# Patient Record
Sex: MALE | Race: WHITE | NOT HISPANIC OR LATINO | Employment: UNEMPLOYED | RURAL
[De-identification: names, ages, dates, MRNs, and addresses within clinical notes are randomized per-mention and may not be internally consistent; named-entity substitution may affect disease eponyms.]

---

## 2017-01-17 ENCOUNTER — OFFICE VISIT (OUTPATIENT)
Dept: FAMILY MEDICINE CLINIC | Facility: CLINIC | Age: 47
End: 2017-01-17

## 2017-01-17 VITALS
RESPIRATION RATE: 16 BRPM | TEMPERATURE: 98.6 F | SYSTOLIC BLOOD PRESSURE: 120 MMHG | WEIGHT: 285 LBS | HEIGHT: 71 IN | DIASTOLIC BLOOD PRESSURE: 80 MMHG | BODY MASS INDEX: 39.9 KG/M2 | HEART RATE: 84 BPM

## 2017-01-17 DIAGNOSIS — E78.5 HYPERLIPIDEMIA, UNSPECIFIED HYPERLIPIDEMIA TYPE: ICD-10-CM

## 2017-01-17 DIAGNOSIS — E66.9 NON MORBID OBESITY, UNSPECIFIED OBESITY TYPE: ICD-10-CM

## 2017-01-17 DIAGNOSIS — F90.2 ATTENTION DEFICIT HYPERACTIVITY DISORDER (ADHD), COMBINED TYPE: Primary | ICD-10-CM

## 2017-01-17 DIAGNOSIS — R73.03 PREDIABETES: ICD-10-CM

## 2017-01-17 DIAGNOSIS — R41.840 INATTENTION: ICD-10-CM

## 2017-01-17 LAB
ALBUMIN SERPL-MCNC: 3.7 GM/DL (ref 3.4–4.8)
ALP SERPL-CCNC: 91 U/L (ref 38–126)
ALT SERPL-CCNC: 45 U/L (ref 21–72)
AMPHETAMINES UR QL: NEGATIVE
ANION GAP SERPL CALCULATED.3IONS-SCNC: 10 MMOL/L (ref 5–15)
AST SERPL-CCNC: 27 U/L (ref 17–59)
BARBITURATES UR QL: NEGATIVE
BENZODIAZ UR QL: NEGATIVE
BILIRUB SERPL-MCNC: 0.8 MG/DL (ref 0.2–1.3)
BUN SERPL-MCNC: 16 MG/DL (ref 7–21)
CALCIUM SERPL-MCNC: 9.4 MG/DL (ref 8.4–10.2)
CANNABINOIDS UR QL SCN: NEGATIVE
CHLORIDE SERPL-SCNC: 101 MMOL/L (ref 95–110)
CHOLEST SERPL-MCNC: 252 MG/DL (ref 0–199)
CO2 SERPL-SCNC: 28 MMOL/L (ref 22–31)
COCAINE UR QL: NEGATIVE
CREAT SERPL-MCNC: 0.9 MG/DL (ref 0.7–1.3)
GLUCOSE SERPL-MCNC: 115 MG/DL (ref 60–100)
HBA1C MFR BLD CALC: 6 %TOTHGB (ref 4–5.6)
HDLC SERPL-MCNC: 41 MG/DL (ref 60–200)
LDLC SERPL CALC-MCNC: 156 MG/DL (ref 0–129)
METHADONE UR QL: NEGATIVE
OPIATES UR QL: NEGATIVE
OXYCODONE UR QL: NEGATIVE
POTASSIUM SERPL-SCNC: 4.8 MMOL/L (ref 3.5–5.1)
PROT SERPL-MCNC: 7 GM/DL (ref 6.3–8.6)
SODIUM SERPL-SCNC: 139 MMOL/L (ref 137–145)
TRIGL SERPL-MCNC: 273 MG/DL (ref 20–199)

## 2017-01-17 PROCEDURE — 99213 OFFICE O/P EST LOW 20 MIN: CPT | Performed by: FAMILY MEDICINE

## 2017-01-17 RX ORDER — DEXTROAMPHETAMINE SACCHARATE, AMPHETAMINE ASPARTATE MONOHYDRATE, DEXTROAMPHETAMINE SULFATE AND AMPHETAMINE SULFATE 2.5; 2.5; 2.5; 2.5 MG/1; MG/1; MG/1; MG/1
10 CAPSULE, EXTENDED RELEASE ORAL EVERY MORNING
Qty: 30 CAPSULE | Refills: 0 | Status: SHIPPED | OUTPATIENT
Start: 2017-01-17 | End: 2017-02-17 | Stop reason: SDUPTHER

## 2017-01-17 NOTE — MR AVS SNAPSHOT
Randall Hernandez   1/17/2017 8:45 AM   Office Visit    Dept Phone:  [phone]   Encounter #:  [id number]    Provider:  Scottie Borges MD   Department:  [facility]                Your Full Care Plan              Today's Medication Changes          These changes are accurate as of: 1/17/17  9:23 AM.  If you have any questions, ask your nurse or doctor.               New Medication(s)Ordered:     amphetamine-dextroamphetamine XR 10 MG 24 hr capsule   Commonly known as:  ADDERALL XR   Take 1 capsule by mouth Every Morning.   Started by:  Scottie Borges MD       aspirin 81 MG tablet   Take 1 tablet by mouth Daily.   Started by:  Scottie Borges MD            Where to Get Your Medications      These medications were sent to [email or web address] [address] [place] [address] AT [facility] [place] - [phone]  - [phone]   [address] [facility] [address]     Phone:  [phone]     aspirin 81 MG tablet         You can get these medications from any pharmacy     Bring a paper prescription for each of these medications     amphetamine-dextroamphetamine XR 10 MG 24 hr capsule                  Your Updated Medication List          This list is accurate as of: 1/17/17  9:23 AM.  Always use your most recent med list.                amphetamine-dextroamphetamine XR 10 MG 24 hr capsule   Commonly known as:  ADDERALL XR   Take 1 capsule by mouth Every Morning.       aspirin 81 MG tablet   Take 1 tablet by mouth Daily.       atorvastatin 20 MG tablet   Commonly known as:  LIPITOR   Take 1 tablet by mouth Every Night.       fluticasone 50 MCG/ACT nasal spray   Commonly known as:  FLONASE       metFORMIN 500 MG tablet   Commonly known as:  GLUCOPHAGE   Take 1 tablet by mouth 2 (Two) Times a Day With Meals.       montelukast 10 MG tablet   Commonly known as:  SINGULAIR       pantoprazole 40  MG EC tablet   Commonly known as:  PROTONIX               We Performed the Following     CBC Auto Differential     Comprehensive Metabolic Panel     Hemoglobin A1c     Lipid Panel     Urine Drug Screen       You Were Diagnosed With        Codes Comments    Attention deficit hyperactivity disorder (ADHD), combined type    -  Primary ICD-10-CM: F90.2  ICD-9-CM: 314.01     Hyperlipidemia, unspecified hyperlipidemia type     ICD-10-CM: E78.5  ICD-9-CM: 272.4     Inattention     ICD-10-CM: R41.840  ICD-9-CM: 799.51     Non morbid obesity, unspecified obesity type     ICD-10-CM: E66.9  ICD-9-CM: 278.00     Prediabetes     ICD-10-CM: R73.03  ICD-9-CM: 790.29       Instructions    10 DAY DETOX DIET AND EAT FAT GET THIN BY DR. CORDELL CARTER  Probiotic over the counter  Amphetamine; Dextroamphetamine extended-release capsules  What is this medicine?  AMPHETAMINE; DEXTROAMPHETAMINE (am FET a meen; dex troe am FET a meen) is used to treat attention-deficit hyperactivity disorder (ADHD). Federal law prohibits giving this medicine to any person other than the person for whom it was prescribed. Do not share this medicine with anyone else.  This medicine may be used for other purposes; ask your health care provider or pharmacist if you have questions.  What should I tell my health care provider before I take this medicine?  They need to know if you have any of these conditions:  -anxiety or panic attacks  -circulation problems in fingers and toes  -glaucoma  -hardening or blockages of the arteries or heart blood vessels  -heart disease or a heart defect  -high blood pressure  -history of a drug or alcohol abuse problem  -history of stroke  -kidney disease  -liver disease  -mental illness  -seizures  -suicidal thoughts, plans, or attempt; a previous suicide attempt by you or a family member  -thyroid disease  -Tourette's syndrome  -an unusual or allergic reaction to dextroamphetamine, other amphetamines, other medicines, foods, dyes,  or preservatives  -pregnant or trying to get pregnant  -breast-feeding  How should I use this medicine?  Take this medicine by mouth with a glass of water. Follow the directions on the prescription label. This medicine is taken just one time per day, usually in the morning after waking up. Take with or without food. Do not chew or crush this medicine. You may open the capsules and sprinkle the medicine on a spoonful of applesauce. If sprinkled on applesauce, take the dose immediately and do not crush or chew. Always drink a glass of water or other liquid after taking this medicine. Do not take your medicine more often than directed.  A special MedGuide will be given to you by the pharmacist with each prescription and refill. Be sure to read this information carefully each time.  Talk to your pediatrician regarding the use of this medicine in children. While this drug may be prescribed for children as young as 6 years for selected conditions, precautions do apply.  Overdosage: If you think you have taken too much of this medicine contact a poison control center or emergency room at once.  NOTE: This medicine is only for you. Do not share this medicine with others.  What if I miss a dose?  If you miss a dose, take it as soon as you can. If it is almost time for your next dose, take only that dose. Do not take double or extra doses.  What may interact with this medicine?  Do not take this medicine with any of the following medications:  -MAOIs like Carbex, Eldepryl, Marplan, Nardil, and Parnate  -other stimulant medicines for attention disorders, weight loss, or to stay awake  This medicine may also interact with the following medications:  -acetazolamide  -ammonium chloride  -antacids  -ascorbic acid  -atomoxetine  -caffeine  -certain medicines for blood pressure  -certain medicines for depression, anxiety, or psychotic disturbances  -certain medicines for diabetes  -certain medicines for seizures like carbamazepine,  phenobarbital, phenytoin  -certain medicines for stomach problems like cimetidine, famotidine, omeprazole, lansoprazole  -cold or allergy medicines  -glutamic acid  -lithium  -meperidine  -methenamine; sodium acid phosphate  -narcotic medicines for pain  -norepinephrine  -phenothiazines like chlorpromazine, mesoridazine, prochlorperazine, thioridazine  -sodium bicarbonate  This list may not describe all possible interactions. Give your health care provider a list of all the medicines, herbs, non-prescription drugs, or dietary supplements you use. Also tell them if you smoke, drink alcohol, or use illegal drugs. Some items may interact with your medicine.  What should I watch for while using this medicine?  Visit your doctor or health care professional for regular checks on your progress. This prescription requires that you follow special procedures with your doctor and pharmacy. You will need to have a new written prescription from your doctor every time you need a refill.  This medicine may affect your concentration, or hide signs of tiredness. Until you know how this medicine affects you, do not drive, ride a bicycle, use machinery, or do anything that needs mental alertness.  Tell your doctor or health care professional if this medicine loses its effects, or if you feel you need to take more than the prescribed amount. Do not change the dosage without talking to your doctor or health care professional.  Decreased appetite is a common side effect when starting this medicine. Eating small, frequent meals or snacks can help. Talk to your doctor if you continue to have poor eating habits. Height and weight growth of a child taking this medicine will be monitored closely.  Do not take this medicine close to bedtime. It may prevent you from sleeping.  If you are going to need surgery, an MRI, a CT scan, or other procedure, tell your doctor that you are taking this medicine. You may need to stop taking this medicine  before the procedure.  Tell your doctor or healthcare professional right away if you notice unexplained wounds on your fingers and toes while taking this medicine. You should also tell your healthcare provider if you experience numbness or pain, changes in the skin color, or sensitivity to temperature in your fingers or toes.  What side effects may I notice from receiving this medicine?  Side effects that you should report to your doctor or health care professional as soon as possible:  -allergic reactions like skin rash, itching or hives, swelling of the face, lips, or tongue  -changes in vision  -chest pain or chest tightness  -confusion, trouble speaking or understanding  -fast, irregular heartbeat  -fingers or toes feel numb, cool, painful  -hallucination, loss of contact with reality  -high blood pressure  -males: prolonged or painful erection  -seizures  -severe headaches  -shortness of breath  -suicidal thoughts or other mood changes  -trouble walking, dizziness, loss of balance or coordination  -uncontrollable head, mouth, neck, arm, or leg movements  Side effects that usually do not require medical attention (report to your doctor or health care professional if they continue or are bothersome):  -anxious  -headache  -loss of appetite  -nausea, vomiting  -trouble sleeping  -weight loss  This list may not describe all possible side effects. Call your doctor for medical advice about side effects. You may report side effects to FDA at 8-036-FDA-3084.  Where should I keep my medicine?  Keep out of the reach of children. This medicine can be abused. Keep your medicine in a safe place to protect it from theft. Do not share this medicine with anyone. Selling or giving away this medicine is dangerous and against the law.  Store at room temperature between 15 and 30 degrees C (59 and 86 degrees F). Keep container tightly closed. Protect from light. Throw away any unused medicine after the expiration date.  NOTE: This  "sheet is a summary. It may not cover all possible information. If you have questions about this medicine, talk to your doctor, pharmacist, or health care provider.     © 2016, [employer]. (2015-10-21 18:22:45)       Patient Instructions History      Upcoming Appointments     Visit Type Date Time Department    OFFICE VISIT 1/17/2017  8:45 AM [facility]    OFFICE VISIT 2/17/2017  3:45 PM [facility]      MyChart Signup     Our records indicate that you have declined [facility] sign[facility]. If you would like to sign up for [employer], please email [email or web address] or call [phone] to obtain an activation code.             Other Info from Your Visit           Your Appointments     Feb 17, 2017  3:45 PM CST   Office Visit with Scottie Borges MD   [facility] (--)    [address] [place] [address]  [facility] [address] [phone]           Arrive 15 minutes prior to appointment.              Allergies     No Known Allergies      Reason for Visit     Med Refill           Vital Signs     Blood Pressure Pulse Temperature Respirations Height Weight    120/80 84 98.6 °F (37 °C) 16 71\" (180.3 cm) 285 lb (129 kg)    Body Mass Index Smoking Status                39.75 kg/m2 Former Smoker          Problems and Diagnoses Noted     ADHD (attention deficit hyperactivity disorder)    High cholesterol or triglycerides    Inattention    Non morbid obesity    Prediabetes        "

## 2017-01-17 NOTE — PROGRESS NOTES
"Subjective   Randall Hernandez is a 46 y.o. male.     History of Present Illness     Problem List  Problem list  1. Obesity  2. Prediabetes  3. Hyperlipidemia  4. Allergic Rhinitis  5. ADHD,combined type    Patient is 47 yo WM with the above medical issues. Is here for recheck. Recently got insurance and was seen by Dr. Correia (Clinical Psychologist) for ADHD evaluation.  Per evaluation pt has combined ADHD in attentive,hyperactive type.  Is not on medication currently.  Was on Adderal before but could not remember dosage or if it was shortacting or long acting. States he still has issues with focus at work. Forgets to take some of medications such as medications for prediabetes,  Hyperlipidemia and allergy medication    Pt also has issues with weight and wants to lose weight.  States he eats a lot of processed food. Once a week would have parties with other motorcycle bikers and people would bring lots of food.  Has not had focus to try to change diet and lose weight. ASCVD risk high. Currently on statin therapy for hyperlipidemia.  Pt is also prediabetic        The following portions of the patient's history were reviewed and updated as appropriate: allergies, current medications, past family history, past medical history, past social history, past surgical history and problem list.    Review of Systems   Constitutional: Negative.    HENT: Negative.    Eyes: Negative.    Respiratory: Negative.    Cardiovascular: Negative.    Gastrointestinal: Negative.    Endocrine: Negative.    Genitourinary: Negative.    Musculoskeletal: Negative.    Skin: Negative.    Allergic/Immunologic: Negative.    Neurological: Negative.    Hematological: Negative.    Psychiatric/Behavioral: Positive for decreased concentration.       Objective    Visit Vitals   • /80   • Pulse 84   • Temp 98.6 °F (37 °C)   • Resp 16   • Ht 71\" (180.3 cm)   • Wt 285 lb (129 kg)   • BMI 39.75 kg/m2         Chemistry        Component Value Date/Time    "  10/07/2016 1328    K 5.0 10/07/2016 1328     10/07/2016 1328    CO2 29 10/07/2016 1328    BUN 11 10/07/2016 1328    CREATININE 0.9 10/07/2016 1328        Component Value Date/Time    CALCIUM 9.3 10/07/2016 1328    ALKPHOS 88 10/07/2016 1328    AST 30 10/07/2016 1328    ALT 45 10/07/2016 1328    BILITOT 0.6 10/07/2016 1328        Lab Results   Component Value Date    WBC 7.4 10/07/2016    HGB 15.0 10/07/2016    HCT 45.4 10/07/2016    MCV 91.9 10/07/2016     10/07/2016     No results found for: CHOL  Lab Results   Component Value Date    TRIG 194 10/07/2016    TRIG 246 (H) 02/24/2016     Lab Results   Component Value Date    HDL 39 (L) 10/07/2016    HDL 37 (L) 02/24/2016     Lab Results   Component Value Date    LDLCALC 196 (H) 10/07/2016    LDLCALC 166 (H) 02/24/2016     No results found for: LDL  No results found for: HDLLDLRATIO  No components found for: CHOLHDL  Lab Results   Component Value Date    HGBA1C 5.7 (H) 10/07/2016     Lab Results   Component Value Date    TSH 3.05 02/24/2016     Physical Exam   Constitutional: He is oriented to person, place, and time. He appears well-developed and well-nourished. No distress.   HENT:   Head: Normocephalic and atraumatic.   Right Ear: External ear normal.   Left Ear: External ear normal.   Eyes: Conjunctivae and EOM are normal. Pupils are equal, round, and reactive to light. Right eye exhibits no discharge. Left eye exhibits no discharge. No scleral icterus.   Neck: Normal range of motion. Neck supple. No JVD present. No tracheal deviation present. No thyromegaly present.   Cardiovascular: Normal rate, regular rhythm and normal heart sounds.  Exam reveals no friction rub.    No murmur heard.  Pulmonary/Chest: Effort normal and breath sounds normal. No stridor. No respiratory distress. He has no wheezes.   Abdominal: Soft. Bowel sounds are normal. He exhibits no distension and no mass. There is no tenderness. There is no rebound and no guarding. No  hernia.   Musculoskeletal: Normal range of motion. He exhibits no edema, tenderness or deformity.   Lymphadenopathy:     He has no cervical adenopathy.   Neurological: He is alert and oriented to person, place, and time. He has normal reflexes. No cranial nerve deficit. Coordination normal.   Skin: Skin is warm and dry. No rash noted. He is not diaphoretic. No erythema. No pallor.   Psychiatric: He has a normal mood and affect. His behavior is normal. Judgment and thought content normal.   Nursing note and vitals reviewed.      Assessment/Plan   Problems Addressed this Visit        Cardiovascular and Mediastinum    Hyperlipidemia    Relevant Orders    CBC Auto Differential    Comprehensive Metabolic Panel    Hemoglobin A1c    Lipid Panel       Digestive    Non morbid obesity       Other    Inattention    Prediabetes    Attention deficit hyperactivity disorder (ADHD), combined type - Primary    Relevant Medications    amphetamine-dextroamphetamine XR (ADDERALL XR) 10 MG 24 hr capsule    Other Relevant Orders    Urine Drug Screen        -  For hyperlipidemia will repeat lipid panel ASCVD risk >5% continue with statin  - aspirin 81 mg PO q daily for MI prevention  - for prediabetes - recheck hga1c - continue with metformin 500 mg PO BId  -  For ADHD inattentive type will start on Adderall XR 10 mg PO q daily.  Pt to take at 6 am before going to work. Drug information given. BP at goal today.  ERWIN printed and on file.  Will get UDS today  - recheck in 1 month

## 2017-01-17 NOTE — PATIENT INSTRUCTIONS
10 DAY DETOX DIET AND EAT FAT GET THIN BY DR. CORDELL Hermosillo over the counter  Amphetamine; Dextroamphetamine extended-release capsules  What is this medicine?  AMPHETAMINE; DEXTROAMPHETAMINE (am FET a meen; dex troe am FET a meen) is used to treat attention-deficit hyperactivity disorder (ADHD). Federal law prohibits giving this medicine to any person other than the person for whom it was prescribed. Do not share this medicine with anyone else.  This medicine may be used for other purposes; ask your health care provider or pharmacist if you have questions.  What should I tell my health care provider before I take this medicine?  They need to know if you have any of these conditions:  -anxiety or panic attacks  -circulation problems in fingers and toes  -glaucoma  -hardening or blockages of the arteries or heart blood vessels  -heart disease or a heart defect  -high blood pressure  -history of a drug or alcohol abuse problem  -history of stroke  -kidney disease  -liver disease  -mental illness  -seizures  -suicidal thoughts, plans, or attempt; a previous suicide attempt by you or a family member  -thyroid disease  -Tourette's syndrome  -an unusual or allergic reaction to dextroamphetamine, other amphetamines, other medicines, foods, dyes, or preservatives  -pregnant or trying to get pregnant  -breast-feeding  How should I use this medicine?  Take this medicine by mouth with a glass of water. Follow the directions on the prescription label. This medicine is taken just one time per day, usually in the morning after waking up. Take with or without food. Do not chew or crush this medicine. You may open the capsules and sprinkle the medicine on a spoonful of applesauce. If sprinkled on applesauce, take the dose immediately and do not crush or chew. Always drink a glass of water or other liquid after taking this medicine. Do not take your medicine more often than directed.  A special MedGuide will be given to you  by the pharmacist with each prescription and refill. Be sure to read this information carefully each time.  Talk to your pediatrician regarding the use of this medicine in children. While this drug may be prescribed for children as young as 6 years for selected conditions, precautions do apply.  Overdosage: If you think you have taken too much of this medicine contact a poison control center or emergency room at once.  NOTE: This medicine is only for you. Do not share this medicine with others.  What if I miss a dose?  If you miss a dose, take it as soon as you can. If it is almost time for your next dose, take only that dose. Do not take double or extra doses.  What may interact with this medicine?  Do not take this medicine with any of the following medications:  -MAOIs like Carbex, Eldepryl, Marplan, Nardil, and Parnate  -other stimulant medicines for attention disorders, weight loss, or to stay awake  This medicine may also interact with the following medications:  -acetazolamide  -ammonium chloride  -antacids  -ascorbic acid  -atomoxetine  -caffeine  -certain medicines for blood pressure  -certain medicines for depression, anxiety, or psychotic disturbances  -certain medicines for diabetes  -certain medicines for seizures like carbamazepine, phenobarbital, phenytoin  -certain medicines for stomach problems like cimetidine, famotidine, omeprazole, lansoprazole  -cold or allergy medicines  -glutamic acid  -lithium  -meperidine  -methenamine; sodium acid phosphate  -narcotic medicines for pain  -norepinephrine  -phenothiazines like chlorpromazine, mesoridazine, prochlorperazine, thioridazine  -sodium bicarbonate  This list may not describe all possible interactions. Give your health care provider a list of all the medicines, herbs, non-prescription drugs, or dietary supplements you use. Also tell them if you smoke, drink alcohol, or use illegal drugs. Some items may interact with your medicine.  What should I  watch for while using this medicine?  Visit your doctor or health care professional for regular checks on your progress. This prescription requires that you follow special procedures with your doctor and pharmacy. You will need to have a new written prescription from your doctor every time you need a refill.  This medicine may affect your concentration, or hide signs of tiredness. Until you know how this medicine affects you, do not drive, ride a bicycle, use machinery, or do anything that needs mental alertness.  Tell your doctor or health care professional if this medicine loses its effects, or if you feel you need to take more than the prescribed amount. Do not change the dosage without talking to your doctor or health care professional.  Decreased appetite is a common side effect when starting this medicine. Eating small, frequent meals or snacks can help. Talk to your doctor if you continue to have poor eating habits. Height and weight growth of a child taking this medicine will be monitored closely.  Do not take this medicine close to bedtime. It may prevent you from sleeping.  If you are going to need surgery, an MRI, a CT scan, or other procedure, tell your doctor that you are taking this medicine. You may need to stop taking this medicine before the procedure.  Tell your doctor or healthcare professional right away if you notice unexplained wounds on your fingers and toes while taking this medicine. You should also tell your healthcare provider if you experience numbness or pain, changes in the skin color, or sensitivity to temperature in your fingers or toes.  What side effects may I notice from receiving this medicine?  Side effects that you should report to your doctor or health care professional as soon as possible:  -allergic reactions like skin rash, itching or hives, swelling of the face, lips, or tongue  -changes in vision  -chest pain or chest tightness  -confusion, trouble speaking or  understanding  -fast, irregular heartbeat  -fingers or toes feel numb, cool, painful  -hallucination, loss of contact with reality  -high blood pressure  -males: prolonged or painful erection  -seizures  -severe headaches  -shortness of breath  -suicidal thoughts or other mood changes  -trouble walking, dizziness, loss of balance or coordination  -uncontrollable head, mouth, neck, arm, or leg movements  Side effects that usually do not require medical attention (report to your doctor or health care professional if they continue or are bothersome):  -anxious  -headache  -loss of appetite  -nausea, vomiting  -trouble sleeping  -weight loss  This list may not describe all possible side effects. Call your doctor for medical advice about side effects. You may report side effects to FDA at 5-484-FDA-5068.  Where should I keep my medicine?  Keep out of the reach of children. This medicine can be abused. Keep your medicine in a safe place to protect it from theft. Do not share this medicine with anyone. Selling or giving away this medicine is dangerous and against the law.  Store at room temperature between 15 and 30 degrees C (59 and 86 degrees F). Keep container tightly closed. Protect from light. Throw away any unused medicine after the expiration date.  NOTE: This sheet is a summary. It may not cover all possible information. If you have questions about this medicine, talk to your doctor, pharmacist, or health care provider.     © 2016, [employer]. (2015-10-21 18:22:45)

## 2017-01-20 ENCOUNTER — TELEPHONE (OUTPATIENT)
Dept: PEDIATRICS | Facility: CLINIC | Age: 47
End: 2017-01-20

## 2017-01-20 RX ORDER — ATORVASTATIN CALCIUM 20 MG/1
40 TABLET, FILM COATED ORAL DAILY
Qty: 30 TABLET | Refills: 11 | Status: SHIPPED | OUTPATIENT
Start: 2017-01-20 | End: 2017-01-23 | Stop reason: DRUGHIGH

## 2017-01-20 NOTE — TELEPHONE ENCOUNTER
Spoke with patient and relayed message and medication change. Patient expressed understanding.      From: Scottie Borges MD     Call pt please.  [id number] got worse and is now at 6.0.  He is prediabetic.  Increase metformin from 500 mg PO BID to 1000 mg PO BID. Give 60 pills and 11 refills.  Please send to his pharmacy.  Have pt take 2 pills of 500 =1000 mg of metformin twice a day so he doesn't waste medication.  Also cholesterol levels still high.  Send new prescription of lipitor 40 mg PO qhs to [facility]. Give 30 pills and 11 refills.  Have pt take 2 pills of 20 mg of lipitor at bedtime = 40 mg so he doesn't waste medication.  Then  new medications.  Have pt call me back with any questions. Thanks

## 2017-01-23 ENCOUNTER — TELEPHONE (OUTPATIENT)
Dept: FAMILY MEDICINE CLINIC | Facility: CLINIC | Age: 47
End: 2017-01-23

## 2017-01-23 RX ORDER — ATORVASTATIN CALCIUM 40 MG/1
40 TABLET, FILM COATED ORAL DAILY
Qty: 30 TABLET | Refills: 5 | Status: SHIPPED | OUTPATIENT
Start: 2017-01-23 | End: 2017-01-23 | Stop reason: SDUPTHER

## 2017-01-23 RX ORDER — ATORVASTATIN CALCIUM 40 MG/1
40 TABLET, FILM COATED ORAL DAILY
Qty: 30 TABLET | Refills: 11 | Status: SHIPPED | OUTPATIENT
Start: 2017-01-23 | End: 2017-11-13 | Stop reason: SDUPTHER

## 2017-01-23 NOTE — TELEPHONE ENCOUNTER
----- Message from Araceli Howell sent at 1/23/2017  8:20 AM CST -----  Regarding: pa  Patient called wanting to know the status of his PA.

## 2017-02-13 RX ORDER — MONTELUKAST SODIUM 10 MG/1
10 TABLET ORAL NIGHTLY
Qty: 30 TABLET | Refills: 11 | Status: SHIPPED | OUTPATIENT
Start: 2017-02-13 | End: 2018-02-27 | Stop reason: SDUPTHER

## 2017-02-17 ENCOUNTER — OFFICE VISIT (OUTPATIENT)
Dept: FAMILY MEDICINE CLINIC | Facility: CLINIC | Age: 47
End: 2017-02-17

## 2017-02-17 VITALS
HEART RATE: 87 BPM | TEMPERATURE: 98.2 F | WEIGHT: 274 LBS | OXYGEN SATURATION: 98 % | SYSTOLIC BLOOD PRESSURE: 120 MMHG | DIASTOLIC BLOOD PRESSURE: 82 MMHG | BODY MASS INDEX: 38.36 KG/M2 | HEIGHT: 71 IN

## 2017-02-17 DIAGNOSIS — F90.2 ATTENTION DEFICIT HYPERACTIVITY DISORDER (ADHD), COMBINED TYPE: Primary | ICD-10-CM

## 2017-02-17 DIAGNOSIS — E66.9 NON MORBID OBESITY, UNSPECIFIED OBESITY TYPE: ICD-10-CM

## 2017-02-17 DIAGNOSIS — E78.5 HYPERLIPIDEMIA, UNSPECIFIED HYPERLIPIDEMIA TYPE: ICD-10-CM

## 2017-02-17 DIAGNOSIS — Z23 NEED FOR VACCINATION: ICD-10-CM

## 2017-02-17 DIAGNOSIS — R73.03 PREDIABETES: ICD-10-CM

## 2017-02-17 PROCEDURE — 99213 OFFICE O/P EST LOW 20 MIN: CPT | Performed by: FAMILY MEDICINE

## 2017-02-17 PROCEDURE — 90715 TDAP VACCINE 7 YRS/> IM: CPT | Performed by: FAMILY MEDICINE

## 2017-02-17 PROCEDURE — 90471 IMMUNIZATION ADMIN: CPT | Performed by: FAMILY MEDICINE

## 2017-02-17 RX ORDER — DEXTROAMPHETAMINE SACCHARATE, AMPHETAMINE ASPARTATE MONOHYDRATE, DEXTROAMPHETAMINE SULFATE AND AMPHETAMINE SULFATE 2.5; 2.5; 2.5; 2.5 MG/1; MG/1; MG/1; MG/1
10 CAPSULE, EXTENDED RELEASE ORAL EVERY MORNING
Qty: 30 CAPSULE | Refills: 0 | Status: SHIPPED | OUTPATIENT
Start: 2017-02-17 | End: 2017-03-28 | Stop reason: SDUPTHER

## 2017-02-17 RX ORDER — ASPIRIN 81 MG
TABLET, DELAYED RELEASE (ENTERIC COATED) ORAL
Refills: 11 | COMMUNITY
Start: 2017-01-17 | End: 2017-06-02

## 2017-02-17 RX ORDER — ACETAMINOPHEN AND CODEINE PHOSPHATE 300; 30 MG/1; MG/1
1 TABLET ORAL EVERY 6 HOURS PRN
Qty: 60 TABLET | Refills: 0 | Status: SHIPPED | OUTPATIENT
Start: 2017-02-17 | End: 2017-02-17

## 2017-02-17 NOTE — PROGRESS NOTES
"Subjective   Randall Hernandez is a 46 y.o. male.     History of Present Illness     Problem list  1. Obesity  2. Prediabetes  3. Hyperlipidemia  4. Allergic Rhinitis  5. ADHD,combined type  6. Hyperinsular Obesity     Pt is 45 yo WM with the above medical issues. Is here for recheck and rfills on ADHD.  Currently on Addeall XR 10 mg every day. States medication is helping with focus and attention. No chest pain, no headaches    Also is prediabetic based on labwork. Currently on aspirin 81 mg PO q daily along with lipitor 40 mg PO qhs for hyperlipidemia.  Tolerating medication well    Also has hyperinsular obesity and is on metformin and tolerating medication well. Lost 10 lbs since last visit         The following portions of the patient's history were reviewed and updated as appropriate: allergies, current medications, past family history, past medical history, past social history, past surgical history and problem list.    Review of Systems   Constitutional: Negative.    HENT: Negative.    Eyes: Negative.    Respiratory: Negative.    Cardiovascular: Negative.    Gastrointestinal: Negative.    Endocrine: Negative.    Genitourinary: Negative.    Musculoskeletal: Negative.    Skin: Negative.    Allergic/Immunologic: Negative.    Neurological: Negative.    Hematological: Negative.    Psychiatric/Behavioral: Negative.        Objective    Visit Vitals   • /82 (BP Location: Right arm, Patient Position: Sitting, Cuff Size: Adult)   • Pulse 87   • Temp 98.2 °F (36.8 °C) (Oral)   • Ht 71\" (180.3 cm)   • Wt 274 lb (124 kg)   • SpO2 98%   • BMI 38.22 kg/m2     Hospital Outpatient Visit on 01/17/2017   Component Date Value Ref Range Status   • WBC 01/17/2017 6.5  3.2 - 9.8 x1000/uL Final   • RBC 01/17/2017 4.72  4.37 - 5.74 case/mm3 Final   • Hemoglobin 01/17/2017 14.2  13.7 - 17.3 gm/dl Final   • Hematocrit 01/17/2017 42.9  39.0 - 49.0 % Final   • MCV 01/17/2017 90.9  80.0 - 98.0 fl Final   • MCH 01/17/2017 30.1  26.0 - " 34.0 pg Final   • MCHC 01/17/2017 33.1  31.5 - 36.3 gm/dl Final   • RDW 01/17/2017 13.4  11.5 - 14.5 % Final   • Platelets 01/17/2017 288  150 - 450 x1000/mm3 Final   • MPV 01/17/2017 9.8  8.0 - 12.0 fl Final   • Neutrophil Rel % 01/17/2017 53.4  37.0 - 80.0 % Final   • Lymphocyte Rel % 01/17/2017 33.5  10.0 - 50.0 % Final   • Monocyte Rel % 01/17/2017 8.9  0.0 - 12.0 % Final   • Eosinophil Rel % 01/17/2017 2.8  0.0 - 7.0 % Final   • Basophil Rel % 01/17/2017 0.8  0.0 - 2.0 % Final   • Immature Granulocyte Rel % 01/17/2017 0.60* 0.00 - 0.50 % Final   • Neutrophils Absolute 01/17/2017 3.49  2.00 - 8.60 x1000/uL Final   • Lymphocytes Absolute 01/17/2017 2.19  0.60 - 4.20 x1000/uL Final   • Monocytes Absolute 01/17/2017 0.58  0.00 - 0.90 x1000/uL Final   • Eosinophils Absolute 01/17/2017 0.18  0.00 - 0.70 x1000/uL Final   • Basophils Absolute 01/17/2017 0.05  0.00 - 0.20 x1000/uL Final   • Immature Granulocytes Absolute 01/17/2017 0.040* 0.005 - 0.022 x1000/uL Final   • nRBC 01/17/2017 0.0  0.0 - 0.2 % Final   • nRBC 01/17/2017 0.000  x1000/uL Final       Physical Exam   Constitutional: He is oriented to person, place, and time. He appears well-developed and well-nourished. No distress.   HENT:   Head: Normocephalic and atraumatic.   Right Ear: External ear normal.   Left Ear: External ear normal.   Eyes: Conjunctivae and EOM are normal. Pupils are equal, round, and reactive to light. Right eye exhibits no discharge. Left eye exhibits no discharge. No scleral icterus.   Neck: Normal range of motion. Neck supple. No JVD present. No tracheal deviation present. No thyromegaly present.   Cardiovascular: Normal rate, regular rhythm and normal heart sounds.    Pulmonary/Chest: Effort normal and breath sounds normal. No stridor. No respiratory distress. He has no wheezes.   Abdominal: Soft. Bowel sounds are normal. He exhibits no distension and no mass. There is no tenderness. There is no rebound and no guarding. No hernia.    Musculoskeletal: Normal range of motion. He exhibits no edema, tenderness or deformity.   Lymphadenopathy:     He has no cervical adenopathy.   Neurological: He is alert and oriented to person, place, and time. He has normal reflexes. No cranial nerve deficit. Coordination normal.   Skin: Skin is warm and dry. No rash noted. He is not diaphoretic. No erythema. No pallor.   Psychiatric: He has a normal mood and affect. His behavior is normal. Judgment and thought content normal.   Nursing note and vitals reviewed.      Assessment/Plan   Problems Addressed this Visit        Cardiovascular and Mediastinum    Hyperlipidemia       Digestive    Non morbid obesity       Other    Prediabetes    Attention deficit hyperactivity disorder (ADHD), combined type - Primary    Relevant Medications    amphetamine-dextroamphetamine XR (ADDERALL XR) 10 MG 24 hr capsule    Other Relevant Orders    Urine Drug Screen    Need for vaccination    Relevant Orders    Tdap Vaccine Greater Than or Equal To 6yo IM (Completed)        - For ADHD - refilled medication today. Gave 30 pills.  Pt to get urine drug screen today  - tetanus vaccination today  - for prediabetes - prediabetes meal plan  - hyperlipidemia - ASCVD risk high - aspirin and lipitor  - recheck in 1 month  - advised high healthy fat diet low sugar and low carb diet

## 2017-02-17 NOTE — PATIENT INSTRUCTIONS
CORDELL CARTER MD -  EAT FAT GET THIN, 10 day detox diet and Blood sugar solution  Probiotic - good bacteria.  A billion cultures.  Daily  [employer]

## 2017-03-28 ENCOUNTER — OFFICE VISIT (OUTPATIENT)
Dept: FAMILY MEDICINE CLINIC | Facility: CLINIC | Age: 47
End: 2017-03-28

## 2017-03-28 VITALS
HEART RATE: 96 BPM | HEIGHT: 71 IN | BODY MASS INDEX: 37.8 KG/M2 | SYSTOLIC BLOOD PRESSURE: 136 MMHG | TEMPERATURE: 99.2 F | WEIGHT: 270 LBS | DIASTOLIC BLOOD PRESSURE: 86 MMHG | RESPIRATION RATE: 16 BRPM

## 2017-03-28 DIAGNOSIS — R09.81 NASAL CONGESTION: ICD-10-CM

## 2017-03-28 DIAGNOSIS — Z23 NEED FOR IMMUNIZATION AGAINST INFLUENZA: ICD-10-CM

## 2017-03-28 DIAGNOSIS — F90.9 ATTENTION DEFICIT HYPERACTIVITY DISORDER (ADHD), UNSPECIFIED ADHD TYPE: Primary | ICD-10-CM

## 2017-03-28 LAB
EXPIRATION DATE: NORMAL
FLUAV AG NPH QL: NORMAL
FLUBV AG NPH QL: NORMAL
INTERNAL CONTROL: NORMAL
Lab: NORMAL

## 2017-03-28 PROCEDURE — 80307 DRUG TEST PRSMV CHEM ANLYZR: CPT | Performed by: FAMILY MEDICINE

## 2017-03-28 PROCEDURE — 87804 INFLUENZA ASSAY W/OPTIC: CPT | Performed by: FAMILY MEDICINE

## 2017-03-28 PROCEDURE — 85025 COMPLETE CBC W/AUTO DIFF WBC: CPT | Performed by: FAMILY MEDICINE

## 2017-03-28 PROCEDURE — 90471 IMMUNIZATION ADMIN: CPT | Performed by: FAMILY MEDICINE

## 2017-03-28 PROCEDURE — 99213 OFFICE O/P EST LOW 20 MIN: CPT | Performed by: FAMILY MEDICINE

## 2017-03-28 PROCEDURE — 90658 IIV3 VACCINE SPLT 0.5 ML IM: CPT | Performed by: FAMILY MEDICINE

## 2017-03-28 PROCEDURE — 83525 ASSAY OF INSULIN: CPT | Performed by: FAMILY MEDICINE

## 2017-03-28 RX ORDER — OSELTAMIVIR PHOSPHATE 75 MG/1
75 CAPSULE ORAL 2 TIMES DAILY
Qty: 10 CAPSULE | Refills: 0 | Status: SHIPPED | OUTPATIENT
Start: 2017-03-28 | End: 2017-12-08

## 2017-03-28 RX ORDER — DEXTROAMPHETAMINE SACCHARATE, AMPHETAMINE ASPARTATE MONOHYDRATE, DEXTROAMPHETAMINE SULFATE AND AMPHETAMINE SULFATE 2.5; 2.5; 2.5; 2.5 MG/1; MG/1; MG/1; MG/1
10 CAPSULE, EXTENDED RELEASE ORAL EVERY MORNING
Qty: 30 CAPSULE | Refills: 0 | Status: SHIPPED | OUTPATIENT
Start: 2017-03-28 | End: 2017-05-05 | Stop reason: SDUPTHER

## 2017-03-28 NOTE — PROGRESS NOTES
"Subjective   Randall Hernandez is a 46 y.o. male.     History of Present Illness     Problem list  1. Obesity  2. Prediabetes  3. Hyperlipidemia  4. Allergic Rhinitis  5. ADHD,combined type  6. Hyperinsular Obesity     Pt is 47 yo WM with the above medical issues. Is here for recheck and refill on ADHD medication. Pt is currently on Adderall XR 10 mg daily.  Last urine drug screen was negative and will need another one today.  States medication is helping with focus and attention. No issues with appetite or sleep.    Pt also has had recent exposure to flu. Wife has been tested positive for flu.  Has started developing cough and congestion but no muscles aches, or fever. Would like to be tested for flu today.  VS are stable today.        The following portions of the patient's history were reviewed and updated as appropriate: allergies, current medications, past family history, past medical history, past social history, past surgical history and problem list.    Review of Systems   Constitutional: Negative.    HENT: Positive for congestion.    Eyes: Negative.    Respiratory: Positive for cough.    Cardiovascular: Negative.    Gastrointestinal: Negative.    Endocrine: Negative.    Genitourinary: Negative.    Musculoskeletal: Negative.    Skin: Negative.    Allergic/Immunologic: Negative.    Neurological: Negative.    Hematological: Negative.    Psychiatric/Behavioral: Negative.        Objective    /86  Pulse 96  Temp 99.2 °F (37.3 °C)  Resp 16  Ht 71\" (180.3 cm)  Wt 270 lb (122 kg)  BMI 37.66 kg/m2     POCT Influenza A/B   Order: [id number]   Status:  Final result   Visible to patient:  No (Not Released) Dx:  Nasal congestion      Ref Range & Units 11:11 AM     Rapid Influenza A Ag  neg   Rapid Influenza B Ag  neg   Internal Control Passed Passed   Lot Number  [id number]   Expiration Date  07/02/2018   [facility]      Specimen Collected: 03/28/17 11:11 AM Last Resulted: " 03/28/17 11:11 AM                Hospital Outpatient Visit on 01/17/2017   Component Date Value Ref Range Status   • WBC 01/17/2017 6.5  3.2 - 9.8 x1000/uL Final   • RBC 01/17/2017 4.72  4.37 - 5.74 case/mm3 Final   • Hemoglobin 01/17/2017 14.2  13.7 - 17.3 gm/dl Final   • Hematocrit 01/17/2017 42.9  39.0 - 49.0 % Final   • MCV 01/17/2017 90.9  80.0 - 98.0 fl Final   • MCH 01/17/2017 30.1  26.0 - 34.0 pg Final   • MCHC 01/17/2017 33.1  31.5 - 36.3 gm/dl Final   • RDW 01/17/2017 13.4  11.5 - 14.5 % Final   • Platelets 01/17/2017 288  150 - 450 x1000/mm3 Final   • MPV 01/17/2017 9.8  8.0 - 12.0 fl Final   • Neutrophil Rel % 01/17/2017 53.4  37.0 - 80.0 % Final   • Lymphocyte Rel % 01/17/2017 33.5  10.0 - 50.0 % Final   • Monocyte Rel % 01/17/2017 8.9  0.0 - 12.0 % Final   • Eosinophil Rel % 01/17/2017 2.8  0.0 - 7.0 % Final   • Basophil Rel % 01/17/2017 0.8  0.0 - 2.0 % Final   • Immature Granulocyte Rel % 01/17/2017 0.60* 0.00 - 0.50 % Final   • Neutrophils Absolute 01/17/2017 3.49  2.00 - 8.60 x1000/uL Final   • Lymphocytes Absolute 01/17/2017 2.19  0.60 - 4.20 x1000/uL Final   • Monocytes Absolute 01/17/2017 0.58  0.00 - 0.90 x1000/uL Final   • Eosinophils Absolute 01/17/2017 0.18  0.00 - 0.70 x1000/uL Final   • Basophils Absolute 01/17/2017 0.05  0.00 - 0.20 x1000/uL Final   • Immature Granulocytes Absolute 01/17/2017 0.040* 0.005 - 0.022 x1000/uL Final   • nRBC 01/17/2017 0.0  0.0 - 0.2 % Final   • nRBC 01/17/2017 0.000  x1000/uL Final         Hospital Outpatient Visit on 01/17/2017   Component Date Value Ref Range Status   • WBC 01/17/2017 6.5  3.2 - 9.8 x1000/uL Final   • RBC 01/17/2017 4.72  4.37 - 5.74 case/mm3 Final   • Hemoglobin 01/17/2017 14.2  13.7 - 17.3 gm/dl Final   • Hematocrit 01/17/2017 42.9  39.0 - 49.0 % Final   • MCV 01/17/2017 90.9  80.0 - 98.0 fl Final   • MCH 01/17/2017 30.1  26.0 - 34.0 pg Final   • MCHC 01/17/2017 33.1  31.5 - 36.3 gm/dl Final   • RDW 01/17/2017 13.4  11.5 - 14.5 % Final    • Platelets 01/17/2017 288  150 - 450 x1000/mm3 Final   • MPV 01/17/2017 9.8  8.0 - 12.0 fl Final   • Neutrophil Rel % 01/17/2017 53.4  37.0 - 80.0 % Final   • Lymphocyte Rel % 01/17/2017 33.5  10.0 - 50.0 % Final   • Monocyte Rel % 01/17/2017 8.9  0.0 - 12.0 % Final   • Eosinophil Rel % 01/17/2017 2.8  0.0 - 7.0 % Final   • Basophil Rel % 01/17/2017 0.8  0.0 - 2.0 % Final   • Immature Granulocyte Rel % 01/17/2017 0.60* 0.00 - 0.50 % Final   • Neutrophils Absolute 01/17/2017 3.49  2.00 - 8.60 x1000/uL Final   • Lymphocytes Absolute 01/17/2017 2.19  0.60 - 4.20 x1000/uL Final   • Monocytes Absolute 01/17/2017 0.58  0.00 - 0.90 x1000/uL Final   • Eosinophils Absolute 01/17/2017 0.18  0.00 - 0.70 x1000/uL Final   • Basophils Absolute 01/17/2017 0.05  0.00 - 0.20 x1000/uL Final   • Immature Granulocytes Absolute 01/17/2017 0.040* 0.005 - 0.022 x1000/uL Final   • nRBC 01/17/2017 0.0  0.0 - 0.2 % Final   • nRBC 01/17/2017 0.000  x1000/uL Final       Physical Exam   Constitutional: He is oriented to person, place, and time. He appears well-developed and well-nourished. No distress.   HENT:   Head: Normocephalic and atraumatic.   Right Ear: External ear normal.   Left Ear: External ear normal.   No cervical lymphadenopathy.    - mild erythema throat. No exudates    Eyes: Conjunctivae and EOM are normal. Pupils are equal, round, and reactive to light. Right eye exhibits no discharge. Left eye exhibits no discharge. No scleral icterus.   Neck: Normal range of motion. Neck supple. No JVD present. No tracheal deviation present. No thyromegaly present.   Cardiovascular: Normal rate, regular rhythm and normal heart sounds.    Pulmonary/Chest: Effort normal and breath sounds normal. No stridor. No respiratory distress. He has no wheezes.   Abdominal: Soft. Bowel sounds are normal. He exhibits no distension and no mass. There is no tenderness. There is no rebound and no guarding. No hernia.   Musculoskeletal: Normal range of  motion. He exhibits no edema, tenderness or deformity.   Lymphadenopathy:     He has no cervical adenopathy.   Neurological: He is alert and oriented to person, place, and time. He has normal reflexes. No cranial nerve deficit. Coordination normal.   Skin: Skin is warm and dry. No rash noted. He is not diaphoretic. No erythema. No pallor.   Psychiatric: He has a normal mood and affect. His behavior is normal. Judgment and thought content normal.   Nursing note and vitals reviewed.      Assessment/Plan   Problems Addressed this Visit     None      Visit Diagnoses     Attention deficit hyperactivity disorder (ADHD), unspecified ADHD type    -  Primary    Relevant Medications    amphetamine-dextroamphetamine XR (ADDERALL XR) 10 MG 24 hr capsule    Other Relevant Orders    Urine Drug Screen    Nasal congestion        Relevant Orders    POCT Influenza A/B (Completed)    Need for immunization against influenza        Relevant Orders    Flu Vaccine Greater Than or equal to 4yo w/Preservative        -Will refill ADHD medication Adderall XR 10 mg PO q daily. ERWIN LOCKETT. Will also get urine drug screen today  - for nasal congestion - pt tested negative for flu.  Will give influenza vaccination today.  Will also prescribe Tamiflu 75 mg PO BID for 5 days in case pt develops symptoms of myalgias, fatigue, fever.  [facility] for flu if symptoms develop  - recheck in 1 month or earlier if any problems arise

## 2017-03-29 LAB
AMPHET+METHAMPHET UR QL: POSITIVE
BARBITURATES UR QL SCN: NEGATIVE
BENZODIAZ UR QL SCN: NEGATIVE
CANNABINOIDS SERPL QL: NEGATIVE
COCAINE UR QL: NEGATIVE
METHADONE UR QL SCN: NEGATIVE
OPIATES UR QL: NEGATIVE
OXYCODONE UR QL SCN: NEGATIVE

## 2017-04-10 RX ORDER — OSELTAMIVIR PHOSPHATE 75 MG/1
CAPSULE ORAL
Qty: 10 CAPSULE | Refills: 0 | OUTPATIENT
Start: 2017-04-10

## 2017-05-05 ENCOUNTER — OFFICE VISIT (OUTPATIENT)
Dept: FAMILY MEDICINE CLINIC | Facility: CLINIC | Age: 47
End: 2017-05-05

## 2017-05-05 VITALS
HEIGHT: 71 IN | SYSTOLIC BLOOD PRESSURE: 120 MMHG | HEART RATE: 73 BPM | BODY MASS INDEX: 37.1 KG/M2 | DIASTOLIC BLOOD PRESSURE: 80 MMHG | WEIGHT: 265 LBS | RESPIRATION RATE: 16 BRPM | TEMPERATURE: 98 F

## 2017-05-05 DIAGNOSIS — F90.2 ATTENTION DEFICIT HYPERACTIVITY DISORDER (ADHD), COMBINED TYPE: Primary | ICD-10-CM

## 2017-05-05 DIAGNOSIS — K21.9 GASTROESOPHAGEAL REFLUX DISEASE, ESOPHAGITIS PRESENCE NOT SPECIFIED: ICD-10-CM

## 2017-05-05 PROCEDURE — 99213 OFFICE O/P EST LOW 20 MIN: CPT | Performed by: FAMILY MEDICINE

## 2017-05-05 RX ORDER — PANTOPRAZOLE SODIUM 40 MG/1
40 TABLET, DELAYED RELEASE ORAL DAILY
Qty: 30 TABLET | Refills: 11 | Status: SHIPPED | OUTPATIENT
Start: 2017-05-05 | End: 2018-01-31

## 2017-05-05 RX ORDER — DEXTROAMPHETAMINE SACCHARATE, AMPHETAMINE ASPARTATE MONOHYDRATE, DEXTROAMPHETAMINE SULFATE AND AMPHETAMINE SULFATE 5; 5; 5; 5 MG/1; MG/1; MG/1; MG/1
20 CAPSULE, EXTENDED RELEASE ORAL EVERY MORNING
Qty: 30 CAPSULE | Refills: 0 | Status: SHIPPED | OUTPATIENT
Start: 2017-05-05 | End: 2017-06-02 | Stop reason: SDUPTHER

## 2017-05-05 RX ORDER — DEXTROAMPHETAMINE SACCHARATE, AMPHETAMINE ASPARTATE MONOHYDRATE, DEXTROAMPHETAMINE SULFATE AND AMPHETAMINE SULFATE 2.5; 2.5; 2.5; 2.5 MG/1; MG/1; MG/1; MG/1
20 CAPSULE, EXTENDED RELEASE ORAL EVERY MORNING
Qty: 30 CAPSULE | Refills: 0 | Status: SHIPPED | OUTPATIENT
Start: 2017-05-05 | End: 2017-06-02

## 2017-06-02 ENCOUNTER — OFFICE VISIT (OUTPATIENT)
Dept: FAMILY MEDICINE CLINIC | Facility: CLINIC | Age: 47
End: 2017-06-02

## 2017-06-02 VITALS
RESPIRATION RATE: 16 BRPM | BODY MASS INDEX: 37.18 KG/M2 | DIASTOLIC BLOOD PRESSURE: 80 MMHG | WEIGHT: 265.6 LBS | HEART RATE: 83 BPM | HEIGHT: 71 IN | TEMPERATURE: 97.9 F | SYSTOLIC BLOOD PRESSURE: 130 MMHG

## 2017-06-02 DIAGNOSIS — E78.5 HYPERLIPIDEMIA, UNSPECIFIED HYPERLIPIDEMIA TYPE: Primary | ICD-10-CM

## 2017-06-02 DIAGNOSIS — F90.2 ATTENTION DEFICIT HYPERACTIVITY DISORDER (ADHD), COMBINED TYPE: ICD-10-CM

## 2017-06-02 DIAGNOSIS — R73.03 PREDIABETES: ICD-10-CM

## 2017-06-02 DIAGNOSIS — K21.9 GASTROESOPHAGEAL REFLUX DISEASE, ESOPHAGITIS PRESENCE NOT SPECIFIED: ICD-10-CM

## 2017-06-02 DIAGNOSIS — E66.9 NON MORBID OBESITY, UNSPECIFIED OBESITY TYPE: ICD-10-CM

## 2017-06-02 LAB
ALBUMIN SERPL-MCNC: 3.9 G/DL (ref 3.4–4.8)
ALBUMIN/GLOB SERPL: 1.3 G/DL (ref 1.1–1.8)
ALP SERPL-CCNC: 101 U/L (ref 38–126)
ALT SERPL W P-5'-P-CCNC: 46 U/L (ref 21–72)
AMPHET+METHAMPHET UR QL: POSITIVE
ANION GAP SERPL CALCULATED.3IONS-SCNC: 12 MMOL/L (ref 5–15)
ARTICHOKE IGE QN: 93 MG/DL (ref 1–129)
AST SERPL-CCNC: 23 U/L (ref 17–59)
BARBITURATES UR QL SCN: NEGATIVE
BASOPHILS # BLD AUTO: 0.04 10*3/MM3 (ref 0–0.2)
BASOPHILS NFR BLD AUTO: 0.6 % (ref 0–2)
BENZODIAZ UR QL SCN: NEGATIVE
BILIRUB SERPL-MCNC: 0.9 MG/DL (ref 0.2–1.3)
BUN BLD-MCNC: 14 MG/DL (ref 7–21)
BUN/CREAT SERPL: 16.1 (ref 7–25)
CALCIUM SPEC-SCNC: 9.1 MG/DL (ref 8.4–10.2)
CANNABINOIDS SERPL QL: NEGATIVE
CHLORIDE SERPL-SCNC: 100 MMOL/L (ref 95–110)
CHOLEST SERPL-MCNC: 144 MG/DL (ref 0–199)
CO2 SERPL-SCNC: 27 MMOL/L (ref 22–31)
COCAINE UR QL: NEGATIVE
CREAT BLD-MCNC: 0.87 MG/DL (ref 0.7–1.3)
DEPRECATED RDW RBC AUTO: 43.5 FL (ref 35.1–43.9)
EOSINOPHIL # BLD AUTO: 0.31 10*3/MM3 (ref 0–0.7)
EOSINOPHIL NFR BLD AUTO: 4.4 % (ref 0–7)
ERYTHROCYTE [DISTWIDTH] IN BLOOD BY AUTOMATED COUNT: 13.5 % (ref 11.5–14.5)
GFR SERPL CREATININE-BSD FRML MDRD: 94 ML/MIN/1.73 (ref 63–147)
GLOBULIN UR ELPH-MCNC: 3 GM/DL (ref 2.3–3.5)
GLUCOSE BLD-MCNC: 95 MG/DL (ref 60–100)
HBA1C MFR BLD: 5.84 % (ref 4–5.6)
HCT VFR BLD AUTO: 41.4 % (ref 39–49)
HDLC SERPL-MCNC: 36 MG/DL (ref 60–200)
HGB BLD-MCNC: 13.9 G/DL (ref 13.7–17.3)
IMM GRANULOCYTES # BLD: 0.02 10*3/MM3 (ref 0–0.02)
IMM GRANULOCYTES NFR BLD: 0.3 % (ref 0–0.5)
LDLC/HDLC SERPL: 2.3 {RATIO} (ref 0–3.55)
LYMPHOCYTES # BLD AUTO: 2.38 10*3/MM3 (ref 0.6–4.2)
LYMPHOCYTES NFR BLD AUTO: 33.9 % (ref 10–50)
MCH RBC QN AUTO: 29.8 PG (ref 26.5–34)
MCHC RBC AUTO-ENTMCNC: 33.6 G/DL (ref 31.5–36.3)
MCV RBC AUTO: 88.7 FL (ref 80–98)
METHADONE UR QL SCN: NEGATIVE
MONOCYTES # BLD AUTO: 0.42 10*3/MM3 (ref 0–0.9)
MONOCYTES NFR BLD AUTO: 6 % (ref 0–12)
NEUTROPHILS # BLD AUTO: 3.85 10*3/MM3 (ref 2–8.6)
NEUTROPHILS NFR BLD AUTO: 54.8 % (ref 37–80)
OPIATES UR QL: NEGATIVE
OXYCODONE UR QL SCN: NEGATIVE
PLATELET # BLD AUTO: 262 10*3/MM3 (ref 150–450)
PMV BLD AUTO: 10.1 FL (ref 8–12)
POTASSIUM BLD-SCNC: 4.3 MMOL/L (ref 3.5–5.1)
PROT SERPL-MCNC: 6.9 G/DL (ref 6.3–8.6)
RBC # BLD AUTO: 4.67 10*6/MM3 (ref 4.37–5.74)
SODIUM BLD-SCNC: 139 MMOL/L (ref 137–145)
TRIGL SERPL-MCNC: 126 MG/DL (ref 20–199)
WBC NRBC COR # BLD: 7.02 10*3/MM3 (ref 3.2–9.8)

## 2017-06-02 PROCEDURE — 80307 DRUG TEST PRSMV CHEM ANLYZR: CPT | Performed by: FAMILY MEDICINE

## 2017-06-02 PROCEDURE — 80061 LIPID PANEL: CPT | Performed by: FAMILY MEDICINE

## 2017-06-02 PROCEDURE — 83036 HEMOGLOBIN GLYCOSYLATED A1C: CPT | Performed by: FAMILY MEDICINE

## 2017-06-02 PROCEDURE — 99214 OFFICE O/P EST MOD 30 MIN: CPT | Performed by: FAMILY MEDICINE

## 2017-06-02 PROCEDURE — 85025 COMPLETE CBC W/AUTO DIFF WBC: CPT | Performed by: FAMILY MEDICINE

## 2017-06-02 PROCEDURE — 80053 COMPREHEN METABOLIC PANEL: CPT | Performed by: FAMILY MEDICINE

## 2017-06-02 RX ORDER — DEXTROAMPHETAMINE SACCHARATE, AMPHETAMINE ASPARTATE MONOHYDRATE, DEXTROAMPHETAMINE SULFATE AND AMPHETAMINE SULFATE 5; 5; 5; 5 MG/1; MG/1; MG/1; MG/1
20 CAPSULE, EXTENDED RELEASE ORAL EVERY MORNING
Qty: 30 CAPSULE | Refills: 0 | Status: SHIPPED | OUTPATIENT
Start: 2017-06-02 | End: 2017-06-23 | Stop reason: SDUPTHER

## 2017-06-02 RX ORDER — FLUTICASONE PROPIONATE 50 MCG
2 SPRAY, SUSPENSION (ML) NASAL DAILY
Qty: 1 EACH | Refills: 11 | Status: SHIPPED | OUTPATIENT
Start: 2017-06-02 | End: 2018-08-25 | Stop reason: SDUPTHER

## 2017-06-02 NOTE — PROGRESS NOTES
"Subjective   Randall Hernandez is a 46 y.o. male.     History of Present Illness        Problem list  1. Obesity  2. Prediabetes  3. Hyperlipidemia  4. Allergic Rhinitis  5. ADHD,combined type  6. Hyperinsular Obesity   7. GERD    Pt is 45 yo WM with the above medical issues. Is here for ADHD medication refill. States he is doing well with Adderall XR 20 mg PO q daily.  No chest pain. No headaches no dizziness. Also protonix has been helping with reflux. Pt has cut down on eating carbs, sugar and beer. Has lost 5 lbs since last visit and is feeling better.  Denies any fever or abdominal pain currently    Also needs new labwork to check for prediabetes and hyperlipidemia. Currently on metformin and lipitor       The following portions of the patient's history were reviewed and updated as appropriate: allergies, current medications, past family history, past medical history, past social history, past surgical history and problem list.    Review of Systems   Constitutional: Negative.    HENT: Negative.    Eyes: Negative.    Respiratory: Negative.    Cardiovascular: Negative.    Gastrointestinal: Positive for abdominal pain.   Endocrine: Negative.    Genitourinary: Negative.    Musculoskeletal: Negative.    Skin: Negative.    Allergic/Immunologic: Negative.    Neurological: Negative.    Hematological: Negative.    Psychiatric/Behavioral: Positive for decreased concentration.       Objective    /80  Pulse 83  Temp 97.9 °F (36.6 °C)  Resp 16  Ht 71\" (180.3 cm)  Wt 265 lb 9.6 oz (120 kg)  BMI 37.04 kg/m2   Office Visit on 03/28/2017   Component Date Value Ref Range Status   • Rapid Influenza A Ag 03/28/2017 neg   Final   • Rapid Influenza B Ag 03/28/2017 neg   Final   • Internal Control 03/28/2017 Passed  Passed Final   • Lot Number 03/28/2017 [id number]   Final   • Expiration Date 03/28/2017 07/02/2018   Final       Physical Exam   Constitutional: He is oriented to person, place, and time. He appears " well-developed and well-nourished. No distress.   HENT:   Head: Normocephalic.   Right Ear: External ear normal.   Left Ear: External ear normal.   Eyes: Conjunctivae and EOM are normal. Pupils are equal, round, and reactive to light. Right eye exhibits no discharge. Left eye exhibits no discharge. No scleral icterus.   Neck: Normal range of motion. Neck supple. No JVD present. No tracheal deviation present. No thyromegaly present.   Cardiovascular: Normal rate, regular rhythm and normal heart sounds.    Pulmonary/Chest: Effort normal and breath sounds normal. No stridor. No respiratory distress. He has no wheezes.   Abdominal: Soft. Bowel sounds are normal. He exhibits no distension and no mass. There is no tenderness. There is no rebound and no guarding. No hernia.   Obese abdomen    Musculoskeletal: Normal range of motion. He exhibits no edema, tenderness or deformity.   Lymphadenopathy:     He has no cervical adenopathy.   Neurological: He is alert and oriented to person, place, and time. He has normal reflexes. No cranial nerve deficit. Coordination normal.   Skin: Skin is warm and dry. No rash noted. He is not diaphoretic. No erythema. No pallor.   Psychiatric: He has a normal mood and affect. His behavior is normal. Judgment and thought content normal.   Nursing note and vitals reviewed.      Assessment/Plan   Problems Addressed this Visit        Cardiovascular and Mediastinum    Hyperlipidemia - Primary    Relevant Orders    CBC Auto Differential    Comprehensive Metabolic Panel    Hemoglobin A1c    Lipid Panel    Urine Drug Screen       Digestive    Non morbid obesity       Other    Prediabetes    Attention deficit hyperactivity disorder (ADHD), combined type    Relevant Medications    amphetamine-dextroamphetamine XR (ADDERALL XR) 20 MG 24 hr capsule          - obesity - continue with weight loss, diet and exercise  - hyperlipidemia-  Lipid panel.  Continue statin  - prediabetes - continue metformin,  recheck hga1c  - ADHD - refilled Adderall XR 20 mg PO q daily. Will get urine drug screen. [facility] printed and on file  - for GERD - continue protonix  - recheck in 1 month

## 2017-06-23 ENCOUNTER — OFFICE VISIT (OUTPATIENT)
Dept: FAMILY MEDICINE CLINIC | Facility: CLINIC | Age: 47
End: 2017-06-23

## 2017-06-23 VITALS
DIASTOLIC BLOOD PRESSURE: 88 MMHG | BODY MASS INDEX: 36.2 KG/M2 | SYSTOLIC BLOOD PRESSURE: 128 MMHG | HEART RATE: 84 BPM | HEIGHT: 71 IN | TEMPERATURE: 98.1 F | WEIGHT: 258.6 LBS | OXYGEN SATURATION: 96 %

## 2017-06-23 DIAGNOSIS — F90.2 ATTENTION DEFICIT HYPERACTIVITY DISORDER (ADHD), COMBINED TYPE: ICD-10-CM

## 2017-06-23 DIAGNOSIS — E66.9 NON MORBID OBESITY, UNSPECIFIED OBESITY TYPE: ICD-10-CM

## 2017-06-23 DIAGNOSIS — E78.5 HYPERLIPIDEMIA, UNSPECIFIED HYPERLIPIDEMIA TYPE: Primary | ICD-10-CM

## 2017-06-23 PROCEDURE — 99213 OFFICE O/P EST LOW 20 MIN: CPT | Performed by: FAMILY MEDICINE

## 2017-06-23 RX ORDER — DEXTROAMPHETAMINE SACCHARATE, AMPHETAMINE ASPARTATE MONOHYDRATE, DEXTROAMPHETAMINE SULFATE AND AMPHETAMINE SULFATE 5; 5; 5; 5 MG/1; MG/1; MG/1; MG/1
20 CAPSULE, EXTENDED RELEASE ORAL EVERY MORNING
Qty: 30 CAPSULE | Refills: 0 | Status: SHIPPED | OUTPATIENT
Start: 2017-06-23 | End: 2017-07-21 | Stop reason: SDUPTHER

## 2017-06-23 RX ORDER — ASPIRIN 81 MG
81 TABLET, DELAYED RELEASE (ENTERIC COATED) ORAL DAILY
COMMUNITY
Start: 2017-06-12 | End: 2018-10-30

## 2017-06-23 NOTE — PROGRESS NOTES
"Subjective   Randall Hernandez is a 46 y.o. male.     History of Present Illness     Problem list  1. Obesity  2. Prediabetes  3. Hyperlipidemia  4. Allergic Rhinitis  5. ADHD,combined type  6. Hyperinsular Obesity   7. GERD    Pt is 45 yo WM with the above medical issues. Is here for recheck. States he is doing well.  Has lost 7 lbs since last visit by eating healthier.  Is due for ADHD medication refill.  Currently on Adderall XR 20 mg PO q daily.  Also had recent labwork and cholesterol levels improved on lipitor 40 mg PO qhs.         The following portions of the patient's history were reviewed and updated as appropriate: allergies, current medications, past family history, past medical history, past social history, past surgical history and problem list.    Review of Systems   Constitutional: Negative.    HENT: Negative.    Eyes: Negative.    Respiratory: Negative.    Cardiovascular: Negative.    Gastrointestinal: Negative.    Endocrine: Negative.    Genitourinary: Negative.    Musculoskeletal: Negative.    Skin: Negative.    Allergic/Immunologic: Negative.    Neurological: Negative.    Hematological: Negative.    Psychiatric/Behavioral: Positive for decreased concentration.       Objective    /88 (BP Location: Right arm, Patient Position: Sitting, Cuff Size: Adult)  Pulse 84  Temp 98.1 °F (36.7 °C) (Oral)   Ht 71\" (180.3 cm)  Wt 258 lb 9.6 oz (117 kg)  SpO2 96%  BMI 36.07 kg/m2    /88 (BP Location: Right arm, Patient Position: Sitting, Cuff Size: Adult)  Pulse 84  Temp 98.1 °F (36.7 °C) (Oral)   Ht 71\" (180.3 cm)  Wt 258 lb 9.6 oz (117 kg)  SpO2 96%  BMI 36.07 kg/m2    Chemistry        Component Value Date/Time     06/02/2017 0916    K 4.3 06/02/2017 0916     06/02/2017 0916    CO2 27.0 06/02/2017 0916    BUN 14 06/02/2017 0916    CREATININE 0.87 06/02/2017 0916        Component Value Date/Time    CALCIUM 9.1 06/02/2017 0916    ALKPHOS 101 06/02/2017 0916    AST 23 06/02/2017 " 0916    ALT 46 06/02/2017 0916    BILITOT 0.9 06/02/2017 0916        Lab Results   Component Value Date    WBC 7.02 06/02/2017    HGB 13.9 06/02/2017    HCT 41.4 06/02/2017    MCV 88.7 06/02/2017     06/02/2017     Lab Results   Component Value Date    CHOL 144 06/02/2017     Lab Results   Component Value Date    TRIG 126 06/02/2017    TRIG 273 (H) 01/17/2017    TRIG 194 10/07/2016     Lab Results   Component Value Date    HDL 36 (L) 06/02/2017    HDL 41 (L) 01/17/2017    HDL 39 (L) 10/07/2016     Lab Results   Component Value Date    LDLCALC 156 (H) 01/17/2017    LDLCALC 196 (H) 10/07/2016    LDLCALC 166 (H) 02/24/2016     No results found for: LDL  No results found for: HDLLDLRATIO  No components found for: CHOLHDL    Office Visit on 06/02/2017   Component Date Value Ref Range Status   • WBC 06/02/2017 7.02  3.20 - 9.80 10*3/mm3 Final   • RBC 06/02/2017 4.67  4.37 - 5.74 10*6/mm3 Final   • Hemoglobin 06/02/2017 13.9  13.7 - 17.3 g/dL Final   • Hematocrit 06/02/2017 41.4  39.0 - 49.0 % Final   • MCV 06/02/2017 88.7  80.0 - 98.0 fL Final   • MCH 06/02/2017 29.8  26.5 - 34.0 pg Final   • MCHC 06/02/2017 33.6  31.5 - 36.3 g/dL Final   • RDW 06/02/2017 13.5  11.5 - 14.5 % Final   • RDW-SD 06/02/2017 43.5  35.1 - 43.9 fl Final   • MPV 06/02/2017 10.1  8.0 - 12.0 fL Final   • Platelets 06/02/2017 262  150 - 450 10*3/mm3 Final   • Neutrophil % 06/02/2017 54.8  37.0 - 80.0 % Final   • Lymphocyte % 06/02/2017 33.9  10.0 - 50.0 % Final   • Monocyte % 06/02/2017 6.0  0.0 - 12.0 % Final   • Eosinophil % 06/02/2017 4.4  0.0 - 7.0 % Final   • Basophil % 06/02/2017 0.6  0.0 - 2.0 % Final   • Immature Grans % 06/02/2017 0.3  0.0 - 0.5 % Final   • Neutrophils, Absolute 06/02/2017 3.85  2.00 - 8.60 10*3/mm3 Final   • Lymphocytes, Absolute 06/02/2017 2.38  0.60 - 4.20 10*3/mm3 Final   • Monocytes, Absolute 06/02/2017 0.42  0.00 - 0.90 10*3/mm3 Final   • Eosinophils, Absolute 06/02/2017 0.31  0.00 - 0.70 10*3/mm3 Final   •  Basophils, Absolute 06/02/2017 0.04  0.00 - 0.20 10*3/mm3 Final   • Immature Grans, Absolute 06/02/2017 0.02  0.00 - 0.02 10*3/mm3 Final   • Glucose 06/02/2017 95  60 - 100 mg/dL Final   • BUN 06/02/2017 14  7 - 21 mg/dL Final   • Creatinine 06/02/2017 0.87  0.70 - 1.30 mg/dL Final   • Sodium 06/02/2017 139  137 - 145 mmol/L Final   • Potassium 06/02/2017 4.3  3.5 - 5.1 mmol/L Final   • Chloride 06/02/2017 100  95 - 110 mmol/L Final   • CO2 06/02/2017 27.0  22.0 - 31.0 mmol/L Final   • Calcium 06/02/2017 9.1  8.4 - 10.2 mg/dL Final   • Total Protein 06/02/2017 6.9  6.3 - 8.6 g/dL Final   • Albumin 06/02/2017 3.90  3.40 - 4.80 g/dL Final   • ALT (SGPT) 06/02/2017 46  21 - 72 U/L Final   • AST (SGOT) 06/02/2017 23  17 - 59 U/L Final   • Alkaline Phosphatase 06/02/2017 101  38 - 126 U/L Final   • Total Bilirubin 06/02/2017 0.9  0.2 - 1.3 mg/dL Final   • eGFR Non African Amer 06/02/2017 94  63 - 147 mL/min/1.73 Final   • Globulin 06/02/2017 3.0  2.3 - 3.5 gm/dL Final   • A/G Ratio 06/02/2017 1.3  1.1 - 1.8 g/dL Final   • BUN/Creatinine Ratio 06/02/2017 16.1  7.0 - 25.0 Final   • Anion Gap 06/02/2017 12.0  5.0 - 15.0 mmol/L Final   • Hemoglobin A1C 06/02/2017 5.84* 4 - 5.6 % Final   • Total Cholesterol 06/02/2017 144  0 - 199 mg/dL Final   • Triglycerides 06/02/2017 126  20 - 199 mg/dL Final   • HDL Cholesterol 06/02/2017 36* 60 - 200 mg/dL Final   • LDL Cholesterol  06/02/2017 93  1 - 129 mg/dL Final   • LDL/HDL Ratio 06/02/2017 2.30  0.00 - 3.55 Final   • Amphetamine Screen, Urine 06/02/2017 Positive* Negative Final   • Barbiturates Screen, Urine 06/02/2017 Negative  Negative Final   • Benzodiazepine Screen, Urine 06/02/2017 Negative  Negative Final   • Cocaine Screen, Urine 06/02/2017 Negative  Negative Final   • Methadone Screen, Urine 06/02/2017 Negative  Negative Final   • Opiate Screen 06/02/2017 Negative  Negative Final   • Oxycodone Screen, Urine 06/02/2017 Negative  Negative Final   • THC, Screen, Urine  06/02/2017 Negative  Negative Final       Physical Exam   Constitutional: He is oriented to person, place, and time. He appears well-developed and well-nourished. No distress.   HENT:   Head: Normocephalic and atraumatic.   Right Ear: External ear normal.   Left Ear: External ear normal.   Eyes: Conjunctivae and EOM are normal. Pupils are equal, round, and reactive to light. Right eye exhibits no discharge. Left eye exhibits no discharge. No scleral icterus.   Neck: Normal range of motion. Neck supple. No JVD present. No tracheal deviation present. No thyromegaly present.   Cardiovascular: Normal rate, regular rhythm and normal heart sounds.    Pulmonary/Chest: Effort normal and breath sounds normal. No stridor. No respiratory distress. He has no wheezes.   Abdominal: Soft. Bowel sounds are normal. He exhibits no distension and no mass. There is no tenderness. There is no rebound and no guarding. No hernia.   Obese abdomen    Musculoskeletal: Normal range of motion. He exhibits no edema, tenderness or deformity.   Lymphadenopathy:     He has no cervical adenopathy.   Neurological: He is alert and oriented to person, place, and time. He has normal reflexes. No cranial nerve deficit. Coordination normal.   Skin: Skin is warm and dry. No rash noted. He is not diaphoretic. No erythema. No pallor.   Psychiatric: He has a normal mood and affect. His behavior is normal.   Nursing note and vitals reviewed.      Assessment/Plan   Problems Addressed this Visit        Cardiovascular and Mediastinum    Hyperlipidemia - Primary       Digestive    Non morbid obesity       Other    Attention deficit hyperactivity disorder (ADHD), combined type    Relevant Medications    amphetamine-dextroamphetamine XR (ADDERALL XR) 20 MG 24 hr capsule      - hyperlipidemia - improved.  Continue with lipitor and aspirin  - obesity - BMI >30. Continue with weight loss, diet and exercise  - ADHD - refilled Adderall XR 20 mg PO q daily.  [facility]  printed and on file  - recheck in 1 month

## 2017-07-21 ENCOUNTER — OFFICE VISIT (OUTPATIENT)
Dept: FAMILY MEDICINE CLINIC | Facility: CLINIC | Age: 47
End: 2017-07-21

## 2017-07-21 VITALS
SYSTOLIC BLOOD PRESSURE: 118 MMHG | WEIGHT: 262 LBS | RESPIRATION RATE: 16 BRPM | HEART RATE: 77 BPM | DIASTOLIC BLOOD PRESSURE: 84 MMHG | HEIGHT: 71 IN | TEMPERATURE: 97.9 F | BODY MASS INDEX: 36.68 KG/M2

## 2017-07-21 DIAGNOSIS — E66.9 NON MORBID OBESITY, UNSPECIFIED OBESITY TYPE: ICD-10-CM

## 2017-07-21 DIAGNOSIS — R73.03 PREDIABETES: ICD-10-CM

## 2017-07-21 DIAGNOSIS — E78.5 HYPERLIPIDEMIA, UNSPECIFIED HYPERLIPIDEMIA TYPE: Primary | ICD-10-CM

## 2017-07-21 DIAGNOSIS — F90.2 ATTENTION DEFICIT HYPERACTIVITY DISORDER (ADHD), COMBINED TYPE: ICD-10-CM

## 2017-07-21 PROCEDURE — 99213 OFFICE O/P EST LOW 20 MIN: CPT | Performed by: FAMILY MEDICINE

## 2017-07-21 RX ORDER — DEXTROAMPHETAMINE SACCHARATE, AMPHETAMINE ASPARTATE MONOHYDRATE, DEXTROAMPHETAMINE SULFATE AND AMPHETAMINE SULFATE 5; 5; 5; 5 MG/1; MG/1; MG/1; MG/1
20 CAPSULE, EXTENDED RELEASE ORAL EVERY MORNING
Qty: 30 CAPSULE | Refills: 0 | Status: SHIPPED | OUTPATIENT
Start: 2017-07-21 | End: 2017-08-25 | Stop reason: SDUPTHER

## 2017-07-21 RX ORDER — DEXTROAMPHETAMINE SACCHARATE, AMPHETAMINE ASPARTATE MONOHYDRATE, DEXTROAMPHETAMINE SULFATE AND AMPHETAMINE SULFATE 5; 5; 5; 5 MG/1; MG/1; MG/1; MG/1
20 CAPSULE, EXTENDED RELEASE ORAL EVERY MORNING
Qty: 30 CAPSULE | Refills: 0 | Status: SHIPPED | OUTPATIENT
Start: 2017-07-21 | End: 2017-07-21 | Stop reason: SDUPTHER

## 2017-07-21 NOTE — PROGRESS NOTES
"Subjective   Randall Hernandez is a 46 y.o. male.     History of Present Illness     Problem list  1. Obesity  2. Prediabetes  3. Hyperlipidemia  4. Allergic Rhinitis  5. ADHD,combined type  6. Hyperinsular Obesity   7. GERD    Pt is 45 yo WM with the above medical issues. Is here for recheck. States he is doing well Adderall XR 20 mg PO q daily is helping.  Has gained 4 lbs since last visit. Mainly eating processed foods now. Does not exercise regularly.   Also had recent labwork and lipid panel improved. Also hga1c improved back in June 2016.       The following portions of the patient's history were reviewed and updated as appropriate: allergies, current medications, past family history, past medical history, past social history, past surgical history and problem list.    Review of Systems   Constitutional: Negative.    HENT: Negative.    Eyes: Negative.    Respiratory: Negative.    Cardiovascular: Negative.    Gastrointestinal: Negative.    Endocrine: Negative.    Genitourinary: Negative.    Musculoskeletal: Negative.    Skin: Negative.    Allergic/Immunologic: Negative.    Neurological: Negative.    Hematological: Negative.    Psychiatric/Behavioral: Positive for decreased concentration.       Objective    /84  Pulse 77  Temp 97.9 °F (36.6 °C)  Resp 16  Ht 71\" (180.3 cm)  Wt 262 lb (119 kg)  BMI 36.54 kg/m2  Office Visit on 06/02/2017   Component Date Value Ref Range Status   • WBC 06/02/2017 7.02  3.20 - 9.80 10*3/mm3 Final   • RBC 06/02/2017 4.67  4.37 - 5.74 10*6/mm3 Final   • Hemoglobin 06/02/2017 13.9  13.7 - 17.3 g/dL Final   • Hematocrit 06/02/2017 41.4  39.0 - 49.0 % Final   • MCV 06/02/2017 88.7  80.0 - 98.0 fL Final   • MCH 06/02/2017 29.8  26.5 - 34.0 pg Final   • MCHC 06/02/2017 33.6  31.5 - 36.3 g/dL Final   • RDW 06/02/2017 13.5  11.5 - 14.5 % Final   • RDW-SD 06/02/2017 43.5  35.1 - 43.9 fl Final   • MPV 06/02/2017 10.1  8.0 - 12.0 fL Final   • Platelets 06/02/2017 262  150 - 450 " 10*3/mm3 Final   • Neutrophil % 06/02/2017 54.8  37.0 - 80.0 % Final   • Lymphocyte % 06/02/2017 33.9  10.0 - 50.0 % Final   • Monocyte % 06/02/2017 6.0  0.0 - 12.0 % Final   • Eosinophil % 06/02/2017 4.4  0.0 - 7.0 % Final   • Basophil % 06/02/2017 0.6  0.0 - 2.0 % Final   • Immature Grans % 06/02/2017 0.3  0.0 - 0.5 % Final   • Neutrophils, Absolute 06/02/2017 3.85  2.00 - 8.60 10*3/mm3 Final   • Lymphocytes, Absolute 06/02/2017 2.38  0.60 - 4.20 10*3/mm3 Final   • Monocytes, Absolute 06/02/2017 0.42  0.00 - 0.90 10*3/mm3 Final   • Eosinophils, Absolute 06/02/2017 0.31  0.00 - 0.70 10*3/mm3 Final   • Basophils, Absolute 06/02/2017 0.04  0.00 - 0.20 10*3/mm3 Final   • Immature Grans, Absolute 06/02/2017 0.02  0.00 - 0.02 10*3/mm3 Final   • Glucose 06/02/2017 95  60 - 100 mg/dL Final   • BUN 06/02/2017 14  7 - 21 mg/dL Final   • Creatinine 06/02/2017 0.87  0.70 - 1.30 mg/dL Final   • Sodium 06/02/2017 139  137 - 145 mmol/L Final   • Potassium 06/02/2017 4.3  3.5 - 5.1 mmol/L Final   • Chloride 06/02/2017 100  95 - 110 mmol/L Final   • CO2 06/02/2017 27.0  22.0 - 31.0 mmol/L Final   • Calcium 06/02/2017 9.1  8.4 - 10.2 mg/dL Final   • Total Protein 06/02/2017 6.9  6.3 - 8.6 g/dL Final   • Albumin 06/02/2017 3.90  3.40 - 4.80 g/dL Final   • ALT (SGPT) 06/02/2017 46  21 - 72 U/L Final   • AST (SGOT) 06/02/2017 23  17 - 59 U/L Final   • Alkaline Phosphatase 06/02/2017 101  38 - 126 U/L Final   • Total Bilirubin 06/02/2017 0.9  0.2 - 1.3 mg/dL Final   • eGFR Non African Amer 06/02/2017 94  63 - 147 mL/min/1.73 Final   • Globulin 06/02/2017 3.0  2.3 - 3.5 gm/dL Final   • A/G Ratio 06/02/2017 1.3  1.1 - 1.8 g/dL Final   • BUN/Creatinine Ratio 06/02/2017 16.1  7.0 - 25.0 Final   • Anion Gap 06/02/2017 12.0  5.0 - 15.0 mmol/L Final   • Hemoglobin A1C 06/02/2017 5.84* 4 - 5.6 % Final   • Total Cholesterol 06/02/2017 144  0 - 199 mg/dL Final   • Triglycerides 06/02/2017 126  20 - 199 mg/dL Final   • HDL Cholesterol 06/02/2017  36* 60 - 200 mg/dL Final   • LDL Cholesterol  06/02/2017 93  1 - 129 mg/dL Final   • LDL/HDL Ratio 06/02/2017 2.30  0.00 - 3.55 Final   • Amphetamine Screen, Urine 06/02/2017 Positive* Negative Final   • Barbiturates Screen, Urine 06/02/2017 Negative  Negative Final   • Benzodiazepine Screen, Urine 06/02/2017 Negative  Negative Final   • Cocaine Screen, Urine 06/02/2017 Negative  Negative Final   • Methadone Screen, Urine 06/02/2017 Negative  Negative Final   • Opiate Screen 06/02/2017 Negative  Negative Final   • Oxycodone Screen, Urine 06/02/2017 Negative  Negative Final   • THC, Screen, Urine 06/02/2017 Negative  Negative Final       Physical Exam   Constitutional: He is oriented to person, place, and time. He appears well-developed and well-nourished. No distress.   HENT:   Head: Normocephalic and atraumatic.   Right Ear: External ear normal.   Eyes: Conjunctivae and EOM are normal. Pupils are equal, round, and reactive to light. Right eye exhibits no discharge. Left eye exhibits no discharge. No scleral icterus.   Neck: Normal range of motion. Neck supple. No JVD present. No tracheal deviation present. No thyromegaly present.   Cardiovascular: Normal rate, regular rhythm and normal heart sounds.    Pulmonary/Chest: Effort normal and breath sounds normal. No stridor. No respiratory distress. He has no wheezes.   Abdominal: Soft. Bowel sounds are normal. He exhibits no distension and no mass. There is no tenderness. There is no rebound and no guarding. No hernia.   Obese abdomen    Musculoskeletal: Normal range of motion. He exhibits no edema, tenderness or deformity.   Lymphadenopathy:     He has no cervical adenopathy.   Neurological: He is alert and oriented to person, place, and time. He has normal reflexes. No cranial nerve deficit. Coordination normal.   Skin: Skin is warm and dry. No rash noted. He is not diaphoretic. No erythema. No pallor.   Psychiatric: He has a normal mood and affect. His behavior is  normal.   Nursing note and vitals reviewed.      Assessment/Plan   Problems Addressed this Visit        Cardiovascular and Mediastinum    Hyperlipidemia - Primary       Digestive    Non morbid obesity       Other    Prediabetes    Attention deficit hyperactivity disorder (ADHD), combined type    Relevant Medications    amphetamine-dextroamphetamine XR (ADDERALL XR) 20 MG 24 hr capsule      - hyperlipidemia - continue statin medication.  - prediabetes - continue prediabetes meal plan  - Obesity -counseled on weight loss, diet and exercise  - ADHD - refilled Adderall XR 20 mg PO q daily.  UDS up to date and consistent.  [facility] printed and on file.   - recheck in 1 month

## 2017-08-25 ENCOUNTER — OFFICE VISIT (OUTPATIENT)
Dept: FAMILY MEDICINE CLINIC | Facility: CLINIC | Age: 47
End: 2017-08-25

## 2017-08-25 VITALS
BODY MASS INDEX: 37.3 KG/M2 | TEMPERATURE: 98.6 F | RESPIRATION RATE: 16 BRPM | SYSTOLIC BLOOD PRESSURE: 116 MMHG | HEART RATE: 88 BPM | HEIGHT: 71 IN | DIASTOLIC BLOOD PRESSURE: 78 MMHG | WEIGHT: 266.4 LBS

## 2017-08-25 DIAGNOSIS — Z02.83 ENCOUNTER FOR DRUG SCREENING: ICD-10-CM

## 2017-08-25 DIAGNOSIS — R20.2 TINGLING IN EXTREMITIES: ICD-10-CM

## 2017-08-25 DIAGNOSIS — F90.2 ATTENTION DEFICIT HYPERACTIVITY DISORDER (ADHD), COMBINED TYPE: Primary | ICD-10-CM

## 2017-08-25 PROCEDURE — 80307 DRUG TEST PRSMV CHEM ANLYZR: CPT | Performed by: FAMILY MEDICINE

## 2017-08-25 PROCEDURE — 99214 OFFICE O/P EST MOD 30 MIN: CPT | Performed by: FAMILY MEDICINE

## 2017-08-25 RX ORDER — DEXTROAMPHETAMINE SACCHARATE, AMPHETAMINE ASPARTATE MONOHYDRATE, DEXTROAMPHETAMINE SULFATE AND AMPHETAMINE SULFATE 5; 5; 5; 5 MG/1; MG/1; MG/1; MG/1
20 CAPSULE, EXTENDED RELEASE ORAL EVERY MORNING
Qty: 30 CAPSULE | Refills: 0 | Status: SHIPPED | OUTPATIENT
Start: 2017-08-25 | End: 2017-10-05 | Stop reason: SDUPTHER

## 2017-08-25 NOTE — PROGRESS NOTES
"Subjective   Randall Hernandez is a 46 y.o. male.     History of Present Illness     Problem list  1. Obesity  2. Prediabetes  3. Hyperlipidemia  4. Allergic Rhinitis  5. ADHD,combined type  6. Hyperinsular Obesity   7. GERD    Pt is 47 yo WM with the above medical issues. Is here for recheck and refill on ADHD medication. :Pt currently takes Adderall XR 20 mg PO q daily.  States prescription is helping with focus and attention. Denies any chest pain. Pt gained 4 lbs since last visit. States he has been binge eating. Pt has stop dieting and does not exercise.      Pt states he has noticed increased numbness and tingling in both his feet. He has been dealing this for months. States pain would come and go and it feels like he is walking on pins and needles. States it is like a sharp electrical shock to both feet.  Pt is prediabetic and last hga1c was 5.8.  Pt states pain would last for a few minutes then stop. No modifying factors.  Pt does not smoke currently.  Pain is about 5/10 on severity.        The following portions of the patient's history were reviewed and updated as appropriate: allergies, current medications, past family history, past medical history, past social history, past surgical history and problem list.    Review of Systems   Constitutional: Negative.    HENT: Negative.    Eyes: Negative.    Respiratory: Negative.    Cardiovascular: Negative.    Gastrointestinal: Negative.    Endocrine: Negative.    Genitourinary: Negative.    Musculoskeletal: Negative.    Skin: Negative.    Allergic/Immunologic: Negative.    Neurological: Negative.    Hematological: Negative.    Psychiatric/Behavioral: Negative.        Objective    /78  Pulse 88  Temp 98.6 °F (37 °C)  Resp 16  Ht 71\" (180.3 cm)  Wt 266 lb 6.4 oz (121 kg)  BMI 37.16 kg/m2    Chemistry        Component Value Date/Time     06/02/2017 0916    K 4.3 06/02/2017 0916     06/02/2017 0916    CO2 27.0 06/02/2017 0916    BUN 14 " 06/02/2017 0916    CREATININE 0.87 06/02/2017 0916        Component Value Date/Time    CALCIUM 9.1 06/02/2017 0916    ALKPHOS 101 06/02/2017 0916    AST 23 06/02/2017 0916    ALT 46 06/02/2017 0916    BILITOT 0.9 06/02/2017 0916        Lab Results   Component Value Date    WBC 7.02 06/02/2017    HGB 13.9 06/02/2017    HCT 41.4 06/02/2017    MCV 88.7 06/02/2017     06/02/2017     Lab Results   Component Value Date    CHOL 144 06/02/2017     Lab Results   Component Value Date    TRIG 126 06/02/2017    TRIG 273 (H) 01/17/2017    TRIG 194 10/07/2016     Lab Results   Component Value Date    HDL 36 (L) 06/02/2017    HDL 41 (L) 01/17/2017    HDL 39 (L) 10/07/2016     Lab Results   Component Value Date    LDLCALC 156 (H) 01/17/2017    LDLCALC 196 (H) 10/07/2016    LDLCALC 166 (H) 02/24/2016     No results found for: LDL  No results found for: HDLLDLRATIO  No components found for: CHOLHDL  Lab Results   Component Value Date    HGBA1C 5.84 (H) 06/02/2017     Office Visit on 06/02/2017   Component Date Value Ref Range Status   • WBC 06/02/2017 7.02  3.20 - 9.80 10*3/mm3 Final   • RBC 06/02/2017 4.67  4.37 - 5.74 10*6/mm3 Final   • Hemoglobin 06/02/2017 13.9  13.7 - 17.3 g/dL Final   • Hematocrit 06/02/2017 41.4  39.0 - 49.0 % Final   • MCV 06/02/2017 88.7  80.0 - 98.0 fL Final   • MCH 06/02/2017 29.8  26.5 - 34.0 pg Final   • MCHC 06/02/2017 33.6  31.5 - 36.3 g/dL Final   • RDW 06/02/2017 13.5  11.5 - 14.5 % Final   • RDW-SD 06/02/2017 43.5  35.1 - 43.9 fl Final   • MPV 06/02/2017 10.1  8.0 - 12.0 fL Final   • Platelets 06/02/2017 262  150 - 450 10*3/mm3 Final   • Neutrophil % 06/02/2017 54.8  37.0 - 80.0 % Final   • Lymphocyte % 06/02/2017 33.9  10.0 - 50.0 % Final   • Monocyte % 06/02/2017 6.0  0.0 - 12.0 % Final   • Eosinophil % 06/02/2017 4.4  0.0 - 7.0 % Final   • Basophil % 06/02/2017 0.6  0.0 - 2.0 % Final   • Immature Grans % 06/02/2017 0.3  0.0 - 0.5 % Final   • Neutrophils, Absolute 06/02/2017 3.85  2.00 -  8.60 10*3/mm3 Final   • Lymphocytes, Absolute 06/02/2017 2.38  0.60 - 4.20 10*3/mm3 Final   • Monocytes, Absolute 06/02/2017 0.42  0.00 - 0.90 10*3/mm3 Final   • Eosinophils, Absolute 06/02/2017 0.31  0.00 - 0.70 10*3/mm3 Final   • Basophils, Absolute 06/02/2017 0.04  0.00 - 0.20 10*3/mm3 Final   • Immature Grans, Absolute 06/02/2017 0.02  0.00 - 0.02 10*3/mm3 Final   • Glucose 06/02/2017 95  60 - 100 mg/dL Final   • BUN 06/02/2017 14  7 - 21 mg/dL Final   • Creatinine 06/02/2017 0.87  0.70 - 1.30 mg/dL Final   • Sodium 06/02/2017 139  137 - 145 mmol/L Final   • Potassium 06/02/2017 4.3  3.5 - 5.1 mmol/L Final   • Chloride 06/02/2017 100  95 - 110 mmol/L Final   • CO2 06/02/2017 27.0  22.0 - 31.0 mmol/L Final   • Calcium 06/02/2017 9.1  8.4 - 10.2 mg/dL Final   • Total Protein 06/02/2017 6.9  6.3 - 8.6 g/dL Final   • Albumin 06/02/2017 3.90  3.40 - 4.80 g/dL Final   • ALT (SGPT) 06/02/2017 46  21 - 72 U/L Final   • AST (SGOT) 06/02/2017 23  17 - 59 U/L Final   • Alkaline Phosphatase 06/02/2017 101  38 - 126 U/L Final   • Total Bilirubin 06/02/2017 0.9  0.2 - 1.3 mg/dL Final   • eGFR Non African Amer 06/02/2017 94  63 - 147 mL/min/1.73 Final   • Globulin 06/02/2017 3.0  2.3 - 3.5 gm/dL Final   • A/G Ratio 06/02/2017 1.3  1.1 - 1.8 g/dL Final   • BUN/Creatinine Ratio 06/02/2017 16.1  7.0 - 25.0 Final   • Anion Gap 06/02/2017 12.0  5.0 - 15.0 mmol/L Final   • Hemoglobin A1C 06/02/2017 5.84* 4 - 5.6 % Final   • Total Cholesterol 06/02/2017 144  0 - 199 mg/dL Final   • Triglycerides 06/02/2017 126  20 - 199 mg/dL Final   • HDL Cholesterol 06/02/2017 36* 60 - 200 mg/dL Final   • LDL Cholesterol  06/02/2017 93  1 - 129 mg/dL Final   • LDL/HDL Ratio 06/02/2017 2.30  0.00 - 3.55 Final   • Amphetamine Screen, Urine 06/02/2017 Positive* Negative Final   • Barbiturates Screen, Urine 06/02/2017 Negative  Negative Final   • Benzodiazepine Screen, Urine 06/02/2017 Negative  Negative Final   • Cocaine Screen, Urine 06/02/2017  Negative  Negative Final   • Methadone Screen, Urine 06/02/2017 Negative  Negative Final   • Opiate Screen 06/02/2017 Negative  Negative Final   • Oxycodone Screen, Urine 06/02/2017 Negative  Negative Final   • THC, Screen, Urine 06/02/2017 Negative  Negative Final       Physical Exam   Constitutional: He is oriented to person, place, and time. He appears well-developed and well-nourished. No distress.   HENT:   Head: Normocephalic and atraumatic.   Right Ear: External ear normal.   Left Ear: External ear normal.   Eyes: Conjunctivae and EOM are normal. Pupils are equal, round, and reactive to light. Right eye exhibits no discharge. Left eye exhibits no discharge. No scleral icterus.   Neck: Normal range of motion. Neck supple. No JVD present. No tracheal deviation present. No thyromegaly present.   Cardiovascular: Normal rate, regular rhythm and normal heart sounds.    Pulmonary/Chest: Effort normal and breath sounds normal. No stridor. No respiratory distress. He has no wheezes.   Abdominal: Soft. Bowel sounds are normal. He exhibits no distension and no mass. There is no tenderness. There is no rebound and no guarding. No hernia.   Obese abdomen    Musculoskeletal: Normal range of motion. He exhibits no edema, tenderness or deformity.   Lymphadenopathy:     He has no cervical adenopathy.   Neurological: He is alert and oriented to person, place, and time. He has normal reflexes. He displays normal reflexes. No cranial nerve deficit. He exhibits normal muscle tone. Coordination normal.   Tuning fork test normal on both feet has good proprioception and vibration sensation in both feet    Skin: Skin is warm and dry. No rash noted. He is not diaphoretic. No erythema. No pallor.   Psychiatric: He has a normal mood and affect. His behavior is normal.   Nursing note and vitals reviewed.      Assessment/Plan   Problems Addressed this Visit        Nervous and Auditory    Tingling in extremities    Relevant Orders     Ambulatory Referral to Neurology (Completed)       Other    Attention deficit hyperactivity disorder (ADHD), combined type - Primary    Relevant Medications    amphetamine-dextroamphetamine XR (ADDERALL XR) 20 MG 24 hr capsule    Encounter for drug screening    Relevant Orders    Urine Drug Screen        - ADHD - refilled Adderall XR 20 mg PO q caban.  UDS today. ERWIN printed and on file  - for numbness and tingling in legs - likely neuropathy. Will refer to Dr. Medina for EMG studies. Consultation appreciated.  Consider Lyrica, neurontin in the near future.  - recheck in 1 month

## 2017-10-05 ENCOUNTER — OFFICE VISIT (OUTPATIENT)
Dept: FAMILY MEDICINE CLINIC | Facility: CLINIC | Age: 47
End: 2017-10-05

## 2017-10-05 VITALS
HEIGHT: 71 IN | SYSTOLIC BLOOD PRESSURE: 116 MMHG | BODY MASS INDEX: 37.35 KG/M2 | DIASTOLIC BLOOD PRESSURE: 78 MMHG | TEMPERATURE: 98.6 F | RESPIRATION RATE: 16 BRPM | WEIGHT: 266.8 LBS | HEART RATE: 87 BPM

## 2017-10-05 DIAGNOSIS — F90.2 ATTENTION DEFICIT HYPERACTIVITY DISORDER (ADHD), COMBINED TYPE: ICD-10-CM

## 2017-10-05 DIAGNOSIS — E78.5 HYPERLIPIDEMIA, UNSPECIFIED HYPERLIPIDEMIA TYPE: Primary | ICD-10-CM

## 2017-10-05 DIAGNOSIS — E66.9 NON MORBID OBESITY: ICD-10-CM

## 2017-10-05 DIAGNOSIS — R73.03 PREDIABETES: ICD-10-CM

## 2017-10-05 PROCEDURE — 99213 OFFICE O/P EST LOW 20 MIN: CPT | Performed by: FAMILY MEDICINE

## 2017-10-05 RX ORDER — DEXTROAMPHETAMINE SACCHARATE, AMPHETAMINE ASPARTATE MONOHYDRATE, DEXTROAMPHETAMINE SULFATE AND AMPHETAMINE SULFATE 5; 5; 5; 5 MG/1; MG/1; MG/1; MG/1
20 CAPSULE, EXTENDED RELEASE ORAL EVERY MORNING
Qty: 30 CAPSULE | Refills: 0 | Status: SHIPPED | OUTPATIENT
Start: 2017-10-05 | End: 2017-11-06 | Stop reason: SDUPTHER

## 2017-10-05 NOTE — PROGRESS NOTES
"Subjective   Randall Hernandez is a 46 y.o. male.     History of Present Illness        Problem list  1. Obesity  2. Prediabetes  3. Hyperlipidemia  4. Allergic Rhinitis  5. ADHD,combined type  6. Hyperinsular Obesity   7. GERD    Pt is 45 yo WM with the above medical  Issues. Is here for followup on ADHD medication . Pt states Adderall XR 20 mg PO q daily is helping with focus and concentration.  No issues with sleep or appetite.  Also takes lipitor for hyperlipidemia along with metformin for hyperinsular obesity and prediabetes.  For GERD pt takes protonix which is helping.  PT has obesity and BMI >30.  Pt has tried to lose weight with no relief. Has yet to try ketogenic diet.     The following portions of the patient's history were reviewed and updated as appropriate: allergies, current medications, past family history, past medical history, past social history, past surgical history and problem list.    Review of Systems   Constitutional: Negative.    HENT: Negative.    Eyes: Negative.    Respiratory: Negative.    Cardiovascular: Negative.    Gastrointestinal: Negative.    Endocrine: Negative.    Genitourinary: Negative.    Musculoskeletal: Negative.    Skin: Negative.    Allergic/Immunologic: Negative.    Neurological: Negative.    Hematological: Negative.    Psychiatric/Behavioral: The patient is nervous/anxious.        Objective    /78  Pulse 87  Temp 98.6 °F (37 °C)  Resp 16  Ht 71\" (180.3 cm)  Wt 266 lb 12.8 oz (121 kg)  BMI 37.21 kg/m2    Chemistry        Component Value Date/Time     06/02/2017 0916    K 4.3 06/02/2017 0916     06/02/2017 0916    CO2 27.0 06/02/2017 0916    BUN 14 06/02/2017 0916    CREATININE 0.87 06/02/2017 0916        Component Value Date/Time    CALCIUM 9.1 06/02/2017 0916    ALKPHOS 101 06/02/2017 0916    AST 23 06/02/2017 0916    ALT 46 06/02/2017 0916    BILITOT 0.9 06/02/2017 0916        Lab Results   Component Value Date    WBC 7.02 06/02/2017    HGB 13.9 " 06/02/2017    HCT 41.4 06/02/2017    MCV 88.7 06/02/2017     06/02/2017     Lab Results   Component Value Date    CHOL 144 06/02/2017     Lab Results   Component Value Date    TRIG 126 06/02/2017    TRIG 273 (H) 01/17/2017    TRIG 194 10/07/2016     Lab Results   Component Value Date    HDL 36 (L) 06/02/2017    HDL 41 (L) 01/17/2017    HDL 39 (L) 10/07/2016     Lab Results   Component Value Date    LDLCALC 156 (H) 01/17/2017    LDLCALC 196 (H) 10/07/2016    LDLCALC 166 (H) 02/24/2016     No results found for: LDL  No results found for: HDLLDLRATIO  No components found for: CHOLHDL  Lab Results   Component Value Date    HGBA1C 5.84 (H) 06/02/2017     Lab Results   Component Value Date    TSH 3.05 02/24/2016     Physical Exam   Constitutional: He is oriented to person, place, and time. He appears well-developed and well-nourished. No distress.   HENT:   Head: Normocephalic and atraumatic.   Right Ear: External ear normal.   Left Ear: External ear normal.   Eyes: Conjunctivae and EOM are normal. Pupils are equal, round, and reactive to light. Right eye exhibits no discharge. Left eye exhibits no discharge. No scleral icterus.   Neck: Normal range of motion. Neck supple. No JVD present. No tracheal deviation present. No thyromegaly present.   Cardiovascular: Normal rate, regular rhythm and normal heart sounds.    Pulmonary/Chest: Effort normal. No stridor. No respiratory distress. He has no wheezes.   Abdominal: Soft. He exhibits no distension and no mass. There is no tenderness. There is no rebound and no guarding. No hernia.   Obese abdomen    Musculoskeletal: Normal range of motion. He exhibits no edema, tenderness or deformity.   Lymphadenopathy:     He has no cervical adenopathy.   Neurological: He is alert and oriented to person, place, and time. No cranial nerve deficit. Coordination normal.   Skin: Skin is warm and dry. No rash noted. He is not diaphoretic. No erythema. No pallor.   Psychiatric: He has a  normal mood and affect.   Nursing note and vitals reviewed.      Assessment/Plan   Problems Addressed this Visit        Cardiovascular and Mediastinum    Hyperlipidemia - Primary    Relevant Orders    CBC Auto Differential    Comprehensive Metabolic Panel    Hemoglobin A1c    Lipid Panel       Digestive    Non morbid obesity       Other    Prediabetes    Attention deficit hyperactivity disorder (ADHD), combined type    Relevant Medications    amphetamine-dextroamphetamine XR (ADDERALL XR) 20 MG 24 hr capsule      -prediabetes - recheck hga1c  - hyperlipidemia - recheck lipid panel continue statin  - recheck cbc and cmp  - obesity - recommend ketogenic diet along with intermittent fasting  - ADHD medication - ERWIN printed and on file.   Refilled Adderall XR 30 mg PO q daily. Currently dosage if working for pt  - recheck in 1 month

## 2017-11-06 ENCOUNTER — OFFICE VISIT (OUTPATIENT)
Dept: FAMILY MEDICINE CLINIC | Facility: CLINIC | Age: 47
End: 2017-11-06

## 2017-11-06 VITALS
BODY MASS INDEX: 37.21 KG/M2 | WEIGHT: 265.8 LBS | HEIGHT: 71 IN | RESPIRATION RATE: 16 BRPM | HEART RATE: 87 BPM | TEMPERATURE: 98.6 F | DIASTOLIC BLOOD PRESSURE: 96 MMHG | SYSTOLIC BLOOD PRESSURE: 140 MMHG

## 2017-11-06 DIAGNOSIS — F90.2 ATTENTION DEFICIT HYPERACTIVITY DISORDER (ADHD), COMBINED TYPE: Primary | ICD-10-CM

## 2017-11-06 DIAGNOSIS — E66.9 NON MORBID OBESITY: ICD-10-CM

## 2017-11-06 DIAGNOSIS — R03.0 ELEVATED BP WITHOUT DIAGNOSIS OF HYPERTENSION: ICD-10-CM

## 2017-11-06 DIAGNOSIS — E78.5 HYPERLIPIDEMIA, UNSPECIFIED HYPERLIPIDEMIA TYPE: ICD-10-CM

## 2017-11-06 DIAGNOSIS — R73.03 PREDIABETES: ICD-10-CM

## 2017-11-06 LAB
ALBUMIN SERPL-MCNC: 4.5 G/DL (ref 3.4–4.8)
ALBUMIN/GLOB SERPL: 1.4 G/DL (ref 1.1–1.8)
ALP SERPL-CCNC: 103 U/L (ref 38–126)
ALT SERPL W P-5'-P-CCNC: 51 U/L (ref 21–72)
ANION GAP SERPL CALCULATED.3IONS-SCNC: 15 MMOL/L (ref 5–15)
ARTICHOKE IGE QN: 107 MG/DL (ref 1–129)
AST SERPL-CCNC: 27 U/L (ref 17–59)
BASOPHILS # BLD AUTO: 0.04 10*3/MM3 (ref 0–0.2)
BASOPHILS NFR BLD AUTO: 0.6 % (ref 0–2)
BILIRUB SERPL-MCNC: 0.7 MG/DL (ref 0.2–1.3)
BUN BLD-MCNC: 11 MG/DL (ref 7–21)
BUN/CREAT SERPL: 11.6 (ref 7–25)
CALCIUM SPEC-SCNC: 9.4 MG/DL (ref 8.4–10.2)
CHLORIDE SERPL-SCNC: 104 MMOL/L (ref 95–110)
CHOLEST SERPL-MCNC: 178 MG/DL (ref 0–199)
CO2 SERPL-SCNC: 24 MMOL/L (ref 22–31)
CREAT BLD-MCNC: 0.95 MG/DL (ref 0.7–1.3)
DEPRECATED RDW RBC AUTO: 43 FL (ref 35.1–43.9)
EOSINOPHIL # BLD AUTO: 0.22 10*3/MM3 (ref 0–0.7)
EOSINOPHIL NFR BLD AUTO: 3.1 % (ref 0–7)
ERYTHROCYTE [DISTWIDTH] IN BLOOD BY AUTOMATED COUNT: 13 % (ref 11.5–14.5)
GFR SERPL CREATININE-BSD FRML MDRD: 84 ML/MIN/1.73 (ref 63–147)
GLOBULIN UR ELPH-MCNC: 3.2 GM/DL (ref 2.3–3.5)
GLUCOSE BLD-MCNC: 100 MG/DL (ref 60–100)
HBA1C MFR BLD: 5.7 % (ref 4–5.6)
HCT VFR BLD AUTO: 44.4 % (ref 39–49)
HDLC SERPL-MCNC: 47 MG/DL (ref 60–200)
HGB BLD-MCNC: 14.9 G/DL (ref 13.7–17.3)
IMM GRANULOCYTES # BLD: 0.03 10*3/MM3 (ref 0–0.02)
IMM GRANULOCYTES NFR BLD: 0.4 % (ref 0–0.5)
LDLC/HDLC SERPL: 2.21 {RATIO} (ref 0–3.55)
LYMPHOCYTES # BLD AUTO: 2.42 10*3/MM3 (ref 0.6–4.2)
LYMPHOCYTES NFR BLD AUTO: 34.5 % (ref 10–50)
MCH RBC QN AUTO: 30.5 PG (ref 26.5–34)
MCHC RBC AUTO-ENTMCNC: 33.6 G/DL (ref 31.5–36.3)
MCV RBC AUTO: 91 FL (ref 80–98)
MONOCYTES # BLD AUTO: 0.41 10*3/MM3 (ref 0–0.9)
MONOCYTES NFR BLD AUTO: 5.8 % (ref 0–12)
NEUTROPHILS # BLD AUTO: 3.89 10*3/MM3 (ref 2–8.6)
NEUTROPHILS NFR BLD AUTO: 55.6 % (ref 37–80)
PLATELET # BLD AUTO: 294 10*3/MM3 (ref 150–450)
PMV BLD AUTO: 9.8 FL (ref 8–12)
POTASSIUM BLD-SCNC: 4.5 MMOL/L (ref 3.5–5.1)
PROT SERPL-MCNC: 7.7 G/DL (ref 6.3–8.6)
RBC # BLD AUTO: 4.88 10*6/MM3 (ref 4.37–5.74)
SODIUM BLD-SCNC: 143 MMOL/L (ref 137–145)
T4 FREE SERPL-MCNC: 0.98 NG/DL (ref 0.78–2.19)
TRIGL SERPL-MCNC: 135 MG/DL (ref 20–199)
TSH SERPL DL<=0.05 MIU/L-ACNC: 2.78 MIU/ML (ref 0.46–4.68)
WBC NRBC COR # BLD: 7.01 10*3/MM3 (ref 3.2–9.8)

## 2017-11-06 PROCEDURE — 84481 FREE ASSAY (FT-3): CPT | Performed by: FAMILY MEDICINE

## 2017-11-06 PROCEDURE — 80061 LIPID PANEL: CPT | Performed by: FAMILY MEDICINE

## 2017-11-06 PROCEDURE — 80053 COMPREHEN METABOLIC PANEL: CPT | Performed by: FAMILY MEDICINE

## 2017-11-06 PROCEDURE — 99213 OFFICE O/P EST LOW 20 MIN: CPT | Performed by: FAMILY MEDICINE

## 2017-11-06 PROCEDURE — 84443 ASSAY THYROID STIM HORMONE: CPT | Performed by: FAMILY MEDICINE

## 2017-11-06 PROCEDURE — 84439 ASSAY OF FREE THYROXINE: CPT | Performed by: FAMILY MEDICINE

## 2017-11-06 PROCEDURE — 83036 HEMOGLOBIN GLYCOSYLATED A1C: CPT | Performed by: FAMILY MEDICINE

## 2017-11-06 PROCEDURE — 85025 COMPLETE CBC W/AUTO DIFF WBC: CPT | Performed by: FAMILY MEDICINE

## 2017-11-06 RX ORDER — DEXTROAMPHETAMINE SACCHARATE, AMPHETAMINE ASPARTATE MONOHYDRATE, DEXTROAMPHETAMINE SULFATE AND AMPHETAMINE SULFATE 5; 5; 5; 5 MG/1; MG/1; MG/1; MG/1
20 CAPSULE, EXTENDED RELEASE ORAL EVERY MORNING
Qty: 30 CAPSULE | Refills: 0 | Status: SHIPPED | OUTPATIENT
Start: 2017-11-06 | End: 2017-12-08 | Stop reason: SDUPTHER

## 2017-11-06 NOTE — PATIENT INSTRUCTIONS
"DASH Eating Plan  DASH stands for \"Dietary Approaches to Stop Hypertension.\" The DASH eating plan is a healthy eating plan that has been shown to reduce high blood pressure (hypertension). Additional health benefits may include reducing the risk of type 2 diabetes mellitus, heart disease, and stroke. The DASH eating plan may also help with weight loss.  WHAT DO I NEED TO KNOW ABOUT THE DASH EATING PLAN?  For the DASH eating plan, you will follow these general guidelines:  · Choose foods with less than 150 milligrams of sodium per serving (as listed on the food label).  · Use salt-free seasonings or herbs instead of table salt or sea salt.  · Check with your health care provider or pharmacist before using salt substitutes.  · Eat lower-sodium products. These are often labeled as \"low-sodium\" or \"no salt added.\"  · Eat fresh foods. Avoid eating a lot of canned foods.  · Eat more vegetables, fruits, and low-fat dairy products.  · Choose whole grains. Look for the word \"whole\" as the first word in the ingredient list.  · Choose fish and skinless chicken or turkey more often than red meat. Limit fish, poultry, and meat to 6 oz (170 g) each day.  · Limit sweets, desserts, sugars, and sugary drinks.  · Choose heart-healthy fats.  · Eat more home-cooked food and less restaurant, buffet, and fast food.  · Limit fried foods.  · Do not pedroza foods. Cook foods using methods such as baking, boiling, grilling, and broiling instead.  · When eating at a restaurant, ask that your food be prepared with less salt, or no salt if possible.  WHAT FOODS CAN I EAT?  Seek help from a dietitian for individual calorie needs.  Grains  Whole grain or whole wheat bread. Brown rice. Whole grain or whole wheat pasta. Quinoa, bulgur, and whole grain cereals. Low-sodium cereals. Corn or whole wheat flour tortillas. Whole grain cornbread. Whole grain crackers. Low-sodium crackers.  Vegetables  Fresh or frozen vegetables (raw, steamed, roasted, or " grilled). Low-sodium or reduced-sodium tomato and vegetable juices. Low-sodium or reduced-sodium tomato sauce and paste. Low-sodium or reduced-sodium canned vegetables.   Fruits  All fresh, canned (in natural juice), or frozen fruits.  Meat and Other Protein Products  Ground beef (85% or leaner), grass-fed beef, or beef trimmed of fat. Skinless chicken or turkey. Ground chicken or turkey. Pork trimmed of fat. All fish and seafood. Eggs. Dried beans, peas, or lentils. Unsalted nuts and seeds. Unsalted canned beans.  Dairy  Low-fat dairy products, such as skim or 1% milk, 2% or reduced-fat cheeses, low-fat ricotta or cottage cheese, or plain low-fat yogurt. Low-sodium or reduced-sodium cheeses.  Fats and Oils  Tub margarines without trans fats. Light or reduced-fat mayonnaise and salad dressings (reduced sodium). Avocado. Safflower, olive, or canola oils. Natural peanut or almond butter.  Other  Unsalted popcorn and pretzels.  The items listed above may not be a complete list of recommended foods or beverages. Contact your dietitian for more options.  WHAT FOODS ARE NOT RECOMMENDED?  Grains  White bread. White pasta. White rice. Refined cornbread. Bagels and croissants. Crackers that contain trans fat.  Vegetables  Creamed or fried vegetables. Vegetables in a cheese sauce. Regular canned vegetables. Regular canned tomato sauce and paste. Regular tomato and vegetable juices.  Fruits  Canned fruit in light or heavy syrup. Fruit juice.  Meat and Other Protein Products  Fatty cuts of meat. Ribs, chicken wings, [place], sausage, bologna, salami, chitterlings, fatback, hot dogs, bratwurst, and packaged luncheon meats. Salted nuts and seeds. Canned beans with salt.  Dairy  Whole or 2% milk, cream, half-and-half, and cream cheese. Whole-fat or sweetened yogurt. Full-fat cheeses or blue cheese. Nondairy creamers and whipped toppings. Processed cheese, cheese spreads, or cheese curds.  Condiments  Onion and garlic salt, seasoned  salt, table salt, and sea salt. Canned and packaged gravies. Worcestershire sauce. Tartar sauce. Barbecue sauce. Teriyaki sauce. Soy sauce, including reduced sodium. Steak sauce. Fish sauce. Oyster sauce. Cocktail sauce. Horseradish. Ketchup and mustard. Meat flavorings and tenderizers. Bouillon cubes. Hot sauce. Tabasco sauce. Marinades. Taco seasonings. Relishes.  Fats and Oils  Butter, stick margarine, lard, shortening, ghee, and [place]ston fat. Coconut, palm kernel, or palm oils. Regular salad dressings.  Other  Pickles and olives. Salted popcorn and pretzels.  The items listed above may not be a complete list of foods and beverages to avoid. Contact your dietitian for more information.  WHERE CAN I FIND MORE INFORMATION?  [employer] [place]: www.nhlbi.nih.gov/health/health-topics/topics/dash/     This information is not intended to replace advice given to you by your health care provider. Make sure you discuss any questions you have with your health care provider.     Document Released: 12/06/2012 Document Revised: 04/10/2017 Document Reviewed: 10/22/2014  Elsevier Interactive Patient Education ©2017 [employer]

## 2017-11-06 NOTE — PROGRESS NOTES
"Subjective   Randall Hernandez is a 47 y.o. male.     History of Present Illness       Problem list  1. Obesity >30  2. Prediabetes  3. Hyperlipidemia ASCVD risk >5%  4. Allergic Rhinitis  5. ADHD,combined type  6. Hyperinsular Obesity   7. GERD    Pt is 47 yo WM with the above medical  Issues. Is here for followup on ADHD medication . Pt states Adderall XR 20 mg PO q daily is helping with focus and concentration.  No issues with sleep or appetite.  Also takes lipitor for hyperlipidemia along with metformin for hyperinsular obesity and prediabetes.  For GERD pt takes protonix which is helping.  PT has obesity and BMI >30.  Pt has tried to lose weight with no relief. Has yet to try ketogenic diet. Pt did not get labwork from last visit yet .  Pt has lost 1 lbs since lat visit    BP is elevated today. States he has been stressed lately. Denies any chest pain/headaches or dizziness    The following portions of the patient's history were reviewed and updated as appropriate: allergies, current medications, past family history, past medical history, past social history, past surgical history and problem list.    Review of Systems   Constitutional: Negative.    HENT: Negative.    Eyes: Negative.    Respiratory: Negative.    Cardiovascular: Negative.    Gastrointestinal: Negative.    Endocrine: Negative.    Genitourinary: Negative.    Musculoskeletal: Negative.    Skin: Negative.    Allergic/Immunologic: Negative.    Neurological: Negative.    Hematological: Negative.    Psychiatric/Behavioral: The patient is nervous/anxious.        Objective    /96  Pulse 87  Temp 98.6 °F (37 °C)  Resp 16  Ht 71\" (180.3 cm)  Wt 265 lb 12.8 oz (121 kg)  BMI 37.07 kg/m2    /96  Pulse 87  Temp 98.6 °F (37 °C)  Resp 16  Ht 71\" (180.3 cm)  Wt 265 lb 12.8 oz (121 kg)  BMI 37.07 kg/m2    Chemistry        Component Value Date/Time     06/02/2017 0916    K 4.3 06/02/2017 0916     06/02/2017 0916    CO2 27.0 " 06/02/2017 0916    BUN 14 06/02/2017 0916    CREATININE 0.87 06/02/2017 0916        Component Value Date/Time    CALCIUM 9.1 06/02/2017 0916    ALKPHOS 101 06/02/2017 0916    AST 23 06/02/2017 0916    ALT 46 06/02/2017 0916    BILITOT 0.9 06/02/2017 0916        Lab Results   Component Value Date    WBC 7.02 06/02/2017    HGB 13.9 06/02/2017    HCT 41.4 06/02/2017    MCV 88.7 06/02/2017     06/02/2017     Lab Results   Component Value Date    CHOL 144 06/02/2017     Lab Results   Component Value Date    TRIG 126 06/02/2017    TRIG 273 (H) 01/17/2017    TRIG 194 10/07/2016     Lab Results   Component Value Date    HDL 36 (L) 06/02/2017    HDL 41 (L) 01/17/2017    HDL 39 (L) 10/07/2016     Lab Results   Component Value Date    LDLCALC 156 (H) 01/17/2017    LDLCALC 196 (H) 10/07/2016    LDLCALC 166 (H) 02/24/2016     No results found for: LDL  No results found for: HDLLDLRATIO  No components found for: CHOLHDL  Lab Results   Component Value Date    HGBA1C 5.84 (H) 06/02/2017     Lab Results   Component Value Date    TSH 3.05 02/24/2016     Physical Exam   Constitutional: He is oriented to person, place, and time. He appears well-developed and well-nourished. No distress.   HENT:   Head: Normocephalic and atraumatic.   Right Ear: External ear normal.   Left Ear: External ear normal.   Eyes: Conjunctivae and EOM are normal. Pupils are equal, round, and reactive to light. Right eye exhibits no discharge. Left eye exhibits no discharge. No scleral icterus.   Neck: Normal range of motion. Neck supple. No JVD present. No tracheal deviation present. No thyromegaly present.   Cardiovascular: Normal rate, regular rhythm and normal heart sounds.    Pulmonary/Chest: Effort normal. No stridor. No respiratory distress. He has no wheezes.   Abdominal: Soft. He exhibits no distension and no mass. There is no tenderness. There is no rebound and no guarding. No hernia.   Obese abdomen    Musculoskeletal: Normal range of motion.  He exhibits no edema, tenderness or deformity.   Lymphadenopathy:     He has no cervical adenopathy.   Neurological: He is alert and oriented to person, place, and time. No cranial nerve deficit. Coordination normal.   Skin: Skin is warm and dry. No rash noted. He is not diaphoretic. No erythema. No pallor.   Psychiatric: He has a normal mood and affect.   Nursing note and vitals reviewed.      Assessment/Plan   Problems Addressed this Visit        Cardiovascular and Mediastinum    Hyperlipidemia    Relevant Orders    TSH    T4, Free    T3, Free    CBC Auto Differential    Comprehensive Metabolic Panel    Hemoglobin A1c    Lipid Panel       Digestive    Non morbid obesity       Other    Prediabetes    Attention deficit hyperactivity disorder (ADHD), combined type - Primary    Relevant Medications    amphetamine-dextroamphetamine XR (ADDERALL XR) 20 MG 24 hr capsule    Elevated BP without diagnosis of hypertension      -prediabetes - recheck hga1c  - hyperlipidemia - recheck lipid panel continue statin  - recheck cbc and cmp  - obesity - recommend ketogenic diet along with intermittent fasting  - ADHD medication - ERWIN printed and on file.   Refilled Adderall XR 30 mg PO q daily. Currently dosage if working for pt  - pt has elevated BP today. Will recheck on next visit.if consisently elevated will initiate BP medicaiton   - recheck in 1 month

## 2017-11-07 LAB — T3FREE SERPL-MCNC: 3.2 PG/ML (ref 2–4.4)

## 2017-11-10 ENCOUNTER — TELEPHONE (OUTPATIENT)
Dept: FAMILY MEDICINE CLINIC | Facility: CLINIC | Age: 47
End: 2017-11-10

## 2017-11-13 RX ORDER — ATORVASTATIN CALCIUM 80 MG/1
80 TABLET, FILM COATED ORAL DAILY
Qty: 30 TABLET | Refills: 11 | Status: SHIPPED | OUTPATIENT
Start: 2017-11-13 | End: 2018-10-30

## 2017-12-08 ENCOUNTER — OFFICE VISIT (OUTPATIENT)
Dept: FAMILY MEDICINE CLINIC | Facility: CLINIC | Age: 47
End: 2017-12-08

## 2017-12-08 VITALS
DIASTOLIC BLOOD PRESSURE: 81 MMHG | WEIGHT: 264.4 LBS | HEART RATE: 94 BPM | BODY MASS INDEX: 37.02 KG/M2 | TEMPERATURE: 98.3 F | SYSTOLIC BLOOD PRESSURE: 124 MMHG | HEIGHT: 71 IN | OXYGEN SATURATION: 94 %

## 2017-12-08 DIAGNOSIS — S60.10XA SUBUNGUAL HEMATOMA OF FINGER, INITIAL ENCOUNTER: ICD-10-CM

## 2017-12-08 DIAGNOSIS — F90.2 ATTENTION DEFICIT HYPERACTIVITY DISORDER (ADHD), COMBINED TYPE: Primary | ICD-10-CM

## 2017-12-08 PROCEDURE — 99213 OFFICE O/P EST LOW 20 MIN: CPT | Performed by: FAMILY MEDICINE

## 2017-12-08 RX ORDER — DEXTROAMPHETAMINE SACCHARATE, AMPHETAMINE ASPARTATE MONOHYDRATE, DEXTROAMPHETAMINE SULFATE AND AMPHETAMINE SULFATE 5; 5; 5; 5 MG/1; MG/1; MG/1; MG/1
20 CAPSULE, EXTENDED RELEASE ORAL EVERY MORNING
Qty: 30 CAPSULE | Refills: 0 | Status: SHIPPED | OUTPATIENT
Start: 2017-12-08 | End: 2018-01-31 | Stop reason: SDUPTHER

## 2017-12-08 NOTE — PROGRESS NOTES
Subjective   Randall Hernandez is a 47 y.o. male.     History of Present Illness       Problem list  1. Obesity >30  2. Prediabetes  3. Hyperlipidemia ASCVD risk >5%  4. Allergic Rhinitis  5. ADHD,combined type  6. Hyperinsular Obesity   7. GERD    Pt is 47 yo WM with the above medical  Issues. Is here for followup on ADHD medication . Pt states Adderall XR 20 mg PO q daily is helping with focus and concentration.  No issues with sleep or appetite.  Also takes lipitor for hyperlipidemia along with metformin for hyperinsular obesity and prediabetes.  For GERD pt takes protonix which is helping.  PT has obesity and BMI >30.  Pt has tried to lose weight with no relief. Has yet to try ketogenic diet. Pt did not get labwork from last visit yet .  Pt has lost 1 lbs since lat visit    BP is elevated today. States he has been stressed lately. Denies any chest pain/headaches or dizziness    Pt states ADHD medication Adderall 20 mg PO q daily is helping. He also has bilateral subungual hematoma of both thumbs that have been bothering him for weeks.  Pt denies any trauma to both thumbs     The following portions of the patient's history were reviewed and updated as appropriate: allergies, current medications, past family history, past medical history, past social history, past surgical history and problem list.    Review of Systems   Constitutional: Negative.    HENT: Negative.    Eyes: Negative.    Respiratory: Negative.    Cardiovascular: Negative.    Gastrointestinal: Negative.    Endocrine: Negative.    Genitourinary: Negative.    Musculoskeletal: Negative.    Skin: Negative.         Bilateral thumb pain.     Allergic/Immunologic: Negative.    Neurological: Negative.    Hematological: Negative.    Psychiatric/Behavioral: The patient is nervous/anxious.        Objective    /81 (BP Location: Other (Comment), Patient Position: Sitting, Cuff Size: Other (Comment)) Comment (BP Location): Left Wrist Comment (Cuff Size):  "Wrist  Pulse 94  Ht 180.3 cm (71\")  Wt 120 kg (264 lb 6.4 oz)  SpO2 94% Comment: Room Air  BMI 36.88 kg/m2    /81 (BP Location: Other (Comment), Patient Position: Sitting, Cuff Size: Other (Comment)) Comment (BP Location): Left Wrist Comment (Cuff Size): Wrist  Pulse 94  Ht 180.3 cm (71\")  Wt 120 kg (264 lb 6.4 oz)  SpO2 94% Comment: Room Air  BMI 36.88 kg/m2    Chemistry        Component Value Date/Time     11/06/2017 0945    K 4.5 11/06/2017 0945     11/06/2017 0945    CO2 24.0 11/06/2017 0945    BUN 11 11/06/2017 0945    CREATININE 0.95 11/06/2017 0945        Component Value Date/Time    CALCIUM 9.4 11/06/2017 0945    ALKPHOS 103 11/06/2017 0945    AST 27 11/06/2017 0945    ALT 51 11/06/2017 0945    BILITOT 0.7 11/06/2017 0945        Lab Results   Component Value Date    WBC 7.01 11/06/2017    HGB 14.9 11/06/2017    HCT 44.4 11/06/2017    MCV 91.0 11/06/2017     11/06/2017     Lab Results   Component Value Date    CHOL 178 11/06/2017    CHOL 144 06/02/2017     Lab Results   Component Value Date    TRIG 135 11/06/2017    TRIG 126 06/02/2017    TRIG 273 (H) 01/17/2017     Lab Results   Component Value Date    HDL 47 (L) 11/06/2017    HDL 36 (L) 06/02/2017    HDL 41 (L) 01/17/2017     Lab Results   Component Value Date    LDLCALC 156 (H) 01/17/2017    LDLCALC 196 (H) 10/07/2016    LDLCALC 166 (H) 02/24/2016     No results found for: LDL  No results found for: HDLLDLRATIO  No components found for: CHOLHDL  Lab Results   Component Value Date    HGBA1C 5.7 (H) 11/06/2017     Lab Results   Component Value Date    TSH 2.780 11/06/2017     Physical Exam   Constitutional: He is oriented to person, place, and time. He appears well-developed and well-nourished. No distress.   HENT:   Head: Normocephalic and atraumatic.   Right Ear: External ear normal.   Left Ear: External ear normal.   Eyes: Conjunctivae and EOM are normal. Pupils are equal, round, and reactive to light. Right eye exhibits " no discharge. Left eye exhibits no discharge. No scleral icterus.   Neck: Normal range of motion. Neck supple. No JVD present. No tracheal deviation present. No thyromegaly present.   Cardiovascular: Normal rate, regular rhythm and normal heart sounds.    Pulmonary/Chest: Effort normal. No stridor. No respiratory distress. He has no wheezes.   Abdominal: Soft. He exhibits no distension and no mass. There is no tenderness. There is no rebound and no guarding. No hernia.   Obese abdomen    Musculoskeletal: Normal range of motion. He exhibits no edema, tenderness or deformity.   Lymphadenopathy:     He has no cervical adenopathy.   Neurological: He is alert and oriented to person, place, and time. No cranial nerve deficit. Coordination normal.   Skin: Skin is warm and dry. No rash noted. He is not diaphoretic. No erythema. No pallor.   Swollen nailbed of both thumbs. Subungual hematoma of both thumbs    Psychiatric: He has a normal mood and affect.   Nursing note and vitals reviewed.      Assessment/Plan   Problems Addressed this Visit        Other    Attention deficit hyperactivity disorder (ADHD), combined type - Primary    Relevant Medications    amphetamine-dextroamphetamine XR (ADDERALL XR) 20 MG 24 hr capsule      Other Visit Diagnoses     Subungual hematoma of finger, initial encounter          -prediabetes - recheck hga1c  - hyperlipidemia - recheck lipid panel continue statin  - recheck cbc and cmp  - obesity - recommend ketogenic diet along with intermittent fasting  - ADHD medication - ERWIN printed and on file.   Refilled Adderall XR 30 mg PO q daily. Currently dosage if working for pt.  Refer number [id number]  - for subungual hematoma of both thumbs. Offered to pt to help relieve pressure with needle but declined. Recommend pt look on youtube videos to try and do it at home.  An 18 gauge needed was provided and pt was instructed how to make hole in nailbed if the event he does decide to do it.    - recheck  in 1 month

## 2018-01-25 RX ORDER — ASPIRIN 81 MG
TABLET, DELAYED RELEASE (ENTERIC COATED) ORAL
Qty: 30 TABLET | Refills: 11 | Status: SHIPPED | OUTPATIENT
Start: 2018-01-25 | End: 2018-06-25

## 2018-01-31 ENCOUNTER — OFFICE VISIT (OUTPATIENT)
Dept: FAMILY MEDICINE CLINIC | Facility: CLINIC | Age: 48
End: 2018-01-31

## 2018-01-31 VITALS
HEART RATE: 100 BPM | BODY MASS INDEX: 37.46 KG/M2 | WEIGHT: 267.6 LBS | HEIGHT: 71 IN | TEMPERATURE: 98.6 F | DIASTOLIC BLOOD PRESSURE: 80 MMHG | SYSTOLIC BLOOD PRESSURE: 120 MMHG | RESPIRATION RATE: 16 BRPM

## 2018-01-31 DIAGNOSIS — R73.03 PREDIABETES: ICD-10-CM

## 2018-01-31 DIAGNOSIS — E66.9 NON MORBID OBESITY: ICD-10-CM

## 2018-01-31 DIAGNOSIS — E78.5 HYPERLIPIDEMIA, UNSPECIFIED HYPERLIPIDEMIA TYPE: ICD-10-CM

## 2018-01-31 DIAGNOSIS — F90.2 ATTENTION DEFICIT HYPERACTIVITY DISORDER (ADHD), COMBINED TYPE: Primary | ICD-10-CM

## 2018-01-31 DIAGNOSIS — Z77.29 CARBON MONOXIDE EXPOSURE: ICD-10-CM

## 2018-01-31 DIAGNOSIS — K21.9 GASTROESOPHAGEAL REFLUX DISEASE, ESOPHAGITIS PRESENCE NOT SPECIFIED: ICD-10-CM

## 2018-01-31 PROCEDURE — 99214 OFFICE O/P EST MOD 30 MIN: CPT | Performed by: FAMILY MEDICINE

## 2018-01-31 RX ORDER — ESOMEPRAZOLE MAGNESIUM 40 MG/1
40 CAPSULE, DELAYED RELEASE ORAL
Qty: 30 CAPSULE | Refills: 11 | Status: SHIPPED | OUTPATIENT
Start: 2018-01-31 | End: 2018-02-09 | Stop reason: CLARIF

## 2018-01-31 RX ORDER — DEXTROAMPHETAMINE SACCHARATE, AMPHETAMINE ASPARTATE MONOHYDRATE, DEXTROAMPHETAMINE SULFATE AND AMPHETAMINE SULFATE 5; 5; 5; 5 MG/1; MG/1; MG/1; MG/1
20 CAPSULE, EXTENDED RELEASE ORAL EVERY MORNING
Qty: 30 CAPSULE | Refills: 0 | Status: SHIPPED | OUTPATIENT
Start: 2018-01-31 | End: 2018-03-01 | Stop reason: SDUPTHER

## 2018-01-31 NOTE — PROGRESS NOTES
Subjective   Randall Hernandez is a 47 y.o. male.     History of Present Illness       Problem list  1. Obesity >30  2. Prediabetes  3. Hyperlipidemia ASCVD risk >5%  4. Allergic Rhinitis  5. ADHD,combined type  6. Hyperinsular Obesity   7. GERD    Pt is 47 yo WM with the above medical  Issues. Is here for followup on ADHD medication . Pt states Adderall XR 20 mg PO q daily is helping with focus and concentration.  No issues with sleep or appetite.  Also takes lipitor for hyperlipidemia along with metformin for hyperinsular obesity and prediabetes.  For GERD pt takes protonix which is helping.  PT has obesity and BMI >30.  Pt has tried to lose weight with no relief. Has yet to try ketogenic diet. Pt did not get labwork from last visit yet .  Pt has lost 1 lbs since lat visit    BP is elevated today. States he has been stressed lately. Denies any chest pain/headaches or dizziness    Pt states ADHD medication Adderall 20 mg PO q daily is helping. He also has bilateral subungual hematoma of both thumbs that have been bothering him for weeks.  Pt denies any trauma to both thumbs     Pt on last labwork had LDL at 107 .HDL was low. Hga1c was at 5.7. Takes lipitors 80 mg PO qhs..  Take metformin 1000 mg PO BID    Pt states he still has reflux issues. Takes protonix. States symptoms occur every once in a while. 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    Review of Systems   Constitutional: Negative.    HENT: Negative.    Eyes: Negative.    Respiratory: Negative.    Cardiovascular: Negative.    Gastrointestinal: Negative.   "  Endocrine: Negative.    Genitourinary: Negative.    Musculoskeletal: Negative.    Skin: Negative.         Bilateral thumb pain.     Allergic/Immunologic: Negative.    Neurological: Negative.    Hematological: Negative.    Psychiatric/Behavioral: The patient is nervous/anxious.        Objective    /80  Pulse 100  Temp 98.6 °F (37 °C)  Resp 16  Ht 180.3 cm (71\")  Wt 121 kg (267 lb 9.6 oz)  BMI 37.32 kg/m2    /80  Pulse 100  Temp 98.6 °F (37 °C)  Resp 16  Ht 180.3 cm (71\")  Wt 121 kg (267 lb 9.6 oz)  BMI 37.32 kg/m2    Chemistry        Component Value Date/Time     11/06/2017 0945    K 4.5 11/06/2017 0945     11/06/2017 0945    CO2 24.0 11/06/2017 0945    BUN 11 11/06/2017 0945    CREATININE 0.95 11/06/2017 0945        Component Value Date/Time    CALCIUM 9.4 11/06/2017 0945    ALKPHOS 103 11/06/2017 0945    AST 27 11/06/2017 0945    ALT 51 11/06/2017 0945    BILITOT 0.7 11/06/2017 0945        Lab Results   Component Value Date    WBC 7.01 11/06/2017    HGB 14.9 11/06/2017    HCT 44.4 11/06/2017    MCV 91.0 11/06/2017     11/06/2017     Lab Results   Component Value Date    CHOL 178 11/06/2017    CHOL 144 06/02/2017     Lab Results   Component Value Date    TRIG 135 11/06/2017    TRIG 126 06/02/2017    TRIG 273 (H) 01/17/2017     Lab Results   Component Value Date    HDL 47 (L) 11/06/2017    HDL 36 (L) 06/02/2017    HDL 41 (L) 01/17/2017     Lab Results   Component Value Date    LDLCALC 156 (H) 01/17/2017    LDLCALC 196 (H) 10/07/2016    LDLCALC 166 (H) 02/24/2016     No results found for: LDL  No results found for: HDLLDLRATIO  No components found for: CHOLHDL  Lab Results   Component Value Date    HGBA1C 5.7 (H) 11/06/2017     Lab Results   Component Value Date    TSH 2.780 11/06/2017     Physical Exam   Constitutional: He is oriented to person, place, and time. He appears well-developed and well-nourished. No distress.   HENT:   Head: Normocephalic and atraumatic.   Right " Ear: External ear normal.   Left Ear: External ear normal.   Eyes: Conjunctivae and EOM are normal. Pupils are equal, round, and reactive to light. Right eye exhibits no discharge. Left eye exhibits no discharge. No scleral icterus.   Neck: Normal range of motion. Neck supple. No JVD present. No tracheal deviation present. No thyromegaly present.   Cardiovascular: Normal rate, regular rhythm and normal heart sounds.    Pulmonary/Chest: Effort normal. No stridor. No respiratory distress. He has no wheezes.   Abdominal: Soft. He exhibits no distension and no mass. There is no tenderness. There is no rebound and no guarding. No hernia.   Obese abdomen    Musculoskeletal: Normal range of motion. He exhibits no edema, tenderness or deformity.   Lymphadenopathy:     He has no cervical adenopathy.   Neurological: He is alert and oriented to person, place, and time. No cranial nerve deficit. Coordination normal.   Skin: Skin is warm and dry. No rash noted. He is not diaphoretic. No erythema. No pallor.   Swollen nailbed of both thumbs. Subungual hematoma of both thumbs    Psychiatric: He has a normal mood and affect.   Nursing note and vitals reviewed.      Assessment/Plan   Problems Addressed this Visit        Cardiovascular and Mediastinum    Hyperlipidemia       Digestive    Non morbid obesity    Gastroesophageal reflux disease    Relevant Medications    esomeprazole (NEXIUM) 40 MG capsule       Other    Prediabetes    Attention deficit hyperactivity disorder (ADHD), combined type - Primary    Relevant Medications    amphetamine-dextroamphetamine XR (ADDERALL XR) 20 MG 24 hr capsule      Other Visit Diagnoses     Carbon monoxide exposure            - counseled pt to limit carbon monoxide and gave information on posioning to go home with.  Recommend ventilation.  He has had multiple carbon monoxide exposure before.  Advised pt to go to ER or call 911 if symptoms severe. Gave carbon monoxide information today    -prediabetes - continue metformin   - hyperlipidemia - recheck lipid panel continue statin  - recheck cbc and cmp  - obesity - recommend ketogenic diet along with intermittent fasting  - ADHD medication - ERWIN printed and on file.   Refilled Adderall XR 30 mg PO q daily. Currently dosage if working for pt.    - for subungual hematoma of both thumbs. Offered to pt to help relieve pressure with needle but declined. Recommend pt look on youtube videos to try and do it at home.  An 18 gauge needed was provided and pt was instructed how to make hole in nailbed if the event he does decide to do it.    - recheck in 1 month

## 2018-01-31 NOTE — PATIENT INSTRUCTIONS
Carbon Monoxide Poisoning  Carbon monoxide poisoning is illness caused by inhaling carbon monoxide gas. Carbon monoxide is a colorless, odorless gas. When the gas is inhaled, it quickly enters the bloodstream and reduces the amount of oxygen that goes to your cells. This decrease in oxygen received by the body tissues quickly becomes a life-threatening problem. Carbon monoxide poisoning is a medical emergency that requires immediate medical care. People who are elderly or who have heart disease or lung disease are more likely to have ill effects from carbon monoxide poisoning.  What are the causes?  Carbon monoxide poisoning is often caused by inhaling exhaust fumes from fuel burning sources. Burning any carbon-containing fuel (gasoline, coal, charcoal, wood) releases carbon monoxide into the air. Sources of carbon monoxide fumes include:  · Motor exhaust from cars, motorcycles, and boats.  · Cigarette smoke.  · Propane-powered tools and vehicles.  · Gas-powered tools and other industrial equipment.  Without proper ventilation, carbon monoxide can build up in an enclosed or partially enclosed area. For example, if a chimney is not clear, a camping stove is used indoors, or a car is left running in a closed garage, this can lead to carbon monoxide poisoning.  What are the signs or symptoms?  · Headache.  · Dizziness.  · Sleepiness.  · Weakness.  · Confusion.  · Fainting.  · Seizures.  · Coma.  · Nausea.  · Vomiting.  · Chest pain.  · Shortness of breath.  How is this diagnosed?  Your caregiver will probably suspect that you have carbon monoxide poisoning based on your symptoms and history of possible exposure. A blood test may be done to confirm the diagnosis.  How is this treated?  Treatment involves getting to fresh air immediately and removing yourself from the dangerous environment. In the emergency room, you may be given oxygen therapy. Oxygen can be delivered through a face mask or breathing tubes that fit  under your nostrils. In some severe cases, hyperbaric oxygen therapy may be used. For this treatment, the person enters a chamber where oxygen is delivered under forced pressure. This speeds up the process in which oxygen is absorbed and replaces the carbon monoxide in the blood.  Follow these instructions at home:  Do not return to the area where you were exposed to carbon monoxide. The area must be thoroughly ventilated before it is safe to return.  How is this prevented?  · Install a carbon monoxide detector in your home.  · Have all gas stoves and furnaces inspected once a year.  · Clean all fireplace chimneys and flues at least once a year.  · Have the exhaust system in your car checked once a year.  · Never keep a car's motor running in a closed garage.  · Never sleep in a car with the motor running.  · Make sure all rooms that are heated with gasoline, coal, charcoal, or wood are properly ventilated.  Get help right away if:  You suspect that a person has inhaled carbon monoxide gas. Remove the person from the area immediately. Call your local emergency services (911 in [place] Begin rescue breathing if the person is unconscious.  This information is not intended to replace advice given to you by your health care provider. Make sure you discuss any questions you have with your health care provider.  Document Released: 12/15/2001 Document Revised: 07/07/2017 Document Reviewed: 03/07/2013  Elsevier Interactive Patient Education © 2017 [employer]

## 2018-02-05 RX ORDER — ATORVASTATIN CALCIUM 40 MG/1
TABLET, FILM COATED ORAL
Qty: 30 TABLET | Refills: 6 | Status: SHIPPED | OUTPATIENT
Start: 2018-02-05 | End: 2018-03-01

## 2018-02-09 RX ORDER — OMEPRAZOLE 40 MG/1
40 CAPSULE, DELAYED RELEASE ORAL DAILY
Qty: 30 CAPSULE | Refills: 11 | Status: SHIPPED | OUTPATIENT
Start: 2018-02-09 | End: 2019-02-07 | Stop reason: SDUPTHER

## 2018-02-12 ENCOUNTER — PRIOR AUTHORIZATION (OUTPATIENT)
Dept: FAMILY MEDICINE CLINIC | Facility: CLINIC | Age: 48
End: 2018-02-12

## 2018-02-12 NOTE — TELEPHONE ENCOUNTER
P/A approved for amphetamine-dextroamphetamine ER 20 mg.  Valid 2/9/18-12/31/2039.  Pharmacy notified.

## 2018-02-27 RX ORDER — MONTELUKAST SODIUM 10 MG/1
TABLET ORAL
Qty: 30 TABLET | Refills: 0 | Status: SHIPPED | OUTPATIENT
Start: 2018-02-27 | End: 2018-04-05 | Stop reason: SDUPTHER

## 2018-03-01 ENCOUNTER — OFFICE VISIT (OUTPATIENT)
Dept: FAMILY MEDICINE CLINIC | Facility: CLINIC | Age: 48
End: 2018-03-01

## 2018-03-01 VITALS
WEIGHT: 262.6 LBS | HEART RATE: 78 BPM | BODY MASS INDEX: 36.76 KG/M2 | TEMPERATURE: 98.6 F | DIASTOLIC BLOOD PRESSURE: 92 MMHG | SYSTOLIC BLOOD PRESSURE: 130 MMHG | HEIGHT: 71 IN | RESPIRATION RATE: 16 BRPM

## 2018-03-01 DIAGNOSIS — J30.9 ALLERGIC RHINITIS, UNSPECIFIED CHRONICITY, UNSPECIFIED SEASONALITY, UNSPECIFIED TRIGGER: ICD-10-CM

## 2018-03-01 DIAGNOSIS — E78.5 HYPERLIPIDEMIA, UNSPECIFIED HYPERLIPIDEMIA TYPE: ICD-10-CM

## 2018-03-01 DIAGNOSIS — F90.2 ATTENTION DEFICIT HYPERACTIVITY DISORDER (ADHD), COMBINED TYPE: Primary | ICD-10-CM

## 2018-03-01 DIAGNOSIS — R73.03 PREDIABETES: ICD-10-CM

## 2018-03-01 DIAGNOSIS — E66.9 NON MORBID OBESITY: ICD-10-CM

## 2018-03-01 PROCEDURE — 99214 OFFICE O/P EST MOD 30 MIN: CPT | Performed by: FAMILY MEDICINE

## 2018-03-01 RX ORDER — CETIRIZINE HYDROCHLORIDE 5 MG/1
5 TABLET ORAL DAILY
Qty: 30 TABLET | Refills: 3 | Status: SHIPPED | OUTPATIENT
Start: 2018-03-01 | End: 2018-10-30

## 2018-03-01 RX ORDER — DEXTROAMPHETAMINE SACCHARATE, AMPHETAMINE ASPARTATE MONOHYDRATE, DEXTROAMPHETAMINE SULFATE AND AMPHETAMINE SULFATE 5; 5; 5; 5 MG/1; MG/1; MG/1; MG/1
20 CAPSULE, EXTENDED RELEASE ORAL EVERY MORNING
Qty: 30 CAPSULE | Refills: 0 | Status: SHIPPED | OUTPATIENT
Start: 2018-03-01 | End: 2018-04-02 | Stop reason: SDUPTHER

## 2018-03-01 NOTE — PROGRESS NOTES
"Subjective   Randall Hernandez is a 47 y.o. male.     History of Present Illness       Problem list  1. Obesity >BMI 30  2. Prediabetes  3. Hyperlipidemia ASCVD risk >5%  4. Allergic Rhinitis  5. ADHD,combined type  6. Hyperinsular Obesity   7. GERD    1/31/18 Pt is 45 yo WM with the above medical  Issues. Is here for followup on ADHD medication . Pt states Adderall XR 20 mg PO q daily is helping with focus and concentration.  No issues with sleep or appetite.  Also takes lipitor for hyperlipidemia along with metformin for hyperinsular obesity and prediabetes.  For GERD pt takes protonix which is helping.  PT has obesity and BMI >30.  Pt has tried to lose weight with no relief. Has yet to try ketogenic diet. Pt did not get labwork from last visit yet .  Pt has lost 1 lbs since lat visitBP is elevated today. States he has been stressed lately. Denies any chest pain/headaches or dizzinessPt states ADHD medication Adderall 20 mg PO q daily is helping. He also has bilateral subungual hematoma of both thumbs that have been bothering him for weeks.  Pt denies any trauma to both thumbs Pt on last labwork had LDL at 107 .HDL was low. Hga1c was at 5.7. Takes lipitors 80 mg PO qhs..  Take metformin 1000 mg PO BIDPt states he still has reflux issues. Takes protonix. States symptoms occur every once in a while. Sometimes he wakes up in middle of night and has burning sensation in throat. 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 Pt is here for refill on  Adderral XR 20 mg PO q daily.  Pt is still taking lipitor and metformin for his hyperlipidemia and prediabetes. Prilosec is helping with reflux medication.  Also takes aspirin 81 mg PO q daily.  Weight today is 262 lbs. On last visit he was 267.  His BMI >30. He declined any weight loss surgery . He states \"I am addicted to sugar\".   Pt also has " "postnasal drip and allergies. He is taking flonase and singulair. He also has been constantly clearing his throat. Denies any fever or body aches    The following portions of the patient's history were reviewed and updated as appropriate: allergies, current medications, past family history, past medical history, past social history, past surgical history and problem list.    Review of Systems   Constitutional: Negative.    HENT: Positive for postnasal drip.    Eyes: Negative.    Respiratory: Negative.    Cardiovascular: Negative.    Gastrointestinal: Negative.    Endocrine: Negative.    Genitourinary: Negative.    Musculoskeletal: Negative.    Skin: Negative.         Bilateral thumb pain.     Allergic/Immunologic: Positive for environmental allergies.   Neurological: Negative.    Hematological: Negative.    Psychiatric/Behavioral: The patient is nervous/anxious.        Objective    /92  Pulse 78  Temp 98.6 °F (37 °C)  Resp 16  Ht 180.3 cm (71\")  Wt 119 kg (262 lb 9.6 oz)  BMI 36.63 kg/m2      Chemistry        Component Value Date/Time     11/06/2017 0945    K 4.5 11/06/2017 0945     11/06/2017 0945    CO2 24.0 11/06/2017 0945    BUN 11 11/06/2017 0945    CREATININE 0.95 11/06/2017 0945        Component Value Date/Time    CALCIUM 9.4 11/06/2017 0945    ALKPHOS 103 11/06/2017 0945    AST 27 11/06/2017 0945    ALT 51 11/06/2017 0945    BILITOT 0.7 11/06/2017 0945        Lab Results   Component Value Date    WBC 7.01 11/06/2017    HGB 14.9 11/06/2017    HCT 44.4 11/06/2017    MCV 91.0 11/06/2017     11/06/2017     Lab Results   Component Value Date    CHOL 178 11/06/2017    CHOL 144 06/02/2017     Lab Results   Component Value Date    TRIG 135 11/06/2017    TRIG 126 06/02/2017    TRIG 273 (H) 01/17/2017     Lab Results   Component Value Date    HDL 47 (L) 11/06/2017    HDL 36 (L) 06/02/2017    HDL 41 (L) 01/17/2017     No components found for: LDLCALC  Lab Results   Component Value Date    "  11/06/2017    LDL 93 06/02/2017     (H) 01/17/2017     No results found for: HDLLDLRATIO  No components found for: CHOLHDL  Lab Results   Component Value Date    HGBA1C 5.7 (H) 11/06/2017     Lab Results   Component Value Date    TSH 2.780 11/06/2017     Physical Exam   Constitutional: He is oriented to person, place, and time. He appears well-developed and well-nourished. No distress.   HENT:   Head: Normocephalic and atraumatic.   Right Ear: External ear normal.   Left Ear: External ear normal.   Eyes: Conjunctivae and EOM are normal. Pupils are equal, round, and reactive to light. Right eye exhibits no discharge. Left eye exhibits no discharge. No scleral icterus.   Neck: Normal range of motion. Neck supple. No JVD present. No tracheal deviation present. No thyromegaly present.   Cardiovascular: Normal rate, regular rhythm and normal heart sounds.    Pulmonary/Chest: Effort normal. No stridor. No respiratory distress. He has no wheezes.   Abdominal: Soft. He exhibits no distension and no mass. There is no tenderness. There is no rebound and no guarding. No hernia.   Obese abdomen    Musculoskeletal: Normal range of motion. He exhibits no edema, tenderness or deformity.   Lymphadenopathy:     He has no cervical adenopathy.   Neurological: He is alert and oriented to person, place, and time. No cranial nerve deficit. Coordination normal.   Skin: Skin is warm and dry. No rash noted. He is not diaphoretic. No erythema. No pallor.   Swollen nailbed of both thumbs. Subungual hematoma of both thumbs    Psychiatric: He has a normal mood and affect.   Nursing note and vitals reviewed.      Assessment/Plan   Problems Addressed this Visit        Cardiovascular and Mediastinum    Hyperlipidemia       Respiratory    Allergic rhinitis       Digestive    Non morbid obesity       Other    Prediabetes    Attention deficit hyperactivity disorder (ADHD), combined type - Primary    Relevant Medications     amphetamine-dextroamphetamine XR (ADDERALL XR) 20 MG 24 hr capsule        - counseled pt to limit carbon monoxide and gave information on posioning to go home with.  Recommend ventilation.  He has had multiple carbon monoxide exposure before.  Advised pt to go to ER or call 911 if symptoms severe. Gave carbon monoxide information today   - for allergic rhinitis - continue singulair and flonase and will add zyrtec.  -prediabetes - continue metformin   - hyperlipidemia -continue statin  -advised pt to let me know if his sore throat gets worse or he develops any body aches   - obesity - recommend ketogenic diet along with intermittent fasting  - ADHD medication - ERWIN printed and on file. Reference number [id number]   Refilled Adderall XR 30 mg PO q daily. Currently dosage if working for pt.    - recheck in 1 month  - labwork in 1 month

## 2018-03-01 NOTE — PATIENT INSTRUCTIONS
"DASH Eating Plan  DASH stands for \"Dietary Approaches to Stop Hypertension.\" The DASH eating plan is a healthy eating plan that has been shown to reduce high blood pressure (hypertension). It may also reduce your risk for type 2 diabetes, heart disease, and stroke. The DASH eating plan may also help with weight loss.  What are tips for following this plan?  General guidelines   · Avoid eating more than 2,300 mg (milligrams) of salt (sodium) a day. If you have hypertension, you may need to reduce your sodium intake to 1,500 mg a day.  · Limit alcohol intake to no more than 1 drink a day for nonpregnant women and 2 drinks a day for men. One drink equals 12 oz of beer, 5 oz of wine, or 1½ oz of hard liquor.  · Work with your health care provider to maintain a healthy body weight or to lose weight. Ask what an ideal weight is for you.  · Get at least 30 minutes of exercise that causes your heart to beat faster (aerobic exercise) most days of the week. Activities may include walking, swimming, or biking.  · Work with your health care provider or diet and nutrition specialist (dietitian) to adjust your eating plan to your individual calorie needs.  Reading food labels   · Check food labels for the amount of sodium per serving. Choose foods with less than 5 percent of the Daily Value of sodium. Generally, foods with less than 300 mg of sodium per serving fit into this eating plan.  · To find whole grains, look for the word \"whole\" as the first word in the ingredient list.  Shopping   · Buy products labeled as \"low-sodium\" or \"no salt added.\"  · Buy fresh foods. Avoid canned foods and premade or frozen meals.  Cooking   · Avoid adding salt when cooking. Use salt-free seasonings or herbs instead of table salt or sea salt. Check with your health care provider or pharmacist before using salt substitutes.  · Do not pedroza foods. Cook foods using healthy methods such as baking, boiling, grilling, and broiling instead.  · Cook with " heart-healthy oils, such as olive, canola, soybean, or sunflower oil.  Meal planning     · Eat a balanced diet that includes:  ¨ 5 or more servings of fruits and vegetables each day. At each meal, try to fill half of your plate with fruits and vegetables.  ¨ Up to 6-8 servings of whole grains each day.  ¨ Less than 6 oz of lean meat, poultry, or fish each day. A 3-oz serving of meat is about the same size as a deck of cards. One egg equals 1 oz.  ¨ 2 servings of low-fat dairy each day.  ¨ A serving of nuts, seeds, or beans 5 times each week.  ¨ Heart-healthy fats. Healthy fats called Omega-3 fatty acids are found in foods such as flaxseeds and coldwater fish, like sardines, salmon, and mackerel.  · Limit how much you eat of the following:  ¨ Canned or prepackaged foods.  ¨ Food that is high in trans fat, such as fried foods.  ¨ Food that is high in saturated fat, such as fatty meat.  ¨ Sweets, desserts, sugary drinks, and other foods with added sugar.  ¨ Full-fat dairy products.  · Do not salt foods before eating.  · Try to eat at least 2 vegetarian meals each week.  · Eat more home-cooked food and less restaurant, buffet, and fast food.  · When eating at a restaurant, ask that your food be prepared with less salt or no salt, if possible.  What foods are recommended?  The items listed may not be a complete list. Talk with your dietitian about what dietary choices are best for you.  Grains   Whole-grain or whole-wheat bread. Whole-grain or whole-wheat pasta. Brown rice. Oatmeal. Quinoa. Bulgur. Whole-grain and low-sodium cereals. Erica bread. Low-fat, low-sodium crackers. Whole-wheat flour tortillas.  Vegetables   Fresh or frozen vegetables (raw, steamed, roasted, or grilled). Low-sodium or reduced-sodium tomato and vegetable juice. Low-sodium or reduced-sodium tomato sauce and tomato paste. Low-sodium or reduced-sodium canned vegetables.  Fruits   All fresh, dried, or frozen fruit. Canned fruit in natural juice  (without added sugar).  Meat and other protein foods   Skinless chicken or turkey. Ground chicken or turkey. Pork with fat trimmed off. Fish and seafood. Egg whites. Dried beans, peas, or lentils. Unsalted nuts, nut butters, and seeds. Unsalted canned beans. Lean cuts of beef with fat trimmed off. Low-sodium, lean deli meat.  Dairy   Low-fat (1%) or fat-free (skim) milk. Fat-free, low-fat, or reduced-fat cheeses. Nonfat, low-sodium ricotta or cottage cheese. Low-fat or nonfat yogurt. Low-fat, low-sodium cheese.  Fats and oils   Soft margarine without trans fats. Vegetable oil. Low-fat, reduced-fat, or light mayonnaise and salad dressings (reduced-sodium). Canola, safflower, olive, soybean, and sunflower oils. Avocado.  Seasoning and other foods   Herbs. Spices. Seasoning mixes without salt. Unsalted popcorn and pretzels. Fat-free sweets.  What foods are not recommended?  The items listed may not be a complete list. Talk with your dietitian about what dietary choices are best for you.  Grains   Baked goods made with fat, such as croissants, muffins, or some breads. Dry pasta or rice meal packs.  Vegetables   Creamed or fried vegetables. Vegetables in a cheese sauce. Regular canned vegetables (not low-sodium or reduced-sodium). Regular canned tomato sauce and paste (not low-sodium or reduced-sodium). Regular tomato and vegetable juice (not low-sodium or reduced-sodium). Pickles. Olives.  Fruits   Canned fruit in a light or heavy syrup. Fried fruit. Fruit in cream or butter sauce.  Meat and other protein foods   Fatty cuts of meat. Ribs. Fried meat. Ling. Sausage. Bologna and other processed lunch meats. Salami. Fatback. Hotdogs. Bratwurst. Salted nuts and seeds. Canned beans with added salt. Canned or smoked fish. Whole eggs or egg yolks. Chicken or turkey with skin.  Dairy   Whole or 2% milk, cream, and half-and-half. Whole or full-fat cream cheese. Whole-fat or sweetened yogurt. Full-fat cheese. Nondairy creamers.  Whipped toppings. Processed cheese and cheese spreads.  Fats and oils   Butter. Stick margarine. Lard. Shortening. Ghee. Ling fat. Tropical oils, such as coconut, palm kernel, or palm oil.  Seasoning and other foods   Salted popcorn and pretzels. Onion salt, garlic salt, seasoned salt, table salt, and sea salt. Worcestershire sauce. Tartar sauce. Barbecue sauce. Teriyaki sauce. Soy sauce, including reduced-sodium. Steak sauce. Canned and packaged gravies. Fish sauce. Oyster sauce. Cocktail sauce. Horseradish that you find on the shelf. Ketchup. Mustard. Meat flavorings and tenderizers. Bouillon cubes. Hot sauce and Tabasco sauce. Premade or packaged marinades. Premade or packaged taco seasonings. Relishes. Regular salad dressings.  Where to find more information:  · National Heart, Lung, and [facility] [place]: www.nhlbi.nih.gov  · American Heart Association: www.heart.org  Summary  · The DASH eating plan is a healthy eating plan that has been shown to reduce high blood pressure (hypertension). It may also reduce your risk for type 2 diabetes, heart disease, and stroke.  · With the DASH eating plan, you should limit salt (sodium) intake to 2,300 mg a day. If you have hypertension, you may need to reduce your sodium intake to 1,500 mg a day.  · When on the DASH eating plan, aim to eat more fresh fruits and vegetables, whole grains, lean proteins, low-fat dairy, and heart-healthy fats.  · Work with your health care provider or diet and nutrition specialist (dietitian) to adjust your eating plan to your individual calorie needs.  This information is not intended to replace advice given to you by your health care provider. Make sure you discuss any questions you have with your health care provider.  Document Released: 12/06/2012 Document Revised: 12/11/2017 Document Reviewed: 12/11/2017  [employer] Interactive Patient Education © 2017 [employer]    Calorie Counting for Weight Loss  Calories are units of energy. Your body  needs a certain amount of calories from food to keep you going throughout the day. When you eat more calories than your body needs, your body stores the extra calories as fat. When you eat fewer calories than your body needs, your body burns fat to get the energy it needs.  Calorie counting means keeping track of how many calories you eat and drink each day. Calorie counting can be helpful if you need to lose weight. If you make sure to eat fewer calories than your body needs, you should lose weight. Ask your health care provider what a healthy weight is for you.  For calorie counting to work, you will need to eat the right number of calories in a day in order to lose a healthy amount of weight per week. A dietitian can help you determine how many calories you need in a day and will give you suggestions on how to reach your calorie goal.  · A healthy amount of weight to lose per week is usually 1-2 lb (0.5-0.9 kg). This usually means that your daily calorie intake should be reduced by 500-750 calories.  · Eating 1,200 - 1,500 calories per day can help most women lose weight.  · Eating 1,500 - 1,800 calories per day can help most men lose weight.  What is my plan?  My goal is to have __________ calories per day.  If I have this many calories per day, I should lose around __________ pounds per week.  What do I need to know about calorie counting?  In order to meet your daily calorie goal, you will need to:  · Find out how many calories are in each food you would like to eat. Try to do this before you eat.  · Decide how much of the food you plan to eat.  · Write down what you ate and how many calories it had. Doing this is called keeping a food log.  To successfully lose weight, it is important to balance calorie counting with a healthy lifestyle that includes regular activity. Aim for 150 minutes of moderate exercise (such as walking) or 75 minutes of vigorous exercise (such as running) each week.  Where do I find  calorie information?     The number of calories in a food can be found on a Nutrition Facts label. If a food does not have a Nutrition Facts label, try to look up the calories online or ask your dietitian for help.  Remember that calories are listed per serving. If you choose to have more than one serving of a food, you will have to multiply the calories per serving by the amount of servings you plan to eat. For example, the label on a package of bread might say that a serving size is 1 slice and that there are 90 calories in a serving. If you eat 1 slice, you will have eaten 90 calories. If you eat 2 slices, you will have eaten 180 calories.  How do I keep a food log?  Immediately after each meal, record the following information in your food log:  · What you ate. Don't forget to include toppings, sauces, and other extras on the food.  · How much you ate. This can be measured in cups, ounces, or number of items.  · How many calories each food and drink had.  · The total number of calories in the meal.  Keep your food log near you, such as in a small notebook in your pocket, or use a mobile gabo or website. Some programs will calculate calories for you and show you how many calories you have left for the day to meet your goal.  What are some calorie counting tips?  · Use your calories on foods and drinks that will fill you up and not leave you hungry:  ¨ Some examples of foods that fill you up are nuts and nut butters, vegetables, lean proteins, and high-fiber foods like whole grains. High-fiber foods are foods with more than 5 g fiber per serving.  ¨ Drinks such as sodas, specialty coffee drinks, alcohol, and juices have a lot of calories, yet do not fill you up.  · Eat nutritious foods and avoid empty calories. Empty calories are calories you get from foods or beverages that do not have many vitamins or protein, such as candy, sweets, and soda. It is better to have a nutritious high-calorie food (such as an  "avocado) than a food with few nutrients (such as a bag of chips).  · Know how many calories are in the foods you eat most often. This will help you calculate calorie counts faster.  · Pay attention to calories in drinks. Low-calorie drinks include water and unsweetened drinks.  · Pay attention to nutrition labels for \"low fat\" or \"fat free\" foods. These foods sometimes have the same amount of calories or more calories than the full fat versions. They also often have added sugar, starch, or salt, to make up for flavor that was removed with the fat.  · Find a way of tracking calories that works for you. Get creative. Try different apps or programs if writing down calories does not work for you.  What are some portion control tips?  · Know how many calories are in a serving. This will help you know how many servings of a certain food you can have.  · Use a measuring cup to measure serving sizes. You could also try weighing out portions on a kitchen scale. With time, you will be able to estimate serving sizes for some foods.  · Take some time to put servings of different foods on your favorite plates, bowls, and cups so you know what a serving looks like.  · Try not to eat straight from a bag or box. Doing this can lead to overeating. Put the amount you would like to eat in a cup or on a plate to make sure you are eating the right portion.  · Use smaller plates, glasses, and bowls to prevent overeating.  · Try not to multitask (for example, watch TV or use your computer) while eating. If it is time to eat, sit down at a table and enjoy your food. This will help you to know when you are full. It will also help you to be aware of what you are eating and how much you are eating.  What are tips for following this plan?  Reading food labels   · Check the calorie count compared to the serving size. The serving size may be smaller than what you are used to eating.  · Check the source of the calories. Make sure the food you are " eating is high in vitamins and protein and low in saturated and trans fats.  Shopping   · Read nutrition labels while you shop. This will help you make healthy decisions before you decide to purchase your food.  · Make a grocery list and stick to it.  Cooking   · Try to cook your favorite foods in a healthier way. For example, try baking instead of frying.  · Use low-fat dairy products.  Meal planning   · Use more fruits and vegetables. Half of your plate should be fruits and vegetables.  · Include lean proteins like poultry and fish.  How do I count calories when eating out?  · Ask for smaller portion sizes.  · Consider sharing an entree and sides instead of getting your own entree.  · If you get your own entree, eat only half. Ask for a box at the beginning of your meal and put the rest of your entree in it so you are not tempted to eat it.  · If calories are listed on the menu, choose the lower calorie options.  · Choose dishes that include vegetables, fruits, whole grains, low-fat dairy products, and lean protein.  · Choose items that are boiled, broiled, grilled, or steamed. Stay away from items that are buttered, battered, fried, or served with cream sauce. Items labeled “crispy” are usually fried, unless stated otherwise.  · Choose water, low-fat milk, unsweetened iced tea, or other drinks without added sugar. If you want an alcoholic beverage, choose a lower calorie option such as a glass of wine or light beer.  · Ask for dressings, sauces, and syrups on the side. These are usually high in calories, so you should limit the amount you eat.  · If you want a salad, choose a garden salad and ask for grilled meats. Avoid extra toppings like [place], cheese, or fried items. Ask for the dressing on the side, or ask for olive oil and vinegar or lemon to use as dressing.  · Estimate how many servings of a food you are given. For example, a serving of cooked rice is ½ cup or about the size of half a baseball. Knowing  serving sizes will help you be aware of how much food you are eating at restaurants. The list below tells you how big or small some common portion sizes are based on everyday objects:  ¨ 1 oz--4 stacked dice.  ¨ 3 oz--1 deck of cards.  ¨ 1 tsp--1 die.  ¨ 1 Tbsp--½ a ping-pong ball.  ¨ 2 Tbsp--1 ping-pong ball.  ¨ ½ cup--½ baseball.  ¨ 1 cup--1 baseball.  Summary  · Calorie counting means keeping track of how many calories you eat and drink each day. If you eat fewer calories than your body needs, you should lose weight.  · A healthy amount of weight to lose per week is usually 1-2 lb (0.5-0.9 kg). This usually means reducing your daily calorie intake by 500-750 calories.  · The number of calories in a food can be found on a Nutrition Facts label. If a food does not have a Nutrition Facts label, try to look up the calories online or ask your dietitian for help.  · Use your calories on foods and drinks that will fill you up, and not on foods and drinks that will leave you hungry.  · Use smaller plates, glasses, and bowls to prevent overeating.  This information is not intended to replace advice given to you by your health care provider. Make sure you discuss any questions you have with your health care provider.  Document Released: 12/18/2006 Document Revised: 11/17/2017 Document Reviewed: 11/17/2017  [employer] Interactive Patient Education © 2017 [employer]    Exercising to Lose Weight  Exercising can help you to lose weight. In order to lose weight through exercise, you need to do vigorous-intensity exercise. You can tell that you are exercising with vigorous intensity if you are breathing very hard and fast and cannot hold a conversation while exercising.  Moderate-intensity exercise helps to maintain your current weight. You can tell that you are exercising at a moderate level if you have a higher heart rate and faster breathing, but you are still able to hold a conversation.  How often should I  exercise?  Choose an activity that you enjoy and set realistic goals. Your health care provider can help you to make an activity plan that works for you. Exercise regularly as directed by your health care provider. This may include:  · Doing resistance training twice each week, such as:  ¨ Push-ups.  ¨ Sit-ups.  ¨ Lifting weights.  ¨ Using resistance bands.  · Doing a given intensity of exercise for a given amount of time. Choose from these options:  ¨ 150 minutes of moderate-intensity exercise every week.  ¨ 75 minutes of vigorous-intensity exercise every week.  ¨ A mix of moderate-intensity and vigorous-intensity exercise every week.  Children, pregnant women, people who are out of shape, people who are overweight, and older adults may need to consult a health care provider for individual recommendations. If you have any sort of medical condition, be sure to consult your health care provider before starting a new exercise program.  What are some activities that can help me to lose weight?  · Walking at a rate of at least 4.5 miles an hour.  · Jogging or running at a rate of 5 miles per hour.  · Biking at a rate of at least 10 miles per hour.  · Lap swimming.  · Roller-skating or in-line skating.  · Cross-country skiing.  · Vigorous competitive sports, such as football, basketball, and soccer.  · Jumping rope.  · Aerobic dancing.  How can I be more active in my day-to-day activities?  · Use the stairs instead of the elevator.  · Take a walk during your lunch break.  · If you drive, park your car farther away from work or school.  · If you take public transportation, get off one stop early and walk the rest of the way.  · Make all of your phone calls while standing up and walking around.  · Get up, stretch, and walk around every 30 minutes throughout the day.  What guidelines should I follow while exercising?  · Do not exercise so much that you hurt yourself, feel dizzy, or get very short of breath.  · Consult your  health care provider prior to starting a new exercise program.  · Wear comfortable clothes and shoes with good support.  · Drink plenty of water while you exercise to prevent dehydration or heat stroke. Body water is lost during exercise and must be replaced.  · Work out until you breathe faster and your heart beats faster.  This information is not intended to replace advice given to you by your health care provider. Make sure you discuss any questions you have with your health care provider.  Document Released: 01/20/2012 Document Revised: 05/25/2017 Document Reviewed: 05/21/2015  [employer] Interactive Patient Education © 2017 [employer]    Influenza, Adult  Influenza, more commonly known as “the flu,” is a viral infection that primarily affects the respiratory tract. The respiratory tract includes organs that help you breathe, such as the lungs, nose, and throat. The flu causes many common cold symptoms, as well as a high fever and body aches.  The flu spreads easily from person to person (is contagious). Getting a flu shot (influenza vaccination) every year is the best way to prevent influenza.  What are the causes?  Influenza is caused by a virus. You can catch the virus by:  · Breathing in droplets from an infected person's cough or sneeze.  · Touching something that was recently contaminated with the virus and then touching your mouth, nose, or eyes.  What increases the risk?  The following factors may make you more likely to get the flu:  · Not cleaning your hands frequently with soap and water or alcohol-based hand .  · Having close contact with many people during cold and flu season.  · Touching your mouth, eyes, or nose without washing or sanitizing your hands first.  · Not drinking enough fluids or not eating a healthy diet.  · Not getting enough sleep or exercise.  · Being under a high amount of stress.  · Not getting a yearly (annual) flu shot.  You may be at a higher risk of complications  from the flu, such as a severe lung infection (pneumonia), if you:  · Are over the age of 65.  · Are pregnant.  · Have a weakened disease-fighting system (immune system). You may have a weakened immune system if you:  ¨ Have HIV or AIDS.  ¨ Are undergoing chemotherapy.  ¨ Are taking medicines that reduce the activity of (suppress) the immune system.  · Have a long-term (chronic) illness, such as heart disease, kidney disease, diabetes, or lung disease.  · Have a liver disorder.  · Are obese.  · Have anemia.  What are the signs or symptoms?  Symptoms of this condition typically last 4-10 days and may include:  · Fever.  · Chills.  · Headache, body aches, or muscle aches.  · Sore throat.  · Cough.  · Runny or congested nose.  · Chest discomfort and cough.  · Poor appetite.  · Weakness or tiredness (fatigue).  · Dizziness.  · Nausea or vomiting.  How is this diagnosed?  This condition may be diagnosed based on your medical history and a physical exam. Your health care provider may do a nose or throat swab test to confirm the diagnosis.  How is this treated?  If influenza is detected early, you can be treated with antiviral medicine that can reduce the length of your illness and the severity of your symptoms. This medicine may be given by mouth (orally) or through an IV tube that is inserted in one of your veins.  The goal of treatment is to relieve symptoms by taking care of yourself at home. This may include taking over-the-counter medicines, drinking plenty of fluids, and adding humidity to the air in your home.  In some cases, influenza goes away on its own. Severe influenza or complications from influenza may be treated in a hospital.  Follow these instructions at home:  · Take over-the-counter and prescription medicines only as told by your health care provider.  · Use a cool mist humidifier to add humidity to the air in your home. This can make breathing easier.  · Rest as needed.  · Drink enough fluid to keep  your urine clear or pale yellow.  · Cover your mouth and nose when you cough or sneeze.  · Wash your hands with soap and water often, especially after you cough or sneeze. If soap and water are not available, use hand .  · Stay home from work or school as told by your health care provider. Unless you are visiting your health care provider, try to avoid leaving home until your fever has been gone for 24 hours without the use of medicine.  · Keep all follow-up visits as told by your health care provider. This is important.  How is this prevented?  · Getting an annual flu shot is the best way to avoid getting the flu. You may get the flu shot in late summer, fall, or winter. Ask your health care provider when you should get your flu shot.  · Wash your hands often or use hand  often.  · Avoid contact with people who are sick during cold and flu season.  · Eat a healthy diet, drink plenty of fluids, get enough sleep, and exercise regularly.  Contact a health care provider if:  · You develop new symptoms.  · You have:  ¨ Chest pain.  ¨ Diarrhea.  ¨ A fever.  · Your cough gets worse.  · You produce more mucus.  · You feel nauseous or you vomit.  Get help right away if:  · You develop shortness of breath or difficulty breathing.  · Your skin or nails turn a bluish color.  · You have severe pain or stiffness in your neck.  · You develop a sudden headache or sudden pain in your face or ear.  · You cannot stop vomiting.  This information is not intended to replace advice given to you by your health care provider. Make sure you discuss any questions you have with your health care provider.  Document Released: 12/15/2001 Document Revised: 05/25/2017 Document Reviewed: 10/11/2016  [employer] Interactive Patient Education © 2017 [employer]  Cetirizine tablets  What is this medicine?  CETIRIZINE (se TI ra zevu) is an antihistamine. This medicine is used to treat or prevent symptoms of allergies. It is also used  to help reduce itchy skin rash and hives.  This medicine may be used for other purposes; ask your health care provider or pharmacist if you have questions.  COMMON BRAND NAME(S): All Day Allergy, Zyrtec, Zyrtec Hives Relief  What should I tell my health care provider before I take this medicine?  They need to know if you have any of these conditions:  -kidney disease  -liver disease  -an unusual or allergic reaction to cetirizine, hydroxyzine, other medicines, foods, dyes, or preservatives  -pregnant or trying to get pregnant  -breast-feeding  How should I use this medicine?  Take this medicine by mouth with a glass of water. Follow the directions on the prescription label. You can take this medicine with food or on an empty stomach. Take your medicine at regular times. Do not take more often than directed. You may need to take this medicine for several days before your symptoms improve.  Talk to your pediatrician regarding the use of this medicine in children. Special care may be needed. While this drug may be prescribed for children as young as 6 years of age for selected conditions, precautions do apply.  Overdosage: If you think you have taken too much of this medicine contact a poison control center or emergency room at once.  NOTE: This medicine is only for you. Do not share this medicine with others.  What if I miss a dose?  If you miss a dose, take it as soon as you can. If it is almost time for your next dose, take only that dose. Do not take double or extra doses.  What may interact with this medicine?  -alcohol  -certain medicines for anxiety or sleep  -narcotic medicines for pain  -other medicines for colds or allergies  This list may not describe all possible interactions. Give your health care provider a list of all the medicines, herbs, non-prescription drugs, or dietary supplements you use. Also tell them if you smoke, drink alcohol, or use illegal drugs. Some items may interact with your  medicine.  What should I watch for while using this medicine?  Visit your doctor or health care professional for regular checks on your health. Tell your doctor if your symptoms do not improve.  You may get drowsy or dizzy. Do not drive, use machinery, or do anything that needs mental alertness until you know how this medicine affects you. Do not stand or sit up quickly, especially if you are an older patient. This reduces the risk of dizzy or fainting spells.  Your mouth may get dry. Chewing sugarless gum or sucking hard candy, and drinking plenty of water may help. Contact your doctor if the problem does not go away or is severe.  What side effects may I notice from receiving this medicine?  Side effects that you should report to your doctor or health care professional as soon as possible:  -allergic reactions like skin rash, itching or hives, swelling of the face, lips, or tongue  -changes in vision or hearing  -fast or irregular heartbeat  -trouble passing urine or change in the amount of urine  Side effects that usually do not require medical attention (report to your doctor or health care professional if they continue or are bothersome):  -dizziness  -dry mouth  -irritability  -sore throat  -stomach pain  -tiredness  This list may not describe all possible side effects. Call your doctor for medical advice about side effects. You may report side effects to FDA at 3-291-FDA-9766.  Where should I keep my medicine?  Keep out of the reach of children.  Store at room temperature between 15 and 30 degrees C (59 and 86 degrees F). Throw away any unused medicine after the expiration date.  NOTE: This sheet is a summary. It may not cover all possible information. If you have questions about this medicine, talk to your doctor, pharmacist, or health care provider.  © 2018 [employer] (2016-01-12 13:44:42)

## 2018-04-02 ENCOUNTER — OFFICE VISIT (OUTPATIENT)
Dept: FAMILY MEDICINE CLINIC | Facility: CLINIC | Age: 48
End: 2018-04-02

## 2018-04-02 VITALS
DIASTOLIC BLOOD PRESSURE: 70 MMHG | WEIGHT: 261 LBS | RESPIRATION RATE: 16 BRPM | BODY MASS INDEX: 36.54 KG/M2 | HEART RATE: 79 BPM | HEIGHT: 71 IN | TEMPERATURE: 98.6 F | SYSTOLIC BLOOD PRESSURE: 120 MMHG

## 2018-04-02 DIAGNOSIS — E78.5 HYPERLIPIDEMIA, UNSPECIFIED HYPERLIPIDEMIA TYPE: ICD-10-CM

## 2018-04-02 DIAGNOSIS — R73.03 PREDIABETES: ICD-10-CM

## 2018-04-02 DIAGNOSIS — K21.9 GASTROESOPHAGEAL REFLUX DISEASE, ESOPHAGITIS PRESENCE NOT SPECIFIED: ICD-10-CM

## 2018-04-02 DIAGNOSIS — F90.2 ATTENTION DEFICIT HYPERACTIVITY DISORDER (ADHD), COMBINED TYPE: ICD-10-CM

## 2018-04-02 DIAGNOSIS — R10.32 LEFT LOWER QUADRANT PAIN: Primary | ICD-10-CM

## 2018-04-02 DIAGNOSIS — E66.9 NON MORBID OBESITY: ICD-10-CM

## 2018-04-02 DIAGNOSIS — Z02.83 ENCOUNTER FOR DRUG SCREENING: ICD-10-CM

## 2018-04-02 LAB
25(OH)D3 SERPL-MCNC: 45 NG/ML (ref 30–100)
ALBUMIN SERPL-MCNC: 4.1 G/DL (ref 3.4–4.8)
ALBUMIN/GLOB SERPL: 1.2 G/DL (ref 1.1–1.8)
ALP SERPL-CCNC: 109 U/L (ref 38–126)
ALT SERPL W P-5'-P-CCNC: 31 U/L (ref 21–72)
AMPHET+METHAMPHET UR QL: NEGATIVE
ANION GAP SERPL CALCULATED.3IONS-SCNC: 13 MMOL/L (ref 5–15)
ARTICHOKE IGE QN: 114 MG/DL (ref 1–129)
AST SERPL-CCNC: 21 U/L (ref 17–59)
BACTERIA UR QL AUTO: ABNORMAL /HPF
BARBITURATES UR QL SCN: NEGATIVE
BASOPHILS # BLD AUTO: 0.06 10*3/MM3 (ref 0–0.2)
BASOPHILS NFR BLD AUTO: 0.8 % (ref 0–2)
BENZODIAZ UR QL SCN: NEGATIVE
BILIRUB SERPL-MCNC: 0.5 MG/DL (ref 0.2–1.3)
BILIRUB UR QL STRIP: ABNORMAL
BUN BLD-MCNC: 15 MG/DL (ref 7–21)
BUN/CREAT SERPL: 17.2 (ref 7–25)
CALCIUM SPEC-SCNC: 9.9 MG/DL (ref 8.4–10.2)
CANNABINOIDS SERPL QL: NEGATIVE
CHLORIDE SERPL-SCNC: 101 MMOL/L (ref 95–110)
CHOLEST SERPL-MCNC: 184 MG/DL (ref 0–199)
CLARITY UR: CLEAR
CO2 SERPL-SCNC: 27 MMOL/L (ref 22–31)
COCAINE UR QL: NEGATIVE
COD CRY URNS QL: ABNORMAL /HPF
COLOR UR: YELLOW
CREAT BLD-MCNC: 0.87 MG/DL (ref 0.7–1.3)
DEPRECATED RDW RBC AUTO: 40.4 FL (ref 35.1–43.9)
EOSINOPHIL # BLD AUTO: 0.2 10*3/MM3 (ref 0–0.7)
EOSINOPHIL NFR BLD AUTO: 2.6 % (ref 0–7)
ERYTHROCYTE [DISTWIDTH] IN BLOOD BY AUTOMATED COUNT: 12.6 % (ref 11.5–14.5)
GFR SERPL CREATININE-BSD FRML MDRD: 93 ML/MIN/1.73 (ref 60–147)
GLOBULIN UR ELPH-MCNC: 3.3 GM/DL (ref 2.3–3.5)
GLUCOSE BLD-MCNC: 132 MG/DL (ref 60–100)
GLUCOSE UR STRIP-MCNC: NEGATIVE MG/DL
HBA1C MFR BLD: 5.4 % (ref 4–5.6)
HCT VFR BLD AUTO: 46.8 % (ref 39–49)
HDLC SERPL-MCNC: 44 MG/DL (ref 60–200)
HGB BLD-MCNC: 15.8 G/DL (ref 13.7–17.3)
HGB UR QL STRIP.AUTO: NEGATIVE
HYALINE CASTS UR QL AUTO: ABNORMAL /LPF
IMM GRANULOCYTES # BLD: 0.05 10*3/MM3 (ref 0–0.02)
IMM GRANULOCYTES NFR BLD: 0.7 % (ref 0–0.5)
KETONES UR QL STRIP: NEGATIVE
LDLC/HDLC SERPL: 2.69 {RATIO} (ref 0–3.55)
LEUKOCYTE ESTERASE UR QL STRIP.AUTO: NEGATIVE
LYMPHOCYTES # BLD AUTO: 2.2 10*3/MM3 (ref 0.6–4.2)
LYMPHOCYTES NFR BLD AUTO: 28.9 % (ref 10–50)
MCH RBC QN AUTO: 29.9 PG (ref 26.5–34)
MCHC RBC AUTO-ENTMCNC: 33.8 G/DL (ref 31.5–36.3)
MCV RBC AUTO: 88.6 FL (ref 80–98)
METHADONE UR QL SCN: NEGATIVE
MONOCYTES # BLD AUTO: 0.5 10*3/MM3 (ref 0–0.9)
MONOCYTES NFR BLD AUTO: 6.6 % (ref 0–12)
MUCOUS THREADS URNS QL MICRO: ABNORMAL /HPF
NEUTROPHILS # BLD AUTO: 4.59 10*3/MM3 (ref 2–8.6)
NEUTROPHILS NFR BLD AUTO: 60.4 % (ref 37–80)
NITRITE UR QL STRIP: NEGATIVE
OPIATES UR QL: NEGATIVE
OXYCODONE UR QL SCN: NEGATIVE
PH UR STRIP.AUTO: 6 [PH] (ref 5–8)
PLATELET # BLD AUTO: 257 10*3/MM3 (ref 150–450)
PMV BLD AUTO: 10 FL (ref 8–12)
POTASSIUM BLD-SCNC: 4.9 MMOL/L (ref 3.5–5.1)
PROT SERPL-MCNC: 7.4 G/DL (ref 6.3–8.6)
PROT UR QL STRIP: ABNORMAL
RBC # BLD AUTO: 5.28 10*6/MM3 (ref 4.37–5.74)
RBC # UR: ABNORMAL /HPF
REF LAB TEST METHOD: ABNORMAL
SODIUM BLD-SCNC: 141 MMOL/L (ref 137–145)
SP GR UR STRIP: 1.03 (ref 1–1.03)
SQUAMOUS #/AREA URNS HPF: ABNORMAL /HPF
TRIGL SERPL-MCNC: 108 MG/DL (ref 20–199)
UROBILINOGEN UR QL STRIP: ABNORMAL
WBC NRBC COR # BLD: 7.6 10*3/MM3 (ref 3.2–9.8)
WBC UR QL AUTO: ABNORMAL /HPF

## 2018-04-02 PROCEDURE — 80307 DRUG TEST PRSMV CHEM ANLYZR: CPT | Performed by: FAMILY MEDICINE

## 2018-04-02 PROCEDURE — 85025 COMPLETE CBC W/AUTO DIFF WBC: CPT | Performed by: FAMILY MEDICINE

## 2018-04-02 PROCEDURE — 80061 LIPID PANEL: CPT | Performed by: FAMILY MEDICINE

## 2018-04-02 PROCEDURE — 83036 HEMOGLOBIN GLYCOSYLATED A1C: CPT | Performed by: FAMILY MEDICINE

## 2018-04-02 PROCEDURE — 80053 COMPREHEN METABOLIC PANEL: CPT | Performed by: FAMILY MEDICINE

## 2018-04-02 PROCEDURE — 81015 MICROSCOPIC EXAM OF URINE: CPT | Performed by: FAMILY MEDICINE

## 2018-04-02 PROCEDURE — 99214 OFFICE O/P EST MOD 30 MIN: CPT | Performed by: FAMILY MEDICINE

## 2018-04-02 PROCEDURE — 82306 VITAMIN D 25 HYDROXY: CPT | Performed by: FAMILY MEDICINE

## 2018-04-02 RX ORDER — DEXTROAMPHETAMINE SACCHARATE, AMPHETAMINE ASPARTATE MONOHYDRATE, DEXTROAMPHETAMINE SULFATE AND AMPHETAMINE SULFATE 5; 5; 5; 5 MG/1; MG/1; MG/1; MG/1
20 CAPSULE, EXTENDED RELEASE ORAL EVERY MORNING
Qty: 30 CAPSULE | Refills: 0 | Status: SHIPPED | OUTPATIENT
Start: 2018-04-02 | End: 2018-05-02

## 2018-04-02 RX ORDER — ATORVASTATIN CALCIUM 40 MG/1
40 TABLET, FILM COATED ORAL DAILY
Refills: 6 | COMMUNITY
Start: 2018-03-09 | End: 2018-04-02

## 2018-04-02 NOTE — PATIENT INSTRUCTIONS
Try linzess 72 mcg. Call or go to ER if pain is getting worse. Will need CT of abdomen.  Get labwork and stool studies today    Call me before April 11 I will be back April 23.  If getting worse     Linaclotide oral capsules  What is this medicine?  LINACLOTIDE (deborah a KLOE tide) is used to treat irritable bowel syndrome (IBS) with constipation as the main problem. It may also be used for relief of chronic constipation.  This medicine may be used for other purposes; ask your health care provider or pharmacist if you have questions.  COMMON BRAND NAME(S): Linzess  What should I tell my health care provider before I take this medicine?  They need to know if you have any of these conditions:  -history of stool (fecal) impaction  -now have diarrhea or have diarrhea often  -other medical condition  -stomach or intestinal disease, including bowel obstruction or abdominal adhesions  -an unusual or allergic reaction to linaclotide, other medicines, foods, dyes, or preservatives  -pregnant or trying to get pregnant  -breast-feeding  How should I use this medicine?  Take this medicine by mouth with a glass of water. Follow the directions on the prescription label. Do not cut, crush or chew this medicine. Take on an empty stomach, at least 30 minutes before your first meal of the day. Take your medicine at regular intervals. Do not take your medicine more often than directed. Do not stop taking except on your doctor's advice.  A special MedGuide will be given to you by the pharmacist with each prescription and refill. Be sure to read this information carefully each time.  Talk to your pediatrician regarding the use of this medicine in children. This medicine is not approved for use in children.  Overdosage: If you think you have taken too much of this medicine contact a poison control center or emergency room at once.  NOTE: This medicine is only for you. Do not share this medicine with others.  What if I miss a dose?  If you  miss a dose, just skip that dose. Wait until your next dose, and take only that dose. Do not take double or extra doses.  What may interact with this medicine?  -certain medicines for bowel problems or bladder incontinence (these can cause constipation)  This list may not describe all possible interactions. Give your health care provider a list of all the medicines, herbs, non-prescription drugs, or dietary supplements you use. Also tell them if you smoke, drink alcohol, or use illegal drugs. Some items may interact with your medicine.  What should I watch for while using this medicine?  Visit your doctor for regular check ups. Tell your doctor if your symptoms do not get better or if they get worse.  Diarrhea is a common side effect of this medicine. It often begins within 2 weeks of starting this medicine. Stop taking this medicine and call your doctor if you get severe diarrhea.  Stop taking this medicine and call your doctor or go to the nearest hospital emergency room right away if you develop unusual or severe stomach-area (abdominal) pain, especially if you also have bright red, bloody stools or black stools that look like tar.  What side effects may I notice from receiving this medicine?  Side effects that you should report to your doctor or health care professional as soon as possible:  -allergic reactions like skin rash, itching or hives, swelling of the face, lips, or tongue  -black, tarry stools  -bloody or watery diarrhea  -new or worsening stomach pain  -severe or prolonged diarrhea  Side effects that usually do not require medical attention (report to your doctor or health care professional if they continue or are bothersome):  -bloating  -gas  -loose stools  This list may not describe all possible side effects. Call your doctor for medical advice about side effects. You may report side effects to FDA at 7-850-FDA-1797.  Where should I keep my medicine?  Keep out of the reach of children.  Store at  room temperature between 20 and 25 degrees C (68 and 77 degrees F). Keep this medicine in the original container. Keep tightly closed in a dry place. Do not remove the desiccant packet from the bottle, it helps to protect your medicine from moisture. Throw away any unused medicine after the expiration date.  NOTE: This sheet is a summary. It may not cover all possible information. If you have questions about this medicine, talk to your doctor, pharmacist, or health care provider.  © 2018 [employer] (2017-01-19 12:17:04)

## 2018-04-02 NOTE — PROGRESS NOTES
"Subjective   Randall Hernandez is a 47 y.o. male.       Problem list  1. Obesity >BMI 30  2. Prediabetes  3. Hyperlipidemia ASCVD risk >5%  4. Allergic Rhinitis  5. ADHD,combined type  6. Hyperinsular Obesity   7. GERD    1/31/18 Pt is 45 yo WM with the above medical  Issues. Is here for followup on ADHD medication . Pt states Adderall XR 20 mg PO q daily is helping with focus and concentration.  No issues with sleep or appetite.  Also takes lipitor for hyperlipidemia along with metformin for hyperinsular obesity and prediabetes.  For GERD pt takes protonix which is helping.  PT has obesity and BMI >30.  Pt has tried to lose weight with no relief. Has yet to try ketogenic diet. Pt did not get labwork from last visit yet .  Pt has lost 1 lbs since lat visitBP is elevated today. States he has been stressed lately. Denies any chest pain/headaches or dizzinessPt states ADHD medication Adderall 20 mg PO q daily is helping. He also has bilateral subungual hematoma of both thumbs that have been bothering him for weeks.  Pt denies any trauma to both thumbs Pt on last labwork had LDL at 107 .HDL was low. Hga1c was at 5.7. Takes lipitors 80 mg PO qhs..  Take metformin 1000 mg PO BIDPt states he still has reflux issues. Takes protonix. States symptoms occur every once in a while. Sometimes he wakes up in middle of night and has burning sensation in throat. 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 Pt is here for refill on  Adderral XR 20 mg PO q daily.  Pt is still taking lipitor and metformin for his hyperlipidemia and prediabetes. Prilosec is helping with reflux medication.  Also takes aspirin 81 mg PO q daily.  Weight today is 262 lbs. On last visit he was 267.  His BMI >30. He declined any weight loss surgery . He states \"I am addicted to sugar\".   Pt also has postnasal drip and allergies. He is " taking flonase and singulair. He also has been constantly clearing his throat. Denies any fever or body aches    4/2/18 pt is here for followup and refill on ADHD medication. Pt is due for new recheck on cholesterol pane.  Pt is on statin medication. Also is prediabetic with hga1c at 5.7.  He is taking metformin 500 mg PO BID with meals. His weight is 261 lbs from from 262 lbs.  He has noted abdominal pain on left lower quadrant.  He does have a bowel movement every day.  He is taking a probiotic supplemetn. He has pain for last 4 days . He denies blood in stool he does have history of constipation and has bowel movement every other day. Denies any fever. Does not recall eating anything that made it worse.  Pt does have family history of colon cancer.  His mother was diagnosed  In her age 80s. Pt denies any vomiting, fever or diarrhea       Abdominal Pain   This is a new problem. The current episode started in the past 7 days. The problem occurs intermittently. The problem has been unchanged. The pain is located in the LLQ. The pain is at a severity of 5/10. The pain is moderate. The quality of the pain is aching. Associated symptoms include constipation. Pertinent negatives include no anorexia, arthralgias, belching, diarrhea, dysuria, fever, flatus, frequency, headaches, hematochezia, hematuria, melena, myalgias, nausea, vomiting or weight loss. Nothing aggravates the pain. The pain is relieved by nothing. He has tried nothing for the symptoms. The treatment provided no relief. There is no history of abdominal surgery, colon cancer, Crohn's disease, gallstones, GERD, irritable bowel syndrome, pancreatitis, PUD or ulcerative colitis.            The following portions of the patient's history were reviewed and updated as appropriate: allergies, current medications, past family history, past medical history, past social history, past surgical history and problem list.    Review of Systems   Constitutional: Negative.   "Negative for fever and weight loss.   HENT: Positive for postnasal drip.    Eyes: Negative.    Respiratory: Negative.    Cardiovascular: Negative.    Gastrointestinal: Positive for abdominal pain and constipation. Negative for anorexia, diarrhea, flatus, hematochezia, melena, nausea and vomiting.   Endocrine: Negative.    Genitourinary: Negative.  Negative for dysuria, frequency and hematuria.   Musculoskeletal: Negative.  Negative for arthralgias and myalgias.   Skin: Negative.         Bilateral thumb pain.     Allergic/Immunologic: Positive for environmental allergies.   Neurological: Negative.  Negative for headaches.   Hematological: Negative.    Psychiatric/Behavioral: The patient is nervous/anxious.        Objective    /70   Pulse 79   Temp 98.6 °F (37 °C)   Resp 16   Ht 180.3 cm (71\")   Wt 118 kg (261 lb)   BMI 36.40 kg/m²     /70   Pulse 79   Temp 98.6 °F (37 °C)   Resp 16   Ht 180.3 cm (71\")   Wt 118 kg (261 lb)   BMI 36.40 kg/m²       Chemistry        Component Value Date/Time     11/06/2017 0945    K 4.5 11/06/2017 0945     11/06/2017 0945    CO2 24.0 11/06/2017 0945    BUN 11 11/06/2017 0945    CREATININE 0.95 11/06/2017 0945        Component Value Date/Time    CALCIUM 9.4 11/06/2017 0945    ALKPHOS 103 11/06/2017 0945    AST 27 11/06/2017 0945    ALT 51 11/06/2017 0945    BILITOT 0.7 11/06/2017 0945        Lab Results   Component Value Date    WBC 7.01 11/06/2017    HGB 14.9 11/06/2017    HCT 44.4 11/06/2017    MCV 91.0 11/06/2017     11/06/2017     Lab Results   Component Value Date    CHOL 178 11/06/2017    CHOL 144 06/02/2017     Lab Results   Component Value Date    TRIG 135 11/06/2017    TRIG 126 06/02/2017    TRIG 273 (H) 01/17/2017     Lab Results   Component Value Date    HDL 47 (L) 11/06/2017    HDL 36 (L) 06/02/2017    HDL 41 (L) 01/17/2017     No components found for: LDLCALC  Lab Results   Component Value Date     11/06/2017    LDL 93 " 06/02/2017     (H) 01/17/2017     No results found for: HDLLDLRATIO  No components found for: CHOLHDL  Lab Results   Component Value Date    HGBA1C 5.7 (H) 11/06/2017     Lab Results   Component Value Date    TSH 2.780 11/06/2017     Physical Exam   Constitutional: He is oriented to person, place, and time. He appears well-developed and well-nourished. No distress.   HENT:   Head: Normocephalic and atraumatic.   Right Ear: External ear normal.   Left Ear: External ear normal.   Eyes: Conjunctivae and EOM are normal. Pupils are equal, round, and reactive to light. Right eye exhibits no discharge. Left eye exhibits no discharge. No scleral icterus.   Neck: Normal range of motion. Neck supple. No JVD present. No tracheal deviation present. No thyromegaly present.   Cardiovascular: Normal rate, regular rhythm and normal heart sounds.    Pulmonary/Chest: Effort normal. No stridor. No respiratory distress. He has no wheezes.   Abdominal: Soft. He exhibits no distension and no mass. There is tenderness. There is no rebound and no guarding. No hernia.   Obese abdomen     Pain in left lower quadrant on palpation   Musculoskeletal: Normal range of motion. He exhibits no edema, tenderness or deformity.   Lymphadenopathy:     He has no cervical adenopathy.   Neurological: He is alert and oriented to person, place, and time. No cranial nerve deficit. Coordination normal.   Skin: Skin is warm and dry. No rash noted. He is not diaphoretic. No erythema. No pallor.   Swollen nailbed of both thumbs. Subungual hematoma of both thumbs    Psychiatric: He has a normal mood and affect.   Nursing note and vitals reviewed.      Assessment/Plan   Problems Addressed this Visit        Cardiovascular and Mediastinum    Hyperlipidemia    Relevant Medications    atorvastatin (LIPITOR) 40 MG tablet    Other Relevant Orders    CBC Auto Differential    Comprehensive Metabolic Panel    Hemoglobin A1c    Lipid Panel    Vitamin D 25 Hydroxy     Urine Drug Screen - Urine, Clean Catch    Urinalysis With Microscopic - Urine, Clean Catch       Digestive    Non morbid obesity    Gastroesophageal reflux disease       Other    Prediabetes    Attention deficit hyperactivity disorder (ADHD), combined type    Relevant Medications    amphetamine-dextroamphetamine XR (ADDERALL XR) 20 MG 24 hr capsule    Encounter for drug screening    Relevant Orders    Urine Drug Screen - Urine, Clean Catch    Urinalysis With Microscopic - Urine, Clean Catch      Other Visit Diagnoses     Left lower quadrant pain    -  Primary    Relevant Orders    Occult Blood X 1, Stool - Stool, Per Rectum      - for abdominal pain  LLQ - suspect IBS with constipation. Will start on linzess low does 72 mcg daily.  Samples provided today. Drug information provided.    - counseled pt to limit carbon monoxide and gave information on posioning to go home with.  Recommend ventilation.  He has had multiple carbon monoxide exposure before.  Advised pt to go to ER or call 911 if symptoms severe. Gave carbon monoxide information today   - for allergic rhinitis - continue singulair and flonase and will add zyrtec.  -prediabetes - continue metformin, recheck hga1c  - hyperlipidemia -continue statin. Recheck lipid panel. Pt on aspirin   -advised pt to let me know if his sore throat gets worse or he develops any body aches   - obesity - recommend ketogenic diet along with intermittent fasting  - ADHD medication - [facility]WIN printed and on file. Reference number [id number]  Refilled Adderall XR 30 mg PO q daily. Currently dosage if working for pt.  Will get UDS as well   - recheck in 1 month  - advised pt to call if abdominal pain is worse to go to ER or call 911 may need CT of abdomen to rule out possible inflammatory or infectious disease   - labwork in 1 month               Review of Systems   Constitutional: Negative.  Negative for fever and unexpected weight loss.   HENT: Positive for postnasal drip.    Eyes:  Negative.    Respiratory: Negative.    Cardiovascular: Negative.    Gastrointestinal: Positive for abdominal pain and constipation. Negative for anorexia, diarrhea, flatus, hematochezia, melena, nausea and vomiting.   Endocrine: Negative.    Genitourinary: Negative.  Negative for dysuria, frequency and hematuria.   Musculoskeletal: Negative.  Negative for arthralgias and myalgias.   Skin: Negative.         Bilateral thumb pain.     Allergic/Immunologic: Positive for environmental allergies.   Neurological: Negative.  Negative for headaches.   Hematological: Negative.    Psychiatric/Behavioral: The patient is nervous/anxious.        Physical Exam   Constitutional: He is oriented to person, place, and time. He appears well-developed and well-nourished. No distress.   HENT:   Head: Normocephalic and atraumatic.   Right Ear: External ear normal.   Left Ear: External ear normal.   Eyes: Conjunctivae and EOM are normal. Pupils are equal, round, and reactive to light. Right eye exhibits no discharge. Left eye exhibits no discharge. No scleral icterus.   Neck: Normal range of motion. Neck supple. No JVD present. No tracheal deviation present. No thyromegaly present.   Cardiovascular: Normal rate, regular rhythm and normal heart sounds.    Pulmonary/Chest: Effort normal. No stridor. No respiratory distress. He has no wheezes.   Abdominal: Soft. He exhibits no distension and no mass. There is tenderness. There is no rebound and no guarding. No hernia.   Obese abdomen     Pain in left lower quadrant on palpation   Musculoskeletal: Normal range of motion. He exhibits no edema, tenderness or deformity.   Lymphadenopathy:     He has no cervical adenopathy.   Neurological: He is alert and oriented to person, place, and time. No cranial nerve deficit. Coordination normal.   Skin: Skin is warm and dry. No rash noted. He is not diaphoretic. No erythema. No pallor.   Swollen nailbed of both thumbs. Subungual hematoma of both thumbs     Psychiatric: He has a normal mood and affect.   Nursing note and vitals reviewed.

## 2018-04-05 RX ORDER — MONTELUKAST SODIUM 10 MG/1
TABLET ORAL
Qty: 30 TABLET | Refills: 0 | Status: SHIPPED | OUTPATIENT
Start: 2018-04-05 | End: 2018-05-07 | Stop reason: SDUPTHER

## 2018-05-02 ENCOUNTER — OFFICE VISIT (OUTPATIENT)
Dept: FAMILY MEDICINE CLINIC | Facility: CLINIC | Age: 48
End: 2018-05-02

## 2018-05-02 VITALS
HEART RATE: 83 BPM | SYSTOLIC BLOOD PRESSURE: 138 MMHG | RESPIRATION RATE: 16 BRPM | HEIGHT: 71 IN | WEIGHT: 265 LBS | DIASTOLIC BLOOD PRESSURE: 98 MMHG | TEMPERATURE: 98.6 F | BODY MASS INDEX: 37.1 KG/M2

## 2018-05-02 DIAGNOSIS — L28.2 PRURITIC RASH: ICD-10-CM

## 2018-05-02 DIAGNOSIS — F90.2 ATTENTION DEFICIT HYPERACTIVITY DISORDER (ADHD), COMBINED TYPE: ICD-10-CM

## 2018-05-02 DIAGNOSIS — E78.5 HYPERLIPIDEMIA, UNSPECIFIED HYPERLIPIDEMIA TYPE: Primary | ICD-10-CM

## 2018-05-02 DIAGNOSIS — R03.0 ELEVATED BLOOD PRESSURE READING: ICD-10-CM

## 2018-05-02 DIAGNOSIS — R73.03 PREDIABETES: ICD-10-CM

## 2018-05-02 DIAGNOSIS — E66.9 NON MORBID OBESITY: ICD-10-CM

## 2018-05-02 PROCEDURE — 99214 OFFICE O/P EST MOD 30 MIN: CPT | Performed by: FAMILY MEDICINE

## 2018-05-02 RX ORDER — DEXTROAMPHETAMINE SACCHARATE, AMPHETAMINE ASPARTATE MONOHYDRATE, DEXTROAMPHETAMINE SULFATE AND AMPHETAMINE SULFATE 6.25; 6.25; 6.25; 6.25 MG/1; MG/1; MG/1; MG/1
25 CAPSULE, EXTENDED RELEASE ORAL EVERY MORNING
Qty: 30 CAPSULE | Refills: 0 | Status: SHIPPED | OUTPATIENT
Start: 2018-05-02 | End: 2018-05-30 | Stop reason: SDUPTHER

## 2018-05-02 RX ORDER — CLOTRIMAZOLE AND BETAMETHASONE DIPROPIONATE 10; .64 MG/G; MG/G
CREAM TOPICAL EVERY 12 HOURS SCHEDULED
Qty: 15 G | Refills: 2 | Status: SHIPPED | OUTPATIENT
Start: 2018-05-02 | End: 2018-10-30
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
## 2018-05-07 RX ORDER — MONTELUKAST SODIUM 10 MG/1
TABLET ORAL
Qty: 30 TABLET | Refills: 11 | Status: SHIPPED | OUTPATIENT
Start: 2018-05-07 | End: 2018-10-30

## 2018-05-16 ENCOUNTER — OFFICE VISIT (OUTPATIENT)
Dept: FAMILY MEDICINE CLINIC | Facility: CLINIC | Age: 48
End: 2018-05-16

## 2018-05-16 VITALS
RESPIRATION RATE: 16 BRPM | HEART RATE: 81 BPM | BODY MASS INDEX: 36.88 KG/M2 | TEMPERATURE: 98.6 F | DIASTOLIC BLOOD PRESSURE: 84 MMHG | SYSTOLIC BLOOD PRESSURE: 120 MMHG | WEIGHT: 263.4 LBS | HEIGHT: 71 IN

## 2018-05-16 DIAGNOSIS — M25.521 BILATERAL ELBOW JOINT PAIN: ICD-10-CM

## 2018-05-16 DIAGNOSIS — E66.9 NON MORBID OBESITY: ICD-10-CM

## 2018-05-16 DIAGNOSIS — M25.531 PAIN IN BOTH WRISTS: ICD-10-CM

## 2018-05-16 DIAGNOSIS — R73.03 PREDIABETES: ICD-10-CM

## 2018-05-16 DIAGNOSIS — E78.5 HYPERLIPIDEMIA, UNSPECIFIED HYPERLIPIDEMIA TYPE: ICD-10-CM

## 2018-05-16 DIAGNOSIS — R20.0 NUMBNESS IN BOTH HANDS: Primary | ICD-10-CM

## 2018-05-16 DIAGNOSIS — M25.522 BILATERAL ELBOW JOINT PAIN: ICD-10-CM

## 2018-05-16 DIAGNOSIS — K21.9 GASTROESOPHAGEAL REFLUX DISEASE, ESOPHAGITIS PRESENCE NOT SPECIFIED: ICD-10-CM

## 2018-05-16 DIAGNOSIS — F90.2 ATTENTION DEFICIT HYPERACTIVITY DISORDER (ADHD), COMBINED TYPE: ICD-10-CM

## 2018-05-16 DIAGNOSIS — M25.532 PAIN IN BOTH WRISTS: ICD-10-CM

## 2018-05-16 PROCEDURE — 99213 OFFICE O/P EST LOW 20 MIN: CPT | Performed by: FAMILY MEDICINE

## 2018-05-16 RX ORDER — NAPROXEN 500 MG/1
500 TABLET ORAL 2 TIMES DAILY WITH MEALS
Qty: 60 TABLET | Refills: 3 | Status: SHIPPED | OUTPATIENT
Start: 2018-05-16 | End: 2018-07-30
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
## 2018-05-16 NOTE — PATIENT INSTRUCTIONS
Tennis Elbow  Tennis elbow (lateral epicondylitis) is inflammation of the outer tendons of your forearm close to your elbow. Your tendons attach your muscles to your bones. The outer tendons of your forearm are used to extend your wrist, and they attach on the outside part of your elbow. Tennis elbow is often found in people who play tennis, but anyone may get the condition from repeatedly extending the wrist or turning the forearm.  What are the causes?  This condition is caused by repeatedly extending your wrist and using your hands. It can result from sports or work that requires repetitive forearm movements. Tennis elbow may also be caused by an injury.  What increases the risk?  You have a higher risk of developing tennis elbow if you play tennis or another racquet sport. You also have a higher risk if you frequently use your hands for work. This condition is also more likely to develop in:  · Musicians.  · , painters, and plumbers.  · Cooks.  · [place].  · People who work in factories.  · Construction workers.  · Butchers.  · People who use computers.  What are the signs or symptoms?  Symptoms of this condition include:  · Pain and tenderness in your forearm and the outer part of your elbow. You may only feel the pain when you use your arm, or you may feel it even when you are not using your arm.  · A burning feeling that runs from your elbow through your arm.  · Weak  in your hands.  How is this diagnosed?  This condition may be diagnosed by medical history and physical exam. You may also have other tests, including:  · X-rays.  · MRI.  How is this treated?  Your health care provider will recommend lifestyle adjustments, such as resting and icing your arm. Treatment may also include:  · Medicines for inflammation. This may include shots of cortisone if your pain continues.  · Physical therapy. This may include massage or exercises.  · An elbow brace.  Surgery may eventually be recommended if  your pain does not go away with treatment.  Follow these instructions at home:  Activity   · Rest your elbow and wrist as directed by your health care provider. Try to avoid any activities that caused the problem until your health care provider says that you can do them again.  · If a physical therapist teaches you exercises, do all of them as directed.  · If you lift an object, lift it with your palm facing upward. This lowers the stress on your elbow.  Lifestyle   · If your tennis elbow is caused by sports, check your equipment and make sure that:  ¨ You are using it correctly.  ¨ It is the best fit for you.  · If your tennis elbow is caused by work, take breaks frequently, if you are able. Talk with your manager about how to best perform tasks in a way that is safe.  ¨ If your tennis elbow is caused by computer use, talk with your manager about any changes that can be made to your work environment.  General instructions   · If directed, apply ice to the painful area:  ¨ Put ice in a plastic bag.  ¨ Place a towel between your skin and the bag.  ¨ Leave the ice on for 20 minutes, 2-3 times per day.  · Take medicines only as directed by your health care provider.  · If you were given a brace, wear it as directed by your health care provider.  · Keep all follow-up visits as directed by your health care provider. This is important.  Contact a health care provider if:  · Your pain does not get better with treatment.  · Your pain gets worse.  · You have numbness or weakness in your forearm, hand, or fingers.  This information is not intended to replace advice given to you by your health care provider. Make sure you discuss any questions you have with your health care provider.  Document Released: 12/18/2006 Document Revised: 08/17/2017 Document Reviewed: 12/14/2015  [employer] Interactive Patient Education © 2017 [employer]  Carpal Tunnel Release, Care After  Refer to this sheet in the next few weeks. These  instructions provide you with information about caring for yourself after your procedure. Your health care provider may also give you more specific instructions. Your treatment has been planned according to current medical practices, but problems sometimes occur. Call your health care provider if you have any problems or questions after your procedure.  What can I expect after the procedure?  After your procedure, it is typical to have the following:  · Pain.  · Numbness.  · Tingling.  · Swelling.  · Stiffness.  · Bruising.  Follow these instructions at home:  · Take medicines only as directed by your health care provider.  · There are many different ways to close and cover an incision, including stitches (sutures), skin glue, and adhesive strips. Follow your health care provider's instructions about:  ¨ Incision care.  ¨ Bandage (dressing) changes and removal.  ¨ Incision closure removal.  · Wear a splint or a brace as directed by your surgeon. You may need to do this for 2-3 weeks.  · Keep your hand raised (elevated) above the level of your heart while you are resting. Move your fingers often.  · Avoid activities that cause hand pain.  · Ask your surgeon when you can start to do all of your usual activities again, such as:  ¨ Driving.  ¨ Returning to work.  ¨ Bathing and swimming.  · Keep all follow-up visits as directed by your health care provider. This is important. You may need physical therapy for several months to speed healing and regain movement.  Contact a health care provider if:  · You have drainage, redness, swelling, or pain at your incision site.  · You have a fever.  · You have chills.  · Your pain medicine is not working.  · Your symptoms do not go away after 2 months.  · Your symptoms go away and then return.  Get help right away if:  · You have pain or numbness that is getting worse.  · Your fingers change color.  · You are not able to move your fingers.  This information is not intended to  replace advice given to you by your health care provider. Make sure you discuss any questions you have with your health care provider.  Document Released: 07/07/2006 Document Revised: 05/25/2017 Document Reviewed: 08/05/2015  [employer] Interactive Patient Education © 2017 [employer]    Carpal Tunnel Release  Carpal tunnel release is a surgical procedure to relieve numbness and pain in your hand that are caused by carpal tunnel syndrome. Your carpal tunnel is a narrow, hollow space in your wrist. It passes between your wrist bones and a band of connective tissue (transverse carpal ligament). The nerve that supplies most of your hand (median nerve) passes through this space, and so do the connections between your fingers and the muscles of your arm (tendons). Carpal tunnel syndrome makes this space swell and become narrow, and this causes pain and numbness.  In carpal tunnel release surgery, a surgeon cuts through the transverse carpal ligament to make more room in the carpal tunnel space. You may have this surgery if other types of treatment have not worked.  Tell a health care provider about:  · Any allergies you have.  · All medicines you are taking, including vitamins, herbs, eye drops, creams, and over-the-counter medicines.  · Any problems you or family members have had with anesthetic medicines.  · Any blood disorders you have.  · Any surgeries you have had.  · Any medical conditions you have.  What are the risks?  Generally, this is a safe procedure. However, problems may occur, including:  · Bleeding.  · Infection.  · Injury to the median nerve.  · Need for additional surgery.  What happens before the procedure?  · Ask your health care provider about:  ¨ Changing or stopping your regular medicines. This is especially important if you are taking diabetes medicines or blood thinners.  ¨ Taking medicines such as aspirin and ibuprofen. These medicines can thin your blood. Do not take these medicines before your  procedure if your health care provider instructs you not to.  · Do not eat or drink anything after midnight on the night before the procedure or as directed by your health care provider.  · Plan to have someone take you home after the procedure.  What happens during the procedure?  · An IV tube may be inserted into a vein.  · You will be given one of the following:  ¨ A medicine that numbs the wrist area (local anesthetic). You may also be given a medicine to make you relax (sedative).  ¨ A medicine that makes you go to sleep (general anesthetic).  · Your arm, hand, and wrist will be cleaned with a germ-killing solution (antiseptic).  · Your surgeon will make a surgical cut (incision) over the palm side of your wrist. The surgeon will pull aside the skin of your wrist to expose the carpal tunnel space.  · The surgeon will cut the transverse carpal ligament.  · The edges of the incision will be closed with stitches (sutures) or staples.  · A bandage (dressing) will be placed over your wrist and wrapped around your hand and wrist.  What happens after the procedure?  · You may spend some time in a recovery area.  · Your blood pressure, heart rate, breathing rate, and blood oxygen level will be monitored often until the medicines you were given have worn off.  · You will likely have some pain. You will be given pain medicine.  · You may need to wear a splint or a wrist brace over your dressing.  This information is not intended to replace advice given to you by your health care provider. Make sure you discuss any questions you have with your health care provider.  Document Released: 03/09/2005 Document Revised: 05/25/2017 Document Reviewed: 08/05/2015  [employer] Interactive Patient Education © 2017 [employer]    Carpal Tunnel Syndrome  Carpal tunnel syndrome is a condition that causes pain in your hand and arm. The carpal tunnel is a narrow area located on the palm side of your wrist. Repeated wrist motion or certain  diseases may cause swelling within the tunnel. This swelling pinches the main nerve in the wrist (median nerve).  What are the causes?  This condition may be caused by:  · Repeated wrist motions.  · Wrist injuries.  · Arthritis.  · A cyst or tumor in the carpal tunnel.  · Fluid buildup during pregnancy.  Sometimes the cause of this condition is not known.  What increases the risk?  This condition is more likely to develop in:  · People who have jobs that cause them to repeatedly move their wrists in the same motion, such as butchers and cashiers.  · Women.  · People with certain conditions, such as:  ¨ Diabetes.  ¨ Obesity.  ¨ An underactive thyroid (hypothyroidism).  ¨ Kidney failure.  What are the signs or symptoms?  Symptoms of this condition include:  · A tingling feeling in your fingers, especially in your thumb, index, and middle fingers.  · Tingling or numbness in your hand.  · An aching feeling in your entire arm, especially when your wrist and elbow are bent for long periods of time.  · Wrist pain that goes up your arm to your shoulder.  · Pain that goes down into your palm or fingers.  · A weak feeling in your hands. You may have trouble grabbing and holding items.  Your symptoms may feel worse during the night.  How is this diagnosed?  This condition is diagnosed with a medical history and physical exam. You may also have tests, including:  · An electromyogram (EMG). This test measures electrical signals sent by your nerves into the muscles.  · X-rays.  How is this treated?  Treatment for this condition includes:  · Lifestyle changes. It is important to stop doing or modify the activity that caused your condition.  · Physical or occupational therapy.  · Medicines for pain and inflammation. This may include medicine that is injected into your wrist.  · A wrist splint.  · Surgery.  Follow these instructions at home:  If you have a splint:   · Wear it as told by your health care provider. Remove it only as  told by your health care provider.  · Loosen the splint if your fingers become numb and tingle, or if they turn cold and blue.  · Keep the splint clean and dry.  General instructions   · Take over-the-counter and prescription medicines only as told by your health care provider.  · Rest your wrist from any activity that may be causing your pain. If your condition is work related, talk to your employer about changes that can be made, such as getting a wrist pad to use while typing.  · If directed, apply ice to the painful area:  ¨ Put ice in a plastic bag.  ¨ Place a towel between your skin and the bag.  ¨ Leave the ice on for 20 minutes, 2-3 times per day.  · Keep all follow-up visits as told by your health care provider. This is important.  · Do any exercises as told by your health care provider, physical therapist, or occupational therapist.  Contact a health care provider if:  · You have new symptoms.  · Your pain is not controlled with medicines.  · Your symptoms get worse.  This information is not intended to replace advice given to you by your health care provider. Make sure you discuss any questions you have with your health care provider.  Document Released: 12/15/2001 Document Revised: 04/27/2017 Document Reviewed: 05/04/2016  [employer] Interactive Patient Education © 2017 [employer]    Tennis Elbow  Tennis elbow (lateral epicondylitis) is inflammation of the outer tendons of your forearm close to your elbow. Your tendons attach your muscles to your bones. The outer tendons of your forearm are used to extend your wrist, and they attach on the outside part of your elbow. Tennis elbow is often found in people who play tennis, but anyone may get the condition from repeatedly extending the wrist or turning the forearm.  What are the causes?  This condition is caused by repeatedly extending your wrist and using your hands. It can result from sports or work that requires repetitive forearm movements. Tennis  elbow may also be caused by an injury.  What increases the risk?  You have a higher risk of developing tennis elbow if you play tennis or another racquet sport. You also have a higher risk if you frequently use your hands for work. This condition is also more likely to develop in:  · Musicians.  · , painters, and plumbers.  · Cooks.  · [place].  · People who work in factories.  · Construction workers.  · Butchers.  · People who use computers.  What are the signs or symptoms?  Symptoms of this condition include:  · Pain and tenderness in your forearm and the outer part of your elbow. You may only feel the pain when you use your arm, or you may feel it even when you are not using your arm.  · A burning feeling that runs from your elbow through your arm.  · Weak  in your hands.  How is this diagnosed?  This condition may be diagnosed by medical history and physical exam. You may also have other tests, including:  · X-rays.  · MRI.  How is this treated?  Your health care provider will recommend lifestyle adjustments, such as resting and icing your arm. Treatment may also include:  · Medicines for inflammation. This may include shots of cortisone if your pain continues.  · Physical therapy. This may include massage or exercises.  · An elbow brace.  Surgery may eventually be recommended if your pain does not go away with treatment.  Follow these instructions at home:  Activity   · Rest your elbow and wrist as directed by your health care provider. Try to avoid any activities that caused the problem until your health care provider says that you can do them again.  · If a physical therapist teaches you exercises, do all of them as directed.  · If you lift an object, lift it with your palm facing upward. This lowers the stress on your elbow.  Lifestyle   · If your tennis elbow is caused by sports, check your equipment and make sure that:  ¨ You are using it correctly.  ¨ It is the best fit for you.  · If your  tennis elbow is caused by work, take breaks frequently, if you are able. Talk with your manager about how to best perform tasks in a way that is safe.  ¨ If your tennis elbow is caused by computer use, talk with your manager about any changes that can be made to your work environment.  General instructions   · If directed, apply ice to the painful area:  ¨ Put ice in a plastic bag.  ¨ Place a towel between your skin and the bag.  ¨ Leave the ice on for 20 minutes, 2-3 times per day.  · Take medicines only as directed by your health care provider.  · If you were given a brace, wear it as directed by your health care provider.  · Keep all follow-up visits as directed by your health care provider. This is important.  Contact a health care provider if:  · Your pain does not get better with treatment.  · Your pain gets worse.  · You have numbness or weakness in your forearm, hand, or fingers.  This information is not intended to replace advice given to you by your health care provider. Make sure you discuss any questions you have with your health care provider.  Document Released: 12/18/2006 Document Revised: 08/17/2017 Document Reviewed: 12/14/2015  [email or web address] Interactive Patient Education © 2017 [employer]

## 2018-05-30 ENCOUNTER — OFFICE VISIT (OUTPATIENT)
Dept: FAMILY MEDICINE CLINIC | Facility: CLINIC | Age: 48
End: 2018-05-30

## 2018-05-30 VITALS
HEIGHT: 71 IN | OXYGEN SATURATION: 98 % | TEMPERATURE: 98.6 F | WEIGHT: 266.4 LBS | DIASTOLIC BLOOD PRESSURE: 92 MMHG | BODY MASS INDEX: 37.3 KG/M2 | SYSTOLIC BLOOD PRESSURE: 142 MMHG | HEART RATE: 84 BPM

## 2018-05-30 DIAGNOSIS — E66.9 NON MORBID OBESITY: ICD-10-CM

## 2018-05-30 DIAGNOSIS — Z02.83 ENCOUNTER FOR DRUG SCREENING: Primary | ICD-10-CM

## 2018-05-30 DIAGNOSIS — F90.2 ATTENTION DEFICIT HYPERACTIVITY DISORDER (ADHD), COMBINED TYPE: ICD-10-CM

## 2018-05-30 DIAGNOSIS — K21.9 GASTROESOPHAGEAL REFLUX DISEASE, ESOPHAGITIS PRESENCE NOT SPECIFIED: ICD-10-CM

## 2018-05-30 DIAGNOSIS — R20.2 TINGLING IN EXTREMITIES: ICD-10-CM

## 2018-05-30 DIAGNOSIS — R20.0 NUMBNESS IN BOTH HANDS: ICD-10-CM

## 2018-05-30 DIAGNOSIS — M25.531 PAIN IN BOTH WRISTS: ICD-10-CM

## 2018-05-30 DIAGNOSIS — E78.5 HYPERLIPIDEMIA, UNSPECIFIED HYPERLIPIDEMIA TYPE: ICD-10-CM

## 2018-05-30 DIAGNOSIS — M25.521 BILATERAL ELBOW JOINT PAIN: ICD-10-CM

## 2018-05-30 DIAGNOSIS — M25.532 PAIN IN BOTH WRISTS: ICD-10-CM

## 2018-05-30 DIAGNOSIS — M25.522 BILATERAL ELBOW JOINT PAIN: ICD-10-CM

## 2018-05-30 PROCEDURE — 99214 OFFICE O/P EST MOD 30 MIN: CPT | Performed by: FAMILY MEDICINE

## 2018-05-30 PROCEDURE — 80307 DRUG TEST PRSMV CHEM ANLYZR: CPT | Performed by: FAMILY MEDICINE

## 2018-05-30 RX ORDER — DEXTROAMPHETAMINE SACCHARATE, AMPHETAMINE ASPARTATE MONOHYDRATE, DEXTROAMPHETAMINE SULFATE AND AMPHETAMINE SULFATE 6.25; 6.25; 6.25; 6.25 MG/1; MG/1; MG/1; MG/1
25 CAPSULE, EXTENDED RELEASE ORAL EVERY MORNING
Qty: 30 CAPSULE | Refills: 0 | Status: SHIPPED | OUTPATIENT
Start: 2018-05-30 | End: 2018-06-25 | Stop reason: SDUPTHER
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
## 2018-05-30 NOTE — PATIENT INSTRUCTIONS
Dr. Medina appt with Neurologist on 6/8/18 at 1:45 pm   [facility] Elbow Rehab  Ask your health care provider which exercises are safe for you. Do exercises exactly as told by your health care provider and adjust them as directed. It is normal to feel mild stretching, pulling, tightness, or discomfort as you do these exercises, but you should stop right away if you feel sudden pain or your pain gets worse. Do not begin these exercises until told by your health care provider.  Stretching and range of motion exercises  These exercises warm up your muscles and joints and improve the movement and flexibility of your elbow.  Exercise A: Wrist flexors     1. Straighten your left / right elbow in front of you with your palm up. If told by your health care provider, do this stretch with your elbow bent rather than straight.  2. With your other hand, gently pull your left / right hand and fingers toward you until you feel a gentle stretch on the top of your forearm.  3. Hold this position for __________ seconds.  Repeat __________ times. Complete this exercise __________ times a day.  Strengthening exercises  These exercises build strength and endurance in your elbow. Endurance is the ability to use your muscles for a long time, even after several repetitions.  Exercise B: Wrist flexion     1. Sit with your left / right forearm palm-up and supported on a table or other surface.  2. Let your left / right wrist extend over the edge of the surface.  3. Hold a __________ weight or a piece of rubber exercise band or tubing. Slowly curl your hand up toward your forearm. Try to only move your hand and keep the rest of your arm still.  4. Hold this position for __________ seconds.  5. Slowly return to the starting position.  Repeat __________ times. Complete this exercise __________ times a day.  Exercise C: Eccentric wrist flexion   1. Sit with your left / right forearm palm-up and supported on a table or other surface.  2. Let your  left / right wrist extend over the edge of the surface.  3. Hold a __________ weight or a piece of rubber exercise band or tubing.  4. Use your other hand to move your left / right hand up toward your forearm.  5. Slowly return to the starting position using only your left / right hand.  Repeat __________ times. Complete this exercise __________ times a day.  Exercise D: Hand turns, pronation i   1. Sit with your left / right forearm supported on a table or other surface.  2. Move your wrist so your pinkie travels toward your forearm and your thumb moves away from your forearm.  3. Hold this position for __________ seconds.  4. Slowly return to the starting position.  Exercise E: Hand turns, pronation ii     1. Sit with your left / right forearm supported on a table or other surface.  2. Hold a hammer in your left / right hand.  ¨ The exercise will be easier if you hold on near the head of the hammer.  ¨ If you hold on toward the end of the handle, the exercise will be harder.  3. Rest your left / right hand over the edge of the table with your palm facing up.  4. Without moving your elbow, slowly turn your palm and your hand down toward the table.  5. Hold this position for __________ seconds.  6. Slowly return to the starting position.  Repeat __________ times. Complete this exercise __________ times a day.  Exercise F: Shoulder blade squeeze   1. Sit in a stable chair with good posture. Do not let your back touch the back of the chair.  2. Your arms should be at your sides with your elbows bent. You can rest your forearms on a pillow.  3. Squeeze your shoulder blades together. Keep your shoulders level. Do not lift your shoulders up toward your ears.  4. Hold this position for __________ seconds.  5. Slowly release.  Repeat __________ times. Complete this exercise __________ times a day.  This information is not intended to replace advice given to you by your health care provider. Make sure you discuss any  questions you have with your health care provider.  Document Released: 12/18/2006 Document Revised: 08/24/2017 Document Reviewed: 08/29/2016  [employer] Interactive Patient Education © 2017 [employer]    Golfer's Elbow  Golfer’s elbow, also called medial epicondylitis, is a condition that results from inflammation of the strong bands of tissue (tendons) that attach your forearm muscles to the inside of your bone at the elbow. These tendons affect the muscles that bend the palm toward the wrist (flexion).  This condition is called golfer's elbow because it is more common among people who constantly bend and twist their wrists, such as golfers. This injury usually results from overuse. Tendons also become less flexible with age.  This condition causes elbow pain that may spread to your forearm and upper arm. The pain may get worse when you bend your wrist downward.  What are the causes?  This condition is an overuse injury that is caused by:  · Repeatedly flexing, turning, or twisting your wrist.  · Constantly gripping objects with your hands.  What increases the risk?  This condition is more likely to develop in people who play golf or tennis or have jobs that require the constant use of their hands. This injury is more common among:  · .  · Gardeners.  · Musicians.  · Bricklayers.  · Typists.  What are the signs or symptoms?  Symptoms of this condition include:  · Pain near the inner elbow or forearm.  · Reduced  strength.  How is this diagnosed?  This condition is diagnosed based on your symptoms, medical history, and physical exam. During the exam, your health care provider may test your  strength and move your wrist to check for pain. You may also have an MRI to confirm the diagnosis, look for other issues, and check for tears in the ligaments, muscles, or tendons.  How is this treated?  Treatment for this condition includes:  · Stopping all activities that make you bend or twist your wrist  until your pain and other symptoms go away.  · Icing your wrist to relieve pain.  · Taking NSAIDs or getting corticosteroid injections to reduce pain and swelling.  · Doing stretches, range-of-motion, and strengthening exercises (physical therapy) as told by your health care provider.  In rare cases, surgery may be needed if your condition does not improve.  Follow these instructions at home:  · If directed, apply ice to the injured area.  ¨ Put ice in a plastic bag.  ¨ Place a towel between your skin and the bag.  ¨ Leave the ice on for 20 minutes, 2-3 times a day.  · Move your fingers often to avoid stiffness.  · Raise (elevate) the injured area above the level of your heart while you are sitting or lying down.  · Return to your normal activities as told by your health care provider. Ask your health care provider what activities are safe for you.  · Do exercises as told by your health care provider.  · Do not use tobacco products, including cigarettes, chewing tobacco, or e-cigarettes. If you need help quitting, ask your health care provider.  · Take over-the-counter and prescription medicines only as told by your health care provider.  · Keep all follow-up visits as told by your health care provider. This is important.  How is this prevented?  · Warm up and stretch before being active.  · Cool down and stretch after being active.  · Give your body time to rest between periods of activity.  · Make sure to use equipment that fits you.  · Be safe and responsible while being active to avoid falls.  · Do at least 150 minutes of moderate-intensity exercise each week, such as brisk walking or water aerobics.  · Maintain physical fitness, including:  ¨ Strength.  ¨ Flexibility.  ¨ Cardiovascular fitness.  ¨ Endurance.  · Perform exercises to strengthen the forearm muscles.  · Slow your golf swing to reduce shock in the arm when making contact with the ball, if you play golf.  Contact a health care provider if:  · Your  pain does not improve or it gets worse.  · You notice numbness in your hand.  Get help right away if:  · Your pain is severe.  · You cannot move your wrist.  This information is not intended to replace advice given to you by your health care provider. Make sure you discuss any questions you have with your health care provider.  Document Released: 12/18/2006 Document Revised: 08/22/2017 Document Reviewed: 08/29/2016  [employer] Interactive Patient Education © 2017 [employer]    Tennis Elbow Rehab  Ask your health care provider which exercises are safe for you. Do exercises exactly as told by your health care provider and adjust them as directed. It is normal to feel mild stretching, pulling, tightness, or discomfort as you do these exercises, but you should stop right away if you feel sudden pain or your pain gets worse. Do not begin these exercises until told by your health care provider.  Stretching and range of motion exercises  These exercises warm up your muscles and joints and improve the movement and flexibility of your elbow. These exercises also help to relieve pain, numbness, and tingling.  Exercise A: Wrist extensor stretch   4. Extend your left / right elbow with your fingers pointing down.  5. Gently pull the palm of your left / right hand toward you until you feel a gentle stretch on the top of your forearm.  6. To increase the stretch, push your left / right hand toward the outer edge or pinkie side of your forearm.  7. Hold this position for __________ seconds.  Repeat __________ times. Complete this exercise __________ times a day.  If directed by your health care provider, repeat this stretch except do it with a bent elbow this time.  Exercise B: Wrist flexor stretch     6. Extend your left / right elbow and turn your palm upward.  7. Gently pull your left / right palm and fingertips back so your wrist extends and your fingers point more toward the ground.  8. You should feel a gentle stretch on  the inside of your forearm.  9. Hold this position for __________ seconds.  Repeat __________ times. Complete this exercise __________ times a day.  If directed by your health care provider, repeat this stretch except do it with a bent elbow this time.  Strengthening exercises  These exercises build strength and endurance in your elbow. Endurance is the ability to use your muscles for a long time, even after they get tired.  Exercise C: Wrist extensors     6. Sit with your left / right forearm palm-down and fully supported on a table or countertop. Your elbow should be resting below the height of your shoulder.  7. Let your left / right wrist extend over the edge of the surface.  8. Loosely hold a __________ weight or a piece of rubber exercise band or tubing in your left / right hand. Slowly curl your left / right hand up toward your forearm. If you are using band or tubing, hold the band or tubing in place with your other hand to provide resistance.  9. Hold this position for __________ seconds.  10. Slowly return to the starting position.  Repeat __________ times. Complete this exercise __________ times a day.  Exercise D: Radial deviators     5. Stand with a __________ weight in your left / right hand. Or, sit while holding a rubber exercise band or tubing with your other arm supported on a table or countertop. Position your hand so your thumb is on top.  6. Raise your hand upward in front of you so your thumb travels toward your forearm, or pull up on the rubber tubing.  7. Hold this position for __________ seconds.  8. Slowly return to the starting position.  Repeat __________ times. Complete this exercise __________ times a day.  Exercise E: Eccentric wrist extensors   1. Sit with your left / right forearm palm-down and fully supported on a table or countertop. Your elbow should be resting below the height of your shoulder.  2. If told by your health care provider, hold a __________ weight in your  hand.  3. Let your left / right wrist extend over the edge of the surface.  4. Use your other hand to lift up your left / right hand toward your forearm. Keep your forearm on the table.  5. Using only the muscles in your left / right hand, slowly lower your hand back down to the starting position.  Repeat __________ times. Complete this exercise __________ times a day.  This information is not intended to replace advice given to you by your health care provider. Make sure you discuss any questions you have with your health care provider.  Document Released: 12/18/2006 Document Revised: 08/23/2017 Document Reviewed: 09/15/2016  [employer] Interactive Patient Education © 2017 [employer]    Lateral Epicondylitis With Rehab  Lateral epicondylitis involves inflammation and pain around the outer portion of the elbow. The pain is caused by inflammation of the tendons in the forearm that bring back (extend) the wrist. Lateral epicondylitis is also called tennis elbow, because it is very common in tennis players. However, it may occur in any individual who extends the wrist repetitively. If lateral epicondylitis is left untreated, it may become a chronic problem.  SYMPTOMS   · Pain, tenderness, and inflammation on the outer (lateral) side of the elbow.  · Pain or weakness with gripping activities.  · Pain that increases with wrist-twisting motions (playing tennis, using a screwdriver, opening a door or a jar).  · Pain with lifting objects, including a coffee cup.  CAUSES   Lateral epicondylitis is caused by inflammation of the tendons that extend the wrist. Causes of injury may include:  · Repetitive stress and strain on the muscles and tendons that extend the wrist.  · Sudden change in activity level or intensity.  · Incorrect  in racquet sports.  · Incorrect  size of racquet (often too large).  · Incorrect hitting position or technique (usually backhand, leading with the elbow).  · Using a racket that is too  heavy.  RISK INCREASES WITH:  · Sports or occupations that require repetitive and/or strenuous forearm and wrist movements (tennis, squash, racquetball, carpentry).  · Poor wrist and forearm strength and flexibility.  · Failure to warm up properly before activity.  · Resuming activity before healing, rehabilitation, and conditioning are complete.  PREVENTION   · Warm up and stretch properly before activity.  · Maintain physical fitness:    Strength, flexibility, and endurance.    Cardiovascular fitness.  · Wear and use properly fitted equipment.  · Learn and use proper technique and have a  correct improper technique.  · Wear a tennis elbow (counterforce) brace.  PROGNOSIS   The course of this condition depends on the degree of the injury. If treated properly, acute cases (symptoms lasting less than 4 weeks) are often resolved in 2 to 6 weeks. Chronic (longer lasting cases) often resolve in 3 to 6 months but may require physical therapy.  RELATED COMPLICATIONS   · Frequently recurring symptoms, resulting in a chronic problem. Properly treating the problem the first time decreases frequency of recurrence.  · Chronic inflammation, scarring tendon degeneration, and partial tendon tear, requiring surgery.  · Delayed healing or resolution of symptoms.  TREATMENT   Treatment first involves the use of ice and medicine to reduce pain and inflammation. Strengthening and stretching exercises may help reduce discomfort if performed regularly. These exercises may be performed at home if the condition is an acute injury. Chronic cases may require a referral to a physical therapist for evaluation and treatment. Your caregiver may advise a corticosteroid injection to help reduce inflammation. Rarely, surgery is needed.  MEDICATION  · If pain medicine is needed, nonsteroidal anti-inflammatory medicines (aspirin and ibuprofen), or other minor pain relievers (acetaminophen), are often advised.  · Do not take pain medicine for 7  days before surgery.  · Prescription pain relievers may be given, if your caregiver thinks they are needed. Use only as directed and only as much as you need.  · Corticosteroid injections may be recommended. These injections should be reserved only for the most severe cases, because they can only be given a certain number of times.  HEAT AND COLD  · Cold treatment (icing) should be applied for 10 to 15 minutes every 2 to 3 hours for inflammation and pain, and immediately after activity that aggravates your symptoms. Use ice packs or an ice massage.  · Heat treatment may be used before performing stretching and strengthening activities prescribed by your caregiver, physical therapist, or . Use a heat pack or a warm water soak.  SEEK MEDICAL CARE IF:  Symptoms get worse or do not improve in 2 weeks, despite treatment.  EXERCISES   RANGE OF MOTION (ROM) AND STRETCHING EXERCISES - Epicondylitis, Lateral (Tennis Elbow)  These exercises may help you when beginning to rehabilitate your injury. Your symptoms may go away with or without further involvement from your physician, physical therapist, or . While completing these exercises, remember:   · Restoring tissue flexibility helps normal motion to return to the joints. This allows healthier, less painful movement and activity.  · An effective stretch should be held for at least 30 seconds.  · A stretch should never be painful. You should only feel a gentle lengthening or release in the stretched tissue.  RANGE OF MOTION - Wrist Flexion, Active-Assisted  · Extend your right / left elbow with your fingers pointing down.*  · Gently pull the back of your hand towards you, until you feel a gentle stretch on the top of your forearm.  · Hold this position for __________ seconds.  Repeat __________ times. Complete this exercise __________ times per day.   *If directed by your physician, physical therapist or , complete this stretch  with your elbow bent, rather than extended.  RANGE OF MOTION - Wrist Extension, Active-Assisted  · Extend your right / left elbow and turn your palm upwards.*  · Gently pull your palm and fingertips back, so your wrist extends and your fingers point more toward the ground.  · You should feel a gentle stretch on the inside of your forearm.  · Hold this position for __________ seconds.  Repeat __________ times. Complete this exercise __________ times per day.  *If directed by your physician, physical therapist or , complete this stretch with your elbow bent, rather than extended.  STRETCH - Wrist Flexion  · Place the back of your right / left hand on a tabletop, leaving your elbow slightly bent. Your fingers should point away from your body.  · Gently press the back of your hand down onto the table by straightening your elbow. You should feel a stretch on the top of your forearm.  · Hold this position for __________ seconds.  Repeat __________ times. Complete this stretch __________ times per day.   STRETCH - Wrist Extension   · Place your right / left fingertips on a tabletop, leaving your elbow slightly bent. Your fingers should point backwards.  · Gently press your fingers and palm down onto the table by straightening your elbow. You should feel a stretch on the inside of your forearm.  · Hold this position for __________ seconds.  Repeat __________ times. Complete this stretch __________ times per day.   STRENGTHENING EXERCISES - Epicondylitis, Lateral (Tennis Elbow)  These exercises may help you when beginning to rehabilitate your injury. They may resolve your symptoms with or without further involvement from your physician, physical therapist, or . While completing these exercises, remember:   · Muscles can gain both the endurance and the strength needed for everyday activities through controlled exercises.  · Complete these exercises as instructed by your physician, physical  therapist or . Increase the resistance and repetitions only as guided.  · You may experience muscle soreness or fatigue, but the pain or discomfort you are trying to eliminate should never worsen during these exercises. If this pain does get worse, stop and make sure you are following the directions exactly. If the pain is still present after adjustments, discontinue the exercise until you can discuss the trouble with your caregiver.  STRENGTH - Wrist Flexors  · Sit with your right / left forearm palm-up and fully supported on a table or countertop. Your elbow should be resting below the height of your shoulder. Allow your wrist to extend over the edge of the surface.  · Loosely holding a __________ weight, or a piece of rubber exercise band or tubing, slowly curl your hand up toward your forearm.  · Hold this position for __________ seconds. Slowly lower the wrist back to the starting position in a controlled manner.  Repeat __________ times. Complete this exercise __________ times per day.   STRENGTH - Wrist Extensors  · Sit with your right / left forearm palm-down and fully supported on a table or countertop. Your elbow should be resting below the height of your shoulder. Allow your wrist to extend over the edge of the surface.  · Loosely holding a __________ weight, or a piece of rubber exercise band or tubing, slowly curl your hand up toward your forearm.  · Hold this position for __________ seconds. Slowly lower the wrist back to the starting position in a controlled manner.  Repeat __________ times. Complete this exercise __________ times per day.   STRENGTH - Ulnar Deviators  · Stand with a ____________________ weight in your right / left hand, or sit while holding a rubber exercise band or tubing, with your healthy arm supported on a table or countertop.  · Move your wrist, so that your pinkie travels toward your forearm and your thumb moves away from your forearm.  · Hold this position for  "__________ seconds and then slowly lower the wrist back to the starting position.  Repeat __________ times. Complete this exercise __________ times per day  STRENGTH - Radial Deviators  · Stand with a ____________________ weight in your right / left hand, or sit while holding a rubber exercise band or tubing, with your injured arm supported on a table or countertop.  · Raise your hand upward in front of you or pull up on the rubber tubing.  · Hold this position for __________ seconds and then slowly lower the wrist back to the starting position.  Repeat __________ times. Complete this exercise __________ times per day.  STRENGTH - Forearm Supinators   · Sit with your right / left forearm supported on a table, keeping your elbow below shoulder height. Rest your hand over the edge, palm down.  · Gently  a hammer or a soup ladle.  · Without moving your elbow, slowly turn your palm and hand upward to a \"thumbs-up\" position.  · Hold this position for __________ seconds. Slowly return to the starting position.  Repeat __________ times. Complete this exercise __________ times per day.   STRENGTH - Forearm Pronators   · Sit with your right / left forearm supported on a table, keeping your elbow below shoulder height. Rest your hand over the edge, palm up.  · Gently  a hammer or a soup ladle.  · Without moving your elbow, slowly turn your palm and hand upward to a \"thumbs-up\" position.  · Hold this position for __________ seconds. Slowly return to the starting position.  Repeat __________ times. Complete this exercise __________ times per day.   STRENGTH -   · Grasp a tennis ball, a dense sponge, or a large, rolled sock in your hand.  · Squeeze as hard as you can, without increasing any pain.  · Hold this position for __________ seconds. Release your  slowly.  Repeat __________ times. Complete this exercise __________ times per day.   STRENGTH - Elbow Extensors, Isometric  · Stand or sit upright, on a firm " surface. Place your right / left arm so that your palm faces your stomach, and it is at the height of your waist.  · Place your opposite hand on the underside of your forearm. Gently push up as your right / left arm resists. Push as hard as you can with both arms, without causing any pain or movement at your right / left elbow. Hold this stationary position for __________ seconds.  Gradually release the tension in both arms. Allow your muscles to relax completely before repeating.     This information is not intended to replace advice given to you by your health care provider. Make sure you discuss any questions you have with your health care provider.     Document Released: 12/18/2006 Document Revised: 01/08/2016 Document Reviewed: 09/15/2016  [employer] Interactive Patient Education ©2017 [employer]    Tennis Elbow  Tennis elbow is puffiness (inflammation) of the outer tendons of your forearm close to your elbow. Your tendons attach your muscles to your bones. Tennis elbow can happen in any sport or job in which you use your elbow too much. It is caused by doing the same motion over and over. Tennis elbow can cause:  · Pain and tenderness in your forearm and the outer part of your elbow.  · A burning feeling. This runs from your elbow through your arm.  · Weak  in your hands.  Follow these instructions at home:  Activity   · Rest your elbow and wrist as told by your doctor. Try to avoid any activities that caused the problem until your doctor says that you can do them again.  · If a physical therapist teaches you exercises, do all of them as told.  · If you lift an object, lift it with your palm facing up. This is easier on your elbow.  Lifestyle   · If your tennis elbow is caused by sports, check your equipment and make sure that:  ¨ You are using it correctly.  ¨ It fits you well.  · If your tennis elbow is caused by work, take breaks often, if you are able. Talk with your manager about doing your work in a  way that is safe for you.  ¨ If your tennis elbow is caused by computer use, talk with your manager about any changes that can be made to your work setup.  General instructions   · If told, apply ice to the painful area:  ¨ Put ice in a plastic bag.  ¨ Place a towel between your skin and the bag.  ¨ Leave the ice on for 20 minutes, 2-3 times per day.  · Take medicines only as told by your doctor.  · If you were given a brace, wear it as told by your doctor.  · Keep all follow-up visits as told by your doctor. This is important.  Contact a doctor if:  · Your pain does not get better with treatment.  · Your pain gets worse.  · You have weakness in your forearm, hand, or fingers.  · You cannot feel your forearm, hand, or fingers.  This information is not intended to replace advice given to you by your health care provider. Make sure you discuss any questions you have with your health care provider.  Document Released: 06/07/2011 Document Revised: 08/17/2017 Document Reviewed: 12/14/2015  [employer] Interactive Patient Education © 2017 [employer]
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
## 2018-06-25 ENCOUNTER — OFFICE VISIT (OUTPATIENT)
Dept: FAMILY MEDICINE CLINIC | Facility: CLINIC | Age: 48
End: 2018-06-25

## 2018-06-25 VITALS
TEMPERATURE: 98.6 F | HEART RATE: 76 BPM | DIASTOLIC BLOOD PRESSURE: 94 MMHG | HEIGHT: 71 IN | OXYGEN SATURATION: 97 % | SYSTOLIC BLOOD PRESSURE: 128 MMHG | BODY MASS INDEX: 38.05 KG/M2 | WEIGHT: 271.8 LBS

## 2018-06-25 DIAGNOSIS — K21.9 GASTROESOPHAGEAL REFLUX DISEASE, ESOPHAGITIS PRESENCE NOT SPECIFIED: ICD-10-CM

## 2018-06-25 DIAGNOSIS — M25.531 PAIN IN BOTH WRISTS: ICD-10-CM

## 2018-06-25 DIAGNOSIS — G56.03 BILATERAL CARPAL TUNNEL SYNDROME: ICD-10-CM

## 2018-06-25 DIAGNOSIS — G56.22 ULNAR NEUROPATHY OF LEFT UPPER EXTREMITY: ICD-10-CM

## 2018-06-25 DIAGNOSIS — E78.5 HYPERLIPIDEMIA, UNSPECIFIED HYPERLIPIDEMIA TYPE: Primary | ICD-10-CM

## 2018-06-25 DIAGNOSIS — M25.532 PAIN IN BOTH WRISTS: ICD-10-CM

## 2018-06-25 DIAGNOSIS — Z00.00 GENERAL MEDICAL EXAMINATION: ICD-10-CM

## 2018-06-25 DIAGNOSIS — Z87.898 HISTORY OF PREDIABETES: ICD-10-CM

## 2018-06-25 DIAGNOSIS — E66.9 NON MORBID OBESITY: ICD-10-CM

## 2018-06-25 DIAGNOSIS — E55.9 VITAMIN D DEFICIENCY: ICD-10-CM

## 2018-06-25 DIAGNOSIS — F90.2 ATTENTION DEFICIT HYPERACTIVITY DISORDER (ADHD), COMBINED TYPE: ICD-10-CM

## 2018-06-25 PROCEDURE — 99214 OFFICE O/P EST MOD 30 MIN: CPT | Performed by: FAMILY MEDICINE

## 2018-06-25 RX ORDER — DEXTROAMPHETAMINE SACCHARATE, AMPHETAMINE ASPARTATE MONOHYDRATE, DEXTROAMPHETAMINE SULFATE AND AMPHETAMINE SULFATE 6.25; 6.25; 6.25; 6.25 MG/1; MG/1; MG/1; MG/1
25 CAPSULE, EXTENDED RELEASE ORAL EVERY MORNING
Qty: 30 CAPSULE | Refills: 0 | Status: SHIPPED | OUTPATIENT
Start: 2018-06-25 | End: 2018-07-30 | Stop reason: SDUPTHER

## 2018-06-25 RX ORDER — GABAPENTIN 300 MG/1
300 CAPSULE ORAL NIGHTLY PRN
Qty: 30 CAPSULE | Refills: 2 | Status: SHIPPED | OUTPATIENT
Start: 2018-06-25 | End: 2018-10-30

## 2018-06-25 NOTE — PATIENT INSTRUCTIONS
1. Get labwork next week after July 2nd   2. Start on neurontin 300 mg at bedtime  3. Recheck in 1 month     Gabapentin capsules or tablets  What is this medicine?  GABAPENTIN (GA ba pen tin) is used to control partial seizures in adults with epilepsy. It is also used to treat certain types of nerve pain.  This medicine may be used for other purposes; ask your health care provider or pharmacist if you have questions.  COMMON BRAND NAME(S): Active-PAC with Gabapentin, Gabarone, Neurontin  What should I tell my health care provider before I take this medicine?  They need to know if you have any of these conditions:  -kidney disease  -suicidal thoughts, plans, or attempt; a previous suicide attempt by you or a family member  -an unusual or allergic reaction to gabapentin, other medicines, foods, dyes, or preservatives  -pregnant or trying to get pregnant  -breast-feeding  How should I use this medicine?  Take this medicine by mouth with a glass of water. Follow the directions on the prescription label. You can take it with or without food. If it upsets your stomach, take it with food.Take your medicine at regular intervals. Do not take it more often than directed. Do not stop taking except on your doctor's advice.  If you are directed to break the 600 or 800 mg tablets in half as part of your dose, the extra half tablet should be used for the next dose. If you have not used the extra half tablet within 28 days, it should be thrown away.  A special MedGuide will be given to you by the pharmacist with each prescription and refill. Be sure to read this information carefully each time.  Talk to your pediatrician regarding the use of this medicine in children. Special care may be needed.  Overdosage: If you think you have taken too much of this medicine contact a poison control center or emergency room at once.  NOTE: This medicine is only for you. Do not share this medicine with others.  What if I miss a dose?  If you  miss a dose, take it as soon as you can. If it is almost time for your next dose, take only that dose. Do not take double or extra doses.  What may interact with this medicine?  Do not take this medicine with any of the following medications:  -other gabapentin products  This medicine may also interact with the following medications:  -alcohol  -antacids  -antihistamines for allergy, cough and cold  -certain medicines for anxiety or sleep  -certain medicines for depression or psychotic disturbances  -homatropine; hydrocodone  -naproxen  -narcotic medicines (opiates) for pain  -phenothiazines like chlorpromazine, mesoridazine, prochlorperazine, thioridazine  This list may not describe all possible interactions. Give your health care provider a list of all the medicines, herbs, non-prescription drugs, or dietary supplements you use. Also tell them if you smoke, drink alcohol, or use illegal drugs. Some items may interact with your medicine.  What should I watch for while using this medicine?  Visit your doctor or health care professional for regular checks on your progress. You may want to keep a record at home of how you feel your condition is responding to treatment. You may want to share this information with your doctor or health care professional at each visit. You should contact your doctor or health care professional if your seizures get worse or if you have any new types of seizures. Do not stop taking this medicine or any of your seizure medicines unless instructed by your doctor or health care professional. Stopping your medicine suddenly can increase your seizures or their severity.  Wear a medical identification bracelet or chain if you are taking this medicine for seizures, and carry a card that lists all your medications.  You may get drowsy, dizzy, or have blurred vision. Do not drive, use machinery, or do anything that needs mental alertness until you know how this medicine affects you. To reduce dizzy  or fainting spells, do not sit or stand up quickly, especially if you are an older patient. Alcohol can increase drowsiness and dizziness. Avoid alcoholic drinks.  Your mouth may get dry. Chewing sugarless gum or sucking hard candy, and drinking plenty of water will help.  The use of this medicine may increase the chance of suicidal thoughts or actions. Pay special attention to how you are responding while on this medicine. Any worsening of mood, or thoughts of suicide or dying should be reported to your health care professional right away.  Women who become pregnant while using this medicine may enroll in the [place] Antiepileptic Drug Pregnancy Registry by calling 1-614.710.7169. This registry collects information about the safety of antiepileptic drug use during pregnancy.  What side effects may I notice from receiving this medicine?  Side effects that you should report to your doctor or health care professional as soon as possible:  -allergic reactions like skin rash, itching or hives, swelling of the face, lips, or tongue  -worsening of mood, thoughts or actions of suicide or dying  Side effects that usually do not require medical attention (report to your doctor or health care professional if they continue or are bothersome):  -constipation  -difficulty walking or controlling muscle movements  -dizziness  -nausea  -slurred speech  -tiredness  -tremors  -weight gain  This list may not describe all possible side effects. Call your doctor for medical advice about side effects. You may report side effects to FDA at 5-491-FDA-0331.  Where should I keep my medicine?  Keep out of reach of children.  This medicine may cause accidental overdose and death if it taken by other adults, children, or pets. Mix any unused medicine with a substance like cat litter or coffee grounds. Then throw the medicine away in a sealed container like a sealed bag or a coffee can with a lid. Do not use the medicine after the  expiration date.  Store at room temperature between 15 and 30 degrees C (59 and 86 degrees F).  NOTE: This sheet is a summary. It may not cover all possible information. If you have questions about this medicine, talk to your doctor, pharmacist, or health care provider.  © 2018 [employer] (2015-02-13 15:26:50)
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
## 2018-07-30 ENCOUNTER — OFFICE VISIT (OUTPATIENT)
Dept: FAMILY MEDICINE CLINIC | Facility: CLINIC | Age: 48
End: 2018-07-30

## 2018-07-30 VITALS
WEIGHT: 271 LBS | SYSTOLIC BLOOD PRESSURE: 134 MMHG | OXYGEN SATURATION: 96 % | HEART RATE: 86 BPM | TEMPERATURE: 98.6 F | DIASTOLIC BLOOD PRESSURE: 84 MMHG | BODY MASS INDEX: 37.94 KG/M2 | HEIGHT: 71 IN

## 2018-07-30 DIAGNOSIS — F90.2 ATTENTION DEFICIT HYPERACTIVITY DISORDER (ADHD), COMBINED TYPE: ICD-10-CM

## 2018-07-30 DIAGNOSIS — G56.03 BILATERAL CARPAL TUNNEL SYNDROME: Primary | ICD-10-CM

## 2018-07-30 DIAGNOSIS — Z87.898 HISTORY OF PREDIABETES: ICD-10-CM

## 2018-07-30 DIAGNOSIS — E78.5 HYPERLIPIDEMIA, UNSPECIFIED HYPERLIPIDEMIA TYPE: ICD-10-CM

## 2018-07-30 DIAGNOSIS — M25.522 BILATERAL ELBOW JOINT PAIN: ICD-10-CM

## 2018-07-30 DIAGNOSIS — Z00.00 GENERAL MEDICAL EXAMINATION: ICD-10-CM

## 2018-07-30 DIAGNOSIS — M25.521 BILATERAL ELBOW JOINT PAIN: ICD-10-CM

## 2018-07-30 DIAGNOSIS — K21.9 GASTROESOPHAGEAL REFLUX DISEASE, ESOPHAGITIS PRESENCE NOT SPECIFIED: ICD-10-CM

## 2018-07-30 DIAGNOSIS — G56.22 ULNAR NEUROPATHY OF LEFT UPPER EXTREMITY: ICD-10-CM

## 2018-07-30 DIAGNOSIS — R20.0 NUMBNESS IN BOTH HANDS: ICD-10-CM

## 2018-07-30 PROCEDURE — 99214 OFFICE O/P EST MOD 30 MIN: CPT | Performed by: FAMILY MEDICINE

## 2018-07-30 RX ORDER — DEXTROAMPHETAMINE SACCHARATE, AMPHETAMINE ASPARTATE MONOHYDRATE, DEXTROAMPHETAMINE SULFATE AND AMPHETAMINE SULFATE 6.25; 6.25; 6.25; 6.25 MG/1; MG/1; MG/1; MG/1
25 CAPSULE, EXTENDED RELEASE ORAL EVERY MORNING
Qty: 30 CAPSULE | Refills: 0 | Status: SHIPPED | OUTPATIENT
Start: 2018-07-30 | End: 2018-08-30 | Stop reason: SDUPTHER

## 2018-07-30 RX ORDER — MELOXICAM 15 MG/1
15 TABLET ORAL DAILY
Qty: 30 TABLET | Refills: 3 | Status: SHIPPED | OUTPATIENT
Start: 2018-07-30 | End: 2018-12-14

## 2018-07-30 NOTE — PATIENT INSTRUCTIONS
Get labwork  1 stop naproxen start mobic  2. Will refer to Orthopedic.  They will call for an appt  3. Recheck in 1 month

## 2018-08-17 LAB
ALBUMIN SERPL-MCNC: 4.1 G/DL (ref 3.4–4.8)
ALBUMIN/GLOB SERPL: 1.3 G/DL (ref 1.1–1.8)
ALP SERPL-CCNC: 102 U/L (ref 38–126)
ALT SERPL W P-5'-P-CCNC: 45 U/L (ref 21–72)
ANION GAP SERPL CALCULATED.3IONS-SCNC: 9 MMOL/L (ref 5–15)
ARTICHOKE IGE QN: 106 MG/DL (ref 1–129)
AST SERPL-CCNC: 28 U/L (ref 17–59)
BASOPHILS # BLD AUTO: 0.03 10*3/MM3 (ref 0–0.2)
BASOPHILS NFR BLD AUTO: 0.5 % (ref 0–2)
BILIRUB SERPL-MCNC: 0.7 MG/DL (ref 0.2–1.3)
BUN BLD-MCNC: 18 MG/DL (ref 7–21)
BUN/CREAT SERPL: 23.1 (ref 7–25)
CALCIUM SPEC-SCNC: 8.8 MG/DL (ref 8.4–10.2)
CHLORIDE SERPL-SCNC: 105 MMOL/L (ref 95–110)
CHOLEST SERPL-MCNC: 174 MG/DL (ref 0–199)
CO2 SERPL-SCNC: 25 MMOL/L (ref 22–31)
CREAT BLD-MCNC: 0.78 MG/DL (ref 0.7–1.3)
DEPRECATED RDW RBC AUTO: 43.6 FL (ref 35.1–43.9)
EOSINOPHIL # BLD AUTO: 0.13 10*3/MM3 (ref 0–0.7)
EOSINOPHIL NFR BLD AUTO: 2.2 % (ref 0–7)
ERYTHROCYTE [DISTWIDTH] IN BLOOD BY AUTOMATED COUNT: 13.3 % (ref 11.5–14.5)
GFR SERPL CREATININE-BSD FRML MDRD: 106 ML/MIN/1.73 (ref 63–147)
GLOBULIN UR ELPH-MCNC: 3.1 GM/DL (ref 2.3–3.5)
GLUCOSE BLD-MCNC: 110 MG/DL (ref 60–100)
HBA1C MFR BLD: 5.5 % (ref 4–5.6)
HCT VFR BLD AUTO: 42.4 % (ref 39–49)
HDLC SERPL-MCNC: 42 MG/DL (ref 60–200)
HGB BLD-MCNC: 14.1 G/DL (ref 13.7–17.3)
IMM GRANULOCYTES # BLD: 0.03 10*3/MM3 (ref 0–0.02)
IMM GRANULOCYTES NFR BLD: 0.5 % (ref 0–0.5)
LDLC/HDLC SERPL: 2.5 {RATIO} (ref 0–3.55)
LYMPHOCYTES # BLD AUTO: 2.12 10*3/MM3 (ref 0.6–4.2)
LYMPHOCYTES NFR BLD AUTO: 35.5 % (ref 10–50)
MCH RBC QN AUTO: 30.1 PG (ref 26.5–34)
MCHC RBC AUTO-ENTMCNC: 33.3 G/DL (ref 31.5–36.3)
MCV RBC AUTO: 90.6 FL (ref 80–98)
MONOCYTES # BLD AUTO: 0.45 10*3/MM3 (ref 0–0.9)
MONOCYTES NFR BLD AUTO: 7.5 % (ref 0–12)
NEUTROPHILS # BLD AUTO: 3.21 10*3/MM3 (ref 2–8.6)
NEUTROPHILS NFR BLD AUTO: 53.8 % (ref 37–80)
PLATELET # BLD AUTO: 249 10*3/MM3 (ref 150–450)
PMV BLD AUTO: 9.6 FL (ref 8–12)
POTASSIUM BLD-SCNC: 4.7 MMOL/L (ref 3.5–5.1)
PROT SERPL-MCNC: 7.2 G/DL (ref 6.3–8.6)
RBC # BLD AUTO: 4.68 10*6/MM3 (ref 4.37–5.74)
SODIUM BLD-SCNC: 139 MMOL/L (ref 137–145)
TRIGL SERPL-MCNC: 134 MG/DL (ref 20–199)
WBC NRBC COR # BLD: 5.97 10*3/MM3 (ref 3.2–9.8)

## 2018-08-17 PROCEDURE — 83036 HEMOGLOBIN GLYCOSYLATED A1C: CPT | Performed by: FAMILY MEDICINE

## 2018-08-17 PROCEDURE — 80053 COMPREHEN METABOLIC PANEL: CPT | Performed by: FAMILY MEDICINE

## 2018-08-17 PROCEDURE — 80061 LIPID PANEL: CPT | Performed by: FAMILY MEDICINE

## 2018-08-17 PROCEDURE — 85025 COMPLETE CBC W/AUTO DIFF WBC: CPT | Performed by: FAMILY MEDICINE

## 2018-08-17 PROCEDURE — 36415 COLL VENOUS BLD VENIPUNCTURE: CPT | Performed by: FAMILY MEDICINE

## 2018-08-27 RX ORDER — FLUTICASONE PROPIONATE 50 MCG
SPRAY, SUSPENSION (ML) NASAL
Qty: 16 ML | Refills: 5 | Status: SHIPPED | OUTPATIENT
Start: 2018-08-27 | End: 2018-10-30

## 2018-08-28 ENCOUNTER — OFFICE VISIT (OUTPATIENT)
Dept: ORTHOPEDIC SURGERY | Facility: CLINIC | Age: 48
End: 2018-08-28

## 2018-08-28 VITALS — WEIGHT: 274 LBS | HEIGHT: 71 IN | BODY MASS INDEX: 38.36 KG/M2

## 2018-08-28 DIAGNOSIS — R20.2 NUMBNESS AND TINGLING IN BOTH HANDS: ICD-10-CM

## 2018-08-28 DIAGNOSIS — G56.03 BILATERAL CARPAL TUNNEL SYNDROME: Primary | ICD-10-CM

## 2018-08-28 DIAGNOSIS — R20.0 NUMBNESS AND TINGLING IN BOTH HANDS: ICD-10-CM

## 2018-08-28 PROCEDURE — 99213 OFFICE O/P EST LOW 20 MIN: CPT | Performed by: NURSE PRACTITIONER

## 2018-08-28 NOTE — PROGRESS NOTES
Randall Hernandez is a 47 y.o. male   Primary provider:  Scottie Borges MD       Chief Complaint   Patient presents with   • Carpal Tunnel       HISTORY OF PRESENT ILLNESS:     47-year-old  is in the office today for an evaluation of bilateral carpal tunnel.  His previously evaluated by his primary care provider has placed him in splints.  He reports that the symptoms have been present for several years with diffuse numbness tingling and burning sensation in its worse when he does his motorcycle in the first second third digit.  He takes gabapentin which improves his night pain and anti-inflammatories which as well does improve his symptoms somewhat.           Upper Extremity Issue   This is a chronic problem. The current episode started more than 1 year ago. The problem occurs intermittently. The problem has been unchanged. Associated symptoms include numbness. Associated symptoms comments: Aching, swelling, numbness, tingling. He has tried rest and NSAIDs for the symptoms.        CONCURRENT MEDICAL HISTORY:    Past Medical History:   Diagnosis Date   • Allergic rhinitis    • Backache    • Cellulitis of leg, except foot    • Dyslipidemia    • GERD (gastroesophageal reflux disease)    • Heartburn    • History of Holter monitoring 02/26/2016    Sinus mechanism with a normal average heart rate.No evidence of chronotropic incompetence.Asymptomatic Holter   • Hyperlipidemia    • Low back pain    • Numbness of lower limb     left leg numbness and tingling      • Obesity, unspecified    • Obstructive sleep apnea of adult    • Pain in left hip    • Pain in right hip    • Palpitations    • Skin lesion     insect bite.      • Supraventricular tachycardia (CMS/HCC)    • Weight loss     advised       No Known Allergies      Current Outpatient Prescriptions:   •  amphetamine-dextroamphetamine XR (ADDERALL XR) 25 MG 24 hr capsule, Take 1 capsule by mouth Every Morning Take 1 tablet by mouth daily, Disp: 30 capsule, Rfl:  0  •  ASPIRIN LOW DOSE 81 MG EC tablet, Take 81 mg by mouth Daily., Disp: , Rfl:   •  atorvastatin (LIPITOR) 80 MG tablet, Take 1 tablet by mouth Daily., Disp: 30 tablet, Rfl: 11  •  cetirizine (zyrTEC) 5 MG tablet, Take 1 tablet by mouth Daily., Disp: 30 tablet, Rfl: 3  •  clotrimazole-betamethasone (LOTRISONE) 1-0.05 % cream, Apply  topically Every 12 (Twelve) Hours., Disp: 15 g, Rfl: 2  •  fluticasone (FLONASE) 50 MCG/ACT nasal spray, INSTILL 2 SPRAYS IN EACH NOSTRIL ONCE DAILY, Disp: 16 mL, Rfl: 5  •  gabapentin (NEURONTIN) 300 MG capsule, Take 1 capsule by mouth At Night As Needed (for pain)., Disp: 30 capsule, Rfl: 2  •  meloxicam (MOBIC) 15 MG tablet, Take 1 tablet by mouth Daily., Disp: 30 tablet, Rfl: 3  •  metFORMIN (GLUCOPHAGE) 500 MG tablet, TAKE 2 TABLETS BY MOUTH TWICE DAILY WITH MEALS, Disp: 60 tablet, Rfl: 0  •  montelukast (SINGULAIR) 10 MG tablet, TAKE 1 TABLET BY MOUTH EVERY NIGHT, Disp: 30 tablet, Rfl: 11  •  omeprazole (priLOSEC) 40 MG capsule, Take 1 capsule by mouth Daily., Disp: 30 capsule, Rfl: 11    No past surgical history on file.    Family History   Problem Relation Age of Onset   • Colon cancer Mother         onset age greater than 75y   • Diabetes Mother    • Breast cancer Sister    • Cancer Brother         bladder   • Diabetes Brother    • Hyperlipidemia Brother         Social History     Social History   • Marital status:      Spouse name: N/A   • Number of children: N/A   • Years of education: N/A     Occupational History   • Not on file.     Social History Main Topics   • Smoking status: Former Smoker   • Smokeless tobacco: Never Used      Comment: 12/08/201 - Patient states he ceased utilization of tobacco products 20 plus years prior.   • Alcohol use Yes      Comment: 12/08/2017 - Patinet states he consumes alcoholic beverages on a social basis.   • Drug use: No   • Sexual activity: Defer     Other Topics Concern   • Not on file     Social History Narrative   • No  "narrative on file        Review of Systems   Musculoskeletal:        Joint pain and swelling   Neurological: Positive for numbness.        Tingling   All other systems reviewed and are negative.      PHYSICAL EXAMINATION:       Ht 180.3 cm (71\")   Wt 124 kg (274 lb)   BMI 38.22 kg/m²     Physical Exam   Constitutional: He is oriented to person, place, and time. Vital signs are normal. He appears well-developed and well-nourished. He is cooperative.   HENT:   Head: Normocephalic and atraumatic.   Neck: Trachea normal and phonation normal.   Pulmonary/Chest: Effort normal. No respiratory distress.   Abdominal: Soft. Normal appearance. He exhibits no distension.   Neurological: He is alert and oriented to person, place, and time. GCS eye subscore is 4. GCS verbal subscore is 5. GCS motor subscore is 6.   Skin: Skin is warm, dry and intact.   Psychiatric: He has a normal mood and affect. His speech is normal and behavior is normal. Judgment and thought content normal. Cognition and memory are normal.   Vitals reviewed.      GAIT:     [x]  Normal  []  Antalgic    Assistive device: [x]  None  []  Walker     []  Crutches  []  Cane     []  Wheelchair  []  Stretcher    Right Hand Exam     Tenderness   The patient is experiencing no tenderness.         Range of Motion     Wrist   Extension: normal   Flexion: normal   Pronation: normal   Supination: normal     Muscle Strength   Wrist Extension: 5/5   Wrist Flexion: 5/5   : 4/5     Tests   Tinel’s Sign (Medial Nerve): positive    Other   Erythema: absent  Scars: absent  Sensation: normal  Pulse: present      Left Hand Exam     Tenderness   The patient is experiencing no tenderness.         Range of Motion     Wrist   Extension: normal   Flexion: normal   Pronation: normal   Supination: normal     Muscle Strength   Wrist Extension: 5/5   Wrist Flexion: 5/5   :  4/5     Tests   Tinel’s Sign (Medial Nerve): positive    Other   Erythema: absent  Scars: absent  Sensation: " normal  Pulse: present      Right Elbow Exam     Tenderness   The patient is experiencing no tenderness.         Range of Motion   Extension: normal   Flexion: normal   Pronation: normal   Supination: normal     Muscle Strength   Pronation:  5/5   Supination:  5/5     Tests Tinel's Sign (cubital tunnel): negative    Other   Erythema: absent  Scars: absent  Sensation: normal  Pulse: present      Left Elbow Exam     Tenderness   The patient is experiencing no tenderness.         Range of Motion   Extension: normal   Flexion: normal   Pronation: normal   Supination: normal     Muscle Strength   Pronation:  5/5   Supination:  5/5     Tests Tinel's Sign (cubital tunnel): negative    Other   Erythema: absent  Scars: absent  Sensation: normal  Pulse: present              emg 6- Dr. Upton  Abnormal test. As suspected, [id number] is most consistent with both upper ext. Carpal tunnel syndrome, left side more than right side, left side moderate state and right side mild stage.       PROCEDURE: Bilateral wrist, three views each.     INDICATION: Bilateral wrist pain. No injury.     COMPARISON: None.     PA, oblique and lateral views of both wrists.     Old well-healed fracture of the left fifth metacarpal shaft. No  acute fractures in either wrist. No osseous arthritic changes.     No soft tissue abnormality.     IMPRESSION:  Old well-healed fracture left fifth metacarpal.  Otherwise negative bilateral wrist.     [id number]        Electronically signed by:  Loi Summers MD  5/16/2018 4:45  PM CDT Workstation: [id number]    ASSESSMENT:    Diagnoses and all orders for this visit:    Bilateral carpal tunnel syndrome  -     Ambulatory Referral to Physical Therapy Evaluate and treat  -      Wrist Hand Orthosis, Wrist Extension Control Cock-up    Numbness and tingling in both hands  -     Ambulatory Referral to Physical Therapy Evaluate and treat  -      Wrist Hand Orthosis, Wrist Extension Control  Cock-up          PLAN  Will recommend a course of guided physical therapy over the next couple weeks and the continued use of the wrist control splint bilaterally.  The patient will then follow up with Dr. Mathias in October to reevaluate and possibly proceed with carpal tunnel release bilaterally.  No Follow-up on file.    ROLAN Johnson

## 2018-08-29 NOTE — PROGRESS NOTES
Subjective   Randall Hernandez is a 47 y.o. male.     Abdominal Pain   This is a new problem. The current episode started in the past 7 days. The problem occurs intermittently. The problem has been unchanged. The pain is located in the LLQ. The pain is at a severity of 5/10. The pain is moderate. The quality of the pain is aching. Associated symptoms include arthralgias and constipation. Pertinent negatives include no anorexia, belching, diarrhea, dysuria, fever, flatus, frequency, headaches, hematochezia, hematuria, melena, myalgias, nausea, vomiting or weight loss. Nothing aggravates the pain. The pain is relieved by nothing. He has tried nothing for the symptoms. The treatment provided no relief. There is no history of abdominal surgery, colon cancer, Crohn's disease, gallstones, GERD, irritable bowel syndrome, pancreatitis, PUD or ulcerative colitis.   Wrist Pain    The pain is present in the left elbow, left wrist, right wrist and right elbow. This is a chronic problem. The current episode started more than 1 month ago. The problem occurs constantly. The problem has been gradually worsening. The quality of the pain is described as aching. The pain is at a severity of 9/10. The pain is moderate. Associated symptoms include joint locking, a limited range of motion, numbness, stiffness and tingling. Pertinent negatives include no fever, inability to bear weight, itching or joint swelling. The symptoms are aggravated by activity. He has tried NSAIDS for the symptoms. The treatment provided no relief. Family history does not include gout or rheumatoid arthritis. There is no history of diabetes, gout, osteoarthritis or rheumatoid arthritis.   Elbow Injury   This is a chronic problem. The current episode started more than 1 year ago. The problem occurs constantly. The problem has been gradually worsening. Associated symptoms include abdominal pain, arthralgias, joint swelling, numbness, a rash and weakness. Pertinent  negatives include no anorexia, change in bowel habit, chest pain, chills, congestion, coughing, diaphoresis, fatigue, fever, headaches, myalgias, nausea, neck pain, sore throat, swollen glands, urinary symptoms, vertigo, visual change or vomiting. The symptoms are aggravated by bending. He has tried NSAIDs for the symptoms. The treatment provided mild relief.        {Common H&P Review Areas:72205}    Review of Systems   Constitutional: Negative for chills, diaphoresis, fatigue, fever and unexpected weight loss.   HENT: Negative for congestion and sore throat.    Respiratory: Negative for cough.    Cardiovascular: Negative for chest pain.   Gastrointestinal: Positive for abdominal pain and constipation. Negative for anorexia, change in bowel habit, diarrhea, flatus, hematochezia, melena, nausea and vomiting.   Genitourinary: Negative for dysuria, frequency and hematuria.   Musculoskeletal: Positive for arthralgias, joint swelling and stiffness. Negative for gout, myalgias and neck pain.   Skin: Positive for rash. Negative for itching.   Neurological: Positive for tingling, weakness and numbness. Negative for vertigo.       Objective   Physical Exam      Assessment/Plan   {Assess/PlanSmartLinks:71086}

## 2018-08-30 ENCOUNTER — OFFICE VISIT (OUTPATIENT)
Dept: FAMILY MEDICINE CLINIC | Facility: CLINIC | Age: 48
End: 2018-08-30

## 2018-08-30 VITALS
OXYGEN SATURATION: 96 % | HEART RATE: 100 BPM | HEIGHT: 71 IN | SYSTOLIC BLOOD PRESSURE: 146 MMHG | DIASTOLIC BLOOD PRESSURE: 80 MMHG | BODY MASS INDEX: 37.74 KG/M2 | WEIGHT: 269.6 LBS

## 2018-08-30 DIAGNOSIS — G56.03 BILATERAL CARPAL TUNNEL SYNDROME: Primary | ICD-10-CM

## 2018-08-30 DIAGNOSIS — R20.0 NUMBNESS IN BOTH HANDS: ICD-10-CM

## 2018-08-30 DIAGNOSIS — I10 ESSENTIAL HYPERTENSION: ICD-10-CM

## 2018-08-30 DIAGNOSIS — E78.5 HYPERLIPIDEMIA, UNSPECIFIED HYPERLIPIDEMIA TYPE: ICD-10-CM

## 2018-08-30 DIAGNOSIS — E66.9 NON MORBID OBESITY: ICD-10-CM

## 2018-08-30 PROCEDURE — 99214 OFFICE O/P EST MOD 30 MIN: CPT | Performed by: FAMILY MEDICINE

## 2018-08-30 RX ORDER — LISINOPRIL 10 MG/1
10 TABLET ORAL DAILY
Qty: 30 TABLET | Refills: 3 | Status: SHIPPED | OUTPATIENT
Start: 2018-08-30 | End: 2018-12-17 | Stop reason: SDUPTHER

## 2018-08-30 RX ORDER — DEXTROAMPHETAMINE SACCHARATE, AMPHETAMINE ASPARTATE MONOHYDRATE, DEXTROAMPHETAMINE SULFATE AND AMPHETAMINE SULFATE 6.25; 6.25; 6.25; 6.25 MG/1; MG/1; MG/1; MG/1
25 CAPSULE, EXTENDED RELEASE ORAL EVERY MORNING
Qty: 30 CAPSULE | Refills: 0 | Status: SHIPPED | OUTPATIENT
Start: 2018-08-30 | End: 2018-09-28 | Stop reason: SDUPTHER
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
## 2018-08-30 NOTE — PATIENT INSTRUCTIONS
"  1.  Start on lisinopril 10 mg daily.  Read drug information. If you develop cough or swelling of lips or trouble breathing go to ER or call 911   2. Recheck in 1 month        Low-Sodium Eating Plan  Sodium, which is an element that makes up salt, helps you maintain a healthy balance of fluids in your body. Too much sodium can increase your blood pressure and cause fluid and waste to be held in your body.  Your health care provider or dietitian may recommend following this plan if you have high blood pressure (hypertension), kidney disease, liver disease, or heart failure. Eating less sodium can help lower your blood pressure, reduce swelling, and protect your heart, liver, and kidneys.  What are tips for following this plan?  General guidelines  · Most people on this plan should limit their sodium intake to 1,500-2,000 mg (milligrams) of sodium each day.  Reading food labels  · The Nutrition Facts label lists the amount of sodium in one serving of the food. If you eat more than one serving, you must multiply the listed amount of sodium by the number of servings.  · Choose foods with less than 140 mg of sodium per serving.  · Avoid foods with 300 mg of sodium or more per serving.  Shopping  · Look for lower-sodium products, often labeled as \"low-sodium\" or \"no salt added.\"  · Always check the sodium content even if foods are labeled as \"unsalted\" or \"no salt added\".  · Buy fresh foods.  ? Avoid canned foods and premade or frozen meals.  ? Avoid canned, cured, or processed meats  · Buy breads that have less than 80 mg of sodium per slice.  Cooking  · Eat more home-cooked food and less restaurant, buffet, and fast food.  · Avoid adding salt when cooking. Use salt-free seasonings or herbs instead of table salt or sea salt. Check with your health care provider or pharmacist before using salt substitutes.  · Cook with plant-based oils, such as canola, sunflower, or olive oil.  Meal planning  · When eating at a " "restaurant, ask that your food be prepared with less salt or no salt, if possible.  · Avoid foods that contain MSG (monosodium glutamate). MSG is sometimes added to Chinese food, bouillon, and some canned foods.  What foods are recommended?  The items listed may not be a complete list. Talk with your dietitian about what dietary choices are best for you.  Grains  Low-sodium cereals, including oats, puffed wheat and rice, and shredded wheat. Low-sodium crackers. Unsalted rice. Unsalted pasta. Low-sodium bread. Whole-grain breads and whole-grain pasta.  Vegetables  Fresh or frozen vegetables. \"No salt added\" canned vegetables. \"No salt added\" tomato sauce and paste. Low-sodium or reduced-sodium tomato and vegetable juice.  Fruits  Fresh, frozen, or canned fruit. Fruit juice.  Meats and other protein foods  Fresh or frozen (no salt added) meat, poultry, seafood, and fish. Low-sodium canned tuna and salmon. Unsalted nuts. Dried peas, beans, and lentils without added salt. Unsalted canned beans. Eggs. Unsalted nut butters.  Dairy  Milk. Soy milk. Cheese that is naturally low in sodium, such as ricotta cheese, fresh mozzarella, or Swiss cheese Low-sodium or reduced-sodium cheese. Cream cheese. Yogurt.  Fats and oils  Unsalted butter. Unsalted margarine with no trans fat. Vegetable oils such as canola or olive oils.  Seasonings and other foods  Fresh and dried herbs and spices. Salt-free seasonings. Low-sodium mustard and ketchup. Sodium-free salad dressing. Sodium-free light mayonnaise. Fresh or refrigerated horseradish. Lemon juice. Vinegar. Homemade, reduced-sodium, or low-sodium soups. Unsalted popcorn and pretzels. Low-salt or salt-free chips.  What foods are not recommended?  The items listed may not be a complete list. Talk with your dietitian about what dietary choices are best for you.  Grains  Instant hot cereals. Bread stuffing, pancake, and biscuit mixes. Croutons. Seasoned rice or pasta mixes. Noodle soup " cups. Boxed or frozen macaroni and cheese. Regular salted crackers. Self-rising flour.  Vegetables  Sauerkraut, pickled vegetables, and relishes. Olives. French fries. Onion rings. Regular canned vegetables (not low-sodium or reduced-sodium). Regular canned tomato sauce and paste (not low-sodium or reduced-sodium). Regular tomato and vegetable juice (not low-sodium or reduced-sodium). Frozen vegetables in sauces.  Meats and other protein foods  Meat or fish that is salted, canned, smoked, spiced, or pickled. Ling, ham, sausage, hotdogs, corned beef, chipped beef, packaged lunch meats, salt pork, jerky, pickled herring, anchovies, regular canned tuna, sardines, salted nuts.  Dairy  Processed cheese and cheese spreads. Cheese curds. Blue cheese. Feta cheese. String cheese. Regular cottage cheese. Buttermilk. Canned milk.  Fats and oils  Salted butter. Regular margarine. Ghee. Ling fat.  Seasonings and other foods  Onion salt, garlic salt, seasoned salt, table salt, and sea salt. Canned and packaged gravies. Worcestershire sauce. Tartar sauce. Barbecue sauce. Teriyaki sauce. Soy sauce, including reduced-sodium. Steak sauce. Fish sauce. Oyster sauce. Cocktail sauce. Horseradish that you find on the shelf. Regular ketchup and mustard. Meat flavorings and tenderizers. Bouillon cubes. Hot sauce and Tabasco sauce. Premade or packaged marinades. Premade or packaged taco seasonings. Relishes. Regular salad dressings. Salsa. Potato and tortilla chips. Corn chips and puffs. Salted popcorn and pretzels. Canned or dried soups. Pizza. Frozen entrees and pot pies.  Summary  · Eating less sodium can help lower your blood pressure, reduce swelling, and protect your heart, liver, and kidneys.  · Most people on this plan should limit their sodium intake to 1,500-2,000 mg (milligrams) of sodium each day.  · Canned, boxed, and frozen foods are high in sodium. Restaurant foods, fast foods, and pizza are also very high in sodium. You  also get sodium by adding salt to food.  · Try to cook at home, eat more fresh fruits and vegetables, and eat less fast food, canned, processed, or prepared foods.  This information is not intended to replace advice given to you by your health care provider. Make sure you discuss any questions you have with your health care provider.  Document Released: 06/09/2003 Document Revised: 12/11/2017 Document Reviewed: 12/11/2017  [employer] Interactive Patient Education © 2018 [employer]    Hypertension  Hypertension is another name for high blood pressure. High blood pressure forces your heart to work harder to pump blood. This can cause problems over time.  There are two numbers in a blood pressure reading. There is a top number (systolic) over a bottom number (diastolic). It is best to have a blood pressure below 120/80. Healthy choices can help lower your blood pressure. You may need medicine to help lower your blood pressure if:  · Your blood pressure cannot be lowered with healthy choices.  · Your blood pressure is higher than 130/80.    Follow these instructions at home:  Eating and drinking  · If directed, follow the DASH eating plan. This diet includes:  ? Filling half of your plate at each meal with fruits and vegetables.  ? Filling one quarter of your plate at each meal with whole grains. Whole grains include whole wheat pasta, brown rice, and whole grain bread.  ? Eating or drinking low-fat dairy products, such as skim milk or low-fat yogurt.  ? Filling one quarter of your plate at each meal with low-fat (lean) proteins. Low-fat proteins include fish, skinless chicken, eggs, beans, and tofu.  ? Avoiding fatty meat, cured and processed meat, or chicken with skin.  ? Avoiding premade or processed food.  · Eat less than 1,500 mg of salt (sodium) a day.  · Limit alcohol use to no more than 1 drink a day for nonpregnant women and 2 drinks a day for men. One drink equals 12 oz of beer, 5 oz of wine, or 1½ oz of hard  liquor.  Lifestyle  · Work with your doctor to stay at a healthy weight or to lose weight. Ask your doctor what the best weight is for you.  · Get at least 30 minutes of exercise that causes your heart to beat faster (aerobic exercise) most days of the week. This may include walking, swimming, or biking.  · Get at least 30 minutes of exercise that strengthens your muscles (resistance exercise) at least 3 days a week. This may include lifting weights or pilates.  · Do not use any products that contain nicotine or tobacco. This includes cigarettes and e-cigarettes. If you need help quitting, ask your doctor.  · Check your blood pressure at home as told by your doctor.  · Keep all follow-up visits as told by your doctor. This is important.  Medicines  · Take over-the-counter and prescription medicines only as told by your doctor. Follow directions carefully.  · Do not skip doses of blood pressure medicine. The medicine does not work as well if you skip doses. Skipping doses also puts you at risk for problems.  · Ask your doctor about side effects or reactions to medicines that you should watch for.  Contact a doctor if:  · You think you are having a reaction to the medicine you are taking.  · You have headaches that keep coming back (recurring).  · You feel dizzy.  · You have swelling in your ankles.  · You have trouble with your vision.  Get help right away if:  · You get a very bad headache.  · You start to feel confused.  · You feel weak or numb.  · You feel faint.  · You get very bad pain in your:  ? Chest.  ? Belly (abdomen).  · You throw up (vomit) more than once.  · You have trouble breathing.  Summary  · Hypertension is another name for high blood pressure.  · Making healthy choices can help lower blood pressure. If your blood pressure cannot be controlled with healthy choices, you may need to take medicine.  This information is not intended to replace advice given to you by your health care provider. Make sure  you discuss any questions you have with your health care provider.  Document Released: 06/05/2009 Document Revised: 11/15/2017 Document Reviewed: 11/15/2017  [employer] Interactive Patient Education © 2018 [employer]  Lisinopril tablets  What is this medicine?  LISINOPRIL (lyse IN oh pril) is an ACE inhibitor. This medicine is used to treat high blood pressure and heart failure. It is also used to protect the heart immediately after a heart attack.  This medicine may be used for other purposes; ask your health care provider or pharmacist if you have questions.  COMMON BRAND NAME(S): Prinivil, Zestril  What should I tell my health care provider before I take this medicine?  They need to know if you have any of these conditions:  -diabetes  -heart or blood vessel disease  -kidney disease  -low blood pressure  -previous swelling of the tongue, face, or lips with difficulty breathing, difficulty swallowing, hoarseness, or tightening of the throat  -an unusual or allergic reaction to lisinopril, other ACE inhibitors, insect venom, foods, dyes, or preservatives  -pregnant or trying to get pregnant  -breast-feeding  How should I use this medicine?  Take this medicine by mouth with a glass of water. Follow the directions on your prescription label. You may take this medicine with or without food. If it upsets your stomach, take it with food. Take your medicine at regular intervals. Do not take it more often than directed. Do not stop taking except on your doctor's advice.  Talk to your pediatrician regarding the use of this medicine in children. Special care may be needed. While this drug may be prescribed for children as young as 6 years of age for selected conditions, precautions do apply.  Overdosage: If you think you have taken too much of this medicine contact a poison control center or emergency room at once.  NOTE: This medicine is only for you. Do not share this medicine with others.  What if I miss a dose?  If you  miss a dose, take it as soon as you can. If it is almost time for your next dose, take only that dose. Do not take double or extra doses.  What may interact with this medicine?  Do not take this medicine with any of the following medications:  -hymenoptera venom  -sacubitril; valsartan  This medicines may also interact with the following medications:  -aliskiren  -angiotensin receptor blockers, like losartan or valsartan  -certain medicines for diabetes  -diuretics  -everolimus  -gold compounds  -lithium  -NSAIDs, medicines for pain and inflammation, like ibuprofen or naproxen  -potassium salts or supplements  -salt substitutes  -sirolimus  -temsirolimus  This list may not describe all possible interactions. Give your health care provider a list of all the medicines, herbs, non-prescription drugs, or dietary supplements you use. Also tell them if you smoke, drink alcohol, or use illegal drugs. Some items may interact with your medicine.  What should I watch for while using this medicine?  Visit your doctor or health care professional for regular check ups. Check your blood pressure as directed. Ask your doctor what your blood pressure should be, and when you should contact him or her.  Do not treat yourself for coughs, colds, or pain while you are using this medicine without asking your doctor or health care professional for advice. Some ingredients may increase your blood pressure.  Women should inform their doctor if they wish to become pregnant or think they might be pregnant. There is a potential for serious side effects to an unborn child. Talk to your health care professional or pharmacist for more information.  Check with your doctor or health care professional if you get an attack of severe diarrhea, nausea and vomiting, or if you sweat a lot. The loss of too much body fluid can make it dangerous for you to take this medicine.  You may get drowsy or dizzy. Do not drive, use machinery, or do anything that  needs mental alertness until you know how this drug affects you. Do not stand or sit up quickly, especially if you are an older patient. This reduces the risk of dizzy or fainting spells. Alcohol can make you more drowsy and dizzy. Avoid alcoholic drinks.  Avoid salt substitutes unless you are told otherwise by your doctor or health care professional.  What side effects may I notice from receiving this medicine?  Side effects that you should report to your doctor or health care professional as soon as possible:  -allergic reactions like skin rash, itching or hives, swelling of the hands, feet, face, lips, throat, or tongue  -breathing problems  -signs and symptoms of kidney injury like trouble passing urine or change in the amount of urine  -signs and symptoms of increased potassium like muscle weakness; chest pain; or fast, irregular heartbeat  -signs and symptoms of liver injury like dark yellow or brown urine; general ill feeling or flu-like symptoms; light-colored stools; loss of appetite; nausea; right upper belly pain; unusually weak or tired; yellowing of the eyes or skin  -signs and symptoms of low blood pressure like dizziness; feeling faint or lightheaded, falls; unusually weak or tired  -stomach pain with or without nausea and vomiting  Side effects that usually do not require medical attention (report to your doctor or health care professional if they continue or are bothersome):  -changes in taste  -cough  -dizziness  -fever  -headache  -sensitivity to light  This list may not describe all possible side effects. Call your doctor for medical advice about side effects. You may report side effects to FDA at 0-191-FDA-8049.  Where should I keep my medicine?  Keep out of the reach of children.  Store at room temperature between 15 and 30 degrees C (59 and 86 degrees F). Protect from moisture. Keep container tightly closed. Throw away any unused medicine after the expiration date.  NOTE: This sheet is a  summary. It may not cover all possible information. If you have questions about this medicine, talk to your doctor, pharmacist, or health care provider.  © 2018 [employer] (2017-02-06 12:52:35)    Hypertension  Hypertension is another name for high blood pressure. High blood pressure forces your heart to work harder to pump blood. This can cause problems over time.  There are two numbers in a blood pressure reading. There is a top number (systolic) over a bottom number (diastolic). It is best to have a blood pressure below 120/80. Healthy choices can help lower your blood pressure. You may need medicine to help lower your blood pressure if:  · Your blood pressure cannot be lowered with healthy choices.  · Your blood pressure is higher than 130/80.    Follow these instructions at home:  Eating and drinking  · If directed, follow the DASH eating plan. This diet includes:  ? Filling half of your plate at each meal with fruits and vegetables.  ? Filling one quarter of your plate at each meal with whole grains. Whole grains include whole wheat pasta, brown rice, and whole grain bread.  ? Eating or drinking low-fat dairy products, such as skim milk or low-fat yogurt.  ? Filling one quarter of your plate at each meal with low-fat (lean) proteins. Low-fat proteins include fish, skinless chicken, eggs, beans, and tofu.  ? Avoiding fatty meat, cured and processed meat, or chicken with skin.  ? Avoiding premade or processed food.  · Eat less than 1,500 mg of salt (sodium) a day.  · Limit alcohol use to no more than 1 drink a day for nonpregnant women and 2 drinks a day for men. One drink equals 12 oz of beer, 5 oz of wine, or 1½ oz of hard liquor.  Lifestyle  · Work with your doctor to stay at a healthy weight or to lose weight. Ask your doctor what the best weight is for you.  · Get at least 30 minutes of exercise that causes your heart to beat faster (aerobic exercise) most days of the week. This may include  walking, swimming, or biking.  · Get at least 30 minutes of exercise that strengthens your muscles (resistance exercise) at least 3 days a week. This may include lifting weights or pilates.  · Do not use any products that contain nicotine or tobacco. This includes cigarettes and e-cigarettes. If you need help quitting, ask your doctor.  · Check your blood pressure at home as told by your doctor.  · Keep all follow-up visits as told by your doctor. This is important.  Medicines  · Take over-the-counter and prescription medicines only as told by your doctor. Follow directions carefully.  · Do not skip doses of blood pressure medicine. The medicine does not work as well if you skip doses. Skipping doses also puts you at risk for problems.  · Ask your doctor about side effects or reactions to medicines that you should watch for.  Contact a doctor if:  · You think you are having a reaction to the medicine you are taking.  · You have headaches that keep coming back (recurring).  · You feel dizzy.  · You have swelling in your ankles.  · You have trouble with your vision.  Get help right away if:  · You get a very bad headache.  · You start to feel confused.  · You feel weak or numb.  · You feel faint.  · You get very bad pain in your:  ? Chest.  ? Belly (abdomen).  · You throw up (vomit) more than once.  · You have trouble breathing.  Summary  · Hypertension is another name for high blood pressure.  · Making healthy choices can help lower blood pressure. If your blood pressure cannot be controlled with healthy choices, you may need to take medicine.  This information is not intended to replace advice given to you by your health care provider. Make sure you discuss any questions you have with your health care provider.  Document Released: 06/05/2009 Document Revised: 11/15/2017 Document Reviewed: 11/15/2017  [employer] Interactive Patient Education © 2018 [employer]    Low-Sodium Eating Plan  Sodium, which is an element  "that makes up salt, helps you maintain a healthy balance of fluids in your body. Too much sodium can increase your blood pressure and cause fluid and waste to be held in your body.  Your health care provider or dietitian may recommend following this plan if you have high blood pressure (hypertension), kidney disease, liver disease, or heart failure. Eating less sodium can help lower your blood pressure, reduce swelling, and protect your heart, liver, and kidneys.  What are tips for following this plan?  General guidelines  · Most people on this plan should limit their sodium intake to 1,500-2,000 mg (milligrams) of sodium each day.  Reading food labels  · The Nutrition Facts label lists the amount of sodium in one serving of the food. If you eat more than one serving, you must multiply the listed amount of sodium by the number of servings.  · Choose foods with less than 140 mg of sodium per serving.  · Avoid foods with 300 mg of sodium or more per serving.  Shopping  · Look for lower-sodium products, often labeled as \"low-sodium\" or \"no salt added.\"  · Always check the sodium content even if foods are labeled as \"unsalted\" or \"no salt added\".  · Buy fresh foods.  ? Avoid canned foods and premade or frozen meals.  ? Avoid canned, cured, or processed meats  · Buy breads that have less than 80 mg of sodium per slice.  Cooking  · Eat more home-cooked food and less restaurant, buffet, and fast food.  · Avoid adding salt when cooking. Use salt-free seasonings or herbs instead of table salt or sea salt. Check with your health care provider or pharmacist before using salt substitutes.  · Cook with plant-based oils, such as canola, sunflower, or olive oil.  Meal planning  · When eating at a restaurant, ask that your food be prepared with less salt or no salt, if possible.  · Avoid foods that contain MSG (monosodium glutamate). MSG is sometimes added to Chinese food, bouillon, and some canned foods.  What foods are " "recommended?  The items listed may not be a complete list. Talk with your dietitian about what dietary choices are best for you.  Grains  Low-sodium cereals, including oats, puffed wheat and rice, and shredded wheat. Low-sodium crackers. Unsalted rice. Unsalted pasta. Low-sodium bread. Whole-grain breads and whole-grain pasta.  Vegetables  Fresh or frozen vegetables. \"No salt added\" canned vegetables. \"No salt added\" tomato sauce and paste. Low-sodium or reduced-sodium tomato and vegetable juice.  Fruits  Fresh, frozen, or canned fruit. Fruit juice.  Meats and other protein foods  Fresh or frozen (no salt added) meat, poultry, seafood, and fish. Low-sodium canned tuna and salmon. Unsalted nuts. Dried peas, beans, and lentils without added salt. Unsalted canned beans. Eggs. Unsalted nut butters.  Dairy  Milk. Soy milk. Cheese that is naturally low in sodium, such as ricotta cheese, fresh mozzarella, or Swiss cheese Low-sodium or reduced-sodium cheese. Cream cheese. Yogurt.  Fats and oils  Unsalted butter. Unsalted margarine with no trans fat. Vegetable oils such as canola or olive oils.  Seasonings and other foods  Fresh and dried herbs and spices. Salt-free seasonings. Low-sodium mustard and ketchup. Sodium-free salad dressing. Sodium-free light mayonnaise. Fresh or refrigerated horseradish. Lemon juice. Vinegar. Homemade, reduced-sodium, or low-sodium soups. Unsalted popcorn and pretzels. Low-salt or salt-free chips.  What foods are not recommended?  The items listed may not be a complete list. Talk with your dietitian about what dietary choices are best for you.  Grains  Instant hot cereals. Bread stuffing, pancake, and biscuit mixes. Croutons. Seasoned rice or pasta mixes. Noodle soup cups. Boxed or frozen macaroni and cheese. Regular salted crackers. Self-rising flour.  Vegetables  Sauerkraut, pickled vegetables, and relishes. Olives. French fries. Onion rings. Regular canned vegetables (not low-sodium or " reduced-sodium). Regular canned tomato sauce and paste (not low-sodium or reduced-sodium). Regular tomato and vegetable juice (not low-sodium or reduced-sodium). Frozen vegetables in sauces.  Meats and other protein foods  Meat or fish that is salted, canned, smoked, spiced, or pickled. Ling, ham, sausage, hotdogs, corned beef, chipped beef, packaged lunch meats, salt pork, jerky, pickled herring, anchovies, regular canned tuna, sardines, salted nuts.  Dairy  Processed cheese and cheese spreads. Cheese curds. Blue cheese. Feta cheese. String cheese. Regular cottage cheese. Buttermilk. Canned milk.  Fats and oils  Salted butter. Regular margarine. Ghee. Ling fat.  Seasonings and other foods  Onion salt, garlic salt, seasoned salt, table salt, and sea salt. Canned and packaged gravies. Worcestershire sauce. Tartar sauce. Barbecue sauce. Teriyaki sauce. Soy sauce, including reduced-sodium. Steak sauce. Fish sauce. Oyster sauce. Cocktail sauce. Horseradish that you find on the shelf. Regular ketchup and mustard. Meat flavorings and tenderizers. Bouillon cubes. Hot sauce and Tabasco sauce. Premade or packaged marinades. Premade or packaged taco seasonings. Relishes. Regular salad dressings. Salsa. Potato and tortilla chips. Corn chips and puffs. Salted popcorn and pretzels. Canned or dried soups. Pizza. Frozen entrees and pot pies.  Summary  · Eating less sodium can help lower your blood pressure, reduce swelling, and protect your heart, liver, and kidneys.  · Most people on this plan should limit their sodium intake to 1,500-2,000 mg (milligrams) of sodium each day.  · Canned, boxed, and frozen foods are high in sodium. Restaurant foods, fast foods, and pizza are also very high in sodium. You also get sodium by adding salt to food.  · Try to cook at home, eat more fresh fruits and vegetables, and eat less fast food, canned, processed, or prepared foods.  This information is not intended to replace advice given to you  by your health care provider. Make sure you discuss any questions you have with your health care provider.  Document Released: 06/09/2003 Document Revised: 12/11/2017 Document Reviewed: 12/11/2017  Elsevier Interactive Patient Education © 2018 [employer]

## 2018-09-06 ENCOUNTER — HOSPITAL ENCOUNTER (OUTPATIENT)
Dept: PHYSICAL THERAPY | Facility: HOSPITAL | Age: 48
Setting detail: THERAPIES SERIES
Discharge: HOME | End: 2018-09-06

## 2018-09-06 DIAGNOSIS — R20.2 NUMBNESS AND TINGLING IN BOTH HANDS: ICD-10-CM

## 2018-09-06 DIAGNOSIS — R20.0 NUMBNESS AND TINGLING IN BOTH HANDS: ICD-10-CM

## 2018-09-06 DIAGNOSIS — R20.0 NUMBNESS IN BOTH HANDS: ICD-10-CM

## 2018-09-06 DIAGNOSIS — G56.03 BILATERAL CARPAL TUNNEL SYNDROME: Primary | ICD-10-CM

## 2018-09-06 PROCEDURE — 97162 PT EVAL MOD COMPLEX 30 MIN: CPT | Performed by: PHYSICAL THERAPIST

## 2018-09-06 NOTE — THERAPY EVALUATION
Outpatient Physical Therapy Ortho Initial Evaluation  [facility]  Rhoda Wallace, PT, DPT, CSCS       Patient Name: Randall Hernandez  : 1970  MRN: [id number]  Today's Date: 2018      Visit Date: 2018     Pt reports 2/10 pain pre treatment, 2/10 pain post treatment  Reports N/A% of improvement.  Attended  visits.  Insurance available: Pending approval  Next MD appt: TBD .  Recertification: 2018.    Patient Active Problem List   Diagnosis   • Hyperlipidemia   • Inattention   • Non morbid obesity   • Prediabetes   • Attention deficit hyperactivity disorder (ADHD), combined type   • Need for vaccination   • Gastroesophageal reflux disease   • Encounter for drug screening   • Tingling in extremities   • Elevated BP without diagnosis of hypertension   • Allergic rhinitis   • Elevated blood pressure reading   • Pruritic rash   • Numbness in both hands   • Bilateral elbow joint pain   • Pain in both wrists   • Ulnar neuropathy of left upper extremity   • General medical examination   • Bilateral carpal tunnel syndrome   • History of prediabetes   • Numbness and tingling in both hands   • Essential hypertension        Past Medical History:   Diagnosis Date   • Allergic rhinitis    • Backache    • Cellulitis of leg, except foot    • Dyslipidemia    • GERD (gastroesophageal reflux disease)    • Heartburn    • History of Holter monitoring 2016    Sinus mechanism with a normal average heart rate.No evidence of chronotropic incompetence.Asymptomatic Holter   • Hyperlipidemia    • Low back pain    • Numbness of lower limb     left leg numbness and tingling      • Obesity, unspecified    • Obstructive sleep apnea of adult    • Pain in left hip    • Pain in right hip    • Palpitations    • Skin lesion     insect bite.      • Supraventricular tachycardia (CMS/HCC)    • Weight loss     advised        History reviewed. No pertinent surgical history.    Visit Dx:      ICD-10-CM ICD-9-CM   1. Bilateral carpal tunnel syndrome G56.03 354.0   2. Numbness and tingling in both hands R20.0 782.0    R20.2      Number of days off work: None    Patient is .    Medications (Admitted on 9/6/2018)     amphetamine-dextroamphetamine XR (ADDERALL XR) 25 MG 24 hr capsule     ASPIRIN LOW DOSE 81 MG EC tablet     atorvastatin (LIPITOR) 80 MG tablet     cetirizine (zyrTEC) 5 MG tablet     clotrimazole-betamethasone (LOTRISONE) 1-0.05 % cream     fluticasone (FLONASE) 50 MCG/ACT nasal spray     gabapentin (NEURONTIN) 300 MG capsule     lisinopril (PRINIVIL,ZESTRIL) 10 MG tablet     meloxicam (MOBIC) 15 MG tablet     montelukast (SINGULAIR) 10 MG tablet     omeprazole (priLOSEC) 40 MG capsule      Allergies: None          Patient History     Row Name 09/06/18 0800             History    Chief Complaint Numbness;Tinglings;Pain  -AJ      Type of Pain Hand pain  -AJ      Date Current Problem(s) Began --   Chronic, years  -AJ      Brief Description of Current Complaint Patient reports this has been going on for years, but he reports that when he rides his motor cycle the vibration he can't even use the turn signal or know how hard he's doing the brake,  -AJ      Previous treatment for THIS PROBLEM Medication  -AJ      Current Tobacco Use None  -AJ      Smoking Status Former smoker  -AJ      Patient's Rating of General Health Good  -AJ      Hand Dominance right-handed  -AJ      Occupation/sports/leisure activities Occupation: ; Hobbies: riding motorcycles, building motor cycles  -AJ      Patient seeing anyone else for problem(s)? Yes, ortho  -AJ      What clinical tests have you had for this problem? Nerve Conduction Test  -AJ      Results of Clinical Tests B CTS  -AJ      History of Previous Related Injuries None recent  -AJ         Pain     Pain Location Hand  -AJ      Pain at Present 2  -AJ      Pain at Best 1  -AJ      Pain at Worst 9  -AJ      Pain Frequency Intermittent  -AJ      Pain  Description Aching;Pressure;Numbness;Throbbing  -AJ      What Performance Factors Make the Current Problem(s) WORSE? vibration from riding motorcycle, power tools  -AJ      What Performance Factors Make the Current Problem(s) BETTER? stretches  -AJ      Is your sleep disturbed? No  -AJ      Is medication used to assist with sleep? Yes  -AJ      Difficulties at work? power tools  -AJ      Difficulties with ADL's? None  -AJ      Difficulties with recreational activities? riding motorcycle  -AJ        User Key  (r) = Recorded By, (t) = Taken By, (c) = Cosigned By    Initials Name Provider Type    AJ Rhoda Wallace, PT Physical Therapist                PT Ortho     Row Name 09/06/18 1000        Strength Right    # Reps 2  -AJ    Right Rung 4  -AJ    Right  Test 1 65  -AJ    Right  Test 2 67  -AJ     Strength Average Right 66  -AJ        Strength Left    # Reps 2  -AJ    Left Rung 4  -AJ    Left  Test 1 74  -AJ    Left  Test 2 62  -AJ     Strength Average Left 68  -AJ    Row Name 09/06/18 0800       Subjective Comments    Subjective Comments Patient wishes ot be able to ride without it hurting. and to get to where he can work without s/s.  -AJ       Precautions and Contraindications    Precautions/Limitations no known precautions/limitations  -AJ       Subjective Pain    Able to rate subjective pain? yes  -AJ    Pre-Treatment Pain Level 2  -AJ    Post-Treatment Pain Level 2  -AJ       Posture/Observations    Posture- WNL Posture is WNL   for B UEs  -AJ    Observations --  -AJ    Posture/Observations Comments No acute distress.  -AJ       Special Tests/Palpation    Special Tests/Palpation --   Mild R ventral wrist TTP at carpal tunnel  -AJ       Wrist/Hand Special Tests    Finklestein test (DeQuervain’s syndrome) Bilateral:;Negative  -AJ    Phalen’s test (carpal tunnel syndrome) Right:;Positive;Left:;Negative  -AJ    Reverse Phalen’s (carpal tunnel syndrome) Bilateral:;Positive  -AJ     Tinel’s sign (median nerve irritation) Right:;Positive;Left:;Negative  -AJ       General ROM    GENERAL ROM COMMENTS B hands all WNL, able to make full fist and open and able to fully move thumb in al ldirections.  -AJ       Right Upper Ext    Rt Elbow Supination AROM WNL but painful  -AJ    Rt Elbow Pronation AROM WNL  -AJ    Rt Wrist Flexion AROM 60°  -AJ    Rt Wrist Extension AROM 58°  -AJ    Rt  Ulnar Deviation AROM 28°  -AJ    Rt  Radial Deviation AROM 12°  -AJ       Left Upper Ext    Lt Elbow Supination AROM WNL  -AJ    Lt Elbow Pronation AROM WNL  -AJ    Lt Wrist Flexion AROM 72°  -AJ    Lt Wrist Extension AROM 62°  -AJ    Lt  Ulnar Deviation AROM 32°  -AJ    Lt  Radial Deviation AROM 20°  -AJ       MMT (Manual Muscle Testing)    General MMT Comments B wrists 5/5, no change in pain.  -AJ        Strength Right    # Reps 2  -AJ    Right Rung 2  -AJ    Right  Test 1 88  -AJ    Right  Test 2 102  -AJ     Strength Average Right 95  -AJ        Strength Left    # Reps 2  -AJ    Left Rung 2  -AJ    Left  Test 1 22  -AJ    Left  Test 2 102  -AJ     Strength Average Left 62  -AJ       Sensation    Sensation WNL? WFL  -AJ    Light Touch Partial deficits in the RUE;Partial deficits in the LUE  -AJ    Additional Comments Reports numbnes in hands primarily with using vibration tools/driving  -AJ       Upper Extremity Flexibility    Wrist Extensors Bilateral:;Mildly limited  -AJ    Wrist Flexors Bilateral:;Mildly limited;Moderately limited  -       Lower Extremity Flexibility    Hamstrings --  -    Quadriceps --  -       RLE Quick Girth (cm)    Other 1 --  -    RLE Quick Girth Total --  -       LLE Quick Girth (cm)    Other 1 --  -    LLE Quick Girth Total --  -       Transfers    Comment (Transfers) I with all transfers.  -AJ       Gait/Stairs Assessment/Training    Comment (Gait/Stairs) FWB, non-antalgic gait, normal arm swing with gait.  -       Hand  Strength      Strength Affected Side Bilateral  -      User Key  (r) = Recorded By, (t) = Taken By, (c) = Cosigned By    Initials Name Provider Type    Rhoda Contreras, VELMA Physical Therapist            Therapy Education  Given: HEP, Symptoms/condition management, Pain management (POC)  Program: New  How Provided: Verbal, Demonstration, Written  Provided to: Patient  Level of Understanding: Verbalized, Demonstrated           PT OP Goals     Row Name 09/06/18 0900          PT Short Term Goals    STG Date to Achieve 09/28/18  -     STG 1 I with HEP and have additions/changes  -     STG 2 AROm B wrist extension >= 70°.  -     STG 3 AROM B wrist UD >= 38°.  -     STG 4 AROm B wrist RD >= 20°.  -     STG 5 AROM B wrist pro/sup WNL and pain-free.  -        Long Term Goals    LTG Date to Achieve 10/05/18  -     LTG 1 AROm B wrists all WNL, no increase in pain flex/ext >= 70° B.  -     LTG 2 B  @ #2 setting >= 100# in 2 rep average  -     LTG 3 B  @ #4 setting >= 85# in 2 rep average.  -     LTG 4 Patient able to perform 10 minutes of fine motor activity with no increase in pain.  -     LTG 5 Patient to report rad pain is occuring less often.  -     LTG 6 I with final HEP.  -     LTG 7 D/C with a final HEP and free 30 day fitness formula memebership.  -        Time Calculation    PT Goal Re-Cert Due Date 09/27/18  -       User Key  (r) = Recorded By, (t) = Taken By, (c) = Cosigned By    Initials Name Provider Type    Rhoda Contreras, VELMA Physical Therapist         Barriers to Rehab: Include significant or possible arthritic/degenerative changes that have occurred within the joint, The patient's obesity.    Safety Issues: None noted.          PT Assessment/Plan     Row Name 09/06/18 0900          PT Assessment    Functional Limitations Limitations in community activities;Performance in leisure activities;Performance in sport activities;Performance in work activities  -NELLY      Impairments Pain;Joint integrity;Impaired muscle endurance;Endurance;Impaired flexibility;Impaired muscle length;Impaired muscle power;Joint mobility;Peripheral nerve integrity;Range of motion;Sensation  -AJ     Assessment Comments Patient's insurance requires authorization prior to treatment beginning. patient was issued small HEP program.  -AJ     Rehab Potential Fair  -AJ     Patient/caregiver participated in establishment of treatment plan and goals Yes  -AJ     Patient would benefit from skilled therapy intervention Yes  -AJ        PT Plan    PT Frequency 2x/week  -AJ     Predicted Duration of Therapy Intervention (Therapy Eval) 3-4 weeks, 6-8 visits  -AJ     Planned CPT's? PT EVAL MOD COMPLELITY: 69662;PT THER PROC EA 15 MIN: 95007;PT THER ACT EA 15 MIN: 31441;PT THER SUPP EA 15 MIN;PT ULTRASOUND EA 15 MIN: 08726;PT PARAFFIN BATH: 23960  -AJ     Physical Therapy Interventions (Optional Details) ROM (Range of Motion);strengthening;stretching;home exercise program;patient/family education;fine motor skills;gross motor skills;modalities  -AJ     PT Plan Comments Progress overall flexibility and function while managing s/s.  -AJ       User Key  (r) = Recorded By, (t) = Taken By, (c) = Cosigned By    Initials Name Provider Type    Rhoda Contreras, PT Physical Therapist       Other therapeutic activities and/or exercises will be prescribed depending on the patients progress or lack there of.            Exercises     Row Name 09/06/18 0800             Subjective Comments    Subjective Comments Patient wishes ot be able to ride without it hurting. and to get to where he can work without s/s.  -AJ         Subjective Pain    Able to rate subjective pain? yes  -AJ      Pre-Treatment Pain Level 2  -AJ      Post-Treatment Pain Level 2  -AJ         Exercise 1    Exercise Name 1 B CT 1 S  -AJ      Reps 1 1  -AJ      Time 1 1 minute  -AJ      Additional Comments --  -AJ         Exercise 2    Exercise Name 2 B CT 3 S   -      Reps 2 1  -      Time 2 1 minute  -         Exercise 3    Exercise Name 3 --  -      Reps 3 --  -      Time 3 --  -         Exercise 4    Exercise Name 4 --  -         Exercise 5    Exercise Name 5 --  -      Reps 5 --  -         Exercise 6    Exercise Name 6 --  -      Reps 6 --  -        User Key  (r) = Recorded By, (t) = Taken By, (c) = Cosigned By    Initials Name Provider Type    Rhoda Contreras PT Physical Therapist                        Outcome Measure Options: Quick DASH  Quick DASH  Open a tight or new jar.: Mild Difficulty  Do heavy household chores (e.g., wash walls, wash floors): Moderate Difficulty  Carry a shopping bag or briefcase: Moderate Difficulty  Wash your back: Moderate Difficulty  Use a knife to cut food: Mild Difficulty  Recreational activities in which you take some force or impact through your arm, should or hand (e.g. golf, hammering, tennis, etc.): Severe Difficulty  During the past week, to what extent has your arm, shoulder, or hand problem interfered with your normal social activites with family, friends, neighbors or groups?: Slightly  During the past week, were you limited in your work or other regular daily activities as a result of your arm, shoulder or hand problem?: Moderately Limited  Arm, Shoulder, or hand pain: Moderate  Tingling (pins and needles) in your arm, shoulder, or hand: Moderate  During the past week, how much difficulty have you had sleeping because of the pain in your arm, shoulder or hand?: Mild Difficulty  Number of Questions Answered: 11  Quick DASH Score: 43.18         Time Calculation:   Start Time: 0848  Stop Time: 0920  Time Calculation (min): 32 min     Therapy Charges for Today     Code Description Service Date Service Provider Modifiers Qty    84874625874 HC PT EVAL MOD COMPLEXITY 2 9/6/2018 Rhoda Wallace, PT GP 1    03297581774 HC PT THER SUPP EA 15 MIN 9/6/2018 Rhoda Wallace, PT GP 2          PT  G-Codes  Outcome Measure Options: Quick DASH  Quick DASH Score: 43.18         Rhoda Wallace, PT, DPT, CSCS  9/6/2018

## 2018-09-13 ENCOUNTER — HOSPITAL ENCOUNTER (OUTPATIENT)
Dept: PHYSICAL THERAPY | Facility: HOSPITAL | Age: 48
Setting detail: THERAPIES SERIES
Discharge: HOME | End: 2018-09-13

## 2018-09-13 DIAGNOSIS — R20.0 NUMBNESS AND TINGLING IN BOTH HANDS: ICD-10-CM

## 2018-09-13 DIAGNOSIS — R20.0 NUMBNESS IN BOTH HANDS: ICD-10-CM

## 2018-09-13 DIAGNOSIS — G56.03 BILATERAL CARPAL TUNNEL SYNDROME: Primary | ICD-10-CM

## 2018-09-13 DIAGNOSIS — R20.2 NUMBNESS AND TINGLING IN BOTH HANDS: ICD-10-CM

## 2018-09-13 PROCEDURE — 97110 THERAPEUTIC EXERCISES: CPT

## 2018-09-13 NOTE — THERAPY TREATMENT NOTE
Outpatient Physical Therapy Ortho Treatment Note  [facility]     Patient Name: Randall Hernandez  : 1970  MRN: [id number]  Today's Date: 2018      Visit Date: 2018  Pt reports 2/10 pain pre treatment, 0/10 pain post treatment  Reports 0% of improvement.  Attended 2/2 visits.  Insurance available: 9 visits approved  Next MD appt: [id number] .  Recertification: 2018.  Visit Dx:    ICD-10-CM ICD-9-CM   1. Bilateral carpal tunnel syndrome G56.03 354.0   2. Numbness and tingling in both hands R20.0 782.0    R20.2    3. Numbness in both hands R20.0 782.0       Patient Active Problem List   Diagnosis   • Hyperlipidemia   • Inattention   • Non morbid obesity   • Prediabetes   • Attention deficit hyperactivity disorder (ADHD), combined type   • Need for vaccination   • Gastroesophageal reflux disease   • Encounter for drug screening   • Tingling in extremities   • Elevated BP without diagnosis of hypertension   • Allergic rhinitis   • Elevated blood pressure reading   • Pruritic rash   • Numbness in both hands   • Bilateral elbow joint pain   • Pain in both wrists   • Ulnar neuropathy of left upper extremity   • General medical examination   • Bilateral carpal tunnel syndrome   • History of prediabetes   • Numbness and tingling in both hands   • Essential hypertension        Past Medical History:   Diagnosis Date   • Allergic rhinitis    • Backache    • Cellulitis of leg, except foot    • Dyslipidemia    • GERD (gastroesophageal reflux disease)    • Heartburn    • History of Holter monitoring 2016    Sinus mechanism with a normal average heart rate.No evidence of chronotropic incompetence.Asymptomatic Holter   • Hyperlipidemia    • Low back pain    • Numbness of lower limb     left leg numbness and tingling      • Obesity, unspecified    • Obstructive sleep apnea of adult    • Pain in left hip    • Pain in right hip    • Palpitations    • Skin lesion     insect bite.      •  Supraventricular tachycardia (CMS/HCC)    • Weight loss     advised        No past surgical history on file.          PT Ortho     Row Name 09/13/18 1600       Subjective Comments    Subjective Comments Pain is mainly riding motocycle or working  -TL       Precautions and Contraindications    Precautions/Limitations no known precautions/limitations  -TL       Subjective Pain    Able to rate subjective pain? yes  -TL    Pre-Treatment Pain Level 2  -TL    Post-Treatment Pain Level 0   numb  -TL      User Key  (r) = Recorded By, (t) = Taken By, (c) = Cosigned By    Initials Name Provider Type    Hemalatha Barajas PTA Physical Therapy Assistant                            PT Assessment/Plan     Row Name 09/13/18 1600          PT Assessment    Assessment Comments No new goals met. pt progressing toward goals. pt tolerated new Stretches this date and ex.    Pt reports that he has been doing his stretches.  -TL        PT Plan    PT Frequency 2x/week  -TL     PT Plan Comments Review HEP and add some strengthening web flex  -TL       User Key  (r) = Recorded By, (t) = Taken By, (c) = Cosigned By    Initials Name Provider Type    Hemalatha Barajas PTA Physical Therapy Assistant                Modalities     Row Name 09/13/18 1600             Ice    Ice Applied Yes  -TL      Location R and L wrist  -TL      Rx Minutes 10 mins  -TL      Ice S/P Rx Yes  -TL        User Key  (r) = Recorded By, (t) = Taken By, (c) = Cosigned By    Initials Name Provider Type    Hemalatha Barajas PTA Physical Therapy Assistant                Exercises     Row Name 09/13/18 1600             Subjective Comments    Subjective Comments Pain is mainly riding motocycle or working  -TL         Subjective Pain    Able to rate subjective pain? yes  -TL      Pre-Treatment Pain Level 2  -TL      Post-Treatment Pain Level 0   numb  -TL         Exercise 1    Exercise Name 1 Pro ll UE fwd/rev  -TL      Time 1 10 mins  -TL      Additional Comments level 4   -TL         Exercise 2    Exercise Name 2 B ct 1 S  -TL      Reps 2 1  -TL      Time 2 1 min  -TL      Additional Comments both  -TL         Exercise 3    Exercise Name 3 B ct 3 S  -TL      Reps 3 1  -TL      Time 3 1 mins  -TL      Additional Comments both  -TL         Exercise 4    Exercise Name 4 Finkelstin thumb s  -TL      Reps 4 2  -TL      Time 4 1 mins  -TL      Additional Comments both  -TL         Exercise 5    Exercise Name 5 6 pack ex  -TL      Reps 5 20 each directions  -TL      Additional Comments both  -TL         Exercise 6    Exercise Name 6 Red Putty fisting  -TL      Time 6 5 mins  -TL      Additional Comments each  -TL         Exercise 7    Exercise Name 7 wrist pronation/supination  -TL      Reps 7 20  -TL        User Key  (r) = Recorded By, (t) = Taken By, (c) = Cosigned By    Initials Name Provider Type    Hemalatha Barajas, PTA Physical Therapy Assistant                               PT OP Goals     Row Name 09/13/18 1600          PT Short Term Goals    STG Date to Achieve 09/28/18  -TL     STG 1 I with HEP and have additions/changes  -TL     STG 1 Progress Ongoing;Progressing  -TL     STG 2 AROm B wrist extension >= 70°.  -TL     STG 2 Progress Ongoing;Progressing  -TL     STG 3 AROM B wrist UD >= 38°.  -TL     STG 3 Progress Ongoing;Progressing  -TL     STG 4 AROm B wrist RD >= 20°.  -TL     STG 4 Progress Ongoing;Progressing  -TL     STG 5 AROM B wrist pro/sup WNL and pain-free.  -TL     STG 5 Progress Ongoing  -TL        Long Term Goals    LTG Date to Achieve 10/05/18  -TL     LTG 1 AROm B wrists all WNL, no increase in pain flex/ext >= 70° B.  -TL     LTG 2 B  @ #2 setting >= 100# in 2 rep average  -TL     LTG 3 B  @ #4 setting >= 85# in 2 rep average.  -TL     LTG 4 Patient able to perform 10 minutes of fine motor activity with no increase in pain.  -TL     LTG 5 Patient to report rad pain is occuring less often.  -TL     LTG 6 I with final HEP.  -TL     LTG 7 D/C with a  final HEP and free 30 day fitness formula memebership.  -TL        Time Calculation    PT Goal Re-Cert Due Date 09/27/18  -TL       User Key  (r) = Recorded By, (t) = Taken By, (c) = Cosigned By    Initials Name Provider Type    Hemalatha Barajas PTA Physical Therapy Assistant          Therapy Education  Education Details: Red putty fisting, six pack, Frinkelstin thumb s  Given: HEP, Symptoms/condition management, Pain management  Program: New  How Provided: Verbal, Demonstration, Written  Provided to: Patient  Level of Understanding: Verbalized, Demonstrated              Time Calculation:   Start Time: 1603  Stop Time: 1705  Time Calculation (min): 62 min  PT Non-Billable Time (min): 10 min  Total Timed Code Minutes- PT: 52 minute(s)  Therapy Suggested Charges     Code   Minutes Charges    None           Therapy Charges for Today     Code Description Service Date Service Provider Modifiers Qty    68566839258 HC PT THER PROC EA 15 MIN 9/13/2018 Heamlatha Baeza PTA GP 3    71258618193 HC PT THER SUPP EA 15 MIN 9/13/2018 Hemalatha Baeza PTA GP 1                    Hemalatha Baeza PTA  9/13/2018

## 2018-09-18 ENCOUNTER — HOSPITAL ENCOUNTER (OUTPATIENT)
Dept: PHYSICAL THERAPY | Facility: HOSPITAL | Age: 48
Setting detail: THERAPIES SERIES
Discharge: HOME | End: 2018-09-18

## 2018-09-18 DIAGNOSIS — R20.0 NUMBNESS IN BOTH HANDS: ICD-10-CM

## 2018-09-18 DIAGNOSIS — R20.2 NUMBNESS AND TINGLING IN BOTH HANDS: ICD-10-CM

## 2018-09-18 DIAGNOSIS — R20.0 NUMBNESS AND TINGLING IN BOTH HANDS: ICD-10-CM

## 2018-09-18 DIAGNOSIS — G56.03 BILATERAL CARPAL TUNNEL SYNDROME: Primary | ICD-10-CM

## 2018-09-18 PROCEDURE — 97110 THERAPEUTIC EXERCISES: CPT

## 2018-09-18 NOTE — THERAPY TREATMENT NOTE
Outpatient Physical Therapy Ortho Treatment Note  [facility]  Camille Rodriguez ATC       Patient Name: Randall Hernandez  : 1970  MRN: [id number]  Today's Date: 2018      Visit Date: 2018  Pt reports 4/10 pain pre treatment, 0/10 pain post treatment  Reports -% of improvement.  Attended 3/3 visits.  Insurance available: 9 visits  Next MD appt: [id number].  Recertification: 2018.  Visit Dx:    ICD-10-CM ICD-9-CM   1. Bilateral carpal tunnel syndrome G56.03 354.0   2. Numbness and tingling in both hands R20.0 782.0    R20.2    3. Numbness in both hands R20.0 782.0       Patient Active Problem List   Diagnosis   • Hyperlipidemia   • Inattention   • Non morbid obesity   • Prediabetes   • Attention deficit hyperactivity disorder (ADHD), combined type   • Need for vaccination   • Gastroesophageal reflux disease   • Encounter for drug screening   • Tingling in extremities   • Elevated BP without diagnosis of hypertension   • Allergic rhinitis   • Elevated blood pressure reading   • Pruritic rash   • Numbness in both hands   • Bilateral elbow joint pain   • Pain in both wrists   • Ulnar neuropathy of left upper extremity   • General medical examination   • Bilateral carpal tunnel syndrome   • History of prediabetes   • Numbness and tingling in both hands   • Essential hypertension        Past Medical History:   Diagnosis Date   • Allergic rhinitis    • Backache    • Cellulitis of leg, except foot    • Dyslipidemia    • GERD (gastroesophageal reflux disease)    • Heartburn    • History of Holter monitoring 2016    Sinus mechanism with a normal average heart rate.No evidence of chronotropic incompetence.Asymptomatic Holter   • Hyperlipidemia    • Low back pain    • Numbness of lower limb     left leg numbness and tingling      • Obesity, unspecified    • Obstructive sleep apnea of adult    • Pain in left hip    • Pain in right hip    • Palpitations    • Skin lesion     insect  bite.      • Supraventricular tachycardia (CMS/HCC)    • Weight loss     advised        No past surgical history on file.          PT Ortho     Row Name 09/18/18 0900       Subjective Comments    Subjective Comments (P)  states that he feel smore stiff this visit thatn striaght up pain   -HB       Precautions and Contraindications    Precautions/Limitations (P)  no known precautions/limitations  -HB       Subjective Pain    Able to rate subjective pain? (P)  yes  -HB    Pre-Treatment Pain Level (P)  4  -HB      User Key  (r) = Recorded By, (t) = Taken By, (c) = Cosigned By    Initials Name Provider Type     Camille Rodriguez, ATC                             PT Assessment/Plan     Row Name 09/18/18 1000          PT Assessment    Assessment Comments (P)  tlerated new additions well this date  -HB        PT Plan    PT Frequency (P)  2x/week  -HB     PT Plan Comments (P)  cont and add 4 way hammer next visit  -HB       User Key  (r) = Recorded By, (t) = Taken By, (c) = Cosigned By    Initials Name Provider Type     Camille Rodriguez, ATC                 Modalities     Row Name 09/18/18 0900             Ice    Ice Applied (P)  Yes  -HB      Location (P)  R and L wrist  -HB      Rx Minutes (P)  10 mins  -HB      Ice S/P Rx (P)  Yes  -HB        User Key  (r) = Recorded By, (t) = Taken By, (c) = Cosigned By    Initials Name Provider Type     Camille Rodriguez, ATC                 Exercises     Row Name 09/18/18 0900             Subjective Comments    Subjective Comments (P)  states that he feel smore stiff this visit thatn striaght up pain   -HB         Subjective Pain    Able to rate subjective pain? (P)  yes  -HB      Pre-Treatment Pain Level (P)  4  -HB      Post-Treatment Pain Level (P)  0  -HB         Exercise 1    Exercise Name 1 (P)  Pro ll UE fwd/rev  -HB      Time 1 (P)  10 mins  -HB      Additional Comments (P)  L 4.0  -HB         Exercise 2    Exercise Name 2 (P)  B  CT1/CT3  -HB      Reps 2 (P)  2  -HB      Time 2 (P)  1 min  -HB         Exercise 3    Exercise Name 3 (P)  B finklinstin S  -HB      Reps 3 (P)  2  -HB      Time 3 (P)  1 min  -HB         Exercise 4    Exercise Name 4 (P)  Powerweb    -HB      Reps 4 (P)  20  -HB      Additional Comments (P)  Bilateral  -HB         Exercise 5    Exercise Name 5 (P)  Powerweb spreads  -HB      Reps 5 (P)  20  -HB      Additional Comments (P)  Bilateral  -HB         Exercise 6    Exercise Name 6 (P)  Red Putty fist  -HB      Time 6 (P)  5 mins  -HB      Additional Comments (P)  each  -HB         Exercise 7    Exercise Name 7 (P)  6 pack   -HB      Reps 7 (P)  20  -HB         Exercise 8    Exercise Name 8 (P)  hammer pro/sup  -HB      Reps 8 (P)  15 B  -HB        User Key  (r) = Recorded By, (t) = Taken By, (c) = Cosigned By    Initials Name Provider Type    Camille Lamb, ATC                                PT OP Goals     Row Name 09/18/18 0900          PT Short Term Goals    STG Date to Achieve (P)  09/28/18  -HB     STG 1 (P)  I with HEP and have additions/changes  -HB     STG 1 Progress (P)  Ongoing;Progressing  -HB     STG 2 (P)  AROm B wrist extension >= 70°.  -HB     STG 2 Progress (P)  Ongoing;Progressing  -HB     STG 3 (P)  AROM B wrist UD >= 38°.  -HB     STG 3 Progress (P)  Ongoing;Progressing  -HB     STG 4 (P)  AROm B wrist RD >= 20°.  -HB     STG 4 Progress (P)  Ongoing;Progressing  -HB     STG 5 (P)  AROM B wrist pro/sup WNL and pain-free.  -HB     STG 5 Progress (P)  Ongoing  -HB        Long Term Goals    LTG Date to Achieve (P)  10/05/18  -HB     LTG 1 (P)  AROm B wrists all WNL, no increase in pain flex/ext >= 70° B.  -HB     LTG 1 Progress (P)  Ongoing  -HB     LTG 2 (P)  B  @ #2 setting >= 100# in 2 rep average  -HB     LTG 2 Progress (P)  Ongoing  -HB     LTG 3 (P)  B  @ #4 setting >= 85# in 2 rep average.  -HB     LTG 3 Progress (P)  Ongoing  -HB     LTG 4 (P)  Patient able to  perform 10 minutes of fine motor activity with no increase in pain.  -HB     LTG 4 Progress (P)  Ongoing  -HB     LTG 5 (P)  Patient to report rad pain is occuring less often.  -HB     LTG 5 Progress (P)  Ongoing  -HB     LTG 6 (P)  I with final HEP.  -HB     LTG 6 Progress (P)  Ongoing  -HB     LTG 7 (P)  D/C with a final HEP and free 30 day fitness formula memebership.  -HB     LTG 7 Progress (P)  Ongoing  -HB        Time Calculation    PT Goal Re-Cert Due Date (P)  09/27/18  -HB       User Key  (r) = Recorded By, (t) = Taken By, (c) = Cosigned By    Initials Name Provider Type     Camille Rodriguez ATC                          Time Calculation:   Start Time: (P) 0930  Stop Time: (P) 1020  Time Calculation (min): (P) 50 min  PT Non-Billable Time (min): (P) 10 min  Total Timed Code Minutes- PT: (P) 40 minute(s)      Therapy Charges for Today     Code Description Service Date Service Provider Modifiers Qty    44251021002  PT THER PROC EA 15 MIN 9/18/2018 Camille Rodriguez ATC  3    19371155832 HC PT THER SUPP EA 15 MIN 9/18/2018 Camille Rodriguez, ATC  2                    Camille Rodriguez ATC  9/18/2018

## 2018-09-21 ENCOUNTER — HOSPITAL ENCOUNTER (OUTPATIENT)
Dept: PHYSICAL THERAPY | Facility: HOSPITAL | Age: 48
Setting detail: THERAPIES SERIES
Discharge: HOME | End: 2018-09-21

## 2018-09-21 DIAGNOSIS — G56.03 BILATERAL CARPAL TUNNEL SYNDROME: Primary | ICD-10-CM

## 2018-09-21 DIAGNOSIS — R20.2 NUMBNESS AND TINGLING IN BOTH HANDS: ICD-10-CM

## 2018-09-21 DIAGNOSIS — R20.0 NUMBNESS IN BOTH HANDS: ICD-10-CM

## 2018-09-21 DIAGNOSIS — R20.0 NUMBNESS AND TINGLING IN BOTH HANDS: ICD-10-CM

## 2018-09-21 PROCEDURE — 97110 THERAPEUTIC EXERCISES: CPT

## 2018-09-21 NOTE — THERAPY TREATMENT NOTE
Outpatient Physical Therapy Ortho Treatment Note  [facility]  Pt reports 2/10 pain pre treatment, 0/10 pain post treatment  Reports 10% of improvement.  Attended 4/4 visits.  Insurance available:9 visits   Next MD appt: [id number] .  Recertification: 2018.     Patient Name: Randall Hernandez  : 1970  MRN: [id number]  Today's Date: 2018      Visit Date: 2018    Visit Dx:    ICD-10-CM ICD-9-CM   1. Bilateral carpal tunnel syndrome G56.03 354.0   2. Numbness and tingling in both hands R20.0 782.0    R20.2    3. Numbness in both hands R20.0 782.0       Patient Active Problem List   Diagnosis   • Hyperlipidemia   • Inattention   • Non morbid obesity   • Prediabetes   • Attention deficit hyperactivity disorder (ADHD), combined type   • Need for vaccination   • Gastroesophageal reflux disease   • Encounter for drug screening   • Tingling in extremities   • Elevated BP without diagnosis of hypertension   • Allergic rhinitis   • Elevated blood pressure reading   • Pruritic rash   • Numbness in both hands   • Bilateral elbow joint pain   • Pain in both wrists   • Ulnar neuropathy of left upper extremity   • General medical examination   • Bilateral carpal tunnel syndrome   • History of prediabetes   • Numbness and tingling in both hands   • Essential hypertension        Past Medical History:   Diagnosis Date   • Allergic rhinitis    • Backache    • Cellulitis of leg, except foot    • Dyslipidemia    • GERD (gastroesophageal reflux disease)    • Heartburn    • History of Holter monitoring 2016    Sinus mechanism with a normal average heart rate.No evidence of chronotropic incompetence.Asymptomatic Holter   • Hyperlipidemia    • Low back pain    • Numbness of lower limb     left leg numbness and tingling      • Obesity, unspecified    • Obstructive sleep apnea of adult    • Pain in left hip    • Pain in right hip    • Palpitations    • Skin lesion     insect bite.      •  Supraventricular tachycardia (CMS/HCC)    • Weight loss     advised        No past surgical history on file.          PT Ortho     Row Name 09/21/18 1000       Right Upper Ext    Rt Wrist Extension AROM 65  -TL    Rt  Ulnar Deviation AROM 30  -TL    Rt  Radial Deviation AROM 18  -TL       Left Upper Ext    Lt Wrist Extension AROM 64  -TL    Lt  Ulnar Deviation AROM 30  -TL    Lt  Radial Deviation AROM 18  -TL        Strength Right    # Reps 2  -TL    Right Rung 2  -TL    Right  Test 1 75  -TL    Right  Test 2 85  -TL     Strength Average Right 80  -TL        Strength Left    # Reps 2  -TL    Left Rung 2  -TL    Left  Test 1 94  -TL    Left  Test 2 94  -TL     Strength Average Left 94  -TL    Row Name 09/21/18 0900       Precautions and Contraindications    Precautions/Limitations no known precautions/limitations  -TL       Subjective Pain    Able to rate subjective pain? yes  -TL    Post-Treatment Pain Level 0  -TL        Strength Right    # Reps 2  -TL    Right Rung 4  -TL    Right  Test 1 77  -TL    Right  Test 2 75  -TL     Strength Average Right 76  -TL        Strength Left    # Reps 2  -TL    Left Rung 4  -TL    Left  Test 1 78  -TL    Left  Test 2 75  -TL     Strength Average Left 76.5  -TL      User Key  (r) = Recorded By, (t) = Taken By, (c) = Cosigned By    Initials Name Provider Type    Hemalatha Barajas PTA Physical Therapy Assistant                            PT Assessment/Plan     Row Name 09/21/18 1000          PT Assessment    Assessment Comments Pt has made some improvements with strengthen and ROM of both wrist. pt reports only 10% improved. Pt has been doing his HEP per patient. No new goals met . Pt does an order for night splints but pt states that he had them before it made the pain worse. PTA instructed pt to bring in the old splints to see if they are proper size.  -TL        PT Plan    PT Frequency 2x/week  -TL     PT Plan  Comments bring in old night splints.  -TL       User Key  (r) = Recorded By, (t) = Taken By, (c) = Cosigned By    Initials Name Provider Type    Hemalatha Barajas PTA Physical Therapy Assistant                Modalities     Row Name 09/21/18 0900             Ice    Ice Applied Yes  -TL      Location R and L wrist  -TL      Rx Minutes 10 mins  -TL      Ice S/P Rx Yes  -TL        User Key  (r) = Recorded By, (t) = Taken By, (c) = Cosigned By    Initials Name Provider Type    TL Hemalatha Baeza PTA Physical Therapy Assistant                Exercises     Row Name 09/21/18 0900             Subjective Comments    Subjective Comments pt reported that he is negra to ride longer before hand going numb. Pt stated that he is only 10% improved. Instructed pt to bring in his night splint next visit.  -TL         Subjective Pain    Able to rate subjective pain? yes  -TL      Pre-Treatment Pain Level 2  -TL      Post-Treatment Pain Level 0  -TL         Exercise 1    Exercise Name 1 Pro ll UE fwd/rev  -TL      Time 1 10 mins  -TL      Additional Comments level 5  -TL         Exercise 2    Exercise Name 2 B CT1/CT3  -TL      Reps 2 2  -TL      Time 2 1 min  -TL         Exercise 3    Exercise Name 3 B finklinstin S  -TL      Reps 3 2  -TL      Time 3 1 min  -TL         Exercise 4    Exercise Name 4 Powerweb    -TL      Time 4 2min  -TL         Exercise 5    Exercise Name 5 Powerweb spread  -TL      Time 5 2 min  -TL         Exercise 6    Exercise Name 6 measured  S  -TL         Exercise 7    Exercise Name 7 measured ROM wrist ex/RD/UD  -TL         Exercise 8    Exercise Name 8 Hammer 4-way  -TL      Reps 8 20  -TL      Additional Comments both  -TL         Exercise 9    Exercise Name 9 thera roll   -TL      Time 9 3mins  -TL        User Key  (r) = Recorded By, (t) = Taken By, (c) = Cosigned By    Initials Name Provider Type    Hemalatha Barajas PTA Physical Therapy Assistant                               PT OP Goals      Row Name 09/21/18 0900          PT Short Term Goals    STG Date to Achieve 09/28/18  -TL     STG 1 I with HEP and have additions/changes  -TL     STG 1 Progress Ongoing;Progressing  -TL     STG 2 AROm B wrist extension >= 70°.  -TL     STG 2 Progress Ongoing;Progressing  -TL     STG 3 AROM B wrist UD >= 38°.  -TL     STG 3 Progress Ongoing;Progressing  -TL     STG 4 AROm B wrist RD >= 20°.  -TL     STG 4 Progress Ongoing;Progressing  -TL     STG 5 AROM B wrist pro/sup WNL and pain-free.  -TL     STG 5 Progress Ongoing  -TL        Long Term Goals    LTG Date to Achieve 10/05/18  -TL     LTG 1 AROm B wrists all WNL, no increase in pain flex/ext >= 70° B.  -TL     LTG 1 Progress Ongoing  -TL     LTG 2 B  @ #2 setting >= 100# in 2 rep average  -TL     LTG 2 Progress Ongoing  -TL     LTG 3 B  @ #4 setting >= 85# in 2 rep average.  -TL     LTG 3 Progress Ongoing  -TL     LTG 4 Patient able to perform 10 minutes of fine motor activity with no increase in pain.  -TL     LTG 4 Progress Ongoing  -TL     LTG 5 Patient to report rad pain is occuring less often.  -TL     LTG 5 Progress Ongoing  -TL     LTG 6 I with final HEP.  -TL     LTG 6 Progress Ongoing  -TL     LTG 7 D/C with a final HEP and free 30 day fitness formula memebership.  -TL     LTG 7 Progress Ongoing  -TL        Time Calculation    PT Goal Re-Cert Due Date 09/27/18  -TL       User Key  (r) = Recorded By, (t) = Taken By, (c) = Cosigned By    Initials Name Provider Type    Hemalatha Barajas, PTA Physical Therapy Assistant          Therapy Education  Given: HEP, Symptoms/condition management, Pain management  Program: Reinforced  How Provided: Verbal, Demonstration, Written  Provided to: Patient  Level of Understanding: Verbalized, Demonstrated              Time Calculation:   Start Time: 0931  Stop Time: 1038  Time Calculation (min): 67 min  PT Non-Billable Time (min): 10 min  Total Timed Code Minutes- PT: 57 minute(s)  Therapy Suggested Charges      Code   Minutes Charges    None           Therapy Charges for Today     Code Description Service Date Service Provider Modifiers Qty    37586255191 HC PT THER PROC EA 15 MIN 9/21/2018 Hemalatha Baeza, PTA GP 4    45541512893 HC PT THER SUPP EA 15 MIN 9/21/2018 Hemalatha Baeza, PTA GP 1                    Hemalatha Baeza, CHANTAL  9/21/2018

## 2018-09-25 ENCOUNTER — HOSPITAL ENCOUNTER (OUTPATIENT)
Dept: PHYSICAL THERAPY | Facility: HOSPITAL | Age: 48
Setting detail: THERAPIES SERIES
Discharge: HOME | End: 2018-09-25

## 2018-09-25 DIAGNOSIS — R20.0 NUMBNESS IN BOTH HANDS: ICD-10-CM

## 2018-09-25 DIAGNOSIS — G56.03 BILATERAL CARPAL TUNNEL SYNDROME: Primary | ICD-10-CM

## 2018-09-25 DIAGNOSIS — R20.2 NUMBNESS AND TINGLING IN BOTH HANDS: ICD-10-CM

## 2018-09-25 DIAGNOSIS — R20.0 NUMBNESS AND TINGLING IN BOTH HANDS: ICD-10-CM

## 2018-09-25 PROCEDURE — 97110 THERAPEUTIC EXERCISES: CPT

## 2018-09-25 NOTE — THERAPY TREATMENT NOTE
Outpatient Physical Therapy Ortho Treatment Note  [facility]  Camille Rodriguez ATC       Patient Name: Randall Hernandez  : 1970  MRN: [id number]  Today's Date: 2018      Visit Date: 2018  Pt reports 2/10 pain pre treatment, 0/10 pain post treatment  Reports 10% of improvement.  Attended 5/5 visits.  Insurance available: 9 visits  Next MD appt: 10/10//2018.  Recertification: 2018.  Visit Dx:    ICD-10-CM ICD-9-CM   1. Bilateral carpal tunnel syndrome G56.03 354.0   2. Numbness and tingling in both hands R20.0 782.0    R20.2    3. Numbness in both hands R20.0 782.0       Patient Active Problem List   Diagnosis   • Hyperlipidemia   • Inattention   • Non morbid obesity   • Prediabetes   • Attention deficit hyperactivity disorder (ADHD), combined type   • Need for vaccination   • Gastroesophageal reflux disease   • Encounter for drug screening   • Tingling in extremities   • Elevated BP without diagnosis of hypertension   • Allergic rhinitis   • Elevated blood pressure reading   • Pruritic rash   • Numbness in both hands   • Bilateral elbow joint pain   • Pain in both wrists   • Ulnar neuropathy of left upper extremity   • General medical examination   • Bilateral carpal tunnel syndrome   • History of prediabetes   • Numbness and tingling in both hands   • Essential hypertension        Past Medical History:   Diagnosis Date   • Allergic rhinitis    • Backache    • Cellulitis of leg, except foot    • Dyslipidemia    • GERD (gastroesophageal reflux disease)    • Heartburn    • History of Holter monitoring 2016    Sinus mechanism with a normal average heart rate.No evidence of chronotropic incompetence.Asymptomatic Holter   • Hyperlipidemia    • Low back pain    • Numbness of lower limb     left leg numbness and tingling      • Obesity, unspecified    • Obstructive sleep apnea of adult    • Pain in left hip    • Pain in right hip    • Palpitations    • Skin lesion     insect  bite.      • Supraventricular tachycardia (CMS/HCC)    • Weight loss     advised        No past surgical history on file.          PT Ortho     Row Name 09/25/18 0800       Subjective Comments    Subjective Comments (P)  states that after using a heard  yesterday at work that his hands/wrist are very sore. the vibration from the tool caused some pain. states that this morning he is more sore than painful in B wrist   -HB       Subjective Pain    Able to rate subjective pain? (P)  yes  -HB    Pre-Treatment Pain Level (P)  2  -HB    Post-Treatment Pain Level (P)  0  -HB      User Key  (r) = Recorded By, (t) = Taken By, (c) = Cosigned By    Initials Name Provider Type     Camille Rodriguez, ATC                             PT Assessment/Plan     Row Name 09/25/18 0900          PT Assessment    Assessment Comments (P)  tolerated therex welll and reports feeling better post therex soreness wise  -HB        PT Plan    PT Frequency (P)  2x/week  -HB     PT Plan Comments (P)  progress as tolerated. Begin TB wrist   -HB       User Key  (r) = Recorded By, (t) = Taken By, (c) = Cosigned By    Initials Name Provider Type     Camille Rodriguez A, ATC                     Exercises     Row Name 09/25/18 0800             Subjective Comments    Subjective Comments (P)  states that after using a heard  yesterday at work that his hands/wrist are very sore. the vibration from the tool caused some pain. states that this morning he is more sore than painful in B wrist   -HB         Subjective Pain    Able to rate subjective pain? (P)  yes  -HB      Pre-Treatment Pain Level (P)  2  -HB      Post-Treatment Pain Level (P)  0  -HB         Exercise 1    Exercise Name 1 (P)  Pro ll UE fwd/rev  -HB      Time 1 (P)  10 mins  -HB      Additional Comments (P)  L 5.0  -HB         Exercise 2    Exercise Name 2 (P)  B CT1/CT3  -HB      Reps 2 (P)  2  -HB      Time 2 (P)  1 min  -HB         Exercise 3    Exercise  Name 3 (P)  B finklinstin S  -HB      Reps 3 (P)  2  -HB      Time 3 (P)  1 min  -HB         Exercise 4    Exercise Name 4 (P)  Powerweb    -HB      Time 4 (P)  2min  -HB         Exercise 5    Exercise Name 5 (P)  Powerweb spread  -HB      Time 5 (P)  2 min  -HB         Exercise 6    Exercise Name 6 (P)  clothpin pinches  -HB      Time 6 (P)  1 min  -HB      Additional Comments (P)  blue  -HB         Exercise 7    Exercise Name 7 (P)  thera roll  -HB      Time 7 (P)  3 min  -HB      Additional Comments (P)  yellow  -HB         Exercise 8    Exercise Name 8 (P)  Hammer 4-way  -HB      Reps 8 (P)  20  -HB      Additional Comments (P)  both  -HB         Exercise 9    Exercise Name 9 (P)  putty    -HB      Time 9 (P)  2 minutes B  -HB         Exercise 10    Exercise Name 10 (P)  velcro board large roll  -HB      Time 10 (P)  1 minute each hand  -HB        User Key  (r) = Recorded By, (t) = Taken By, (c) = Cosigned By    Initials Name Provider Type    Camille Lamb, ATC                                PT OP Goals     Row Name 09/25/18 0800          PT Short Term Goals    STG Date to Achieve (P)  09/28/18  -HB     STG 1 (P)  I with HEP and have additions/changes  -HB     STG 1 Progress (P)  Ongoing;Progressing  -HB     STG 2 (P)  AROm B wrist extension >= 70°.  -HB     STG 2 Progress (P)  Ongoing;Progressing  -HB     STG 3 (P)  AROM B wrist UD >= 38°.  -HB     STG 3 Progress (P)  Ongoing;Progressing  -HB     STG 4 (P)  AROm B wrist RD >= 20°.  -HB     STG 4 Progress (P)  Ongoing;Progressing  -HB     STG 5 (P)  AROM B wrist pro/sup WNL and pain-free.  -HB     STG 5 Progress (P)  Ongoing  -HB        Long Term Goals    LTG Date to Achieve (P)  10/05/18  -HB     LTG 1 (P)  AROm B wrists all WNL, no increase in pain flex/ext >= 70° B.  -HB     LTG 1 Progress (P)  Ongoing  -HB     LTG 2 (P)  B  @ #2 setting >= 100# in 2 rep average  -HB     LTG 2 Progress (P)  Ongoing  -HB     LTG 3 (P)  B  @  #4 setting >= 85# in 2 rep average.  -HB     LTG 3 Progress (P)  Ongoing  -HB     LTG 4 (P)  Patient able to perform 10 minutes of fine motor activity with no increase in pain.  -HB     LTG 4 Progress (P)  Ongoing  -HB     LTG 5 (P)  Patient to report rad pain is occuring less often.  -HB     LTG 5 Progress (P)  Ongoing  -HB     LTG 6 (P)  I with final HEP.  -HB     LTG 6 Progress (P)  Ongoing  -HB     LTG 7 (P)  D/C with a final HEP and free 30 day fitness formula memebership.  -HB     LTG 7 Progress (P)  Ongoing  -HB        Time Calculation    PT Goal Re-Cert Due Date (P)  09/27/18  -HB       User Key  (r) = Recorded By, (t) = Taken By, (c) = Cosigned By    Initials Name Provider Type    Camille Lamb, ATC           Therapy Education  Given: (P) HEP, Symptoms/condition management, Pain management  Program: (P) Reinforced  How Provided: (P) Verbal, Demonstration  Provided to: (P) Patient  Level of Understanding: (P) Verbalized, Demonstrated              Time Calculation:   Start Time: (P) 0841  Stop Time: (P) 0933  Time Calculation (min): (P) 52 min  PT Non-Billable Time (min): (P) 10 min  Total Timed Code Minutes- PT: (P) 42 minute(s)  Therapy Suggested Charges     Code   Minutes Charges    None           Therapy Charges for Today     Code Description Service Date Service Provider Modifiers Qty    65137900702 HC PT THER PROC EA 15 MIN 9/25/2018 Camille Rodriguez ATC  3    [id number] HC PT THER SUPP EA 15 MIN 9/25/2018 Camille Rodriguez ATC  1                    Camille Rodriguez ATC  9/25/2018

## 2018-09-26 NOTE — PROGRESS NOTES
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 tenderness.        Left wrist: He exhibits decreased range of motion, tenderness and bony tenderness.        Arms:  Lymphadenopathy:     He has no cervical adenopathy.   Neurological: He is alert and oriented to person, place, and time. No cranial nerve deficit. Coordination normal.   Skin: Skin is warm and dry. He is not diaphoretic. No erythema. No pallor.   Pruritic rash on right lower leg. Slight tenderness no swelling no drainage. Itchy    Psychiatric: He has a normal mood and affect.   Nursing note and vitals reviewed.

## 2018-09-27 ENCOUNTER — HOSPITAL ENCOUNTER (OUTPATIENT)
Dept: PHYSICAL THERAPY | Facility: HOSPITAL | Age: 48
Setting detail: THERAPIES SERIES
Discharge: HOME | End: 2018-09-27

## 2018-09-27 DIAGNOSIS — G56.03 BILATERAL CARPAL TUNNEL SYNDROME: Primary | ICD-10-CM

## 2018-09-27 DIAGNOSIS — R20.2 NUMBNESS AND TINGLING IN BOTH HANDS: ICD-10-CM

## 2018-09-27 DIAGNOSIS — R20.0 NUMBNESS IN BOTH HANDS: ICD-10-CM

## 2018-09-27 DIAGNOSIS — R20.0 NUMBNESS AND TINGLING IN BOTH HANDS: ICD-10-CM

## 2018-09-27 PROCEDURE — 97110 THERAPEUTIC EXERCISES: CPT | Performed by: PHYSICAL THERAPIST

## 2018-09-27 NOTE — THERAPY DISCHARGE NOTE
Outpatient Physical Therapy Ortho Progress Note/Discharge Summary  [facility]  Rhoda Wallace, PT, DPT, CSCS       Patient Name: Randall Hernandez  : 1970  MRN: [id number]  Today's Date: 2018      Visit Date: 2018     Pt reports 3/10 pain R pre treatment, 0/10 pain post treatment  Reports 10-15% of improvement.  Attended 6/6 visits.  Insurance available: 9 visits  Next MD appt: 10/10/2018.  Recertification: N/A    Visit Dx:    ICD-10-CM ICD-9-CM   1. Bilateral carpal tunnel syndrome G56.03 354.0   2. Numbness and tingling in both hands R20.0 782.0    R20.2    3. Numbness in both hands R20.0 782.0       Patient Active Problem List   Diagnosis   • Hyperlipidemia   • Inattention   • Non morbid obesity   • Prediabetes   • Attention deficit hyperactivity disorder (ADHD), combined type   • Need for vaccination   • Gastroesophageal reflux disease   • Encounter for drug screening   • Tingling in extremities   • Elevated BP without diagnosis of hypertension   • Allergic rhinitis   • Elevated blood pressure reading   • Pruritic rash   • Numbness in both hands   • Bilateral elbow joint pain   • Pain in both wrists   • Ulnar neuropathy of left upper extremity   • General medical examination   • Bilateral carpal tunnel syndrome   • History of prediabetes   • Numbness and tingling in both hands   • Essential hypertension        Past Medical History:   Diagnosis Date   • Allergic rhinitis    • Backache    • Cellulitis of leg, except foot    • Dyslipidemia    • GERD (gastroesophageal reflux disease)    • Heartburn    • History of Holter monitoring 2016    Sinus mechanism with a normal average heart rate.No evidence of chronotropic incompetence.Asymptomatic Holter   • Hyperlipidemia    • Low back pain    • Numbness of lower limb     left leg numbness and tingling      • Obesity, unspecified    • Obstructive sleep apnea of adult    • Pain in left hip    • Pain in right hip    •  Palpitations    • Skin lesion     insect bite.      • Supraventricular tachycardia (CMS/HCC)    • Weight loss     advised        History reviewed. No pertinent surgical history.    Changes to medications: None noted.    Changes to MD orders: None noted.          PT Ortho     Row Name 09/27/18 1301        Strength Right    # Reps 2  -AJ    Right Rung 4  -AJ    Right  Test 1 78  -AJ    Right  Test 2 78  -AJ     Strength Average Right 78  -AJ        Strength Left    # Reps 2  -AJ    Left Rung 4  -AJ    Left  Test 1 84  -AJ    Left  Test 2 88  -AJ     Strength Average Left 86  -AJ    Row Name 09/27/18 1300       Subjective Comments    Subjective Comments Patient rpeorts he still feels it more in his R hand than L. He reports it has gotten some better, but not resolved.  -AJ       Precautions and Contraindications    Precautions/Limitations no known precautions/limitations  -       Subjective Pain    Able to rate subjective pain? yes  -AJ    Pre-Treatment Pain Level 3  -AJ    Post-Treatment Pain Level 0  -       Posture/Observations    Posture- WNL Posture is WNL   for b UEs  -    Posture/Observations Comments No acute distress.  -AJ       Wrist/Hand Special Tests    Finklestein test (DeQuervain’s syndrome) Bilateral:;Negative  -AJ    Phalen’s test (carpal tunnel syndrome) Right:;Positive;Left:;Negative  -AJ    Reverse Phalen’s (carpal tunnel syndrome) Bilateral:;Positive  -AJ    Tinel’s sign (median nerve irritation) Right:;Positive;Left:;Negative  -       General ROM    GENERAL ROM COMMENTS B hands all WNL, able to make full fist and open and able to fully move thumb in al ldirections.  -AJ       Right Upper Ext    Rt Elbow Supination AROM WNL, pain-free  -AJ    Rt Elbow Pronation AROM WNL, pain-free  -AJ    Rt Wrist Flexion AROM 72°  -AJ    Rt Wrist Extension AROM 78°  -AJ    Rt  Ulnar Deviation AROM 34°  -AJ    Rt  Radial Deviation AROM 24°  -AJ       Left Upper Ext    Lt Elbow  Supination AROM WNL, pain-free  -AJ    Lt Elbow Pronation AROM WNL, pain-free  -AJ    Lt Wrist Flexion AROM 78°  -AJ    Lt Wrist Extension AROM 70°  -AJ    Lt  Ulnar Deviation AROM 38°  -AJ    Lt  Radial Deviation AROM 24°  -AJ       MMT (Manual Muscle Testing)    General MMT Comments B wrists 5/5, no change in pain.  -AJ        Strength Right    # Reps 2  -AJ    Right Rung 2  -AJ    Right  Test 1 92  -AJ    Right  Test 2 104  -AJ     Strength Average Right 98  -AJ        Strength Left    # Reps 2  -AJ    Left Rung 2  -AJ    Left  Test 1 122  -AJ    Left  Test 2 114  -AJ     Strength Average Left 118  -AJ       Sensation    Sensation WNL? WFL  -AJ    Light Touch Partial deficits in the RUE;Partial deficits in the LUE  -AJ    Additional Comments Reports numbnes in hands primarily with using vibration tools/driving  -AJ       Upper Extremity Flexibility    Wrist Extensors Bilateral:;WNL  -AJ    Wrist Flexors Bilateral:;WNL  -AJ       Transfers    Comment (Transfers) I with all transfers.  -AJ       Gait/Stairs Assessment/Training    Comment (Gait/Stairs) FWB, non-antalgic gait, normal arm swing with gait.  -AJ    Row Name 09/25/18 0800       Subjective Comments    Subjective Comments (P)  states that after using a heard  yesterday at work that his hands/wrist are very sore. the vibration from the tool caused some pain. states that this morning he is more sore than painful in B wrist   -HB       Subjective Pain    Able to rate subjective pain? (P)  yes  -HB    Pre-Treatment Pain Level (P)  2  -HB    Post-Treatment Pain Level (P)  0  -HB      User Key  (r) = Recorded By, (t) = Taken By, (c) = Cosigned By    Initials Name Provider Type    Rhoda Contreras, PT Physical Therapist    Camille Lamb, ATC            Barriers to Rehab: Include significant or possible arthritic/degenerative changes that have occurred within the joint.    Safety Issues: None  noted.            PT Assessment/Plan     Row Name 09/27/18 1300          PT Assessment    Functional Limitations Limitations in community activities;Performance in leisure activities;Performance in sport activities;Performance in work activities  -     Impairments Pain;Joint integrity;Impaired flexibility;Peripheral nerve integrity;Sensation;Impaired muscle length  -     Assessment Comments Patient now has normal ROM and improved strength in B hands. Patient is also I with all ther ex.  -AJ     Rehab Potential Fair  -AJ     Patient/caregiver participated in establishment of treatment plan and goals Yes  -AJ     Patient would benefit from skilled therapy intervention No  -AJ        PT Plan    PT Frequency --   N/A  -AJ     Predicted Duration of Therapy Intervention (Therapy Eval) N/A  -AJ     PT Plan Comments D/C today with a final HEP and free 30 day fitness formula memebership.  -AJ       User Key  (r) = Recorded By, (t) = Taken By, (c) = Cosigned By    Initials Name Provider Type    Rhoda Contreras, PT Physical Therapist                 Modalities     Row Name 09/27/18 1300             Ice    Patient denies application of Ice Yes  -      Patient reports will apply ice at home to involved area Yes  -        User Key  (r) = Recorded By, (t) = Taken By, (c) = Cosigned By    Initials Name Provider Type    Rhoda Contreras, PT Physical Therapist                Exercises     Row Name 09/27/18 1300             Subjective Comments    Subjective Comments Patient rpeorts he still feels it more in his R hand than L. He reports it has gotten some better, but not resolved.  -         Subjective Pain    Able to rate subjective pain? yes  -AJ      Pre-Treatment Pain Level 3  -      Post-Treatment Pain Level 0  -         Exercise 1    Exercise Name 1 Pro ll UE fwd/rev  -AJ      Time 1 10 mins  -AJ      Additional Comments L 5.0  -         Exercise 2    Exercise Name 2 B CT1/CT3  -      Reps 2 1   -      Time 2 1 min each  -         Exercise 3    Exercise Name 3 B gunjan S  -      Reps 3 1  -      Time 3 1 min  -         Exercise 4    Exercise Name 4 fine motor chain  -      Time 4 10 minutes  -         Exercise 5    Exercise Name 5 Dscussion of POC and final HEP.  -        User Key  (r) = Recorded By, (t) = Taken By, (c) = Cosigned By    Initials Name Provider Type    Rhoda Contreras, PT Physical Therapist                               PT OP Goals     Row Name 09/27/18 1300          PT Short Term Goals    STG Date to Achieve 09/28/18  -     STG 1 I with HEP and have additions/changes  -     STG 1 Progress Met  -     STG 2 AROm B wrist extension >= 70°.  -     STG 2 Progress Met  -     STG 3 AROM B wrist UD >= 38°.  -     STG 3 Progress Met  -     STG 4 AROm B wrist RD >= 20°.  -     STG 4 Progress Met  -     STG 5 AROM B wrist pro/sup WNL and pain-free.  -     STG 5 Progress Met  -        Long Term Goals    LTG Date to Achieve 10/05/18  -     LTG 1 AROm B wrists all WNL, no increase in pain flex/ext >= 70° B.  -     LTG 1 Progress Met  -     LTG 2 B  @ #2 setting >= 100# in 2 rep average  -     LTG 2 Progress Ongoing;Partially Met  -     LTG 3 B  @ #4 setting >= 85# in 2 rep average.  -     LTG 3 Progress Ongoing;Partially Met  -     LTG 4 Patient able to perform 10 minutes of fine motor activity with no increase in pain.  -     LTG 4 Progress Met  -     LTG 5 Patient to report rad pain is occuring less often.  -     LTG 5 Progress Partially Met;Ongoing  -     LTG 6 I with final HEP.  -     LTG 6 Progress Met  -     LTG 7 D/C with a final HEP and free 30 day fitness formula memebership.  -     LTG 7 Progress Met  -       User Key  (r) = Recorded By, (t) = Taken By, (c) = Cosigned By    Initials Name Provider Type    Rhoda Contreras, PT Physical Therapist          Therapy Education  Given: HEP,  Symptoms/condition management, Pain management (POC)  Program: Reinforced  How Provided: Verbal, Demonstration  Provided to: Patient  Level of Understanding: Teach back education performed, Verbalized, Demonstrated    Outcome Measure Options: Quick DASH  Quick DASH  Open a tight or new jar.: Moderate Difficulty  Do heavy household chores (e.g., wash walls, wash floors): Moderate Difficulty  Carry a shopping bag or briefcase: Mild Difficulty  Wash your back: Moderate Difficulty  Use a knife to cut food: Mild Difficulty  Recreational activities in which you take some force or impact through your arm, should or hand (e.g. golf, hammering, tennis, etc.): Moderate Difficulty  During the past week, to what extent has your arm, shoulder, or hand problem interfered with your normal social activites with family, friends, neighbors or groups?: Slightly  During the past week, were you limited in your work or other regular daily activities as a result of your arm, shoulder or hand problem?: Moderately Limited  Arm, Shoulder, or hand pain: Moderate  Tingling (pins and needles) in your arm, shoulder, or hand: Severe  During the past week, how much difficulty have you had sleeping because of the pain in your arm, shoulder or hand?: Mild Difficulty  Number of Questions Answered: 11  Quick DASH Score: 43.18         Time Calculation:   Start Time: 1257  Stop Time: 1344  Time Calculation (min): 47 min    Therapy Charges for Today     Code Description Service Date Service Provider Modifiers Qty    71522532874  PT THER PROC EA 15 MIN 9/27/2018 Rhoda Wallace, PT GP 3    93834682194 HC PT THER SUPP EA 15 MIN 9/27/2018 Rhoda Wallace, PT GP 1          PT G-Codes  Outcome Measure Options: Quick DASH  Quick DASH Score: 43.18     OP PT Discharge Summary  Date of Discharge: 09/27/18  Reason for Discharge: Independent  Outcomes Achieved: Patient able to partially acheive established goals, Refer to plan of care for updates on  goals achieved  Discharge Destination: Home with home program  Discharge Instructions/Additional Comments: D/C with a final HEp and free 30 day fitness formula memebership.      Rhoda Wallace, PT, DPT, CSCS  9/27/2018

## 2018-09-28 ENCOUNTER — APPOINTMENT (OUTPATIENT)
Dept: PHYSICAL THERAPY | Facility: HOSPITAL | Age: 48
End: 2018-09-28

## 2018-09-28 ENCOUNTER — OFFICE VISIT (OUTPATIENT)
Dept: FAMILY MEDICINE CLINIC | Facility: CLINIC | Age: 48
End: 2018-09-28

## 2018-09-28 VITALS
BODY MASS INDEX: 38.89 KG/M2 | HEIGHT: 71 IN | TEMPERATURE: 97.9 F | DIASTOLIC BLOOD PRESSURE: 88 MMHG | WEIGHT: 277.8 LBS | SYSTOLIC BLOOD PRESSURE: 128 MMHG | HEART RATE: 92 BPM | OXYGEN SATURATION: 96 %

## 2018-09-28 DIAGNOSIS — F90.2 ATTENTION DEFICIT HYPERACTIVITY DISORDER (ADHD), COMBINED TYPE: ICD-10-CM

## 2018-09-28 DIAGNOSIS — M25.532 PAIN IN BOTH WRISTS: Primary | ICD-10-CM

## 2018-09-28 DIAGNOSIS — E78.5 HYPERLIPIDEMIA, UNSPECIFIED HYPERLIPIDEMIA TYPE: ICD-10-CM

## 2018-09-28 DIAGNOSIS — I10 ESSENTIAL HYPERTENSION: ICD-10-CM

## 2018-09-28 DIAGNOSIS — G56.00 CARPAL TUNNEL SYNDROME, UNSPECIFIED LATERALITY: ICD-10-CM

## 2018-09-28 DIAGNOSIS — Z02.83 ENCOUNTER FOR DRUG SCREENING: ICD-10-CM

## 2018-09-28 DIAGNOSIS — E66.9 NON MORBID OBESITY: ICD-10-CM

## 2018-09-28 DIAGNOSIS — M25.531 PAIN IN BOTH WRISTS: Primary | ICD-10-CM

## 2018-09-28 DIAGNOSIS — K21.9 GASTROESOPHAGEAL REFLUX DISEASE, ESOPHAGITIS PRESENCE NOT SPECIFIED: ICD-10-CM

## 2018-09-28 DIAGNOSIS — H61.23 CERUMEN DEBRIS ON TYMPANIC MEMBRANE OF BOTH EARS: ICD-10-CM

## 2018-09-28 DIAGNOSIS — R20.0 NUMBNESS IN BOTH HANDS: ICD-10-CM

## 2018-09-28 PROCEDURE — 99214 OFFICE O/P EST MOD 30 MIN: CPT | Performed by: FAMILY MEDICINE

## 2018-09-28 RX ORDER — DEXTROAMPHETAMINE SACCHARATE, AMPHETAMINE ASPARTATE MONOHYDRATE, DEXTROAMPHETAMINE SULFATE AND AMPHETAMINE SULFATE 6.25; 6.25; 6.25; 6.25 MG/1; MG/1; MG/1; MG/1
25 CAPSULE, EXTENDED RELEASE ORAL EVERY MORNING
Qty: 30 CAPSULE | Refills: 0 | Status: SHIPPED | OUTPATIENT
Start: 2018-09-28 | End: 2018-10-26 | Stop reason: SDUPTHER

## 2018-09-28 RX ORDER — IBUPROFEN 800 MG/1
800 TABLET ORAL 3 TIMES DAILY
Refills: 0 | COMMUNITY
Start: 2018-09-18 | End: 2018-10-30

## 2018-10-02 ENCOUNTER — APPOINTMENT (OUTPATIENT)
Dept: PHYSICAL THERAPY | Facility: HOSPITAL | Age: 48
End: 2018-10-02

## 2018-10-04 ENCOUNTER — APPOINTMENT (OUTPATIENT)
Dept: PHYSICAL THERAPY | Facility: HOSPITAL | Age: 48
End: 2018-10-04

## 2018-10-05 ENCOUNTER — APPOINTMENT (OUTPATIENT)
Dept: PHYSICAL THERAPY | Facility: HOSPITAL | Age: 48
End: 2018-10-05

## 2018-10-10 ENCOUNTER — OFFICE VISIT (OUTPATIENT)
Dept: ORTHOPEDIC SURGERY | Facility: CLINIC | Age: 48
End: 2018-10-10

## 2018-10-10 VITALS — BODY MASS INDEX: 38.22 KG/M2 | HEIGHT: 71 IN | WEIGHT: 273 LBS

## 2018-10-10 DIAGNOSIS — R20.2 NUMBNESS AND TINGLING IN BOTH HANDS: ICD-10-CM

## 2018-10-10 DIAGNOSIS — R20.0 NUMBNESS AND TINGLING IN BOTH HANDS: ICD-10-CM

## 2018-10-10 DIAGNOSIS — G56.03 BILATERAL CARPAL TUNNEL SYNDROME: Primary | ICD-10-CM

## 2018-10-10 PROCEDURE — 99214 OFFICE O/P EST MOD 30 MIN: CPT | Performed by: ORTHOPAEDIC SURGERY

## 2018-10-10 RX ORDER — BUPIVACAINE HCL/0.9 % NACL/PF 0.1 %
2 PLASTIC BAG, INJECTION (ML) EPIDURAL ONCE
Status: CANCELLED | OUTPATIENT
Start: 2018-11-02 | End: 2018-11-02

## 2018-10-10 NOTE — PROGRESS NOTES
Randall Hernandez is a 48 y.o. male   Primary provider:  Scottie Borges MD       Chief Complaint   Patient presents with   • Left Wrist - Follow-up   • Right Wrist - Follow-up       HISTORY OF PRESENT ILLNESS: Patient being seen for bilateral wrist follow up. Patient states pain is 3/10 at this time, however is aggravated by driving due to driving stick shift.  Is been seen by Angelo and had EMGs show bilateral carpal tunnel worse on the right than the left the EMG portion is intact.  He's been treated conservatively at this point he works as a  and rides his bike these things that bother him the most.    History of Present Illness     CONCURRENT MEDICAL HISTORY:    Past Medical History:   Diagnosis Date   • Allergic rhinitis    • Backache    • Cellulitis of leg, except foot    • Dyslipidemia    • GERD (gastroesophageal reflux disease)    • Heartburn    • History of Holter monitoring 02/26/2016    Sinus mechanism with a normal average heart rate.No evidence of chronotropic incompetence.Asymptomatic Holter   • Hyperlipidemia    • Low back pain    • Numbness of lower limb     left leg numbness and tingling      • Obesity, unspecified    • Obstructive sleep apnea of adult    • Pain in left hip    • Pain in right hip    • Palpitations    • Skin lesion     insect bite.      • Supraventricular tachycardia (CMS/HCC)    • Weight loss     advised       No Known Allergies      Current Outpatient Prescriptions:   •  amphetamine-dextroamphetamine XR (ADDERALL XR) 25 MG 24 hr capsule, Take 1 capsule by mouth Every Morning Take 1 tablet by mouth daily, Disp: 30 capsule, Rfl: 0  •  ASPIRIN LOW DOSE 81 MG EC tablet, Take 81 mg by mouth Daily., Disp: , Rfl:   •  atorvastatin (LIPITOR) 80 MG tablet, Take 1 tablet by mouth Daily., Disp: 30 tablet, Rfl: 11  •  carbamide peroxide (DEBROX) 6.5 % otic solution, Administer 5 drops into both ears As Needed for Ear Pain., Disp: 14.8 mL, Rfl: 2  •  cetirizine (zyrTEC) 5 MG tablet,  Take 1 tablet by mouth Daily., Disp: 30 tablet, Rfl: 3  •  clotrimazole-betamethasone (LOTRISONE) 1-0.05 % cream, Apply  topically Every 12 (Twelve) Hours., Disp: 15 g, Rfl: 2  •  fluticasone (FLONASE) 50 MCG/ACT nasal spray, INSTILL 2 SPRAYS IN EACH NOSTRIL ONCE DAILY, Disp: 16 mL, Rfl: 5  •  gabapentin (NEURONTIN) 300 MG capsule, Take 1 capsule by mouth At Night As Needed (for pain)., Disp: 30 capsule, Rfl: 2  •  ibuprofen (ADVIL,MOTRIN) 800 MG tablet, Take 800 mg by mouth 3 (Three) Times a Day., Disp: , Rfl: 0  •  lisinopril (PRINIVIL,ZESTRIL) 10 MG tablet, Take 1 tablet by mouth Daily., Disp: 30 tablet, Rfl: 3  •  meloxicam (MOBIC) 15 MG tablet, Take 1 tablet by mouth Daily., Disp: 30 tablet, Rfl: 3  •  montelukast (SINGULAIR) 10 MG tablet, TAKE 1 TABLET BY MOUTH EVERY NIGHT, Disp: 30 tablet, Rfl: 11  •  omeprazole (priLOSEC) 40 MG capsule, Take 1 capsule by mouth Daily., Disp: 30 capsule, Rfl: 11    History reviewed. No pertinent surgical history.    Family History   Problem Relation Age of Onset   • Colon cancer Mother         onset age greater than 75y   • Diabetes Mother    • Breast cancer Sister    • Cancer Brother         bladder   • Diabetes Brother    • Hyperlipidemia Brother         Social History     Social History   • Marital status:      Spouse name: N/A   • Number of children: N/A   • Years of education: N/A     Occupational History   • Not on file.     Social History Main Topics   • Smoking status: Former Smoker   • Smokeless tobacco: Never Used      Comment: 12/08/201 - Patient states he ceased utilization of tobacco products 20 plus years prior.   • Alcohol use Yes      Comment: 09/06/2018 - Patinet states he consumes alcoholic beverages on a social basis.   • Drug use: No   • Sexual activity: Defer     Other Topics Concern   • Not on file     Social History Narrative   • No narrative on file        Review of Systems   Constitutional: Positive for activity change. Negative for chills and  "fever.   HENT: Negative for facial swelling.    Eyes: Negative for photophobia.   Respiratory: Negative for apnea and shortness of breath.    Cardiovascular: Negative for chest pain and leg swelling.   Gastrointestinal: Negative for abdominal pain, nausea and vomiting.   Genitourinary: Negative for dysuria.   Skin: Negative for color change and rash.   Neurological: Positive for weakness and numbness. Negative for seizures and syncope.   Psychiatric/Behavioral: Negative for behavioral problems and dysphoric mood.       PHYSICAL EXAMINATION:       Ht 180.3 cm (71\")   Wt 124 kg (273 lb)   BMI 38.08 kg/m²     Physical Exam   Constitutional: He is oriented to person, place, and time. He appears well-developed and well-nourished.   HENT:   Head: Normocephalic and atraumatic.   Eyes: Pupils are equal, round, and reactive to light. EOM are normal.   Neck: Neck supple. No tracheal deviation present.   Pulmonary/Chest: Effort normal.   Musculoskeletal: Normal range of motion. He exhibits no edema or deformity.   Neurological: He is alert and oriented to person, place, and time. A sensory deficit is present.   Skin: Skin is warm and dry. No erythema.   Psychiatric: He has a normal mood and affect.   Vitals reviewed.      GAIT:     [x]  Normal  []  Antalgic    Assistive device: [x]  None  []  Walker     []  Crutches  []  Cane     []  Wheelchair  []  Stretcher    Ortho Exam  Positive Tinel's.  Phalen's is negative bilaterally.  No atrophy is noted weakness in median nerve distribution.  Good capillary refill.  Good motion of his wrist.    PROCEDURE: Bilateral wrist, three views each.     INDICATION: Bilateral wrist pain. No injury.     COMPARISON: None.     PA, oblique and lateral views of both wrists.     Old well-healed fracture of the left fifth metacarpal shaft. No  acute fractures in either wrist. No osseous arthritic changes.     No soft tissue abnormality.     IMPRESSION:  Old well-healed fracture left fifth " metacarpal.  Otherwise negative bilateral wrist.     [id number]        Electronically signed by:  Loi Summers MD  5/16/2018 4:45  PM CDT Workstation: [email or web address]    Three view bilateral elbows     HISTORY: Bilateral elbow pain     AP, lateral and oblique views of each elbow obtained.     COMPARISON: None     No fracture or dislocation.  Very small spur coronoid process proximal right ulna.  No joint effusion.  No other osseous or articular abnormality.     IMPRESSION:  CONCLUSION:  Very small spur coronoid process proximal right ulna.  Normal left elbow.     [id number]     Electronically signed by:  Anthony Cm MD  5/16/2018 4:49 PM CDT  Workstation: [email or web address] [id number]        ASSESSMENT:    Diagnoses and all orders for this visit:    Bilateral carpal tunnel syndrome    Numbness and tingling in both hands          PLAN right side is worse than the left.  I'd get this released.  Reasonable at this point.  I discussed risks and benefits including but not limited to bleeding, blood clot, infection, painful wound, prolonged recovery, neurovascular injury, need for further surgery, recurrence, medical anesthetic complications, etc. he understands detailed informed consent is given we will proceed as planned.    No Follow-up on file.    Judd Mathias MD

## 2018-10-24 NOTE — PROGRESS NOTES
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 and swelling. Tenderness found.        Left elbow: He exhibits decreased range of motion and swelling. Tenderness found.        Right wrist: He exhibits decreased range of motion, tenderness and bony tenderness.        Left wrist: He exhibits decreased range of motion, tenderness and bony tenderness.        Arms:  Lymphadenopathy:     He has no cervical adenopathy.   Neurological: He is alert and oriented to person, place, and time. No cranial nerve deficit. Coordination normal.   Skin: Skin is warm and dry. He is not diaphoretic. No erythema. No pallor.   Pruritic rash on right lower leg. Slight tenderness no swelling no drainage. Itchy    Psychiatric: He has a normal mood and affect.   Nursing note and vitals reviewed.

## 2018-10-26 ENCOUNTER — OFFICE VISIT (OUTPATIENT)
Dept: FAMILY MEDICINE CLINIC | Facility: CLINIC | Age: 48
End: 2018-10-26

## 2018-10-26 VITALS
DIASTOLIC BLOOD PRESSURE: 80 MMHG | WEIGHT: 279.4 LBS | SYSTOLIC BLOOD PRESSURE: 124 MMHG | HEIGHT: 71 IN | TEMPERATURE: 97.9 F | HEART RATE: 80 BPM | OXYGEN SATURATION: 96 % | BODY MASS INDEX: 39.11 KG/M2

## 2018-10-26 DIAGNOSIS — F90.2 ATTENTION DEFICIT HYPERACTIVITY DISORDER (ADHD), COMBINED TYPE: ICD-10-CM

## 2018-10-26 DIAGNOSIS — E78.5 HYPERLIPIDEMIA, UNSPECIFIED HYPERLIPIDEMIA TYPE: ICD-10-CM

## 2018-10-26 DIAGNOSIS — G56.00 CARPAL TUNNEL SYNDROME, UNSPECIFIED LATERALITY: Primary | ICD-10-CM

## 2018-10-26 DIAGNOSIS — I10 ESSENTIAL HYPERTENSION: ICD-10-CM

## 2018-10-26 DIAGNOSIS — K21.9 GASTROESOPHAGEAL REFLUX DISEASE, ESOPHAGITIS PRESENCE NOT SPECIFIED: ICD-10-CM

## 2018-10-26 DIAGNOSIS — E66.9 NON MORBID OBESITY: ICD-10-CM

## 2018-10-26 PROCEDURE — 99214 OFFICE O/P EST MOD 30 MIN: CPT | Performed by: FAMILY MEDICINE

## 2018-10-26 RX ORDER — DEXTROAMPHETAMINE SACCHARATE, AMPHETAMINE ASPARTATE MONOHYDRATE, DEXTROAMPHETAMINE SULFATE AND AMPHETAMINE SULFATE 6.25; 6.25; 6.25; 6.25 MG/1; MG/1; MG/1; MG/1
25 CAPSULE, EXTENDED RELEASE ORAL EVERY MORNING
Qty: 30 CAPSULE | Refills: 0 | Status: SHIPPED | OUTPATIENT
Start: 2018-10-26 | End: 2018-11-28 | Stop reason: SDUPTHER

## 2018-10-30 ENCOUNTER — APPOINTMENT (OUTPATIENT)
Dept: PREADMISSION TESTING | Facility: HOSPITAL | Age: 48
End: 2018-10-30

## 2018-10-30 VITALS
WEIGHT: 276 LBS | HEART RATE: 100 BPM | OXYGEN SATURATION: 98 % | DIASTOLIC BLOOD PRESSURE: 81 MMHG | BODY MASS INDEX: 38.64 KG/M2 | SYSTOLIC BLOOD PRESSURE: 146 MMHG | RESPIRATION RATE: 16 BRPM | HEIGHT: 71 IN

## 2018-10-30 PROCEDURE — 93010 ELECTROCARDIOGRAM REPORT: CPT | Performed by: INTERNAL MEDICINE

## 2018-10-30 PROCEDURE — 93005 ELECTROCARDIOGRAM TRACING: CPT

## 2018-10-30 RX ORDER — MONTELUKAST SODIUM 10 MG/1
10 TABLET ORAL NIGHTLY
COMMUNITY
End: 2019-02-07 | Stop reason: SDUPTHER

## 2018-10-30 RX ORDER — FLUTICASONE PROPIONATE 50 MCG
2 SPRAY, SUSPENSION (ML) NASAL DAILY
COMMUNITY
End: 2019-02-07 | Stop reason: SDUPTHER

## 2018-10-30 RX ORDER — SODIUM CHLORIDE, SODIUM GLUCONATE, SODIUM ACETATE, POTASSIUM CHLORIDE, AND MAGNESIUM CHLORIDE 526; 502; 368; 37; 30 MG/100ML; MG/100ML; MG/100ML; MG/100ML; MG/100ML
1000 INJECTION, SOLUTION INTRAVENOUS CONTINUOUS
Status: CANCELLED | OUTPATIENT
Start: 2018-11-02

## 2018-11-02 ENCOUNTER — ANESTHESIA (OUTPATIENT)
Dept: PERIOP | Facility: HOSPITAL | Age: 48
End: 2018-11-02

## 2018-11-02 ENCOUNTER — HOSPITAL ENCOUNTER (OUTPATIENT)
Facility: HOSPITAL | Age: 48
Setting detail: HOSPITAL OUTPATIENT SURGERY
Discharge: HOME | End: 2018-11-02
Attending: ORTHOPAEDIC SURGERY | Admitting: ORTHOPAEDIC SURGERY

## 2018-11-02 ENCOUNTER — ANESTHESIA EVENT (OUTPATIENT)
Dept: PERIOP | Facility: HOSPITAL | Age: 48
End: 2018-11-02

## 2018-11-02 VITALS
DIASTOLIC BLOOD PRESSURE: 63 MMHG | WEIGHT: 276.02 LBS | HEART RATE: 85 BPM | HEIGHT: 71 IN | RESPIRATION RATE: 18 BRPM | OXYGEN SATURATION: 96 % | SYSTOLIC BLOOD PRESSURE: 135 MMHG | TEMPERATURE: 97.5 F | BODY MASS INDEX: 38.64 KG/M2

## 2018-11-02 DIAGNOSIS — G56.03 BILATERAL CARPAL TUNNEL SYNDROME: ICD-10-CM

## 2018-11-02 PROCEDURE — 64721 CARPAL TUNNEL SURGERY: CPT | Performed by: ORTHOPAEDIC SURGERY

## 2018-11-02 PROCEDURE — 25010000002 MIDAZOLAM PER 1 MG: Performed by: NURSE ANESTHETIST, CERTIFIED REGISTERED

## 2018-11-02 PROCEDURE — 25010000002 FENTANYL CITRATE (PF) 100 MCG/2ML SOLUTION: Performed by: NURSE ANESTHETIST, CERTIFIED REGISTERED

## 2018-11-02 RX ORDER — HYDROCODONE BITARTRATE AND ACETAMINOPHEN 7.5; 325 MG/1; MG/1
1-2 TABLET ORAL EVERY 4 HOURS PRN
Qty: 30 TABLET | Refills: 0 | Status: SHIPPED | OUTPATIENT
Start: 2018-11-02 | End: 2018-11-12

## 2018-11-02 RX ORDER — 0.9 % SODIUM CHLORIDE 0.9 %
VIAL (ML) INJECTION AS NEEDED
Status: DISCONTINUED | OUTPATIENT
Start: 2018-11-02 | End: 2018-11-02 | Stop reason: HOSPADM

## 2018-11-02 RX ORDER — ONDANSETRON 2 MG/ML
4 INJECTION INTRAMUSCULAR; INTRAVENOUS ONCE AS NEEDED
Status: DISCONTINUED | OUTPATIENT
Start: 2018-11-02 | End: 2018-11-02 | Stop reason: HOSPADM

## 2018-11-02 RX ORDER — BACITRACIN 50000 [IU]/1
INJECTION, POWDER, FOR SOLUTION INTRAMUSCULAR AS NEEDED
Status: DISCONTINUED | OUTPATIENT
Start: 2018-11-02 | End: 2018-11-02 | Stop reason: HOSPADM

## 2018-11-02 RX ORDER — LIDOCAINE HYDROCHLORIDE 10 MG/ML
INJECTION, SOLUTION INFILTRATION; PERINEURAL AS NEEDED
Status: DISCONTINUED | OUTPATIENT
Start: 2018-11-02 | End: 2018-11-02 | Stop reason: HOSPADM

## 2018-11-02 RX ORDER — BUPIVACAINE HCL/0.9 % NACL/PF 0.1 %
2 PLASTIC BAG, INJECTION (ML) EPIDURAL ONCE
Status: COMPLETED | OUTPATIENT
Start: 2018-11-02 | End: 2018-11-02

## 2018-11-02 RX ORDER — SODIUM CHLORIDE, SODIUM GLUCONATE, SODIUM ACETATE, POTASSIUM CHLORIDE, AND MAGNESIUM CHLORIDE 526; 502; 368; 37; 30 MG/100ML; MG/100ML; MG/100ML; MG/100ML; MG/100ML
1000 INJECTION, SOLUTION INTRAVENOUS CONTINUOUS
Status: DISCONTINUED | OUTPATIENT
Start: 2018-11-02 | End: 2018-11-02 | Stop reason: HOSPADM

## 2018-11-02 RX ORDER — MIDAZOLAM HYDROCHLORIDE 1 MG/ML
INJECTION INTRAMUSCULAR; INTRAVENOUS AS NEEDED
Status: DISCONTINUED | OUTPATIENT
Start: 2018-11-02 | End: 2018-11-02 | Stop reason: SURG

## 2018-11-02 RX ORDER — FENTANYL CITRATE 50 UG/ML
INJECTION, SOLUTION INTRAMUSCULAR; INTRAVENOUS AS NEEDED
Status: DISCONTINUED | OUTPATIENT
Start: 2018-11-02 | End: 2018-11-02 | Stop reason: SURG

## 2018-11-02 RX ADMIN — SODIUM CHLORIDE, SODIUM GLUCONATE, SODIUM ACETATE, POTASSIUM CHLORIDE, AND MAGNESIUM CHLORIDE 1000 ML: 526; 502; 368; 37; 30 INJECTION, SOLUTION INTRAVENOUS at 06:18

## 2018-11-02 RX ADMIN — MIDAZOLAM HYDROCHLORIDE 2 MG: 2 INJECTION, SOLUTION INTRAMUSCULAR; INTRAVENOUS at 07:44

## 2018-11-02 RX ADMIN — FENTANYL CITRATE 25 MCG: 50 INJECTION, SOLUTION INTRAMUSCULAR; INTRAVENOUS at 08:03

## 2018-11-02 RX ADMIN — FENTANYL CITRATE 25 MCG: 50 INJECTION, SOLUTION INTRAMUSCULAR; INTRAVENOUS at 08:06

## 2018-11-02 RX ADMIN — Medication 2 G: at 07:48

## 2018-11-02 RX ADMIN — FENTANYL CITRATE 50 MCG: 50 INJECTION, SOLUTION INTRAMUSCULAR; INTRAVENOUS at 07:47

## 2018-11-02 NOTE — OP NOTE
Procedure(s):  CARPAL TUNNEL RELEASE  Procedure Note    Randall Hernandez  11/2/2018    Pre-op Diagnosis:   Bilateral carpal tunnel syndrome [G56.03]    Post-op Diagnosis:     Post-Op Diagnosis Codes:     * Bilateral carpal tunnel syndrome [G56.03]    Procedure/CPT® Codes:      Procedure(s):  CARPAL TUNNEL RELEASE right    Surgeon(s):  Judd Mathias MD    Anesthesia: MAC with local    Staff:   Circulator: Silvia Clarke RN  Scrub Person: Luz Peguero; Nicki Spangler  Assistant: Jocelyn Olson MA    Estimated Blood Loss: minimal    Specimens:                None      Drains:      Findings: Carpal tunnel    Complications: None    Dictation: Indications: 48-year-old with radial electrodiagnostic and clinical evidence carpal tunnel was failed conservative treatment he is for at least this point.  Risk and benefits have been discussed with him in detail including but not limited to bleeding, blood clot, infection, neurovascular injury, failure to resolve his pain, need for further surgery, medical anesthetic complications, etc. he understands was to go ahead and proceed as detailed informed consent is given.    Procedure: After adequate anesthesia obtained patient's arm prepped and draped usual sterile fashion timeout was performed prior to procedure.  Longitudinal incision made over the right carpal canal.  Sharp dissection is carried to skin and down to the palmar fascia with care to avoid neurovascular structures.  Self-retaining retractors were inserted the transverse carpal ligament was identified and entered in line with the fourth metacarpal ray.  A freer elevator was inserted under the transverse carpal ligament protecting the contents of the canal and a knife was used to transect the carpal canal proximally to distally.  This was completed with careful scissor dissection both proximally and distally.  The nerve was noted to be red and irritated the canal was palpated to make sure there was no  further constriction.  There was let down and bleeding points controlled with electrocautery and the wound was irrigated) saline solution.  Skin closed with 4-0 nylon a sterile dressing and a splint were applied he tolerated procedure well without complication    Judd Mathias MD  11/02/18  11:38 AM

## 2018-11-02 NOTE — INTERVAL H&P NOTE
H&P reviewed. The patient was examined and there are no changes to the H&P.      Temp:  [97.4 °F (36.3 °C)] 97.4 °F (36.3 °C)  Heart Rate:  [86] 86  Resp:  [20] 20  BP: (131)/(72) 131/72    No Known Allergies    Prior to Admission medications    Medication Sig Start Date End Date Taking? Authorizing Provider   amphetamine-dextroamphetamine XR (ADDERALL XR) 25 MG 24 hr capsule Take 1 capsule by mouth Every Morning Take 1 tablet by mouth daily 10/26/18  Yes Scottie Borges MD   fluticasone (FLONASE) 50 MCG/ACT nasal spray 2 sprays into the nostril(s) as directed by provider Daily.   Yes ProviderAlin MD   lisinopril (PRINIVIL,ZESTRIL) 10 MG tablet Take 1 tablet by mouth Daily. 8/30/18   Scottie Borges MD   meloxicam (MOBIC) 15 MG tablet Take 1 tablet by mouth Daily. 7/30/18   Scottie Borges MD   montelukast (SINGULAIR) 10 MG tablet Take 10 mg by mouth Every Night.    Provider, MD Alin   omeprazole (priLOSEC) 40 MG capsule Take 1 capsule by mouth Daily. 2/9/18   Scottie Borges MD       Past Medical History:   Diagnosis Date   • Allergic rhinitis    • Arthritis    • Backache    • Cellulitis of leg, except foot    • Dyslipidemia    • GERD (gastroesophageal reflux disease)    • Heartburn    • History of Holter monitoring 02/26/2016    Sinus mechanism with a normal average heart rate.No evidence of chronotropic incompetence.Asymptomatic Holter   • Hyperlipidemia    • Hypertension    • Low back pain    • Numbness of lower limb     left leg numbness and tingling      • Obesity, unspecified    • Obstructive sleep apnea of adult     USING C-PAP   • Pain in left hip    • Pain in right hip    • Palpitations    • Skin lesion     insect bite.      • Supraventricular tachycardia (CMS/HCC)    • Weight loss     advised       Past Surgical History:   Procedure Laterality Date   • ENDOSCOPY     • PYLOROMYOTOMY         Social History     Social History   • Marital status:      Spouse name: N/A   • Number of  children: N/A   • Years of education: N/A     Occupational History   • Not on file.     Social History Main Topics   • Smoking status: Former Smoker     Years: 6.00     Quit date: 1993   • Smokeless tobacco: Never Used   • Alcohol use Yes      Comment: SOCIAL   • Drug use: No   • Sexual activity: Defer     Other Topics Concern   • Not on file     Social History Narrative   • No narrative on file       Family History   Problem Relation Age of Onset   • Colon cancer Mother         onset age greater than 75y   • Diabetes Mother    • Breast cancer Sister    • Cancer Brother         bladder   • Diabetes Brother    • Hyperlipidemia Brother          Bilateral carpal tunnel syndrome

## 2018-11-02 NOTE — ANESTHESIA POSTPROCEDURE EVALUATION
Patient: Randall Hernandez    Procedure Summary     Date:  11/02/18 Room / Location:  [facility] OR 11 / [facility] OR    Anesthesia Start:  0744 Anesthesia Stop:  0824    Procedure:  CARPAL TUNNEL RELEASE (Right Wrist) Diagnosis:       Bilateral carpal tunnel syndrome      (Bilateral carpal tunnel syndrome [G56.03])    Surgeon:  Judd Mathias MD Provider:  Kennedy Green MD    Anesthesia Type:  MAC ASA Status:  3          Anesthesia Type: MAC  Last vitals  BP   131/72 (11/02/18 0609)   Temp   97.4 °F (36.3 °C) (11/02/18 0609)   Pulse   86 (11/02/18 0609)   Resp   20 (11/02/18 0609)     SpO2   98 % (11/02/18 0609)     Post Anesthesia Care and Evaluation    Patient location during evaluation: PHASE II  Level of consciousness: awake and awake and alert  Pain score: 0  Pain management: adequate  Airway patency: patent  Anesthetic complications: No anesthetic complications  PONV Status: none  Cardiovascular status: acceptable and hemodynamically stable  Respiratory status: acceptable and spontaneous ventilation  Hydration status: acceptable

## 2018-11-02 NOTE — H&P (VIEW-ONLY)
Randall Hernandez is a 48 y.o. male   Primary provider:  Scottie Borges MD       Chief Complaint   Patient presents with   • Left Wrist - Follow-up   • Right Wrist - Follow-up       HISTORY OF PRESENT ILLNESS: Patient being seen for bilateral wrist follow up. Patient states pain is 3/10 at this time, however is aggravated by driving due to driving stick shift.  Is been seen by Angelo and had EMGs show bilateral carpal tunnel worse on the right than the left the EMG portion is intact.  He's been treated conservatively at this point he works as a  and rides his bike these things that bother him the most.    History of Present Illness     CONCURRENT MEDICAL HISTORY:    Past Medical History:   Diagnosis Date   • Allergic rhinitis    • Backache    • Cellulitis of leg, except foot    • Dyslipidemia    • GERD (gastroesophageal reflux disease)    • Heartburn    • History of Holter monitoring 02/26/2016    Sinus mechanism with a normal average heart rate.No evidence of chronotropic incompetence.Asymptomatic Holter   • Hyperlipidemia    • Low back pain    • Numbness of lower limb     left leg numbness and tingling      • Obesity, unspecified    • Obstructive sleep apnea of adult    • Pain in left hip    • Pain in right hip    • Palpitations    • Skin lesion     insect bite.      • Supraventricular tachycardia (CMS/HCC)    • Weight loss     advised       No Known Allergies      Current Outpatient Prescriptions:   •  amphetamine-dextroamphetamine XR (ADDERALL XR) 25 MG 24 hr capsule, Take 1 capsule by mouth Every Morning Take 1 tablet by mouth daily, Disp: 30 capsule, Rfl: 0  •  ASPIRIN LOW DOSE 81 MG EC tablet, Take 81 mg by mouth Daily., Disp: , Rfl:   •  atorvastatin (LIPITOR) 80 MG tablet, Take 1 tablet by mouth Daily., Disp: 30 tablet, Rfl: 11  •  carbamide peroxide (DEBROX) 6.5 % otic solution, Administer 5 drops into both ears As Needed for Ear Pain., Disp: 14.8 mL, Rfl: 2  •  cetirizine (zyrTEC) 5 MG tablet,  Take 1 tablet by mouth Daily., Disp: 30 tablet, Rfl: 3  •  clotrimazole-betamethasone (LOTRISONE) 1-0.05 % cream, Apply  topically Every 12 (Twelve) Hours., Disp: 15 g, Rfl: 2  •  fluticasone (FLONASE) 50 MCG/ACT nasal spray, INSTILL 2 SPRAYS IN EACH NOSTRIL ONCE DAILY, Disp: 16 mL, Rfl: 5  •  gabapentin (NEURONTIN) 300 MG capsule, Take 1 capsule by mouth At Night As Needed (for pain)., Disp: 30 capsule, Rfl: 2  •  ibuprofen (ADVIL,MOTRIN) 800 MG tablet, Take 800 mg by mouth 3 (Three) Times a Day., Disp: , Rfl: 0  •  lisinopril (PRINIVIL,ZESTRIL) 10 MG tablet, Take 1 tablet by mouth Daily., Disp: 30 tablet, Rfl: 3  •  meloxicam (MOBIC) 15 MG tablet, Take 1 tablet by mouth Daily., Disp: 30 tablet, Rfl: 3  •  montelukast (SINGULAIR) 10 MG tablet, TAKE 1 TABLET BY MOUTH EVERY NIGHT, Disp: 30 tablet, Rfl: 11  •  omeprazole (priLOSEC) 40 MG capsule, Take 1 capsule by mouth Daily., Disp: 30 capsule, Rfl: 11    History reviewed. No pertinent surgical history.    Family History   Problem Relation Age of Onset   • Colon cancer Mother         onset age greater than 75y   • Diabetes Mother    • Breast cancer Sister    • Cancer Brother         bladder   • Diabetes Brother    • Hyperlipidemia Brother         Social History     Social History   • Marital status:      Spouse name: N/A   • Number of children: N/A   • Years of education: N/A     Occupational History   • Not on file.     Social History Main Topics   • Smoking status: Former Smoker   • Smokeless tobacco: Never Used      Comment: 12/08/201 - Patient states he ceased utilization of tobacco products 20 plus years prior.   • Alcohol use Yes      Comment: 09/06/2018 - Patinet states he consumes alcoholic beverages on a social basis.   • Drug use: No   • Sexual activity: Defer     Other Topics Concern   • Not on file     Social History Narrative   • No narrative on file        Review of Systems   Constitutional: Positive for activity change. Negative for chills and  "fever.   HENT: Negative for facial swelling.    Eyes: Negative for photophobia.   Respiratory: Negative for apnea and shortness of breath.    Cardiovascular: Negative for chest pain and leg swelling.   Gastrointestinal: Negative for abdominal pain, nausea and vomiting.   Genitourinary: Negative for dysuria.   Skin: Negative for color change and rash.   Neurological: Positive for weakness and numbness. Negative for seizures and syncope.   Psychiatric/Behavioral: Negative for behavioral problems and dysphoric mood.       PHYSICAL EXAMINATION:       Ht 180.3 cm (71\")   Wt 124 kg (273 lb)   BMI 38.08 kg/m²     Physical Exam   Constitutional: He is oriented to person, place, and time. He appears well-developed and well-nourished.   HENT:   Head: Normocephalic and atraumatic.   Eyes: Pupils are equal, round, and reactive to light. EOM are normal.   Neck: Neck supple. No tracheal deviation present.   Pulmonary/Chest: Effort normal.   Musculoskeletal: Normal range of motion. He exhibits no edema or deformity.   Neurological: He is alert and oriented to person, place, and time. A sensory deficit is present.   Skin: Skin is warm and dry. No erythema.   Psychiatric: He has a normal mood and affect.   Vitals reviewed.      GAIT:     [x]  Normal  []  Antalgic    Assistive device: [x]  None  []  Walker     []  Crutches  []  Cane     []  Wheelchair  []  Stretcher    Ortho Exam  Positive Tinel's.  Phalen's is negative bilaterally.  No atrophy is noted weakness in median nerve distribution.  Good capillary refill.  Good motion of his wrist.    PROCEDURE: Bilateral wrist, three views each.     INDICATION: Bilateral wrist pain. No injury.     COMPARISON: None.     PA, oblique and lateral views of both wrists.     Old well-healed fracture of the left fifth metacarpal shaft. No  acute fractures in either wrist. No osseous arthritic changes.     No soft tissue abnormality.     IMPRESSION:  Old well-healed fracture left fifth " metacarpal.  Otherwise negative bilateral wrist.     [id number]        Electronically signed by:  Loi Summers MD  5/16/2018 4:45  PM CDT Workstation: [email or web address]    Three view bilateral elbows     HISTORY: Bilateral elbow pain     AP, lateral and oblique views of each elbow obtained.     COMPARISON: None     No fracture or dislocation.  Very small spur coronoid process proximal right ulna.  No joint effusion.  No other osseous or articular abnormality.     IMPRESSION:  CONCLUSION:  Very small spur coronoid process proximal right ulna.  Normal left elbow.     [id number]     Electronically signed by:  Anthony Cm MD  5/16/2018 4:49 PM CDT  Workstation: [employer]        ASSESSMENT:    Diagnoses and all orders for this visit:    Bilateral carpal tunnel syndrome    Numbness and tingling in both hands          PLAN right side is worse than the left.  I'd get this released.  Reasonable at this point.  I discussed risks and benefits including but not limited to bleeding, blood clot, infection, painful wound, prolonged recovery, neurovascular injury, need for further surgery, recurrence, medical anesthetic complications, etc. he understands detailed informed consent is given we will proceed as planned.    No Follow-up on file.    Judd Mathias MD

## 2018-11-02 NOTE — ANESTHESIA PREPROCEDURE EVALUATION
Anesthesia Evaluation     Patient summary reviewed and Nursing notes reviewed   no history of anesthetic complications:  NPO Solid Status: > 8 hours  NPO Liquid Status: > 8 hours           Airway   Mallampati: III  TM distance: >3 FB  Neck ROM: full  possible difficult intubation, Narrow palate and Small opening  Dental    (+) poor dentation    Pulmonary - normal exam    breath sounds clear to auscultation  (+) a smoker Former, sleep apnea,   Cardiovascular - normal exam    ECG reviewed  Rhythm: regular  Rate: normal    (+) hypertension 2 medications or greater, hyperlipidemia,   (-) murmur    ROS comment: Normal sinus rhythm  Non specific t wave changes inferior leads  When compared with ECG of 09-MAR-2016 13:43,  T wave inversion now evident in Inferior leads  Confirmed by DANIELA CASTRO, [id number] on 10/31/2018 12:31:25 PM    Neuro/Psych  (+) numbness (Bilateral carpal tunnel.), psychiatric history ADHD,     GI/Hepatic/Renal/Endo    (+) obesity,  GERD well controlled,      Musculoskeletal     Abdominal   (+) obese,    Substance History - negative use     OB/GYN negative ob/gyn ROS         Other   (+) arthritis                     Anesthesia Plan    ASA 3     other     intravenous induction   Anesthetic plan, all risks, benefits, and alternatives have been provided, discussed and informed consent has been obtained with: patient and spouse/significant other.

## 2018-11-12 ENCOUNTER — OFFICE VISIT (OUTPATIENT)
Dept: ORTHOPEDIC SURGERY | Facility: CLINIC | Age: 48
End: 2018-11-12

## 2018-11-12 VITALS — BODY MASS INDEX: 38.64 KG/M2 | WEIGHT: 276 LBS | HEIGHT: 71 IN

## 2018-11-12 DIAGNOSIS — R20.2 NUMBNESS AND TINGLING IN BOTH HANDS: ICD-10-CM

## 2018-11-12 DIAGNOSIS — G56.03 BILATERAL CARPAL TUNNEL SYNDROME: Primary | ICD-10-CM

## 2018-11-12 DIAGNOSIS — R20.0 NUMBNESS AND TINGLING IN BOTH HANDS: ICD-10-CM

## 2018-11-12 PROCEDURE — 99024 POSTOP FOLLOW-UP VISIT: CPT | Performed by: ORTHOPAEDIC SURGERY

## 2018-11-12 NOTE — PROGRESS NOTES
Randall Hernandez is a 48 y.o. male is s/p       Chief Complaint   Patient presents with   • Right Wrist - Post-op       HISTORY OF PRESENT ILLNESS: Patient being seen for right wrist post-op. Right CTR performed 11/2/18.        No Known Allergies      Current Outpatient Medications:   •  amphetamine-dextroamphetamine XR (ADDERALL XR) 25 MG 24 hr capsule, Take 1 capsule by mouth Every Morning Take 1 tablet by mouth daily, Disp: 30 capsule, Rfl: 0  •  fluticasone (FLONASE) 50 MCG/ACT nasal spray, 2 sprays into the nostril(s) as directed by provider Daily., Disp: , Rfl:   •  lisinopril (PRINIVIL,ZESTRIL) 10 MG tablet, Take 1 tablet by mouth Daily., Disp: 30 tablet, Rfl: 3  •  meloxicam (MOBIC) 15 MG tablet, Take 1 tablet by mouth Daily., Disp: 30 tablet, Rfl: 3  •  montelukast (SINGULAIR) 10 MG tablet, Take 10 mg by mouth Every Night., Disp: , Rfl:   •  omeprazole (priLOSEC) 40 MG capsule, Take 1 capsule by mouth Daily., Disp: 30 capsule, Rfl: 11    No fevers or chills.  No nausea or vomiting.      PHYSICAL EXAMINATION:       Randall Hernandez is a 48 y.o. male    Patient is awake and alert, answers questions appropriately and is in no apparent distress.    GAIT:     [x]  Normal  []  Antalgic    Assistive device: [x]  None  []  Walker     []  Crutches  []  Cane     []  Wheelchair  []  Stretcher    Ortho Exam  No sign of infection.  Able to make a fist.  Minimal ecchymosis      PROCEDURE: Bilateral wrist, three views each.     INDICATION: Bilateral wrist pain. No injury.     COMPARISON: None.     PA, oblique and lateral views of both wrists.     Old well-healed fracture of the left fifth metacarpal shaft. No  acute fractures in either wrist. No osseous arthritic changes.     No soft tissue abnormality.     IMPRESSION:  Old well-healed fracture left fifth metacarpal.  Otherwise negative bilateral wrist.     [id number]        Electronically signed by:  Loi Summers MD  5/16/2018 4:45  PM CDT Workstation:  [email or web address]      ASSESSMENT:    Diagnoses and all orders for this visit:    Bilateral carpal tunnel syndrome    Numbness and tingling in both hands          PLAN DC splint.  Range of motion hand.  Suture removal 1 week    No Follow-up on file.    Judd Mathias MD

## 2018-11-19 ENCOUNTER — OFFICE VISIT (OUTPATIENT)
Dept: ORTHOPEDIC SURGERY | Facility: CLINIC | Age: 48
End: 2018-11-19

## 2018-11-19 VITALS — BODY MASS INDEX: 38.64 KG/M2 | HEIGHT: 71 IN | WEIGHT: 276 LBS

## 2018-11-19 DIAGNOSIS — Z98.890 S/P CARPAL TUNNEL RELEASE: Primary | ICD-10-CM

## 2018-11-19 PROCEDURE — 99024 POSTOP FOLLOW-UP VISIT: CPT | Performed by: ORTHOPAEDIC SURGERY

## 2018-11-19 RX ORDER — BUPIVACAINE HCL/0.9 % NACL/PF 0.1 %
2 PLASTIC BAG, INJECTION (ML) EPIDURAL ONCE
Status: CANCELLED | OUTPATIENT
Start: 2018-12-07 | End: 2018-11-19

## 2018-11-19 NOTE — PROGRESS NOTES
Randall Hernandez is a 48 y.o. male is s/p  Right carpal tunnel release.     Chief Complaint   Patient presents with   • Right Hand - Follow-up       HISTORY OF PRESENT ILLNESS: Patient is here today for suture removal following carpal tunnel release on 11/2/2018.        No Known Allergies      Current Outpatient Medications:   •  amphetamine-dextroamphetamine XR (ADDERALL XR) 25 MG 24 hr capsule, Take 1 capsule by mouth Every Morning Take 1 tablet by mouth daily, Disp: 30 capsule, Rfl: 0  •  fluticasone (FLONASE) 50 MCG/ACT nasal spray, 2 sprays into the nostril(s) as directed by provider Daily., Disp: , Rfl:   •  lisinopril (PRINIVIL,ZESTRIL) 10 MG tablet, Take 1 tablet by mouth Daily., Disp: 30 tablet, Rfl: 3  •  meloxicam (MOBIC) 15 MG tablet, Take 1 tablet by mouth Daily., Disp: 30 tablet, Rfl: 3  •  montelukast (SINGULAIR) 10 MG tablet, Take 10 mg by mouth Every Night., Disp: , Rfl:   •  omeprazole (priLOSEC) 40 MG capsule, Take 1 capsule by mouth Daily., Disp: 30 capsule, Rfl: 11    No fevers or chills.  No nausea or vomiting.      PHYSICAL EXAMINATION:       Randall Hernandez is a 48 y.o. male    Patient is awake and alert, answers questions appropriately and is in no apparent distress.    GAIT:     []  Normal  []  Antalgic    Assistive device: []  None  []  Walker     []  Crutches  []  Cane     []  Wheelchair  []  Stretcher    Ortho Exam   Wound looks good.  Able to make a fist.  Neurologic symptoms improved    No results found.        ASSESSMENT:    Diagnoses and all orders for this visit:    S/P carpal tunnel release          PLAN suture removal, scar massage.  We'll plan on doing his opposite side in 3 weeks.  We have again gone over risks and benefits as we have before including not limited to bleeding, blood clot, infection, neurovascular injury, need for further surgery, recurrence, failure to resolve his pain, medical anesthetic complications, etc. he understands we'll proceed as planned    No  Follow-up on file.    Judd Mathias MD

## 2018-11-27 NOTE — PROGRESS NOTES
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 bony tenderness.        Arms:  Lymphadenopathy:     He has no cervical adenopathy.   Neurological: He is alert and oriented to person, place, and time. No cranial nerve deficit. Coordination normal.   Skin: Skin is warm and dry. He is not diaphoretic. No erythema. No pallor.   Pruritic rash on right lower leg. Slight tenderness no swelling no drainage. Itchy    Psychiatric: He has a normal mood and affect.   Nursing note and vitals reviewed.

## 2018-11-28 ENCOUNTER — OFFICE VISIT (OUTPATIENT)
Dept: FAMILY MEDICINE CLINIC | Facility: CLINIC | Age: 48
End: 2018-11-28

## 2018-11-28 VITALS
HEART RATE: 88 BPM | TEMPERATURE: 98.6 F | BODY MASS INDEX: 38.75 KG/M2 | HEIGHT: 71 IN | OXYGEN SATURATION: 97 % | SYSTOLIC BLOOD PRESSURE: 120 MMHG | WEIGHT: 276.8 LBS | DIASTOLIC BLOOD PRESSURE: 84 MMHG

## 2018-11-28 DIAGNOSIS — F90.2 ATTENTION DEFICIT HYPERACTIVITY DISORDER (ADHD), COMBINED TYPE: ICD-10-CM

## 2018-11-28 DIAGNOSIS — R20.0 NUMBNESS AND TINGLING IN BOTH HANDS: ICD-10-CM

## 2018-11-28 DIAGNOSIS — G56.00 CARPAL TUNNEL SYNDROME, UNSPECIFIED LATERALITY: ICD-10-CM

## 2018-11-28 DIAGNOSIS — Z12.11 ENCOUNTER FOR SCREENING FOR MALIGNANT NEOPLASM OF COLON: Primary | ICD-10-CM

## 2018-11-28 DIAGNOSIS — R20.2 NUMBNESS AND TINGLING IN BOTH HANDS: ICD-10-CM

## 2018-11-28 DIAGNOSIS — Z98.890 S/P CARPAL TUNNEL RELEASE: ICD-10-CM

## 2018-11-28 DIAGNOSIS — E66.9 NON MORBID OBESITY: ICD-10-CM

## 2018-11-28 DIAGNOSIS — K21.9 GASTROESOPHAGEAL REFLUX DISEASE, ESOPHAGITIS PRESENCE NOT SPECIFIED: ICD-10-CM

## 2018-11-28 PROCEDURE — 99214 OFFICE O/P EST MOD 30 MIN: CPT | Performed by: FAMILY MEDICINE

## 2018-11-28 RX ORDER — ASPIRIN 81 MG/1
81 TABLET ORAL DAILY
Qty: 30 TABLET | Refills: 3 | Status: SHIPPED | OUTPATIENT
Start: 2018-11-28 | End: 2019-08-19 | Stop reason: SDUPTHER

## 2018-11-28 RX ORDER — ATORVASTATIN CALCIUM 20 MG/1
20 TABLET, FILM COATED ORAL DAILY
Qty: 30 TABLET | Refills: 3 | Status: SHIPPED | OUTPATIENT
Start: 2018-11-28 | End: 2019-02-07 | Stop reason: SDUPTHER

## 2018-11-28 RX ORDER — DEXTROAMPHETAMINE SACCHARATE, AMPHETAMINE ASPARTATE MONOHYDRATE, DEXTROAMPHETAMINE SULFATE AND AMPHETAMINE SULFATE 6.25; 6.25; 6.25; 6.25 MG/1; MG/1; MG/1; MG/1
25 CAPSULE, EXTENDED RELEASE ORAL EVERY MORNING
Qty: 30 CAPSULE | Refills: 0 | Status: SHIPPED | OUTPATIENT
Start: 2018-11-28 | End: 2018-12-27 | Stop reason: SDUPTHER

## 2018-11-28 NOTE — PATIENT INSTRUCTIONS
Get labwork before I come back in March 2019    Take aspirin and lipitor. Make sure you do not take this with mobic or meloxicam    Recheck with other providers for medication refill

## 2018-12-04 ENCOUNTER — APPOINTMENT (OUTPATIENT)
Dept: PREADMISSION TESTING | Facility: HOSPITAL | Age: 48
End: 2018-12-04

## 2018-12-04 VITALS
BODY MASS INDEX: 38.64 KG/M2 | HEART RATE: 92 BPM | DIASTOLIC BLOOD PRESSURE: 72 MMHG | OXYGEN SATURATION: 94 % | WEIGHT: 276 LBS | HEIGHT: 71 IN | RESPIRATION RATE: 12 BRPM | SYSTOLIC BLOOD PRESSURE: 125 MMHG

## 2018-12-04 RX ORDER — SODIUM CHLORIDE, SODIUM GLUCONATE, SODIUM ACETATE, POTASSIUM CHLORIDE, AND MAGNESIUM CHLORIDE 526; 502; 368; 37; 30 MG/100ML; MG/100ML; MG/100ML; MG/100ML; MG/100ML
1000 INJECTION, SOLUTION INTRAVENOUS CONTINUOUS
Status: CANCELLED | OUTPATIENT
Start: 2018-12-07

## 2018-12-06 ENCOUNTER — ANESTHESIA EVENT (OUTPATIENT)
Dept: PERIOP | Facility: HOSPITAL | Age: 48
End: 2018-12-06

## 2018-12-07 ENCOUNTER — ANESTHESIA (OUTPATIENT)
Dept: PERIOP | Facility: HOSPITAL | Age: 48
End: 2018-12-07

## 2018-12-07 ENCOUNTER — HOSPITAL ENCOUNTER (OUTPATIENT)
Facility: HOSPITAL | Age: 48
Setting detail: HOSPITAL OUTPATIENT SURGERY
Discharge: HOME | End: 2018-12-07
Attending: ORTHOPAEDIC SURGERY | Admitting: ORTHOPAEDIC SURGERY

## 2018-12-07 VITALS
OXYGEN SATURATION: 95 % | RESPIRATION RATE: 18 BRPM | SYSTOLIC BLOOD PRESSURE: 118 MMHG | BODY MASS INDEX: 39.07 KG/M2 | HEART RATE: 93 BPM | WEIGHT: 279.1 LBS | DIASTOLIC BLOOD PRESSURE: 64 MMHG | HEIGHT: 71 IN | TEMPERATURE: 98.9 F

## 2018-12-07 DIAGNOSIS — Z98.890 S/P CARPAL TUNNEL RELEASE: ICD-10-CM

## 2018-12-07 PROCEDURE — 64721 CARPAL TUNNEL SURGERY: CPT | Performed by: ORTHOPAEDIC SURGERY

## 2018-12-07 PROCEDURE — 25010000002 PROPOFOL 10 MG/ML EMULSION: Performed by: NURSE ANESTHETIST, CERTIFIED REGISTERED

## 2018-12-07 PROCEDURE — 25010000002 FENTANYL CITRATE (PF) 100 MCG/2ML SOLUTION: Performed by: NURSE ANESTHETIST, CERTIFIED REGISTERED

## 2018-12-07 PROCEDURE — 25010000002 MIDAZOLAM PER 1 MG: Performed by: NURSE ANESTHETIST, CERTIFIED REGISTERED

## 2018-12-07 RX ORDER — 0.9 % SODIUM CHLORIDE 0.9 %
VIAL (ML) INJECTION AS NEEDED
Status: DISCONTINUED | OUTPATIENT
Start: 2018-12-07 | End: 2018-12-07 | Stop reason: HOSPADM

## 2018-12-07 RX ORDER — MAGNESIUM HYDROXIDE 1200 MG/15ML
LIQUID ORAL AS NEEDED
Status: DISCONTINUED | OUTPATIENT
Start: 2018-12-07 | End: 2018-12-07 | Stop reason: HOSPADM

## 2018-12-07 RX ORDER — MIDAZOLAM HYDROCHLORIDE 1 MG/ML
INJECTION INTRAMUSCULAR; INTRAVENOUS AS NEEDED
Status: DISCONTINUED | OUTPATIENT
Start: 2018-12-07 | End: 2018-12-07 | Stop reason: SURG

## 2018-12-07 RX ORDER — PROPOFOL 10 MG/ML
VIAL (ML) INTRAVENOUS AS NEEDED
Status: DISCONTINUED | OUTPATIENT
Start: 2018-12-07 | End: 2018-12-07 | Stop reason: SURG

## 2018-12-07 RX ORDER — HYDROCODONE BITARTRATE AND ACETAMINOPHEN 7.5; 325 MG/1; MG/1
1-2 TABLET ORAL EVERY 4 HOURS PRN
Qty: 30 TABLET | Refills: 0 | Status: SHIPPED | OUTPATIENT
Start: 2018-12-07 | End: 2018-12-27

## 2018-12-07 RX ORDER — BUPIVACAINE HCL/0.9 % NACL/PF 0.1 %
2 PLASTIC BAG, INJECTION (ML) EPIDURAL ONCE
Status: COMPLETED | OUTPATIENT
Start: 2018-12-07 | End: 2018-12-07

## 2018-12-07 RX ORDER — LIDOCAINE HYDROCHLORIDE 10 MG/ML
INJECTION, SOLUTION INFILTRATION; PERINEURAL AS NEEDED
Status: DISCONTINUED | OUTPATIENT
Start: 2018-12-07 | End: 2018-12-07 | Stop reason: HOSPADM

## 2018-12-07 RX ORDER — FENTANYL CITRATE 50 UG/ML
INJECTION, SOLUTION INTRAMUSCULAR; INTRAVENOUS AS NEEDED
Status: DISCONTINUED | OUTPATIENT
Start: 2018-12-07 | End: 2018-12-07 | Stop reason: SURG

## 2018-12-07 RX ORDER — SODIUM CHLORIDE, SODIUM GLUCONATE, SODIUM ACETATE, POTASSIUM CHLORIDE, AND MAGNESIUM CHLORIDE 526; 502; 368; 37; 30 MG/100ML; MG/100ML; MG/100ML; MG/100ML; MG/100ML
1000 INJECTION, SOLUTION INTRAVENOUS CONTINUOUS
Status: DISCONTINUED | OUTPATIENT
Start: 2018-12-07 | End: 2018-12-07 | Stop reason: HOSPADM

## 2018-12-07 RX ADMIN — FENTANYL CITRATE 25 MCG: 50 INJECTION, SOLUTION INTRAMUSCULAR; INTRAVENOUS at 07:43

## 2018-12-07 RX ADMIN — PROPOFOL 20 MG: 10 INJECTION, EMULSION INTRAVENOUS at 07:51

## 2018-12-07 RX ADMIN — MIDAZOLAM HYDROCHLORIDE 1 MG: 2 INJECTION, SOLUTION INTRAMUSCULAR; INTRAVENOUS at 07:38

## 2018-12-07 RX ADMIN — SODIUM CHLORIDE, SODIUM GLUCONATE, SODIUM ACETATE, POTASSIUM CHLORIDE, AND MAGNESIUM CHLORIDE: 526; 502; 368; 37; 30 INJECTION, SOLUTION INTRAVENOUS at 07:32

## 2018-12-07 RX ADMIN — PROPOFOL 50 MG: 10 INJECTION, EMULSION INTRAVENOUS at 07:48

## 2018-12-07 RX ADMIN — PROPOFOL 30 MG: 10 INJECTION, EMULSION INTRAVENOUS at 07:46

## 2018-12-07 RX ADMIN — SODIUM CHLORIDE, SODIUM GLUCONATE, SODIUM ACETATE, POTASSIUM CHLORIDE, AND MAGNESIUM CHLORIDE 1000 ML: 526; 502; 368; 37; 30 INJECTION, SOLUTION INTRAVENOUS at 05:59

## 2018-12-07 RX ADMIN — PROPOFOL 20 MG: 10 INJECTION, EMULSION INTRAVENOUS at 07:50

## 2018-12-07 RX ADMIN — Medication 2 G: at 07:39

## 2018-12-07 RX ADMIN — FENTANYL CITRATE 25 MCG: 50 INJECTION, SOLUTION INTRAMUSCULAR; INTRAVENOUS at 07:49

## 2018-12-07 RX ADMIN — MIDAZOLAM HYDROCHLORIDE 1 MG: 2 INJECTION, SOLUTION INTRAMUSCULAR; INTRAVENOUS at 07:33

## 2018-12-07 NOTE — ANESTHESIA POSTPROCEDURE EVALUATION
Patient: Randall Hernandez    Procedure Summary     Date:  12/07/18 Room / Location:  [facility] OR  / [facility] OR    Anesthesia Start:  0736 Anesthesia Stop:  0808    Procedure:  CARPAL TUNNEL RELEASE (Left Wrist) Diagnosis:       S/P carpal tunnel release      (S/P carpal tunnel release [Z98.890])    Surgeon:  Judd Mathias MD Provider:  Kennedy Green MD    Anesthesia Type:  other ASA Status:  3          Anesthesia Type: other  Last vitals  BP   118/64 (12/07/18 0810)   Temp   98.9 °F (37.2 °C) (12/07/18 0810)   Pulse   93 (12/07/18 0810)   Resp   18 (12/07/18 0810)     SpO2   95 % (12/07/18 0810)     Anesthesia Post Evaluation

## 2018-12-07 NOTE — INTERVAL H&P NOTE
H&P reviewed. The patient was examined and there are no changes to the H&P.      Temp:  [98.3 °F (36.8 °C)] 98.3 °F (36.8 °C)  Heart Rate:  [88] 88  Resp:  [18] 18  BP: (135)/(78) 135/78    No Known Allergies    Prior to Admission medications    Medication Sig Start Date End Date Taking? Authorizing Provider   amphetamine-dextroamphetamine XR (ADDERALL XR) 25 MG 24 hr capsule Take 1 capsule by mouth Every Morning Take 1 tablet by mouth daily 11/28/18  Yes Scottie Borges MD   atorvastatin (LIPITOR) 20 MG tablet Take 1 tablet by mouth Daily. 11/28/18  Yes Scottie Borges MD   fluticasone (FLONASE) 50 MCG/ACT nasal spray 2 sprays into the nostril(s) as directed by provider Daily.   Yes Alin Cano MD   lisinopril (PRINIVIL,ZESTRIL) 10 MG tablet Take 1 tablet by mouth Daily. 8/30/18  Yes Scottie Borges MD   meloxicam (MOBIC) 15 MG tablet Take 1 tablet by mouth Daily. 7/30/18  Yes Scottie Borges MD   montelukast (SINGULAIR) 10 MG tablet Take 10 mg by mouth Every Night.   Yes Alin Cano MD   omeprazole (priLOSEC) 40 MG capsule Take 1 capsule by mouth Daily. 2/9/18  Yes Scottie Borges MD   aspirin 81 MG EC tablet Take 1 tablet by mouth Daily. 11/28/18   Scottie Borges MD       Past Medical History:   Diagnosis Date   • Allergic rhinitis    • Arthritis    • Backache    • Cellulitis of leg, except foot    • Dyslipidemia    • GERD (gastroesophageal reflux disease)    • Heartburn    • History of Holter monitoring 02/26/2016    Sinus mechanism with a normal average heart rate.No evidence of chronotropic incompetence.Asymptomatic Holter   • Hyperlipidemia    • Hypertension    • Low back pain    • Numbness of lower limb     left leg numbness and tingling      • Obesity, unspecified    • Obstructive sleep apnea of adult     USING C-PAP   • Pain in left hip    • Pain in right hip    • Palpitations    • Skin lesion     insect bite.      • Supraventricular tachycardia (CMS/HCC)    • Weight loss      advised       Past Surgical History:   Procedure Laterality Date   • ENDOSCOPY     • PYLOROMYOTOMY         Social History     Socioeconomic History   • Marital status:      Spouse name: Not on file   • Number of children: Not on file   • Years of education: Not on file   • Highest education level: Not on file   Social Needs   • Financial resource strain: Not on file   • Food insecurity - worry: Not on file   • Food insecurity - inability: Not on file   • Transportation needs - medical: Not on file   • Transportation needs - non-medical: Not on file   Occupational History   • Not on file   Tobacco Use   • Smoking status: Former Smoker     Years: 6.00     Last attempt to quit:      Years since quittin.9   • Smokeless tobacco: Never Used   Substance and Sexual Activity   • Alcohol use: Yes     Comment: SOCIAL   • Drug use: No   • Sexual activity: Defer   Other Topics Concern   • Not on file   Social History Narrative   • Not on file       Family History   Problem Relation Age of Onset   • Colon cancer Mother         onset age greater than 75y   • Diabetes Mother    • Breast cancer Sister    • Cancer Brother         bladder   • Diabetes Brother    • Hyperlipidemia Brother          S/P carpal tunnel release

## 2018-12-07 NOTE — ANESTHESIA POSTPROCEDURE EVALUATION
Patient: Randall Hernandez    Procedure Summary     Date:  12/07/18 Room / Location:  [facility] OR  / [facility] OR    Anesthesia Start:  0736 Anesthesia Stop:  0808    Procedure:  CARPAL TUNNEL RELEASE (Left Wrist) Diagnosis:       S/P carpal tunnel release      (S/P carpal tunnel release [Z98.890])    Surgeon:  Judd Mathias MD Provider:  Kennedy Green MD    Anesthesia Type:  other ASA Status:  3          Anesthesia Type: other  Last vitals  BP   135/78 (12/07/18 0603)   Temp   98.3 °F (36.8 °C) (12/07/18 0603)   Pulse   88 (12/07/18 0603)   Resp   18 (12/07/18 0603)     SpO2   96 % (12/07/18 0603)     Post Anesthesia Care and Evaluation    Patient location during evaluation: bedside  Patient participation: complete - patient participated  Level of consciousness: awake and alert  Pain score: 0  Pain management: adequate  Airway patency: patent  Anesthetic complications: No anesthetic complications  PONV Status: none  Cardiovascular status: acceptable  Respiratory status: acceptable  Hydration status: acceptable

## 2018-12-07 NOTE — ANESTHESIA PREPROCEDURE EVALUATION
Anesthesia Evaluation     Patient summary reviewed and Nursing notes reviewed   no history of anesthetic complications:  NPO Solid Status: > 8 hours  NPO Liquid Status: > 8 hours           Airway   Mallampati: III  TM distance: >3 FB  Neck ROM: full  possible difficult intubation, Narrow palate and Small opening  Comment: Full beard.  Dental    (+) poor dentation    Pulmonary - normal exam    breath sounds clear to auscultation  (+) a smoker Former, sleep apnea,   Cardiovascular - normal exam    ECG reviewed  Rhythm: regular  Rate: normal    (+) hypertension 2 medications or greater, hyperlipidemia,   (-) murmur    ROS comment: Normal sinus rhythm  Non specific t wave changes inferior leads  When compared with ECG of 09-MAR-2016 13:43,  T wave inversion now evident in Inferior leads  Confirmed by DANIELA CASTRO, [facility] ([id number]) on 10/31/2018 12:31:25 PM    Neuro/Psych  (+) numbness (Bilateral carpal tunnel.), psychiatric history ADHD,     GI/Hepatic/Renal/Endo    (+) obesity,  GERD well controlled,      Musculoskeletal     Abdominal   (+) obese,    Substance History - negative use     OB/GYN negative ob/gyn ROS         Other   (+) arthritis                       Anesthesia Plan    ASA 3     other     intravenous induction   Anesthetic plan, all risks, benefits, and alternatives have been provided, discussed and informed consent has been obtained with: patient and spouse/significant other.

## 2018-12-07 NOTE — OP NOTE
Procedure(s):  CARPAL TUNNEL RELEASE  Procedure Note    Randall Hernandez  12/7/2018    Pre-op Diagnosis:   S/P carpal tunnel release [Z98.890]    Post-op Diagnosis:     Post-Op Diagnosis Codes:     * S/P carpal tunnel release [Z98.890]    Procedure/CPT® Codes:      Procedure(s):  CARPAL TUNNEL RELEASE left    Surgeon(s):  Judd Mathias MD    Anesthesia: Local with MAC    Staff:   Circulator: Irena Merlos RN  Scrub Person: Anna Ledesma  Assistant: Jocelyn Olson MA    Estimated Blood Loss: minimal    Specimens:                None      Drains:      Findings: Carpal tunnel    Complications: None    Dictation: Indications: 48-year-old status post carpal tunnel release on the opposite side.  He is for release on the left at this point.  He is failed conservative treatment.  Risks and benefits have been explained to him once again including but not limited to bleeding, blood clot, neurovascular injury, need for further surgery, failure to resolve his pain, medical anesthetic complications, etc. he understands we will proceed as planned as detailed informed consent is given.    Procedure:  Procedure: Patient taken operating room the arm was prepped and draped usual sterile fashion timeout performed prior to procedure.    Area of the volar surface of the carpal canal was infiltrated 1% plain Xylocaine.  It was tested for numbness.  Tourniquet was inflated.  Sharp dissection carried to the skin down to the transverse carpal ligament with care to avoid neurovascular structures.  Transverse carpal ligament was identified and entered sharply in line with the fourth metacarpal ray.  A elevator was inserted to protect the canal contents.  This transverse carpal ligament was divided sharply in line with the fourth metacarpal ray.  Careful scissor dissection was carried proximally and distally to make sure this had been released adequately.  Nerve was somewhat erythematous the canal was palpated and noted to  be free.  Tourniquet let down and bleeding points controlled with electrocautery.  Areas irrigated with copious amounts of saline solution.  Skin was enclosed 4-0 nylon interrupted fashion sterile dressings and a splint were applied she tolerated procedure well WERE correct    Judd Mathias MD  12/07/18  8:52 AM

## 2018-12-07 NOTE — ANESTHESIA POSTPROCEDURE EVALUATION
Patient: Randall Hernandez    Procedure Summary     Date:  12/07/18 Room / Location:  [facility] OR 12 / [facility] OR    Anesthesia Start:  0736 Anesthesia Stop:  0808    Procedure:  CARPAL TUNNEL RELEASE (Left Wrist) Diagnosis:       S/P carpal tunnel release      (S/P carpal tunnel release [Z98.890])    Surgeon:  Judd Mathias MD Provider:  Kennedy Green MD    Anesthesia Type:  other ASA Status:  3          Anesthesia Type: other  Last vitals  BP   135/78 (12/07/18 0603)   Temp   98.3 °F (36.8 °C) (12/07/18 0603)   Pulse   88 (12/07/18 0603)   Resp   18 (12/07/18 0603)     SpO2   96 % (12/07/18 0603)     Anesthesia Post Evaluation

## 2018-12-07 NOTE — ANESTHESIA POSTPROCEDURE EVALUATION
Patient: Randall Hernandez    Procedure Summary     Date:  12/07/18 Room / Location:  [facility] OR  / [facility] OR    Anesthesia Start:  0736 Anesthesia Stop:  0808    Procedure:  CARPAL TUNNEL RELEASE (Left Wrist) Diagnosis:       S/P carpal tunnel release      (S/P carpal tunnel release [Z98.890])    Surgeon:  Judd Mathias MD Provider:  Kennedy Green MD    Anesthesia Type:  other ASA Status:  3          Anesthesia Type: other  Last vitals  BP   135/78 (12/07/18 0603)   Temp   98.3 °F (36.8 °C) (12/07/18 0603)   Pulse   88 (12/07/18 0603)   Resp   18 (12/07/18 0603)     SpO2   96 % (12/07/18 0603)     Post Anesthesia Care and Evaluation    Patient location during evaluation: PHASE II  Patient participation: complete - patient participated  Level of consciousness: awake and alert  Pain management: adequate  Airway patency: patent  Anesthetic complications: No anesthetic complications  PONV Status: none  Cardiovascular status: acceptable  Respiratory status: acceptable  Hydration status: acceptable

## 2018-12-14 ENCOUNTER — OFFICE VISIT (OUTPATIENT)
Dept: GASTROENTEROLOGY | Facility: CLINIC | Age: 48
End: 2018-12-14

## 2018-12-14 VITALS
DIASTOLIC BLOOD PRESSURE: 70 MMHG | HEART RATE: 97 BPM | SYSTOLIC BLOOD PRESSURE: 116 MMHG | WEIGHT: 276 LBS | OXYGEN SATURATION: 95 % | BODY MASS INDEX: 38.64 KG/M2 | HEIGHT: 71 IN

## 2018-12-14 DIAGNOSIS — Z80.0 FH: COLON CANCER: ICD-10-CM

## 2018-12-14 DIAGNOSIS — Z12.11 ENCOUNTER FOR SCREENING FOR MALIGNANT NEOPLASM OF COLON: Primary | ICD-10-CM

## 2018-12-14 PROCEDURE — S0260 H&P FOR SURGERY: HCPCS | Performed by: NURSE PRACTITIONER

## 2018-12-14 RX ORDER — DEXTROSE AND SODIUM CHLORIDE 5; .45 G/100ML; G/100ML
30 INJECTION, SOLUTION INTRAVENOUS CONTINUOUS PRN
Status: CANCELLED | OUTPATIENT
Start: 2019-01-18

## 2018-12-14 NOTE — PROGRESS NOTES
Chief Complaint   Patient presents with   • Screening Colonoscopy Consultation       Subjective    Randall Hernandez is a 48 y.o. male. he is being seen for consultation today at the request of Dr. Borges     History of Present Illness  48-year-old male presents to discuss getting colonoscopy.  He denies any abdominal pain, nausea, vomiting or changes in his bowel habits.  He has reflux that is well controlled PPI daily.  Reports only history of colon cancer in his mother and states she was diagnosed in her 70s.  Plan; schedule patient for initial screening colonoscopy.       The following portions of the patient's history were reviewed and updated as appropriate:   Past Medical History:   Diagnosis Date   • Allergic rhinitis    • Arthritis    • Backache    • Cellulitis of leg, except foot    • Dyslipidemia    • GERD (gastroesophageal reflux disease)    • Heartburn    • History of Holter monitoring 02/26/2016    Sinus mechanism with a normal average heart rate.No evidence of chronotropic incompetence.Asymptomatic Holter   • Hyperlipidemia    • Hypertension    • Low back pain    • Numbness of lower limb     left leg numbness and tingling      • Obesity, unspecified    • Obstructive sleep apnea of adult     USING C-PAP   • Pain in left hip    • Pain in right hip    • Palpitations    • Skin lesion     insect bite.      • Supraventricular tachycardia (CMS/HCC)    • Weight loss     advised     Past Surgical History:   Procedure Laterality Date   • CARPAL TUNNEL RELEASE  12/07/2018    Left   • ENDOSCOPY     • PYLOROMYOTOMY       Family History   Problem Relation Age of Onset   • Colon cancer Mother         onset age greater than 75y   • Diabetes Mother    • Breast cancer Sister    • Cancer Brother         bladder   • Diabetes Brother    • Hyperlipidemia Brother        Current Outpatient Medications   Medication Sig Dispense Refill   • amphetamine-dextroamphetamine XR (ADDERALL XR) 25 MG 24 hr capsule Take 1 capsule by  mouth Every Morning Take 1 tablet by mouth daily 30 capsule 0   • aspirin 81 MG EC tablet Take 1 tablet by mouth Daily. 30 tablet 3   • atorvastatin (LIPITOR) 20 MG tablet Take 1 tablet by mouth Daily. 30 tablet 3   • fluticasone (FLONASE) 50 MCG/ACT nasal spray 2 sprays into the nostril(s) as directed by provider Daily.     • HYDROcodone-acetaminophen (NORCO) 7.5-325 MG per tablet Take 1-2 tablets by mouth Every 4 (Four) Hours As Needed for Moderate Pain. 30 tablet 0   • lisinopril (PRINIVIL,ZESTRIL) 10 MG tablet Take 1 tablet by mouth Daily. 30 tablet 3   • montelukast (SINGULAIR) 10 MG tablet Take 10 mg by mouth Every Night.     • omeprazole (priLOSEC) 40 MG capsule Take 1 capsule by mouth Daily. 30 capsule 11   • polyethylene glycol (GoLYTELY) 236 g solution Starting at noon on day prior to procedure, drink 8 ounces every 30 minutes until all gone or stools are clear. May add flavor packet. 4000 mL 0     No current facility-administered medications for this visit.      No Known Allergies  Social History     Socioeconomic History   • Marital status:      Spouse name: Not on file   • Number of children: Not on file   • Years of education: Not on file   • Highest education level: Not on file   Occupational History   • Occupation:      Employer: SELF-EMPLOYED     Comment: [place]   Tobacco Use   • Smoking status: Former Smoker     Packs/day: 1.00     Years: 6.00     Pack years: 6.00     Types: Cigarettes     Last attempt to quit:      Years since quittin.9   • Smokeless tobacco: Never Used   Substance and Sexual Activity   • Alcohol use: Yes     Comment: 2018 - Kevin reports consumption of alcoholic beverages under a social basis (approximately 3 - 4 per month).     • Drug use: No   • Sexual activity: Defer       Review of Systems  Review of Systems   Constitutional: Negative for activity change, appetite change, chills, diaphoresis,  "fatigue, fever and unexpected weight change.   HENT: Negative for sore throat and trouble swallowing.    Respiratory: Negative for shortness of breath.    Gastrointestinal: Negative for abdominal distention, abdominal pain, anal bleeding, blood in stool, constipation, diarrhea, nausea, rectal pain and vomiting.   Musculoskeletal: Negative for arthralgias.   Skin: Negative for pallor.   Neurological: Negative for light-headedness.        /70   Pulse 97   Ht 180.3 cm (71\")   Wt 125 kg (276 lb)   SpO2 95%   BMI 38.49 kg/m²     Objective    Physical Exam   Constitutional: He is oriented to person, place, and time. He appears well-developed and well-nourished. He is cooperative. No distress.   HENT:   Head: Normocephalic and atraumatic.   Neck: Normal range of motion. Neck supple. No thyromegaly present.   Cardiovascular: Normal rate, regular rhythm and normal heart sounds.   Pulmonary/Chest: Effort normal and breath sounds normal. He has no wheezes. He has no rhonchi. He has no rales.   Abdominal: Soft. Normal appearance and bowel sounds are normal. He exhibits no distension. There is no hepatosplenomegaly. There is tenderness in the epigastric area. There is no rigidity and no guarding. No hernia.   Lymphadenopathy:     He has no cervical adenopathy.   Neurological: He is alert and oriented to person, place, and time.   Skin: Skin is warm, dry and intact. No rash noted. No pallor.   Psychiatric: He has a normal mood and affect. His speech is normal.     Office Visit on 06/25/2018   Component Date Value Ref Range Status   • WBC 08/17/2018 5.97  3.20 - 9.80 10*3/mm3 Final   • RBC 08/17/2018 4.68  4.37 - 5.74 10*6/mm3 Final   • Hemoglobin 08/17/2018 14.1  13.7 - 17.3 g/dL Final   • Hematocrit 08/17/2018 42.4  39.0 - 49.0 % Final   • MCV 08/17/2018 90.6  80.0 - 98.0 fL Final   • MCH 08/17/2018 30.1  26.5 - 34.0 pg Final   • MCHC 08/17/2018 33.3  31.5 - 36.3 g/dL Final   • RDW 08/17/2018 13.3  11.5 - 14.5 % Final "   • RDW-SD 08/17/2018 43.6  35.1 - 43.9 fl Final   • MPV 08/17/2018 9.6  8.0 - 12.0 fL Final   • Platelets 08/17/2018 249  150 - 450 10*3/mm3 Final   • Neutrophil % 08/17/2018 53.8  37.0 - 80.0 % Final   • Lymphocyte % 08/17/2018 35.5  10.0 - 50.0 % Final   • Monocyte % 08/17/2018 7.5  0.0 - 12.0 % Final   • Eosinophil % 08/17/2018 2.2  0.0 - 7.0 % Final   • Basophil % 08/17/2018 0.5  0.0 - 2.0 % Final   • Immature Grans % 08/17/2018 0.5  0.0 - 0.5 % Final   • Neutrophils, Absolute 08/17/2018 3.21  2.00 - 8.60 10*3/mm3 Final   • Lymphocytes, Absolute 08/17/2018 2.12  0.60 - 4.20 10*3/mm3 Final   • Monocytes, Absolute 08/17/2018 0.45  0.00 - 0.90 10*3/mm3 Final   • Eosinophils, Absolute 08/17/2018 0.13  0.00 - 0.70 10*3/mm3 Final   • Basophils, Absolute 08/17/2018 0.03  0.00 - 0.20 10*3/mm3 Final   • Immature Grans, Absolute 08/17/2018 0.03* 0.00 - 0.02 10*3/mm3 Final   • Hemoglobin A1C 08/17/2018 5.5  4 - 5.6 % Final   • Total Cholesterol 08/17/2018 174  0 - 199 mg/dL Final   • Triglycerides 08/17/2018 134  20 - 199 mg/dL Final   • HDL Cholesterol 08/17/2018 42* 60 - 200 mg/dL Final   • LDL Cholesterol  08/17/2018 106  1 - 129 mg/dL Final   • LDL/HDL Ratio 08/17/2018 2.50  0.00 - 3.55 Final   • Glucose 08/17/2018 110* 60 - 100 mg/dL Final   • BUN 08/17/2018 18  7 - 21 mg/dL Final   • Creatinine 08/17/2018 0.78  0.70 - 1.30 mg/dL Final   • Sodium 08/17/2018 139  137 - 145 mmol/L Final   • Potassium 08/17/2018 4.7  3.5 - 5.1 mmol/L Final   • Chloride 08/17/2018 105  95 - 110 mmol/L Final   • CO2 08/17/2018 25.0  22.0 - 31.0 mmol/L Final   • Calcium 08/17/2018 8.8  8.4 - 10.2 mg/dL Final   • Total Protein 08/17/2018 7.2  6.3 - 8.6 g/dL Final   • Albumin 08/17/2018 4.10  3.40 - 4.80 g/dL Final   • ALT (SGPT) 08/17/2018 45  21 - 72 U/L Final   • AST (SGOT) 08/17/2018 28  17 - 59 U/L Final   • Alkaline Phosphatase 08/17/2018 102  38 - 126 U/L Final   • Total Bilirubin 08/17/2018 0.7  0.2 - 1.3 mg/dL Final   • eGFR Non   Amer 08/17/2018 106  63 - 147 mL/min/1.73 Final   • Globulin 08/17/2018 3.1  2.3 - 3.5 gm/dL Final   • A/G Ratio 08/17/2018 1.3  1.1 - 1.8 g/dL Final   • BUN/Creatinine Ratio 08/17/2018 23.1  7.0 - 25.0 Final   • Anion Gap 08/17/2018 9.0  5.0 - 15.0 mmol/L Final     Assessment/Plan      1. Encounter for screening for malignant neoplasm of colon    2. FH: colon cancer    .       Orders placed during this encounter include:  Orders Placed This Encounter   Procedures   • Follow Anesthesia Guidelines / Standing Orders     Standing Status:   Future       COLONOSCOPY a friday please  (N/A)    Review and/or summary of lab tests, radiology, procedures, medications. Review and summary of old records and obtaining of history. The risks and benefits of my recommendations, as well as other treatment options were discussed with the patient today. Questions were answered.    New Medications Ordered This Visit   Medications   • polyethylene glycol (GoLYTELY) 236 g solution     Sig: Starting at noon on day prior to procedure, drink 8 ounces every 30 minutes until all gone or stools are clear. May add flavor packet.     Dispense:  4000 mL     Refill:  0       Follow-up: Return for Recheck after procedure.          This document has been electronically signed by ROLAN Way on December 14, 2018 9:51 AM             Results for orders placed or performed in visit on 06/25/18   CBC Auto Differential   Result Value Ref Range    WBC 5.97 3.20 - 9.80 10*3/mm3    RBC 4.68 4.37 - 5.74 10*6/mm3    Hemoglobin 14.1 13.7 - 17.3 g/dL    Hematocrit 42.4 39.0 - 49.0 %    MCV 90.6 80.0 - 98.0 fL    MCH 30.1 26.5 - 34.0 pg    MCHC 33.3 31.5 - 36.3 g/dL    RDW 13.3 11.5 - 14.5 %    RDW-SD 43.6 35.1 - 43.9 fl    MPV 9.6 8.0 - 12.0 fL    Platelets 249 150 - 450 10*3/mm3    Neutrophil % 53.8 37.0 - 80.0 %    Lymphocyte % 35.5 10.0 - 50.0 %    Monocyte % 7.5 0.0 - 12.0 %    Eosinophil % 2.2 0.0 - 7.0 %    Basophil % 0.5 0.0 - 2.0 %     Immature Grans % 0.5 0.0 - 0.5 %    Neutrophils, Absolute 3.21 2.00 - 8.60 10*3/mm3    Lymphocytes, Absolute 2.12 0.60 - 4.20 10*3/mm3    Monocytes, Absolute 0.45 0.00 - 0.90 10*3/mm3    Eosinophils, Absolute 0.13 0.00 - 0.70 10*3/mm3    Basophils, Absolute 0.03 0.00 - 0.20 10*3/mm3    Immature Grans, Absolute 0.03 (H) 0.00 - 0.02 10*3/mm3   Hemoglobin A1c   Result Value Ref Range    Hemoglobin A1C 5.5 4 - 5.6 %   Lipid Panel   Result Value Ref Range    Total Cholesterol 174 0 - 199 mg/dL    Triglycerides 134 20 - 199 mg/dL    HDL Cholesterol 42 (L) 60 - 200 mg/dL    LDL Cholesterol  106 1 - 129 mg/dL    LDL/HDL Ratio 2.50 0.00 - 3.55   Comprehensive Metabolic Panel   Result Value Ref Range    Glucose 110 (H) 60 - 100 mg/dL    BUN 18 7 - 21 mg/dL    Creatinine 0.78 0.70 - 1.30 mg/dL    Sodium 139 137 - 145 mmol/L    Potassium 4.7 3.5 - 5.1 mmol/L    Chloride 105 95 - 110 mmol/L    CO2 25.0 22.0 - 31.0 mmol/L    Calcium 8.8 8.4 - 10.2 mg/dL    Total Protein 7.2 6.3 - 8.6 g/dL    Albumin 4.10 3.40 - 4.80 g/dL    ALT (SGPT) 45 21 - 72 U/L    AST (SGOT) 28 17 - 59 U/L    Alkaline Phosphatase 102 38 - 126 U/L    Total Bilirubin 0.7 0.2 - 1.3 mg/dL    eGFR Non  Amer 106 63 - 147 mL/min/1.73    Globulin 3.1 2.3 - 3.5 gm/dL    A/G Ratio 1.3 1.1 - 1.8 g/dL    BUN/Creatinine Ratio 23.1 7.0 - 25.0    Anion Gap 9.0 5.0 - 15.0 mmol/L   Results for orders placed or performed in visit on 05/30/18   Urine Drug Screen - Urine, Clean Catch   Result Value Ref Range    Amphetamine Screen, Urine Positive (A) Negative    Barbiturates Screen, Urine Negative Negative    Benzodiazepine Screen, Urine Negative Negative    Cocaine Screen, Urine Negative Negative    Methadone Screen, Urine Negative Negative    Opiate Screen Negative Negative    Oxycodone Screen, Urine Negative Negative    THC, Screen, Urine Negative Negative   Results for orders placed or performed in visit on 04/02/18   Urinalysis, Microscopic Only - Urine, Clean Catch    Result Value Ref Range    RBC, UA None Seen None Seen /HPF    WBC, UA 6-12 (A) None Seen, 0-2, 3-5 /HPF    Bacteria, UA Trace (A) None Seen /HPF    Squamous Epithelial Cells, UA 0-2 None Seen, 0-2 /HPF    Hyaline Casts, UA None Seen None Seen /LPF    Calcium Oxalate Crystals, UA Moderate/2+ None Seen /HPF    Mucus, UA Moderate/2+ (A) None Seen, Trace /HPF    Methodology Manual Light Microscopy    CBC Auto Differential   Result Value Ref Range    WBC 7.60 3.20 - 9.80 10*3/mm3    RBC 5.28 4.37 - 5.74 10*6/mm3    Hemoglobin 15.8 13.7 - 17.3 g/dL    Hematocrit 46.8 39.0 - 49.0 %    MCV 88.6 80.0 - 98.0 fL    MCH 29.9 26.5 - 34.0 pg    MCHC 33.8 31.5 - 36.3 g/dL    RDW 12.6 11.5 - 14.5 %    RDW-SD 40.4 35.1 - 43.9 fl    MPV 10.0 8.0 - 12.0 fL    Platelets 257 150 - 450 10*3/mm3    Neutrophil % 60.4 37.0 - 80.0 %    Lymphocyte % 28.9 10.0 - 50.0 %    Monocyte % 6.6 0.0 - 12.0 %    Eosinophil % 2.6 0.0 - 7.0 %    Basophil % 0.8 0.0 - 2.0 %    Immature Grans % 0.7 (H) 0.0 - 0.5 %    Neutrophils, Absolute 4.59 2.00 - 8.60 10*3/mm3    Lymphocytes, Absolute 2.20 0.60 - 4.20 10*3/mm3    Monocytes, Absolute 0.50 0.00 - 0.90 10*3/mm3    Eosinophils, Absolute 0.20 0.00 - 0.70 10*3/mm3    Basophils, Absolute 0.06 0.00 - 0.20 10*3/mm3    Immature Grans, Absolute 0.05 (H) 0.00 - 0.02 10*3/mm3   Urine Drug Screen - Urine, Clean Catch   Result Value Ref Range    Amphetamine Screen, Urine Negative Negative    Barbiturates Screen, Urine Negative Negative    Benzodiazepine Screen, Urine Negative Negative    Cocaine Screen, Urine Negative Negative    Methadone Screen, Urine Negative Negative    Opiate Screen Negative Negative    Oxycodone Screen, Urine Negative Negative    THC, Screen, Urine Negative Negative   Vitamin D 25 Hydroxy   Result Value Ref Range    25 Hydroxy, Vitamin D 45.0 30.0 - 100.0 ng/ml   Urinalysis - Urine, Clean Catch   Result Value Ref Range    Color, UA Yellow Yellow, Straw    Appearance, UA Clear Clear    pH, UA  6.0 5.0 - 8.0    Specific Gravity, UA 1.030 1.005 - 1.030    Glucose, UA Negative Negative    Ketones, UA Negative Negative    Bilirubin, UA Small (1+) (A) Negative    Blood, UA Negative Negative    Protein, UA 30 mg/dL (1+) (A) Negative    Leuk Esterase, UA Negative Negative    Nitrite, UA Negative Negative    Urobilinogen, UA 0.2 E.U./dL 0.2 - 1.0 E.U./dL     *Note: Due to a large number of results and/or encounters for the requested time period, some results have not been displayed. A complete set of results can be found in Results Review.

## 2018-12-14 NOTE — PATIENT INSTRUCTIONS
Colonoscopy, Adult  A colonoscopy is an exam to look at the entire large intestine. During the exam, a lubricated, bendable tube is inserted into the anus and then passed into the rectum, colon, and other parts of the large intestine.  A colonoscopy is often done as a part of normal colorectal screening or in response to certain symptoms, such as anemia, persistent diarrhea, abdominal pain, and blood in the stool. The exam can help screen for and diagnose medical problems, including:  · Tumors.  · Polyps.  · Inflammation.  · Areas of bleeding.    Tell a health care provider about:  · Any allergies you have.  · All medicines you are taking, including vitamins, herbs, eye drops, creams, and over-the-counter medicines.  · Any problems you or family members have had with anesthetic medicines.  · Any blood disorders you have.  · Any surgeries you have had.  · Any medical conditions you have.  · Any problems you have had passing stool.  What are the risks?  Generally, this is a safe procedure. However, problems may occur, including:  · Bleeding.  · A tear in the intestine.  · A reaction to medicines given during the exam.  · Infection (rare).    What happens before the procedure?  Eating and drinking restrictions  Follow instructions from your health care provider about eating and drinking, which may include:  · A few days before the procedure - follow a low-fiber diet. Avoid nuts, seeds, dried fruit, raw fruits, and vegetables.  · 1-3 days before the procedure - follow a clear liquid diet. Drink only clear liquids, such as clear broth or bouillon, black coffee or tea, clear juice, clear soft drinks or sports drinks, gelatin dessert, and popsicles. Avoid any liquids that contain red or purple dye.  · On the day of the procedure - do not eat or drink anything during the 2 hours before the procedure, or within the time period that your health care provider recommends.    Bowel prep  If you were prescribed an oral bowel prep  to clean out your colon:  · Take it as told by your health care provider. Starting the day before your procedure, you will need to drink a large amount of medicated liquid. The liquid will cause you to have multiple loose stools until your stool is almost clear or light green.  · If your skin or anus gets irritated from diarrhea, you may use these to relieve the irritation:  ? Medicated wipes, such as adult wet wipes with aloe and vitamin E.  ? A skin soothing-product like petroleum jelly.  · If you vomit while drinking the bowel prep, take a break for up to 60 minutes and then begin the bowel prep again. If vomiting continues and you cannot take the bowel prep without vomiting, call your health care provider.    General instructions  · Ask your health care provider about changing or stopping your regular medicines. This is especially important if you are taking diabetes medicines or blood thinners.  · Plan to have someone take you home from the hospital or clinic.  What happens during the procedure?  · An IV tube may be inserted into one of your veins.  · You will be given medicine to help you relax (sedative).  · To reduce your risk of infection:  ? Your health care team will wash or sanitize their hands.  ? Your anal area will be washed with soap.  · You will be asked to lie on your side with your knees bent.  · Your health care provider will lubricate a long, thin, flexible tube. The tube will have a camera and a light on the end.  · The tube will be inserted into your anus.  · The tube will be gently eased through your rectum and colon.  · Air will be delivered into your colon to keep it open. You may feel some pressure or cramping.  · The camera will be used to take images during the procedure.  · A small tissue sample may be removed from your body to be examined under a microscope (biopsy). If any potential problems are found, the tissue will be sent to a lab for testing.  · If small polyps are found, your  health care provider may remove them and have them checked for cancer cells.  · The tube that was inserted into your anus will be slowly removed.  The procedure may vary among health care providers and hospitals.  What happens after the procedure?  · Your blood pressure, heart rate, breathing rate, and blood oxygen level will be monitored until the medicines you were given have worn off.  · Do not drive for 24 hours after the exam.  · You may have a small amount of blood in your stool.  · You may pass gas and have mild abdominal cramping or bloating due to the air that was used to inflate your colon during the exam.  · It is up to you to get the results of your procedure. Ask your health care provider, or the department performing the procedure, when your results will be ready.  This information is not intended to replace advice given to you by your health care provider. Make sure you discuss any questions you have with your health care provider.  Document Released: 12/15/2001 Document Revised: 10/18/2017 Document Reviewed: 02/28/2017  [employer] Patient Education © 2018 [employer]

## 2018-12-17 RX ORDER — LISINOPRIL 10 MG/1
10 TABLET ORAL DAILY
Qty: 30 TABLET | Refills: 3 | Status: SHIPPED | OUTPATIENT
Start: 2018-12-17 | End: 2019-02-07 | Stop reason: SDUPTHER

## 2018-12-20 ENCOUNTER — OFFICE VISIT (OUTPATIENT)
Dept: ORTHOPEDIC SURGERY | Facility: CLINIC | Age: 48
End: 2018-12-20

## 2018-12-20 VITALS — BODY MASS INDEX: 38.64 KG/M2 | WEIGHT: 276 LBS | HEIGHT: 71 IN

## 2018-12-20 DIAGNOSIS — G56.00 CARPAL TUNNEL SYNDROME, UNSPECIFIED LATERALITY: Primary | ICD-10-CM

## 2018-12-20 PROCEDURE — 99024 POSTOP FOLLOW-UP VISIT: CPT | Performed by: ORTHOPAEDIC SURGERY

## 2018-12-20 RX ORDER — SULFAMETHOXAZOLE AND TRIMETHOPRIM 800; 160 MG/1; MG/1
1 TABLET ORAL 2 TIMES DAILY
Qty: 14 TABLET | Refills: 1 | Status: SHIPPED | OUTPATIENT
Start: 2018-12-20 | End: 2018-12-27

## 2018-12-20 NOTE — PROGRESS NOTES
Randall Hernandez is a 48 y.o. male is s/p  Left carpal tunnel release     Chief Complaint   Patient presents with   • Post-op     left CTR       HISTORY OF PRESENT ILLNESS: Patient is here today for follow up of left carpal tunnel release on 12/7/2018. Patient comes in and his wound is red and he states that it has had yellow discharge coming from the wound for the last few days. Patient states that his pain is 5/10.       No Known Allergies      Current Outpatient Medications:   •  amphetamine-dextroamphetamine XR (ADDERALL XR) 25 MG 24 hr capsule, Take 1 capsule by mouth Every Morning Take 1 tablet by mouth daily, Disp: 30 capsule, Rfl: 0  •  aspirin 81 MG EC tablet, Take 1 tablet by mouth Daily., Disp: 30 tablet, Rfl: 3  •  atorvastatin (LIPITOR) 20 MG tablet, Take 1 tablet by mouth Daily., Disp: 30 tablet, Rfl: 3  •  fluticasone (FLONASE) 50 MCG/ACT nasal spray, 2 sprays into the nostril(s) as directed by provider Daily., Disp: , Rfl:   •  HYDROcodone-acetaminophen (NORCO) 7.5-325 MG per tablet, Take 1-2 tablets by mouth Every 4 (Four) Hours As Needed for Moderate Pain., Disp: 30 tablet, Rfl: 0  •  lisinopril (PRINIVIL,ZESTRIL) 10 MG tablet, TAKE 1 TABLET BY MOUTH DAILY, Disp: 30 tablet, Rfl: 3  •  montelukast (SINGULAIR) 10 MG tablet, Take 10 mg by mouth Every Night., Disp: , Rfl:   •  omeprazole (priLOSEC) 40 MG capsule, Take 1 capsule by mouth Daily., Disp: 30 capsule, Rfl: 11  •  polyethylene glycol (GoLYTELY) 236 g solution, Starting at noon on day prior to procedure, drink 8 ounces every 30 minutes until all gone or stools are clear. May add flavor packet., Disp: 4000 mL, Rfl: 0    No fevers or chills.  No nausea or vomiting.      PHYSICAL EXAMINATION:       Randall Hernandez is a 48 y.o. male    Patient is awake and alert, answers questions appropriately and is in no apparent distress.    GAIT:     []  Normal  []  Antalgic    Assistive device: []  None  []  Walker     []  Crutches  []  Cane     []   Wheelchair  []  Stretcher    Ortho Exam  Wound was good is a little irritation proximally.  Neurologically is okay there is no drainage.    No results found.        ASSESSMENT:    Diagnoses and all orders for this visit:    Carpal tunnel syndrome, unspecified laterality  -     sulfamethoxazole-trimethoprim (BACTRIM DS) 800-160 MG per tablet; Take 1 tablet by mouth 2 (Two) Times a Day.          PLAN suture removal.  An appointment to [facility].  Follow-up in a week, sooner with any problems or any change.  Tells me he's opercular can take his splint off has been    Doing a little too much with it    No Follow-up on file.    Judd Mathias MD

## 2018-12-27 ENCOUNTER — OFFICE VISIT (OUTPATIENT)
Dept: FAMILY MEDICINE CLINIC | Facility: CLINIC | Age: 48
End: 2018-12-27

## 2018-12-27 VITALS
HEART RATE: 98 BPM | BODY MASS INDEX: 38.75 KG/M2 | DIASTOLIC BLOOD PRESSURE: 86 MMHG | SYSTOLIC BLOOD PRESSURE: 120 MMHG | HEIGHT: 71 IN | TEMPERATURE: 98.6 F | OXYGEN SATURATION: 95 % | WEIGHT: 276.8 LBS

## 2018-12-27 DIAGNOSIS — J30.9 ALLERGIC RHINITIS, UNSPECIFIED SEASONALITY, UNSPECIFIED TRIGGER: ICD-10-CM

## 2018-12-27 DIAGNOSIS — I10 ESSENTIAL HYPERTENSION: Primary | ICD-10-CM

## 2018-12-27 PROCEDURE — 99213 OFFICE O/P EST LOW 20 MIN: CPT | Performed by: FAMILY MEDICINE

## 2018-12-27 RX ORDER — DEXTROAMPHETAMINE SACCHARATE, AMPHETAMINE ASPARTATE MONOHYDRATE, DEXTROAMPHETAMINE SULFATE AND AMPHETAMINE SULFATE 6.25; 6.25; 6.25; 6.25 MG/1; MG/1; MG/1; MG/1
25 CAPSULE, EXTENDED RELEASE ORAL EVERY MORNING
Qty: 30 CAPSULE | Refills: 0 | Status: SHIPPED | OUTPATIENT
Start: 2018-12-27 | End: 2019-02-07 | Stop reason: SDUPTHER

## 2018-12-27 NOTE — PATIENT INSTRUCTIONS
Preventive Care 40-64 Years, Male  Preventive care refers to lifestyle choices and visits with your health care provider that can promote health and wellness.  What does preventive care include?  · A yearly physical exam. This is also called an annual well check.  · Dental exams once or twice a year.  · Routine eye exams. Ask your health care provider how often you should have your eyes checked.  · Personal lifestyle choices, including:  ? Daily care of your teeth and gums.  ? Regular physical activity.  ? Eating a healthy diet.  ? Avoiding tobacco and drug use.  ? Limiting alcohol use.  ? Practicing safe sex.  ? Taking low-dose aspirin every day starting at age 50.  What happens during an annual well check?  The services and screenings done by your health care provider during your annual well check will depend on your age, overall health, lifestyle risk factors, and family history of disease.  Counseling  Your health care provider may ask you questions about your:  · Alcohol use.  · Tobacco use.  · Drug use.  · Emotional well-being.  · Home and relationship well-being.  · Sexual activity.  · Eating habits.  · Work and work environment.    Screening  You may have the following tests or measurements:  · Height, weight, and BMI.  · Blood pressure.  · Lipid and cholesterol levels. These may be checked every 5 years, or more frequently if you are over 50 years old.  · Skin check.  · Lung cancer screening. You may have this screening every year starting at age 55 if you have a 30-pack-year history of smoking and currently smoke or have quit within the past 15 years.  · Fecal occult blood test (FOBT) of the stool. You may have this test every year starting at age 50.  · Flexible sigmoidoscopy or colonoscopy. You may have a sigmoidoscopy every 5 years or a colonoscopy every 10 years starting at age 50.  · Prostate cancer screening. Recommendations will vary depending on your family history and other risks.  · Hepatitis C  blood test.  · Hepatitis B blood test.  · Sexually transmitted disease (STD) testing.  · Diabetes screening. This is done by checking your blood sugar (glucose) after you have not eaten for a while (fasting). You may have this done every 1-3 years.    Discuss your test results, treatment options, and if necessary, the need for more tests with your health care provider.  Vaccines  Your health care provider may recommend certain vaccines, such as:  · Influenza vaccine. This is recommended every year.  · Tetanus, diphtheria, and acellular pertussis (Tdap, Td) vaccine. You may need a Td booster every 10 years.  · Varicella vaccine. You may need this if you have not been vaccinated.  · Zoster vaccine. You may need this after age 60.  · Measles, mumps, and rubella (MMR) vaccine. You may need at least one dose of MMR if you were born in 1957 or later. You may also need a second dose.  · Pneumococcal 13-valent conjugate (PCV13) vaccine. You may need this if you have certain conditions and have not been vaccinated.  · Pneumococcal polysaccharide (PPSV23) vaccine. You may need one or two doses if you smoke cigarettes or if you have certain conditions.  · Meningococcal vaccine. You may need this if you have certain conditions.  · Hepatitis A vaccine. You may need this if you have certain conditions or if you travel or work in places where you may be exposed to hepatitis A.  · Hepatitis B vaccine. You may need this if you have certain conditions or if you travel or work in places where you may be exposed to hepatitis B.  · Haemophilus influenzae type b (Hib) vaccine. You may need this if you have certain risk factors.    Talk to your health care provider about which screenings and vaccines you need and how often you need them.  This information is not intended to replace advice given to you by your health care provider. Make sure you discuss any questions you have with your health care provider.  Document Released: 01/13/2017  Document Revised: 09/06/2017 Document Reviewed: 10/18/2016  [employer] Interactive Patient Education © 2018 [employer]

## 2018-12-27 NOTE — PROGRESS NOTES
Subjective Pt of Dr. CHLOE Borges, covering vacation.    Randall Hernandez is a 48 y.o. obese white male former smoker with HTN, High cholesterol, Allergies , ADHD controlled on meds. Pt presents for exam , discuss Tx and F/Un plans    ' ADHD controlled on med, need refill.  S/P Carpal tunnel surgery with Dr. Mathias. Heart burn has been controlled on med,Have Coloscopy scheduled with Dr. Mata.  B/P has been controlled.     Mental Health Problem   This is a chronic problem.   The degree of incapacity that he is experiencing as a consequence of his illness is moderate. Sequelae of the illness include an inability to work. Additional symptoms of the illness include attention impairment. Additional symptoms of the illness do not include fatigue, headaches or abdominal pain.   Hypertension   This is a chronic problem. The current episode started more than 1 year ago. The problem is controlled. Pertinent negatives include no chest pain, headaches, neck pain, palpitations or shortness of breath. Risk factors for coronary artery disease include dyslipidemia, male gender, obesity and smoking/tobacco exposure. Past treatments include ACE inhibitors. Current antihypertension treatment includes ACE inhibitors. The current treatment provides significant improvement.   Heartburn   He complains of heartburn. He reports no abdominal pain, no chest pain, no coughing, no nausea or no sore throat. This is a recurrent problem. The problem occurs occasionally. The problem has been gradually improving. The heartburn is of moderate intensity. Pertinent negatives include no fatigue. He has tried a PPI for the symptoms. The treatment provided significant relief.        The following portions of the patient's history were reviewed and updated as appropriate: allergies, current medications, past family history, past medical history, past social history, past surgical history and problem list.    Review of Systems   Constitutional: Negative  for chills, diaphoresis, fatigue and fever.   HENT: Positive for postnasal drip. Negative for congestion and sore throat.    Eyes: Negative.    Respiratory: Negative.  Negative for cough and shortness of breath.    Cardiovascular: Negative.  Negative for chest pain and palpitations.   Gastrointestinal: Positive for constipation and heartburn. Negative for abdominal pain, diarrhea, nausea and vomiting.   Endocrine: Negative.    Genitourinary: Negative.  Negative for dysuria, frequency and hematuria.   Musculoskeletal: Positive for arthralgias and joint swelling. Negative for myalgias and neck pain.   Skin: Negative for rash.        S/P Carpal tunnel surgery   Allergic/Immunologic: Positive for environmental allergies.   Neurological: Negative for weakness, numbness and headaches.   Hematological: Negative.    Psychiatric/Behavioral: The patient is nervous/anxious.        Objective    Pleasant WM appears stated age in no distress.  AAOx 3  BBS clear  HRR, S1 S2  Abd large nrounded, BS active, nontender.   ADAMS x 4 S/P carpal tunnel syndrome incison healing well.  PP with edema. Skin W/D/I  Mood congruent,       Assessment/Plan   Diagnoses and all orders for this visit:    Essential hypertension    Allergic rhinitis, unspecified seasonality, unspecified trigger    Other orders  -     amphetamine-dextroamphetamine XR (ADDERALL XR) 25 MG 24 hr capsule; Take 1 capsule by mouth Every Morning Take 1 tablet by mouth daily      Discussed exam, meds, indications, USPSTF recommendations. Discussed safety with controlled meds. Discussed f/U here.          This document has been electronically signed by Cameron Woodard MD

## 2019-01-18 ENCOUNTER — ANESTHESIA EVENT (OUTPATIENT)
Dept: GASTROENTEROLOGY | Facility: HOSPITAL | Age: 49
End: 2019-01-18

## 2019-01-18 ENCOUNTER — ANESTHESIA (OUTPATIENT)
Dept: GASTROENTEROLOGY | Facility: HOSPITAL | Age: 49
End: 2019-01-18

## 2019-01-18 ENCOUNTER — HOSPITAL ENCOUNTER (OUTPATIENT)
Facility: HOSPITAL | Age: 49
Setting detail: HOSPITAL OUTPATIENT SURGERY
Discharge: HOME | End: 2019-01-18
Attending: INTERNAL MEDICINE | Admitting: INTERNAL MEDICINE

## 2019-01-18 VITALS
BODY MASS INDEX: 38.06 KG/M2 | TEMPERATURE: 97.9 F | WEIGHT: 271.83 LBS | SYSTOLIC BLOOD PRESSURE: 145 MMHG | HEART RATE: 87 BPM | DIASTOLIC BLOOD PRESSURE: 67 MMHG | OXYGEN SATURATION: 97 % | HEIGHT: 71 IN | RESPIRATION RATE: 19 BRPM

## 2019-01-18 DIAGNOSIS — Z12.11 ENCOUNTER FOR SCREENING FOR MALIGNANT NEOPLASM OF COLON: ICD-10-CM

## 2019-01-18 DIAGNOSIS — Z80.0 FH: COLON CANCER: ICD-10-CM

## 2019-01-18 PROCEDURE — 25010000002 PROPOFOL 10 MG/ML EMULSION: Performed by: NURSE ANESTHETIST, CERTIFIED REGISTERED

## 2019-01-18 PROCEDURE — 45378 DIAGNOSTIC COLONOSCOPY: CPT | Performed by: INTERNAL MEDICINE

## 2019-01-18 RX ORDER — PROPOFOL 10 MG/ML
VIAL (ML) INTRAVENOUS AS NEEDED
Status: DISCONTINUED | OUTPATIENT
Start: 2019-01-18 | End: 2019-01-18 | Stop reason: SURG

## 2019-01-18 RX ORDER — DEXTROSE AND SODIUM CHLORIDE 5; .45 G/100ML; G/100ML
30 INJECTION, SOLUTION INTRAVENOUS CONTINUOUS PRN
Status: DISCONTINUED | OUTPATIENT
Start: 2019-01-18 | End: 2019-01-18 | Stop reason: HOSPADM

## 2019-01-18 RX ADMIN — PROPOFOL 40 MG: 10 INJECTION, EMULSION INTRAVENOUS at 17:42

## 2019-01-18 RX ADMIN — DEXTROSE AND SODIUM CHLORIDE 30 ML/HR: 5; 450 INJECTION, SOLUTION INTRAVENOUS at 16:24

## 2019-01-18 RX ADMIN — PROPOFOL 40 MG: 10 INJECTION, EMULSION INTRAVENOUS at 17:44

## 2019-01-18 RX ADMIN — PROPOFOL 120 MG: 10 INJECTION, EMULSION INTRAVENOUS at 17:40

## 2019-01-18 NOTE — ANESTHESIA POSTPROCEDURE EVALUATION
Patient: Randall Hernandez    Procedure Summary     Date:  01/18/19 Room / Location:  [facility] ENDOSCOPY 1 / [facility] ENDOSCOPY    Anesthesia Start:  1727 Anesthesia Stop:  1750    Procedure:  COLONOSCOPY a friday please  (N/A ) Diagnosis:       Encounter for screening for malignant neoplasm of colon      FH: colon cancer      (Encounter for screening for malignant neoplasm of colon [Z12.11])      (FH: colon cancer [Z80.0])    Surgeon:  Ruslan Bowers MD Provider:  Patric Cohen CRNA    Anesthesia Type:  MAC ASA Status:  2          Anesthesia Type: MAC  Last vitals  BP   151/86 (01/18/19 1614)   Temp   97.4 °F (36.3 °C) (01/18/19 1614)   Pulse   81 (01/18/19 1614)   Resp   18 (01/18/19 1614)     SpO2   96 % (01/18/19 1614)     Post Anesthesia Care and Evaluation    Patient location during evaluation: bedside  Patient participation: complete - patient participated  Level of consciousness: awake and alert  Pain management: adequate  Airway patency: patent  Anesthetic complications: No anesthetic complications  PONV Status: none  Cardiovascular status: acceptable  Respiratory status: acceptable  Hydration status: acceptable

## 2019-01-18 NOTE — ANESTHESIA PREPROCEDURE EVALUATION
Anesthesia Evaluation     NPO Solid Status: > 8 hours  NPO Liquid Status: > 8 hours           Airway   Mallampati: III  TM distance: >3 FB  Neck ROM: full  no difficulty expected and Possible difficult intubation  Dental - normal exam     Pulmonary - normal exam   (+) sleep apnea,   Cardiovascular - normal exam    (+) hypertension, hyperlipidemia,       Neuro/Psych  (+) numbness, psychiatric history,     GI/Hepatic/Renal/Endo    (+) obesity,       Musculoskeletal     Abdominal    Substance History      OB/GYN          Other   (+) arthritis                   Anesthesia Plan    ASA 2     MAC     intravenous induction   Anesthetic plan, all risks, benefits, and alternatives have been provided, discussed and informed consent has been obtained with: patient.

## 2019-01-18 NOTE — H&P
No chief complaint on file.      Subjective    Randall Hernandez is a 48 y.o. male. he is being seen for consultation today at the request of Dr. Borges     History of Present Illness  48-year-old male presents to discuss getting colonoscopy.  He denies any abdominal pain, nausea, vomiting or changes in his bowel habits.  He has reflux that is well controlled PPI daily.  Reports only history of colon cancer in his mother and states she was diagnosed in her 70s.  Plan; schedule patient for initial screening colonoscopy.       Prior to Admission medications    Medication Sig Start Date End Date Taking? Authorizing Provider   amphetamine-dextroamphetamine XR (ADDERALL XR) 25 MG 24 hr capsule Take 1 capsule by mouth Every Morning Take 1 tablet by mouth daily 12/27/18  Yes Cameron Woodard MD   atorvastatin (LIPITOR) 20 MG tablet Take 1 tablet by mouth Daily. 11/28/18  Yes Scottie Borges MD   fluticasone (FLONASE) 50 MCG/ACT nasal spray 2 sprays into the nostril(s) as directed by provider Daily.   Yes Alin Cano MD   lisinopril (PRINIVIL,ZESTRIL) 10 MG tablet TAKE 1 TABLET BY MOUTH DAILY 12/17/18  Yes Scottie Borges MD   montelukast (SINGULAIR) 10 MG tablet Take 10 mg by mouth Every Night.   Yes Alin Cano MD   omeprazole (priLOSEC) 40 MG capsule Take 1 capsule by mouth Daily. 2/9/18  Yes Scottie Borges MD   polyethylene glycol (GoLYTELY) 236 g solution Starting at noon on day prior to procedure, drink 8 ounces every 30 minutes until all gone or stools are clear. May add flavor packet. 12/14/18  Yes Reba Jones APRN   aspirin 81 MG EC tablet Take 1 tablet by mouth Daily. 11/28/18   Scottie Borges MD       The following portions of the patient's history were reviewed and updated as appropriate:   Past Medical History:   Diagnosis Date   • Allergic rhinitis    • Arthritis    • Backache    • Cellulitis of leg, except foot    • Dyslipidemia    • GERD (gastroesophageal reflux disease)    •  Heartburn    • History of Holter monitoring 2016    Sinus mechanism with a normal average heart rate.No evidence of chronotropic incompetence.Asymptomatic Holter   • Hyperlipidemia    • Hypertension    • Low back pain    • Numbness of lower limb     left leg numbness and tingling      • Obesity, unspecified    • Obstructive sleep apnea of adult     USING C-PAP   • Pain in left hip    • Pain in right hip    • Palpitations    • Skin lesion     insect bite.      • Supraventricular tachycardia (CMS/HCC)    • Weight loss     advised     Past Surgical History:   Procedure Laterality Date   • CARPAL TUNNEL RELEASE Right 2018    Procedure: CARPAL TUNNEL RELEASE;  Surgeon: Judd Mathias MD;  Location: [facility];  Service: Orthopedics   • CARPAL TUNNEL RELEASE  2018    Left   • CARPAL TUNNEL RELEASE Left 2018    Procedure: CARPAL TUNNEL RELEASE;  Surgeon: Judd Mathias MD;  Location: [facility];  Service: Orthopedics   • ENDOSCOPY     • PYLOROMYOTOMY       Family History   Problem Relation Age of Onset   • Colon cancer Mother         onset age greater than 75y   • Diabetes Mother    • Breast cancer Sister    • Cancer Brother         bladder   • Diabetes Brother    • Hyperlipidemia Brother        No current facility-administered medications for this encounter.      No Known Allergies  Social History     Socioeconomic History   • Marital status: Single     Spouse name: Not on file   • Number of children: Not on file   • Years of education: Not on file   • Highest education level: Not on file   Occupational History   • Occupation:      Employer: SELF-EMPLOYED     Comment: Patient resides with [place] Addiselaine Tavarez.   Tobacco Use   • Smoking status: Former Smoker     Packs/day: 1.00     Years: 6.00     Pack years: 6.00     Types: Cigarettes     Last attempt to quit:      Years since quittin.0   • Smokeless tobacco: Never Used   Substance and Sexual Activity   • Alcohol  "use: Yes     Comment: 12/14/2018 - Kevin reports consumption of alcoholic beverages under a social basis (approximately 3 - 4 per month).     • Drug use: No   • Sexual activity: Defer       Review of Systems  Review of Systems   Constitutional: Negative for activity change, appetite change, chills, diaphoresis, fatigue, fever and unexpected weight change.   HENT: Negative for sore throat and trouble swallowing.    Respiratory: Negative for shortness of breath.    Gastrointestinal: Negative for abdominal distention, abdominal pain, anal bleeding, blood in stool, constipation, diarrhea, nausea, rectal pain and vomiting.   Musculoskeletal: Negative for arthralgias.   Skin: Negative for pallor.   Neurological: Negative for light-headedness.        Ht 180.3 cm (71\")   Wt 122 kg (270 lb)   BMI 37.66 kg/m²     Objective    Physical Exam   Constitutional: He is oriented to person, place, and time. He appears well-developed and well-nourished. He is cooperative. No distress.   HENT:   Head: Normocephalic and atraumatic.   Neck: Normal range of motion. Neck supple. No thyromegaly present.   Cardiovascular: Normal rate, regular rhythm and normal heart sounds.   Pulmonary/Chest: Effort normal and breath sounds normal. He has no wheezes. He has no rhonchi. He has no rales.   Abdominal: Soft. Normal appearance and bowel sounds are normal. He exhibits no distension. There is no hepatosplenomegaly. There is tenderness in the epigastric area. There is no rigidity and no guarding. No hernia.   Lymphadenopathy:     He has no cervical adenopathy.   Neurological: He is alert and oriented to person, place, and time.   Skin: Skin is warm, dry and intact. No rash noted. No pallor.   Psychiatric: He has a normal mood and affect. His speech is normal.     Office Visit on 06/25/2018   Component Date Value Ref Range Status   • WBC 08/17/2018 5.97  3.20 - 9.80 10*3/mm3 Final   • RBC 08/17/2018 4.68  4.37 - 5.74 10*6/mm3 Final   • Hemoglobin " 08/17/2018 14.1  13.7 - 17.3 g/dL Final   • Hematocrit 08/17/2018 42.4  39.0 - 49.0 % Final   • MCV 08/17/2018 90.6  80.0 - 98.0 fL Final   • MCH 08/17/2018 30.1  26.5 - 34.0 pg Final   • MCHC 08/17/2018 33.3  31.5 - 36.3 g/dL Final   • RDW 08/17/2018 13.3  11.5 - 14.5 % Final   • RDW-SD 08/17/2018 43.6  35.1 - 43.9 fl Final   • MPV 08/17/2018 9.6  8.0 - 12.0 fL Final   • Platelets 08/17/2018 249  150 - 450 10*3/mm3 Final   • Neutrophil % 08/17/2018 53.8  37.0 - 80.0 % Final   • Lymphocyte % 08/17/2018 35.5  10.0 - 50.0 % Final   • Monocyte % 08/17/2018 7.5  0.0 - 12.0 % Final   • Eosinophil % 08/17/2018 2.2  0.0 - 7.0 % Final   • Basophil % 08/17/2018 0.5  0.0 - 2.0 % Final   • Immature Grans % 08/17/2018 0.5  0.0 - 0.5 % Final   • Neutrophils, Absolute 08/17/2018 3.21  2.00 - 8.60 10*3/mm3 Final   • Lymphocytes, Absolute 08/17/2018 2.12  0.60 - 4.20 10*3/mm3 Final   • Monocytes, Absolute 08/17/2018 0.45  0.00 - 0.90 10*3/mm3 Final   • Eosinophils, Absolute 08/17/2018 0.13  0.00 - 0.70 10*3/mm3 Final   • Basophils, Absolute 08/17/2018 0.03  0.00 - 0.20 10*3/mm3 Final   • Immature Grans, Absolute 08/17/2018 0.03* 0.00 - 0.02 10*3/mm3 Final   • Hemoglobin A1C 08/17/2018 5.5  4 - 5.6 % Final   • Total Cholesterol 08/17/2018 174  0 - 199 mg/dL Final   • Triglycerides 08/17/2018 134  20 - 199 mg/dL Final   • HDL Cholesterol 08/17/2018 42* 60 - 200 mg/dL Final   • LDL Cholesterol  08/17/2018 106  1 - 129 mg/dL Final   • LDL/HDL Ratio 08/17/2018 2.50  0.00 - 3.55 Final   • Glucose 08/17/2018 110* 60 - 100 mg/dL Final   • BUN 08/17/2018 18  7 - 21 mg/dL Final   • Creatinine 08/17/2018 0.78  0.70 - 1.30 mg/dL Final   • Sodium 08/17/2018 139  137 - 145 mmol/L Final   • Potassium 08/17/2018 4.7  3.5 - 5.1 mmol/L Final   • Chloride 08/17/2018 105  95 - 110 mmol/L Final   • CO2 08/17/2018 25.0  22.0 - 31.0 mmol/L Final   • Calcium 08/17/2018 8.8  8.4 - 10.2 mg/dL Final   • Total Protein 08/17/2018 7.2  6.3 - 8.6 g/dL Final   •  Albumin 08/17/2018 4.10  3.40 - 4.80 g/dL Final   • ALT (SGPT) 08/17/2018 45  21 - 72 U/L Final   • AST (SGOT) 08/17/2018 28  17 - 59 U/L Final   • Alkaline Phosphatase 08/17/2018 102  38 - 126 U/L Final   • Total Bilirubin 08/17/2018 0.7  0.2 - 1.3 mg/dL Final   • eGFR Non African Amer 08/17/2018 106  63 - 147 mL/min/1.73 Final   • Globulin 08/17/2018 3.1  2.3 - 3.5 gm/dL Final   • A/G Ratio 08/17/2018 1.3  1.1 - 1.8 g/dL Final   • BUN/Creatinine Ratio 08/17/2018 23.1  7.0 - 25.0 Final   • Anion Gap 08/17/2018 9.0  5.0 - 15.0 mmol/L Final     Assessment/Plan      No diagnosis found..       Orders placed during this encounter include:  No orders of the defined types were placed in this encounter.      COLONOSCOPY a friday please  (N/A)    Review and/or summary of lab tests, radiology, procedures, medications. Review and summary of old records and obtaining of history. The risks and benefits of my recommendations, as well as other treatment options were discussed with the patient today. Questions were answered.    No orders of the defined types were placed in this encounter.      Follow-up: No Follow-up on file.          This document has been electronically signed by Ruslan Bowers MD on January 18, 2019 3:17 PM             Results for orders placed or performed in visit on 06/25/18   CBC Auto Differential   Result Value Ref Range    WBC 5.97 3.20 - 9.80 10*3/mm3    RBC 4.68 4.37 - 5.74 10*6/mm3    Hemoglobin 14.1 13.7 - 17.3 g/dL    Hematocrit 42.4 39.0 - 49.0 %    MCV 90.6 80.0 - 98.0 fL    MCH 30.1 26.5 - 34.0 pg    MCHC 33.3 31.5 - 36.3 g/dL    RDW 13.3 11.5 - 14.5 %    RDW-SD 43.6 35.1 - 43.9 fl    MPV 9.6 8.0 - 12.0 fL    Platelets 249 150 - 450 10*3/mm3    Neutrophil % 53.8 37.0 - 80.0 %    Lymphocyte % 35.5 10.0 - 50.0 %    Monocyte % 7.5 0.0 - 12.0 %    Eosinophil % 2.2 0.0 - 7.0 %    Basophil % 0.5 0.0 - 2.0 %    Immature Grans % 0.5 0.0 - 0.5 %    Neutrophils, Absolute 3.21 2.00 - 8.60 10*3/mm3     Lymphocytes, Absolute 2.12 0.60 - 4.20 10*3/mm3    Monocytes, Absolute 0.45 0.00 - 0.90 10*3/mm3    Eosinophils, Absolute 0.13 0.00 - 0.70 10*3/mm3    Basophils, Absolute 0.03 0.00 - 0.20 10*3/mm3    Immature Grans, Absolute 0.03 (H) 0.00 - 0.02 10*3/mm3   Hemoglobin A1c   Result Value Ref Range    Hemoglobin A1C 5.5 4 - 5.6 %   Lipid Panel   Result Value Ref Range    Total Cholesterol 174 0 - 199 mg/dL    Triglycerides 134 20 - 199 mg/dL    HDL Cholesterol 42 (L) 60 - 200 mg/dL    LDL Cholesterol  106 1 - 129 mg/dL    LDL/HDL Ratio 2.50 0.00 - 3.55   Comprehensive Metabolic Panel   Result Value Ref Range    Glucose 110 (H) 60 - 100 mg/dL    BUN 18 7 - 21 mg/dL    Creatinine 0.78 0.70 - 1.30 mg/dL    Sodium 139 137 - 145 mmol/L    Potassium 4.7 3.5 - 5.1 mmol/L    Chloride 105 95 - 110 mmol/L    CO2 25.0 22.0 - 31.0 mmol/L    Calcium 8.8 8.4 - 10.2 mg/dL    Total Protein 7.2 6.3 - 8.6 g/dL    Albumin 4.10 3.40 - 4.80 g/dL    ALT (SGPT) 45 21 - 72 U/L    AST (SGOT) 28 17 - 59 U/L    Alkaline Phosphatase 102 38 - 126 U/L    Total Bilirubin 0.7 0.2 - 1.3 mg/dL    eGFR Non  Amer 106 63 - 147 mL/min/1.73    Globulin 3.1 2.3 - 3.5 gm/dL    A/G Ratio 1.3 1.1 - 1.8 g/dL    BUN/Creatinine Ratio 23.1 7.0 - 25.0    Anion Gap 9.0 5.0 - 15.0 mmol/L   Results for orders placed or performed in visit on 05/30/18   Urine Drug Screen - Urine, Clean Catch   Result Value Ref Range    Amphetamine Screen, Urine Positive (A) Negative    Barbiturates Screen, Urine Negative Negative    Benzodiazepine Screen, Urine Negative Negative    Cocaine Screen, Urine Negative Negative    Methadone Screen, Urine Negative Negative    Opiate Screen Negative Negative    Oxycodone Screen, Urine Negative Negative    THC, Screen, Urine Negative Negative   Results for orders placed or performed in visit on 04/02/18   Urinalysis, Microscopic Only - Urine, Clean Catch   Result Value Ref Range    RBC, UA None Seen None Seen /HPF    WBC, UA 6-12 (A) None  Seen, 0-2, 3-5 /HPF    Bacteria, UA Trace (A) None Seen /HPF    Squamous Epithelial Cells, UA 0-2 None Seen, 0-2 /HPF    Hyaline Casts, UA None Seen None Seen /LPF    Calcium Oxalate Crystals, UA Moderate/2+ None Seen /HPF    Mucus, UA Moderate/2+ (A) None Seen, Trace /HPF    Methodology Manual Light Microscopy    CBC Auto Differential   Result Value Ref Range    WBC 7.60 3.20 - 9.80 10*3/mm3    RBC 5.28 4.37 - 5.74 10*6/mm3    Hemoglobin 15.8 13.7 - 17.3 g/dL    Hematocrit 46.8 39.0 - 49.0 %    MCV 88.6 80.0 - 98.0 fL    MCH 29.9 26.5 - 34.0 pg    MCHC 33.8 31.5 - 36.3 g/dL    RDW 12.6 11.5 - 14.5 %    RDW-SD 40.4 35.1 - 43.9 fl    MPV 10.0 8.0 - 12.0 fL    Platelets 257 150 - 450 10*3/mm3    Neutrophil % 60.4 37.0 - 80.0 %    Lymphocyte % 28.9 10.0 - 50.0 %    Monocyte % 6.6 0.0 - 12.0 %    Eosinophil % 2.6 0.0 - 7.0 %    Basophil % 0.8 0.0 - 2.0 %    Immature Grans % 0.7 (H) 0.0 - 0.5 %    Neutrophils, Absolute 4.59 2.00 - 8.60 10*3/mm3    Lymphocytes, Absolute 2.20 0.60 - 4.20 10*3/mm3    Monocytes, Absolute 0.50 0.00 - 0.90 10*3/mm3    Eosinophils, Absolute 0.20 0.00 - 0.70 10*3/mm3    Basophils, Absolute 0.06 0.00 - 0.20 10*3/mm3    Immature Grans, Absolute 0.05 (H) 0.00 - 0.02 10*3/mm3   Urine Drug Screen - Urine, Clean Catch   Result Value Ref Range    Amphetamine Screen, Urine Negative Negative    Barbiturates Screen, Urine Negative Negative    Benzodiazepine Screen, Urine Negative Negative    Cocaine Screen, Urine Negative Negative    Methadone Screen, Urine Negative Negative    Opiate Screen Negative Negative    Oxycodone Screen, Urine Negative Negative    THC, Screen, Urine Negative Negative   Vitamin D 25 Hydroxy   Result Value Ref Range    25 Hydroxy, Vitamin D 45.0 30.0 - 100.0 ng/ml   Urinalysis - Urine, Clean Catch   Result Value Ref Range    Color, UA Yellow Yellow, Straw    Appearance, UA Clear Clear    pH, UA 6.0 5.0 - 8.0    Specific Gravity, UA 1.030 1.005 - 1.030    Glucose, UA Negative  Negative    Ketones, UA Negative Negative    Bilirubin, UA Small (1+) (A) Negative    Blood, UA Negative Negative    Protein, UA 30 mg/dL (1+) (A) Negative    Leuk Esterase, UA Negative Negative    Nitrite, UA Negative Negative    Urobilinogen, UA 0.2 E.U./dL 0.2 - 1.0 E.U./dL     *Note: Due to a large number of results and/or encounters for the requested time period, some results have not been displayed. A complete set of results can be found in Results Review.

## 2019-01-28 ENCOUNTER — LAB (OUTPATIENT)
Dept: LAB | Facility: HOSPITAL | Age: 49
End: 2019-01-28

## 2019-01-28 DIAGNOSIS — E78.5 HYPERLIPIDEMIA, UNSPECIFIED HYPERLIPIDEMIA TYPE: ICD-10-CM

## 2019-01-28 DIAGNOSIS — I10 ESSENTIAL HYPERTENSION: ICD-10-CM

## 2019-01-28 DIAGNOSIS — Z02.83 ENCOUNTER FOR DRUG SCREENING: ICD-10-CM

## 2019-01-28 LAB
ALBUMIN SERPL-MCNC: 4.1 G/DL (ref 3.4–4.8)
ALBUMIN/GLOB SERPL: 1.5 G/DL (ref 1.1–1.8)
ALP SERPL-CCNC: 107 U/L (ref 38–126)
ALT SERPL W P-5'-P-CCNC: 42 U/L (ref 21–72)
AMPHET+METHAMPHET UR QL: POSITIVE
ANION GAP SERPL CALCULATED.3IONS-SCNC: 8 MMOL/L (ref 5–15)
ARTICHOKE IGE QN: 109 MG/DL (ref 1–129)
AST SERPL-CCNC: 29 U/L (ref 17–59)
BARBITURATES UR QL SCN: NEGATIVE
BASOPHILS # BLD AUTO: 0.04 10*3/MM3 (ref 0–0.2)
BASOPHILS NFR BLD AUTO: 0.7 % (ref 0–2)
BENZODIAZ UR QL SCN: NEGATIVE
BILIRUB SERPL-MCNC: 0.4 MG/DL (ref 0.2–1.3)
BUN BLD-MCNC: 12 MG/DL (ref 7–21)
BUN/CREAT SERPL: 12.5 (ref 7–25)
CALCIUM SPEC-SCNC: 9.7 MG/DL (ref 8.4–10.2)
CANNABINOIDS SERPL QL: NEGATIVE
CHLORIDE SERPL-SCNC: 103 MMOL/L (ref 95–110)
CHOLEST SERPL-MCNC: 168 MG/DL (ref 0–199)
CO2 SERPL-SCNC: 28 MMOL/L (ref 22–31)
COCAINE UR QL: NEGATIVE
CREAT BLD-MCNC: 0.96 MG/DL (ref 0.7–1.3)
DEPRECATED RDW RBC AUTO: 43.1 FL (ref 35.1–43.9)
EOSINOPHIL # BLD AUTO: 0.25 10*3/MM3 (ref 0–0.7)
EOSINOPHIL NFR BLD AUTO: 4.1 % (ref 0–7)
ERYTHROCYTE [DISTWIDTH] IN BLOOD BY AUTOMATED COUNT: 12.9 % (ref 11.5–14.5)
GFR SERPL CREATININE-BSD FRML MDRD: 83 ML/MIN/1.73 (ref 56–130)
GLOBULIN UR ELPH-MCNC: 2.7 GM/DL (ref 2.3–3.5)
GLUCOSE BLD-MCNC: 118 MG/DL (ref 60–100)
HCT VFR BLD AUTO: 43.4 % (ref 39–49)
HDLC SERPL-MCNC: 38 MG/DL (ref 60–200)
HGB BLD-MCNC: 14.4 G/DL (ref 13.7–17.3)
IMM GRANULOCYTES # BLD AUTO: 0.02 10*3/MM3 (ref 0–0.02)
IMM GRANULOCYTES NFR BLD AUTO: 0.3 % (ref 0–0.5)
LDLC/HDLC SERPL: 2.77 {RATIO} (ref 0–3.55)
LYMPHOCYTES # BLD AUTO: 2.34 10*3/MM3 (ref 0.6–4.2)
LYMPHOCYTES NFR BLD AUTO: 38.3 % (ref 10–50)
MCH RBC QN AUTO: 30.3 PG (ref 26.5–34)
MCHC RBC AUTO-ENTMCNC: 33.2 G/DL (ref 31.5–36.3)
MCV RBC AUTO: 91.4 FL (ref 80–98)
METHADONE UR QL SCN: NEGATIVE
MONOCYTES # BLD AUTO: 0.39 10*3/MM3 (ref 0–0.9)
MONOCYTES NFR BLD AUTO: 6.4 % (ref 0–12)
NEUTROPHILS # BLD AUTO: 3.07 10*3/MM3 (ref 2–8.6)
NEUTROPHILS NFR BLD AUTO: 50.2 % (ref 37–80)
OPIATES UR QL: NEGATIVE
OXYCODONE UR QL SCN: NEGATIVE
PLATELET # BLD AUTO: 288 10*3/MM3 (ref 150–450)
PMV BLD AUTO: 9.4 FL (ref 8–12)
POTASSIUM BLD-SCNC: 4.7 MMOL/L (ref 3.5–5.1)
PROT SERPL-MCNC: 6.8 G/DL (ref 6.3–8.6)
RBC # BLD AUTO: 4.75 10*6/MM3 (ref 4.37–5.74)
SODIUM BLD-SCNC: 139 MMOL/L (ref 137–145)
TRIGL SERPL-MCNC: 124 MG/DL (ref 20–199)
WBC NRBC COR # BLD: 6.11 10*3/MM3 (ref 3.2–9.8)

## 2019-01-28 PROCEDURE — 85025 COMPLETE CBC W/AUTO DIFF WBC: CPT

## 2019-01-28 PROCEDURE — 80307 DRUG TEST PRSMV CHEM ANLYZR: CPT

## 2019-01-28 PROCEDURE — 80053 COMPREHEN METABOLIC PANEL: CPT

## 2019-01-28 PROCEDURE — 80061 LIPID PANEL: CPT

## 2019-02-05 RX ORDER — ASPIRIN 81 MG/1
TABLET, COATED ORAL
Qty: 30 TABLET | Refills: 0 | OUTPATIENT
Start: 2019-02-05

## 2019-02-07 ENCOUNTER — OFFICE VISIT (OUTPATIENT)
Dept: FAMILY MEDICINE CLINIC | Facility: CLINIC | Age: 49
End: 2019-02-07

## 2019-02-07 VITALS
WEIGHT: 274.1 LBS | OXYGEN SATURATION: 96 % | SYSTOLIC BLOOD PRESSURE: 134 MMHG | HEART RATE: 89 BPM | TEMPERATURE: 98.3 F | BODY MASS INDEX: 38.37 KG/M2 | HEIGHT: 71 IN | DIASTOLIC BLOOD PRESSURE: 86 MMHG

## 2019-02-07 DIAGNOSIS — I10 ESSENTIAL HYPERTENSION: Primary | ICD-10-CM

## 2019-02-07 DIAGNOSIS — K21.9 GASTROESOPHAGEAL REFLUX DISEASE, ESOPHAGITIS PRESENCE NOT SPECIFIED: ICD-10-CM

## 2019-02-07 DIAGNOSIS — Z87.898 HISTORY OF PREDIABETES: ICD-10-CM

## 2019-02-07 PROCEDURE — 99214 OFFICE O/P EST MOD 30 MIN: CPT | Performed by: FAMILY MEDICINE

## 2019-02-07 RX ORDER — OMEPRAZOLE 40 MG/1
40 CAPSULE, DELAYED RELEASE ORAL DAILY
Qty: 30 CAPSULE | Refills: 11 | Status: SHIPPED | OUTPATIENT
Start: 2019-02-07 | End: 2019-06-13 | Stop reason: SDUPTHER

## 2019-02-07 RX ORDER — ATORVASTATIN CALCIUM 20 MG/1
20 TABLET, FILM COATED ORAL DAILY
Qty: 30 TABLET | Refills: 3 | Status: SHIPPED | OUTPATIENT
Start: 2019-02-07 | End: 2019-04-22 | Stop reason: SDUPTHER

## 2019-02-07 RX ORDER — FLUTICASONE PROPIONATE 50 MCG
2 SPRAY, SUSPENSION (ML) NASAL DAILY
Qty: 1 BOTTLE | Refills: 5 | Status: SHIPPED | OUTPATIENT
Start: 2019-02-07 | End: 2019-06-13 | Stop reason: SDUPTHER

## 2019-02-07 RX ORDER — DEXTROAMPHETAMINE SACCHARATE, AMPHETAMINE ASPARTATE MONOHYDRATE, DEXTROAMPHETAMINE SULFATE AND AMPHETAMINE SULFATE 6.25; 6.25; 6.25; 6.25 MG/1; MG/1; MG/1; MG/1
25 CAPSULE, EXTENDED RELEASE ORAL EVERY MORNING
Qty: 30 CAPSULE | Refills: 0 | Status: SHIPPED | OUTPATIENT
Start: 2019-02-07 | End: 2019-03-06 | Stop reason: SDUPTHER

## 2019-02-07 RX ORDER — LISINOPRIL 10 MG/1
10 TABLET ORAL DAILY
Qty: 30 TABLET | Refills: 3 | Status: SHIPPED | OUTPATIENT
Start: 2019-02-07 | End: 2019-08-19 | Stop reason: SDUPTHER

## 2019-02-07 RX ORDER — MONTELUKAST SODIUM 10 MG/1
10 TABLET ORAL NIGHTLY
Qty: 30 TABLET | Refills: 5 | Status: SHIPPED | OUTPATIENT
Start: 2019-02-07 | End: 2019-06-13 | Stop reason: SDUPTHER

## 2019-02-07 NOTE — PROGRESS NOTES
Subjective   Pt of Dr. CHLOE Borges, covering vacation.     Randall Hernandez is a 48 y.o. obese white male former smoker with HTN, High cholesterol, Allergies , ADHD controlled on meds. Pt presents for exam , discuss Tx and F/Un plans   Last visit  ' ADHD controlled on med, need refill.  S/P Carpal tunnel surgery with Dr. Mathias. Heart burn has been controlled on med,Have Coloscopy scheduled with Dr. Mata.  B/P has been controlled'.     Today   ' Had Colonoscopy. B/P has been good. Able to stay on focus with current meds, needs refill'.    Mental Health Problem   This is a chronic problem.   The degree of incapacity that he is experiencing as a consequence of his illness is moderate. Sequelae of the illness include an inability to work. Additional symptoms of the illness include attention impairment. Additional symptoms of the illness do not include fatigue, headaches or abdominal pain.   Hypertension   This is a chronic problem. The current episode started more than 1 year ago. The problem is controlled. Pertinent negatives include no chest pain, headaches, neck pain, palpitations or shortness of breath. Risk factors for coronary artery disease include dyslipidemia, male gender, obesity and smoking/tobacco exposure. Past treatments include ACE inhibitors. Current antihypertension treatment includes ACE inhibitors. The current treatment provides significant improvement.   Heartburn   He complains of heartburn. He reports no abdominal pain, no chest pain, no coughing, no nausea or no sore throat. This is a recurrent problem. The problem occurs occasionally. The problem has been gradually improving. The heartburn is of moderate intensity. Pertinent negatives include no fatigue. He has tried a PPI for the symptoms. The treatment provided significant relief.        The following portions of the patient's history were reviewed and updated as appropriate: allergies, current medications, past family history, past  medical history, past social history, past surgical history and problem list.    Review of Systems   Constitutional: Negative for chills, diaphoresis, fatigue and fever.   HENT: Positive for postnasal drip. Negative for congestion and sore throat.    Eyes: Negative.    Respiratory: Negative.  Negative for cough and shortness of breath.    Cardiovascular: Negative.  Negative for chest pain and palpitations.   Gastrointestinal: Positive for constipation and heartburn. Negative for abdominal pain, diarrhea, nausea and vomiting.   Endocrine: Negative.    Genitourinary: Negative.  Negative for dysuria, frequency and hematuria.   Musculoskeletal: Positive for arthralgias and joint swelling. Negative for myalgias and neck pain.   Skin: Negative for rash.        S/P Carpal tunnel surgery   Allergic/Immunologic: Positive for environmental allergies.   Neurological: Negative for weakness, numbness and headaches.   Hematological: Negative.    Psychiatric/Behavioral: The patient is nervous/anxious.        Objective   Physical Exam   Constitutional: He is oriented to person, place, and time. He appears well-developed and well-nourished. No distress.   HENT:   Head: Normocephalic.   Right Ear: External ear normal.   Left Ear: External ear normal.   Nose: Nose normal.   Mouth/Throat: Oropharynx is clear and moist.    Wax in ears.   Eyes: Conjunctivae and EOM are normal. Pupils are equal, round, and reactive to light. Right eye exhibits no discharge. Left eye exhibits no discharge. No scleral icterus.   Neck: Normal range of motion. Neck supple. No JVD present. No tracheal deviation present. No thyromegaly present.   Cardiovascular: Normal rate, regular rhythm, normal heart sounds and intact distal pulses. Exam reveals no gallop and no friction rub.   No murmur heard.  Pulmonary/Chest: Effort normal and breath sounds normal. No stridor. No respiratory distress. He has no wheezes. He has no rales. He exhibits no tenderness.    Abdominal: Soft. Bowel sounds are normal. He exhibits no distension and no mass. There is no tenderness. There is no rebound and no guarding. No hernia.   Musculoskeletal: Normal range of motion. He exhibits no edema, tenderness or deformity.   Lymphadenopathy:     He has no cervical adenopathy.   Neurological: He is alert and oriented to person, place, and time. He has normal reflexes. He displays normal reflexes. No cranial nerve deficit or sensory deficit. He exhibits normal muscle tone. Coordination normal.   Skin: Skin is warm and dry. No rash noted. He is not diaphoretic. No erythema. No pallor.   Psychiatric: He has a normal mood and affect. His behavior is normal. Judgment and thought content normal.   Nursing note and vitals reviewed.      Assessment/Plan   Randall was seen today for adhd.    Diagnoses and all orders for this visit:    Essential hypertension    Gastroesophageal reflux disease, esophagitis presence not specified    History of prediabetes    Other orders  -     amphetamine-dextroamphetamine XR (ADDERALL XR) 25 MG 24 hr capsule; Take 1 capsule by mouth Every Morning Take 1 tablet by mouth daily  -     lisinopril (PRINIVIL,ZESTRIL) 10 MG tablet; Take 1 tablet by mouth Daily.  -     omeprazole (priLOSEC) 40 MG capsule; Take 1 capsule by mouth Daily.  -     atorvastatin (LIPITOR) 20 MG tablet; Take 1 tablet by mouth Daily.  -     montelukast (SINGULAIR) 10 MG tablet; Take 1 tablet by mouth Every Night.  -     fluticasone (FLONASE) 50 MCG/ACT nasal spray; 2 sprays into the nostril(s) as directed by provider Daily.      Discussed exam, health problems, meds, indications, Refills,  safety with controlled meds. Discussed USPSTF recommendations. Discussed F/U plan.           This document has been electronically signed by Cameron Woodard MD

## 2019-03-02 NOTE — PROGRESS NOTES
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    Genitourinary: Negative.  Negative for dysuria, frequency and hematuria.   Musculoskeletal: Positive for arthralgias, joint swelling and stiffness. Negative for gout, myalgias and neck pain.   Skin: Positive for rash. Negative for itching.        Bilateral thumb pain.     Allergic/Immunologic: Positive for environmental allergies.   Neurological: Positive for tingling, weakness and numbness. Negative for vertigo.   Hematological: Negative.    Psychiatric/Behavioral: The patient is nervous/anxious.        Physical Exam   Constitutional: He is oriented to person, place, and time. He appears well-developed and well-nourished. No distress.   HENT:   Head: Normocephalic and atraumatic.       Right Ear: External ear normal.   Left Ear: External ear normal.   Eyes: Conjunctivae and EOM are normal. Pupils are equal, round, and reactive to light. Right eye exhibits no discharge. Left eye exhibits no discharge. No scleral icterus.   Cerumen in both ears    Neck: Normal range of motion. Neck supple. No JVD present. No tracheal deviation present. No thyromegaly present.   Cardiovascular: Normal rate, regular rhythm and normal heart sounds.   Pulmonary/Chest: Effort normal. No stridor. No respiratory distress. He has no wheezes.   Abdominal: Soft. He exhibits no distension and no mass. There is tenderness. There is  no rebound and no guarding. No hernia.   Obese abdomen     Pain in left lower quadrant on palpation   Musculoskeletal: He exhibits tenderness. He exhibits no edema or deformity.        Right elbow: He exhibits decreased range of motion and swelling. Tenderness found.        Left elbow: He exhibits decreased range of motion and swelling. Tenderness found.        Right wrist: He exhibits decreased range of motion, tenderness and bony tenderness.        Left wrist: He exhibits decreased range of motion, tenderness and bony tenderness.        Arms:  Lymphadenopathy:     He has no cervical adenopathy.   Neurological: He is alert and oriented to person, place, and time. No cranial nerve deficit. Coordination normal.   Skin: Skin is warm and dry. He is not diaphoretic. No erythema. No pallor.   Pruritic rash on right lower leg. Slight tenderness no swelling no drainage. Itchy    Psychiatric: He has a normal mood and affect.   Nursing note and vitals reviewed.

## 2019-03-06 ENCOUNTER — OFFICE VISIT (OUTPATIENT)
Dept: FAMILY MEDICINE CLINIC | Facility: CLINIC | Age: 49
End: 2019-03-06

## 2019-03-06 VITALS
TEMPERATURE: 99 F | WEIGHT: 254.6 LBS | SYSTOLIC BLOOD PRESSURE: 120 MMHG | BODY MASS INDEX: 35.64 KG/M2 | OXYGEN SATURATION: 96 % | HEART RATE: 85 BPM | DIASTOLIC BLOOD PRESSURE: 80 MMHG | HEIGHT: 71 IN

## 2019-03-06 DIAGNOSIS — Z98.890 S/P CARPAL TUNNEL RELEASE: ICD-10-CM

## 2019-03-06 DIAGNOSIS — E78.5 HYPERLIPIDEMIA, UNSPECIFIED HYPERLIPIDEMIA TYPE: ICD-10-CM

## 2019-03-06 DIAGNOSIS — F90.2 ATTENTION DEFICIT HYPERACTIVITY DISORDER (ADHD), COMBINED TYPE: Primary | ICD-10-CM

## 2019-03-06 DIAGNOSIS — J01.91 ACUTE RECURRENT SINUSITIS, UNSPECIFIED LOCATION: ICD-10-CM

## 2019-03-06 DIAGNOSIS — R41.840 INATTENTION: ICD-10-CM

## 2019-03-06 DIAGNOSIS — E66.9 NON MORBID OBESITY: ICD-10-CM

## 2019-03-06 DIAGNOSIS — R73.03 PREDIABETES: ICD-10-CM

## 2019-03-06 DIAGNOSIS — I10 ESSENTIAL HYPERTENSION: ICD-10-CM

## 2019-03-06 PROCEDURE — 99214 OFFICE O/P EST MOD 30 MIN: CPT | Performed by: FAMILY MEDICINE

## 2019-03-06 RX ORDER — DOXYCYCLINE 50 MG/1
100 CAPSULE ORAL 2 TIMES DAILY
Qty: 20 CAPSULE | Refills: 0 | Status: SHIPPED | OUTPATIENT
Start: 2019-03-06 | End: 2019-05-08

## 2019-03-06 RX ORDER — DEXTROAMPHETAMINE SACCHARATE, AMPHETAMINE ASPARTATE MONOHYDRATE, DEXTROAMPHETAMINE SULFATE AND AMPHETAMINE SULFATE 6.25; 6.25; 6.25; 6.25 MG/1; MG/1; MG/1; MG/1
25 CAPSULE, EXTENDED RELEASE ORAL EVERY MORNING
Qty: 30 CAPSULE | Refills: 0 | Status: SHIPPED | OUTPATIENT
Start: 2019-03-06 | End: 2019-04-08 | Stop reason: SDUPTHER

## 2019-03-06 NOTE — PATIENT INSTRUCTIONS
[facility]I POT    Steam therapy     Sinusitis, Adult  Sinusitis is soreness and inflammation of your sinuses. Sinuses are hollow spaces in the bones around your face. Your sinuses are located:  · Around your eyes.  · In the middle of your forehead.  · Behind your nose.  · In your cheekbones.    Your sinuses and nasal passages are lined with a stringy fluid (mucus). Mucus normally drains out of your sinuses. When your nasal tissues become inflamed or swollen, the mucus can become trapped or blocked so air cannot flow through your sinuses. This allows bacteria, viruses, and funguses to grow, which leads to infection.  Sinusitis can develop quickly and last for 7?10 days (acute) or for more than 12 weeks (chronic). Sinusitis often develops after a cold.  What are the causes?  This condition is caused by anything that creates swelling in the sinuses or stops mucus from draining, including:  · Allergies.  · Asthma.  · Bacterial or viral infection.  · Abnormally shaped bones between the nasal passages.  · Nasal growths that contain mucus (nasal polyps).  · Narrow sinus openings.  · Pollutants, such as chemicals or irritants in the air.  · A foreign object stuck in the nose.  · A fungal infection. This is rare.    What increases the risk?  The following factors may make you more likely to develop this condition:  · Having allergies or asthma.  · Having had a recent cold or respiratory tract infection.  · Having structural deformities or blockages in your nose or sinuses.  · Having a weak immune system.  · Doing a lot of swimming or diving.  · Overusing nasal sprays.  · Smoking.    What are the signs or symptoms?  The main symptoms of this condition are pain and a feeling of pressure around the affected sinuses. Other symptoms include:  · Upper toothache.  · Earache.  · Headache.  · Bad breath.  · Decreased sense of smell and taste.  · A cough that may get worse at night.  · Fatigue.  · Fever.  · Thick drainage from your nose.  The drainage is often green and it may contain pus (purulent).  · Stuffy nose or congestion.  · Postnasal drip. This is when extra mucus collects in the throat or back of the nose.  · Swelling and warmth over the affected sinuses.  · Sore throat.  · Sensitivity to light.    How is this diagnosed?  This condition is diagnosed based on symptoms, a medical history, and a physical exam. To find out if your condition is acute or chronic, your health care provider may:  · Look in your nose for signs of nasal polyps.  · Tap over the affected sinus to check for signs of infection.  · View the inside of your sinuses using an imaging device that has a light attached (endoscope).    If your health care provider suspects that you have chronic sinusitis, you may also:  · Be tested for allergies.  · Have a sample of mucus taken from your nose (nasal culture) and checked for bacteria.  · Have a mucus sample examined to see if your sinusitis is related to an allergy.    If your sinusitis does not respond to treatment and it lasts longer than 8 weeks, you may have an MRI or CT scan to check your sinuses. These scans also help to determine how severe your infection is.  In rare cases, a bone biopsy may be done to rule out more serious types of fungal sinus disease.  How is this treated?  Treatment for sinusitis depends on the cause and whether your condition is chronic or acute. If a virus is causing your sinusitis, your symptoms will go away on their own within 10 days. You may be given medicines to relieve your symptoms, including:  · Topical nasal decongestants. They shrink swollen nasal passages and let mucus drain from your sinuses.  · Antihistamines. These drugs block inflammation that is triggered by allergies. This can help to ease swelling in your nose and sinuses.  · Topical nasal corticosteroids. These are nasal sprays that ease inflammation and swelling in your nose and sinuses.  · Nasal saline washes. These rinses can  help to get rid of thick mucus in your nose.    If your condition is caused by bacteria, you will be given an antibiotic medicine. If your condition is caused by a fungus, you will be given an antifungal medicine.  Surgery may be needed to correct underlying conditions, such as narrow nasal passages. Surgery may also be needed to remove polyps.  Follow these instructions at home:  Medicines  · Take, use, or apply over-the-counter and prescription medicines only as told by your health care provider. These may include nasal sprays.  · If you were prescribed an antibiotic medicine, take it as told by your health care provider. Do not stop taking the antibiotic even if you start to feel better.  Hydrate and Humidify  · Drink enough water to keep your urine clear or pale yellow. Staying hydrated will help to thin your mucus.  · Use a cool mist humidifier to keep the humidity level in your home above 50%.  · Inhale steam for 10-15 minutes, 3-4 times a day or as told by your health care provider. You can do this in the bathroom while a hot shower is running.  · Limit your exposure to cool or dry air.  Rest  · Rest as much as possible.  · Sleep with your head raised (elevated).  · Make sure to get enough sleep each night.  General instructions  · Apply a warm, moist washcloth to your face 3-4 times a day or as told by your health care provider. This will help with discomfort.  · Wash your hands often with soap and water to reduce your exposure to viruses and other germs. If soap and water are not available, use hand .  · Do not smoke. Avoid being around people who are smoking (secondhand smoke).  · Keep all follow-up visits as told by your health care provider. This is important.  Contact a health care provider if:  · You have a fever.  · Your symptoms get worse.  · Your symptoms do not improve within 10 days.  Get help right away if:  · You have a severe headache.  · You have persistent vomiting.  · You have pain  or swelling around your face or eyes.  · You have vision problems.  · You develop confusion.  · Your neck is stiff.  · You have trouble breathing.  This information is not intended to replace advice given to you by your health care provider. Make sure you discuss any questions you have with your health care provider.  Document Released: 12/18/2006 Document Revised: 08/13/2017 Document Reviewed: 10/12/2016  [employer] Interactive Patient Education © 2018 [employer]

## 2019-04-03 RX ORDER — FLUTICASONE PROPIONATE 50 MCG
SPRAY, SUSPENSION (ML) NASAL
Qty: 16 ML | Refills: 0 | Status: CANCELLED | OUTPATIENT
Start: 2019-04-03

## 2019-04-03 RX ORDER — OMEPRAZOLE 40 MG/1
CAPSULE, DELAYED RELEASE ORAL
Qty: 30 CAPSULE | Refills: 0 | Status: CANCELLED | OUTPATIENT
Start: 2019-04-03

## 2019-04-03 RX ORDER — ATORVASTATIN CALCIUM 20 MG/1
20 TABLET, FILM COATED ORAL DAILY
Qty: 30 TABLET | Refills: 0 | Status: CANCELLED | OUTPATIENT
Start: 2019-04-03

## 2019-04-04 RX ORDER — ATORVASTATIN CALCIUM 20 MG/1
20 TABLET, FILM COATED ORAL DAILY
Qty: 30 TABLET | Refills: 0 | Status: SHIPPED | OUTPATIENT
Start: 2019-04-04 | End: 2019-05-06

## 2019-04-04 RX ORDER — OMEPRAZOLE 40 MG/1
CAPSULE, DELAYED RELEASE ORAL
Qty: 30 CAPSULE | Refills: 0 | Status: SHIPPED | OUTPATIENT
Start: 2019-04-04 | End: 2019-04-05

## 2019-04-04 RX ORDER — FLUTICASONE PROPIONATE 50 MCG
SPRAY, SUSPENSION (ML) NASAL
Qty: 16 ML | Refills: 0 | Status: SHIPPED | OUTPATIENT
Start: 2019-04-04 | End: 2019-05-08 | Stop reason: SDUPTHER

## 2019-04-06 NOTE — PROGRESS NOTES
Subjective:  Randall Hernandez is a 48 y.o. male who presents for       Patient Active Problem List   Diagnosis   • Hyperlipidemia   • Inattention   • Non morbid obesity   • Prediabetes   • Attention deficit hyperactivity disorder (ADHD), combined type   • Need for vaccination   • Gastroesophageal reflux disease   • Encounter for drug screening   • Tingling in extremities   • Elevated BP without diagnosis of hypertension   • Allergic rhinitis   • Elevated blood pressure reading   • Pruritic rash   • Numbness in both hands   • Bilateral elbow joint pain   • Pain in both wrists   • Ulnar neuropathy of left upper extremity   • General medical examination   • Bilateral carpal tunnel syndrome   • History of prediabetes   • Numbness and tingling in both hands   • Essential hypertension   • Cerumen debris on tympanic membrane of both ears   • Carpal tunnel syndrome   • S/P carpal tunnel release   • Encounter for screening for malignant neoplasm of colon   • FH: colon cancer   • Acute recurrent sinusitis           Current Outpatient Medications:   •  amphetamine-dextroamphetamine XR (ADDERALL XR) 25 MG 24 hr capsule, Take 1 capsule by mouth Every Morning Take 1 tablet by mouth daily, Disp: 30 capsule, Rfl: 0  •  aspirin 81 MG EC tablet, Take 1 tablet by mouth Daily., Disp: 30 tablet, Rfl: 3  •  atorvastatin (LIPITOR) 20 MG tablet, Take 1 tablet by mouth Daily., Disp: 30 tablet, Rfl: 3  •  atorvastatin (LIPITOR) 20 MG tablet, TAKE 1 TABLET BY MOUTH DAILY, Disp: 30 tablet, Rfl: 0  •  cyclobenzaprine (FLEXERIL) 5 MG tablet, Take 1 tablet by mouth 3 (Three) Times a Day As Needed for Muscle Spasms., Disp: 30 tablet, Rfl: 3  •  doxycycline (MONODOX) 50 MG capsule, Take 2 capsules by mouth 2 (Two) Times a Day., Disp: 20 capsule, Rfl: 0  •  fluticasone (FLONASE) 50 MCG/ACT nasal spray, 2 sprays into the nostril(s) as directed by provider Daily., Disp: 1 bottle, Rfl: 5  •  fluticasone (FLONASE) 50 MCG/ACT nasal spray, INSTILL 2  "SPRAYS IN EACH NOSTRIL ONCE DAILY, Disp: 16 mL, Rfl: 0  •  lisinopril (PRINIVIL,ZESTRIL) 10 MG tablet, Take 1 tablet by mouth Daily., Disp: 30 tablet, Rfl: 3  •  meloxicam (MOBIC) 7.5 MG tablet, Take 1 tablet by mouth Daily., Disp: 30 tablet, Rfl: 3  •  montelukast (SINGULAIR) 10 MG tablet, Take 1 tablet by mouth Every Night., Disp: 30 tablet, Rfl: 5  •  omeprazole (priLOSEC) 40 MG capsule, Take 1 capsule by mouth Daily., Disp: 30 capsule, Rfl: 11    Pt is 47 yo male with history of obesity, gERD, allergic rhinitis ADHD, HLP who is here for recheck. And refills on ADHD medication. He is doing well no chest pain . Pt is doing well postop after having bilateral carpal tunnel release although his left hand gives him issues intermittently.  No chest pain no dizziness. His other issue is back pian in lower back  With bilateral sciatica  that has been going for years. It is worse when he bends and stands for prolong periods. Pain is about 8/10 on severity at times. He does hear a \"popping\" sound in his back periodically     Obesity   This is a chronic problem. The current episode started more than 1 year ago. The problem occurs constantly. The problem has been unchanged. Associated symptoms include arthralgias and numbness. Pertinent negatives include no abdominal pain, anorexia, change in bowel habit, chest pain, chills, congestion, coughing, diaphoresis, fatigue, fever, headaches, joint swelling, myalgias, nausea, rash, sore throat, swollen glands, urinary symptoms, vertigo, visual change, vomiting or weakness. Nothing aggravates the symptoms. He has tried nothing for the symptoms. The treatment provided no relief.   Mental Health Problem   The primary symptoms do not include dysphoric mood, delusions, hallucinations, bizarre behavior, disorganized speech, negative symptoms or somatic symptoms. This is a chronic problem.   The degree of incapacity that he is experiencing as a consequence of his illness is mild. " Additional symptoms of the illness include attention impairment. Additional symptoms of the illness do not include anhedonia, insomnia, hypersomnia, appetite change, unexpected weight change, fatigue, agitation, psychomotor retardation, feelings of worthlessness, euphoric mood, increased goal-directed activity, flight of ideas, inflated self-esteem, decreased need for sleep, distractible, poor judgment, visual change, headaches or abdominal pain. He does not admit to suicidal ideas. He does not have a plan to commit suicide. He does not contemplate injuring another person.   Back Pain   This is a chronic problem. The current episode started more than 1 month ago. The problem occurs constantly. The problem is unchanged. The pain is present in the lumbar spine. The quality of the pain is described as aching. The pain is at a severity of 5/10. The symptoms are aggravated by lying down, position, sitting and standing. Associated symptoms include numbness. Pertinent negatives include no abdominal pain, bladder incontinence, bowel incontinence, chest pain, dysuria, fever, headaches, leg pain, paresis, paresthesias, pelvic pain, perianal numbness, tingling or weakness. He has tried nothing for the symptoms. The treatment provided no relief.    Wrist Pain    The pain is present in the left elbow, left wrist, right wrist and right elbow. This is a chronic problem. The current episode started more than 1 month ago. The problem occurs constantly. The problem has been gradually worsening. The quality of the pain is described as aching. The pain is at a severity of 9/10. The pain is moderate. Associated symptoms include joint locking, a limited range of motion, numbness, stiffness and tingling. Pertinent negatives include no fever, inability to bear weight, itching or joint swelling. The symptoms are aggravated by activity. He has tried NSAIDS for the symptoms. The treatment provided no relief. Family history does not include  gout or rheumatoid arthritis. There is no history of diabetes, gout, osteoarthritis or rheumatoid arthritis.   Elbow Injury   This is a chronic problem. The current episode started more than 1 year ago. The problem occurs constantly. The problem has been gradually worsening. Associated symptoms include arthralgias, joint swelling, numbness, a rash and weakness. Pertinent negatives include no abdominal pain, anorexia, change in bowel habit, chest pain, chills, congestion, coughing, diaphoresis, fatigue, fever, headaches, myalgias, nausea, neck pain, sore throat, swollen glands, urinary symptoms, vertigo, visual change or vomiting. The symptoms are aggravated by bending. He has tried NSAIDs for the symptoms. The treatment provided mild relief.   Hypertension   This is a new problem. The current episode started more than 1 month ago. The problem has been gradually worsening since onset. The problem is uncontrolled. Pertinent negatives include no anxiety, blurred vision, chest pain, headaches, malaise/fatigue, neck pain, orthopnea, palpitations, peripheral edema, PND, shortness of breath or sweats. Risk factors for coronary artery disease include male gender, obesity and sedentary lifestyle. Past treatments include nothing. There are no compliance problems.         Review of Systems  Review of Systems   Constitutional: Negative for activity change, appetite change, chills, diaphoresis, fatigue, fever and unexpected weight change.   HENT: Negative for congestion, postnasal drip, rhinorrhea, sinus pressure, sinus pain, sneezing, sore throat, trouble swallowing and voice change.    Respiratory: Negative for cough, choking, chest tightness, shortness of breath, wheezing and stridor.    Cardiovascular: Negative for chest pain.   Gastrointestinal: Negative for abdominal pain, anorexia, bowel incontinence, change in bowel habit, diarrhea, nausea and vomiting.   Genitourinary: Negative for bladder incontinence, dysuria and  pelvic pain.   Musculoskeletal: Positive for arthralgias and back pain. Negative for joint swelling and myalgias.   Skin: Negative for rash.   Neurological: Positive for numbness. Negative for vertigo, tingling, weakness, headaches and paresthesias.   Psychiatric/Behavioral: Positive for decreased concentration. Negative for agitation, dysphoric mood and hallucinations. The patient does not have insomnia.        Patient Active Problem List   Diagnosis   • Hyperlipidemia   • Inattention   • Non morbid obesity   • Prediabetes   • Attention deficit hyperactivity disorder (ADHD), combined type   • Need for vaccination   • Gastroesophageal reflux disease   • Encounter for drug screening   • Tingling in extremities   • Elevated BP without diagnosis of hypertension   • Allergic rhinitis   • Elevated blood pressure reading   • Pruritic rash   • Numbness in both hands   • Bilateral elbow joint pain   • Pain in both wrists   • Ulnar neuropathy of left upper extremity   • General medical examination   • Bilateral carpal tunnel syndrome   • History of prediabetes   • Numbness and tingling in both hands   • Essential hypertension   • Cerumen debris on tympanic membrane of both ears   • Carpal tunnel syndrome   • S/P carpal tunnel release   • Encounter for screening for malignant neoplasm of colon   • FH: colon cancer   • Acute recurrent sinusitis     Past Surgical History:   Procedure Laterality Date   • CARPAL TUNNEL RELEASE Right 11/2/2018    Procedure: CARPAL TUNNEL RELEASE;  Surgeon: Judd Mathias MD;  Location: [facility] OR;  Service: Orthopedics   • CARPAL TUNNEL RELEASE  12/07/2018    Left   • CARPAL TUNNEL RELEASE Left 12/7/2018    Procedure: CARPAL TUNNEL RELEASE;  Surgeon: Judd Mathias MD;  Location: [facility] OR;  Service: Orthopedics   • COLONOSCOPY N/A 1/18/2019    Procedure: COLONOSCOPY a friday please ;  Surgeon: Ruslan Bowers MD;  Location: [facility] ENDOSCOPY;  Service: Gastroenterology   • ENDOSCOPY      • PYLOROMYOTOMY       Social History     Socioeconomic History   • Marital status: Single     Spouse name: Not on file   • Number of children: Not on file   • Years of education: Not on file   • Highest education level: Not on file   Occupational History   • Occupation:      Employer: SELF-EMPLOYED     Comment: Patient resides with Addis Rodriguez.   Tobacco Use   • Smoking status: Former Smoker     Packs/day: 1.00     Years: 6.00     Pack years: 6.00     Types: Cigarettes     Last attempt to quit:      Years since quittin.2   • Smokeless tobacco: Never Used   Substance and Sexual Activity   • Alcohol use: Yes     Comment: 2018 - Kevin reports consumption of alcoholic beverages under a social basis (approximately 3 - 4 per month).     • Drug use: No   • Sexual activity: Defer     Family History   Problem Relation Age of Onset   • Colon cancer Mother         onset age greater than 75y   • Diabetes Mother    • Breast cancer Sister    • Cancer Brother         bladder   • Diabetes Brother    • Hyperlipidemia Brother      Lab on 2019   Component Date Value Ref Range Status   • WBC 2019 6.11  3.20 - 9.80 10*3/mm3 Final   • RBC 2019 4.75  4.37 - 5.74 10*6/mm3 Final   • Hemoglobin 2019 14.4  13.7 - 17.3 g/dL Final   • Hematocrit 2019 43.4  39.0 - 49.0 % Final   • MCV 2019 91.4  80.0 - 98.0 fL Final   • MCH 2019 30.3  26.5 - 34.0 pg Final   • MCHC 2019 33.2  31.5 - 36.3 g/dL Final   • RDW 2019 12.9  11.5 - 14.5 % Final   • RDW-SD 2019 43.1  35.1 - 43.9 fl Final   • MPV 2019 9.4  8.0 - 12.0 fL Final   • Platelets 2019 288  150 - 450 10*3/mm3 Final   • Neutrophil % 2019 50.2  37.0 - 80.0 % Final   • Lymphocyte % 2019 38.3  10.0 - 50.0 % Final   • Monocyte % 2019 6.4  0.0 - 12.0 % Final   • Eosinophil % 2019 4.1  0.0 - 7.0 % Final   • Basophil % 2019 0.7  0.0 - 2.0 % Final   • Immature  Grans % 01/28/2019 0.3  0.0 - 0.5 % Final   • Neutrophils, Absolute 01/28/2019 3.07  2.00 - 8.60 10*3/mm3 Final   • Lymphocytes, Absolute 01/28/2019 2.34  0.60 - 4.20 10*3/mm3 Final   • Monocytes, Absolute 01/28/2019 0.39  0.00 - 0.90 10*3/mm3 Final   • Eosinophils, Absolute 01/28/2019 0.25  0.00 - 0.70 10*3/mm3 Final   • Basophils, Absolute 01/28/2019 0.04  0.00 - 0.20 10*3/mm3 Final   • Immature Grans, Absolute 01/28/2019 0.02  0.00 - 0.02 10*3/mm3 Final   • Glucose 01/28/2019 118* 60 - 100 mg/dL Final   • BUN 01/28/2019 12  7 - 21 mg/dL Final   • Creatinine 01/28/2019 0.96  0.70 - 1.30 mg/dL Final   • Sodium 01/28/2019 139  137 - 145 mmol/L Final   • Potassium 01/28/2019 4.7  3.5 - 5.1 mmol/L Final   • Chloride 01/28/2019 103  95 - 110 mmol/L Final   • CO2 01/28/2019 28.0  22.0 - 31.0 mmol/L Final   • Calcium 01/28/2019 9.7  8.4 - 10.2 mg/dL Final   • Total Protein 01/28/2019 6.8  6.3 - 8.6 g/dL Final   • Albumin 01/28/2019 4.10  3.40 - 4.80 g/dL Final   • ALT (SGPT) 01/28/2019 42  21 - 72 U/L Final   • AST (SGOT) 01/28/2019 29  17 - 59 U/L Final   • Alkaline Phosphatase 01/28/2019 107  38 - 126 U/L Final   • Total Bilirubin 01/28/2019 0.4  0.2 - 1.3 mg/dL Final   • eGFR Non African Amer 01/28/2019 83  56 - 130 mL/min/1.73 Final   • Globulin 01/28/2019 2.7  2.3 - 3.5 gm/dL Final   • A/G Ratio 01/28/2019 1.5  1.1 - 1.8 g/dL Final   • BUN/Creatinine Ratio 01/28/2019 12.5  7.0 - 25.0 Final   • Anion Gap 01/28/2019 8.0  5.0 - 15.0 mmol/L Final   • Total Cholesterol 01/28/2019 168  0 - 199 mg/dL Final   • Triglycerides 01/28/2019 124  20 - 199 mg/dL Final   • HDL Cholesterol 01/28/2019 38* 60 - 200 mg/dL Final   • LDL Cholesterol  01/28/2019 109  1 - 129 mg/dL Final   • LDL/HDL Ratio 01/28/2019 2.77  0.00 - 3.55 Final   • Amphetamine Screen, Urine 01/28/2019 Positive* Negative Final   • Barbiturates Screen, Urine 01/28/2019 Negative  Negative Final   • Benzodiazepine Screen, Urine 01/28/2019 Negative  Negative Final  "  • Cocaine Screen, Urine 01/28/2019 Negative  Negative Final   • Methadone Screen, Urine 01/28/2019 Negative  Negative Final   • Opiate Screen 01/28/2019 Negative  Negative Final   • Oxycodone Screen, Urine 01/28/2019 Negative  Negative Final   • THC, Screen, Urine 01/28/2019 Negative  Negative Final      XR wrist 3+ vw bilateral  Narrative: PROCEDURE: Bilateral wrist, three views each.    INDICATION: Bilateral wrist pain. No injury.    COMPARISON: None.    PA, oblique and lateral views of both wrists.    Old well-healed fracture of the left fifth metacarpal shaft. No  acute fractures in either wrist. No osseous arthritic changes.    No soft tissue abnormality.  Impression: Old well-healed fracture left fifth metacarpal.  Otherwise negative bilateral wrist.    [id number]    Electronically signed by:  Loi Summers MD  5/16/2018 4:45  PM CDT Workstation: [email or web address]    [email or web address]  Immunization History   Administered Date(s) Administered   • Influenza TIV (IM) 03/28/2017   • Tdap 02/17/2017       The following portions of the patient's history were reviewed and updated as appropriate: allergies, current medications, past family history, past medical history, past social history, past surgical history and problem list.        Physical Exam  /80   Pulse 75   Temp 98.6 °F (37 °C)   Ht 180.3 cm (71\")   Wt 115 kg (253 lb 3.2 oz)   SpO2 96%   BMI 35.31 kg/m²     Physical Exam   Constitutional: He is oriented to person, place, and time. He appears well-developed and well-nourished.   HENT:   Head: Normocephalic and atraumatic.   Right Ear: External ear normal.   Eyes: Conjunctivae and EOM are normal. Pupils are equal, round, and reactive to light.   Neck: Normal range of motion. Neck supple.   Cardiovascular: Normal rate, regular rhythm and normal heart sounds.   No murmur heard.  Pulmonary/Chest: Effort normal and breath sounds normal. No respiratory distress.   Abdominal: Soft. Bowel sounds are normal. He " exhibits no distension. There is no tenderness.   Obese abdomen    Musculoskeletal: He exhibits tenderness. He exhibits no edema or deformity.        Cervical back: He exhibits decreased range of motion, tenderness, bony tenderness, pain and spasm.        Thoracic back: He exhibits decreased range of motion, tenderness, bony tenderness, pain and spasm.        Lumbar back: He exhibits decreased range of motion, tenderness, bony tenderness and pain.   Neurological: He is alert and oriented to person, place, and time. No cranial nerve deficit.   Skin: Skin is warm. No rash noted. He is not diaphoretic. No erythema. No pallor.   Psychiatric: He has a normal mood and affect. His behavior is normal.   Nursing note and vitals reviewed.      Assessment/Plan   Problems Addressed this Visit        Cardiovascular and Mediastinum    Hyperlipidemia    Essential hypertension       Respiratory    Allergic rhinitis       Digestive    Non morbid obesity       Endocrine    History of prediabetes       Other    Inattention    Prediabetes    Attention deficit hyperactivity disorder (ADHD), combined type    Relevant Medications    amphetamine-dextroamphetamine XR (ADDERALL XR) 25 MG 24 hr capsule    Encounter for drug screening    Relevant Orders    Urine Drug Screen - Urine, Clean Catch    General medical examination    Relevant Orders    CBC Auto Differential    Comprehensive Metabolic Panel    Lipid Panel    Vitamin D 25 Hydroxy    S/P carpal tunnel release      Other Visit Diagnoses     Bilateral low back pain with bilateral sciatica, unspecified chronicity    -  Primary    Relevant Orders    XR Spine Lumbar 4+ View    Ambulatory Referral to Physical Therapy Evaluate and treat    Upper back pain        Relevant Orders    XR Spine Thoracic 3 View    Ambulatory Referral to Physical Therapy Evaluate and treat          -recommend pt go back on aspirin and lipitor  -for back pain with sciatica  -x rays of thoracic and lumbar spine -PT/OT  referral start back on mobic 7.5 mg daily. And flexeril 5 mg PO TID PRN.  Recommend rest heat massage   -referral to Gastroenterology for colon cancer screening   - pt doing well postop on carpal tunnel release on right hand   -for cerumen of both ears - debrox ointment PRN. Drug information provided  -for carpal tunnel he is doing better post op  he has failed conservative treatment for >3 months. . He has yet to get surgery on left  Hand   -  Continue  on lisionpril 10 mg PO q daily drug information provided low salt diet informaiton provided. Side effects discussed BP better controlled today  -for sinusitis - doxycyline 100 mg BID for 10 days. Side effects discussed  - refilled ADHD medication Adderall XR 25 mg PO q daily for ADHD.  [facility] [place] ref [id number]  - for allergic rhinitis - continue singulair and flonase and will add zyrtec.  -H/O prediabetes - D/C  metformin, recent hga1c normal at 5.4   - hyperlipidemia -continue statin. Recheck lipid panel. Pt on aspirin   - obesity - recommend ketogenic diet along with intermittent fasting. Pt has lost 3 lbs since last visit   -recheck in 1 month for followup         This document has been electronically signed by Scottie Borges MD on April 8, 2019 8:34 AM                      This document has been electronically signed by Scottie Borges MD on April 8, 2019 8:34 AM

## 2019-04-08 ENCOUNTER — OFFICE VISIT (OUTPATIENT)
Dept: FAMILY MEDICINE CLINIC | Facility: CLINIC | Age: 49
End: 2019-04-08

## 2019-04-08 ENCOUNTER — TELEPHONE (OUTPATIENT)
Dept: FAMILY MEDICINE CLINIC | Facility: CLINIC | Age: 49
End: 2019-04-08

## 2019-04-08 VITALS
HEART RATE: 75 BPM | OXYGEN SATURATION: 96 % | WEIGHT: 253.2 LBS | BODY MASS INDEX: 35.45 KG/M2 | SYSTOLIC BLOOD PRESSURE: 130 MMHG | HEIGHT: 71 IN | DIASTOLIC BLOOD PRESSURE: 80 MMHG | TEMPERATURE: 98.6 F

## 2019-04-08 DIAGNOSIS — Z02.83 ENCOUNTER FOR DRUG SCREENING: ICD-10-CM

## 2019-04-08 DIAGNOSIS — Z00.00 GENERAL MEDICAL EXAMINATION: ICD-10-CM

## 2019-04-08 DIAGNOSIS — R73.03 PREDIABETES: ICD-10-CM

## 2019-04-08 DIAGNOSIS — M54.41 BILATERAL LOW BACK PAIN WITH BILATERAL SCIATICA, UNSPECIFIED CHRONICITY: Primary | ICD-10-CM

## 2019-04-08 DIAGNOSIS — J30.9 ALLERGIC RHINITIS, UNSPECIFIED SEASONALITY, UNSPECIFIED TRIGGER: ICD-10-CM

## 2019-04-08 DIAGNOSIS — Z98.890 S/P CARPAL TUNNEL RELEASE: ICD-10-CM

## 2019-04-08 DIAGNOSIS — M54.9 UPPER BACK PAIN: ICD-10-CM

## 2019-04-08 DIAGNOSIS — I10 ESSENTIAL HYPERTENSION: ICD-10-CM

## 2019-04-08 DIAGNOSIS — F90.2 ATTENTION DEFICIT HYPERACTIVITY DISORDER (ADHD), COMBINED TYPE: ICD-10-CM

## 2019-04-08 DIAGNOSIS — M54.42 BILATERAL LOW BACK PAIN WITH BILATERAL SCIATICA, UNSPECIFIED CHRONICITY: Primary | ICD-10-CM

## 2019-04-08 DIAGNOSIS — E66.9 NON MORBID OBESITY: ICD-10-CM

## 2019-04-08 DIAGNOSIS — E78.5 HYPERLIPIDEMIA, UNSPECIFIED HYPERLIPIDEMIA TYPE: ICD-10-CM

## 2019-04-08 DIAGNOSIS — Z87.898 HISTORY OF PREDIABETES: ICD-10-CM

## 2019-04-08 DIAGNOSIS — R41.840 INATTENTION: ICD-10-CM

## 2019-04-08 PROCEDURE — 99214 OFFICE O/P EST MOD 30 MIN: CPT | Performed by: FAMILY MEDICINE

## 2019-04-08 RX ORDER — CYCLOBENZAPRINE HCL 5 MG
5 TABLET ORAL 3 TIMES DAILY PRN
Qty: 30 TABLET | Refills: 3 | Status: SHIPPED | OUTPATIENT
Start: 2019-04-08 | End: 2020-05-13

## 2019-04-08 RX ORDER — MELOXICAM 7.5 MG/1
7.5 TABLET ORAL DAILY
Qty: 30 TABLET | Refills: 3 | Status: SHIPPED | OUTPATIENT
Start: 2019-04-08 | End: 2019-09-03 | Stop reason: SDUPTHER

## 2019-04-08 RX ORDER — DEXTROAMPHETAMINE SACCHARATE, AMPHETAMINE ASPARTATE MONOHYDRATE, DEXTROAMPHETAMINE SULFATE AND AMPHETAMINE SULFATE 6.25; 6.25; 6.25; 6.25 MG/1; MG/1; MG/1; MG/1
25 CAPSULE, EXTENDED RELEASE ORAL EVERY MORNING
Qty: 30 CAPSULE | Refills: 0 | Status: SHIPPED | OUTPATIENT
Start: 2019-04-08 | End: 2019-05-08 | Stop reason: SDUPTHER

## 2019-04-08 NOTE — TELEPHONE ENCOUNTER
----- Message from Scottie Borges MD sent at 4/8/2019 10:55 AM CDT -----  Call pt    Has degenerative changes of lumbar and throacic spine    He also has mild scoliosis or curvature of spine  that contributes to back pain. Continue PT/OT mobic and flexeril for pain    Recheck on next visit. Thanks  Called pt

## 2019-04-08 NOTE — TELEPHONE ENCOUNTER
----- Message from Scottie Borges MD sent at 4/8/2019 10:56 AM CDT -----  Call pt    Has degenerative changes of lumbar and throacic spine    He also has mild scoliosis or curvature of spine  that contributes to back pain. Continue PT/OT mobic and flexeril for pain    Recheck on next visit. Thanks  Called pt

## 2019-04-11 ENCOUNTER — HOSPITAL ENCOUNTER (OUTPATIENT)
Dept: PHYSICAL THERAPY | Facility: HOSPITAL | Age: 49
Setting detail: THERAPIES SERIES
Discharge: HOME | End: 2019-04-11

## 2019-04-11 DIAGNOSIS — M54.41 BILATERAL LOW BACK PAIN WITH BILATERAL SCIATICA, UNSPECIFIED CHRONICITY: Primary | ICD-10-CM

## 2019-04-11 DIAGNOSIS — M54.9 UPPER BACK PAIN: ICD-10-CM

## 2019-04-11 DIAGNOSIS — M54.42 BILATERAL LOW BACK PAIN WITH BILATERAL SCIATICA, UNSPECIFIED CHRONICITY: Primary | ICD-10-CM

## 2019-04-11 PROCEDURE — 97162 PT EVAL MOD COMPLEX 30 MIN: CPT | Performed by: PHYSICAL THERAPIST

## 2019-04-11 NOTE — THERAPY EVALUATION
"    Outpatient Physical Therapy Ortho Initial Evaluation  [facility]  Rhoda Wallace, PT       Patient Name: Randall Hernandez  : 1970  MRN: [id number]  Today's Date: 2019      Visit Date: 2019     Pt reports 2/10 pain pre treatment, \"not bad\"/10 pain post treatment  Reports N/A% of improvement.  Attended  visits.  Insurance available: Eval only  Next MD appt: TBD .  Recertification: 2019.    Patient Active Problem List   Diagnosis   • Hyperlipidemia   • Inattention   • Non morbid obesity   • Prediabetes   • Attention deficit hyperactivity disorder (ADHD), combined type   • Need for vaccination   • Gastroesophageal reflux disease   • Encounter for drug screening   • Tingling in extremities   • Elevated BP without diagnosis of hypertension   • Allergic rhinitis   • Elevated blood pressure reading   • Pruritic rash   • Numbness in both hands   • Bilateral elbow joint pain   • Pain in both wrists   • Ulnar neuropathy of left upper extremity   • General medical examination   • Bilateral carpal tunnel syndrome   • History of prediabetes   • Numbness and tingling in both hands   • Essential hypertension   • Cerumen debris on tympanic membrane of both ears   • Carpal tunnel syndrome   • S/P carpal tunnel release   • Encounter for screening for malignant neoplasm of colon   • FH: colon cancer   • Acute recurrent sinusitis        Past Medical History:   Diagnosis Date   • Allergic rhinitis    • Arthritis    • Backache    • Cellulitis of leg, except foot    • Dyslipidemia    • GERD (gastroesophageal reflux disease)    • Heartburn    • History of Holter monitoring 2016    Sinus mechanism with a normal average heart rate.No evidence of chronotropic incompetence.Asymptomatic Holter   • Hyperlipidemia    • Hypertension    • Low back pain    • Numbness of lower limb     left leg numbness and tingling      • Obesity, unspecified    • Obstructive sleep apnea of adult     " USING C-PAP   • Pain in left hip    • Pain in right hip    • Palpitations    • Skin lesion     insect bite.      • Supraventricular tachycardia (CMS/HCC)    • Weight loss     advised        Past Surgical History:   Procedure Laterality Date   • CARPAL TUNNEL RELEASE Right 11/2/2018    Procedure: CARPAL TUNNEL RELEASE;  Surgeon: Judd Mathias MD;  Location: [facility] OR;  Service: Orthopedics   • CARPAL TUNNEL RELEASE  12/07/2018    Left   • CARPAL TUNNEL RELEASE Left 12/7/2018    Procedure: CARPAL TUNNEL RELEASE;  Surgeon: Judd Mathias MD;  Location: [facility] OR;  Service: Orthopedics   • COLONOSCOPY N/A 1/18/2019    Procedure: COLONOSCOPY a friday please ;  Surgeon: Ruslan Bowers MD;  Location: [facility] ENDOSCOPY;  Service: Gastroenterology   • ENDOSCOPY     • PYLOROMYOTOMY       Medications      amphetamine-dextroamphetamine XR (ADDERALL XR) 25 MG 24 hr capsule     aspirin 81 MG EC tablet     atorvastatin (LIPITOR) 20 MG tablet     atorvastatin (LIPITOR) 20 MG tablet     cyclobenzaprine (FLEXERIL) 5 MG tablet     doxycycline (MONODOX) 50 MG capsule     fluticasone (FLONASE) 50 MCG/ACT nasal spray     fluticasone (FLONASE) 50 MCG/ACT nasal spray     lisinopril (PRINIVIL,ZESTRIL) 10 MG tablet     meloxicam (MOBIC) 7.5 MG tablet     montelukast (SINGULAIR) 10 MG tablet     omeprazole (priLOSEC) 40 MG capsule          Allergies: None      Visit Dx:     ICD-10-CM ICD-9-CM   1. Bilateral low back pain with bilateral sciatica, unspecified chronicity M54.42 724.3    M54.41 724.2   2. Upper back pain M54.9 724.5     Number of days off work: N/A    Patient History     Row Name 04/11/19 0800             History    Chief Complaint  Tinglings;Numbness;Pain  (Pended)   -ER      Type of Pain  Back pain  (Pended)   -ER      Date Current Problem(s) Began  --  (Pended)  couple years, gotten worse recently  -ER      Brief Description of Current Complaint  Standing around for very long, the back hurts and it goes all the  way down to his feet, also sitting for long periods of time causes increased pain. Doing the dishes, vacuuming, anything that involved bending forward causes increased pain  (Pended)   -ER      Current Tobacco Use  none  (Pended)   -ER      Smoking Status  Former smoker  (Pended)   -ER      Occupation/sports/leisure activities  Occupation: ; Hobbies: riding motorcycles, building motor cycles  (Pended)   -ER      Patient seeing anyone else for problem(s)?  Yes, ortho  (Pended)   -ER      How has patient tried to help current problem?  ibuprofen, trying to stretch, resting, muscle relaxers  (Pended)   -ER      What clinical tests have you had for this problem?  X-ray  (Pended)   -ER      Results of Clinical Tests  Age-related degeneration, short pedicles  (Pended)   -ER      History of Previous Related Injuries  been going on periodically for the past few years  (Pended)   -ER         Pain     Pain Location  Back  (Pended)  Lumbar and Thoracic  -ER      Pain at Present  2  (Pended)   -ER      Pain at Best  1  (Pended)   -ER      Pain at Worst  8  (Pended)   -ER      Pain Frequency  Constant/continuous  (Pended)   -ER      Pain Description  Aching;Sharp  (Pended)   -ER      What Performance Factors Make the Current Problem(s) WORSE?  standing for long periods, bending over, doing the dishes  (Pended)   -ER      What Performance Factors Make the Current Problem(s) BETTER?  standing up straight and walking around for a little while eases the pain, resting  (Pended)   -ER      Is your sleep disturbed?  Yes  (Pended)   -ER      Is medication used to assist with sleep?  Yes  (Pended)  ibuprofen, muscle relaxers  -ER      What position do you sleep in?  Prone;Right sidelying  (Pended)   -ER      Difficulties at work?  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  doing dishes, sweeping, vacuuming  (Pended)   -ER      Difficulties with recreational activities?  sometimes gets in the  "way of riding motorcycles, has a gel seat that helps  (Pended)   -ER        User Key  (r) = Recorded By, (t) = Taken By, (c) = Cosigned By    Initials Name Provider Type    ER Donna Lemus, PT Student PT Student          PT Ortho     Row Name 04/11/19 0800       Subjective Comments    Subjective Comments  Patient wishes to ease the pain in order to return to home management, work, and riding his motorcycle  (Pended)   -ER       Precautions and Contraindications    Precautions/Limitations  no known precautions/limitations  (Pended)   -ER       Subjective Pain    Able to rate subjective pain?  yes  (Pended)   -ER    Pre-Treatment Pain Level  2  (Pended)   -ER    Post-Treatment Pain Level  --  (Pended)  \"not bad\"  -ER    Subjective Pain Comment  Patient states that there was a dull ache in his mid back throughout eval  (Pended)   -ER       Posture/Observations    Alignment Options  Thoracic kyphosis;Rounded shoulders;Cervical lordosis;Lumbar lordosis;Scoliosis  (Pended)   -ER    Cervical Lordosis  Mild;Increased  (Pended)   -ER    Thoracic Kyphosis  Moderate;Increased  (Pended)   -ER    Rounded Shoulders  Moderate;Increased  (Pended)   -ER    Scoliosis  Mild;Increased  (Pended)  Lumbar spine  -ER    Lumbar lordosis  Decreased;Mild  (Pended)   -ER    Posture/Observations Comments  Patient has forward flexed posture in sitting and standing.   (Pended)   -ER       Lumbar/SI Special Tests    Standing Flexion Test (SI Dysfunction)  Bilateral:;Negative  (Pended)   -ER    Trendelenburg Test (Gluteus Medius Weakness)  Bilateral:;Negative  (Pended)   -ER    SLR (Neural Tension)  Bilateral:;Negative  (Pended)   -ER    SI Compression Test (SI Dysfunction)  Bilateral:;Negative  (Pended)   -ER    FRED (hip vs. SI Dysfunction)  Bilateral:;Negative  (Pended)   -ER    FAIR Test (Piriformis Syndrome)  Bilateral:;Negative  (Pended)   -ER       Head/Neck/Trunk    Trunk Extension AROM  14 degrees  (Pended)   -ER    Trunk Flexion AROM  74 " degrees  (Pended)   -ER    Trunk Lt Lateral Flexion AROM  75% of range  (Pended)   -ER    Trunk Rt Lateral Flexion AROM  75% of range  (Pended)   -ER    Trunk Lt Rotation AROM  50% of range  (Pended)   -ER    Trunk Rt Rotation AROM  50% of range  (Pended)   -ER       MMT (Manual Muscle Testing)    General MMT Comments  BLE 5/5, patient shaky with hip flexion, knee flexion/extension  (Pended)   -ER       Sensation    Sensation WNL?  WNL  (Pended)   -ER    Light Touch  No apparent deficits  (Pended)   -ER    Sharp/Dull  --  (Pended)   -ER    Additional Comments  Patient responds to light touch  (Pended)   -ER       Lower Extremity Flexibility    Hamstrings  Moderately limited;Bilateral:  (Pended)  Right more limited than left  -ER    LE Other Flexibility  Bilateral:;Mildly limited  (Pended)  Piriformis  -ER       Pathomechanics    Spine Pathomechanics  Excessive thoracic kyphosis with forward bend  (Pended)   -ER    Pathomechanics Comments  Patient has significant forward flexed posture with limited trunk rotation.  (Pended)   -ER       Transfers    Comment (Transfers)  I with all transfers. Absent logroll technique  (Pended)   -ER       Gait/Stairs Assessment/Training    Comment (Gait/Stairs)  Patient ambulates non-antalgically  (Pended)   -ER      User Key  (r) = Recorded By, (t) = Taken By, (c) = Cosigned By    Initials Name Provider Type    ER Donna Lemus, PT Student PT Student              Therapy Education  Education Details: (P) Patient given scapular retractions, prone on elbows, St HS stretch, doorway stretch, and supine piriformis stretch  Given: (P) HEP, Symptoms/condition management, Pain management, Posture/body mechanics  Program: (P) New  How Provided: (P) Verbal, Demonstration, Written  Provided to: (P) Patient  Level of Understanding: (P) Verbalized, Demonstrated     PT OP Goals     Row Name 04/11/19 0900          PT Short Term Goals    STG Date to Achieve  05/02/19  (Pended)   -ER     STG 1  I with  HEP and have additions/changes  (Pended)   -ER     STG 2  Trunk extension AROM >=25 degrees  (Pended)   -ER     STG 3  Patient to demonstrate improved posture and postural awareness  (Pended)   -ER     STG 4  Patient will perform 20 bridges with no UE assist  (Pended)   -ER     STG 5  Patient to demonstrate improved flexibility in B hamstrings and piriformis  (Pended)   -ER        Long Term Goals    LTG 1  Patient to walk 1/2 mile non-antalgically with no increase in pain  (Pended)   -ER     LTG 2  Patient to lift 20lb weight from ground to waist to shoulder level with no increase in pain  (Pended)   -ER     LTG 3  Patient to perform 3 prone planks for 30 sec each with increase in pain or radicular symptoms  (Pended)   -ER     LTG 4  Patient to push/pull 90 lb. weighted sled 40 ft., 5x F/R with no increase in pain  (Pended)   -ER     LTG 4 Progress Comments  --  (Pended)   -ER     LTG 5  Trunk AROM WNL in all directions  (Pended)   -ER     LTG 6  I with final HEP  (Pended)   -ER     LTG 7  D/C with a final HEP and free 30 day fitness formula memebership.  (Pended)   -ER        Time Calculation    PT Goal Re-Cert Due Date  05/02/19  (Pended)   -ER       User Key  (r) = Recorded By, (t) = Taken By, (c) = Cosigned By    Initials Name Provider Type    ER Donna Lemus, PT Student PT Student          PT Assessment/Plan     Row Name 04/11/19 1000          PT Assessment    Functional Limitations  Limitation in home management;Limitations in community activities;Performance in self-care ADL;Performance in leisure activities;Performance in work activities  (Pended)   -ER     Impairments  Impaired muscle endurance;Impaired muscle length;Muscle strength;Posture;Range of motion;Poor body mechanics  (Pended)   -ER     Assessment Comments  Patient has forward flexed posture that prevents him from having normal range of motion. Congenital and degenerative spine abnormalities along with hamstrings and piriformis tightness are causing  radicular pain into both legs. Tight pectoral muscles and weak scapular retractors add to kyphotic posture. Poor lumbar and spinal posture contribute to pain with work and home management.   (Pended)   -ER     Rehab Potential  Good  (Pended)   -ER     Patient/caregiver participated in establishment of treatment plan and goals  Yes  (Pended)   -ER     Patient would benefit from skilled therapy intervention  Yes  (Pended)   -ER        PT Plan    PT Frequency  2x/week  (Pended)   -ER     Predicted Duration of Therapy Intervention (Therapy Eval)  3-5 weeks, 6-10 visits  (Pended)   -ER     Planned CPT's?  PT EVAL MOD COMPLELITY: 30312;PT THER PROC EA 15 MIN: 57212;PT THER ACT EA 15 MIN: 93097;PT MANUAL THERAPY EA 15 MIN: 47491;PT SELF CARE/HOME MGMT/TRAIN EA 15: 43298;PT RE-EVAL: 85987;PT HOT/COLD PACK WC NONMCARE: 91768;PT THER SUPP EA 15 MIN  (Pended)   -ER     Physical Therapy Interventions (Optional Details)  gross motor skills;home exercise program;joint mobilization;lumbar stabilization;manual therapy techniques;modalities;patient/family education;postural re-education;ROM (Range of Motion);strengthening;stretching;transfer training  (Pended)   -ER     PT Plan Comments  Show logroll technique, add bridging, add red theraband to no moneys.   (Pended)   -ER       User Key  (r) = Recorded By, (t) = Taken By, (c) = Cosigned By    Initials Name Provider Type    ER Donna Lemus, PT Student PT Student         Other therapeutic activities and/or exercises will be prescribed depending on the patients progress or lack there of.    Barriers to Rehab:   Include significant or possible arthritic/degenerative changes that have occurred within the spine  The chronicity of this issue    Safety Issues: None noted.      Modalities     Row Name 04/11/19 0800             Ice    Ice S/P Rx  No  (Pended)   -ER      Patient denies application of Ice  Yes  (Pended)   -ER        User Key  (r) = Recorded By, (t) = Taken By, (c) = Cosigned By  "   Initials Name Provider Type    ER Donna Lemus, PT Student PT Student        Exercises     Row Name 04/11/19 0800             Subjective Comments    Subjective Comments  Patient wishes to ease the pain in order to return to home management, work, and riding his motorcycle  (Pended)   -ER         Subjective Pain    Able to rate subjective pain?  yes  (Pended)   -ER      Pre-Treatment Pain Level  2  (Pended)   -ER      Post-Treatment Pain Level  --  (Pended)  \"not bad\"  -ER      Subjective Pain Comment  Patient states that there was a dull ache in his mid back throughout eval  (Pended)   -ER         Exercise 1    Exercise Name 1  B Supine Piriformis S  (Pended)   -ER      Reps 1  2  (Pended)   -ER      Time 1  30 sec hold  (Pended)   -ER      Additional Comments  --  (Pended)   -ER         Exercise 2    Exercise Name 2  B St. HS S  (Pended)   -ER      Reps 2  2  (Pended)   -ER      Time 2  30 sec hold  (Pended)   -ER         Exercise 3    Exercise Name 3  Pro II LE  (Pended)   -ER      Time 3  10 min  (Pended)   -ER      Additional Comments  L 4.0  (Pended)   -ER         Exercise 4    Exercise Name 4  Doorway S  (Pended)   -ER      Reps 4  2  (Pended)   -ER      Time 4  30 sec hold  (Pended)   -ER         Exercise 5    Exercise Name 5  Prone on Elbows  (Pended)   -ER      Time 5  5 min  (Pended)   -ER         Exercise 6    Exercise Name 6  No Moneys  (Pended)   -ER      Sets 6  2  (Pended)   -ER      Reps 6  10  (Pended)   -ER      Time 6  5 sec hold  (Pended)   -ER        User Key  (r) = Recorded By, (t) = Taken By, (c) = Cosigned By    Initials Name Provider Type    ER Donna Lemus, PT Student PT Student                        Outcome Measure Options: (P) Modifed Owestry  Modified Oswestry  Modified Oswestry Score/Comments: (P) 20/50= 40%      Time Calculation:     Start Time: 0830  Stop Time: 0919  Time Calculation (min): 49 min     Therapy Charges for Today     Code Description Service Date Service Provider " Modifiers Qty    48824385188 HC PT EVAL MOD COMPLEXITY 3 4/11/2019 Rhoda Wallace, PT GP 1    84688544678 HC PT THER SUPP EA 15 MIN 4/11/2019 Rhoda Wallace, PT GP 2          PT G-Codes  Outcome Measure Options: (P) Modifed Katherineestry  Modified Oswestry Score/Comments: (P) 20/50= 40%         Rhoda Wallace, PT, DPT, CSCS  4/11/2019

## 2019-04-18 ENCOUNTER — HOSPITAL ENCOUNTER (OUTPATIENT)
Dept: PHYSICAL THERAPY | Facility: HOSPITAL | Age: 49
Setting detail: THERAPIES SERIES
Discharge: HOME | End: 2019-04-18

## 2019-04-18 ENCOUNTER — LAB (OUTPATIENT)
Dept: LAB | Facility: HOSPITAL | Age: 49
End: 2019-04-18

## 2019-04-18 DIAGNOSIS — M54.42 BILATERAL LOW BACK PAIN WITH BILATERAL SCIATICA, UNSPECIFIED CHRONICITY: Primary | ICD-10-CM

## 2019-04-18 DIAGNOSIS — M54.41 BILATERAL LOW BACK PAIN WITH BILATERAL SCIATICA, UNSPECIFIED CHRONICITY: Primary | ICD-10-CM

## 2019-04-18 DIAGNOSIS — Z00.00 GENERAL MEDICAL EXAMINATION: ICD-10-CM

## 2019-04-18 DIAGNOSIS — M54.9 UPPER BACK PAIN: ICD-10-CM

## 2019-04-18 DIAGNOSIS — Z02.83 ENCOUNTER FOR DRUG SCREENING: ICD-10-CM

## 2019-04-18 LAB
BASOPHILS # BLD AUTO: 0.07 10*3/MM3 (ref 0–0.2)
BASOPHILS NFR BLD AUTO: 1.3 % (ref 0–1.5)
DEPRECATED RDW RBC AUTO: 44.4 FL (ref 37–54)
EOSINOPHIL # BLD AUTO: 0.18 10*3/MM3 (ref 0–0.4)
EOSINOPHIL NFR BLD AUTO: 3.4 % (ref 0.3–6.2)
ERYTHROCYTE [DISTWIDTH] IN BLOOD BY AUTOMATED COUNT: 13.1 % (ref 12.3–15.4)
HCT VFR BLD AUTO: 43.6 % (ref 37.5–51)
HGB BLD-MCNC: 14.2 G/DL (ref 13–17.7)
IMM GRANULOCYTES # BLD AUTO: 0.02 10*3/MM3 (ref 0–0.05)
IMM GRANULOCYTES NFR BLD AUTO: 0.4 % (ref 0–0.5)
LYMPHOCYTES # BLD AUTO: 2 10*3/MM3 (ref 0.7–3.1)
LYMPHOCYTES NFR BLD AUTO: 38.1 % (ref 19.6–45.3)
MCH RBC QN AUTO: 29.8 PG (ref 26.6–33)
MCHC RBC AUTO-ENTMCNC: 32.6 G/DL (ref 31.5–35.7)
MCV RBC AUTO: 91.4 FL (ref 79–97)
MONOCYTES # BLD AUTO: 0.44 10*3/MM3 (ref 0.1–0.9)
MONOCYTES NFR BLD AUTO: 8.4 % (ref 5–12)
NEUTROPHILS # BLD AUTO: 2.54 10*3/MM3 (ref 1.4–7)
NEUTROPHILS NFR BLD AUTO: 48.4 % (ref 42.7–76)
NRBC BLD AUTO-RTO: 0 /100 WBC (ref 0–0)
PLATELET # BLD AUTO: 306 10*3/MM3 (ref 140–450)
PMV BLD AUTO: 9.9 FL (ref 6–12)
RBC # BLD AUTO: 4.77 10*6/MM3 (ref 4.14–5.8)
WBC NRBC COR # BLD: 5.25 10*3/MM3 (ref 3.4–10.8)

## 2019-04-18 PROCEDURE — 80307 DRUG TEST PRSMV CHEM ANLYZR: CPT

## 2019-04-18 PROCEDURE — 80053 COMPREHEN METABOLIC PANEL: CPT

## 2019-04-18 PROCEDURE — 80061 LIPID PANEL: CPT

## 2019-04-18 PROCEDURE — 97110 THERAPEUTIC EXERCISES: CPT | Performed by: SPECIALIST/TECHNOLOGIST

## 2019-04-18 PROCEDURE — 85025 COMPLETE CBC W/AUTO DIFF WBC: CPT

## 2019-04-18 PROCEDURE — 82306 VITAMIN D 25 HYDROXY: CPT

## 2019-04-18 NOTE — THERAPY TREATMENT NOTE
Outpatient Physical Therapy Ortho Treatment Note  [facility]     Patient Name: Randall Hernandez  : 1970  MRN: [id number]  Today's Date: 2019      Visit Date: 2019     Ethan    Patients reports pain as:  2/10   Patient reports overall % improvement as:  0%   Patients attendance has been:  2/2   Insurance visits available  9 visits   Next MD visit is:  TBD    Next PT recertification is:  19         Visit Dx:    ICD-10-CM ICD-9-CM   1. Bilateral low back pain with bilateral sciatica, unspecified chronicity M54.42 724.3    M54.41 724.2   2. Upper back pain M54.9 724.5       Patient Active Problem List   Diagnosis   • Hyperlipidemia   • Inattention   • Non morbid obesity   • Prediabetes   • Attention deficit hyperactivity disorder (ADHD), combined type   • Need for vaccination   • Gastroesophageal reflux disease   • Encounter for drug screening   • Tingling in extremities   • Elevated BP without diagnosis of hypertension   • Allergic rhinitis   • Elevated blood pressure reading   • Pruritic rash   • Numbness in both hands   • Bilateral elbow joint pain   • Pain in both wrists   • Ulnar neuropathy of left upper extremity   • General medical examination   • Bilateral carpal tunnel syndrome   • History of prediabetes   • Numbness and tingling in both hands   • Essential hypertension   • Cerumen debris on tympanic membrane of both ears   • Carpal tunnel syndrome   • S/P carpal tunnel release   • Encounter for screening for malignant neoplasm of colon   • FH: colon cancer   • Acute recurrent sinusitis        Past Medical History:   Diagnosis Date   • Allergic rhinitis    • Arthritis    • Backache    • Cellulitis of leg, except foot    • Dyslipidemia    • GERD (gastroesophageal reflux disease)    • Heartburn    • History of Holter monitoring 2016    Sinus mechanism with a normal average heart rate.No evidence of chronotropic incompetence.Asymptomatic Holter   • Hyperlipidemia     • Hypertension    • Low back pain    • Numbness of lower limb     left leg numbness and tingling      • Obesity, unspecified    • Obstructive sleep apnea of adult     USING C-PAP   • Pain in left hip    • Pain in right hip    • Palpitations    • Skin lesion     insect bite.      • Supraventricular tachycardia (CMS/HCC)    • Weight loss     advised        Past Surgical History:   Procedure Laterality Date   • CARPAL TUNNEL RELEASE Right 11/2/2018    Procedure: CARPAL TUNNEL RELEASE;  Surgeon: Judd Mathias MD;  Location: [facility] OR;  Service: Orthopedics   • CARPAL TUNNEL RELEASE  12/07/2018    Left   • CARPAL TUNNEL RELEASE Left 12/7/2018    Procedure: CARPAL TUNNEL RELEASE;  Surgeon: Judd Mathias MD;  Location: [facility] OR;  Service: Orthopedics   • COLONOSCOPY N/A 1/18/2019    Procedure: COLONOSCOPY a friday please ;  Surgeon: Ruslan Bowers MD;  Location: [facility] ENDOSCOPY;  Service: Gastroenterology   • ENDOSCOPY     • PYLOROMYOTOMY         PT Ortho     Row Name 04/18/19 0800       Subjective Pain    Post-Treatment Pain Level  3  (Pended)   -ML      User Key  (r) = Recorded By, (t) = Taken By, (c) = Cosigned By    Initials Name Provider Type    Rylan Pina, qualifyor                       PT Assessment/Plan     Row Name 04/18/19 0800          PT Assessment    Assessment Comments  jah rx well.   (Pended)   -ML        PT Plan    PT Frequency  2x/week  (Pended)   -ML       User Key  (r) = Recorded By, (t) = Taken By, (c) = Cosigned By    Initials Name Provider Type    Rlyan Pina, qualifyor             Exercises     Row Name 04/18/19 0800             Subjective Pain    Able to rate subjective pain?  yes  (Pended)   -ML      Pre-Treatment Pain Level  2  (Pended)   -ML      Post-Treatment Pain Level  3  (Pended)   -ML         Exercise 1    Exercise Name 1  pro2 UE/LE alt f/r  (Pended)   -ML      Time 1  10 min  (Pended)   -ML      Additional Comments  L4.0   (Pended)   -ML         Exercise 2    Exercise Name 2  B St. HS S  (Pended)   -ML      Reps 2  2  (Pended)   -ML      Time 2  30 sec hold  (Pended)   -ML         Exercise 3    Exercise Name 3  Swiss: DK-C rolls  (Pended)   -ML      Sets 3  2  (Pended)   -ML      Reps 3  10  (Pended)   -ML         Exercise 4    Exercise Name 4  Bridges w/ add   (Pended)   -ML      Sets 4  2  (Pended)   -ML      Reps 4  10  (Pended)   -ML         Exercise 5    Exercise Name 5  prone on elbows  (Pended)   -ML      Time 5  5 min  (Pended)   -ML         Exercise 6    Exercise Name 6  Tband: no $  (Pended)   -ML      Sets 6  2  (Pended)   -ML      Reps 6  10  (Pended)   -ML      Time 6  5 sec hold  (Pended)   -ML         Exercise 7    Exercise Name 7  Doorway S-mid + rot  (Pended)   -ML      Sets 7  2  (Pended)   -ML      Time 7  30 sec ea.   (Pended)   -ML         Exercise 8    Exercise Name 8  Fwd/rev shld rolls  (Pended)   -ML      Reps 8  10  (Pended)   -ML      Additional Comments  prn b/w sets no$  (Pended)   -ML         Exercise 9    Exercise Name 9  track walk   (Pended)   -ML      Reps 9  3 laps  (Pended)   -ML         Exercise 10    Exercise Name 10  log roll instruct  (Pended)   -ML        User Key  (r) = Recorded By, (t) = Taken By, (c) = Cosigned By    Initials Name Provider Type    ML Rylan Reeves, ATC                        PT OP Goals     Row Name 04/18/19 0900 04/18/19 0800       PT Short Term Goals    STG Date to Achieve  05/02/19  (Pended)   -ML  --    STG 1  I with HEP and have additions/changes  (Pended)   -ML  --    STG 1 Progress  Ongoing  (Pended)   -ML  --    STG 2  Trunk extension AROM >=25 degrees  (Pended)   -ML  --    STG 2 Progress  Ongoing  (Pended)   -ML  --    STG 3  Patient to demonstrate improved posture and postural awareness  (Pended)   -ML  --    STG 3 Progress  Ongoing  (Pended)   -ML  --    STG 4  Patient will perform 20 bridges with no UE assist  (Pended)   -ML  --    STG 4  Progress  Ongoing  (Pended)   -ML  --    STG 5  Patient to demonstrate improved flexibility in B hamstrings and piriformis  (Pended)   -ML  --    STG 5 Progress  Ongoing  (Pended)   -ML  --       Long Term Goals    LTG 1  Patient to walk 1/2 mile non-antalgically with no increase in pain  (Pended)   -ML  --    LTG 1 Progress  Ongoing  (Pended)   -ML  --    LTG 2  Patient to lift 20lb weight from ground to waist to shoulder level with no increase in pain  (Pended)   -ML  --    LTG 2 Progress  Ongoing  (Pended)   -ML  --    LTG 3  Patient to perform 3 prone planks for 30 sec each with increase in pain or radicular symptoms  (Pended)   -ML  --    LTG 3 Progress  Ongoing  (Pended)   -ML  --    LTG 4  Patient to push/pull 90 lb. weighted sled 40 ft., 5x F/R with no increase in pain  (Pended)   -ML  --    LTG 4 Progress  Ongoing  (Pended)   -ML  --    LTG 5  Trunk AROM WNL in all directions  (Pended)   -ML  --    LTG 6  I with final HEP  (Pended)   -ML  --    LTG 7  D/C with a final HEP and free 30 day fitness formula memebership.  (Pended)   -ML  --       Time Calculation    PT Goal Re-Cert Due Date  --  05/02/19  (Pended)   -ML      User Key  (r) = Recorded By, (t) = Taken By, (c) = Cosigned By    Initials Name Provider Type    Rylan Pina ATC                          Time Calculation:   Start Time: (P) 0844  Stop Time: (P) 0925  Time Calculation (min): (P) 41 min  Therapy Charges for Today     Code Description Service Date Service Provider Modifiers Qty    90246121749 HC PT THER SUPP EA 15 MIN 4/18/2019 Rylan Reeves, ATC  1    60200278205 HC PT THER PROC EA 15 MIN 4/18/2019 Rylan Reeves, ATC  3                    Rylan Reeves ATC  4/18/2019

## 2019-04-19 LAB
25(OH)D3 SERPL-MCNC: 27.9 NG/ML (ref 30–100)
ALBUMIN SERPL-MCNC: 4.5 G/DL (ref 3.5–5.2)
ALBUMIN/GLOB SERPL: 1.5 G/DL
ALP SERPL-CCNC: 110 U/L (ref 39–117)
ALT SERPL W P-5'-P-CCNC: 30 U/L (ref 1–41)
AMPHET+METHAMPHET UR QL: POSITIVE
ANION GAP SERPL CALCULATED.3IONS-SCNC: 15.2 MMOL/L
AST SERPL-CCNC: 28 U/L (ref 1–40)
BARBITURATES UR QL SCN: NEGATIVE
BENZODIAZ UR QL SCN: NEGATIVE
BILIRUB SERPL-MCNC: 0.6 MG/DL (ref 0.2–1.2)
BUN BLD-MCNC: 11 MG/DL (ref 6–20)
BUN/CREAT SERPL: 10.3 (ref 7–25)
CALCIUM SPEC-SCNC: 8.9 MG/DL (ref 8.6–10.5)
CANNABINOIDS SERPL QL: NEGATIVE
CHLORIDE SERPL-SCNC: 96 MMOL/L (ref 98–107)
CHOLEST SERPL-MCNC: 174 MG/DL (ref 0–200)
CO2 SERPL-SCNC: 22.8 MMOL/L (ref 22–29)
COCAINE UR QL: NEGATIVE
CREAT BLD-MCNC: 1.07 MG/DL (ref 0.76–1.27)
GFR SERPL CREATININE-BSD FRML MDRD: 73 ML/MIN/1.73
GLOBULIN UR ELPH-MCNC: 3.1 GM/DL
GLUCOSE BLD-MCNC: 104 MG/DL (ref 65–99)
HDLC SERPL-MCNC: 41 MG/DL (ref 40–60)
LDLC SERPL CALC-MCNC: 115 MG/DL (ref 0–100)
LDLC/HDLC SERPL: 2.8 {RATIO}
METHADONE UR QL SCN: NEGATIVE
OPIATES UR QL: NEGATIVE
OXYCODONE UR QL SCN: NEGATIVE
POTASSIUM BLD-SCNC: 4.5 MMOL/L (ref 3.5–5.2)
PROT SERPL-MCNC: 7.6 G/DL (ref 6–8.5)
SODIUM BLD-SCNC: 134 MMOL/L (ref 136–145)
TRIGL SERPL-MCNC: 91 MG/DL (ref 0–150)
VLDLC SERPL-MCNC: 18.2 MG/DL (ref 5–40)

## 2019-04-22 ENCOUNTER — TELEPHONE (OUTPATIENT)
Dept: FAMILY MEDICINE CLINIC | Facility: CLINIC | Age: 49
End: 2019-04-22

## 2019-04-22 RX ORDER — ERGOCALCIFEROL 1.25 MG/1
50000 CAPSULE ORAL WEEKLY
Qty: 4 CAPSULE | Refills: 3 | Status: SHIPPED | OUTPATIENT
Start: 2019-04-22 | End: 2019-08-19 | Stop reason: SDUPTHER

## 2019-04-22 RX ORDER — ATORVASTATIN CALCIUM 40 MG/1
40 TABLET, FILM COATED ORAL DAILY
Qty: 30 TABLET | Refills: 3 | Status: SHIPPED | OUTPATIENT
Start: 2019-04-22 | End: 2019-08-19 | Stop reason: SDUPTHER

## 2019-04-22 NOTE — TELEPHONE ENCOUNTER
----- Message from Scottie Borges MD sent at 4/20/2019  9:01 AM CDT -----  Call pt     UDS consistent with ADHD medication use. Continue to monitor     Vitamin D is low and recommend Vitamin D 50,000 units once a week Give 4 pills and 3 refills    On CMP, kidney function is slightly lower from last check Recommend pt drink more water and restrict diuretic such as coffee and tea. Sodium a d chloride slightly low and diuretics can cause this     Cholesterol levels not at goal. Recommend going up on lipitor from 20 to 40 mg PO qhs. Give 30 pills and 3 refills. Can double up on lipitor 20 mg x 2 pills = 40 mg until medication finished    CBC normal     recheck on next visit .Thanks    Called pt,added and changed dose

## 2019-04-23 ENCOUNTER — HOSPITAL ENCOUNTER (OUTPATIENT)
Dept: PHYSICAL THERAPY | Facility: HOSPITAL | Age: 49
Setting detail: THERAPIES SERIES
Discharge: HOME | End: 2019-04-23

## 2019-04-23 DIAGNOSIS — M54.41 BILATERAL LOW BACK PAIN WITH BILATERAL SCIATICA, UNSPECIFIED CHRONICITY: Primary | ICD-10-CM

## 2019-04-23 DIAGNOSIS — M54.9 UPPER BACK PAIN: ICD-10-CM

## 2019-04-23 DIAGNOSIS — M54.42 BILATERAL LOW BACK PAIN WITH BILATERAL SCIATICA, UNSPECIFIED CHRONICITY: Primary | ICD-10-CM

## 2019-04-23 PROCEDURE — 97110 THERAPEUTIC EXERCISES: CPT

## 2019-04-23 NOTE — THERAPY TREATMENT NOTE
Outpatient Physical Therapy Ortho Treatment Note  [facility]     Patient Name: Randall Hernandez  : 1970  MRN: [id number]  Today's Date: 2019      Visit Date: 2019  Pt reports 5/10 pain pre treatment, 3/10 pain post treatment  Reports 0% of improvement.  Attended 3/3 visits.  Insurance available:9 visits   Next MD appt: TBD .  Recertification: 2019.  Visit Dx:    ICD-10-CM ICD-9-CM   1. Bilateral low back pain with bilateral sciatica, unspecified chronicity M54.42 724.3    M54.41 724.2   2. Upper back pain M54.9 724.5       Patient Active Problem List   Diagnosis   • Hyperlipidemia   • Inattention   • Non morbid obesity   • Prediabetes   • Attention deficit hyperactivity disorder (ADHD), combined type   • Need for vaccination   • Gastroesophageal reflux disease   • Encounter for drug screening   • Tingling in extremities   • Elevated BP without diagnosis of hypertension   • Allergic rhinitis   • Elevated blood pressure reading   • Pruritic rash   • Numbness in both hands   • Bilateral elbow joint pain   • Pain in both wrists   • Ulnar neuropathy of left upper extremity   • General medical examination   • Bilateral carpal tunnel syndrome   • History of prediabetes   • Numbness and tingling in both hands   • Essential hypertension   • Cerumen debris on tympanic membrane of both ears   • Carpal tunnel syndrome   • S/P carpal tunnel release   • Encounter for screening for malignant neoplasm of colon   • FH: colon cancer   • Acute recurrent sinusitis        Past Medical History:   Diagnosis Date   • Allergic rhinitis    • Arthritis    • Backache    • Cellulitis of leg, except foot    • Dyslipidemia    • GERD (gastroesophageal reflux disease)    • Heartburn    • History of Holter monitoring 2016    Sinus mechanism with a normal average heart rate.No evidence of chronotropic incompetence.Asymptomatic Holter   • Hyperlipidemia    • Hypertension    • Low back pain    •  Numbness of lower limb     left leg numbness and tingling      • Obesity, unspecified    • Obstructive sleep apnea of adult     USING C-PAP   • Pain in left hip    • Pain in right hip    • Palpitations    • Skin lesion     insect bite.      • Supraventricular tachycardia (CMS/HCC)    • Weight loss     advised        Past Surgical History:   Procedure Laterality Date   • CARPAL TUNNEL RELEASE Right 11/2/2018    Procedure: CARPAL TUNNEL RELEASE;  Surgeon: Judd Mathias MD;  Location: [facility] OR;  Service: Orthopedics   • CARPAL TUNNEL RELEASE  12/07/2018    Left   • CARPAL TUNNEL RELEASE Left 12/7/2018    Procedure: CARPAL TUNNEL RELEASE;  Surgeon: Judd Mathias MD;  Location: [facility] OR;  Service: Orthopedics   • COLONOSCOPY N/A 1/18/2019    Procedure: COLONOSCOPY a friday please ;  Surgeon: Ruslan Bowers MD;  Location: [facility] ENDOSCOPY;  Service: Gastroenterology   • ENDOSCOPY     • PYLOROMYOTOMY         PT Ortho     Row Name 04/23/19 0800       Subjective Comments    Subjective Comments  pt stated that he was up all night and had to take a muscle relaxor.  -TL       Precautions and Contraindications    Precautions/Limitations  no known precautions/limitations  -TL       Subjective Pain    Able to rate subjective pain?  yes  -TL    Pre-Treatment Pain Level  5  -TL    Post-Treatment Pain Level  3  -TL      User Key  (r) = Recorded By, (t) = Taken By, (c) = Cosigned By    Initials Name Provider Type    TL Hemalatha Baeza PTA Physical Therapy Assistant                      PT Assessment/Plan     Row Name 04/23/19 0800          PT Plan    PT Frequency  2x/week  -TL       User Key  (r) = Recorded By, (t) = Taken By, (c) = Cosigned By    Initials Name Provider Type    Hemalatha Barajas PTA Physical Therapy Assistant          Modalities     Row Name 04/23/19 0800             Ice    Ice Applied  Yes  -TL      Location  low back  -TL      Rx Minutes  15 mins  -TL      Ice S/P Rx  Yes  -TL        User  Key  (r) = Recorded By, (t) = Taken By, (c) = Cosigned By    Initials Name Provider Type    TL Hemalatha Baeza PTA Physical Therapy Assistant        Exercises     Row Name 04/23/19 0800             Subjective Comments    Subjective Comments  pt stated that he was up all night and had to take a muscle relaxor.  -TL         Subjective Pain    Able to rate subjective pain?  yes  -TL      Pre-Treatment Pain Level  5  -TL      Post-Treatment Pain Level  3  -TL         Exercise 1    Exercise Name 1  pro2 UE/LE alt f/r  -TL      Time 1  10 mins  -TL      Additional Comments  level 4.5  -TL         Exercise 2    Exercise Name 2  B St. HS S  -TL      Reps 2  2  -TL      Time 2  30 sec hold  -TL         Exercise 3    Exercise Name 3  seated pirif S  -TL      Reps 3  2  -TL      Time 3  30 sec hold  -TL         Exercise 4    Exercise Name 4  sit to stands with lumbar ext  -TL      Reps 4  10  -TL      Time 4  5 sec hold  -TL         Exercise 5    Exercise Name 5  Trunk flexion  -TL      Reps 5  5  -TL      Time 5  5 sec hold  -TL         Exercise 6    Exercise Name 6  LTR  -TL      Reps 6  10  -TL      Time 6  10 sec hold  -TL         Exercise 7    Exercise Name 7  Bridges  -TL      Sets 7  2  -TL      Reps 7  10  -TL      Time 7  5 sec hold  -TL         Exercise 8    Exercise Name 8  SKTC S/DKTC S  -TL      Reps 8  2  -TL      Time 8  30 sec hold  -TL         Exercise 9    Exercise Name 9  Tband No money   -TL      Reps 9  20  -TL      Time 9  5 sec hold  -TL      Additional Comments  RTB  -TL         Exercise 10    Exercise Name 10  Shld rows high/mid/  -TL      Sets 10  2  -TL      Reps 10  10  -TL      Time 10  5 sec hold  -TL      Additional Comments  RTB  -TL         Exercise 11    Exercise Name 11  Doorway S Mid  -TL      Reps 11  2  -TL      Time 11  30 sec hold  -TL        User Key  (r) = Recorded By, (t) = Taken By, (c) = Cosigned By    Initials Name Provider Type    TL Hemalatha Baeza PTA Physical Therapy  Assistant                       PT OP Goals     Row Name 04/23/19 0800          PT Short Term Goals    STG Date to Achieve  05/02/19  -TL     STG 1  I with HEP and have additions/changes  -TL     STG 1 Progress  Ongoing  -TL     STG 2  Trunk extension AROM >=25 degrees  -TL     STG 2 Progress  Ongoing  -TL     STG 3  Patient to demonstrate improved posture and postural awareness  -TL     STG 3 Progress  Ongoing  -TL     STG 4  Patient will perform 20 bridges with no UE assist  -TL     STG 4 Progress  Ongoing  -TL     STG 5  Patient to demonstrate improved flexibility in B hamstrings and piriformis  -TL     STG 5 Progress  Ongoing  -TL        Long Term Goals    LTG 1  Patient to walk 1/2 mile non-antalgically with no increase in pain  -TL     LTG 1 Progress  Ongoing  -TL     LTG 2  Patient to lift 20lb weight from ground to waist to shoulder level with no increase in pain  -TL     LTG 2 Progress  Ongoing  -TL     LTG 3  Patient to perform 3 prone planks for 30 sec each with increase in pain or radicular symptoms  -TL     LTG 3 Progress  Ongoing  -TL     LTG 4  Patient to push/pull 90 lb. weighted sled 40 ft., 5x F/R with no increase in pain  -TL     LTG 4 Progress  Ongoing  -TL     LTG 5  Trunk AROM WNL in all directions  -TL     LTG 6  I with final HEP  -TL     LTG 7  D/C with a final HEP and free 30 day fitness formula memebership.  -TL        Time Calculation    PT Goal Re-Cert Due Date  05/02/19  -TL       User Key  (r) = Recorded By, (t) = Taken By, (c) = Cosigned By    Initials Name Provider Type    Hemalatha Barajas, PTA Physical Therapy Assistant          Therapy Education  Education Details: bridges , SKTC S, DKTC S, icing before bed, sleeping with pillow between legs on side  Given: HEP, Symptoms/condition management, Pain management, Posture/body mechanics  Program: New, Reinforced  How Provided: Verbal, Demonstration, Written  Provided to: Patient  Level of Understanding: Teach back education performed,  Verbalized, Demonstrated              Time Calculation:   Start Time: 0800  Stop Time: 0901  Time Calculation (min): 61 min  PT Non-Billable Time (min): 15 min  Total Timed Code Minutes- PT: 46 minute(s)  Therapy Charges for Today     Code Description Service Date Service Provider Modifiers Qty    24799681525 HC PT THER PROC EA 15 MIN 4/23/2019 Hemalatha Baeza, CHANTAL GP 3    81317281507 HC PT THER SUPP EA 15 MIN 4/23/2019 Hemalatha Baeza, PTA GP 1                    Hemalatha Baeza PTA  4/23/2019

## 2019-04-25 ENCOUNTER — HOSPITAL ENCOUNTER (OUTPATIENT)
Dept: PHYSICAL THERAPY | Facility: HOSPITAL | Age: 49
Setting detail: THERAPIES SERIES
Discharge: HOME | End: 2019-04-25

## 2019-04-25 DIAGNOSIS — M54.41 BILATERAL LOW BACK PAIN WITH BILATERAL SCIATICA, UNSPECIFIED CHRONICITY: Primary | ICD-10-CM

## 2019-04-25 DIAGNOSIS — M54.9 UPPER BACK PAIN: ICD-10-CM

## 2019-04-25 DIAGNOSIS — M54.42 BILATERAL LOW BACK PAIN WITH BILATERAL SCIATICA, UNSPECIFIED CHRONICITY: Primary | ICD-10-CM

## 2019-04-25 PROCEDURE — 97110 THERAPEUTIC EXERCISES: CPT | Performed by: SPECIALIST/TECHNOLOGIST

## 2019-04-30 ENCOUNTER — HOSPITAL ENCOUNTER (OUTPATIENT)
Dept: PHYSICAL THERAPY | Facility: HOSPITAL | Age: 49
Setting detail: THERAPIES SERIES
Discharge: HOME | End: 2019-04-30

## 2019-04-30 DIAGNOSIS — M54.41 BILATERAL LOW BACK PAIN WITH BILATERAL SCIATICA, UNSPECIFIED CHRONICITY: Primary | ICD-10-CM

## 2019-04-30 DIAGNOSIS — M54.9 UPPER BACK PAIN: ICD-10-CM

## 2019-04-30 DIAGNOSIS — M54.42 BILATERAL LOW BACK PAIN WITH BILATERAL SCIATICA, UNSPECIFIED CHRONICITY: Primary | ICD-10-CM

## 2019-04-30 PROCEDURE — 97110 THERAPEUTIC EXERCISES: CPT | Performed by: SPECIALIST/TECHNOLOGIST

## 2019-05-02 ENCOUNTER — HOSPITAL ENCOUNTER (OUTPATIENT)
Dept: PHYSICAL THERAPY | Facility: HOSPITAL | Age: 49
Setting detail: THERAPIES SERIES
Discharge: HOME | End: 2019-05-02

## 2019-05-02 DIAGNOSIS — M54.9 UPPER BACK PAIN: ICD-10-CM

## 2019-05-02 DIAGNOSIS — M54.41 BILATERAL LOW BACK PAIN WITH BILATERAL SCIATICA, UNSPECIFIED CHRONICITY: Primary | ICD-10-CM

## 2019-05-02 DIAGNOSIS — M54.42 BILATERAL LOW BACK PAIN WITH BILATERAL SCIATICA, UNSPECIFIED CHRONICITY: Primary | ICD-10-CM

## 2019-05-02 PROCEDURE — 97110 THERAPEUTIC EXERCISES: CPT | Performed by: SPECIALIST/TECHNOLOGIST

## 2019-05-02 NOTE — THERAPY TREATMENT NOTE
Outpatient Physical Therapy Ortho Treatment Note  [facility]     Patient Name: Randall Hernandez  : 1970  MRN: [id number]  Today's Date: 2019      Visit Date: 2019     [place]    Patients reports pain as:  0/10   Patient reports overall % improvement as:  50%   Patients attendance has been:     Insurance visits available   9 visits   Next MD visit is:  TBD    Next PT recertification is:  May 2, 2019         Visit Dx:    ICD-10-CM ICD-9-CM   1. Bilateral low back pain with bilateral sciatica, unspecified chronicity M54.42 724.3    M54.41 724.2   2. Upper back pain M54.9 724.5       Patient Active Problem List   Diagnosis   • Hyperlipidemia   • Inattention   • Non morbid obesity   • Prediabetes   • Attention deficit hyperactivity disorder (ADHD), combined type   • Need for vaccination   • Gastroesophageal reflux disease   • Encounter for drug screening   • Tingling in extremities   • Elevated BP without diagnosis of hypertension   • Allergic rhinitis   • Elevated blood pressure reading   • Pruritic rash   • Numbness in both hands   • Bilateral elbow joint pain   • Pain in both wrists   • Ulnar neuropathy of left upper extremity   • General medical examination   • Bilateral carpal tunnel syndrome   • History of prediabetes   • Numbness and tingling in both hands   • Essential hypertension   • Cerumen debris on tympanic membrane of both ears   • Carpal tunnel syndrome   • S/P carpal tunnel release   • Encounter for screening for malignant neoplasm of colon   • FH: colon cancer   • Acute recurrent sinusitis        Past Medical History:   Diagnosis Date   • Allergic rhinitis    • Arthritis    • Backache    • Cellulitis of leg, except foot    • Dyslipidemia    • GERD (gastroesophageal reflux disease)    • Heartburn    • History of Holter monitoring 2016    Sinus mechanism with a normal average heart rate.No evidence of chronotropic incompetence.Asymptomatic Holter   •  Hyperlipidemia    • Hypertension    • Low back pain    • Numbness of lower limb     left leg numbness and tingling      • Obesity, unspecified    • Obstructive sleep apnea of adult     USING C-PAP   • Pain in left hip    • Pain in right hip    • Palpitations    • Skin lesion     insect bite.      • Supraventricular tachycardia (CMS/HCC)    • Weight loss     advised        Past Surgical History:   Procedure Laterality Date   • CARPAL TUNNEL RELEASE Right 11/2/2018    Procedure: CARPAL TUNNEL RELEASE;  Surgeon: Judd Mathias MD;  Location: [facility] OR;  Service: Orthopedics   • CARPAL TUNNEL RELEASE  12/07/2018    Left   • CARPAL TUNNEL RELEASE Left 12/7/2018    Procedure: CARPAL TUNNEL RELEASE;  Surgeon: Judd Mathias MD;  Location: [facility] OR;  Service: Orthopedics   • COLONOSCOPY N/A 1/18/2019    Procedure: COLONOSCOPY a friday please ;  Surgeon: Ruslan Bowers MD;  Location: [facility] ENDOSCOPY;  Service: Gastroenterology   • ENDOSCOPY     • PYLOROMYOTOMY                         PT Assessment/Plan     Row Name 05/02/19 0900          PT Assessment    Assessment Comments  jah rx well. able to complete 20x bridges and intro sled push pull w/ no c/o  (Pended)   -ML        PT Plan    PT Frequency  2x/week  (Pended)   -ML     PT Plan Comments  per poc and goals   (Pended)   -ML       User Key  (r) = Recorded By, (t) = Taken By, (c) = Cosigned By    Initials Name Provider Type    Rylan Pina, ATC             Exercises     Row Name 05/02/19 0800             Subjective Comments    Subjective Comments  about the same.  pain comes and goes  (Pended)   -ML         Subjective Pain    Able to rate subjective pain?  yes  (Pended)   -ML      Pre-Treatment Pain Level  0  (Pended)   -ML      Post-Treatment Pain Level  0  (Pended)   -ML         Exercise 1    Exercise Name 1  pro2 UE/LE alt f/r  (Pended)   -ML      Time 1  10 mins  (Pended)   -ML      Additional Comments  L6.0  (Pended)   -ML          Exercise 2    Exercise Name 2  Swiss: DK-c Rolls  (Pended)   -ML      Sets 2  2  (Pended)   -ML      Reps 2  15  (Pended)   -ML         Exercise 3    Exercise Name 3  Swiss: LTR (feet on ball)  (Pended)   -ML      Reps 3  10  (Pended)   -ML         Exercise 4    Exercise Name 4  LTR w/ add  (Pended)   -ML      Reps 4  10  (Pended)   -ML      Time 4  10 sec  (Pended)   -ML         Exercise 5    Exercise Name 5  Bridges w/ add  (Pended)   -ML      Reps 5  20  (Pended)   -ML         Exercise 6    Exercise Name 6  prone Hip IR + heel squeeze  (Pended)   -ML      Sets 6  2  (Pended)   -ML      Reps 6  10  (Pended)   -ML         Exercise 7    Exercise Name 7  prone alt hip ext  (Pended)   -ML      Sets 7  2  (Pended)   -ML      Reps 7  10  (Pended)   -ML         Exercise 8    Exercise Name 8  prone planks  (Pended)   -ML      Reps 8  5   (Pended)   -ML      Time 8  20 sec  (Pended)   -ML         Exercise 9    Exercise Name 9  Track walk - posture & stride  (Pended)   -ML      Reps 9  10 laps  (Pended)   -ML         Exercise 10    Exercise Name 10  Tband: high row  (Pended)   -ML      Reps 10  15  (Pended)   -ML      Additional Comments  green  (Pended)   -ML         Exercise 11    Exercise Name 11  Tband: high pull-sh ext  (Pended)   -ML      Reps 11  15  (Pended)   -ML      Additional Comments  green  (Pended)   -ML         Exercise 12    Exercise Name 12  Tband: chops high to low  (Pended)   -ML      Reps 12  15  (Pended)   -ML         Exercise 13    Exercise Name 13  Sit to stands w/ [place] Lx ext  (Pended)   -ML      Reps 13  10  (Pended)   -ML      Additional Comments  4# kettle/ 5 sec ext s hold  (Pended)   -ML        User Key  (r) = Recorded By, (t) = Taken By, (c) = Cosigned By    Initials Name Provider Type    Rylan Pina, ATC                        PT OP Goals     Row Name 05/02/19 0800          PT Short Term Goals    STG Date to Achieve  05/02/19  (Pended)   -ML     STG 1  I with  HEP and have additions/changes  (Pended)   -ML     STG 1 Progress  Ongoing  (Pended)   -ML     STG 2  Trunk extension AROM >=25 degrees  (Pended)   -ML     STG 2 Progress  Ongoing  (Pended)   -ML     STG 3  Patient to demonstrate improved posture and postural awareness  (Pended)   -ML     STG 3 Progress  Ongoing  (Pended)   -ML     STG 4  Patient will perform 20 bridges with no UE assist  (Pended)   -ML     STG 4 Progress  Partially Met;Ongoing  (Pended)   -ML     STG 5  Patient to demonstrate improved flexibility in B hamstrings and piriformis  (Pended)   -ML     STG 5 Progress  Ongoing  (Pended)   -ML        Long Term Goals    LTG 1  Patient to walk 1/2 mile non-antalgically with no increase in pain  (Pended)   -ML     LTG 1 Progress  Met  (Pended)   -ML     LTG 2  Patient to lift 20lb weight from ground to waist to shoulder level with no increase in pain  (Pended)   -ML     LTG 2 Progress  Ongoing  (Pended)   -ML     LTG 3  Patient to perform 3 prone planks for 30 sec each with increase in pain or radicular symptoms  (Pended)   -ML     LTG 3 Progress  Partially Met;Ongoing  (Pended)   -ML     LTG 4  Patient to push/pull 90 lb. weighted sled 40 ft., 5x F/R with no increase in pain  (Pended)   -ML     LTG 4 Progress  Partially Met;Ongoing  (Pended)   -ML     LTG 5  Trunk AROM WNL in all directions  (Pended)   -ML     LTG 6  I with final HEP  (Pended)   -ML     LTG 7  D/C with a final HEP and free 30 day fitness formula memebership.  (Pended)   -ML        Time Calculation    PT Goal Re-Cert Due Date  05/02/19  (Pended)   -ML       User Key  (r) = Recorded By, (t) = Taken By, (c) = Cosigned By    Initials Name Provider Type    Rylan Pina, ATC                          Time Calculation:   Start Time: (P) 0805  Stop Time: (P) 0922  Time Calculation (min): (P) 77 min  Therapy Charges for Today     Code Description Service Date Service Provider Modifiers Qty    78947413756 HC PT THER SUPP EA 15  MIN 5/2/2019 Rylan Reeves, ATC  1    53765697091 HC PT THER PROC EA 15 MIN 5/2/2019 Rylan Reeves, ATC  4                    Rylan Reeves, ATC  5/2/2019

## 2019-05-06 PROBLEM — F90.0 ATTENTION DEFICIT HYPERACTIVITY DISORDER (ADHD), PREDOMINANTLY INATTENTIVE TYPE: Status: ACTIVE | Noted: 2019-05-06

## 2019-05-06 PROBLEM — E66.9 CLASS 2 OBESITY WITH BODY MASS INDEX (BMI) OF 35.0 TO 35.9 IN ADULT: Status: ACTIVE | Noted: 2019-05-06

## 2019-05-06 PROBLEM — Z00.00 ANNUAL PHYSICAL EXAM: Status: ACTIVE | Noted: 2019-05-06

## 2019-05-06 NOTE — PROGRESS NOTES
Subjective:  Randall Hernandez is a 48 y.o. male who presents for       Patient Active Problem List   Diagnosis   • Hyperlipidemia   • Inattention   • Non morbid obesity   • Prediabetes   • Attention deficit hyperactivity disorder (ADHD), combined type   • Need for vaccination   • Gastroesophageal reflux disease   • Encounter for drug screening   • Tingling in extremities   • Elevated BP without diagnosis of hypertension   • Allergic rhinitis   • Elevated blood pressure reading   • Pruritic rash   • Numbness in both hands   • Bilateral elbow joint pain   • Pain in both wrists   • Ulnar neuropathy of left upper extremity   • General medical examination   • Bilateral carpal tunnel syndrome   • History of prediabetes   • Numbness and tingling in both hands   • Essential hypertension   • Cerumen debris on tympanic membrane of both ears   • Carpal tunnel syndrome   • S/P carpal tunnel release   • Encounter for screening for malignant neoplasm of colon   • FH: colon cancer   • Acute recurrent sinusitis   • Attention deficit hyperactivity disorder (ADHD), predominantly inattentive type   • Class 2 obesity with body mass index (BMI) of 35.0 to 35.9 in adult   • Annual physical exam           Current Outpatient Medications:   •  amphetamine-dextroamphetamine XR (ADDERALL XR) 25 MG 24 hr capsule, Take 1 capsule by mouth Every Morning Take 1 tablet by mouth daily, Disp: 30 capsule, Rfl: 0  •  aspirin 81 MG EC tablet, Take 1 tablet by mouth Daily., Disp: 30 tablet, Rfl: 3  •  atorvastatin (LIPITOR) 40 MG tablet, Take 1 tablet by mouth Daily., Disp: 30 tablet, Rfl: 3  •  cyclobenzaprine (FLEXERIL) 5 MG tablet, Take 1 tablet by mouth 3 (Three) Times a Day As Needed for Muscle Spasms., Disp: 30 tablet, Rfl: 3  •  doxycycline (MONODOX) 50 MG capsule, Take 2 capsules by mouth 2 (Two) Times a Day., Disp: 20 capsule, Rfl: 0  •  fluticasone (FLONASE) 50 MCG/ACT nasal spray, 2 sprays into the nostril(s) as directed by provider Daily.,  "Disp: 1 bottle, Rfl: 5  •  fluticasone (FLONASE) 50 MCG/ACT nasal spray, INSTILL 2 SPRAYS IN EACH NOSTRIL ONCE DAILY, Disp: 16 mL, Rfl: 0  •  lisinopril (PRINIVIL,ZESTRIL) 10 MG tablet, Take 1 tablet by mouth Daily., Disp: 30 tablet, Rfl: 3  •  meloxicam (MOBIC) 7.5 MG tablet, Take 1 tablet by mouth Daily., Disp: 30 tablet, Rfl: 3  •  montelukast (SINGULAIR) 10 MG tablet, Take 1 tablet by mouth Every Night., Disp: 30 tablet, Rfl: 5  •  omeprazole (priLOSEC) 40 MG capsule, Take 1 capsule by mouth Daily., Disp: 30 capsule, Rfl: 11  •  vitamin D (ERGOCALCIFEROL) 15463 units capsule capsule, Take 1 capsule by mouth 1 (One) Time Per Week., Disp: 4 capsule, Rfl: 3    HPI     Pt is 47 yo male with history of obesity, gERD, allergic rhinitis ADHD, HLP who is here for recheck. And refills on ADHD medication. He is doing well no chest pain . Pt is doing well postop after having bilateral carpal tunnel release although his left hand gives him issues intermittently.  No chest pain no dizziness. His other issue is back pian in lower back  With bilateral sciatica  that has been going for years. It is worse when he bends and stands for prolong periods. Pain is about 8/10 on severity at times. He does hear a \"popping\" sound in his back periodically      Obesity   This is a chronic problem. The current episode started more than 1 year ago. The problem occurs constantly. The problem has been unchanged. Associated symptoms include arthralgias and numbness. Pertinent negatives include no abdominal pain, anorexia, change in bowel habit, chest pain, chills, congestion, coughing, diaphoresis, fatigue, fever, headaches, joint swelling, myalgias, nausea, rash, sore throat, swollen glands, urinary symptoms, vertigo, visual change, vomiting or weakness. Nothing aggravates the symptoms. He has tried nothing for the symptoms. The treatment provided no relief.   Mental Health Problem   The primary symptoms do not include dysphoric mood, " delusions, hallucinations, bizarre behavior, disorganized speech, negative symptoms or somatic symptoms. This is a chronic problem.   The degree of incapacity that he is experiencing as a consequence of his illness is mild. Additional symptoms of the illness include attention impairment. Additional symptoms of the illness do not include anhedonia, insomnia, hypersomnia, appetite change, unexpected weight change, fatigue, agitation, psychomotor retardation, feelings of worthlessness, euphoric mood, increased goal-directed activity, flight of ideas, inflated self-esteem, decreased need for sleep, distractible, poor judgment, visual change, headaches or abdominal pain. He does not admit to suicidal ideas. He does not have a plan to commit suicide. He does not contemplate injuring another person.   Back Pain   This is a chronic problem. The current episode started more than 1 month ago. The problem occurs constantly. The problem is unchanged. The pain is present in the lumbar spine. The quality of the pain is described as aching. The pain is at a severity of 5/10. The symptoms are aggravated by lying down, position, sitting and standing. Associated symptoms include numbness. Pertinent negatives include no abdominal pain, bladder incontinence, bowel incontinence, chest pain, dysuria, fever, headaches, leg pain, paresis, paresthesias, pelvic pain, perianal numbness, tingling or weakness. He has tried nothing for the symptoms. The treatment provided no relief.    Wrist Pain    The pain is present in the left elbow, left wrist, right wrist and right elbow. This is a chronic problem. The current episode started more than 1 month ago. The problem occurs constantly. The problem has been gradually worsening. The quality of the pain is described as aching. The pain is at a severity of 9/10. The pain is moderate. Associated symptoms include joint locking, a limited range of motion, numbness, stiffness and tingling. Pertinent  negatives include no fever, inability to bear weight, itching or joint swelling. The symptoms are aggravated by activity. He has tried NSAIDS for the symptoms. The treatment provided no relief. Family history does not include gout or rheumatoid arthritis. There is no history of diabetes, gout, osteoarthritis or rheumatoid arthritis.   Elbow Injury   This is a chronic problem. The current episode started more than 1 year ago. The problem occurs constantly. The problem has been gradually worsening. Associated symptoms include arthralgias, joint swelling, numbness, a rash and weakness. Pertinent negatives include no abdominal pain, anorexia, change in bowel habit, chest pain, chills, congestion, coughing, diaphoresis, fatigue, fever, headaches, myalgias, nausea, neck pain, sore throat, swollen glands, urinary symptoms, vertigo, visual change or vomiting. The symptoms are aggravated by bending. He has tried NSAIDs for the symptoms. The treatment provided mild relief.   Hypertension   This is a new problem. The current episode started more than 1 month ago. The problem has been gradually worsening since onset. The problem is uncontrolled. Pertinent negatives include no anxiety, blurred vision, chest pain, headaches, malaise/fatigue, neck pain, orthopnea, palpitations, peripheral edema, PND, shortness of breath or sweats. Risk factors for coronary artery disease include male gender, obesity and sedentary lifestyle. Past treatments include nothing. There are no compliance problems.         Review of Systems  Review of Systems    Patient Active Problem List   Diagnosis   • Hyperlipidemia   • Inattention   • Non morbid obesity   • Prediabetes   • Attention deficit hyperactivity disorder (ADHD), combined type   • Need for vaccination   • Gastroesophageal reflux disease   • Encounter for drug screening   • Tingling in extremities   • Elevated BP without diagnosis of hypertension   • Allergic rhinitis   • Elevated blood  pressure reading   • Pruritic rash   • Numbness in both hands   • Bilateral elbow joint pain   • Pain in both wrists   • Ulnar neuropathy of left upper extremity   • General medical examination   • Bilateral carpal tunnel syndrome   • History of prediabetes   • Numbness and tingling in both hands   • Essential hypertension   • Cerumen debris on tympanic membrane of both ears   • Carpal tunnel syndrome   • S/P carpal tunnel release   • Encounter for screening for malignant neoplasm of colon   • FH: colon cancer   • Acute recurrent sinusitis   • Attention deficit hyperactivity disorder (ADHD), predominantly inattentive type   • Class 2 obesity with body mass index (BMI) of 35.0 to 35.9 in adult   • Annual physical exam     Past Surgical History:   Procedure Laterality Date   • CARPAL TUNNEL RELEASE Right 2018    Procedure: CARPAL TUNNEL RELEASE;  Surgeon: Judd Mathias MD;  Location: [facility] OR;  Service: Orthopedics   • CARPAL TUNNEL RELEASE  2018    Left   • CARPAL TUNNEL RELEASE Left 2018    Procedure: CARPAL TUNNEL RELEASE;  Surgeon: Judd Mathias MD;  Location: [facility] OR;  Service: Orthopedics   • COLONOSCOPY N/A 2019    Procedure: COLONOSCOPY a friday please ;  Surgeon: Ruslan Bowers MD;  Location: [facility] ENDOSCOPY;  Service: Gastroenterology   • ENDOSCOPY     • PYLOROMYOTOMY       Social History     Socioeconomic History   • Marital status: Single     Spouse name: Not on file   • Number of children: Not on file   • Years of education: Not on file   • Highest education level: Not on file   Occupational History   • Occupation:      Employer: SELF-EMPLOYED     Comment: Patient resides with karanAddis Tavarez.   Tobacco Use   • Smoking status: Former Smoker     Packs/day: 1.00     Years: 6.00     Pack years: 6.00     Types: Cigarettes     Last attempt to quit:      Years since quittin.3   • Smokeless tobacco: Never Used   Substance and Sexual  Activity   • Alcohol use: Yes     Comment: 12/14/2018 - Kevin reports consumption of alcoholic beverages under a social basis (approximately 3 - 4 per month).     • Drug use: No   • Sexual activity: Defer     Family History   Problem Relation Age of Onset   • Colon cancer Mother         onset age greater than 75y   • Diabetes Mother    • Breast cancer Sister    • Cancer Brother         bladder   • Diabetes Brother    • Hyperlipidemia Brother      Lab on 04/18/2019   Component Date Value Ref Range Status   • WBC 04/18/2019 5.25  3.40 - 10.80 10*3/mm3 Final   • RBC 04/18/2019 4.77  4.14 - 5.80 10*6/mm3 Final   • Hemoglobin 04/18/2019 14.2  13.0 - 17.7 g/dL Final   • Hematocrit 04/18/2019 43.6  37.5 - 51.0 % Final   • MCV 04/18/2019 91.4  79.0 - 97.0 fL Final   • MCH 04/18/2019 29.8  26.6 - 33.0 pg Final   • MCHC 04/18/2019 32.6  31.5 - 35.7 g/dL Final   • RDW 04/18/2019 13.1  12.3 - 15.4 % Final   • RDW-SD 04/18/2019 44.4  37.0 - 54.0 fl Final   • MPV 04/18/2019 9.9  6.0 - 12.0 fL Final   • Platelets 04/18/2019 306  140 - 450 10*3/mm3 Final   • Neutrophil % 04/18/2019 48.4  42.7 - 76.0 % Final   • Lymphocyte % 04/18/2019 38.1  19.6 - 45.3 % Final   • Monocyte % 04/18/2019 8.4  5.0 - 12.0 % Final   • Eosinophil % 04/18/2019 3.4  0.3 - 6.2 % Final   • Basophil % 04/18/2019 1.3  0.0 - 1.5 % Final   • Immature Grans % 04/18/2019 0.4  0.0 - 0.5 % Final   • Neutrophils, Absolute 04/18/2019 2.54  1.40 - 7.00 10*3/mm3 Final   • Lymphocytes, Absolute 04/18/2019 2.00  0.70 - 3.10 10*3/mm3 Final   • Monocytes, Absolute 04/18/2019 0.44  0.10 - 0.90 10*3/mm3 Final   • Eosinophils, Absolute 04/18/2019 0.18  0.00 - 0.40 10*3/mm3 Final   • Basophils, Absolute 04/18/2019 0.07  0.00 - 0.20 10*3/mm3 Final   • Immature Grans, Absolute 04/18/2019 0.02  0.00 - 0.05 10*3/mm3 Final   • nRBC 04/18/2019 0.0  0.0 - 0.0 /100 WBC Final   • Glucose 04/18/2019 104* 65 - 99 mg/dL Final   • BUN 04/18/2019 11  6 - 20 mg/dL Final   • Creatinine  04/18/2019 1.07  0.76 - 1.27 mg/dL Final   • Sodium 04/18/2019 134* 136 - 145 mmol/L Final   • Potassium 04/18/2019 4.5  3.5 - 5.2 mmol/L Final   • Chloride 04/18/2019 96* 98 - 107 mmol/L Final   • CO2 04/18/2019 22.8  22.0 - 29.0 mmol/L Final   • Calcium 04/18/2019 8.9  8.6 - 10.5 mg/dL Final   • Total Protein 04/18/2019 7.6  6.0 - 8.5 g/dL Final   • Albumin 04/18/2019 4.50  3.50 - 5.20 g/dL Final   • ALT (SGPT) 04/18/2019 30  1 - 41 U/L Final   • AST (SGOT) 04/18/2019 28  1 - 40 U/L Final   • Alkaline Phosphatase 04/18/2019 110  39 - 117 U/L Final   • Total Bilirubin 04/18/2019 0.6  0.2 - 1.2 mg/dL Final   • eGFR Non African Amer 04/18/2019 73  >60 mL/min/1.73 Final   • Globulin 04/18/2019 3.1  gm/dL Final   • A/G Ratio 04/18/2019 1.5  g/dL Final   • BUN/Creatinine Ratio 04/18/2019 10.3  7.0 - 25.0 Final   • Anion Gap 04/18/2019 15.2  mmol/L Final   • Total Cholesterol 04/18/2019 174  0 - 200 mg/dL Final   • Triglycerides 04/18/2019 91  0 - 150 mg/dL Final   • HDL Cholesterol 04/18/2019 41  40 - 60 mg/dL Final   • LDL Cholesterol  04/18/2019 115* 0 - 100 mg/dL Final   • VLDL Cholesterol 04/18/2019 18.2  5 - 40 mg/dL Final   • LDL/HDL Ratio 04/18/2019 2.80   Final   • 25 Hydroxy, Vitamin D 04/18/2019 27.9* 30.0 - 100.0 ng/ml Final   • Amphet/Methamphet, Screen 04/18/2019 Positive* Negative Final   • Barbiturates Screen, Urine 04/18/2019 Negative  Negative Final   • Benzodiazepine Screen, Urine 04/18/2019 Negative  Negative Final   • Cocaine Screen, Urine 04/18/2019 Negative  Negative Final   • Opiate Screen 04/18/2019 Negative  Negative Final   • THC, Screen, Urine 04/18/2019 Negative  Negative Final   • Methadone Screen, Urine 04/18/2019 Negative  Negative Final   • Oxycodone Screen, Urine 04/18/2019 Negative  Negative Final   Lab on 01/28/2019   Component Date Value Ref Range Status   • WBC 01/28/2019 6.11  3.20 - 9.80 10*3/mm3 Final   • RBC 01/28/2019 4.75  4.37 - 5.74 10*6/mm3 Final   • Hemoglobin 01/28/2019  14.4  13.7 - 17.3 g/dL Final   • Hematocrit 01/28/2019 43.4  39.0 - 49.0 % Final   • MCV 01/28/2019 91.4  80.0 - 98.0 fL Final   • MCH 01/28/2019 30.3  26.5 - 34.0 pg Final   • MCHC 01/28/2019 33.2  31.5 - 36.3 g/dL Final   • RDW 01/28/2019 12.9  11.5 - 14.5 % Final   • RDW-SD 01/28/2019 43.1  35.1 - 43.9 fl Final   • MPV 01/28/2019 9.4  8.0 - 12.0 fL Final   • Platelets 01/28/2019 288  150 - 450 10*3/mm3 Final   • Neutrophil % 01/28/2019 50.2  37.0 - 80.0 % Final   • Lymphocyte % 01/28/2019 38.3  10.0 - 50.0 % Final   • Monocyte % 01/28/2019 6.4  0.0 - 12.0 % Final   • Eosinophil % 01/28/2019 4.1  0.0 - 7.0 % Final   • Basophil % 01/28/2019 0.7  0.0 - 2.0 % Final   • Immature Grans % 01/28/2019 0.3  0.0 - 0.5 % Final   • Neutrophils, Absolute 01/28/2019 3.07  2.00 - 8.60 10*3/mm3 Final   • Lymphocytes, Absolute 01/28/2019 2.34  0.60 - 4.20 10*3/mm3 Final   • Monocytes, Absolute 01/28/2019 0.39  0.00 - 0.90 10*3/mm3 Final   • Eosinophils, Absolute 01/28/2019 0.25  0.00 - 0.70 10*3/mm3 Final   • Basophils, Absolute 01/28/2019 0.04  0.00 - 0.20 10*3/mm3 Final   • Immature Grans, Absolute 01/28/2019 0.02  0.00 - 0.02 10*3/mm3 Final   • Glucose 01/28/2019 118* 60 - 100 mg/dL Final   • BUN 01/28/2019 12  7 - 21 mg/dL Final   • Creatinine 01/28/2019 0.96  0.70 - 1.30 mg/dL Final   • Sodium 01/28/2019 139  137 - 145 mmol/L Final   • Potassium 01/28/2019 4.7  3.5 - 5.1 mmol/L Final   • Chloride 01/28/2019 103  95 - 110 mmol/L Final   • CO2 01/28/2019 28.0  22.0 - 31.0 mmol/L Final   • Calcium 01/28/2019 9.7  8.4 - 10.2 mg/dL Final   • Total Protein 01/28/2019 6.8  6.3 - 8.6 g/dL Final   • Albumin 01/28/2019 4.10  3.40 - 4.80 g/dL Final   • ALT (SGPT) 01/28/2019 42  21 - 72 U/L Final   • AST (SGOT) 01/28/2019 29  17 - 59 U/L Final   • Alkaline Phosphatase 01/28/2019 107  38 - 126 U/L Final   • Total Bilirubin 01/28/2019 0.4  0.2 - 1.3 mg/dL Final   • eGFR Non African Amer 01/28/2019 83  56 - 130 mL/min/1.73 Final   •  Globulin 01/28/2019 2.7  2.3 - 3.5 gm/dL Final   • A/G Ratio 01/28/2019 1.5  1.1 - 1.8 g/dL Final   • BUN/Creatinine Ratio 01/28/2019 12.5  7.0 - 25.0 Final   • Anion Gap 01/28/2019 8.0  5.0 - 15.0 mmol/L Final   • Total Cholesterol 01/28/2019 168  0 - 199 mg/dL Final   • Triglycerides 01/28/2019 124  20 - 199 mg/dL Final   • HDL Cholesterol 01/28/2019 38* 60 - 200 mg/dL Final   • LDL Cholesterol  01/28/2019 109  1 - 129 mg/dL Final   • LDL/HDL Ratio 01/28/2019 2.77  0.00 - 3.55 Final   • Amphetamine Screen, Urine 01/28/2019 Positive* Negative Final   • Barbiturates Screen, Urine 01/28/2019 Negative  Negative Final   • Benzodiazepine Screen, Urine 01/28/2019 Negative  Negative Final   • Cocaine Screen, Urine 01/28/2019 Negative  Negative Final   • Methadone Screen, Urine 01/28/2019 Negative  Negative Final   • Opiate Screen 01/28/2019 Negative  Negative Final   • Oxycodone Screen, Urine 01/28/2019 Negative  Negative Final   • THC, Screen, Urine 01/28/2019 Negative  Negative Final      XR Spine Lumbar 4+ View  Narrative: Five-view lumbar spine    HISTORY: Back pain. Lumbago with bilateral sciatica.    AP, lateral and oblique radiographs of the lumbar spine and spot  film of the lumbosacral junction were obtained.    COMPARISON: None.    Mild levoscoliosis.  Congenitally short pedicles.  Minimal degenerative changes with small marginal osteophytes.  There is a normal lordosis.   Vertebral height and alignment are maintained.  No fracture.  The pedicles are intact.  Impression: CONCLUSION:  Mild levoscoliosis.  Congenitally short pedicles.  Minimal degenerative changes with small marginal osteophytes.    [id number]    Electronically signed by:  Anthony Cm MD  4/8/2019 10:21 AM CDT  Workstation: [id number]  XR Spine Thoracic 3 View  Narrative: Three-view thoracic spine.    HISTORY: Back pain    AP, lateral and swimmer's views of the thoracic spine were  obtained.    COMPARISON: None.    Normal thoracic  kyphosis.  Vertebral height and alignment maintained.  Mild degenerative changes.  No fracture.  The pedicles are intact.  No paraspinal widening.  Impression: CONCLUSION:   Mild degenerative changes.    [id number]    Electronically signed by:  Anthony Cm MD  4/8/2019 10:19 AM CDT  Workstation: [email or web address]    [unfilled]  Immunization History   Administered Date(s) Administered   • Influenza TIV (IM) 03/28/2017   • Tdap 02/17/2017       The following portions of the patient's history were reviewed and updated as appropriate: allergies, current medications, past family history, past medical history, past social history, past surgical history and problem list.        Physical Exam  Physical Exam    Assessment/Plan   Problems Addressed this Visit        Cardiovascular and Mediastinum    Hyperlipidemia    Essential hypertension       Respiratory    Allergic rhinitis       Digestive    Non morbid obesity    Class 2 obesity with body mass index (BMI) of 35.0 to 35.9 in adult       Other    Prediabetes    S/P carpal tunnel release    Attention deficit hyperactivity disorder (ADHD), predominantly inattentive type - Primary    Annual physical exam           -recommend pt go back on aspirin and lipitor  -for back pain with sciatica  -x rays of thoracic and lumbar spine -PT/OT referral start back on mobic 7.5 mg daily. And flexeril 5 mg PO TID PRN.  Recommend rest heat massage   -referral to Gastroenterology for colon cancer screening   - pt doing well postop on carpal tunnel release on right hand   -for cerumen of both ears - debrox ointment PRN. Drug information provided  -for carpal tunnel he is doing better post op  he has failed conservative treatment for >3 months. . He has yet to get surgery on left  Hand   -  Continue  on lisionpril 10 mg PO q daily drug information provided low salt diet informaiton provided. Side effects discussed BP better controlled today  -for sinusitis - doxycyline 100 mg BID for 10 days. Side  effects discussed  - refilled ADHD medication Adderall XR 25 mg PO q daily for ADHD.  [facility] ref [id number]  - for allergic rhinitis - continue singulair and flonase and will add zyrtec.  -H/O prediabetes - D/C  metformin, recent hga1c normal at 5.4   - hyperlipidemia -continue statin. Recheck lipid panel. Pt on aspirin   - obesity - recommend ketogenic diet along with intermittent fasting. Pt has lost 3 lbs since last visit   -recheck in 1 month for followup                   This document has been electronically signed by Scottie Borges MD on May 6, 2019 10:31 AM

## 2019-05-07 ENCOUNTER — HOSPITAL ENCOUNTER (OUTPATIENT)
Dept: PHYSICAL THERAPY | Facility: HOSPITAL | Age: 49
Setting detail: THERAPIES SERIES
Discharge: HOME | End: 2019-05-07

## 2019-05-07 DIAGNOSIS — M54.42 BILATERAL LOW BACK PAIN WITH BILATERAL SCIATICA, UNSPECIFIED CHRONICITY: ICD-10-CM

## 2019-05-07 DIAGNOSIS — M54.9 UPPER BACK PAIN: Primary | ICD-10-CM

## 2019-05-07 DIAGNOSIS — M54.41 BILATERAL LOW BACK PAIN WITH BILATERAL SCIATICA, UNSPECIFIED CHRONICITY: ICD-10-CM

## 2019-05-07 PROCEDURE — 97110 THERAPEUTIC EXERCISES: CPT | Performed by: PHYSICAL THERAPIST

## 2019-05-07 NOTE — THERAPY PROGRESS REPORT/RE-CERT
"    Outpatient Physical Therapy Ortho Progress Note  [facility]  Rhoda Wallace, PT       Patient Name: Randall Hernandez  : 1970  MRN: [id number]  Today's Date: 2019      Visit Date: 2019     Pt reports 0/10 pain pre treatment, \"it's good\"/10 pain post treatment  Reports 50% of improvement.  Attended 6/6 visits.  Insurance available: 9 visits  Next MD appt: 2019.  Recertification: N/A    Patient Active Problem List   Diagnosis   • Hyperlipidemia   • Inattention   • Non morbid obesity   • Prediabetes   • Attention deficit hyperactivity disorder (ADHD), combined type   • Need for vaccination   • Gastroesophageal reflux disease   • Encounter for drug screening   • Tingling in extremities   • Elevated BP without diagnosis of hypertension   • Allergic rhinitis   • Elevated blood pressure reading   • Pruritic rash   • Numbness in both hands   • Bilateral elbow joint pain   • Pain in both wrists   • Ulnar neuropathy of left upper extremity   • General medical examination   • Bilateral carpal tunnel syndrome   • History of prediabetes   • Numbness and tingling in both hands   • Essential hypertension   • Cerumen debris on tympanic membrane of both ears   • Carpal tunnel syndrome   • S/P carpal tunnel release   • Encounter for screening for malignant neoplasm of colon   • FH: colon cancer   • Acute recurrent sinusitis   • Attention deficit hyperactivity disorder (ADHD), predominantly inattentive type   • Class 2 obesity with body mass index (BMI) of 35.0 to 35.9 in adult   • Annual physical exam        Past Medical History:   Diagnosis Date   • Allergic rhinitis    • Arthritis    • Backache    • Cellulitis of leg, except foot    • Dyslipidemia    • GERD (gastroesophageal reflux disease)    • Heartburn    • History of Holter monitoring 2016    Sinus mechanism with a normal average heart rate.No evidence of chronotropic incompetence.Asymptomatic Holter   • Hyperlipidemia  "   • Hypertension    • Low back pain    • Numbness of lower limb     left leg numbness and tingling      • Obesity, unspecified    • Obstructive sleep apnea of adult     USING C-PAP   • Pain in left hip    • Pain in right hip    • Palpitations    • Skin lesion     insect bite.      • Supraventricular tachycardia (CMS/HCC)    • Weight loss     advised        Past Surgical History:   Procedure Laterality Date   • CARPAL TUNNEL RELEASE Right 11/2/2018    Procedure: CARPAL TUNNEL RELEASE;  Surgeon: Judd Mathias MD;  Location: [facility] OR;  Service: Orthopedics   • CARPAL TUNNEL RELEASE  12/07/2018    Left   • CARPAL TUNNEL RELEASE Left 12/7/2018    Procedure: CARPAL TUNNEL RELEASE;  Surgeon: Judd Mathias MD;  Location: [facility] OR;  Service: Orthopedics   • COLONOSCOPY N/A 1/18/2019    Procedure: COLONOSCOPY a friday please ;  Surgeon: Ruslan Bowers MD;  Location: [facility] ENDOSCOPY;  Service: Gastroenterology   • ENDOSCOPY     • PYLOROMYOTOMY       Changes to medications: None noted.    Changes to MD orders: None noted.    Visit Dx:     ICD-10-CM ICD-9-CM   1. Upper back pain M54.9 724.5   2. Bilateral low back pain with bilateral sciatica, unspecified chronicity M54.42 724.3    M54.41 724.2             PT Ortho     Row Name 05/07/19 0900       Head/Neck/Trunk    Trunk Extension AROM  --  (Pended)   -ER    Trunk Flexion AROM  --  (Pended)   -ER    Trunk Lt Lateral Flexion AROM  --  (Pended)   -ER    Trunk Rt Lateral Flexion AROM  --  (Pended)   -ER    Trunk Lt Rotation AROM  --  (Pended)   -ER    Trunk Rt Rotation AROM  --  (Pended)   -ER       MMT (Manual Muscle Testing)    General MMT Comments  --  (Pended)   -ER       Sensation    Sensation WNL?  --  (Pended)   -ER    Light Touch  --  (Pended)   -ER    Additional Comments  --  (Pended)   -ER       Pathomechanics    Spine Pathomechanics  --  (Pended)   -ER    Row Name 05/07/19 0800       Subjective Comments    Subjective Comments  Patient states that  "the back \"feels okay\"  (Pended)   -ER       Subjective Pain    Able to rate subjective pain?  yes  (Pended)   -ER    Pre-Treatment Pain Level  0  (Pended)   -ER       Posture/Observations    Alignment Options  Thoracic kyphosis;Rounded shoulders;Cervical lordosis;Lumbar lordosis;Scoliosis  (Pended)   -ER    Cervical Lordosis  Mild;Increased  (Pended)   -ER    Thoracic Kyphosis  Moderate;Increased  (Pended)   -ER    Rounded Shoulders  Moderate;Increased  (Pended)   -ER    Scoliosis  Mild;Increased  (Pended)   -ER    Lumbar lordosis  Decreased;Mild  (Pended)   -ER    Posture/Observations Comments  No acute distress  (Pended)   -ER       Head/Neck/Trunk    Trunk Extension AROM  29 degrees  (Pended)   -ER    Trunk Flexion AROM  94 degrees  (Pended)   -ER    Trunk Lt Lateral Flexion AROM  100% of range  (Pended)   -ER    Trunk Rt Lateral Flexion AROM  100% of range  (Pended)   -ER    Trunk Lt Rotation AROM  100% of range  (Pended)   -ER    Trunk Rt Rotation AROM  100% of range  (Pended)   -ER       MMT (Manual Muscle Testing)    General MMT Comments  5/5 BLE with no increase in pain  (Pended)   -ER       Sensation    Sensation WNL?  WNL  (Pended)   -ER    Light Touch  No apparent deficits  (Pended)   -ER    Additional Comments  Appropriate response to stimuli  (Pended)   -ER       Pathomechanics    Spine Pathomechanics  Excessive thoracic kyphosis with forward bend;Limited lumbar flattening with forward bend  (Pended)   -ER       Transfers    Comment (Transfers)  I with transfers, improved logroll technique  (Pended)   -ER       Gait/Stairs Assessment/Training    Comment (Gait/Stairs)  FWB, patient ambulates non-antalgically  (Pended)   -ER      User Key  (r) = Recorded By, (t) = Taken By, (c) = Cosigned By    Initials Name Provider Type    ER Donna Lemus, PT Student PT Student                      Therapy Education  Education Details: (P) Lifting Technique  Given: (P) HEP, Symptoms/condition management, Pain management, " Posture/body mechanics  Program: (P) New, Reinforced  How Provided: (P) Verbal, Demonstration, Written  Provided to: (P) Patient  Level of Understanding: (P) Teach back education performed, Verbalized, Demonstrated     PT OP Goals     Row Name 05/07/19 0800          PT Short Term Goals    STG Date to Achieve  05/02/19  (Pended)   -ER     STG 1  I with HEP and have additions/changes  (Pended)   -ER     STG 1 Progress  Ongoing  (Pended)   -ER     STG 2  Trunk extension AROM >=25 degrees  (Pended)   -ER     STG 2 Progress  Met  (Pended)   -ER     STG 3  Patient to demonstrate improved posture and postural awareness  (Pended)   -ER     STG 3 Progress  Ongoing  (Pended)   -ER     STG 4  Patient will perform 20 bridges with no UE assist  (Pended)   -ER     STG 4 Progress  Met  (Pended)   -ER     STG 5  Patient to demonstrate improved flexibility in B hamstrings and piriformis  (Pended)   -ER     STG 5 Progress  Ongoing  (Pended)   -ER        Long Term Goals    LTG Date to Achieve  05/10/19  (Pended)   -ER     LTG 1  Patient to walk 1/2 mile non-antalgically with no increase in pain  (Pended)   -ER     LTG 1 Progress  Met  (Pended)   -ER     LTG 2  Patient to lift 20lb weight from ground to waist to shoulder level with no increase in pain  (Pended)   -ER     LTG 2 Progress  Met  (Pended)   -ER     LTG 3  Patient to perform 3 prone planks for 30 sec each with increase in pain or radicular symptoms  (Pended)   -ER     LTG 3 Progress  Met  (Pended)   -ER     LTG 4  Patient to push/pull 90 lb. weighted sled 40 ft., 5x F/R with no increase in pain  (Pended)   -ER     LTG 4 Progress  Met  (Pended)   -ER     LTG 5  Trunk AROM WNL in all directions  (Pended)   -ER     LTG 5 Progress  Met  (Pended)   -ER     LTG 6  I with final HEP  (Pended)   -ER     LTG 7  D/C with a final HEP and free 30 day fitness formula memebership.  (Pended)   -ER        Time Calculation    PT Goal Re-Cert Due Date  --  (Pended)  N/A  -ER       User Key   (r) = Recorded By, (t) = Taken By, (c) = Cosigned By    Initials Name Provider Type    ER Donna Lemus, PT Student PT Student          PT Assessment/Plan     Row Name 05/07/19 0900          PT Assessment    Functional Limitations  Limitation in home management;Limitations in community activities;Performance in leisure activities;Performance in work activities  (Pended)   -ER     Impairments  Impaired muscle endurance;Impaired muscle length;Muscle strength;Posture;Range of motion;Poor body mechanics  (Pended)   -ER     Assessment Comments  Pt tolerates therex well and demonstrates improved postural awareness. Patient has met most LTG and STG and reports 50% improvement.   (Pended)   -ER     Rehab Potential  Good  (Pended)   -ER     Patient/caregiver participated in establishment of treatment plan and goals  Yes  (Pended)   -ER     Patient would benefit from skilled therapy intervention  Yes  (Pended)   -ER        PT Plan    PT Frequency  Other (comment)  (Pended)  1 more visit this week  -ER     Predicted Duration of Therapy Intervention (Therapy Eval)  1 more visit to review HEP then D/C with fitness formula  (Pended)   -ER     Planned CPT's?  PT EVAL MOD COMPLELITY: 36364;PT RE-EVAL: 67775;PT THER PROC EA 15 MIN: 06144;PT THER ACT EA 15 MIN: 18025;PT MANUAL THERAPY EA 15 MIN: 47550;PT NEUROMUSC RE-EDUCATION EA 15 MIN: 14310;PT THER MASS EA 15 MIN: 72954  (Pended)   -ER     Physical Therapy Interventions (Optional Details)  gross motor skills;home exercise program;lumbar stabilization;joint mobilization;modalities;manual therapy techniques;patient/family education;postural re-education;ROM (Range of Motion);strengthening;stretching  (Pended)   -ER     PT Plan Comments  Review HEP and body mechanics prior to D/C  (Pended)   -ER       User Key  (r) = Recorded By, (t) = Taken By, (c) = Cosigned By    Initials Name Provider Type    ER Donna Lemus, PT Student PT Student         Other therapeutic activities and/or  "exercises will be prescribed depending on the patients progress or lack there of.    Barriers to Rehab:   Include significant or possible arthritic/degenerative changes that have occurred within the spine  The chronicity of this issue  The patient's obesity    Safety Issues: None noted.        Modalities     Row Name 05/07/19 0800             Ice    Ice Applied  Yes  (Pended)   -ER      Location  low back  (Pended)   -ER      Rx Minutes  15 mins  (Pended)   -ER      Ice S/P Rx  Yes  (Pended)   -ER        User Key  (r) = Recorded By, (t) = Taken By, (c) = Cosigned By    Initials Name Provider Type    ER Donna Lemus, PT Student PT Student        Exercises     Row Name 05/07/19 0800             Subjective Comments    Subjective Comments  Patient states that the back \"feels okay\"  (Pended)   -ER         Subjective Pain    Able to rate subjective pain?  yes  (Pended)   -ER      Pre-Treatment Pain Level  0  (Pended)   -ER      Post-Treatment Pain Level  --  (Pended)  \"it's good\"  -ER         Exercise 1    Exercise Name 1  pro2 UE/LE alt f/r  (Pended)   -ER      Time 1  10 mins  (Pended)   -ER      Additional Comments  L 6.0  (Pended)   -ER         Exercise 2    Exercise Name 2  B St. HS S  (Pended)   -ER      Reps 2  2  (Pended)   -ER      Time 2  30 seconds  (Pended)   -ER         Exercise 3    Exercise Name 3  B St. 1L Incline Calf S  (Pended)   -ER      Reps 3  2  (Pended)   -ER      Time 3  30 seconds  (Pended)   -ER         Exercise 4    Exercise Name 4  DKTC w/Pball  (Pended)   -ER      Sets 4  2  (Pended)   -ER      Reps 4  10  (Pended)   -ER         Exercise 5    Exercise Name 5  LTR w/Pball  (Pended)   -ER      Reps 5  10  (Pended)   -ER      Time 5  10 sec hold  (Pended)   -ER         Exercise 6    Exercise Name 6  Bridges  (Pended)   -ER      Sets 6  2  (Pended)   -ER      Reps 6  10  (Pended)   -ER      Additional Comments  Arms across chest  (Pended)   -ER         Exercise 7    Exercise Name 7  Track Walk  " (Pended)   -ER      Additional Comments  8 laps  (Pended)   -ER         Exercise 8    Exercise Name 8  Push/Pull Sled F/R  (Pended)   -ER      Reps 8  5  (Pended)   -ER      Additional Comments  90#, 40ft  (Pended)   -ER         Exercise 9    Exercise Name 9  Lifting Station  (Pended)   -ER      Reps 9  5  (Pended)   -ER      Additional Comments  20#  (Pended)   -ER         Exercise 10    Exercise Name 10  Prone Planks  (Pended)   -ER      Reps 10  3  (Pended)   -ER      Time 10  30 seconds  (Pended)   -ER        User Key  (r) = Recorded By, (t) = Taken By, (c) = Cosigned By    Initials Name Provider Type    ER Donna Lemus, PT Student PT Student                        Outcome Measure Options: (P) Modifed Owestry  Modified Oswestry  Modified Oswestry Score/Comments: (P) 10/50 = 20%      Time Calculation:     Start Time: 0840  Stop Time: 0950  Time Calculation (min): 70 min  PT Non-Billable Time (min): 15 min  Total Timed Code Minutes- PT: 55 minute(s)     Therapy Charges for Today     Code Description Service Date Service Provider Modifiers Qty    48965091979 HC PT THER PROC EA 15 MIN 5/7/2019 Rhoda Wallace, PT GP 4    35820255120 HC PT THER SUPP EA 15 MIN 5/7/2019 Rhoda Wallace, PT GP 1          PT G-Codes  Outcome Measure Options: (P) Modifed Owestry  Modified Oswestry Score/Comments: (P) 10/50 = 20%         Rhoda Wallace PT, DPT, CSCS  5/7/2019

## 2019-05-08 ENCOUNTER — OFFICE VISIT (OUTPATIENT)
Dept: FAMILY MEDICINE CLINIC | Facility: CLINIC | Age: 49
End: 2019-05-08

## 2019-05-08 VITALS
TEMPERATURE: 97.9 F | DIASTOLIC BLOOD PRESSURE: 80 MMHG | WEIGHT: 249.8 LBS | OXYGEN SATURATION: 97 % | BODY MASS INDEX: 34.97 KG/M2 | HEART RATE: 88 BPM | HEIGHT: 71 IN | SYSTOLIC BLOOD PRESSURE: 128 MMHG

## 2019-05-08 DIAGNOSIS — F90.0 ATTENTION DEFICIT HYPERACTIVITY DISORDER (ADHD), PREDOMINANTLY INATTENTIVE TYPE: Primary | ICD-10-CM

## 2019-05-08 DIAGNOSIS — R73.03 PREDIABETES: ICD-10-CM

## 2019-05-08 DIAGNOSIS — I10 ESSENTIAL HYPERTENSION: ICD-10-CM

## 2019-05-08 DIAGNOSIS — M47.9 ARTHRITIS OF BACK: ICD-10-CM

## 2019-05-08 DIAGNOSIS — J30.9 ALLERGIC RHINITIS, UNSPECIFIED SEASONALITY, UNSPECIFIED TRIGGER: ICD-10-CM

## 2019-05-08 DIAGNOSIS — E78.5 HYPERLIPIDEMIA, UNSPECIFIED HYPERLIPIDEMIA TYPE: ICD-10-CM

## 2019-05-08 DIAGNOSIS — E66.9 NON MORBID OBESITY: ICD-10-CM

## 2019-05-08 DIAGNOSIS — Z98.890 S/P CARPAL TUNNEL RELEASE: ICD-10-CM

## 2019-05-08 DIAGNOSIS — M41.9 SCOLIOSIS, UNSPECIFIED SCOLIOSIS TYPE, UNSPECIFIED SPINAL REGION: ICD-10-CM

## 2019-05-08 DIAGNOSIS — E66.9 CLASS 2 OBESITY WITH BODY MASS INDEX (BMI) OF 35.0 TO 35.9 IN ADULT, UNSPECIFIED OBESITY TYPE, UNSPECIFIED WHETHER SERIOUS COMORBIDITY PRESENT: ICD-10-CM

## 2019-05-08 DIAGNOSIS — Z00.00 ANNUAL PHYSICAL EXAM: ICD-10-CM

## 2019-05-08 PROCEDURE — 99214 OFFICE O/P EST MOD 30 MIN: CPT | Performed by: FAMILY MEDICINE

## 2019-05-08 RX ORDER — DEXTROAMPHETAMINE SACCHARATE, AMPHETAMINE ASPARTATE MONOHYDRATE, DEXTROAMPHETAMINE SULFATE AND AMPHETAMINE SULFATE 6.25; 6.25; 6.25; 6.25 MG/1; MG/1; MG/1; MG/1
25 CAPSULE, EXTENDED RELEASE ORAL EVERY MORNING
Qty: 30 CAPSULE | Refills: 0 | Status: SHIPPED | OUTPATIENT
Start: 2019-05-08 | End: 2019-05-08 | Stop reason: SDUPTHER

## 2019-05-08 RX ORDER — FLUTICASONE PROPIONATE 50 MCG
SPRAY, SUSPENSION (ML) NASAL
Qty: 16 ML | Refills: 0 | Status: SHIPPED | OUTPATIENT
Start: 2019-05-08 | End: 2019-07-11 | Stop reason: SDUPTHER

## 2019-05-08 RX ORDER — DEXTROAMPHETAMINE SACCHARATE, AMPHETAMINE ASPARTATE MONOHYDRATE, DEXTROAMPHETAMINE SULFATE AND AMPHETAMINE SULFATE 6.25; 6.25; 6.25; 6.25 MG/1; MG/1; MG/1; MG/1
25 CAPSULE, EXTENDED RELEASE ORAL EVERY MORNING
Qty: 30 CAPSULE | Refills: 0 | Status: SHIPPED | OUTPATIENT
Start: 2019-05-08 | End: 2019-06-13 | Stop reason: SDUPTHER

## 2019-05-08 RX ORDER — ATORVASTATIN CALCIUM 20 MG/1
20 TABLET, FILM COATED ORAL DAILY
Qty: 30 TABLET | Refills: 0 | Status: SHIPPED | OUTPATIENT
Start: 2019-05-08 | End: 2019-06-13

## 2019-05-08 NOTE — PROGRESS NOTES
Subjective:  Randall Hernandez is a 48 y.o. male who presents for       Patient Active Problem List   Diagnosis   • Hyperlipidemia   • Inattention   • Non morbid obesity   • Prediabetes   • Attention deficit hyperactivity disorder (ADHD), combined type   • Need for vaccination   • Gastroesophageal reflux disease   • Encounter for drug screening   • Tingling in extremities   • Elevated BP without diagnosis of hypertension   • Allergic rhinitis   • Elevated blood pressure reading   • Pruritic rash   • Numbness in both hands   • Bilateral elbow joint pain   • Pain in both wrists   • Ulnar neuropathy of left upper extremity   • General medical examination   • Bilateral carpal tunnel syndrome   • History of prediabetes   • Numbness and tingling in both hands   • Essential hypertension   • Cerumen debris on tympanic membrane of both ears   • Carpal tunnel syndrome   • S/P carpal tunnel release   • Encounter for screening for malignant neoplasm of colon   • FH: colon cancer   • Acute recurrent sinusitis   • Attention deficit hyperactivity disorder (ADHD), predominantly inattentive type   • Class 2 obesity with body mass index (BMI) of 35.0 to 35.9 in adult   • Annual physical exam           Current Outpatient Medications:   •  amphetamine-dextroamphetamine XR (ADDERALL XR) 25 MG 24 hr capsule, Take 1 capsule by mouth Every Morning Take 1 tablet by mouth daily, Disp: 30 capsule, Rfl: 0  •  aspirin 81 MG EC tablet, Take 1 tablet by mouth Daily., Disp: 30 tablet, Rfl: 3  •  atorvastatin (LIPITOR) 40 MG tablet, Take 1 tablet by mouth Daily., Disp: 30 tablet, Rfl: 3  •  cyclobenzaprine (FLEXERIL) 5 MG tablet, Take 1 tablet by mouth 3 (Three) Times a Day As Needed for Muscle Spasms., Disp: 30 tablet, Rfl: 3  •  doxycycline (MONODOX) 50 MG capsule, Take 2 capsules by mouth 2 (Two) Times a Day., Disp: 20 capsule, Rfl: 0  •  fluticasone (FLONASE) 50 MCG/ACT nasal spray, 2 sprays into the nostril(s) as directed by provider Daily.,  "Disp: 1 bottle, Rfl: 5  •  fluticasone (FLONASE) 50 MCG/ACT nasal spray, INSTILL 2 SPRAYS IN EACH NOSTRIL ONCE DAILY, Disp: 16 mL, Rfl: 0  •  lisinopril (PRINIVIL,ZESTRIL) 10 MG tablet, Take 1 tablet by mouth Daily., Disp: 30 tablet, Rfl: 3  •  meloxicam (MOBIC) 7.5 MG tablet, Take 1 tablet by mouth Daily., Disp: 30 tablet, Rfl: 3  •  montelukast (SINGULAIR) 10 MG tablet, Take 1 tablet by mouth Every Night., Disp: 30 tablet, Rfl: 5  •  omeprazole (priLOSEC) 40 MG capsule, Take 1 capsule by mouth Daily., Disp: 30 capsule, Rfl: 11  •  vitamin D (ERGOCALCIFEROL) 94629 units capsule capsule, Take 1 capsule by mouth 1 (One) Time Per Week., Disp: 4 capsule, Rfl: 3    HPI     4/8/19 Pt is 49 yo male with history of obesity, gERD, allergic rhinitis ADHD, HLP who is here for recheck. And refills on ADHD medication. He is doing well no chest pain . Pt is doing well postop after having bilateral carpal tunnel release although his left hand gives him issues intermittently.  No chest pain no dizziness. His other issue is back pian in lower back  With bilateral sciatica  that has been going for years. It is worse when he bends and stands for prolong periods. Pain is about 8/10 on severity at times. He does hear a \"popping\" sound in his back periodically     5/8/19 pt is here for recheck on back pain and ADHD,.  He has been doing PT/OT for back pian. X-ray of back showed mild scoliosis and mild degenerative changeso of lumbar spine. Also has degenerative changes of thoracic spine.  His back pain is improved with PT/OT  And pain is 0/10 on severity. He takes mobic and flexeril as needed.  He lost 4 lbs since last visit.  Energy is improved     Obesity   This is a chronic problem. The current episode started more than 1 year ago. The problem occurs constantly. The problem has been unchanged. Associated symptoms include arthralgias and numbness. Pertinent negatives include no abdominal pain, anorexia, change in bowel habit, chest " pain, chills, congestion, coughing, diaphoresis, fatigue, fever, headaches, joint swelling, myalgias, nausea, rash, sore throat, swollen glands, urinary symptoms, vertigo, visual change, vomiting or weakness. Nothing aggravates the symptoms. He has tried nothing for the symptoms. The treatment provided no relief.   Mental Health Problem   The primary symptoms do not include dysphoric mood, delusions, hallucinations, bizarre behavior, disorganized speech, negative symptoms or somatic symptoms. This is a chronic problem.   The degree of incapacity that he is experiencing as a consequence of his illness is mild. Additional symptoms of the illness include attention impairment. Additional symptoms of the illness do not include anhedonia, insomnia, hypersomnia, appetite change, unexpected weight change, fatigue, agitation, psychomotor retardation, feelings of worthlessness, euphoric mood, increased goal-directed activity, flight of ideas, inflated self-esteem, decreased need for sleep, distractible, poor judgment, visual change, headaches or abdominal pain. He does not admit to suicidal ideas. He does not have a plan to commit suicide. He does not contemplate injuring another person.   Back Pain   This is a chronic problem. The current episode started more than 1 month ago. The problem occurs constantly. The problem is unchanged. The pain is present in the lumbar spine. The quality of the pain is described as aching. The pain is at a severity of 5/10. The symptoms are aggravated by lying down, position, sitting and standing. Associated symptoms include numbness. Pertinent negatives include no abdominal pain, bladder incontinence, bowel incontinence, chest pain, dysuria, fever, headaches, leg pain, paresis, paresthesias, pelvic pain, perianal numbness, tingling or weakness. He has tried nothing for the symptoms. The treatment provided no relief.    Wrist Pain    The pain is present in the left elbow, left wrist, right  wrist and right elbow. This is a chronic problem. The current episode started more than 1 month ago. The problem occurs constantly. The problem has been gradually worsening. The quality of the pain is described as aching. The pain is at a severity of 9/10. The pain is moderate. Associated symptoms include joint locking, a limited range of motion, numbness, stiffness and tingling. Pertinent negatives include no fever, inability to bear weight, itching or joint swelling. The symptoms are aggravated by activity. He has tried NSAIDS for the symptoms. The treatment provided no relief. Family history does not include gout or rheumatoid arthritis. There is no history of diabetes, gout, osteoarthritis or rheumatoid arthritis.   Elbow Injury   This is a chronic problem. The current episode started more than 1 year ago. The problem occurs constantly. The problem has been gradually worsening. Associated symptoms include arthralgias, joint swelling, numbness, a rash and weakness. Pertinent negatives include no abdominal pain, anorexia, change in bowel habit, chest pain, chills, congestion, coughing, diaphoresis, fatigue, fever, headaches, myalgias, nausea, neck pain, sore throat, swollen glands, urinary symptoms, vertigo, visual change or vomiting. The symptoms are aggravated by bending. He has tried NSAIDs for the symptoms. The treatment provided mild relief.   Hypertension   This is a new problem. The current episode started more than 1 month ago. The problem has been gradually worsening since onset. The problem is uncontrolled. Pertinent negatives include no anxiety, blurred vision, chest pain, headaches, malaise/fatigue, neck pain, orthopnea, palpitations, peripheral edema, PND, shortness of breath or sweats. Risk factors for coronary artery disease include male gender, obesity and sedentary lifestyle. Past treatments include nothing. There are no compliance problems.    Review of Systems  Review of Systems    Constitutional: Positive for activity change. Negative for appetite change, chills, diaphoresis and fever.   HENT: Negative for congestion, postnasal drip, rhinorrhea, sinus pressure, sinus pain, sneezing, sore throat, trouble swallowing and voice change.    Respiratory: Negative for cough, choking, chest tightness, shortness of breath, wheezing and stridor.    Cardiovascular: Negative for chest pain.   Gastrointestinal: Negative for diarrhea, nausea and vomiting.   Musculoskeletal: Positive for arthralgias and back pain.   Neurological: Negative for weakness and headaches.   Psychiatric/Behavioral: Positive for decreased concentration.       Patient Active Problem List   Diagnosis   • Hyperlipidemia   • Inattention   • Non morbid obesity   • Prediabetes   • Attention deficit hyperactivity disorder (ADHD), combined type   • Need for vaccination   • Gastroesophageal reflux disease   • Encounter for drug screening   • Tingling in extremities   • Elevated BP without diagnosis of hypertension   • Allergic rhinitis   • Elevated blood pressure reading   • Pruritic rash   • Numbness in both hands   • Bilateral elbow joint pain   • Pain in both wrists   • Ulnar neuropathy of left upper extremity   • General medical examination   • Bilateral carpal tunnel syndrome   • History of prediabetes   • Numbness and tingling in both hands   • Essential hypertension   • Cerumen debris on tympanic membrane of both ears   • Carpal tunnel syndrome   • S/P carpal tunnel release   • Encounter for screening for malignant neoplasm of colon   • FH: colon cancer   • Acute recurrent sinusitis   • Attention deficit hyperactivity disorder (ADHD), predominantly inattentive type   • Class 2 obesity with body mass index (BMI) of 35.0 to 35.9 in adult   • Annual physical exam     Past Surgical History:   Procedure Laterality Date   • CARPAL TUNNEL RELEASE Right 11/2/2018    Procedure: CARPAL TUNNEL RELEASE;  Surgeon: Judd Mathias MD;   Location: [facility] OR;  Service: Orthopedics   • CARPAL TUNNEL RELEASE  2018    Left   • CARPAL TUNNEL RELEASE Left 2018    Procedure: CARPAL TUNNEL RELEASE;  Surgeon: Judd Mathias MD;  Location: [facility] OR;  Service: Orthopedics   • COLONOSCOPY N/A 2019    Procedure: COLONOSCOPY a friday please ;  Surgeon: Ruslan Bowers MD;  Location: [facility] ENDOSCOPY;  Service: Gastroenterology   • ENDOSCOPY     • PYLOROMYOTOMY       Social History     Socioeconomic History   • Marital status: Single     Spouse name: Not on file   • Number of children: Not on file   • Years of education: Not on file   • Highest education level: Not on file   Occupational History   • Occupation:      Employer: SELF-EMPLOYED     Comment: Patient resides with [place].   Tobacco Use   • Smoking status: Former Smoker     Packs/day: 1.00     Years: 6.00     Pack years: 6.00     Types: Cigarettes     Last attempt to quit:      Years since quittin.3   • Smokeless tobacco: Never Used   Substance and Sexual Activity   • Alcohol use: Yes     Comment: 2018 - Kevin reports consumption of alcoholic beverages under a social basis (approximately 3 - 4 per month).     • Drug use: No   • Sexual activity: Defer     Family History   Problem Relation Age of Onset   • Colon cancer Mother         onset age greater than 75y   • Diabetes Mother    • Breast cancer Sister    • Cancer Brother         bladder   • Diabetes Brother    • Hyperlipidemia Brother      Lab on 2019   Component Date Value Ref Range Status   • WBC 2019 5.25  3.40 - 10.80 10*3/mm3 Final   • RBC 2019 4.77  4.14 - 5.80 10*6/mm3 Final   • Hemoglobin 2019 14.2  13.0 - 17.7 g/dL Final   • Hematocrit 2019 43.6  37.5 - 51.0 % Final   • MCV 2019 91.4  79.0 - 97.0 fL Final   • MCH 2019 29.8  26.6 - 33.0 pg Final   • MCHC 2019 32.6  31.5 - 35.7 g/dL Final   • RDW 2019 13.1  12.3 - 15.4 %  Final   • RDW-SD 04/18/2019 44.4  37.0 - 54.0 fl Final   • MPV 04/18/2019 9.9  6.0 - 12.0 fL Final   • Platelets 04/18/2019 306  140 - 450 10*3/mm3 Final   • Neutrophil % 04/18/2019 48.4  42.7 - 76.0 % Final   • Lymphocyte % 04/18/2019 38.1  19.6 - 45.3 % Final   • Monocyte % 04/18/2019 8.4  5.0 - 12.0 % Final   • Eosinophil % 04/18/2019 3.4  0.3 - 6.2 % Final   • Basophil % 04/18/2019 1.3  0.0 - 1.5 % Final   • Immature Grans % 04/18/2019 0.4  0.0 - 0.5 % Final   • Neutrophils, Absolute 04/18/2019 2.54  1.40 - 7.00 10*3/mm3 Final   • Lymphocytes, Absolute 04/18/2019 2.00  0.70 - 3.10 10*3/mm3 Final   • Monocytes, Absolute 04/18/2019 0.44  0.10 - 0.90 10*3/mm3 Final   • Eosinophils, Absolute 04/18/2019 0.18  0.00 - 0.40 10*3/mm3 Final   • Basophils, Absolute 04/18/2019 0.07  0.00 - 0.20 10*3/mm3 Final   • Immature Grans, Absolute 04/18/2019 0.02  0.00 - 0.05 10*3/mm3 Final   • nRBC 04/18/2019 0.0  0.0 - 0.0 /100 WBC Final   • Glucose 04/18/2019 104* 65 - 99 mg/dL Final   • BUN 04/18/2019 11  6 - 20 mg/dL Final   • Creatinine 04/18/2019 1.07  0.76 - 1.27 mg/dL Final   • Sodium 04/18/2019 134* 136 - 145 mmol/L Final   • Potassium 04/18/2019 4.5  3.5 - 5.2 mmol/L Final   • Chloride 04/18/2019 96* 98 - 107 mmol/L Final   • CO2 04/18/2019 22.8  22.0 - 29.0 mmol/L Final   • Calcium 04/18/2019 8.9  8.6 - 10.5 mg/dL Final   • Total Protein 04/18/2019 7.6  6.0 - 8.5 g/dL Final   • Albumin 04/18/2019 4.50  3.50 - 5.20 g/dL Final   • ALT (SGPT) 04/18/2019 30  1 - 41 U/L Final   • AST (SGOT) 04/18/2019 28  1 - 40 U/L Final   • Alkaline Phosphatase 04/18/2019 110  39 - 117 U/L Final   • Total Bilirubin 04/18/2019 0.6  0.2 - 1.2 mg/dL Final   • eGFR Non African Amer 04/18/2019 73  >60 mL/min/1.73 Final   • Globulin 04/18/2019 3.1  gm/dL Final   • A/G Ratio 04/18/2019 1.5  g/dL Final   • BUN/Creatinine Ratio 04/18/2019 10.3  7.0 - 25.0 Final   • Anion Gap 04/18/2019 15.2  mmol/L Final   • Total Cholesterol 04/18/2019 174  0 - 200  mg/dL Final   • Triglycerides 04/18/2019 91  0 - 150 mg/dL Final   • HDL Cholesterol 04/18/2019 41  40 - 60 mg/dL Final   • LDL Cholesterol  04/18/2019 115* 0 - 100 mg/dL Final   • VLDL Cholesterol 04/18/2019 18.2  5 - 40 mg/dL Final   • LDL/HDL Ratio 04/18/2019 2.80   Final   • 25 Hydroxy, Vitamin D 04/18/2019 27.9* 30.0 - 100.0 ng/ml Final   • Amphet/Methamphet, Screen 04/18/2019 Positive* Negative Final   • Barbiturates Screen, Urine 04/18/2019 Negative  Negative Final   • Benzodiazepine Screen, Urine 04/18/2019 Negative  Negative Final   • Cocaine Screen, Urine 04/18/2019 Negative  Negative Final   • Opiate Screen 04/18/2019 Negative  Negative Final   • THC, Screen, Urine 04/18/2019 Negative  Negative Final   • Methadone Screen, Urine 04/18/2019 Negative  Negative Final   • Oxycodone Screen, Urine 04/18/2019 Negative  Negative Final   Lab on 01/28/2019   Component Date Value Ref Range Status   • WBC 01/28/2019 6.11  3.20 - 9.80 10*3/mm3 Final   • RBC 01/28/2019 4.75  4.37 - 5.74 10*6/mm3 Final   • Hemoglobin 01/28/2019 14.4  13.7 - 17.3 g/dL Final   • Hematocrit 01/28/2019 43.4  39.0 - 49.0 % Final   • MCV 01/28/2019 91.4  80.0 - 98.0 fL Final   • MCH 01/28/2019 30.3  26.5 - 34.0 pg Final   • MCHC 01/28/2019 33.2  31.5 - 36.3 g/dL Final   • RDW 01/28/2019 12.9  11.5 - 14.5 % Final   • RDW-SD 01/28/2019 43.1  35.1 - 43.9 fl Final   • MPV 01/28/2019 9.4  8.0 - 12.0 fL Final   • Platelets 01/28/2019 288  150 - 450 10*3/mm3 Final   • Neutrophil % 01/28/2019 50.2  37.0 - 80.0 % Final   • Lymphocyte % 01/28/2019 38.3  10.0 - 50.0 % Final   • Monocyte % 01/28/2019 6.4  0.0 - 12.0 % Final   • Eosinophil % 01/28/2019 4.1  0.0 - 7.0 % Final   • Basophil % 01/28/2019 0.7  0.0 - 2.0 % Final   • Immature Grans % 01/28/2019 0.3  0.0 - 0.5 % Final   • Neutrophils, Absolute 01/28/2019 3.07  2.00 - 8.60 10*3/mm3 Final   • Lymphocytes, Absolute 01/28/2019 2.34  0.60 - 4.20 10*3/mm3 Final   • Monocytes, Absolute 01/28/2019 0.39   0.00 - 0.90 10*3/mm3 Final   • Eosinophils, Absolute 01/28/2019 0.25  0.00 - 0.70 10*3/mm3 Final   • Basophils, Absolute 01/28/2019 0.04  0.00 - 0.20 10*3/mm3 Final   • Immature Grans, Absolute 01/28/2019 0.02  0.00 - 0.02 10*3/mm3 Final   • Glucose 01/28/2019 118* 60 - 100 mg/dL Final   • BUN 01/28/2019 12  7 - 21 mg/dL Final   • Creatinine 01/28/2019 0.96  0.70 - 1.30 mg/dL Final   • Sodium 01/28/2019 139  137 - 145 mmol/L Final   • Potassium 01/28/2019 4.7  3.5 - 5.1 mmol/L Final   • Chloride 01/28/2019 103  95 - 110 mmol/L Final   • CO2 01/28/2019 28.0  22.0 - 31.0 mmol/L Final   • Calcium 01/28/2019 9.7  8.4 - 10.2 mg/dL Final   • Total Protein 01/28/2019 6.8  6.3 - 8.6 g/dL Final   • Albumin 01/28/2019 4.10  3.40 - 4.80 g/dL Final   • ALT (SGPT) 01/28/2019 42  21 - 72 U/L Final   • AST (SGOT) 01/28/2019 29  17 - 59 U/L Final   • Alkaline Phosphatase 01/28/2019 107  38 - 126 U/L Final   • Total Bilirubin 01/28/2019 0.4  0.2 - 1.3 mg/dL Final   • eGFR Non African Amer 01/28/2019 83  56 - 130 mL/min/1.73 Final   • Globulin 01/28/2019 2.7  2.3 - 3.5 gm/dL Final   • A/G Ratio 01/28/2019 1.5  1.1 - 1.8 g/dL Final   • BUN/Creatinine Ratio 01/28/2019 12.5  7.0 - 25.0 Final   • Anion Gap 01/28/2019 8.0  5.0 - 15.0 mmol/L Final   • Total Cholesterol 01/28/2019 168  0 - 199 mg/dL Final   • Triglycerides 01/28/2019 124  20 - 199 mg/dL Final   • HDL Cholesterol 01/28/2019 38* 60 - 200 mg/dL Final   • LDL Cholesterol  01/28/2019 109  1 - 129 mg/dL Final   • LDL/HDL Ratio 01/28/2019 2.77  0.00 - 3.55 Final   • Amphetamine Screen, Urine 01/28/2019 Positive* Negative Final   • Barbiturates Screen, Urine 01/28/2019 Negative  Negative Final   • Benzodiazepine Screen, Urine 01/28/2019 Negative  Negative Final   • Cocaine Screen, Urine 01/28/2019 Negative  Negative Final   • Methadone Screen, Urine 01/28/2019 Negative  Negative Final   • Opiate Screen 01/28/2019 Negative  Negative Final   • Oxycodone Screen, Urine 01/28/2019  "Negative  Negative Final   • THC, Screen, Urine 01/28/2019 Negative  Negative Final      XR Spine Lumbar 4+ View  Narrative: Five-view lumbar spine    HISTORY: Back pain. Lumbago with bilateral sciatica.    AP, lateral and oblique radiographs of the lumbar spine and spot  film of the lumbosacral junction were obtained.    COMPARISON: None.    Mild levoscoliosis.  Congenitally short pedicles.  Minimal degenerative changes with small marginal osteophytes.  There is a normal lordosis.   Vertebral height and alignment are maintained.  No fracture.  The pedicles are intact.  Impression: CONCLUSION:  Mild levoscoliosis.  Congenitally short pedicles.  Minimal degenerative changes with small marginal osteophytes.    [id number]    Electronically signed by:  Anthony Cm MD  4/8/2019 10:21 AM CDT  Workstation: [id number]  XR Spine Thoracic 3 View  Narrative: Three-view thoracic spine.    HISTORY: Back pain    AP, lateral and swimmer's views of the thoracic spine were  obtained.    COMPARISON: None.    Normal thoracic kyphosis.  Vertebral height and alignment maintained.  Mild degenerative changes.  No fracture.  The pedicles are intact.  No paraspinal widening.  Impression: CONCLUSION:   Mild degenerative changes.    [id number]    Electronically signed by:  Anthony Cm MD  4/8/2019 10:19 AM CDT  Workstation: [id number]    [unfilled]  Immunization History   Administered Date(s) Administered   • Influenza TIV (IM) 03/28/2017   • Tdap 02/17/2017       The following portions of the patient's history were reviewed and updated as appropriate: allergies, current medications, past family history, past medical history, past social history, past surgical history and problem list.        Physical Exam  /80   Pulse 88   Temp 97.9 °F (36.6 °C)   Ht 180.3 cm (71\")   Wt 113 kg (249 lb 12.8 oz)   SpO2 97%   BMI 34.84 kg/m²     Physical Exam   Constitutional: He is oriented to person, place, and time. He appears well-developed " and well-nourished.   HENT:   Head: Normocephalic and atraumatic.   Right Ear: External ear normal.   Eyes: Conjunctivae and EOM are normal. Pupils are equal, round, and reactive to light.   Neck: Normal range of motion. Neck supple.   Cardiovascular: Normal rate, regular rhythm and normal heart sounds.   No murmur heard.  Pulmonary/Chest: Effort normal and breath sounds normal. No respiratory distress.   Abdominal: Soft. Bowel sounds are normal. He exhibits no distension. There is no tenderness.   obsese abdomen    Musculoskeletal: Normal range of motion. He exhibits no edema or deformity.        Arms:  Neurological: He is alert and oriented to person, place, and time. No cranial nerve deficit.   Skin: Skin is warm. No rash noted. He is not diaphoretic. No erythema. No pallor.   Psychiatric: He has a normal mood and affect. His behavior is normal.   Nursing note and vitals reviewed.      Assessment/Plan   Problems Addressed this Visit        Cardiovascular and Mediastinum    Hyperlipidemia    Essential hypertension       Respiratory    Allergic rhinitis       Digestive    Non morbid obesity    Class 2 obesity with body mass index (BMI) of 35.0 to 35.9 in adult       Other    Prediabetes    S/P carpal tunnel release    Attention deficit hyperactivity disorder (ADHD), predominantly inattentive type - Primary    Annual physical exam        -recommend pt go back on aspirin and lipitor  -for back pain with sciatica/scoliosis - pain is better after PT/OT. Continue with mobic and flexeril PRN.  Consider MRI if not improving   -referral to Gastroenterology for colon cancer screening   - pt doing well postop on carpal tunnel release on right hand   -for cerumen of both ears - debrox ointment PRN. Drug information provided  -for carpal tunnel he is doing better post op  he has failed conservative treatment for >3 months. . He has yet to get surgery on left  Hand   -vitamin D pill once a week   -HLP - lipitor 40 mg PO qhs   -   Continue  on lisionpril 10 mg PO q daily drug information provided low salt diet informaiton provided. Side effects discussed BP better controlled today  - refilled ADHD medication Adderall XR 25 mg PO q daily for ADHD.  [facility] ref number [id number]  - for allergic rhinitis - continue singulair and flonase and will add zyrtec.  -H/O prediabetes - D/C  metformin, recent hga1c normal at 5.4   - hyperlipidemia -continue statin. Recheck lipid panel. Pt on aspirin   - obesity - recommend ketogenic diet along with intermittent fasting. Pt has lost 3 lbs since last visit   -recheck in 1 month for followup         This document has been electronically signed by Scottie Borges MD on May 8, 2019 8:23 AM                      This document has been electronically signed by Scottie Borges MD on May 8, 2019 8:20 AM

## 2019-05-09 ENCOUNTER — HOSPITAL ENCOUNTER (OUTPATIENT)
Dept: PHYSICAL THERAPY | Facility: HOSPITAL | Age: 49
Setting detail: THERAPIES SERIES
Discharge: HOME | End: 2019-05-09

## 2019-05-09 DIAGNOSIS — M54.42 BILATERAL LOW BACK PAIN WITH BILATERAL SCIATICA, UNSPECIFIED CHRONICITY: ICD-10-CM

## 2019-05-09 DIAGNOSIS — M54.41 BILATERAL LOW BACK PAIN WITH BILATERAL SCIATICA, UNSPECIFIED CHRONICITY: ICD-10-CM

## 2019-05-09 DIAGNOSIS — M54.9 UPPER BACK PAIN: Primary | ICD-10-CM

## 2019-05-09 PROCEDURE — 97110 THERAPEUTIC EXERCISES: CPT | Performed by: SPECIALIST/TECHNOLOGIST

## 2019-05-09 NOTE — THERAPY DISCHARGE NOTE
Outpatient Physical Therapy Ortho Treatment Note/Discharge Summary  [facility]     Patient Name: Randall Hernandez  : 1970  MRN: [id number]  Today's Date: 2019      Visit Date: 2019     [place]    Patients reports pain as:  0/10   Patient reports overall % improvement as:  60%   Patients attendance has been:     Insurance visits available  9 visits   Next MD visit is:  May 8, 2019   Next PT recertification is:  N/A         Visit Dx:    ICD-10-CM ICD-9-CM   1. Upper back pain M54.9 724.5   2. Bilateral low back pain with bilateral sciatica, unspecified chronicity M54.42 724.3    M54.41 724.2       Patient Active Problem List   Diagnosis   • Hyperlipidemia   • Inattention   • Non morbid obesity   • Prediabetes   • Attention deficit hyperactivity disorder (ADHD), combined type   • Need for vaccination   • Gastroesophageal reflux disease   • Encounter for drug screening   • Tingling in extremities   • Elevated BP without diagnosis of hypertension   • Allergic rhinitis   • Elevated blood pressure reading   • Pruritic rash   • Numbness in both hands   • Bilateral elbow joint pain   • Pain in both wrists   • Ulnar neuropathy of left upper extremity   • General medical examination   • Bilateral carpal tunnel syndrome   • History of prediabetes   • Numbness and tingling in both hands   • Essential hypertension   • Cerumen debris on tympanic membrane of both ears   • Carpal tunnel syndrome   • S/P carpal tunnel release   • Encounter for screening for malignant neoplasm of colon   • FH: colon cancer   • Acute recurrent sinusitis   • Attention deficit hyperactivity disorder (ADHD), predominantly inattentive type   • Class 2 obesity with body mass index (BMI) of 35.0 to 35.9 in adult   • Annual physical exam   • Arthritis of back   • Scoliosis        Past Medical History:   Diagnosis Date   • Allergic rhinitis    • Arthritis    • Backache    • Cellulitis of leg, except foot    • Dyslipidemia     • GERD (gastroesophageal reflux disease)    • Heartburn    • History of Holter monitoring 02/26/2016    Sinus mechanism with a normal average heart rate.No evidence of chronotropic incompetence.Asymptomatic Holter   • Hyperlipidemia    • Hypertension    • Low back pain    • Numbness of lower limb     left leg numbness and tingling      • Obesity, unspecified    • Obstructive sleep apnea of adult     USING C-PAP   • Pain in left hip    • Pain in right hip    • Palpitations    • Skin lesion     insect bite.      • Supraventricular tachycardia (CMS/HCC)    • Weight loss     advised        Past Surgical History:   Procedure Laterality Date   • CARPAL TUNNEL RELEASE Right 11/2/2018    Procedure: CARPAL TUNNEL RELEASE;  Surgeon: Judd Mathias MD;  Location: [facility] OR;  Service: Orthopedics   • CARPAL TUNNEL RELEASE  12/07/2018    Left   • CARPAL TUNNEL RELEASE Left 12/7/2018    Procedure: CARPAL TUNNEL RELEASE;  Surgeon: Judd Mathias MD;  Location: [facility] OR;  Service: Orthopedics   • COLONOSCOPY N/A 1/18/2019    Procedure: COLONOSCOPY a friday please ;  Surgeon: Ruslan Bowers MD;  Location: [facility] ENDOSCOPY;  Service: Gastroenterology   • ENDOSCOPY     • PYLOROMYOTOMY         PT Ortho     Row Name 05/09/19 0800       Posture/Observations    Posture/Observations Comments  upright posture.  better awareness w/ cueing.    (Pended)   -ML    Row Name 05/07/19 0900       Head/Neck/Trunk    Trunk Extension AROM  --  (Pended)   -ER    Trunk Flexion AROM  --  (Pended)   -ER    Trunk Lt Lateral Flexion AROM  --  (Pended)   -ER    Trunk Rt Lateral Flexion AROM  --  (Pended)   -ER    Trunk Lt Rotation AROM  --  (Pended)   -ER    Trunk Rt Rotation AROM  --  (Pended)   -ER       MMT (Manual Muscle Testing)    General MMT Comments  --  (Pended)   -ER       Sensation    Sensation WNL?  --  (Pended)   -ER    Light Touch  --  (Pended)   -ER    Additional Comments  --  (Pended)   -ER       Pathomechanics    Spine  "Pathomechanics  --  (Pended)   -ER    Row Name 05/07/19 0800       Subjective Comments    Subjective Comments  Patient states that the back \"feels okay\"  (Pended)   -ER       Subjective Pain    Able to rate subjective pain?  yes  (Pended)   -ER    Pre-Treatment Pain Level  0  (Pended)   -ER       Posture/Observations    Alignment Options  Thoracic kyphosis;Rounded shoulders;Cervical lordosis;Lumbar lordosis;Scoliosis  (Pended)   -ER    Cervical Lordosis  Mild;Increased  (Pended)   -ER    Thoracic Kyphosis  Moderate;Increased  (Pended)   -ER    Rounded Shoulders  Moderate;Increased  (Pended)   -ER    Scoliosis  Mild;Increased  (Pended)   -ER    Lumbar lordosis  Decreased;Mild  (Pended)   -ER    Posture/Observations Comments  No acute distress  (Pended)   -ER       Head/Neck/Trunk    Trunk Extension AROM  29 degrees  (Pended)   -ER    Trunk Flexion AROM  94 degrees  (Pended)   -ER    Trunk Lt Lateral Flexion AROM  100% of range  (Pended)   -ER    Trunk Rt Lateral Flexion AROM  100% of range  (Pended)   -ER    Trunk Lt Rotation AROM  100% of range  (Pended)   -ER    Trunk Rt Rotation AROM  100% of range  (Pended)   -ER       MMT (Manual Muscle Testing)    General MMT Comments  5/5 BLE with no increase in pain  (Pended)   -ER       Sensation    Sensation WNL?  WNL  (Pended)   -ER    Light Touch  No apparent deficits  (Pended)   -ER    Additional Comments  Appropriate response to stimuli  (Pended)   -ER       Pathomechanics    Spine Pathomechanics  Excessive thoracic kyphosis with forward bend;Limited lumbar flattening with forward bend  (Pended)   -ER       Transfers    Comment (Transfers)  I with transfers, improved logroll technique  (Pended)   -ER       Gait/Stairs Assessment/Training    Comment (Gait/Stairs)  FWB, patient ambulates non-antalgically  (Pended)   -ER      User Key  (r) = Recorded By, (t) = Taken By, (c) = Cosigned By    Initials Name Provider Type    Rylan Pina, ATC     ER " Donna Lemus, PT Student PT Student                      PT Assessment/Plan     Row Name 05/09/19 0900          PT Assessment    Assessment Comments  majority of goals met or progressing toward meeting.  pt jah rx. despite fatigue level w/ little sleep.  better jah w/ planks as able to complete 3 x30sec w/ no c/o's.  pt reports will continue HEP and utilize [employer] 1 mo coupon to continue progression.  advised daily stretching and progression.    (Pended)   -ML        PT Plan    PT Plan Comments  dc to indep mgmt.    (Pended)   -ML       User Key  (r) = Recorded By, (t) = Taken By, (c) = Cosigned By    Initials Name Provider Type    ML Rylan Reeves, ATC               Exercises     Row Name 05/09/19 0800             Subjective Comments    Subjective Comments  low sleep taking care of family member that had medical procedure.  no new c/o at back.  just tight today.  last visit w/ PT this date.    (Pended)   -ML         Subjective Pain    Able to rate subjective pain?  yes  (Pended)   -ML      Pre-Treatment Pain Level  0  (Pended)   -ML      Post-Treatment Pain Level  0  (Pended)   -ML         Exercise 1    Exercise Name 1  pro2 UE/LE alt f/r  (Pended)   -ML      Time 1  10 mins  (Pended)   -ML      Additional Comments  L5.0  (Pended)   -ML         Exercise 2    Exercise Name 2  B St. HS S  (Pended)   -ML      Reps 2  2  (Pended)   -ML      Time 2  30 seconds  (Pended)   -ML         Exercise 3    Exercise Name 3  B St. 1L Incline Calf S  (Pended)   -ML      Reps 3  2  (Pended)   -ML      Time 3  30 seconds  (Pended)   -ML         Exercise 4    Exercise Name 4  Track walk   (Pended)   -ML      Reps 4  8 laps  (Pended)   -ML         Exercise 5    Exercise Name 5  LTR w/Pball  (Pended)   -ML      Reps 5  10  (Pended)   -ML      Time 5  10 sec hold  (Pended)   -ML         Exercise 6    Exercise Name 6  Bridges  (Pended)   -ML      Sets 6  2  (Pended)   -ML      Reps 6  10  (Pended)   -ML       Additional Comments  arms across chest  (Pended)   -ML         Exercise 7    Exercise Name 7  Swiss: DK-C rolls  (Pended)   -ML      Reps 7  10  (Pended)   -ML      Time 7  5 sec hold (abs tight)  (Pended)   -ML         Exercise 8    Exercise Name 8  Swiss: LTR feet on ball  (Pended)   -ML      Reps 8  10  (Pended)   -ML         Exercise 10    Exercise Name 10  Prone Planks  (Pended)   -ML      Reps 10  3  (Pended)   -ML      Time 10  30 seconds  (Pended)   -ML        User Key  (r) = Recorded By, (t) = Taken By, (c) = Cosigned By    Initials Name Provider Type    ML Rylan Reeves, ATC                          PT OP Goals     Row Name 05/09/19 0800          PT Short Term Goals    STG Date to Achieve  05/02/19  (Pended)   -ML     STG 1  I with HEP and have additions/changes  (Pended)   -ML     STG 1 Progress  Ongoing  (Pended)   -ML     STG 2  Trunk extension AROM >=25 degrees  (Pended)   -ML     STG 2 Progress  Met  (Pended)   -ML     STG 3  Patient to demonstrate improved posture and postural awareness  (Pended)   -ML     STG 3 Progress  Partially Met  (Pended)   -ML     STG 4  Patient will perform 20 bridges with no UE assist  (Pended)   -ML     STG 4 Progress  Met  (Pended)   -ML     STG 5  Patient to demonstrate improved flexibility in B hamstrings and piriformis  (Pended)   -ML     STG 5 Progress  Ongoing  (Pended)   -ML        Long Term Goals    LTG Date to Achieve  05/10/19  (Pended)   -ML     LTG 1  Patient to walk 1/2 mile non-antalgically with no increase in pain  (Pended)   -ML     LTG 1 Progress  Met  (Pended)   -ML     LTG 2  Patient to lift 20lb weight from ground to waist to shoulder level with no increase in pain  (Pended)   -ML     LTG 2 Progress  Met  (Pended)   -ML     LTG 3  Patient to perform 3 prone planks for 30 sec each with increase in pain or radicular symptoms  (Pended)   -ML     LTG 3 Progress  Met  (Pended)   -ML     LTG 4  Patient to push/pull 90 lb. weighted sled 40  ft., 5x F/R with no increase in pain  (Pended)   -ML     LTG 4 Progress  Met  (Pended)   -ML     LTG 5  Trunk AROM WNL in all directions  (Pended)   -ML     LTG 5 Progress  Met  (Pended)   -ML     LTG 6  I with final HEP  (Pended)   -ML     LTG 7  D/C with a final HEP and free 30 day fitness formula memebership.  (Pended)   -ML        Time Calculation    PT Goal Re-Cert Due Date  --  (Pended)  n/a  -ML       User Key  (r) = Recorded By, (t) = Taken By, (c) = Cosigned By    Initials Name Provider Type    Rylan Pina, ATC                          Time Calculation:   Start Time: (P) 0804  Stop Time: (P) 0844  Time Calculation (min): (P) 40 min  Therapy Charges for Today     Code Description Service Date Service Provider Modifiers Qty    79485794287 HC PT THER SUPP EA 15 MIN 5/9/2019 Rylan Reeves, ATC  1    53192942070 HC PT THER PROC EA 15 MIN 5/9/2019 Rylan Reeves, ATC  3                OP PT Discharge Summary  Date of Discharge: (P) 05/09/19  Reason for Discharge: (P) Independent, All goals achieved  Outcomes Achieved: (P) Able to achieve all goals within established timeline  Discharge Destination: (P) Home with home program  Discharge Instructions/Additional Comments: (P) given 30 day fitness formula membership.       Rylan Reeves ATC  5/9/2019

## 2019-05-15 RX ORDER — OMEPRAZOLE 40 MG/1
CAPSULE, DELAYED RELEASE ORAL
Qty: 30 CAPSULE | Refills: 0 | Status: SHIPPED | OUTPATIENT
Start: 2019-05-15 | End: 2019-06-14 | Stop reason: SDUPTHER

## 2019-05-15 RX ORDER — MONTELUKAST SODIUM 10 MG/1
TABLET ORAL
Qty: 30 TABLET | Refills: 0 | Status: SHIPPED | OUTPATIENT
Start: 2019-05-15 | End: 2019-07-11 | Stop reason: SDUPTHER

## 2019-06-10 NOTE — PROGRESS NOTES
Subjective:  Randall Hernandez is a 48 y.o. male who presents for       Patient Active Problem List   Diagnosis   • Hyperlipidemia   • Inattention   • Non morbid obesity   • Prediabetes   • Attention deficit hyperactivity disorder (ADHD), combined type   • Need for vaccination   • Gastroesophageal reflux disease   • Encounter for drug screening   • Tingling in extremities   • Elevated BP without diagnosis of hypertension   • Allergic rhinitis   • Elevated blood pressure reading   • Pruritic rash   • Numbness in both hands   • Bilateral elbow joint pain   • Pain in both wrists   • Ulnar neuropathy of left upper extremity   • General medical examination   • Bilateral carpal tunnel syndrome   • History of prediabetes   • Numbness and tingling in both hands   • Essential hypertension   • Cerumen debris on tympanic membrane of both ears   • Carpal tunnel syndrome   • S/P carpal tunnel release   • Encounter for screening for malignant neoplasm of colon   • FH: colon cancer   • Acute recurrent sinusitis   • Attention deficit hyperactivity disorder (ADHD), predominantly inattentive type   • Class 2 obesity with body mass index (BMI) of 35.0 to 35.9 in adult   • Annual physical exam   • Arthritis of back   • Scoliosis           Current Outpatient Medications:   •  amphetamine-dextroamphetamine XR (ADDERALL XR) 25 MG 24 hr capsule, Take 1 capsule by mouth Every Morning Take 1 tablet by mouth daily, Disp: 30 capsule, Rfl: 0  •  aspirin 81 MG EC tablet, Take 1 tablet by mouth Daily., Disp: 30 tablet, Rfl: 3  •  atorvastatin (LIPITOR) 20 MG tablet, TAKE 1 TABLET BY MOUTH DAILY, Disp: 30 tablet, Rfl: 0  •  atorvastatin (LIPITOR) 40 MG tablet, Take 1 tablet by mouth Daily., Disp: 30 tablet, Rfl: 3  •  cyclobenzaprine (FLEXERIL) 5 MG tablet, Take 1 tablet by mouth 3 (Three) Times a Day As Needed for Muscle Spasms., Disp: 30 tablet, Rfl: 3  •  fluticasone (FLONASE) 50 MCG/ACT nasal spray, 2 sprays into the nostril(s) as directed  by provider Daily., Disp: 1 bottle, Rfl: 5  •  fluticasone (FLONASE) 50 MCG/ACT nasal spray, INSTILL 2 SPRAYS IN EACH NOSTRIL EVERY DAY, Disp: 16 mL, Rfl: 0  •  lisinopril (PRINIVIL,ZESTRIL) 10 MG tablet, Take 1 tablet by mouth Daily., Disp: 30 tablet, Rfl: 3  •  meloxicam (MOBIC) 7.5 MG tablet, Take 1 tablet by mouth Daily., Disp: 30 tablet, Rfl: 3  •  montelukast (SINGULAIR) 10 MG tablet, Take 1 tablet by mouth Every Night., Disp: 30 tablet, Rfl: 5  •  montelukast (SINGULAIR) 10 MG tablet, TAKE 1 TABLET BY MOUTH EVERY NIGHT, Disp: 30 tablet, Rfl: 0  •  omeprazole (priLOSEC) 40 MG capsule, Take 1 capsule by mouth Daily., Disp: 30 capsule, Rfl: 11  •  omeprazole (priLOSEC) 40 MG capsule, TAKE ONE CAPSULE BY MOUTH EVERY DAY ON AN EMPTY STOMACH, Disp: 30 capsule, Rfl: 0  •  vitamin D (ERGOCALCIFEROL) 76571 units capsule capsule, Take 1 capsule by mouth 1 (One) Time Per Week., Disp: 4 capsule, Rfl: 3    HPI        5/8/19 pt is here for recheck on back pain and ADHD,.  He has been doing PT/OT for back pian. X-ray of back showed mild scoliosis and mild degenerative changeso of lumbar spine. Also has degenerative changes of thoracic spine.  His back pain is improved with PT/OT  And pain is 0/10 on severity. He takes mobic and flexeril as needed.  He lost 4 lbs since last visit.  Energy is improved    6/14/19 pt is here for recheck and needs refills on ADHD medication doing well on Adderall XR. Pt doing well on medication. He gained 2 lbs since last visit. He has been busy.      Obesity   This is a chronic problem. The current episode started more than 1 year ago. The problem occurs constantly. The problem has been unchanged. Associated symptoms include arthralgias and numbness. Pertinent negatives include no abdominal pain, anorexia, change in bowel habit, chest pain, chills, congestion, coughing, diaphoresis, fatigue, fever, headaches, joint swelling, myalgias, nausea, rash, sore throat, swollen glands, urinary  symptoms, vertigo, visual change, vomiting or weakness. Nothing aggravates the symptoms. He has tried nothing for the symptoms. The treatment provided no relief.   Mental Health Problem   The primary symptoms do not include dysphoric mood, delusions, hallucinations, bizarre behavior, disorganized speech, negative symptoms or somatic symptoms. This is a chronic problem.   The degree of incapacity that he is experiencing as a consequence of his illness is mild. Additional symptoms of the illness include attention impairment. Additional symptoms of the illness do not include anhedonia, insomnia, hypersomnia, appetite change, unexpected weight change, fatigue, agitation, psychomotor retardation, feelings of worthlessness, euphoric mood, increased goal-directed activity, flight of ideas, inflated self-esteem, decreased need for sleep, distractible, poor judgment, visual change, headaches or abdominal pain. He does not admit to suicidal ideas. He does not have a plan to commit suicide. He does not contemplate injuring another person.   Back Pain   This is a chronic problem. The current episode started more than 1 month ago. The problem occurs constantly. The problem is unchanged. The pain is present in the lumbar spine. The quality of the pain is described as aching. The pain is at a severity of 5/10. The symptoms are aggravated by lying down, position, sitting and standing. Associated symptoms include numbness. Pertinent negatives include no abdominal pain, bladder incontinence, bowel incontinence, chest pain, dysuria, fever, headaches, leg pain, paresis, paresthesias, pelvic pain, perianal numbness, tingling or weakness. He has tried nothing for the symptoms. The treatment provided no relief.    Wrist Pain    The pain is present in the left elbow, left wrist, right wrist and right elbow. This is a chronic problem. The current episode started more than 1 month ago. The problem occurs constantly. The problem has  been gradually worsening. The quality of the pain is described as aching. The pain is at a severity of 9/10. The pain is moderate. Associated symptoms include joint locking, a limited range of motion, numbness, stiffness and tingling. Pertinent negatives include no fever, inability to bear weight, itching or joint swelling. The symptoms are aggravated by activity. He has tried NSAIDS for the symptoms. The treatment provided no relief. Family history does not include gout or rheumatoid arthritis. There is no history of diabetes, gout, osteoarthritis or rheumatoid arthritis.   Elbow Injury   This is a chronic problem. The current episode started more than 1 year ago. The problem occurs constantly. The problem has been gradually worsening. Associated symptoms include arthralgias, joint swelling, numbness, a rash and weakness. Pertinent negatives include no abdominal pain, anorexia, change in bowel habit, chest pain, chills, congestion, coughing, diaphoresis, fatigue, fever, headaches, myalgias, nausea, neck pain, sore throat, swollen glands, urinary symptoms, vertigo, visual change or vomiting. The symptoms are aggravated by bending. He has tried NSAIDs for the symptoms. The treatment provided mild relief.   Hypertension   This is a new problem. The current episode started more than 1 month ago. The problem has been gradually worsening since onset. The problem is uncontrolled. Pertinent negatives include no anxiety, blurred vision, chest pain, headaches, malaise/fatigue, neck pain, orthopnea, palpitations, peripheral edema, PND, shortness of breath or sweats. Risk factors for coronary artery disease include male gender, obesity and sedentary lifestyle. Past treatments include nothing. There are no compliance problems.    Review of Systems  Review of Systems   Constitutional: Positive for activity change and fatigue. Negative for appetite change, chills, diaphoresis and fever.   HENT: Negative for congestion, postnasal  drip, rhinorrhea, sinus pressure, sinus pain, sneezing, sore throat, trouble swallowing and voice change.    Respiratory: Negative for cough, choking, chest tightness, shortness of breath, wheezing and stridor.    Cardiovascular: Negative for chest pain.   Gastrointestinal: Negative for diarrhea, nausea and vomiting.   Musculoskeletal: Positive for back pain.   Neurological: Negative for weakness and headaches.   Psychiatric/Behavioral: Positive for decreased concentration.       Patient Active Problem List   Diagnosis   • Hyperlipidemia   • Inattention   • Non morbid obesity   • Prediabetes   • Attention deficit hyperactivity disorder (ADHD), combined type   • Need for vaccination   • Gastroesophageal reflux disease   • Encounter for drug screening   • Tingling in extremities   • Elevated BP without diagnosis of hypertension   • Allergic rhinitis   • Elevated blood pressure reading   • Pruritic rash   • Numbness in both hands   • Bilateral elbow joint pain   • Pain in both wrists   • Ulnar neuropathy of left upper extremity   • General medical examination   • Bilateral carpal tunnel syndrome   • History of prediabetes   • Numbness and tingling in both hands   • Essential hypertension   • Cerumen debris on tympanic membrane of both ears   • Carpal tunnel syndrome   • S/P carpal tunnel release   • Encounter for screening for malignant neoplasm of colon   • FH: colon cancer   • Acute recurrent sinusitis   • Attention deficit hyperactivity disorder (ADHD), predominantly inattentive type   • Class 2 obesity with body mass index (BMI) of 35.0 to 35.9 in adult   • Annual physical exam   • Arthritis of back   • Scoliosis     Past Surgical History:   Procedure Laterality Date   • CARPAL TUNNEL RELEASE Right 11/2/2018    Procedure: CARPAL TUNNEL RELEASE;  Surgeon: Judd Mathias MD;  Location: [facility];  Service: Orthopedics   • CARPAL TUNNEL RELEASE  12/07/2018    Left   • CARPAL TUNNEL RELEASE Left 12/7/2018     Procedure: CARPAL TUNNEL RELEASE;  Surgeon: Judd Mathias MD;  Location: [facility] OR;  Service: Orthopedics   • COLONOSCOPY N/A 2019    Procedure: COLONOSCOPY a friday please ;  Surgeon: Ruslan Bowers MD;  Location: [facility] ENDOSCOPY;  Service: Gastroenterology   • ENDOSCOPY     • PYLOROMYOTOMY       Social History     Socioeconomic History   • Marital status: Single     Spouse name: Not on file   • Number of children: Not on file   • Years of education: Not on file   • Highest education level: Not on file   Occupational History   • Occupation:      Employer: SELF-EMPLOYED     Comment: Patient resides with Addis Rodriguez.   Tobacco Use   • Smoking status: Former Smoker     Packs/day: 1.00     Years: 6.00     Pack years: 6.00     Types: Cigarettes     Last attempt to quit:      Years since quittin.4   • Smokeless tobacco: Never Used   Substance and Sexual Activity   • Alcohol use: Yes     Comment: 2018 - Patinet reports consumption of alcoholic beverages under a social basis (approximately 3 - 4 per month).     • Drug use: No   • Sexual activity: Defer     Family History   Problem Relation Age of Onset   • Colon cancer Mother         onset age greater than 75y   • Diabetes Mother    • Breast cancer Sister    • Cancer Brother         bladder   • Diabetes Brother    • Hyperlipidemia Brother      Lab on 2019   Component Date Value Ref Range Status   • WBC 2019 5.25  3.40 - 10.80 10*3/mm3 Final   • RBC 2019 4.77  4.14 - 5.80 10*6/mm3 Final   • Hemoglobin 2019 14.2  13.0 - 17.7 g/dL Final   • Hematocrit 2019 43.6  37.5 - 51.0 % Final   • MCV 2019 91.4  79.0 - 97.0 fL Final   • MCH 2019 29.8  26.6 - 33.0 pg Final   • MCHC 2019 32.6  31.5 - 35.7 g/dL Final   • RDW 2019 13.1  12.3 - 15.4 % Final   • RDW-SD 2019 44.4  37.0 - 54.0 fl Final   • MPV 2019 9.9  6.0 - 12.0 fL Final   • Platelets 2019 306  140 -  450 10*3/mm3 Final   • Neutrophil % 04/18/2019 48.4  42.7 - 76.0 % Final   • Lymphocyte % 04/18/2019 38.1  19.6 - 45.3 % Final   • Monocyte % 04/18/2019 8.4  5.0 - 12.0 % Final   • Eosinophil % 04/18/2019 3.4  0.3 - 6.2 % Final   • Basophil % 04/18/2019 1.3  0.0 - 1.5 % Final   • Immature Grans % 04/18/2019 0.4  0.0 - 0.5 % Final   • Neutrophils, Absolute 04/18/2019 2.54  1.40 - 7.00 10*3/mm3 Final   • Lymphocytes, Absolute 04/18/2019 2.00  0.70 - 3.10 10*3/mm3 Final   • Monocytes, Absolute 04/18/2019 0.44  0.10 - 0.90 10*3/mm3 Final   • Eosinophils, Absolute 04/18/2019 0.18  0.00 - 0.40 10*3/mm3 Final   • Basophils, Absolute 04/18/2019 0.07  0.00 - 0.20 10*3/mm3 Final   • Immature Grans, Absolute 04/18/2019 0.02  0.00 - 0.05 10*3/mm3 Final   • nRBC 04/18/2019 0.0  0.0 - 0.0 /100 WBC Final   • Glucose 04/18/2019 104* 65 - 99 mg/dL Final   • BUN 04/18/2019 11  6 - 20 mg/dL Final   • Creatinine 04/18/2019 1.07  0.76 - 1.27 mg/dL Final   • Sodium 04/18/2019 134* 136 - 145 mmol/L Final   • Potassium 04/18/2019 4.5  3.5 - 5.2 mmol/L Final   • Chloride 04/18/2019 96* 98 - 107 mmol/L Final   • CO2 04/18/2019 22.8  22.0 - 29.0 mmol/L Final   • Calcium 04/18/2019 8.9  8.6 - 10.5 mg/dL Final   • Total Protein 04/18/2019 7.6  6.0 - 8.5 g/dL Final   • Albumin 04/18/2019 4.50  3.50 - 5.20 g/dL Final   • ALT (SGPT) 04/18/2019 30  1 - 41 U/L Final   • AST (SGOT) 04/18/2019 28  1 - 40 U/L Final   • Alkaline Phosphatase 04/18/2019 110  39 - 117 U/L Final   • Total Bilirubin 04/18/2019 0.6  0.2 - 1.2 mg/dL Final   • eGFR Non African Amer 04/18/2019 73  >60 mL/min/1.73 Final   • Globulin 04/18/2019 3.1  gm/dL Final   • A/G Ratio 04/18/2019 1.5  g/dL Final   • BUN/Creatinine Ratio 04/18/2019 10.3  7.0 - 25.0 Final   • Anion Gap 04/18/2019 15.2  mmol/L Final   • Total Cholesterol 04/18/2019 174  0 - 200 mg/dL Final   • Triglycerides 04/18/2019 91  0 - 150 mg/dL Final   • HDL Cholesterol 04/18/2019 41  40 - 60 mg/dL Final   • LDL  Cholesterol  04/18/2019 115* 0 - 100 mg/dL Final   • VLDL Cholesterol 04/18/2019 18.2  5 - 40 mg/dL Final   • LDL/HDL Ratio 04/18/2019 2.80   Final   • 25 Hydroxy, Vitamin D 04/18/2019 27.9* 30.0 - 100.0 ng/ml Final   • Amphet/Methamphet, Screen 04/18/2019 Positive* Negative Final   • Barbiturates Screen, Urine 04/18/2019 Negative  Negative Final   • Benzodiazepine Screen, Urine 04/18/2019 Negative  Negative Final   • Cocaine Screen, Urine 04/18/2019 Negative  Negative Final   • Opiate Screen 04/18/2019 Negative  Negative Final   • THC, Screen, Urine 04/18/2019 Negative  Negative Final   • Methadone Screen, Urine 04/18/2019 Negative  Negative Final   • Oxycodone Screen, Urine 04/18/2019 Negative  Negative Final   Lab on 01/28/2019   Component Date Value Ref Range Status   • WBC 01/28/2019 6.11  3.20 - 9.80 10*3/mm3 Final   • RBC 01/28/2019 4.75  4.37 - 5.74 10*6/mm3 Final   • Hemoglobin 01/28/2019 14.4  13.7 - 17.3 g/dL Final   • Hematocrit 01/28/2019 43.4  39.0 - 49.0 % Final   • MCV 01/28/2019 91.4  80.0 - 98.0 fL Final   • MCH 01/28/2019 30.3  26.5 - 34.0 pg Final   • MCHC 01/28/2019 33.2  31.5 - 36.3 g/dL Final   • RDW 01/28/2019 12.9  11.5 - 14.5 % Final   • RDW-SD 01/28/2019 43.1  35.1 - 43.9 fl Final   • MPV 01/28/2019 9.4  8.0 - 12.0 fL Final   • Platelets 01/28/2019 288  150 - 450 10*3/mm3 Final   • Neutrophil % 01/28/2019 50.2  37.0 - 80.0 % Final   • Lymphocyte % 01/28/2019 38.3  10.0 - 50.0 % Final   • Monocyte % 01/28/2019 6.4  0.0 - 12.0 % Final   • Eosinophil % 01/28/2019 4.1  0.0 - 7.0 % Final   • Basophil % 01/28/2019 0.7  0.0 - 2.0 % Final   • Immature Grans % 01/28/2019 0.3  0.0 - 0.5 % Final   • Neutrophils, Absolute 01/28/2019 3.07  2.00 - 8.60 10*3/mm3 Final   • Lymphocytes, Absolute 01/28/2019 2.34  0.60 - 4.20 10*3/mm3 Final   • Monocytes, Absolute 01/28/2019 0.39  0.00 - 0.90 10*3/mm3 Final   • Eosinophils, Absolute 01/28/2019 0.25  0.00 - 0.70 10*3/mm3 Final   • Basophils, Absolute  01/28/2019 0.04  0.00 - 0.20 10*3/mm3 Final   • Immature Grans, Absolute 01/28/2019 0.02  0.00 - 0.02 10*3/mm3 Final   • Glucose 01/28/2019 118* 60 - 100 mg/dL Final   • BUN 01/28/2019 12  7 - 21 mg/dL Final   • Creatinine 01/28/2019 0.96  0.70 - 1.30 mg/dL Final   • Sodium 01/28/2019 139  137 - 145 mmol/L Final   • Potassium 01/28/2019 4.7  3.5 - 5.1 mmol/L Final   • Chloride 01/28/2019 103  95 - 110 mmol/L Final   • CO2 01/28/2019 28.0  22.0 - 31.0 mmol/L Final   • Calcium 01/28/2019 9.7  8.4 - 10.2 mg/dL Final   • Total Protein 01/28/2019 6.8  6.3 - 8.6 g/dL Final   • Albumin 01/28/2019 4.10  3.40 - 4.80 g/dL Final   • ALT (SGPT) 01/28/2019 42  21 - 72 U/L Final   • AST (SGOT) 01/28/2019 29  17 - 59 U/L Final   • Alkaline Phosphatase 01/28/2019 107  38 - 126 U/L Final   • Total Bilirubin 01/28/2019 0.4  0.2 - 1.3 mg/dL Final   • eGFR Non African Amer 01/28/2019 83  56 - 130 mL/min/1.73 Final   • Globulin 01/28/2019 2.7  2.3 - 3.5 gm/dL Final   • A/G Ratio 01/28/2019 1.5  1.1 - 1.8 g/dL Final   • BUN/Creatinine Ratio 01/28/2019 12.5  7.0 - 25.0 Final   • Anion Gap 01/28/2019 8.0  5.0 - 15.0 mmol/L Final   • Total Cholesterol 01/28/2019 168  0 - 199 mg/dL Final   • Triglycerides 01/28/2019 124  20 - 199 mg/dL Final   • HDL Cholesterol 01/28/2019 38* 60 - 200 mg/dL Final   • LDL Cholesterol  01/28/2019 109  1 - 129 mg/dL Final   • LDL/HDL Ratio 01/28/2019 2.77  0.00 - 3.55 Final   • Amphetamine Screen, Urine 01/28/2019 Positive* Negative Final   • Barbiturates Screen, Urine 01/28/2019 Negative  Negative Final   • Benzodiazepine Screen, Urine 01/28/2019 Negative  Negative Final   • Cocaine Screen, Urine 01/28/2019 Negative  Negative Final   • Methadone Screen, Urine 01/28/2019 Negative  Negative Final   • Opiate Screen 01/28/2019 Negative  Negative Final   • Oxycodone Screen, Urine 01/28/2019 Negative  Negative Final   • THC, Screen, Urine 01/28/2019 Negative  Negative Final      XR Spine Lumbar 4+ View  Narrative:  Five-view lumbar spine    HISTORY: Back pain. Lumbago with bilateral sciatica.    AP, lateral and oblique radiographs of the lumbar spine and spot  film of the lumbosacral junction were obtained.    COMPARISON: None.    Mild levoscoliosis.  Congenitally short pedicles.  Minimal degenerative changes with small marginal osteophytes.  There is a normal lordosis.   Vertebral height and alignment are maintained.  No fracture.  The pedicles are intact.  Impression: CONCLUSION:  Mild levoscoliosis.  Congenitally short pedicles.  Minimal degenerative changes with small marginal osteophytes.    [id number]    Electronically signed by:  Anthony Cm MD  4/8/2019 10:21 AM CDT  Workstation: [employer]  XR Spine Thoracic 3 View  Narrative: Three-view thoracic spine.    HISTORY: Back pain    AP, lateral and swimmer's views of the thoracic spine were  obtained.    COMPARISON: None.    Normal thoracic kyphosis.  Vertebral height and alignment maintained.  Mild degenerative changes.  No fracture.  The pedicles are intact.  No paraspinal widening.  Impression: CONCLUSION:   Mild degenerative changes.    [id number]    Electronically signed by:  Anthony Cm MD  4/8/2019 10:19 AM CDT  Workstation: [employer]    [unfilled]  Immunization History   Administered Date(s) Administered   • Influenza TIV (IM) 03/28/2017   • Tdap 02/17/2017       The following portions of the patient's history were reviewed and updated as appropriate: allergies, current medications, past family history, past medical history, past social history, past surgical history and problem list.        Physical Exam  Physical Exam   Constitutional: He is oriented to person, place, and time. He appears well-developed and well-nourished.   HENT:   Head: Normocephalic and atraumatic.   Right Ear: External ear normal.   Eyes: Conjunctivae and EOM are normal. Pupils are equal, round, and reactive to light.   Neck: Normal range of motion. Neck supple.   Cardiovascular: Normal  rate, regular rhythm and normal heart sounds.   No murmur heard.  Pulmonary/Chest: Effort normal and breath sounds normal. No respiratory distress.   Abdominal: Soft. Bowel sounds are normal. He exhibits no distension. There is no tenderness.   Obese abdomen    Musculoskeletal: Normal range of motion. He exhibits no edema or deformity.   Neurological: He is alert and oriented to person, place, and time. No cranial nerve deficit.   Skin: Skin is warm. No rash noted. He is not diaphoretic. No erythema. No pallor.   Psychiatric: He has a normal mood and affect. His behavior is normal.   Nursing note and vitals reviewed.      Assessment/Plan    Diagnosis Plan   1. Prediabetes     2. S/P carpal tunnel release     3. Hyperlipidemia, unspecified hyperlipidemia type     4. Class 2 obesity with body mass index (BMI) of 35.0 to 35.9 in adult, unspecified obesity type, unspecified whether serious comorbidity present     5. Attention deficit hyperactivity disorder (ADHD), predominantly inattentive type     6. Arthritis of back     7. Essential hypertension                -recommend pt go back on aspirin and lipitor  -for back pain with sciatica/scoliosis - pain is better after PT/OT. Continue with mobic and flexeril PRN.  Consider MRI if not improving   -referral to Gastroenterology for colon cancer screening   - pt doing well postop on carpal tunnel release on right hand   -for cerumen of both ears - debrox ointment PRN. Drug information provided  -for carpal tunnel he is doing better post op  he has failed conservative treatment for >3 months. . He has yet to get surgery on left  Hand   -vitamin D pill once a week   -HLP - lipitor 40 mg PO qhs   -  Continue  on lisionpril 10 mg PO q daily drug information provided low salt diet informaiton provided. Side effects discussed BP better controlled today  - refilled ADHD medication Adderall XR 25 mg PO q daily for ADHD.  [facility] ref number [id number]  - for allergic rhinitis -  continue singulair and flonase and will add zyrtec.  -H/O prediabetes - D/C  metformin, recent hga1c normal at 5.4   - hyperlipidemia -continue statin. Recheck lipid panel. Pt on aspirin   - obesity - recommend ketogenic diet along with intermittent fasting. Pt has lost 3 lbs since last visit   -recheck in 1 month for followup         This document has been electronically signed by Scottie Borges MD on June 13, 2019 8:09 AM                      This document has been electronically signed by Scottie Borges MD on June 13, 2019 8:09 AM

## 2019-06-13 ENCOUNTER — OFFICE VISIT (OUTPATIENT)
Dept: FAMILY MEDICINE CLINIC | Facility: CLINIC | Age: 49
End: 2019-06-13

## 2019-06-13 VITALS
WEIGHT: 251.2 LBS | OXYGEN SATURATION: 98 % | BODY MASS INDEX: 35.17 KG/M2 | HEIGHT: 71 IN | HEART RATE: 74 BPM | DIASTOLIC BLOOD PRESSURE: 76 MMHG | SYSTOLIC BLOOD PRESSURE: 124 MMHG | TEMPERATURE: 97.9 F

## 2019-06-13 DIAGNOSIS — M47.9 ARTHRITIS OF BACK: ICD-10-CM

## 2019-06-13 DIAGNOSIS — Z98.890 S/P CARPAL TUNNEL RELEASE: ICD-10-CM

## 2019-06-13 DIAGNOSIS — E78.5 HYPERLIPIDEMIA, UNSPECIFIED HYPERLIPIDEMIA TYPE: ICD-10-CM

## 2019-06-13 DIAGNOSIS — I10 ESSENTIAL HYPERTENSION: ICD-10-CM

## 2019-06-13 DIAGNOSIS — F90.0 ATTENTION DEFICIT HYPERACTIVITY DISORDER (ADHD), PREDOMINANTLY INATTENTIVE TYPE: ICD-10-CM

## 2019-06-13 DIAGNOSIS — R73.03 PREDIABETES: Primary | ICD-10-CM

## 2019-06-13 DIAGNOSIS — E66.9 CLASS 2 OBESITY WITH BODY MASS INDEX (BMI) OF 35.0 TO 35.9 IN ADULT, UNSPECIFIED OBESITY TYPE, UNSPECIFIED WHETHER SERIOUS COMORBIDITY PRESENT: ICD-10-CM

## 2019-06-13 PROCEDURE — 99214 OFFICE O/P EST MOD 30 MIN: CPT | Performed by: FAMILY MEDICINE

## 2019-06-13 RX ORDER — DEXTROAMPHETAMINE SACCHARATE, AMPHETAMINE ASPARTATE MONOHYDRATE, DEXTROAMPHETAMINE SULFATE AND AMPHETAMINE SULFATE 6.25; 6.25; 6.25; 6.25 MG/1; MG/1; MG/1; MG/1
25 CAPSULE, EXTENDED RELEASE ORAL EVERY MORNING
Qty: 30 CAPSULE | Refills: 0 | Status: SHIPPED | OUTPATIENT
Start: 2019-06-13 | End: 2019-07-19 | Stop reason: SDUPTHER

## 2019-06-14 RX ORDER — OMEPRAZOLE 40 MG/1
CAPSULE, DELAYED RELEASE ORAL
Qty: 30 CAPSULE | Refills: 0 | Status: SHIPPED | OUTPATIENT
Start: 2019-06-14 | End: 2019-08-12 | Stop reason: SDUPTHER

## 2019-06-28 ENCOUNTER — DOCUMENTATION (OUTPATIENT)
Dept: PHYSICAL THERAPY | Facility: HOSPITAL | Age: 49
End: 2019-06-28

## 2019-06-28 DIAGNOSIS — M54.9 UPPER BACK PAIN: Primary | ICD-10-CM

## 2019-06-28 DIAGNOSIS — M54.41 BILATERAL LOW BACK PAIN WITH BILATERAL SCIATICA, UNSPECIFIED CHRONICITY: ICD-10-CM

## 2019-06-28 DIAGNOSIS — M54.42 BILATERAL LOW BACK PAIN WITH BILATERAL SCIATICA, UNSPECIFIED CHRONICITY: ICD-10-CM

## 2019-07-11 RX ORDER — FLUTICASONE PROPIONATE 50 MCG
SPRAY, SUSPENSION (ML) NASAL
Qty: 16 ML | Refills: 0 | Status: SHIPPED | OUTPATIENT
Start: 2019-07-11 | End: 2019-08-19 | Stop reason: SDUPTHER

## 2019-07-11 RX ORDER — MONTELUKAST SODIUM 10 MG/1
TABLET ORAL
Qty: 30 TABLET | Refills: 0 | Status: SHIPPED | OUTPATIENT
Start: 2019-07-11 | End: 2019-08-19 | Stop reason: SDUPTHER

## 2019-07-17 NOTE — PROGRESS NOTES
Subjective:  Randall Hernandez is a 48 y.o. male who presents for       Patient Active Problem List   Diagnosis   • Hyperlipidemia   • Inattention   • Non morbid obesity   • Prediabetes   • Attention deficit hyperactivity disorder (ADHD), combined type   • Need for vaccination   • Gastroesophageal reflux disease   • Encounter for drug screening   • Tingling in extremities   • Elevated BP without diagnosis of hypertension   • Allergic rhinitis   • Elevated blood pressure reading   • Pruritic rash   • Numbness in both hands   • Bilateral elbow joint pain   • Pain in both wrists   • Ulnar neuropathy of left upper extremity   • General medical examination   • Bilateral carpal tunnel syndrome   • History of prediabetes   • Numbness and tingling in both hands   • Essential hypertension   • Cerumen debris on tympanic membrane of both ears   • Carpal tunnel syndrome   • S/P carpal tunnel release   • Encounter for screening for malignant neoplasm of colon   • FH: colon cancer   • Acute recurrent sinusitis   • Attention deficit hyperactivity disorder (ADHD), predominantly inattentive type   • Class 2 obesity with body mass index (BMI) of 35.0 to 35.9 in adult   • Annual physical exam   • Arthritis of back   • Scoliosis   • Hyponatremia   • Hypochloremia           Current Outpatient Medications:   •  amphetamine-dextroamphetamine XR (ADDERALL XR) 25 MG 24 hr capsule, Take 1 capsule by mouth Every Morning Take 1 tablet by mouth daily, Disp: 30 capsule, Rfl: 0  •  aspirin 81 MG EC tablet, Take 1 tablet by mouth Daily., Disp: 30 tablet, Rfl: 3  •  atorvastatin (LIPITOR) 40 MG tablet, Take 1 tablet by mouth Daily., Disp: 30 tablet, Rfl: 3  •  cyclobenzaprine (FLEXERIL) 5 MG tablet, Take 1 tablet by mouth 3 (Three) Times a Day As Needed for Muscle Spasms., Disp: 30 tablet, Rfl: 3  •  fluticasone (FLONASE) 50 MCG/ACT nasal spray, INSTILL 2 SPRAYS IN EACH NOSTRIL EVERY DAY, Disp: 16 mL, Rfl: 0  •  lisinopril (PRINIVIL,ZESTRIL) 10  MG tablet, Take 1 tablet by mouth Daily., Disp: 30 tablet, Rfl: 3  •  meloxicam (MOBIC) 7.5 MG tablet, Take 1 tablet by mouth Daily., Disp: 30 tablet, Rfl: 3  •  montelukast (SINGULAIR) 10 MG tablet, TAKE 1 TABLET BY MOUTH EVERY NIGHT, Disp: 30 tablet, Rfl: 0  •  omeprazole (priLOSEC) 40 MG capsule, TAKE ONE CAPSULE BY MOUTH EVERY DAY ON AN EMPTY STOMACH, Disp: 30 capsule, Rfl: 0  •  vitamin D (ERGOCALCIFEROL) 39166 units capsule capsule, Take 1 capsule by mouth 1 (One) Time Per Week., Disp: 4 capsule, Rfl: 3    HPI        5/8/19 pt is here for recheck on back pain and ADHD,.  He has been doing PT/OT for back pian. X-ray of back showed mild scoliosis and mild degenerative changeso of lumbar spine. Also has degenerative changes of thoracic spine.  His back pain is improved with PT/OT  And pain is 0/10 on severity. He takes mobic and flexeril as needed.  He lost 4 lbs since last visit.  Energy is improved     6/14/19 pt is here for recheck and needs refills on ADHD medication doing well on Adderall XR. Pt doing well on medication. He gained 2 lbs since last visit. He has been busy.  Pt states his hands is doing okay. Has occasional numbness. No chest pain no dizziness.  He gained 8 lbs since  May 2019.        Obesity   This is a chronic problem. The current episode started more than 1 year ago. The problem occurs constantly. The problem has been unchanged. Associated symptoms include arthralgias and numbness. Pertinent negatives include no abdominal pain, anorexia, change in bowel habit, chest pain, chills, congestion, coughing, diaphoresis, fatigue, fever, headaches, joint swelling, myalgias, nausea, rash, sore throat, swollen glands, urinary symptoms, vertigo, visual change, vomiting or weakness. Nothing aggravates the symptoms. He has tried nothing for the symptoms. The treatment provided no relief.   Mental Health Problem   The primary symptoms do not include dysphoric mood, delusions, hallucinations, bizarre  behavior, disorganized speech, negative symptoms or somatic symptoms. This is a chronic problem.   The degree of incapacity that he is experiencing as a consequence of his illness is mild. Additional symptoms of the illness include attention impairment. Additional symptoms of the illness do not include anhedonia, insomnia, hypersomnia, appetite change, unexpected weight change, fatigue, agitation, psychomotor retardation, feelings of worthlessness, euphoric mood, increased goal-directed activity, flight of ideas, inflated self-esteem, decreased need for sleep, distractible, poor judgment, visual change, headaches or abdominal pain. He does not admit to suicidal ideas. He does not have a plan to commit suicide. He does not contemplate injuring another person.   Back Pain   This is a chronic problem. The current episode started more than 1 month ago. The problem occurs constantly. The problem is unchanged. The pain is present in the lumbar spine. The quality of the pain is described as aching. The pain is at a severity of 5/10. The symptoms are aggravated by lying down, position, sitting and standing. Associated symptoms include numbness. Pertinent negatives include no abdominal pain, bladder incontinence, bowel incontinence, chest pain, dysuria, fever, headaches, leg pain, paresis, paresthesias, pelvic pain, perianal numbness, tingling or weakness. He has tried nothing for the symptoms. The treatment provided no relief.    Wrist Pain    The pain is present in the left elbow, left wrist, right wrist and right elbow. This is a chronic problem. The current episode started more than 1 month ago. The problem occurs constantly. The problem has been gradually worsening. The quality of the pain is described as aching. The pain is at a severity of 9/10. The pain is moderate. Associated symptoms include joint locking, a limited range of motion, numbness, stiffness and tingling. Pertinent negatives include no fever, inability  to bear weight, itching or joint swelling. The symptoms are aggravated by activity. He has tried NSAIDS for the symptoms. The treatment provided no relief. Family history does not include gout or rheumatoid arthritis. There is no history of diabetes, gout, osteoarthritis or rheumatoid arthritis.   Elbow Injury   This is a chronic problem. The current episode started more than 1 year ago. The problem occurs constantly. The problem has been gradually worsening. Associated symptoms include arthralgias, joint swelling, numbness, a rash and weakness. Pertinent negatives include no abdominal pain, anorexia, change in bowel habit, chest pain, chills, congestion, coughing, diaphoresis, fatigue, fever, headaches, myalgias, nausea, neck pain, sore throat, swollen glands, urinary symptoms, vertigo, visual change or vomiting. The symptoms are aggravated by bending. He has tried NSAIDs for the symptoms. The treatment provided mild relief.   Hypertension   This is a new problem. The current episode started more than 1 month ago. The problem has been gradually worsening since onset. The problem is uncontrolled. Pertinent negatives include no anxiety, blurred vision, chest pain, headaches, malaise/fatigue, neck pain, orthopnea, palpitations, peripheral edema, PND, shortness of breath or sweats. Risk factors for coronary artery disease include male gender, obesity and sedentary lifestyle. Past treatments include nothing. There are no compliance problems    Review of Systems  Review of Systems   Constitutional: Positive for activity change and fatigue. Negative for appetite change, chills, diaphoresis and fever.   HENT: Negative for congestion, postnasal drip, rhinorrhea, sinus pressure, sinus pain, sneezing, sore throat, trouble swallowing and voice change.    Respiratory: Negative for cough, choking, chest tightness, shortness of breath, wheezing and stridor.    Cardiovascular: Negative for chest pain.   Gastrointestinal: Negative  for diarrhea, nausea and vomiting.   Musculoskeletal: Positive for arthralgias.   Neurological: Negative for weakness and headaches.   Psychiatric/Behavioral: Positive for decreased concentration.       Patient Active Problem List   Diagnosis   • Hyperlipidemia   • Inattention   • Non morbid obesity   • Prediabetes   • Attention deficit hyperactivity disorder (ADHD), combined type   • Need for vaccination   • Gastroesophageal reflux disease   • Encounter for drug screening   • Tingling in extremities   • Elevated BP without diagnosis of hypertension   • Allergic rhinitis   • Elevated blood pressure reading   • Pruritic rash   • Numbness in both hands   • Bilateral elbow joint pain   • Pain in both wrists   • Ulnar neuropathy of left upper extremity   • General medical examination   • Bilateral carpal tunnel syndrome   • History of prediabetes   • Numbness and tingling in both hands   • Essential hypertension   • Cerumen debris on tympanic membrane of both ears   • Carpal tunnel syndrome   • S/P carpal tunnel release   • Encounter for screening for malignant neoplasm of colon   • FH: colon cancer   • Acute recurrent sinusitis   • Attention deficit hyperactivity disorder (ADHD), predominantly inattentive type   • Class 2 obesity with body mass index (BMI) of 35.0 to 35.9 in adult   • Annual physical exam   • Arthritis of back   • Scoliosis   • Hyponatremia   • Hypochloremia     Past Surgical History:   Procedure Laterality Date   • CARPAL TUNNEL RELEASE Right 11/2/2018    Procedure: CARPAL TUNNEL RELEASE;  Surgeon: Judd Mathias MD;  Location: [facility] OR;  Service: Orthopedics   • CARPAL TUNNEL RELEASE  12/07/2018    Left   • CARPAL TUNNEL RELEASE Left 12/7/2018    Procedure: CARPAL TUNNEL RELEASE;  Surgeon: Judd Mathias MD;  Location: [facility] OR;  Service: Orthopedics   • COLONOSCOPY N/A 1/18/2019    Procedure: COLONOSCOPY a friday please ;  Surgeon: Ruslan Bowers MD;  Location: [facility] ENDOSCOPY;   Service: Gastroenterology   • ENDOSCOPY     • PYLOROMYOTOMY       Social History     Socioeconomic History   • Marital status: Single     Spouse name: Not on file   • Number of children: Not on file   • Years of education: Not on file   • Highest education level: Not on file   Occupational History   • Occupation:      Employer: SELF-EMPLOYED     Comment: Patient resides with Addis Rodriguez.   Tobacco Use   • Smoking status: Former Smoker     Packs/day: 1.00     Years: 6.00     Pack years: 6.00     Types: Cigarettes     Last attempt to quit:      Years since quittin.5   • Smokeless tobacco: Never Used   Substance and Sexual Activity   • Alcohol use: Yes     Comment: 2018 - Kevin reports consumption of alcoholic beverages under a social basis (approximately 3 - 4 per month).     • Drug use: No   • Sexual activity: Defer     Family History   Problem Relation Age of Onset   • Colon cancer Mother         onset age greater than 75y   • Diabetes Mother    • Breast cancer Sister    • Cancer Brother         bladder   • Diabetes Brother    • Hyperlipidemia Brother      Lab on 2019   Component Date Value Ref Range Status   • WBC 2019 5.25  3.40 - 10.80 10*3/mm3 Final   • RBC 2019 4.77  4.14 - 5.80 10*6/mm3 Final   • Hemoglobin 2019 14.2  13.0 - 17.7 g/dL Final   • Hematocrit 2019 43.6  37.5 - 51.0 % Final   • MCV 2019 91.4  79.0 - 97.0 fL Final   • MCH 2019 29.8  26.6 - 33.0 pg Final   • MCHC 2019 32.6  31.5 - 35.7 g/dL Final   • RDW 2019 13.1  12.3 - 15.4 % Final   • RDW-SD 2019 44.4  37.0 - 54.0 fl Final   • MPV 2019 9.9  6.0 - 12.0 fL Final   • Platelets 2019 306  140 - 450 10*3/mm3 Final   • Neutrophil % 2019 48.4  42.7 - 76.0 % Final   • Lymphocyte % 2019 38.1  19.6 - 45.3 % Final   • Monocyte % 2019 8.4  5.0 - 12.0 % Final   • Eosinophil % 2019 3.4  0.3 - 6.2 % Final   • Basophil %  04/18/2019 1.3  0.0 - 1.5 % Final   • Immature Grans % 04/18/2019 0.4  0.0 - 0.5 % Final   • Neutrophils, Absolute 04/18/2019 2.54  1.40 - 7.00 10*3/mm3 Final   • Lymphocytes, Absolute 04/18/2019 2.00  0.70 - 3.10 10*3/mm3 Final   • Monocytes, Absolute 04/18/2019 0.44  0.10 - 0.90 10*3/mm3 Final   • Eosinophils, Absolute 04/18/2019 0.18  0.00 - 0.40 10*3/mm3 Final   • Basophils, Absolute 04/18/2019 0.07  0.00 - 0.20 10*3/mm3 Final   • Immature Grans, Absolute 04/18/2019 0.02  0.00 - 0.05 10*3/mm3 Final   • nRBC 04/18/2019 0.0  0.0 - 0.0 /100 WBC Final   • Glucose 04/18/2019 104* 65 - 99 mg/dL Final   • BUN 04/18/2019 11  6 - 20 mg/dL Final   • Creatinine 04/18/2019 1.07  0.76 - 1.27 mg/dL Final   • Sodium 04/18/2019 134* 136 - 145 mmol/L Final   • Potassium 04/18/2019 4.5  3.5 - 5.2 mmol/L Final   • Chloride 04/18/2019 96* 98 - 107 mmol/L Final   • CO2 04/18/2019 22.8  22.0 - 29.0 mmol/L Final   • Calcium 04/18/2019 8.9  8.6 - 10.5 mg/dL Final   • Total Protein 04/18/2019 7.6  6.0 - 8.5 g/dL Final   • Albumin 04/18/2019 4.50  3.50 - 5.20 g/dL Final   • ALT (SGPT) 04/18/2019 30  1 - 41 U/L Final   • AST (SGOT) 04/18/2019 28  1 - 40 U/L Final   • Alkaline Phosphatase 04/18/2019 110  39 - 117 U/L Final   • Total Bilirubin 04/18/2019 0.6  0.2 - 1.2 mg/dL Final   • eGFR Non African Amer 04/18/2019 73  >60 mL/min/1.73 Final   • Globulin 04/18/2019 3.1  gm/dL Final   • A/G Ratio 04/18/2019 1.5  g/dL Final   • BUN/Creatinine Ratio 04/18/2019 10.3  7.0 - 25.0 Final   • Anion Gap 04/18/2019 15.2  mmol/L Final   • Total Cholesterol 04/18/2019 174  0 - 200 mg/dL Final   • Triglycerides 04/18/2019 91  0 - 150 mg/dL Final   • HDL Cholesterol 04/18/2019 41  40 - 60 mg/dL Final   • LDL Cholesterol  04/18/2019 115* 0 - 100 mg/dL Final   • VLDL Cholesterol 04/18/2019 18.2  5 - 40 mg/dL Final   • LDL/HDL Ratio 04/18/2019 2.80   Final   • 25 Hydroxy, Vitamin D 04/18/2019 27.9* 30.0 - 100.0 ng/ml Final   • Amphet/Methamphet, Screen  04/18/2019 Positive* Negative Final   • Barbiturates Screen, Urine 04/18/2019 Negative  Negative Final   • Benzodiazepine Screen, Urine 04/18/2019 Negative  Negative Final   • Cocaine Screen, Urine 04/18/2019 Negative  Negative Final   • Opiate Screen 04/18/2019 Negative  Negative Final   • THC, Screen, Urine 04/18/2019 Negative  Negative Final   • Methadone Screen, Urine 04/18/2019 Negative  Negative Final   • Oxycodone Screen, Urine 04/18/2019 Negative  Negative Final   Lab on 01/28/2019   Component Date Value Ref Range Status   • WBC 01/28/2019 6.11  3.20 - 9.80 10*3/mm3 Final   • RBC 01/28/2019 4.75  4.37 - 5.74 10*6/mm3 Final   • Hemoglobin 01/28/2019 14.4  13.7 - 17.3 g/dL Final   • Hematocrit 01/28/2019 43.4  39.0 - 49.0 % Final   • MCV 01/28/2019 91.4  80.0 - 98.0 fL Final   • MCH 01/28/2019 30.3  26.5 - 34.0 pg Final   • MCHC 01/28/2019 33.2  31.5 - 36.3 g/dL Final   • RDW 01/28/2019 12.9  11.5 - 14.5 % Final   • RDW-SD 01/28/2019 43.1  35.1 - 43.9 fl Final   • MPV 01/28/2019 9.4  8.0 - 12.0 fL Final   • Platelets 01/28/2019 288  150 - 450 10*3/mm3 Final   • Neutrophil % 01/28/2019 50.2  37.0 - 80.0 % Final   • Lymphocyte % 01/28/2019 38.3  10.0 - 50.0 % Final   • Monocyte % 01/28/2019 6.4  0.0 - 12.0 % Final   • Eosinophil % 01/28/2019 4.1  0.0 - 7.0 % Final   • Basophil % 01/28/2019 0.7  0.0 - 2.0 % Final   • Immature Grans % 01/28/2019 0.3  0.0 - 0.5 % Final   • Neutrophils, Absolute 01/28/2019 3.07  2.00 - 8.60 10*3/mm3 Final   • Lymphocytes, Absolute 01/28/2019 2.34  0.60 - 4.20 10*3/mm3 Final   • Monocytes, Absolute 01/28/2019 0.39  0.00 - 0.90 10*3/mm3 Final   • Eosinophils, Absolute 01/28/2019 0.25  0.00 - 0.70 10*3/mm3 Final   • Basophils, Absolute 01/28/2019 0.04  0.00 - 0.20 10*3/mm3 Final   • Immature Grans, Absolute 01/28/2019 0.02  0.00 - 0.02 10*3/mm3 Final   • Glucose 01/28/2019 118* 60 - 100 mg/dL Final   • BUN 01/28/2019 12  7 - 21 mg/dL Final   • Creatinine 01/28/2019 0.96  0.70 - 1.30  mg/dL Final   • Sodium 01/28/2019 139  137 - 145 mmol/L Final   • Potassium 01/28/2019 4.7  3.5 - 5.1 mmol/L Final   • Chloride 01/28/2019 103  95 - 110 mmol/L Final   • CO2 01/28/2019 28.0  22.0 - 31.0 mmol/L Final   • Calcium 01/28/2019 9.7  8.4 - 10.2 mg/dL Final   • Total Protein 01/28/2019 6.8  6.3 - 8.6 g/dL Final   • Albumin 01/28/2019 4.10  3.40 - 4.80 g/dL Final   • ALT (SGPT) 01/28/2019 42  21 - 72 U/L Final   • AST (SGOT) 01/28/2019 29  17 - 59 U/L Final   • Alkaline Phosphatase 01/28/2019 107  38 - 126 U/L Final   • Total Bilirubin 01/28/2019 0.4  0.2 - 1.3 mg/dL Final   • eGFR Non African Amer 01/28/2019 83  56 - 130 mL/min/1.73 Final   • Globulin 01/28/2019 2.7  2.3 - 3.5 gm/dL Final   • A/G Ratio 01/28/2019 1.5  1.1 - 1.8 g/dL Final   • BUN/Creatinine Ratio 01/28/2019 12.5  7.0 - 25.0 Final   • Anion Gap 01/28/2019 8.0  5.0 - 15.0 mmol/L Final   • Total Cholesterol 01/28/2019 168  0 - 199 mg/dL Final   • Triglycerides 01/28/2019 124  20 - 199 mg/dL Final   • HDL Cholesterol 01/28/2019 38* 60 - 200 mg/dL Final   • LDL Cholesterol  01/28/2019 109  1 - 129 mg/dL Final   • LDL/HDL Ratio 01/28/2019 2.77  0.00 - 3.55 Final   • Amphetamine Screen, Urine 01/28/2019 Positive* Negative Final   • Barbiturates Screen, Urine 01/28/2019 Negative  Negative Final   • Benzodiazepine Screen, Urine 01/28/2019 Negative  Negative Final   • Cocaine Screen, Urine 01/28/2019 Negative  Negative Final   • Methadone Screen, Urine 01/28/2019 Negative  Negative Final   • Opiate Screen 01/28/2019 Negative  Negative Final   • Oxycodone Screen, Urine 01/28/2019 Negative  Negative Final   • THC, Screen, Urine 01/28/2019 Negative  Negative Final      XR Spine Lumbar 4+ View  Narrative: Five-view lumbar spine    HISTORY: Back pain. Lumbago with bilateral sciatica.    AP, lateral and oblique radiographs of the lumbar spine and spot  film of the lumbosacral junction were obtained.    COMPARISON: None.    Mild levoscoliosis.  Congenitally  "short pedicles.  Minimal degenerative changes with small marginal osteophytes.  There is a normal lordosis.   Vertebral height and alignment are maintained.  No fracture.  The pedicles are intact.  Impression: CONCLUSION:  Mild levoscoliosis.  Congenitally short pedicles.  Minimal degenerative changes with small marginal osteophytes.    [id number]    Electronically signed by:  Anthony Cm MD  4/8/2019 10:21 AM CDT  Workstation: [employer]  XR Spine Thoracic 3 View  Narrative: Three-view thoracic spine.    HISTORY: Back pain    AP, lateral and swimmer's views of the thoracic spine were  obtained.    COMPARISON: None.    Normal thoracic kyphosis.  Vertebral height and alignment maintained.  Mild degenerative changes.  No fracture.  The pedicles are intact.  No paraspinal widening.  Impression: CONCLUSION:   Mild degenerative changes.    [id number]    Electronically signed by:  Anthony Cm MD  4/8/2019 10:19 AM CDT  Workstation: [employer] [place]    [unfilled]  Immunization History   Administered Date(s) Administered   • Influenza TIV (IM) 03/28/2017   • Tdap 02/17/2017       The following portions of the patient's history were reviewed and updated as appropriate: allergies, current medications, past family history, past medical history, past social history, past surgical history and problem list.        Physical Exam  /82 (BP Location: Right arm, Patient Position: Sitting, Cuff Size: Adult)   Pulse 79   Temp 97.9 °F (36.6 °C) (Oral)   Ht 180.3 cm (71\")   Wt 117 kg (257 lb)   SpO2 96%   BMI 35.84 kg/m²     Physical Exam   Constitutional: He is oriented to person, place, and time. He appears well-developed and well-nourished.   HENT:   Head: Normocephalic and atraumatic.   Right Ear: External ear normal.   Eyes: Conjunctivae and EOM are normal. Pupils are equal, round, and reactive to light.   Neck: Normal range of motion. Neck supple.   Cardiovascular: Normal rate, regular rhythm and normal heart sounds.   No " murmur heard.  Pulmonary/Chest: Effort normal and breath sounds normal. No respiratory distress.   Abdominal: Soft. Bowel sounds are normal. He exhibits no distension. There is no tenderness.   Obese abdomen    Musculoskeletal: Normal range of motion. He exhibits no edema or deformity.   Neurological: He is alert and oriented to person, place, and time. No cranial nerve deficit.   Skin: Skin is warm. No rash noted. He is not diaphoretic. No erythema. No pallor.   Psychiatric: He has a normal mood and affect. His behavior is normal.   Nursing note and vitals reviewed.      Assessment/Plan    Diagnosis Plan   1. Attention deficit hyperactivity disorder (ADHD), predominantly inattentive type     2. Prediabetes     3. S/P carpal tunnel release     4. Hyperlipidemia, unspecified hyperlipidemia type     5. History of prediabetes     6. Non morbid obesity     7. Class 2 obesity with body mass index (BMI) of 35.0 to 35.9 in adult, unspecified obesity type, unspecified whether serious comorbidity present     8. Allergic rhinitis, unspecified seasonality, unspecified trigger     9. Essential hypertension     10. Hypochloremia     11. Hyponatremia             -will get labwork  -hypochloremia/hypontremia - check CMP.    -recommend pt go back on aspirin and lipitor  -for back pain with sciatica/scoliosis - pain is better after PT/OT. Continue with mobic and flexeril PRN.  Consider MRI if not improving   - pt doing well postop on carpal tunnel release on right hand   -for cerumen of both ears - debrox ointment PRN. Drug information provided  -for carpal tunnel he is doing better post op  he has failed conservative treatment for >3 months. . He has yet to get surgery on left  Hand   -vitamin D pill once a week   -HLP - lipitor 40 mg PO qhs   -  BP was at goal Continue  on lisionpril 10 mg PO q daily drug information provided low salt diet informaiton provided. Side effects discussed BP better controlled today  - refilled ADHD  medication Adderall XR 25 mg PO q daily for ADHD.  [facility] ref number [id number]  - for allergic rhinitis - continue singulair and flonase and will add zyrtec.  -H/O prediabetes - D/C  metformin, recent hga1c normal at 5.4   - hyperlipidemia -continue statin. Recheck lipid panel. Pt on aspirin   - obesity - recommend ketogenic diet along with intermittent fasting. Pt has lost 3 lbs since last visit   -recheck in 1 month for followup         This document has been electronically signed by Scottie Borges MD on July 19, 2019 9:05 AM                  This document has been electronically signed by Scottie Borges MD on July 19, 2019 9:05 AM

## 2019-07-19 ENCOUNTER — OFFICE VISIT (OUTPATIENT)
Dept: FAMILY MEDICINE CLINIC | Facility: CLINIC | Age: 49
End: 2019-07-19

## 2019-07-19 VITALS
BODY MASS INDEX: 35.98 KG/M2 | TEMPERATURE: 97.9 F | OXYGEN SATURATION: 96 % | HEIGHT: 71 IN | WEIGHT: 257 LBS | DIASTOLIC BLOOD PRESSURE: 82 MMHG | HEART RATE: 79 BPM | SYSTOLIC BLOOD PRESSURE: 124 MMHG

## 2019-07-19 DIAGNOSIS — E87.1 HYPONATREMIA: ICD-10-CM

## 2019-07-19 DIAGNOSIS — E78.5 HYPERLIPIDEMIA, UNSPECIFIED HYPERLIPIDEMIA TYPE: ICD-10-CM

## 2019-07-19 DIAGNOSIS — E87.8 HYPOCHLOREMIA: ICD-10-CM

## 2019-07-19 DIAGNOSIS — E66.9 NON MORBID OBESITY: ICD-10-CM

## 2019-07-19 DIAGNOSIS — Z98.890 S/P CARPAL TUNNEL RELEASE: ICD-10-CM

## 2019-07-19 DIAGNOSIS — F90.0 ATTENTION DEFICIT HYPERACTIVITY DISORDER (ADHD), PREDOMINANTLY INATTENTIVE TYPE: Primary | ICD-10-CM

## 2019-07-19 DIAGNOSIS — I10 ESSENTIAL HYPERTENSION: ICD-10-CM

## 2019-07-19 DIAGNOSIS — R73.03 PREDIABETES: ICD-10-CM

## 2019-07-19 DIAGNOSIS — Z87.898 HISTORY OF PREDIABETES: ICD-10-CM

## 2019-07-19 DIAGNOSIS — J30.9 ALLERGIC RHINITIS, UNSPECIFIED SEASONALITY, UNSPECIFIED TRIGGER: ICD-10-CM

## 2019-07-19 DIAGNOSIS — E66.9 CLASS 2 OBESITY WITH BODY MASS INDEX (BMI) OF 35.0 TO 35.9 IN ADULT, UNSPECIFIED OBESITY TYPE, UNSPECIFIED WHETHER SERIOUS COMORBIDITY PRESENT: ICD-10-CM

## 2019-07-19 DIAGNOSIS — Z02.83 ENCOUNTER FOR DRUG SCREENING: ICD-10-CM

## 2019-07-19 PROCEDURE — 99214 OFFICE O/P EST MOD 30 MIN: CPT | Performed by: FAMILY MEDICINE

## 2019-07-19 RX ORDER — DEXTROAMPHETAMINE SACCHARATE, AMPHETAMINE ASPARTATE MONOHYDRATE, DEXTROAMPHETAMINE SULFATE AND AMPHETAMINE SULFATE 6.25; 6.25; 6.25; 6.25 MG/1; MG/1; MG/1; MG/1
25 CAPSULE, EXTENDED RELEASE ORAL EVERY MORNING
Qty: 30 CAPSULE | Refills: 0 | Status: SHIPPED | OUTPATIENT
Start: 2019-07-19 | End: 2019-08-19 | Stop reason: SDUPTHER

## 2019-08-12 ENCOUNTER — TELEPHONE (OUTPATIENT)
Dept: FAMILY MEDICINE CLINIC | Facility: CLINIC | Age: 49
End: 2019-08-12

## 2019-08-12 RX ORDER — OMEPRAZOLE 40 MG/1
40 CAPSULE, DELAYED RELEASE ORAL DAILY
Qty: 30 CAPSULE | Refills: 3 | Status: SHIPPED | OUTPATIENT
Start: 2019-08-12 | End: 2020-02-03

## 2019-08-15 NOTE — PROGRESS NOTES
Subjective:  Randall Hernandez is a 48 y.o. male who presents for       Patient Active Problem List   Diagnosis   • Hyperlipidemia   • Inattention   • Non morbid obesity   • Prediabetes   • Attention deficit hyperactivity disorder (ADHD), combined type   • Need for vaccination   • Gastroesophageal reflux disease   • Encounter for drug screening   • Tingling in extremities   • Elevated BP without diagnosis of hypertension   • Allergic rhinitis   • Elevated blood pressure reading   • Pruritic rash   • Numbness in both hands   • Bilateral elbow joint pain   • Pain in both wrists   • Ulnar neuropathy of left upper extremity   • General medical examination   • Bilateral carpal tunnel syndrome   • History of prediabetes   • Numbness and tingling in both hands   • Essential hypertension   • Cerumen debris on tympanic membrane of both ears   • Carpal tunnel syndrome   • S/P carpal tunnel release   • Encounter for screening for malignant neoplasm of colon   • FH: colon cancer   • Acute recurrent sinusitis   • Attention deficit hyperactivity disorder (ADHD), predominantly inattentive type   • Class 2 obesity with body mass index (BMI) of 35.0 to 35.9 in adult   • Annual physical exam   • Arthritis of back   • Scoliosis   • Hyponatremia   • Hypochloremia           Current Outpatient Medications:   •  amphetamine-dextroamphetamine XR (ADDERALL XR) 25 MG 24 hr capsule, Take 1 capsule by mouth Every Morning Take 1 tablet by mouth daily, Disp: 30 capsule, Rfl: 0  •  aspirin 81 MG EC tablet, Take 1 tablet by mouth Daily., Disp: 30 tablet, Rfl: 3  •  atorvastatin (LIPITOR) 40 MG tablet, Take 1 tablet by mouth Daily., Disp: 30 tablet, Rfl: 3  •  cyclobenzaprine (FLEXERIL) 5 MG tablet, Take 1 tablet by mouth 3 (Three) Times a Day As Needed for Muscle Spasms., Disp: 30 tablet, Rfl: 3  •  fluticasone (FLONASE) 50 MCG/ACT nasal spray, INSTILL 2 SPRAYS IN EACH NOSTRIL EVERY DAY, Disp: 16 mL, Rfl: 0  •  lisinopril (PRINIVIL,ZESTRIL) 10  MG tablet, Take 1 tablet by mouth Daily., Disp: 30 tablet, Rfl: 3  •  meloxicam (MOBIC) 7.5 MG tablet, Take 1 tablet by mouth Daily., Disp: 30 tablet, Rfl: 3  •  montelukast (SINGULAIR) 10 MG tablet, TAKE 1 TABLET BY MOUTH EVERY NIGHT, Disp: 30 tablet, Rfl: 0  •  omeprazole (priLOSEC) 40 MG capsule, Take 1 capsule by mouth Daily., Disp: 30 capsule, Rfl: 3  •  vitamin D (ERGOCALCIFEROL) 26672 units capsule capsule, Take 1 capsule by mouth 1 (One) Time Per Week., Disp: 4 capsule, Rfl: 3    HPI     5/8/19 pt is here for recheck on back pain and ADHD,.  He has been doing PT/OT for back pian. X-ray of back showed mild scoliosis and mild degenerative changeso of lumbar spine. Also has degenerative changes of thoracic spine.  His back pain is improved with PT/OT  And pain is 0/10 on severity. He takes mobic and flexeril as needed.  He lost 4 lbs since last visit.  Energy is improved     6/14/19 pt is here for recheck and needs refills on ADHD medication doing well on Adderall XR. Pt doing well on medication. He gained 2 lbs since last visit. He has been busy.  Pt states his hands is doing okay. Has occasional numbness. No chest pain no dizziness.  He gained 8 lbs since  May 2019.         8/19/19 pt is here for recheck and followup.  Needs refill on ADHD medication. Doing well on Adderall XR 25 mg daily . Had yet to do labwork.  BP on higher side today. He gained more than >10 lbs since May 2019.       Obesity   This is a chronic problem. The current episode started more than 1 year ago. The problem occurs constantly. The problem has been unchanged. Associated symptoms include arthralgias and numbness. Pertinent negatives include no abdominal pain, anorexia, change in bowel habit, chest pain, chills, congestion, coughing, diaphoresis, fatigue, fever, headaches, joint swelling, myalgias, nausea, rash, sore throat, swollen glands, urinary symptoms, vertigo, visual change, vomiting or weakness. Nothing aggravates the symptoms.  He has tried nothing for the symptoms. The treatment provided no relief.   Mental Health Problem   The primary symptoms do not include dysphoric mood, delusions, hallucinations, bizarre behavior, disorganized speech, negative symptoms or somatic symptoms. This is a chronic problem.   The degree of incapacity that he is experiencing as a consequence of his illness is mild. Additional symptoms of the illness include attention impairment. Additional symptoms of the illness do not include anhedonia, insomnia, hypersomnia, appetite change, unexpected weight change, fatigue, agitation, psychomotor retardation, feelings of worthlessness, euphoric mood, increased goal-directed activity, flight of ideas, inflated self-esteem, decreased need for sleep, distractible, poor judgment, visual change, headaches or abdominal pain. He does not admit to suicidal ideas. He does not have a plan to commit suicide. He does not contemplate injuring another person.   Back Pain   This is a chronic problem. The current episode started more than 1 month ago. The problem occurs constantly. The problem is unchanged. The pain is present in the lumbar spine. The quality of the pain is described as aching. The pain is at a severity of 5/10. The symptoms are aggravated by lying down, position, sitting and standing. Associated symptoms include numbness. Pertinent negatives include no abdominal pain, bladder incontinence, bowel incontinence, chest pain, dysuria, fever, headaches, leg pain, paresis, paresthesias, pelvic pain, perianal numbness, tingling or weakness. He has tried nothing for the symptoms. The treatment provided no relief.    Wrist Pain    The pain is present in the left elbow, left wrist, right wrist and right elbow. This is a chronic problem. The current episode started more than 1 month ago. The problem occurs constantly. The problem has been gradually worsening. The quality of the pain is described as aching. The pain is at a  severity of 9/10. The pain is moderate. Associated symptoms include joint locking, a limited range of motion, numbness, stiffness and tingling. Pertinent negatives include no fever, inability to bear weight, itching or joint swelling. The symptoms are aggravated by activity. He has tried NSAIDS for the symptoms. The treatment provided no relief. Family history does not include gout or rheumatoid arthritis. There is no history of diabetes, gout, osteoarthritis or rheumatoid arthritis.   Elbow Injury   This is a chronic problem. The current episode started more than 1 year ago. The problem occurs constantly. The problem has been gradually worsening. Associated symptoms include arthralgias, joint swelling, numbness, a rash and weakness. Pertinent negatives include no abdominal pain, anorexia, change in bowel habit, chest pain, chills, congestion, coughing, diaphoresis, fatigue, fever, headaches, myalgias, nausea, neck pain, sore throat, swollen glands, urinary symptoms, vertigo, visual change or vomiting. The symptoms are aggravated by bending. He has tried NSAIDs for the symptoms. The treatment provided mild relief.   Hypertension   This is a new problem. The current episode started more than 1 month ago. The problem has been gradually worsening since onset. The problem is uncontrolled. Pertinent negatives include no anxiety, blurred vision, chest pain, headaches, malaise/fatigue, neck pain, orthopnea, palpitations, peripheral edema, PND, shortness of breath or sweats. Risk factors for coronary artery disease include male gender, obesity and sedentary lifestyle. Past treatments include nothing. There are no compliance problems    Review of Systems  Review of Systems   Constitutional: Positive for activity change and fatigue. Negative for appetite change, chills, diaphoresis and fever.   HENT: Negative for congestion, postnasal drip, rhinorrhea, sinus pressure, sinus pain, sneezing, sore throat, trouble swallowing and  voice change.    Respiratory: Negative for cough, choking, chest tightness, shortness of breath, wheezing and stridor.    Cardiovascular: Negative for chest pain.   Gastrointestinal: Negative for diarrhea, nausea and vomiting.   Neurological: Positive for numbness. Negative for weakness and headaches.   Psychiatric/Behavioral: Positive for decreased concentration.       Patient Active Problem List   Diagnosis   • Hyperlipidemia   • Inattention   • Non morbid obesity   • Prediabetes   • Attention deficit hyperactivity disorder (ADHD), combined type   • Need for vaccination   • Gastroesophageal reflux disease   • Encounter for drug screening   • Tingling in extremities   • Elevated BP without diagnosis of hypertension   • Allergic rhinitis   • Elevated blood pressure reading   • Pruritic rash   • Numbness in both hands   • Bilateral elbow joint pain   • Pain in both wrists   • Ulnar neuropathy of left upper extremity   • General medical examination   • Bilateral carpal tunnel syndrome   • History of prediabetes   • Numbness and tingling in both hands   • Essential hypertension   • Cerumen debris on tympanic membrane of both ears   • Carpal tunnel syndrome   • S/P carpal tunnel release   • Encounter for screening for malignant neoplasm of colon   • FH: colon cancer   • Acute recurrent sinusitis   • Attention deficit hyperactivity disorder (ADHD), predominantly inattentive type   • Class 2 obesity with body mass index (BMI) of 35.0 to 35.9 in adult   • Annual physical exam   • Arthritis of back   • Scoliosis   • Hyponatremia   • Hypochloremia     Past Surgical History:   Procedure Laterality Date   • CARPAL TUNNEL RELEASE Right 11/2/2018    Procedure: CARPAL TUNNEL RELEASE;  Surgeon: Judd Mathias MD;  Location: [facility];  Service: Orthopedics   • CARPAL TUNNEL RELEASE  12/07/2018    Left   • CARPAL TUNNEL RELEASE Left 12/7/2018    Procedure: CARPAL TUNNEL RELEASE;  Surgeon: Judd Mathias MD;  Location:  [facility] OR;  Service: Orthopedics   • COLONOSCOPY N/A 2019    Procedure: COLONOSCOPY a friday please ;  Surgeon: Ruslan Bowers MD;  Location: [facility] ENDOSCOPY;  Service: Gastroenterology   • ENDOSCOPY     • PYLOROMYOTOMY       Social History     Socioeconomic History   • Marital status: Single     Spouse name: Not on file   • Number of children: Not on file   • Years of education: Not on file   • Highest education level: Not on file   Occupational History   • Occupation:      Employer: SELF-EMPLOYED     Comment: Patient resides with Addis Rodriguez.   Tobacco Use   • Smoking status: Former Smoker     Packs/day: 1.00     Years: 6.00     Pack years: 6.00     Types: Cigarettes     Last attempt to quit:      Years since quittin.6   • Smokeless tobacco: Never Used   Substance and Sexual Activity   • Alcohol use: Yes     Comment: 2018 - Kevin reports consumption of alcoholic beverages under a social basis (approximately 3 - 4 per month).     • Drug use: No   • Sexual activity: Defer     Family History   Problem Relation Age of Onset   • Colon cancer Mother         onset age greater than 75y   • Diabetes Mother    • Breast cancer Sister    • Cancer Brother         bladder   • Diabetes Brother    • Hyperlipidemia Brother      Lab on 2019   Component Date Value Ref Range Status   • WBC 2019 5.25  3.40 - 10.80 10*3/mm3 Final   • RBC 2019 4.77  4.14 - 5.80 10*6/mm3 Final   • Hemoglobin 2019 14.2  13.0 - 17.7 g/dL Final   • Hematocrit 2019 43.6  37.5 - 51.0 % Final   • MCV 2019 91.4  79.0 - 97.0 fL Final   • MCH 2019 29.8  26.6 - 33.0 pg Final   • MCHC 2019 32.6  31.5 - 35.7 g/dL Final   • RDW 2019 13.1  12.3 - 15.4 % Final   • RDW-SD 2019 44.4  37.0 - 54.0 fl Final   • MPV 2019 9.9  6.0 - 12.0 fL Final   • Platelets 2019 306  140 - 450 10*3/mm3 Final   • Neutrophil % 2019 48.4  42.7 - 76.0 % Final   •  Lymphocyte % 04/18/2019 38.1  19.6 - 45.3 % Final   • Monocyte % 04/18/2019 8.4  5.0 - 12.0 % Final   • Eosinophil % 04/18/2019 3.4  0.3 - 6.2 % Final   • Basophil % 04/18/2019 1.3  0.0 - 1.5 % Final   • Immature Grans % 04/18/2019 0.4  0.0 - 0.5 % Final   • Neutrophils, Absolute 04/18/2019 2.54  1.40 - 7.00 10*3/mm3 Final   • Lymphocytes, Absolute 04/18/2019 2.00  0.70 - 3.10 10*3/mm3 Final   • Monocytes, Absolute 04/18/2019 0.44  0.10 - 0.90 10*3/mm3 Final   • Eosinophils, Absolute 04/18/2019 0.18  0.00 - 0.40 10*3/mm3 Final   • Basophils, Absolute 04/18/2019 0.07  0.00 - 0.20 10*3/mm3 Final   • Immature Grans, Absolute 04/18/2019 0.02  0.00 - 0.05 10*3/mm3 Final   • nRBC 04/18/2019 0.0  0.0 - 0.0 /100 WBC Final   • Glucose 04/18/2019 104* 65 - 99 mg/dL Final   • BUN 04/18/2019 11  6 - 20 mg/dL Final   • Creatinine 04/18/2019 1.07  0.76 - 1.27 mg/dL Final   • Sodium 04/18/2019 134* 136 - 145 mmol/L Final   • Potassium 04/18/2019 4.5  3.5 - 5.2 mmol/L Final   • Chloride 04/18/2019 96* 98 - 107 mmol/L Final   • CO2 04/18/2019 22.8  22.0 - 29.0 mmol/L Final   • Calcium 04/18/2019 8.9  8.6 - 10.5 mg/dL Final   • Total Protein 04/18/2019 7.6  6.0 - 8.5 g/dL Final   • Albumin 04/18/2019 4.50  3.50 - 5.20 g/dL Final   • ALT (SGPT) 04/18/2019 30  1 - 41 U/L Final   • AST (SGOT) 04/18/2019 28  1 - 40 U/L Final   • Alkaline Phosphatase 04/18/2019 110  39 - 117 U/L Final   • Total Bilirubin 04/18/2019 0.6  0.2 - 1.2 mg/dL Final   • eGFR Non African Amer 04/18/2019 73  >60 mL/min/1.73 Final   • Globulin 04/18/2019 3.1  gm/dL Final   • A/G Ratio 04/18/2019 1.5  g/dL Final   • BUN/Creatinine Ratio 04/18/2019 10.3  7.0 - 25.0 Final   • Anion Gap 04/18/2019 15.2  mmol/L Final   • Total Cholesterol 04/18/2019 174  0 - 200 mg/dL Final   • Triglycerides 04/18/2019 91  0 - 150 mg/dL Final   • HDL Cholesterol 04/18/2019 41  40 - 60 mg/dL Final   • LDL Cholesterol  04/18/2019 115* 0 - 100 mg/dL Final   • VLDL Cholesterol 04/18/2019  18.2  5 - 40 mg/dL Final   • LDL/HDL Ratio 04/18/2019 2.80   Final   • 25 Hydroxy, Vitamin D 04/18/2019 27.9* 30.0 - 100.0 ng/ml Final   • Amphet/Methamphet, Screen 04/18/2019 Positive* Negative Final   • Barbiturates Screen, Urine 04/18/2019 Negative  Negative Final   • Benzodiazepine Screen, Urine 04/18/2019 Negative  Negative Final   • Cocaine Screen, Urine 04/18/2019 Negative  Negative Final   • Opiate Screen 04/18/2019 Negative  Negative Final   • THC, Screen, Urine 04/18/2019 Negative  Negative Final   • Methadone Screen, Urine 04/18/2019 Negative  Negative Final   • Oxycodone Screen, Urine 04/18/2019 Negative  Negative Final      XR Spine Lumbar 4+ View  Narrative: Five-view lumbar spine    HISTORY: Back pain. Lumbago with bilateral sciatica.    AP, lateral and oblique radiographs of the lumbar spine and spot  film of the lumbosacral junction were obtained.    COMPARISON: None.    Mild levoscoliosis.  Congenitally short pedicles.  Minimal degenerative changes with small marginal osteophytes.  There is a normal lordosis.   Vertebral height and alignment are maintained.  No fracture.  The pedicles are intact.  Impression: CONCLUSION:  Mild levoscoliosis.  Congenitally short pedicles.  Minimal degenerative changes with small marginal osteophytes.    [id number]    Electronically signed by:  Anthony Cm MD  4/8/2019 10:21 AM CDT  Workstation: [facility]  XR Spine Thoracic 3 View  Narrative: Three-view thoracic spine.    HISTORY: Back pain    AP, lateral and swimmer's views of the thoracic spine were  obtained.    COMPARISON: None.    Normal thoracic kyphosis.  Vertebral height and alignment maintained.  Mild degenerative changes.  No fracture.  The pedicles are intact.  No paraspinal widening.  Impression: CONCLUSION:   Mild degenerative changes.    [id number]    Electronically signed by:  Anthony Cm MD  4/8/2019 10:19 AM CDT  Workstation: [id number]    [unfilled]  Immunization History   Administered Date(s)  "Administered   • Influenza TIV (IM) 03/28/2017   • Tdap 02/17/2017       The following portions of the patient's history were reviewed and updated as appropriate: allergies, current medications, past family history, past medical history, past social history, past surgical history and problem list.        Physical Exam  /90   Pulse 75   Temp 98.6 °F (37 °C)   Ht 180.3 cm (71\")   Wt 118 kg (260 lb 3.2 oz)   SpO2 96%   BMI 36.29 kg/m²     Physical Exam   Constitutional: He is oriented to person, place, and time. He appears well-developed and well-nourished.   HENT:   Head: Normocephalic and atraumatic.   Right Ear: External ear normal.   Eyes: Conjunctivae and EOM are normal. Pupils are equal, round, and reactive to light.   Neck: Normal range of motion. Neck supple.   Cardiovascular: Normal rate, regular rhythm and normal heart sounds.   No murmur heard.  Pulmonary/Chest: Effort normal and breath sounds normal. No respiratory distress.   Abdominal: Soft. Bowel sounds are normal. He exhibits no distension. There is no tenderness.   Obese abdomen    Musculoskeletal: Normal range of motion. He exhibits no edema or deformity.   Neurological: He is alert and oriented to person, place, and time. No cranial nerve deficit.   Skin: Skin is warm. No rash noted. He is not diaphoretic. No erythema. No pallor.   Psychiatric: He has a normal mood and affect. His behavior is normal.   Nursing note and vitals reviewed.      Assessment/Plan    Diagnosis Plan   1. Prediabetes     2. S/P carpal tunnel release     3. Hyperlipidemia, unspecified hyperlipidemia type     4. Non morbid obesity     5. Essential hypertension     6. Attention deficit hyperactivity disorder (ADHD), predominantly inattentive type           -will get labwork  -hypochloremia/hypontremia - check CMP.    -recommend pt go back on aspirin and lipitor  -for back pain with sciatica/scoliosis - pain is better after PT/OT. Continue with mobic and flexeril PRN. "  Consider MRI if not improving   - pt doing well postop on carpal tunnel release on right hand   -for cerumen of both ears - debrox ointment PRN. Drug information provided  -for carpal tunnel he is doing better post op  he has failed conservative treatment for >3 months. . He has yet to get surgery on left  Hand   -vitamin D pill once a week   -HLP - lipitor 40 mg PO qhs   -  BP was at goal Continue  on lisionpril 10 mg PO q daily drug information provided low salt diet informaiton provided. Side effects discussed BP better controlled today  - refilled ADHD medication Adderall XR 25 mg PO q daily for ADHD.  [facility] ref number [id number]  - for allergic rhinitis - continue singulair and flonase and will add zyrtec.  -H/O prediabetes - D/C  metformin, recent hga1c normal at 5.4   - hyperlipidemia -continue statin. Recheck lipid panel. Pt on aspirin   - obesity - recommend ketogenic diet along with intermittent fasting. Pt has lost 3 lbs since last visit   -recheck in 1 month for followup         This document has been electronically signed by Scottie Borges MD on August 19, 2019 7:18 AM

## 2019-08-19 ENCOUNTER — LAB (OUTPATIENT)
Dept: LAB | Facility: HOSPITAL | Age: 49
End: 2019-08-19

## 2019-08-19 ENCOUNTER — OFFICE VISIT (OUTPATIENT)
Dept: FAMILY MEDICINE CLINIC | Facility: CLINIC | Age: 49
End: 2019-08-19

## 2019-08-19 ENCOUNTER — TELEPHONE (OUTPATIENT)
Dept: FAMILY MEDICINE CLINIC | Facility: CLINIC | Age: 49
End: 2019-08-19

## 2019-08-19 VITALS
WEIGHT: 260.2 LBS | BODY MASS INDEX: 36.43 KG/M2 | SYSTOLIC BLOOD PRESSURE: 138 MMHG | DIASTOLIC BLOOD PRESSURE: 90 MMHG | HEART RATE: 75 BPM | TEMPERATURE: 98.6 F | HEIGHT: 71 IN | OXYGEN SATURATION: 96 %

## 2019-08-19 DIAGNOSIS — I10 ESSENTIAL HYPERTENSION: ICD-10-CM

## 2019-08-19 DIAGNOSIS — Z98.890 S/P CARPAL TUNNEL RELEASE: ICD-10-CM

## 2019-08-19 DIAGNOSIS — E66.9 NON MORBID OBESITY: ICD-10-CM

## 2019-08-19 DIAGNOSIS — R73.03 PREDIABETES: Primary | ICD-10-CM

## 2019-08-19 DIAGNOSIS — F90.0 ATTENTION DEFICIT HYPERACTIVITY DISORDER (ADHD), PREDOMINANTLY INATTENTIVE TYPE: ICD-10-CM

## 2019-08-19 DIAGNOSIS — E78.5 HYPERLIPIDEMIA, UNSPECIFIED HYPERLIPIDEMIA TYPE: ICD-10-CM

## 2019-08-19 DIAGNOSIS — Z02.83 ENCOUNTER FOR DRUG SCREENING: ICD-10-CM

## 2019-08-19 LAB
25(OH)D3 SERPL-MCNC: 39.8 NG/ML (ref 30–100)
ALBUMIN SERPL-MCNC: 4.8 G/DL (ref 3.5–5.2)
ALBUMIN/GLOB SERPL: 1.5 G/DL
ALP SERPL-CCNC: 102 U/L (ref 39–117)
ALT SERPL W P-5'-P-CCNC: 30 U/L (ref 1–41)
AMPHET+METHAMPHET UR QL: POSITIVE
AMPHETAMINES UR QL: NEGATIVE
ANION GAP SERPL CALCULATED.3IONS-SCNC: 12.2 MMOL/L (ref 5–15)
AST SERPL-CCNC: 21 U/L (ref 1–40)
BARBITURATES UR QL SCN: NEGATIVE
BASOPHILS # BLD AUTO: 0.07 10*3/MM3 (ref 0–0.2)
BASOPHILS NFR BLD AUTO: 0.9 % (ref 0–1.5)
BENZODIAZ UR QL SCN: NEGATIVE
BILIRUB SERPL-MCNC: 0.6 MG/DL (ref 0.2–1.2)
BUN BLD-MCNC: 15 MG/DL (ref 6–20)
BUN/CREAT SERPL: 16.3 (ref 7–25)
BUPRENORPHINE SERPL-MCNC: NEGATIVE NG/ML
CALCIUM SPEC-SCNC: 9.9 MG/DL (ref 8.6–10.5)
CANNABINOIDS SERPL QL: NEGATIVE
CHLORIDE SERPL-SCNC: 106 MMOL/L (ref 98–107)
CHOLEST SERPL-MCNC: 196 MG/DL (ref 0–200)
CO2 SERPL-SCNC: 26.8 MMOL/L (ref 22–29)
COCAINE UR QL: NEGATIVE
CREAT BLD-MCNC: 0.92 MG/DL (ref 0.76–1.27)
DEPRECATED RDW RBC AUTO: 46.5 FL (ref 37–54)
EOSINOPHIL # BLD AUTO: 0.19 10*3/MM3 (ref 0–0.4)
EOSINOPHIL NFR BLD AUTO: 2.6 % (ref 0.3–6.2)
ERYTHROCYTE [DISTWIDTH] IN BLOOD BY AUTOMATED COUNT: 13.6 % (ref 12.3–15.4)
GFR SERPL CREATININE-BSD FRML MDRD: 87 ML/MIN/1.73
GLOBULIN UR ELPH-MCNC: 3.1 GM/DL
GLUCOSE BLD-MCNC: 95 MG/DL (ref 65–99)
HBA1C MFR BLD: 5.5 % (ref 4.8–5.6)
HCT VFR BLD AUTO: 46.7 % (ref 37.5–51)
HDLC SERPL-MCNC: 47 MG/DL (ref 40–60)
HGB BLD-MCNC: 15 G/DL (ref 13–17.7)
IMM GRANULOCYTES # BLD AUTO: 0.04 10*3/MM3 (ref 0–0.05)
IMM GRANULOCYTES NFR BLD AUTO: 0.5 % (ref 0–0.5)
LDLC SERPL CALC-MCNC: 125 MG/DL (ref 0–100)
LDLC/HDLC SERPL: 2.66 {RATIO}
LYMPHOCYTES # BLD AUTO: 2.56 10*3/MM3 (ref 0.7–3.1)
LYMPHOCYTES NFR BLD AUTO: 34.5 % (ref 19.6–45.3)
MCH RBC QN AUTO: 29.7 PG (ref 26.6–33)
MCHC RBC AUTO-ENTMCNC: 32.1 G/DL (ref 31.5–35.7)
MCV RBC AUTO: 92.5 FL (ref 79–97)
METHADONE UR QL SCN: NEGATIVE
MONOCYTES # BLD AUTO: 0.45 10*3/MM3 (ref 0.1–0.9)
MONOCYTES NFR BLD AUTO: 6.1 % (ref 5–12)
NEUTROPHILS # BLD AUTO: 4.11 10*3/MM3 (ref 1.7–7)
NEUTROPHILS NFR BLD AUTO: 55.4 % (ref 42.7–76)
NRBC BLD AUTO-RTO: 0.1 /100 WBC (ref 0–0.2)
OPIATES UR QL: NEGATIVE
OXYCODONE UR QL SCN: NEGATIVE
PCP UR QL SCN: NEGATIVE
PLATELET # BLD AUTO: 293 10*3/MM3 (ref 140–450)
PMV BLD AUTO: 10.5 FL (ref 6–12)
POTASSIUM BLD-SCNC: 4.6 MMOL/L (ref 3.5–5.2)
PROPOXYPH UR QL: NEGATIVE
PROT SERPL-MCNC: 7.9 G/DL (ref 6–8.5)
RBC # BLD AUTO: 5.05 10*6/MM3 (ref 4.14–5.8)
SODIUM BLD-SCNC: 145 MMOL/L (ref 136–145)
TRICYCLICS UR QL SCN: NEGATIVE
TRIGL SERPL-MCNC: 121 MG/DL (ref 0–150)
VLDLC SERPL-MCNC: 24.2 MG/DL (ref 5–40)
WBC NRBC COR # BLD: 7.42 10*3/MM3 (ref 3.4–10.8)

## 2019-08-19 PROCEDURE — 80061 LIPID PANEL: CPT

## 2019-08-19 PROCEDURE — 83036 HEMOGLOBIN GLYCOSYLATED A1C: CPT

## 2019-08-19 PROCEDURE — 99214 OFFICE O/P EST MOD 30 MIN: CPT | Performed by: FAMILY MEDICINE

## 2019-08-19 PROCEDURE — 80053 COMPREHEN METABOLIC PANEL: CPT

## 2019-08-19 PROCEDURE — 80306 DRUG TEST PRSMV INSTRMNT: CPT

## 2019-08-19 PROCEDURE — 85025 COMPLETE CBC W/AUTO DIFF WBC: CPT

## 2019-08-19 PROCEDURE — 82306 VITAMIN D 25 HYDROXY: CPT

## 2019-08-19 RX ORDER — FLUTICASONE PROPIONATE 50 MCG
2 SPRAY, SUSPENSION (ML) NASAL DAILY
Qty: 16 ML | Refills: 3 | Status: SHIPPED | OUTPATIENT
Start: 2019-08-19 | End: 2020-02-18

## 2019-08-19 RX ORDER — DEXTROAMPHETAMINE SACCHARATE, AMPHETAMINE ASPARTATE MONOHYDRATE, DEXTROAMPHETAMINE SULFATE AND AMPHETAMINE SULFATE 6.25; 6.25; 6.25; 6.25 MG/1; MG/1; MG/1; MG/1
25 CAPSULE, EXTENDED RELEASE ORAL EVERY MORNING
Qty: 30 CAPSULE | Refills: 0 | Status: SHIPPED | OUTPATIENT
Start: 2019-08-19 | End: 2019-09-19 | Stop reason: SDUPTHER

## 2019-08-19 RX ORDER — LISINOPRIL 10 MG/1
10 TABLET ORAL DAILY
Qty: 30 TABLET | Refills: 3 | Status: SHIPPED | OUTPATIENT
Start: 2019-08-19 | End: 2019-09-19

## 2019-08-19 RX ORDER — ASPIRIN 81 MG/1
81 TABLET ORAL DAILY
Qty: 30 TABLET | Refills: 3 | Status: SHIPPED | OUTPATIENT
Start: 2019-08-19 | End: 2019-09-19 | Stop reason: SDUPTHER

## 2019-08-19 RX ORDER — MONTELUKAST SODIUM 10 MG/1
10 TABLET ORAL NIGHTLY
Qty: 30 TABLET | Refills: 3 | Status: SHIPPED | OUTPATIENT
Start: 2019-08-19 | End: 2019-11-15 | Stop reason: SDUPTHER

## 2019-08-19 RX ORDER — ERGOCALCIFEROL 1.25 MG/1
50000 CAPSULE ORAL WEEKLY
Qty: 4 CAPSULE | Refills: 3 | Status: SHIPPED | OUTPATIENT
Start: 2019-08-19 | End: 2020-02-03

## 2019-08-19 RX ORDER — ATORVASTATIN CALCIUM 40 MG/1
40 TABLET, FILM COATED ORAL DAILY
Qty: 30 TABLET | Refills: 3 | Status: SHIPPED | OUTPATIENT
Start: 2019-08-19 | End: 2019-08-20 | Stop reason: SDUPTHER

## 2019-08-19 NOTE — TELEPHONE ENCOUNTER
----- Message from Scottie Borges MD sent at 8/19/2019  1:24 PM CDT -----  UDS consistent with ADHD medication use  Called pt

## 2019-08-20 ENCOUNTER — TELEPHONE (OUTPATIENT)
Dept: FAMILY MEDICINE CLINIC | Facility: CLINIC | Age: 49
End: 2019-08-20

## 2019-08-20 RX ORDER — ATORVASTATIN CALCIUM 80 MG/1
80 TABLET, FILM COATED ORAL DAILY
Qty: 30 TABLET | Refills: 3 | Status: SHIPPED | OUTPATIENT
Start: 2019-08-20 | End: 2020-06-02 | Stop reason: SDUPTHER

## 2019-08-20 NOTE — TELEPHONE ENCOUNTER
----- Message from Scottie Borges MD sent at 8/19/2019  8:37 PM CDT -----  Call pt vitamin D is normal     On Cmp kidney function is stable but may have CKD stage 2. Based on history of hypertension.  REcommend more water intake     On lipid panel LDL still not at goal. Make sure he is taking lipitor 40 mg PO qhs. IF he truly is then go up on lipitor from 40 to 80 mg PO qhs give 30 pills and 3 refills     CBC normal    hga1c normal    Recheck on next visit. Thanks  Called pt,changed dose

## 2019-09-03 RX ORDER — MELOXICAM 7.5 MG/1
TABLET ORAL
Qty: 30 TABLET | Refills: 3 | Status: SHIPPED | OUTPATIENT
Start: 2019-09-03 | End: 2019-10-17

## 2019-09-15 NOTE — PROGRESS NOTES
Subjective:  Randall Hernandez is a 48 y.o. male who presents for       Patient Active Problem List   Diagnosis   • Hyperlipidemia   • Inattention   • Non morbid obesity   • Prediabetes   • Attention deficit hyperactivity disorder (ADHD), combined type   • Need for vaccination   • Gastroesophageal reflux disease   • Encounter for drug screening   • Tingling in extremities   • Elevated BP without diagnosis of hypertension   • Allergic rhinitis   • Elevated blood pressure reading   • Pruritic rash   • Numbness in both hands   • Bilateral elbow joint pain   • Pain in both wrists   • Ulnar neuropathy of left upper extremity   • General medical examination   • Bilateral carpal tunnel syndrome   • History of prediabetes   • Numbness and tingling in both hands   • Essential hypertension   • Cerumen debris on tympanic membrane of both ears   • Carpal tunnel syndrome   • S/P carpal tunnel release   • Encounter for screening for malignant neoplasm of colon   • FH: colon cancer   • Acute recurrent sinusitis   • Attention deficit hyperactivity disorder (ADHD), predominantly inattentive type   • Class 2 obesity with body mass index (BMI) of 35.0 to 35.9 in adult   • Annual physical exam   • Arthritis of back   • Scoliosis   • Hyponatremia   • Hypochloremia           Current Outpatient Medications:   •  amphetamine-dextroamphetamine XR (ADDERALL XR) 25 MG 24 hr capsule, Take 1 capsule by mouth Every Morning Take 1 tablet by mouth daily, Disp: 30 capsule, Rfl: 0  •  aspirin 81 MG EC tablet, Take 1 tablet by mouth Daily., Disp: 30 tablet, Rfl: 3  •  atorvastatin (LIPITOR) 80 MG tablet, Take 1 tablet by mouth Daily., Disp: 30 tablet, Rfl: 3  •  cyclobenzaprine (FLEXERIL) 5 MG tablet, Take 1 tablet by mouth 3 (Three) Times a Day As Needed for Muscle Spasms., Disp: 30 tablet, Rfl: 3  •  fluticasone (FLONASE) 50 MCG/ACT nasal spray, 2 sprays by Each Nare route Daily., Disp: 16 mL, Rfl: 3  •  lisinopril (PRINIVIL,ZESTRIL) 10 MG  tablet, Take 1 tablet by mouth Daily., Disp: 30 tablet, Rfl: 3  •  meloxicam (MOBIC) 7.5 MG tablet, TAKE 1 TABLET BY MOUTH WITH FOOD EVERY DAY, Disp: 30 tablet, Rfl: 3  •  montelukast (SINGULAIR) 10 MG tablet, Take 1 tablet by mouth Every Night., Disp: 30 tablet, Rfl: 3  •  omeprazole (priLOSEC) 40 MG capsule, Take 1 capsule by mouth Daily., Disp: 30 capsule, Rfl: 3  •  vitamin D (ERGOCALCIFEROL) 45587 units capsule capsule, Take 1 capsule by mouth 1 (One) Time Per Week., Disp: 4 capsule, Rfl: 3      Pt is 47 yo male with history of obesity, history of prediabetes, HTN,  GERD, vitamin D deficiency, alelrgic rhinitis, ADHD predominantly inattentive type ,sp bilateral carpal tunnel release surgery,  Chronic back pain (arthritis of lumbar spine, short pedicles, mild scoliosis, mild arthritis thoracic spine).  CKD stage 2 /14/19 pt is here for recheck and needs refills on ADHD medication doing well on Adderall XR. Pt doing well on medication. He gained 2 lbs since last visit. He has been busy.  Pt states his hands is doing okay. Has occasional numbness. No chest pain no dizziness.  He gained 8 lbs since  May 2019.         8/19/19 pt is here for recheck and followup.  Needs refill on ADHD medication. Doing well on Adderall XR 25 mg daily . Had yet to do labwork.  BP on higher side today. He gained more than >10 lbs since May 2019.      9/19/19 pt is here for recheck and followup. Had labwork on 8/191/9. UDS consistent with ADHD medication use, Vitami nD was normal lipid panel was stable. hga1c normal. CMP showed GFR at 87 from 73 last check   A few months ago.  Liver enzymes normal. CBC normal. His right shoulder is also bothering him.  It hurts to lift things. He has numbness in arm        Shoulder Injury    The incident occurred at the pool and at work. The right shoulder is affected. The injury mechanism is unknown. The quality of the pain is described as aching. The pain radiates to the right arm and right hand.  The pain is at a severity of 5/10. The pain is moderate. Associated symptoms include numbness. Pertinent negatives include no chest pain, muscle weakness or tingling. Nothing aggravates the symptoms.    Obesity   This is a chronic problem. The current episode started more than 1 year ago. The problem occurs constantly. The problem has been unchanged. Associated symptoms include arthralgias and numbness. Pertinent negatives include no abdominal pain, anorexia, change in bowel habit, chest pain, chills, congestion, coughing, diaphoresis, fatigue, fever, headaches, joint swelling, myalgias, nausea, rash, sore throat, swollen glands, urinary symptoms, vertigo, visual change, vomiting or weakness. Nothing aggravates the symptoms. He has tried nothing for the symptoms. The treatment provided no relief.   Mental Health Problem   The primary symptoms do not include dysphoric mood, delusions, hallucinations, bizarre behavior, disorganized speech, negative symptoms or somatic symptoms. This is a chronic problem.   The degree of incapacity that he is experiencing as a consequence of his illness is mild. Additional symptoms of the illness include attention impairment. Additional symptoms of the illness do not include anhedonia, insomnia, hypersomnia, appetite change, unexpected weight change, fatigue, agitation, psychomotor retardation, feelings of worthlessness, euphoric mood, increased goal-directed activity, flight of ideas, inflated self-esteem, decreased need for sleep, distractible, poor judgment, visual change, headaches or abdominal pain. He does not admit to suicidal ideas. He does not have a plan to commit suicide. He does not contemplate injuring another person.   Back Pain   This is a chronic problem. The current episode started more than 1 month ago. The problem occurs constantly. The problem is unchanged. The pain is present in the lumbar spine. The quality of the pain is described as aching. The pain is at a  severity of 5/10. The symptoms are aggravated by lying down, position, sitting and standing. Associated symptoms include numbness. Pertinent negatives include no abdominal pain, bladder incontinence, bowel incontinence, chest pain, dysuria, fever, headaches, leg pain, paresis, paresthesias, pelvic pain, perianal numbness, tingling or weakness. He has tried nothing for the symptoms. The treatment provided no relief.    Wrist Pain    The pain is present in the left elbow, left wrist, right wrist and right elbow. This is a chronic problem. The current episode started more than 1 month ago. The problem occurs constantly. The problem has been gradually worsening. The quality of the pain is described as aching. The pain is at a severity of 9/10. The pain is moderate. Associated symptoms include joint locking, a limited range of motion, numbness, stiffness and tingling. Pertinent negatives include no fever, inability to bear weight, itching or joint swelling. The symptoms are aggravated by activity. He has tried NSAIDS for the symptoms. The treatment provided no relief. Family history does not include gout or rheumatoid arthritis. There is no history of diabetes, gout, osteoarthritis or rheumatoid arthritis.   Elbow Injury   This is a chronic problem. The current episode started more than 1 year ago. The problem occurs constantly. The problem has been gradually worsening. Associated symptoms include arthralgias, joint swelling, numbness, a rash and weakness. Pertinent negatives include no abdominal pain, anorexia, change in bowel habit, chest pain, chills, congestion, coughing, diaphoresis, fatigue, fever, headaches, myalgias, nausea, neck pain, sore throat, swollen glands, urinary symptoms, vertigo, visual change or vomiting. The symptoms are aggravated by bending. He has tried NSAIDs for the symptoms. The treatment provided mild relief.   Hypertension   This is a new problem. The current episode started more than 1 month  ago. The problem has been gradually worsening since onset. The problem is uncontrolled. Pertinent negatives include no anxiety, blurred vision, chest pain, headaches, malaise/fatigue, neck pain, orthopnea, palpitations, peripheral edema, PND, shortness of breath or sweats. Risk factors for coronary artery disease include male gender, obesity and sedentary lifestyle. Past treatments include nothing. There are no compliance problems       Review of Systems  Review of Systems   Constitutional: Positive for activity change and fatigue. Negative for appetite change, chills, diaphoresis and fever.   HENT: Negative for congestion, postnasal drip, rhinorrhea, sinus pressure, sinus pain, sneezing, sore throat, trouble swallowing and voice change.    Respiratory: Negative for cough, choking, chest tightness, shortness of breath, wheezing and stridor.    Cardiovascular: Negative for chest pain.   Gastrointestinal: Positive for abdominal pain. Negative for diarrhea, nausea and vomiting.   Musculoskeletal: Positive for arthralgias and back pain.   Neurological: Positive for weakness and numbness. Negative for tingling and headaches.   Psychiatric/Behavioral: Positive for decreased concentration.       Patient Active Problem List   Diagnosis   • Hyperlipidemia   • Inattention   • Non morbid obesity   • Prediabetes   • Attention deficit hyperactivity disorder (ADHD), combined type   • Need for vaccination   • Gastroesophageal reflux disease   • Encounter for drug screening   • Tingling in extremities   • Elevated BP without diagnosis of hypertension   • Allergic rhinitis   • Elevated blood pressure reading   • Pruritic rash   • Numbness in both hands   • Bilateral elbow joint pain   • Pain in both wrists   • Ulnar neuropathy of left upper extremity   • General medical examination   • Bilateral carpal tunnel syndrome   • History of prediabetes   • Numbness and tingling in both hands   • Essential hypertension   • Cerumen debris on  tympanic membrane of both ears   • Carpal tunnel syndrome   • S/P carpal tunnel release   • Encounter for screening for malignant neoplasm of colon   • FH: colon cancer   • Acute recurrent sinusitis   • Attention deficit hyperactivity disorder (ADHD), predominantly inattentive type   • Class 2 obesity with body mass index (BMI) of 35.0 to 35.9 in adult   • Annual physical exam   • Arthritis of back   • Scoliosis   • Hyponatremia   • Hypochloremia     Past Surgical History:   Procedure Laterality Date   • CARPAL TUNNEL RELEASE Right 2018    Procedure: CARPAL TUNNEL RELEASE;  Surgeon: Judd Mathias MD;  Location: [facility] OR;  Service: Orthopedics   • CARPAL TUNNEL RELEASE  2018    Left   • CARPAL TUNNEL RELEASE Left 2018    Procedure: CARPAL TUNNEL RELEASE;  Surgeon: Judd Mathias MD;  Location: [facility] OR;  Service: Orthopedics   • COLONOSCOPY N/A 2019    Procedure: COLONOSCOPY a friday please ;  Surgeon: Ruslan Bowers MD;  Location: [facility] ENDOSCOPY;  Service: Gastroenterology   • ENDOSCOPY     • PYLOROMYOTOMY       Social History     Socioeconomic History   • Marital status: Single     Spouse name: Not on file   • Number of children: Not on file   • Years of education: Not on file   • Highest education level: Not on file   Occupational History   • Occupation:      Employer: SELF-EMPLOYED     Comment: Patient resides with Addis Rodriguez.   Tobacco Use   • Smoking status: Former Smoker     Packs/day: 1.00     Years: 6.00     Pack years: 6.00     Types: Cigarettes     Last attempt to quit:      Years since quittin.7   • Smokeless tobacco: Never Used   Substance and Sexual Activity   • Alcohol use: Yes     Comment: 2018 - Kevin reports consumption of alcoholic beverages under a social basis (approximately 3 - 4 per month).     • Drug use: No   • Sexual activity: Defer     Family History   Problem Relation Age of Onset   • Colon cancer Mother          onset age greater than 75y   • Diabetes Mother    • Breast cancer Sister    • Cancer Brother         bladder   • Diabetes Brother    • Hyperlipidemia Brother      Lab on 08/19/2019   Component Date Value Ref Range Status   • WBC 08/19/2019 7.42  3.40 - 10.80 10*3/mm3 Final   • RBC 08/19/2019 5.05  4.14 - 5.80 10*6/mm3 Final   • Hemoglobin 08/19/2019 15.0  13.0 - 17.7 g/dL Final   • Hematocrit 08/19/2019 46.7  37.5 - 51.0 % Final   • MCV 08/19/2019 92.5  79.0 - 97.0 fL Final   • MCH 08/19/2019 29.7  26.6 - 33.0 pg Final   • MCHC 08/19/2019 32.1  31.5 - 35.7 g/dL Final   • RDW 08/19/2019 13.6  12.3 - 15.4 % Final   • RDW-SD 08/19/2019 46.5  37.0 - 54.0 fl Final   • MPV 08/19/2019 10.5  6.0 - 12.0 fL Final   • Platelets 08/19/2019 293  140 - 450 10*3/mm3 Final   • Neutrophil % 08/19/2019 55.4  42.7 - 76.0 % Final   • Lymphocyte % 08/19/2019 34.5  19.6 - 45.3 % Final   • Monocyte % 08/19/2019 6.1  5.0 - 12.0 % Final   • Eosinophil % 08/19/2019 2.6  0.3 - 6.2 % Final   • Basophil % 08/19/2019 0.9  0.0 - 1.5 % Final   • Immature Grans % 08/19/2019 0.5  0.0 - 0.5 % Final   • Neutrophils, Absolute 08/19/2019 4.11  1.70 - 7.00 10*3/mm3 Final   • Lymphocytes, Absolute 08/19/2019 2.56  0.70 - 3.10 10*3/mm3 Final   • Monocytes, Absolute 08/19/2019 0.45  0.10 - 0.90 10*3/mm3 Final   • Eosinophils, Absolute 08/19/2019 0.19  0.00 - 0.40 10*3/mm3 Final   • Basophils, Absolute 08/19/2019 0.07  0.00 - 0.20 10*3/mm3 Final   • Immature Grans, Absolute 08/19/2019 0.04  0.00 - 0.05 10*3/mm3 Final   • nRBC 08/19/2019 0.1  0.0 - 0.2 /100 WBC Final   • Glucose 08/19/2019 95  65 - 99 mg/dL Final   • BUN 08/19/2019 15  6 - 20 mg/dL Final   • Creatinine 08/19/2019 0.92  0.76 - 1.27 mg/dL Final   • Sodium 08/19/2019 145  136 - 145 mmol/L Final   • Potassium 08/19/2019 4.6  3.5 - 5.2 mmol/L Final   • Chloride 08/19/2019 106  98 - 107 mmol/L Final   • CO2 08/19/2019 26.8  22.0 - 29.0 mmol/L Final   • Calcium 08/19/2019 9.9  8.6 - 10.5  mg/dL Final   • Total Protein 08/19/2019 7.9  6.0 - 8.5 g/dL Final   • Albumin 08/19/2019 4.80  3.50 - 5.20 g/dL Final   • ALT (SGPT) 08/19/2019 30  1 - 41 U/L Final   • AST (SGOT) 08/19/2019 21  1 - 40 U/L Final   • Alkaline Phosphatase 08/19/2019 102  39 - 117 U/L Final   • Total Bilirubin 08/19/2019 0.6  0.2 - 1.2 mg/dL Final   • eGFR Non  Amer 08/19/2019 87  >60 mL/min/1.73 Final   • Globulin 08/19/2019 3.1  gm/dL Final   • A/G Ratio 08/19/2019 1.5  g/dL Final   • BUN/Creatinine Ratio 08/19/2019 16.3  7.0 - 25.0 Final   • Anion Gap 08/19/2019 12.2  5.0 - 15.0 mmol/L Final   • Hemoglobin A1C 08/19/2019 5.50  4.80 - 5.60 % Final   • Total Cholesterol 08/19/2019 196  0 - 200 mg/dL Final   • Triglycerides 08/19/2019 121  0 - 150 mg/dL Final   • HDL Cholesterol 08/19/2019 47  40 - 60 mg/dL Final   • LDL Cholesterol  08/19/2019 125* 0 - 100 mg/dL Final   • VLDL Cholesterol 08/19/2019 24.2  5 - 40 mg/dL Final   • LDL/HDL Ratio 08/19/2019 2.66   Final   • 25 Hydroxy, Vitamin D 08/19/2019 39.8  30.0 - 100.0 ng/ml Final   • THC, Screen, Urine 08/19/2019 Negative  Negative Final   • Phencyclidine (PCP), Urine 08/19/2019 Negative  Negative Final   • Cocaine Screen, Urine 08/19/2019 Negative  Negative Final   • Methamphetamine, Ur 08/19/2019 Negative  Negative Final   • Opiate Screen 08/19/2019 Negative  Negative Final   • Amphetamine Screen, Urine 08/19/2019 Positive* Negative Final   • Benzodiazepine Screen, Urine 08/19/2019 Negative  Negative Final   • Tricyclic Antidepressants Screen 08/19/2019 Negative  Negative Final   • Methadone Screen, Urine 08/19/2019 Negative  Negative Final   • Barbiturates Screen, Urine 08/19/2019 Negative  Negative Final   • Oxycodone Screen, Urine 08/19/2019 Negative  Negative Final   • Propoxyphene Screen 08/19/2019 Negative  Negative Final   • Buprenorphine, Screen, Urine 08/19/2019 Negative  Negative Final   Lab on 04/18/2019   Component Date Value Ref Range Status   • WBC 04/18/2019  5.25  3.40 - 10.80 10*3/mm3 Final   • RBC 04/18/2019 4.77  4.14 - 5.80 10*6/mm3 Final   • Hemoglobin 04/18/2019 14.2  13.0 - 17.7 g/dL Final   • Hematocrit 04/18/2019 43.6  37.5 - 51.0 % Final   • MCV 04/18/2019 91.4  79.0 - 97.0 fL Final   • MCH 04/18/2019 29.8  26.6 - 33.0 pg Final   • MCHC 04/18/2019 32.6  31.5 - 35.7 g/dL Final   • RDW 04/18/2019 13.1  12.3 - 15.4 % Final   • RDW-SD 04/18/2019 44.4  37.0 - 54.0 fl Final   • MPV 04/18/2019 9.9  6.0 - 12.0 fL Final   • Platelets 04/18/2019 306  140 - 450 10*3/mm3 Final   • Neutrophil % 04/18/2019 48.4  42.7 - 76.0 % Final   • Lymphocyte % 04/18/2019 38.1  19.6 - 45.3 % Final   • Monocyte % 04/18/2019 8.4  5.0 - 12.0 % Final   • Eosinophil % 04/18/2019 3.4  0.3 - 6.2 % Final   • Basophil % 04/18/2019 1.3  0.0 - 1.5 % Final   • Immature Grans % 04/18/2019 0.4  0.0 - 0.5 % Final   • Neutrophils, Absolute 04/18/2019 2.54  1.40 - 7.00 10*3/mm3 Final   • Lymphocytes, Absolute 04/18/2019 2.00  0.70 - 3.10 10*3/mm3 Final   • Monocytes, Absolute 04/18/2019 0.44  0.10 - 0.90 10*3/mm3 Final   • Eosinophils, Absolute 04/18/2019 0.18  0.00 - 0.40 10*3/mm3 Final   • Basophils, Absolute 04/18/2019 0.07  0.00 - 0.20 10*3/mm3 Final   • Immature Grans, Absolute 04/18/2019 0.02  0.00 - 0.05 10*3/mm3 Final   • nRBC 04/18/2019 0.0  0.0 - 0.0 /100 WBC Final   • Glucose 04/18/2019 104* 65 - 99 mg/dL Final   • BUN 04/18/2019 11  6 - 20 mg/dL Final   • Creatinine 04/18/2019 1.07  0.76 - 1.27 mg/dL Final   • Sodium 04/18/2019 134* 136 - 145 mmol/L Final   • Potassium 04/18/2019 4.5  3.5 - 5.2 mmol/L Final   • Chloride 04/18/2019 96* 98 - 107 mmol/L Final   • CO2 04/18/2019 22.8  22.0 - 29.0 mmol/L Final   • Calcium 04/18/2019 8.9  8.6 - 10.5 mg/dL Final   • Total Protein 04/18/2019 7.6  6.0 - 8.5 g/dL Final   • Albumin 04/18/2019 4.50  3.50 - 5.20 g/dL Final   • ALT (SGPT) 04/18/2019 30  1 - 41 U/L Final   • AST (SGOT) 04/18/2019 28  1 - 40 U/L Final   • Alkaline Phosphatase 04/18/2019 110   39 - 117 U/L Final   • Total Bilirubin 04/18/2019 0.6  0.2 - 1.2 mg/dL Final   • eGFR Non African Amer 04/18/2019 73  >60 mL/min/1.73 Final   • Globulin 04/18/2019 3.1  gm/dL Final   • A/G Ratio 04/18/2019 1.5  g/dL Final   • BUN/Creatinine Ratio 04/18/2019 10.3  7.0 - 25.0 Final   • Anion Gap 04/18/2019 15.2  mmol/L Final   • Total Cholesterol 04/18/2019 174  0 - 200 mg/dL Final   • Triglycerides 04/18/2019 91  0 - 150 mg/dL Final   • HDL Cholesterol 04/18/2019 41  40 - 60 mg/dL Final   • LDL Cholesterol  04/18/2019 115* 0 - 100 mg/dL Final   • VLDL Cholesterol 04/18/2019 18.2  5 - 40 mg/dL Final   • LDL/HDL Ratio 04/18/2019 2.80   Final   • 25 Hydroxy, Vitamin D 04/18/2019 27.9* 30.0 - 100.0 ng/ml Final   • Amphet/Methamphet, Screen 04/18/2019 Positive* Negative Final   • Barbiturates Screen, Urine 04/18/2019 Negative  Negative Final   • Benzodiazepine Screen, Urine 04/18/2019 Negative  Negative Final   • Cocaine Screen, Urine 04/18/2019 Negative  Negative Final   • Opiate Screen 04/18/2019 Negative  Negative Final   • THC, Screen, Urine 04/18/2019 Negative  Negative Final   • Methadone Screen, Urine 04/18/2019 Negative  Negative Final   • Oxycodone Screen, Urine 04/18/2019 Negative  Negative Final      XR Spine Lumbar 4+ View  Narrative: Five-view lumbar spine    HISTORY: Back pain. Lumbago with bilateral sciatica.    AP, lateral and oblique radiographs of the lumbar spine and spot  film of the lumbosacral junction were obtained.    COMPARISON: None.    Mild levoscoliosis.  Congenitally short pedicles.  Minimal degenerative changes with small marginal osteophytes.  There is a normal lordosis.   Vertebral height and alignment are maintained.  No fracture.  The pedicles are intact.  Impression: CONCLUSION:  Mild levoscoliosis.  Congenitally short pedicles.  Minimal degenerative changes with small marginal osteophytes.    [id number]    Electronically signed by:  Anthony Cm MD  4/8/2019 10:21 AM CDT  Workstation:  "[id number]  XR Spine Thoracic 3 View  Narrative: Three-view thoracic spine.    HISTORY: Back pain    AP, lateral and swimmer's views of the thoracic spine were  obtained.    COMPARISON: None.    Normal thoracic kyphosis.  Vertebral height and alignment maintained.  Mild degenerative changes.  No fracture.  The pedicles are intact.  No paraspinal widening.  Impression: CONCLUSION:   Mild degenerative changes.    [id number]    Electronically signed by:  Anthony Cm MD  4/8/2019 10:19 AM CDT  Workstation: [email or web address]    [unfilled]  Immunization History   Administered Date(s) Administered   • Influenza TIV (IM) 03/28/2017   • Tdap 02/17/2017       The following portions of the patient's history were reviewed and updated as appropriate: allergies, current medications, past family history, past medical history, past social history, past surgical history and problem list.        Physical Exam  /82 (BP Location: Left arm, Patient Position: Sitting, Cuff Size: Adult)   Pulse 88   Temp 97.7 °F (36.5 °C) (Oral)   Ht 180.3 cm (71\")   Wt 125 kg (275 lb 12.8 oz)   SpO2 97%   BMI 38.47 kg/m²     Physical Exam   Constitutional: He is oriented to person, place, and time. He appears well-developed and well-nourished.   HENT:   Head: Normocephalic and atraumatic.   Right Ear: External ear normal.   Eyes: Conjunctivae and EOM are normal. Pupils are equal, round, and reactive to light.   Neck: Normal range of motion. Neck supple.   Cardiovascular: Normal rate, regular rhythm and normal heart sounds.   No murmur heard.  Pulmonary/Chest: Effort normal and breath sounds normal. No respiratory distress.   Abdominal: Soft. Bowel sounds are normal. He exhibits no distension. There is no tenderness.   Obese abdomen    Musculoskeletal: He exhibits tenderness. He exhibits no edema or deformity.        Right shoulder: He exhibits decreased range of motion, tenderness, pain and spasm.   Neurological: He is alert and oriented to " person, place, and time. No cranial nerve deficit.   Skin: Skin is warm. No rash noted. He is not diaphoretic. No erythema. No pallor.   Psychiatric: He has a normal mood and affect. His behavior is normal.   Nursing note and vitals reviewed.      Assessment/Plan    Diagnosis Plan   1. S/P carpal tunnel release     2. Prediabetes     3. Non morbid obesity     4. Inattention     5. Hyperlipidemia, unspecified hyperlipidemia type     6. History of prediabetes     7. Essential hypertension     8. Class 2 obesity with body mass index (BMI) of 35.0 to 35.9 in adult, unspecified obesity type, unspecified whether serious comorbidity present     9. Attention deficit hyperactivity disorder (ADHD), predominantly inattentive type          -went over labwork   -ckd stage 2 - continue to monitor. Gave information. Advised better hydration BP control   -hypochloremia/hypontremia - resolved  -recommend pt go back on aspirin and lipitor  -for back pain with sciatica/scoliosis - pain is better after PT/OT. Continue with mobic and flexeril PRN.  Consider MRI if not improving   -right shoulder pain - x-ray today. Consider PT/OT or Orthopedic referral.  Tumeric powder in pill form   - pt doing well postop on carpal tunnel release on right hand   -for cerumen of both ears - debrox ointment PRN. Drug information provided  -for carpal tunnel he is doing better post op  he has failed conservative treatment for >3 months. . He has yet to get surgery on left  Hand   -vitamin D pill once a week   -recommend flu shot she declined   -HLP - lipitor 80mg PO qhs   -  BP was at goal Continue  on lisionpril  But go up form 10 to 20 mg PO q daily drug information provided low salt diet informaiton provided. Side effects discussed BP better controlled today advsied pt to bring readings next visit and get BP machine   - refilled ADHD medication Adderall XR 25 mg PO q daily for ADHD.  [facility] ref number [id number]  - for allergic rhinitis - continue  singulair and flonase and will add zyrtec.  -H/O prediabetes - D/C  metformin, recent hga1c normal at 5.4   - hyperlipidemia -continue statin. Recheck lipid panel. Pt on aspirin   - obesity - recommend ketogenic diet along with intermittent fasting. Pt has lost 3 lbs since last visit   -recheck in 1 month for followup         This document has been electronically signed by Scottie Borges MD on September 19, 2019 7:20 AM

## 2019-09-19 ENCOUNTER — OFFICE VISIT (OUTPATIENT)
Dept: FAMILY MEDICINE CLINIC | Facility: CLINIC | Age: 49
End: 2019-09-19

## 2019-09-19 VITALS
WEIGHT: 275.8 LBS | OXYGEN SATURATION: 97 % | BODY MASS INDEX: 38.61 KG/M2 | HEIGHT: 71 IN | SYSTOLIC BLOOD PRESSURE: 140 MMHG | HEART RATE: 88 BPM | DIASTOLIC BLOOD PRESSURE: 82 MMHG | TEMPERATURE: 97.7 F

## 2019-09-19 DIAGNOSIS — R41.840 INATTENTION: ICD-10-CM

## 2019-09-19 DIAGNOSIS — E78.5 HYPERLIPIDEMIA, UNSPECIFIED HYPERLIPIDEMIA TYPE: ICD-10-CM

## 2019-09-19 DIAGNOSIS — E66.9 NON MORBID OBESITY: ICD-10-CM

## 2019-09-19 DIAGNOSIS — M25.511 CHRONIC RIGHT SHOULDER PAIN: Primary | ICD-10-CM

## 2019-09-19 DIAGNOSIS — I10 ESSENTIAL HYPERTENSION: ICD-10-CM

## 2019-09-19 DIAGNOSIS — R73.03 PREDIABETES: ICD-10-CM

## 2019-09-19 DIAGNOSIS — Z98.890 S/P CARPAL TUNNEL RELEASE: ICD-10-CM

## 2019-09-19 DIAGNOSIS — F90.0 ATTENTION DEFICIT HYPERACTIVITY DISORDER (ADHD), PREDOMINANTLY INATTENTIVE TYPE: ICD-10-CM

## 2019-09-19 DIAGNOSIS — E66.9 CLASS 2 OBESITY WITH BODY MASS INDEX (BMI) OF 35.0 TO 35.9 IN ADULT, UNSPECIFIED OBESITY TYPE, UNSPECIFIED WHETHER SERIOUS COMORBIDITY PRESENT: ICD-10-CM

## 2019-09-19 DIAGNOSIS — Z87.898 HISTORY OF PREDIABETES: ICD-10-CM

## 2019-09-19 DIAGNOSIS — G89.29 CHRONIC RIGHT SHOULDER PAIN: Primary | ICD-10-CM

## 2019-09-19 PROCEDURE — 99214 OFFICE O/P EST MOD 30 MIN: CPT | Performed by: FAMILY MEDICINE

## 2019-09-19 RX ORDER — LISINOPRIL 20 MG/1
20 TABLET ORAL DAILY
Qty: 30 TABLET | Refills: 3 | Status: SHIPPED | OUTPATIENT
Start: 2019-09-19 | End: 2019-11-15

## 2019-09-19 RX ORDER — ASPIRIN 81 MG/1
81 TABLET ORAL DAILY
Qty: 30 TABLET | Refills: 3 | Status: SHIPPED | OUTPATIENT
Start: 2019-09-19 | End: 2020-05-13 | Stop reason: SDUPTHER

## 2019-09-19 RX ORDER — DEXTROAMPHETAMINE SACCHARATE, AMPHETAMINE ASPARTATE MONOHYDRATE, DEXTROAMPHETAMINE SULFATE AND AMPHETAMINE SULFATE 6.25; 6.25; 6.25; 6.25 MG/1; MG/1; MG/1; MG/1
25 CAPSULE, EXTENDED RELEASE ORAL EVERY MORNING
Qty: 30 CAPSULE | Refills: 0 | Status: SHIPPED | OUTPATIENT
Start: 2019-09-19 | End: 2019-10-17 | Stop reason: SDUPTHER

## 2019-09-19 NOTE — PATIENT INSTRUCTIONS
Go up on lisionpril from 10 to 20 mg daily. Take 2 pills of lisiopnril 10 mg = 20 mg     Get blood pressure machine and bring readings on paper on next visit     Goal <130/90    MIDNITE melatonin at [facility] for sleep     TUMERIC POWDER - can get at [facility] in pill form

## 2019-10-10 NOTE — PROGRESS NOTES
Subjective:  Randall Hernandez is a 49 y.o. male who presents for       Patient Active Problem List   Diagnosis   • Hyperlipidemia   • Inattention   • Non morbid obesity   • Prediabetes   • Attention deficit hyperactivity disorder (ADHD), combined type   • Need for vaccination   • Gastroesophageal reflux disease   • Encounter for drug screening   • Tingling in extremities   • Elevated BP without diagnosis of hypertension   • Allergic rhinitis   • Elevated blood pressure reading   • Pruritic rash   • Numbness in both hands   • Bilateral elbow joint pain   • Pain in both wrists   • Ulnar neuropathy of left upper extremity   • General medical examination   • Bilateral carpal tunnel syndrome   • History of prediabetes   • Numbness and tingling in both hands   • Essential hypertension   • Cerumen debris on tympanic membrane of both ears   • Carpal tunnel syndrome   • S/P carpal tunnel release   • Encounter for screening for malignant neoplasm of colon   • FH: colon cancer   • Acute recurrent sinusitis   • Attention deficit hyperactivity disorder (ADHD), predominantly inattentive type   • Class 2 obesity with body mass index (BMI) of 35.0 to 35.9 in adult   • Annual physical exam   • Arthritis of back   • Scoliosis   • Hyponatremia   • Hypochloremia   • Stage 2 chronic kidney disease           Current Outpatient Medications:   •  amphetamine-dextroamphetamine XR (ADDERALL XR) 25 MG 24 hr capsule, Take 1 capsule by mouth Every Morning Take 1 tablet by mouth daily, Disp: 30 capsule, Rfl: 0  •  aspirin 81 MG EC tablet, Take 1 tablet by mouth Daily., Disp: 30 tablet, Rfl: 3  •  atorvastatin (LIPITOR) 80 MG tablet, Take 1 tablet by mouth Daily., Disp: 30 tablet, Rfl: 3  •  cyclobenzaprine (FLEXERIL) 5 MG tablet, Take 1 tablet by mouth 3 (Three) Times a Day As Needed for Muscle Spasms., Disp: 30 tablet, Rfl: 3  •  fluticasone (FLONASE) 50 MCG/ACT nasal spray, 2 sprays by Each Nare route Daily., Disp: 16 mL, Rfl: 3  •   lisinopril (PRINIVIL,ZESTRIL) 20 MG tablet, Take 1 tablet by mouth Daily., Disp: 30 tablet, Rfl: 3  •  montelukast (SINGULAIR) 10 MG tablet, Take 1 tablet by mouth Every Night., Disp: 30 tablet, Rfl: 3  •  omeprazole (priLOSEC) 40 MG capsule, Take 1 capsule by mouth Daily., Disp: 30 capsule, Rfl: 3  •  vitamin D (ERGOCALCIFEROL) 77312 units capsule capsule, Take 1 capsule by mouth 1 (One) Time Per Week., Disp: 4 capsule, Rfl: 3  •  chlorthalidone (HYGROTON) 25 MG tablet, Take 1 tablet by mouth Daily., Disp: 30 tablet, Rfl: 3    HPI        Pt is 47 yo male with history of obesity, history of prediabetes, HTN,  GERD, vitamin D deficiency, alelrgic rhinitis, ADHD predominantly inattentive type ,sp bilateral carpal tunnel release surgery,  Chronic back pain (arthritis of lumbar spine, short pedicles, mild scoliosis, mild arthritis thoracic spine).  CKD stage 2      9/19/19 pt is here for recheck and followup. Had labwork on 8/191/9. UDS consistent with ADHD medication use, Vitami nD was normal lipid panel was stable. hga1c normal. CMP showed GFR at 87 from 73 last check   A few months ago.  Liver enzymes normal. CBC normal. His right shoulder is also bothering him.  It hurts to lift things. He has numbness in arm     101/7//19 pt is here for recheck and followup. Doing well . He has been stressed out due to being behind at work. BP is elevated today he has not been checking it at home. curently on lisinopril 20 mg daily.          Shoulder Injury    The incident occurred at the pool and at work. The right shoulder is affected. The injury mechanism is unknown. The quality of the pain is described as aching. The pain radiates to the right arm and right hand. The pain is at a severity of 5/10. The pain is moderate. Associated symptoms include numbness. Pertinent negatives include no chest pain, muscle weakness or tingling. Nothing aggravates the symptoms.    Obesity   This is a chronic problem. The current episode started  more than 1 year ago. The problem occurs constantly. The problem has been unchanged. Associated symptoms include arthralgias and numbness. Pertinent negatives include no abdominal pain, anorexia, change in bowel habit, chest pain, chills, congestion, coughing, diaphoresis, fatigue, fever, headaches, joint swelling, myalgias, nausea, rash, sore throat, swollen glands, urinary symptoms, vertigo, visual change, vomiting or weakness. Nothing aggravates the symptoms. He has tried nothing for the symptoms. The treatment provided no relief.   Mental Health Problem   The primary symptoms do not include dysphoric mood, delusions, hallucinations, bizarre behavior, disorganized speech, negative symptoms or somatic symptoms. This is a chronic problem.   The degree of incapacity that he is experiencing as a consequence of his illness is mild. Additional symptoms of the illness include attention impairment. Additional symptoms of the illness do not include anhedonia, insomnia, hypersomnia, appetite change, unexpected weight change, fatigue, agitation, psychomotor retardation, feelings of worthlessness, euphoric mood, increased goal-directed activity, flight of ideas, inflated self-esteem, decreased need for sleep, distractible, poor judgment, visual change, headaches or abdominal pain. He does not admit to suicidal ideas. He does not have a plan to commit suicide. He does not contemplate injuring another person.   Back Pain   This is a chronic problem. The current episode started more than 1 month ago. The problem occurs constantly. The problem is unchanged. The pain is present in the lumbar spine. The quality of the pain is described as aching. The pain is at a severity of 5/10. The symptoms are aggravated by lying down, position, sitting and standing. Associated symptoms include numbness. Pertinent negatives include no abdominal pain, bladder incontinence, bowel incontinence, chest pain, dysuria, fever, headaches, leg  pain, paresis, paresthesias, pelvic pain, perianal numbness, tingling or weakness. He has tried nothing for the symptoms. The treatment provided no relief.    Wrist Pain    The pain is present in the left elbow, left wrist, right wrist and right elbow. This is a chronic problem. The current episode started more than 1 month ago. The problem occurs constantly. The problem has been gradually worsening. The quality of the pain is described as aching. The pain is at a severity of 9/10. The pain is moderate. Associated symptoms include joint locking, a limited range of motion, numbness, stiffness and tingling. Pertinent negatives include no fever, inability to bear weight, itching or joint swelling. The symptoms are aggravated by activity. He has tried NSAIDS for the symptoms. The treatment provided no relief. Family history does not include gout or rheumatoid arthritis. There is no history of diabetes, gout, osteoarthritis or rheumatoid arthritis.   Elbow Injury   This is a chronic problem. The current episode started more than 1 year ago. The problem occurs constantly. The problem has been gradually worsening. Associated symptoms include arthralgias, joint swelling, numbness, a rash and weakness. Pertinent negatives include no abdominal pain, anorexia, change in bowel habit, chest pain, chills, congestion, coughing, diaphoresis, fatigue, fever, headaches, myalgias, nausea, neck pain, sore throat, swollen glands, urinary symptoms, vertigo, visual change or vomiting. The symptoms are aggravated by bending. He has tried NSAIDs for the symptoms. The treatment provided mild relief.   Hypertension   This is a new problem. The current episode started more than 1 month ago. The problem has been gradually worsening since onset. The problem is uncontrolled. Pertinent negatives include no anxiety, blurred vision, chest pain, headaches, malaise/fatigue, neck pain, orthopnea, palpitations, peripheral edema, PND, shortness of  breath or sweats. Risk factors for coronary artery disease include male gender, obesity and sedentary lifestyle. Past treatments include nothing. There are no compliance problems    Review of Systems  Review of Systems   Constitutional: Positive for activity change and fatigue. Negative for appetite change, chills, diaphoresis and fever.   HENT: Negative for congestion, postnasal drip, rhinorrhea, sinus pressure, sinus pain, sneezing, sore throat, trouble swallowing and voice change.    Respiratory: Negative for cough, choking, chest tightness, shortness of breath, wheezing and stridor.    Cardiovascular: Negative for chest pain.   Gastrointestinal: Negative for diarrhea, nausea and vomiting.   Musculoskeletal: Positive for arthralgias.   Neurological: Positive for weakness. Negative for headaches.   Psychiatric/Behavioral: Positive for decreased concentration.       Patient Active Problem List   Diagnosis   • Hyperlipidemia   • Inattention   • Non morbid obesity   • Prediabetes   • Attention deficit hyperactivity disorder (ADHD), combined type   • Need for vaccination   • Gastroesophageal reflux disease   • Encounter for drug screening   • Tingling in extremities   • Elevated BP without diagnosis of hypertension   • Allergic rhinitis   • Elevated blood pressure reading   • Pruritic rash   • Numbness in both hands   • Bilateral elbow joint pain   • Pain in both wrists   • Ulnar neuropathy of left upper extremity   • General medical examination   • Bilateral carpal tunnel syndrome   • History of prediabetes   • Numbness and tingling in both hands   • Essential hypertension   • Cerumen debris on tympanic membrane of both ears   • Carpal tunnel syndrome   • S/P carpal tunnel release   • Encounter for screening for malignant neoplasm of colon   • FH: colon cancer   • Acute recurrent sinusitis   • Attention deficit hyperactivity disorder (ADHD), predominantly inattentive type   • Class 2 obesity with body mass index  (BMI) of 35.0 to 35.9 in adult   • Annual physical exam   • Arthritis of back   • Scoliosis   • Hyponatremia   • Hypochloremia   • Stage 2 chronic kidney disease     Past Surgical History:   Procedure Laterality Date   • CARPAL TUNNEL RELEASE Right 2018    Procedure: CARPAL TUNNEL RELEASE;  Surgeon: Judd Mathias MD;  Location: [facility] OR;  Service: Orthopedics   • CARPAL TUNNEL RELEASE  2018    Left   • CARPAL TUNNEL RELEASE Left 2018    Procedure: CARPAL TUNNEL RELEASE;  Surgeon: Judd Mathias MD;  Location: [facility] OR;  Service: Orthopedics   • COLONOSCOPY N/A 2019    Procedure: COLONOSCOPY a friday please ;  Surgeon: Ruslan Bowers MD;  Location: [facility] ENDOSCOPY;  Service: Gastroenterology   • ENDOSCOPY     • PYLOROMYOTOMY       Social History     Socioeconomic History   • Marital status: Single     Spouse name: Not on file   • Number of children: Not on file   • Years of education: Not on file   • Highest education level: Not on file   Occupational History   • Occupation:      Employer: SELF-EMPLOYED     Comment: Patient resides with [place].   Tobacco Use   • Smoking status: Former Smoker     Packs/day: 1.00     Years: 6.00     Pack years: 6.00     Types: Cigarettes     Last attempt to quit:      Years since quittin.8   • Smokeless tobacco: Never Used   Substance and Sexual Activity   • Alcohol use: Yes     Comment: 2018 - Kevin reports consumption of alcoholic beverages under a social basis (approximately 3 - 4 per month).     • Drug use: No   • Sexual activity: Defer     Family History   Problem Relation Age of Onset   • Colon cancer Mother         onset age greater than 75y   • Diabetes Mother    • Breast cancer Sister    • Cancer Brother         bladder   • Diabetes Brother    • Hyperlipidemia Brother      Lab on 2019   Component Date Value Ref Range Status   • WBC 2019 7.42  3.40 - 10.80 10*3/mm3 Final   • RBC  08/19/2019 5.05  4.14 - 5.80 10*6/mm3 Final   • Hemoglobin 08/19/2019 15.0  13.0 - 17.7 g/dL Final   • Hematocrit 08/19/2019 46.7  37.5 - 51.0 % Final   • MCV 08/19/2019 92.5  79.0 - 97.0 fL Final   • MCH 08/19/2019 29.7  26.6 - 33.0 pg Final   • MCHC 08/19/2019 32.1  31.5 - 35.7 g/dL Final   • RDW 08/19/2019 13.6  12.3 - 15.4 % Final   • RDW-SD 08/19/2019 46.5  37.0 - 54.0 fl Final   • MPV 08/19/2019 10.5  6.0 - 12.0 fL Final   • Platelets 08/19/2019 293  140 - 450 10*3/mm3 Final   • Neutrophil % 08/19/2019 55.4  42.7 - 76.0 % Final   • Lymphocyte % 08/19/2019 34.5  19.6 - 45.3 % Final   • Monocyte % 08/19/2019 6.1  5.0 - 12.0 % Final   • Eosinophil % 08/19/2019 2.6  0.3 - 6.2 % Final   • Basophil % 08/19/2019 0.9  0.0 - 1.5 % Final   • Immature Grans % 08/19/2019 0.5  0.0 - 0.5 % Final   • Neutrophils, Absolute 08/19/2019 4.11  1.70 - 7.00 10*3/mm3 Final   • Lymphocytes, Absolute 08/19/2019 2.56  0.70 - 3.10 10*3/mm3 Final   • Monocytes, Absolute 08/19/2019 0.45  0.10 - 0.90 10*3/mm3 Final   • Eosinophils, Absolute 08/19/2019 0.19  0.00 - 0.40 10*3/mm3 Final   • Basophils, Absolute 08/19/2019 0.07  0.00 - 0.20 10*3/mm3 Final   • Immature Grans, Absolute 08/19/2019 0.04  0.00 - 0.05 10*3/mm3 Final   • nRBC 08/19/2019 0.1  0.0 - 0.2 /100 WBC Final   • Glucose 08/19/2019 95  65 - 99 mg/dL Final   • BUN 08/19/2019 15  6 - 20 mg/dL Final   • Creatinine 08/19/2019 0.92  0.76 - 1.27 mg/dL Final   • Sodium 08/19/2019 145  136 - 145 mmol/L Final   • Potassium 08/19/2019 4.6  3.5 - 5.2 mmol/L Final   • Chloride 08/19/2019 106  98 - 107 mmol/L Final   • CO2 08/19/2019 26.8  22.0 - 29.0 mmol/L Final   • Calcium 08/19/2019 9.9  8.6 - 10.5 mg/dL Final   • Total Protein 08/19/2019 7.9  6.0 - 8.5 g/dL Final   • Albumin 08/19/2019 4.80  3.50 - 5.20 g/dL Final   • ALT (SGPT) 08/19/2019 30  1 - 41 U/L Final   • AST (SGOT) 08/19/2019 21  1 - 40 U/L Final   • Alkaline Phosphatase 08/19/2019 102  39 - 117 U/L Final   • Total Bilirubin  08/19/2019 0.6  0.2 - 1.2 mg/dL Final   • eGFR Non  Amer 08/19/2019 87  >60 mL/min/1.73 Final   • Globulin 08/19/2019 3.1  gm/dL Final   • A/G Ratio 08/19/2019 1.5  g/dL Final   • BUN/Creatinine Ratio 08/19/2019 16.3  7.0 - 25.0 Final   • Anion Gap 08/19/2019 12.2  5.0 - 15.0 mmol/L Final   • Hemoglobin A1C 08/19/2019 5.50  4.80 - 5.60 % Final   • Total Cholesterol 08/19/2019 196  0 - 200 mg/dL Final   • Triglycerides 08/19/2019 121  0 - 150 mg/dL Final   • HDL Cholesterol 08/19/2019 47  40 - 60 mg/dL Final   • LDL Cholesterol  08/19/2019 125* 0 - 100 mg/dL Final   • VLDL Cholesterol 08/19/2019 24.2  5 - 40 mg/dL Final   • LDL/HDL Ratio 08/19/2019 2.66   Final   • 25 Hydroxy, Vitamin D 08/19/2019 39.8  30.0 - 100.0 ng/ml Final   • THC, Screen, Urine 08/19/2019 Negative  Negative Final   • Phencyclidine (PCP), Urine 08/19/2019 Negative  Negative Final   • Cocaine Screen, Urine 08/19/2019 Negative  Negative Final   • Methamphetamine, Ur 08/19/2019 Negative  Negative Final   • Opiate Screen 08/19/2019 Negative  Negative Final   • Amphetamine Screen, Urine 08/19/2019 Positive* Negative Final   • Benzodiazepine Screen, Urine 08/19/2019 Negative  Negative Final   • Tricyclic Antidepressants Screen 08/19/2019 Negative  Negative Final   • Methadone Screen, Urine 08/19/2019 Negative  Negative Final   • Barbiturates Screen, Urine 08/19/2019 Negative  Negative Final   • Oxycodone Screen, Urine 08/19/2019 Negative  Negative Final   • Propoxyphene Screen 08/19/2019 Negative  Negative Final   • Buprenorphine, Screen, Urine 08/19/2019 Negative  Negative Final   Lab on 04/18/2019   Component Date Value Ref Range Status   • WBC 04/18/2019 5.25  3.40 - 10.80 10*3/mm3 Final   • RBC 04/18/2019 4.77  4.14 - 5.80 10*6/mm3 Final   • Hemoglobin 04/18/2019 14.2  13.0 - 17.7 g/dL Final   • Hematocrit 04/18/2019 43.6  37.5 - 51.0 % Final   • MCV 04/18/2019 91.4  79.0 - 97.0 fL Final   • MCH 04/18/2019 29.8  26.6 - 33.0 pg Final   •  MCHC 04/18/2019 32.6  31.5 - 35.7 g/dL Final   • RDW 04/18/2019 13.1  12.3 - 15.4 % Final   • RDW-SD 04/18/2019 44.4  37.0 - 54.0 fl Final   • MPV 04/18/2019 9.9  6.0 - 12.0 fL Final   • Platelets 04/18/2019 306  140 - 450 10*3/mm3 Final   • Neutrophil % 04/18/2019 48.4  42.7 - 76.0 % Final   • Lymphocyte % 04/18/2019 38.1  19.6 - 45.3 % Final   • Monocyte % 04/18/2019 8.4  5.0 - 12.0 % Final   • Eosinophil % 04/18/2019 3.4  0.3 - 6.2 % Final   • Basophil % 04/18/2019 1.3  0.0 - 1.5 % Final   • Immature Grans % 04/18/2019 0.4  0.0 - 0.5 % Final   • Neutrophils, Absolute 04/18/2019 2.54  1.40 - 7.00 10*3/mm3 Final   • Lymphocytes, Absolute 04/18/2019 2.00  0.70 - 3.10 10*3/mm3 Final   • Monocytes, Absolute 04/18/2019 0.44  0.10 - 0.90 10*3/mm3 Final   • Eosinophils, Absolute 04/18/2019 0.18  0.00 - 0.40 10*3/mm3 Final   • Basophils, Absolute 04/18/2019 0.07  0.00 - 0.20 10*3/mm3 Final   • Immature Grans, Absolute 04/18/2019 0.02  0.00 - 0.05 10*3/mm3 Final   • nRBC 04/18/2019 0.0  0.0 - 0.0 /100 WBC Final   • Glucose 04/18/2019 104* 65 - 99 mg/dL Final   • BUN 04/18/2019 11  6 - 20 mg/dL Final   • Creatinine 04/18/2019 1.07  0.76 - 1.27 mg/dL Final   • Sodium 04/18/2019 134* 136 - 145 mmol/L Final   • Potassium 04/18/2019 4.5  3.5 - 5.2 mmol/L Final   • Chloride 04/18/2019 96* 98 - 107 mmol/L Final   • CO2 04/18/2019 22.8  22.0 - 29.0 mmol/L Final   • Calcium 04/18/2019 8.9  8.6 - 10.5 mg/dL Final   • Total Protein 04/18/2019 7.6  6.0 - 8.5 g/dL Final   • Albumin 04/18/2019 4.50  3.50 - 5.20 g/dL Final   • ALT (SGPT) 04/18/2019 30  1 - 41 U/L Final   • AST (SGOT) 04/18/2019 28  1 - 40 U/L Final   • Alkaline Phosphatase 04/18/2019 110  39 - 117 U/L Final   • Total Bilirubin 04/18/2019 0.6  0.2 - 1.2 mg/dL Final   • eGFR Non African Amer 04/18/2019 73  >60 mL/min/1.73 Final   • Globulin 04/18/2019 3.1  gm/dL Final   • A/G Ratio 04/18/2019 1.5  g/dL Final   • BUN/Creatinine Ratio 04/18/2019 10.3  7.0 - 25.0 Final   •  Anion Gap 04/18/2019 15.2  mmol/L Final   • Total Cholesterol 04/18/2019 174  0 - 200 mg/dL Final   • Triglycerides 04/18/2019 91  0 - 150 mg/dL Final   • HDL Cholesterol 04/18/2019 41  40 - 60 mg/dL Final   • LDL Cholesterol  04/18/2019 115* 0 - 100 mg/dL Final   • VLDL Cholesterol 04/18/2019 18.2  5 - 40 mg/dL Final   • LDL/HDL Ratio 04/18/2019 2.80   Final   • 25 Hydroxy, Vitamin D 04/18/2019 27.9* 30.0 - 100.0 ng/ml Final   • Amphet/Methamphet, Screen 04/18/2019 Positive* Negative Final   • Barbiturates Screen, Urine 04/18/2019 Negative  Negative Final   • Benzodiazepine Screen, Urine 04/18/2019 Negative  Negative Final   • Cocaine Screen, Urine 04/18/2019 Negative  Negative Final   • Opiate Screen 04/18/2019 Negative  Negative Final   • THC, Screen, Urine 04/18/2019 Negative  Negative Final   • Methadone Screen, Urine 04/18/2019 Negative  Negative Final   • Oxycodone Screen, Urine 04/18/2019 Negative  Negative Final      XR Spine Lumbar 4+ View  Narrative: Five-view lumbar spine    HISTORY: Back pain. Lumbago with bilateral sciatica.    AP, lateral and oblique radiographs of the lumbar spine and spot  film of the lumbosacral junction were obtained.    COMPARISON: None.    Mild levoscoliosis.  Congenitally short pedicles.  Minimal degenerative changes with small marginal osteophytes.  There is a normal lordosis.   Vertebral height and alignment are maintained.  No fracture.  The pedicles are intact.  Impression: CONCLUSION:  Mild levoscoliosis.  Congenitally short pedicles.  Minimal degenerative changes with small marginal osteophytes.    [id number]    Electronically signed by:  Anthony Cm MD  4/8/2019 10:21 AM CDT  Workstation: [email or web address]  XR Spine Thoracic 3 View  Narrative: Three-view thoracic spine.    HISTORY: Back pain    AP, lateral and swimmer's views of the thoracic spine were  obtained.    COMPARISON: None.    Normal thoracic kyphosis.  Vertebral height and alignment maintained.  Mild degenerative  "changes.  No fracture.  The pedicles are intact.  No paraspinal widening.  Impression: CONCLUSION:   Mild degenerative changes.    [id number]    Electronically signed by:  Anthony Cm MD  4/8/2019 10:19 AM CDT  Workstation: [email or web address]    [unfilled]  Immunization History   Administered Date(s) Administered   • Influenza TIV (IM) 03/28/2017   • Tdap 02/17/2017       The following portions of the patient's history were reviewed and updated as appropriate: allergies, current medications, past family history, past medical history, past social history, past surgical history and problem list.        Physical Exam  /86 (BP Location: Right arm, Patient Position: Sitting, Cuff Size: Adult)   Pulse 87   Temp 97.9 °F (36.6 °C) (Oral)   Ht 180.3 cm (71\")   Wt 121 kg (267 lb)   SpO2 96%   BMI 37.24 kg/m²     Physical Exam   Constitutional: He is oriented to person, place, and time. He appears well-developed and well-nourished.   HENT:   Head: Normocephalic and atraumatic.   Right Ear: External ear normal.   Eyes: Conjunctivae and EOM are normal. Pupils are equal, round, and reactive to light.   Neck: Normal range of motion. Neck supple.   Cardiovascular: Normal rate, regular rhythm and normal heart sounds.   No murmur heard.  Pulmonary/Chest: Effort normal and breath sounds normal. No respiratory distress.   Abdominal: Soft. Bowel sounds are normal. He exhibits no distension. There is no tenderness.   Obese    Musculoskeletal: Normal range of motion. He exhibits no edema or deformity.   Neurological: He is alert and oriented to person, place, and time. No cranial nerve deficit.   Skin: Skin is warm. No rash noted. He is not diaphoretic. No erythema. No pallor.   Psychiatric: He has a normal mood and affect. His behavior is normal.   Nursing note and vitals reviewed.      Assessment/Plan    Diagnosis Plan   1. Essential hypertension     2. Prediabetes     3. S/P carpal tunnel release     4. Hyperlipidemia, " unspecified hyperlipidemia type     5. History of prediabetes     6. Gastroesophageal reflux disease, esophagitis presence not specified     7. Attention deficit hyperactivity disorder (ADHD), predominantly inattentive type     8. Allergic rhinitis, unspecified seasonality, unspecified trigger     9. Stage 2 chronic kidney disease            -went over labwork   -recommend influenza vaccination - pt declined.   -ckd stage 2 - continue to monitor. Gave information. Advised better hydration BP control   -recommend pt go back on aspirin and lipitor  -for back pain with sciatica/scoliosis - pain is better after PT/OT. Continue with mobic and flexeril PRN.  Consider MRI if not improving   -right shoulder pain - x-ray today. Consider PT/OT or Orthopedic referral.  Tumeric powder in pill form   - pt doing well postop on carpal tunnel release on right hand   -for cerumen of both ears - debrox ointment PRN. Drug information provided  -for carpal tunnel he is doing better post op  he has failed conservative treatment for >3 months. . He has yet to get surgery on left  Hand   -HTN-  Lisinopril 20 mg daily. Add chlorthalidone 25 mg q daily.    -vitamin D pill once a week   -recommend flu shot she declined   -HLP - lipitor 80mg PO qhs   -  BP was at goal Continue  on lisionpril  But go up form 10 to 20 mg PO q daily drug information provided low salt diet informaiton provided. Side effects discussed BP better controlled today advsied pt to bring readings next visit and get BP machine   - refilled ADHD medication Adderall XR 25 mg PO q daily for ADHD.  [facility] ref number [id number]  - for allergic rhinitis - continue singulair and flonase and will add zyrtec.  -H/O prediabetes - D/C  metformin, recent hga1c normal at 5.4   - hyperlipidemia -continue statin. Recheck lipid panel. Pt on aspirin   - obesity - recommend ketogenic diet along with intermittent fasting. Pt has lost 3 lbs since last visit   -advised pt to be safe and call  with questions and concerns  -advised to go to ER or call 911 if symptoms get severe  -advised to followup with specialist and referrals   -recheck in 1 month for followup         This document has been electronically signed by Scottie Borges MD on October 17, 2019 8:47 AM

## 2019-10-17 ENCOUNTER — OFFICE VISIT (OUTPATIENT)
Dept: FAMILY MEDICINE CLINIC | Facility: CLINIC | Age: 49
End: 2019-10-17

## 2019-10-17 VITALS
BODY MASS INDEX: 37.38 KG/M2 | OXYGEN SATURATION: 96 % | SYSTOLIC BLOOD PRESSURE: 142 MMHG | DIASTOLIC BLOOD PRESSURE: 86 MMHG | TEMPERATURE: 97.9 F | WEIGHT: 267 LBS | HEIGHT: 71 IN | HEART RATE: 87 BPM

## 2019-10-17 DIAGNOSIS — F90.0 ATTENTION DEFICIT HYPERACTIVITY DISORDER (ADHD), PREDOMINANTLY INATTENTIVE TYPE: ICD-10-CM

## 2019-10-17 DIAGNOSIS — K21.9 GASTROESOPHAGEAL REFLUX DISEASE, ESOPHAGITIS PRESENCE NOT SPECIFIED: ICD-10-CM

## 2019-10-17 DIAGNOSIS — N18.2 STAGE 2 CHRONIC KIDNEY DISEASE: ICD-10-CM

## 2019-10-17 DIAGNOSIS — E78.5 HYPERLIPIDEMIA, UNSPECIFIED HYPERLIPIDEMIA TYPE: ICD-10-CM

## 2019-10-17 DIAGNOSIS — Z98.890 S/P CARPAL TUNNEL RELEASE: ICD-10-CM

## 2019-10-17 DIAGNOSIS — I10 ESSENTIAL HYPERTENSION: Primary | ICD-10-CM

## 2019-10-17 DIAGNOSIS — Z87.898 HISTORY OF PREDIABETES: ICD-10-CM

## 2019-10-17 DIAGNOSIS — R73.03 PREDIABETES: ICD-10-CM

## 2019-10-17 DIAGNOSIS — J30.9 ALLERGIC RHINITIS, UNSPECIFIED SEASONALITY, UNSPECIFIED TRIGGER: ICD-10-CM

## 2019-10-17 PROCEDURE — 99213 OFFICE O/P EST LOW 20 MIN: CPT | Performed by: FAMILY MEDICINE

## 2019-10-17 RX ORDER — DEXTROAMPHETAMINE SACCHARATE, AMPHETAMINE ASPARTATE MONOHYDRATE, DEXTROAMPHETAMINE SULFATE AND AMPHETAMINE SULFATE 6.25; 6.25; 6.25; 6.25 MG/1; MG/1; MG/1; MG/1
25 CAPSULE, EXTENDED RELEASE ORAL EVERY MORNING
Qty: 30 CAPSULE | Refills: 0 | Status: SHIPPED | OUTPATIENT
Start: 2019-10-17 | End: 2019-11-15 | Stop reason: SDUPTHER

## 2019-10-17 RX ORDER — CHLORTHALIDONE 25 MG/1
25 TABLET ORAL DAILY
Qty: 30 TABLET | Refills: 3 | Status: SHIPPED | OUTPATIENT
Start: 2019-10-17 | End: 2020-03-05

## 2019-10-17 NOTE — PATIENT INSTRUCTIONS
Chlorthalidone tablets  What is this medicine?  CHLORTHALIDONE (klor THAL i done) is a diuretic. It increases the amount of urine passed, which causes the body to lose salt and water. This medicine is used to treat high blood pressure and edema or water retention.  This medicine may be used for other purposes; ask your health care provider or pharmacist if you have questions.  COMMON BRAND NAME(S): Thalitone  What should I tell my health care provider before I take this medicine?  They need to know if you have any of these conditions:  -asthma  -diabetes  -gout  -kidney disease  -liver disease  -parathyroid disease  -systemic lupus erythematosus (SLE)  -taking cortisone, digoxin, lithium carbonate, or drugs for diabetes  -an unusual or allergic reaction to chlorthalidone, sulfa drugs, other medicines, foods, dyes, or preservatives  -pregnant or trying to get pregnant  -breast-feeding  How should I use this medicine?  Take this medicine by mouth with a glass of water. Follow the directions on the prescription label. It is best to take your dose in the morning with food. Take your medicine at regular intervals. Do not take your medicine more often than directed. Do not stop taking except on your doctor's advice.  Talk to your pediatrician regarding the use of this medicine in children. Special care may be needed.  Overdosage: If you think you have taken too much of this medicine contact a poison control center or emergency room at once.  NOTE: This medicine is only for you. Do not share this medicine with others.  What if I miss a dose?  If you miss a dose, take it as soon as you can. If it is almost time for your next dose, take only that dose. Do not take double or extra doses.  What may interact with this medicine?  -barbiturate medicines for sleep or seizure control  -digoxin  -lithium  -medicines for diabetes  -norepinephrine  -other medicines for high blood pressure  -some pain medicines  -steroid hormones like  prednisone, cortisone, hydrocortisone, corticotropin  -tubocurarine  This list may not describe all possible interactions. Give your health care provider a list of all the medicines, herbs, non-prescription drugs, or dietary supplements you use. Also tell them if you smoke, drink alcohol, or use illegal drugs. Some items may interact with your medicine.  What should I watch for while using this medicine?  Visit your doctor or health care professional for regular check ups. Check your blood pressure as directed. Ask your doctor or health care professional what your blood pressure should be and when you should contact him or her.  You may need to be on a special diet while taking this medicine. Ask your doctor.  You may get drowsy or dizzy. Do not drive, use machinery, or do anything that needs mental alertness until you know how this medicine affects you. Do not stand or sit up quickly, especially if you are an older patient. This reduces the risk of dizzy or fainting spells. Alcohol may interfere with the effect of this medicine. Avoid alcoholic drinks.  This medicine may affect your blood sugar level. If you have diabetes, check with your doctor or health care professional before changing the dose of your diabetic medicine.  This medicine can make you more sensitive to the sun. Keep out of the sun. If you cannot avoid being in the sun, wear protective clothing and use sunscreen. Do not use sun lamps or tanning beds/booths.  What side effects may I notice from receiving this medicine?  Side effects that you should report to your doctor or health care professional as soon as possible:  -allergic reactions like skin rash, itching or hives, swelling of the face, lips, or tongue  -dark urine  -dry mouth  -excess thirst  -fast, irregular heart rate  -fever, chills  -muscle pain, cramps, or spasm  -nausea, vomiting  -redness, blistering, peeling or loosening of the skin, including inside the mouth  -tingling, pain or  numbness in the hands or feet  -unusually weak or tired  -yellowing of the eyes or skin  Side effects that usually do not require medical attention (report to your doctor or health care professional if they continue or are bothersome):  -diarrhea or constipation  -headache  -impotence  -loss of appetite  -stomach upset  This list may not describe all possible side effects. Call your doctor for medical advice about side effects. You may report side effects to FDA at 9-933-RAQ-1114.  Where should I keep my medicine?  Keep out of the reach of children.  Store at room temperature between 15 and 30 degrees C (59 and 86 degrees F). Keep container tightly closed. Throw away any unused medicine after the expiration date.  NOTE: This sheet is a summary. It may not cover all possible information. If you have questions about this medicine, talk to your doctor, pharmacist, or health care provider.  © 2019 [employer] (2009-03-24 15:28:48)

## 2019-10-25 RX ORDER — FLUTICASONE PROPIONATE 50 MCG
SPRAY, SUSPENSION (ML) NASAL
Qty: 16 ML | Refills: 0 | Status: SHIPPED | OUTPATIENT
Start: 2019-10-25 | End: 2019-11-15 | Stop reason: SDUPTHER

## 2019-10-25 RX ORDER — MONTELUKAST SODIUM 10 MG/1
TABLET ORAL
Qty: 30 TABLET | Refills: 0 | Status: SHIPPED | OUTPATIENT
Start: 2019-10-25 | End: 2020-01-27

## 2019-11-06 NOTE — PROGRESS NOTES
Subjective:  Randall Hernandez is a 49 y.o. male who presents for       Patient Active Problem List   Diagnosis   • Hyperlipidemia   • Inattention   • Non morbid obesity   • Prediabetes   • Attention deficit hyperactivity disorder (ADHD), combined type   • Need for vaccination   • Gastroesophageal reflux disease   • Encounter for drug screening   • Tingling in extremities   • Elevated BP without diagnosis of hypertension   • Allergic rhinitis   • Elevated blood pressure reading   • Pruritic rash   • Numbness in both hands   • Bilateral elbow joint pain   • Pain in both wrists   • Ulnar neuropathy of left upper extremity   • General medical examination   • Bilateral carpal tunnel syndrome   • History of prediabetes   • Numbness and tingling in both hands   • Essential hypertension   • Cerumen debris on tympanic membrane of both ears   • Carpal tunnel syndrome   • S/P carpal tunnel release   • Encounter for screening for malignant neoplasm of colon   • FH: colon cancer   • Acute recurrent sinusitis   • Attention deficit hyperactivity disorder (ADHD), predominantly inattentive type   • Class 2 obesity with body mass index (BMI) of 35.0 to 35.9 in adult   • Annual physical exam   • Arthritis of back   • Scoliosis   • Hyponatremia   • Hypochloremia   • Stage 2 chronic kidney disease           Current Outpatient Medications:   •  amphetamine-dextroamphetamine XR (ADDERALL XR) 25 MG 24 hr capsule, Take 1 capsule by mouth Every Morning Take 1 tablet by mouth daily, Disp: 30 capsule, Rfl: 0  •  aspirin 81 MG EC tablet, Take 1 tablet by mouth Daily., Disp: 30 tablet, Rfl: 3  •  atorvastatin (LIPITOR) 80 MG tablet, Take 1 tablet by mouth Daily., Disp: 30 tablet, Rfl: 3  •  chlorthalidone (HYGROTON) 25 MG tablet, Take 1 tablet by mouth Daily., Disp: 30 tablet, Rfl: 3  •  cyclobenzaprine (FLEXERIL) 5 MG tablet, Take 1 tablet by mouth 3 (Three) Times a Day As Needed for Muscle Spasms., Disp: 30 tablet, Rfl: 3  •  fluticasone  (FLONASE) 50 MCG/ACT nasal spray, 2 sprays by Each Nare route Daily., Disp: 16 mL, Rfl: 3  •  montelukast (SINGULAIR) 10 MG tablet, TAKE 1 TABLET BY MOUTH EVERY NIGHT, Disp: 30 tablet, Rfl: 0  •  omeprazole (priLOSEC) 40 MG capsule, Take 1 capsule by mouth Daily., Disp: 30 capsule, Rfl: 3  •  vitamin D (ERGOCALCIFEROL) 03493 units capsule capsule, Take 1 capsule by mouth 1 (One) Time Per Week., Disp: 4 capsule, Rfl: 3  •  lisinopril (PRINIVIL,ZESTRIL) 40 MG tablet, Take 1 tablet by mouth Daily., Disp: 30 tablet, Rfl: 3      Pt is 47 yo male with management  of obesity, history of prediabetes, HTN,  GERD, vitamin D deficiency, alelrgic rhinitis, ADHD predominantly inattentive type ,sp bilateral carpal tunnel release surgery,  Chronic back pain (arthritis of lumbar spine, short pedicles, mild scoliosis, mild arthritis thoracic spine).  CKD stage 2      101/7//19 pt is here for recheck and followup. Doing well . He has been stressed out due to being behind at work. BP is elevated today he has not been checking it at home. curently on lisinopril 20 mg daily.      11/15/19 pt is here for recheck and refill on ADHD medication. Medication is helping. Takes Adderall XR 25 mg daily. No chest pain no dizziness. BP is up today.  His Fiancee is in the hospital now. He has bees stressed.   His main concern today  Is his left upper arm has been hurting. He has a knot on left upper arm. He concerned about possible hematoma. Hurts to lift things It feels like something is burning in there       Upper Extremity Issue   This is a chronic problem. The current episode started more than 1 year ago. The problem occurs constantly. The problem has been unchanged. Associated symptoms include arthralgias, fatigue, numbness and weakness. Pertinent negatives include no abdominal pain, anorexia, change in bowel habit, chest pain, chills, congestion, coughing, diaphoresis, fever, headaches, joint swelling, myalgias, nausea, neck pain, swollen  glands, urinary symptoms, vertigo, visual change or vomiting. The symptoms are aggravated by stress and exertion (lifting). Treatments tried: ice. The treatment provided no relief.    Shoulder Injury    The incident occurred at the pool and at work. The right shoulder is affected. The injury mechanism is unknown. The quality of the pain is described as aching. The pain radiates to the right arm and right hand. The pain is at a severity of 5/10. The pain is moderate. Associated symptoms include numbness. Pertinent negatives include no chest pain, muscle weakness or tingling. Nothing aggravates the symptoms.    Obesity   This is a chronic problem. The current episode started more than 1 year ago. The problem occurs constantly. The problem has been unchanged. Associated symptoms include arthralgias and numbness. Pertinent negatives include no abdominal pain, anorexia, change in bowel habit, chest pain, chills, congestion, coughing, diaphoresis, fatigue, fever, headaches, joint swelling, myalgias, nausea, rash, sore throat, swollen glands, urinary symptoms, vertigo, visual change, vomiting or weakness. Nothing aggravates the symptoms. He has tried nothing for the symptoms. The treatment provided no relief.   Mental Health Problem   The primary symptoms do not include dysphoric mood, delusions, hallucinations, bizarre behavior, disorganized speech, negative symptoms or somatic symptoms. This is a chronic problem.   The degree of incapacity that he is experiencing as a consequence of his illness is mild. Additional symptoms of the illness include attention impairment. Additional symptoms of the illness do not include anhedonia, insomnia, hypersomnia, appetite change, unexpected weight change, fatigue, agitation, psychomotor retardation, feelings of worthlessness, euphoric mood, increased goal-directed activity, flight of ideas, inflated self-esteem, decreased need for sleep, distractible, poor judgment, visual  change, headaches or abdominal pain. He does not admit to suicidal ideas. He does not have a plan to commit suicide. He does not contemplate injuring another person.   Back Pain   This is a chronic problem. The current episode started more than 1 month ago. The problem occurs constantly. The problem is unchanged. The pain is present in the lumbar spine. The quality of the pain is described as aching. The pain is at a severity of 5/10. The symptoms are aggravated by lying down, position, sitting and standing. Associated symptoms include numbness. Pertinent negatives include no abdominal pain, bladder incontinence, bowel incontinence, chest pain, dysuria, fever, headaches, leg pain, paresis, paresthesias, pelvic pain, perianal numbness, tingling or weakness. He has tried nothing for the symptoms. The treatment provided no relief.    Wrist Pain    The pain is present in the left elbow, left wrist, right wrist and right elbow. This is a chronic problem. The current episode started more than 1 month ago. The problem occurs constantly. The problem has been gradually worsening. The quality of the pain is described as aching. The pain is at a severity of 9/10. The pain is moderate. Associated symptoms include joint locking, a limited range of motion, numbness, stiffness and tingling. Pertinent negatives include no fever, inability to bear weight, itching or joint swelling. The symptoms are aggravated by activity. He has tried NSAIDS for the symptoms. The treatment provided no relief. Family history does not include gout or rheumatoid arthritis. There is no history of diabetes, gout, osteoarthritis or rheumatoid arthritis.   Elbow Injury   This is a chronic problem. The current episode started more than 1 year ago. The problem occurs constantly. The problem has been gradually worsening. Associated symptoms include arthralgias, joint swelling, numbness, a rash and weakness. Pertinent negatives include no abdominal  pain, anorexia, change in bowel habit, chest pain, chills, congestion, coughing, diaphoresis, fatigue, fever, headaches, myalgias, nausea, neck pain, sore throat, swollen glands, urinary symptoms, vertigo, visual change or vomiting. The symptoms are aggravated by bending. He has tried NSAIDs for the symptoms. The treatment provided mild relief.   Hypertension   This is a new problem. The current episode started more than 1 month ago. The problem has been gradually worsening since onset. The problem is uncontrolled. Pertinent negatives include no anxiety, blurred vision, chest pain, headaches, malaise/fatigue, neck pain, orthopnea, palpitations, peripheral edema, PND, shortness of breath or sweats. Risk factors for coronary artery disease include male gender, obesity and sedentary lifestyle. Past treatments include nothing. There are no compliance problems    Review of Systems  Review of Systems   Constitutional: Positive for activity change and fatigue. Negative for appetite change, chills, diaphoresis and fever.   HENT: Negative for congestion, postnasal drip, rhinorrhea, sinus pressure, sinus pain, sneezing, trouble swallowing and voice change.    Respiratory: Negative for cough, choking, chest tightness, shortness of breath, wheezing and stridor.    Cardiovascular: Negative for chest pain.   Gastrointestinal: Negative for abdominal pain, anorexia, change in bowel habit, diarrhea, nausea and vomiting.   Musculoskeletal: Positive for arthralgias. Negative for joint swelling, myalgias and neck pain.   Neurological: Positive for weakness and numbness. Negative for vertigo and headaches.   Psychiatric/Behavioral: Positive for decreased concentration. The patient is nervous/anxious.        Patient Active Problem List   Diagnosis   • Hyperlipidemia   • Inattention   • Non morbid obesity   • Prediabetes   • Attention deficit hyperactivity disorder (ADHD), combined type   • Need for vaccination   • Gastroesophageal reflux  disease   • Encounter for drug screening   • Tingling in extremities   • Elevated BP without diagnosis of hypertension   • Allergic rhinitis   • Elevated blood pressure reading   • Pruritic rash   • Numbness in both hands   • Bilateral elbow joint pain   • Pain in both wrists   • Ulnar neuropathy of left upper extremity   • General medical examination   • Bilateral carpal tunnel syndrome   • History of prediabetes   • Numbness and tingling in both hands   • Essential hypertension   • Cerumen debris on tympanic membrane of both ears   • Carpal tunnel syndrome   • S/P carpal tunnel release   • Encounter for screening for malignant neoplasm of colon   • FH: colon cancer   • Acute recurrent sinusitis   • Attention deficit hyperactivity disorder (ADHD), predominantly inattentive type   • Class 2 obesity with body mass index (BMI) of 35.0 to 35.9 in adult   • Annual physical exam   • Arthritis of back   • Scoliosis   • Hyponatremia   • Hypochloremia   • Stage 2 chronic kidney disease     Past Surgical History:   Procedure Laterality Date   • CARPAL TUNNEL RELEASE Right 11/2/2018    Procedure: CARPAL TUNNEL RELEASE;  Surgeon: Judd Mathias MD;  Location: [facility] OR;  Service: Orthopedics   • CARPAL TUNNEL RELEASE  12/07/2018    Left   • CARPAL TUNNEL RELEASE Left 12/7/2018    Procedure: CARPAL TUNNEL RELEASE;  Surgeon: Judd Mathias MD;  Location: [facility] OR;  Service: Orthopedics   • COLONOSCOPY N/A 1/18/2019    Procedure: COLONOSCOPY a friday please ;  Surgeon: Ruslan Bowers MD;  Location: [facility] ENDOSCOPY;  Service: Gastroenterology   • ENDOSCOPY     • PYLOROMYOTOMY       Social History     Socioeconomic History   • Marital status: Single     Spouse name: Not on file   • Number of children: Not on file   • Years of education: Not on file   • Highest education level: Not on file   Occupational History   • Occupation:      Employer: SELF-EMPLOYED     Comment: Patient resides with [facility]  Addis Tavarez.   Tobacco Use   • Smoking status: Former Smoker     Packs/day: 1.00     Years: 6.00     Pack years: 6.00     Types: Cigarettes     Last attempt to quit: 1993     Years since quittin.8   • Smokeless tobacco: Never Used   Substance and Sexual Activity   • Alcohol use: Yes     Comment: 2018 - Kevin reports consumption of alcoholic beverages under a social basis (approximately 3 - 4 per month).     • Drug use: No   • Sexual activity: Defer     Family History   Problem Relation Age of Onset   • Colon cancer Mother         onset age greater than 75y   • Diabetes Mother    • Breast cancer Sister    • Cancer Brother         bladder   • Diabetes Brother    • Hyperlipidemia Brother      Lab on 2019   Component Date Value Ref Range Status   • WBC 2019 7.42  3.40 - 10.80 10*3/mm3 Final   • RBC 2019 5.05  4.14 - 5.80 10*6/mm3 Final   • Hemoglobin 2019 15.0  13.0 - 17.7 g/dL Final   • Hematocrit 2019 46.7  37.5 - 51.0 % Final   • MCV 2019 92.5  79.0 - 97.0 fL Final   • MCH 2019 29.7  26.6 - 33.0 pg Final   • MCHC 2019 32.1  31.5 - 35.7 g/dL Final   • RDW 2019 13.6  12.3 - 15.4 % Final   • RDW-SD 2019 46.5  37.0 - 54.0 fl Final   • MPV 2019 10.5  6.0 - 12.0 fL Final   • Platelets 2019 293  140 - 450 10*3/mm3 Final   • Neutrophil % 2019 55.4  42.7 - 76.0 % Final   • Lymphocyte % 2019 34.5  19.6 - 45.3 % Final   • Monocyte % 2019 6.1  5.0 - 12.0 % Final   • Eosinophil % 2019 2.6  0.3 - 6.2 % Final   • Basophil % 2019 0.9  0.0 - 1.5 % Final   • Immature Grans % 2019 0.5  0.0 - 0.5 % Final   • Neutrophils, Absolute 2019 4.11  1.70 - 7.00 10*3/mm3 Final   • Lymphocytes, Absolute 2019 2.56  0.70 - 3.10 10*3/mm3 Final   • Monocytes, Absolute 2019 0.45  0.10 - 0.90 10*3/mm3 Final   • Eosinophils, Absolute 2019 0.19  0.00 - 0.40 10*3/mm3 Final   • Basophils, Absolute  08/19/2019 0.07  0.00 - 0.20 10*3/mm3 Final   • Immature Grans, Absolute 08/19/2019 0.04  0.00 - 0.05 10*3/mm3 Final   • nRBC 08/19/2019 0.1  0.0 - 0.2 /100 WBC Final   • Glucose 08/19/2019 95  65 - 99 mg/dL Final   • BUN 08/19/2019 15  6 - 20 mg/dL Final   • Creatinine 08/19/2019 0.92  0.76 - 1.27 mg/dL Final   • Sodium 08/19/2019 145  136 - 145 mmol/L Final   • Potassium 08/19/2019 4.6  3.5 - 5.2 mmol/L Final   • Chloride 08/19/2019 106  98 - 107 mmol/L Final   • CO2 08/19/2019 26.8  22.0 - 29.0 mmol/L Final   • Calcium 08/19/2019 9.9  8.6 - 10.5 mg/dL Final   • Total Protein 08/19/2019 7.9  6.0 - 8.5 g/dL Final   • Albumin 08/19/2019 4.80  3.50 - 5.20 g/dL Final   • ALT (SGPT) 08/19/2019 30  1 - 41 U/L Final   • AST (SGOT) 08/19/2019 21  1 - 40 U/L Final   • Alkaline Phosphatase 08/19/2019 102  39 - 117 U/L Final   • Total Bilirubin 08/19/2019 0.6  0.2 - 1.2 mg/dL Final   • eGFR Non  Amer 08/19/2019 87  >60 mL/min/1.73 Final   • Globulin 08/19/2019 3.1  gm/dL Final   • A/G Ratio 08/19/2019 1.5  g/dL Final   • BUN/Creatinine Ratio 08/19/2019 16.3  7.0 - 25.0 Final   • Anion Gap 08/19/2019 12.2  5.0 - 15.0 mmol/L Final   • Hemoglobin A1C 08/19/2019 5.50  4.80 - 5.60 % Final   • Total Cholesterol 08/19/2019 196  0 - 200 mg/dL Final   • Triglycerides 08/19/2019 121  0 - 150 mg/dL Final   • HDL Cholesterol 08/19/2019 47  40 - 60 mg/dL Final   • LDL Cholesterol  08/19/2019 125* 0 - 100 mg/dL Final   • VLDL Cholesterol 08/19/2019 24.2  5 - 40 mg/dL Final   • LDL/HDL Ratio 08/19/2019 2.66   Final   • 25 Hydroxy, Vitamin D 08/19/2019 39.8  30.0 - 100.0 ng/ml Final   • THC, Screen, Urine 08/19/2019 Negative  Negative Final   • Phencyclidine (PCP), Urine 08/19/2019 Negative  Negative Final   • Cocaine Screen, Urine 08/19/2019 Negative  Negative Final   • Methamphetamine, Ur 08/19/2019 Negative  Negative Final   • Opiate Screen 08/19/2019 Negative  Negative Final   • Amphetamine Screen, Urine 08/19/2019 Positive*  Negative Final   • Benzodiazepine Screen, Urine 08/19/2019 Negative  Negative Final   • Tricyclic Antidepressants Screen 08/19/2019 Negative  Negative Final   • Methadone Screen, Urine 08/19/2019 Negative  Negative Final   • Barbiturates Screen, Urine 08/19/2019 Negative  Negative Final   • Oxycodone Screen, Urine 08/19/2019 Negative  Negative Final   • Propoxyphene Screen 08/19/2019 Negative  Negative Final   • Buprenorphine, Screen, Urine 08/19/2019 Negative  Negative Final      XR Spine Lumbar 4+ View  Narrative: Five-view lumbar spine    HISTORY: Back pain. Lumbago with bilateral sciatica.    AP, lateral and oblique radiographs of the lumbar spine and spot  film of the lumbosacral junction were obtained.    COMPARISON: None.    Mild levoscoliosis.  Congenitally short pedicles.  Minimal degenerative changes with small marginal osteophytes.  There is a normal lordosis.   Vertebral height and alignment are maintained.  No fracture.  The pedicles are intact.  Impression: CONCLUSION:  Mild levoscoliosis.  Congenitally short pedicles.  Minimal degenerative changes with small marginal osteophytes.    [id number]    Electronically signed by:  Anthony Cm MD  4/8/2019 10:21 AM [employer]  Workstation: [id number]  XR Spine Thoracic 3 View  Narrative: Three-view thoracic spine.    HISTORY: Back pain    AP, lateral and swimmer's views of the thoracic spine were  obtained.    COMPARISON: None.    Normal thoracic kyphosis.  Vertebral height and alignment maintained.  Mild degenerative changes.  No fracture.  The pedicles are intact.  No paraspinal widening.  Impression: CONCLUSION:   Mild degenerative changes.    [id number]    Electronically signed by:  Anthony Cm MD  4/8/2019 10:19 AM [employer]  Workstation: [id number]    [unfilled]  Immunization History   Administered Date(s) Administered   • Influenza TIV (IM) 03/28/2017   • Tdap 02/17/2017       The following portions of the patient's history were reviewed and updated as  "appropriate: allergies, current medications, past family history, past medical history, past social history, past surgical history and problem list.        Physical Exam  /80   Pulse 96   Temp 98.2 °F (36.8 °C)   Ht 180.3 cm (71\")   Wt 122 kg (268 lb 6.4 oz)   SpO2 98%   BMI 37.43 kg/m²       Physical Exam   Constitutional: He is oriented to person, place, and time. He appears well-developed and well-nourished.   HENT:   Head: Normocephalic and atraumatic.   Right Ear: External ear normal.   Eyes: Conjunctivae and EOM are normal. Pupils are equal, round, and reactive to light.   Neck: Normal range of motion. Neck supple.   Cardiovascular: Normal rate, regular rhythm and normal heart sounds.   No murmur heard.  Pulmonary/Chest: Effort normal and breath sounds normal. No respiratory distress.   Abdominal: Soft. Bowel sounds are normal. He exhibits no distension. There is no tenderness.   Obese abdomen    Musculoskeletal: Normal range of motion. He exhibits tenderness.        Right upper arm: He exhibits tenderness, swelling, edema and deformity.   Neurological: He is alert and oriented to person, place, and time. No cranial nerve deficit.   Skin: Skin is warm. No rash noted. He is not diaphoretic. No erythema. No pallor.   Psychiatric: He has a normal mood and affect. His behavior is normal.   Nursing note and vitals reviewed.      Assessment/Plan    Diagnosis Plan   1. Left arm pain  Duplex Venous Upper Extremity - Left CAR   2. Stage 2 chronic kidney disease     3. Prediabetes     4. S/P carpal tunnel release     5. Hypochloremia     6. History of prediabetes     7. Hyperlipidemia, unspecified hyperlipidemia type     8. Essential hypertension     9. Attention deficit hyperactivity disorder (ADHD), predominantly inattentive type     10. Allergic rhinitis, unspecified seasonality, unspecified trigger     11. Class 2 obesity with body mass index (BMI) of 35.0 to 35.9 in adult, unspecified obesity type, " unspecified whether serious comorbidity present     12. Left arm swelling  Duplex Venous Upper Extremity - Left CAR          -went over labwork   -recommend influenza vaccination - pt declined.   -ckd stage 2 - continue to monitor. Gave information. Advised better hydration BP control   -recommend pt go back on aspirin and lipitor  -for back pain with sciatica/scoliosis - pain is better after PT/OT. Continue with mobic and flexeril PRN.  Consider MRI if not improving   -left upper arm pain - will get US of  Arm. Suspect hematoma.    -right shoulder pain - x-ray today. Consider PT/OT or Orthopedic referral.  Tumeric powder in pill form   - pt doing well postop on carpal tunnel release on right hand   -for cerumen of both ears - debrox ointment PRN. Drug information provided  -for carpal tunnel he is doing better post op  he has failed conservative treatment for >3 months. . He has yet to get surgery on left  Hand   -HTN-  Lisinopril 20  To 40 mg mg daily. Add chlorthalidone 25 mg q daily.    -vitamin D pill once a week   -recommend flu shot she declined   -HLP - lipitor 80mg PO qhs   -  BP was at goal Continue  on lisionpril  But go up form 10 to 20 mg PO q daily drug information provided low salt diet informaiton provided. Side effects discussed BP better controlled today advsied pt to bring readings next visit and get BP machine   - refilled ADHD medication Adderall XR 25 mg PO q daily for ADHD.  [facility] ref number ref number [id number]  - for allergic rhinitis - continue singulair and flonase and will add zyrtec.  -H/O prediabetes - D/C  metformin, recent hga1c normal at 5.4   - hyperlipidemia -continue statin. Recheck lipid panel. Pt on aspirin   - obesity - recommend ketogenic diet along with intermittent fasting. BMI at 37.4   -advised pt to be safe and call with questions and concerns  -advised to go to ER or call 911 if symptoms get severe  -advised to followup with specialist and referrals   -recheck in 1 month  for followup         This document has been electronically signed by Scottie Borges MD on November 15, 2019 8:36 AM

## 2019-11-15 ENCOUNTER — OFFICE VISIT (OUTPATIENT)
Dept: FAMILY MEDICINE CLINIC | Facility: CLINIC | Age: 49
End: 2019-11-15

## 2019-11-15 VITALS
BODY MASS INDEX: 37.57 KG/M2 | WEIGHT: 268.4 LBS | HEIGHT: 71 IN | HEART RATE: 96 BPM | OXYGEN SATURATION: 98 % | DIASTOLIC BLOOD PRESSURE: 80 MMHG | SYSTOLIC BLOOD PRESSURE: 158 MMHG | TEMPERATURE: 98.2 F

## 2019-11-15 DIAGNOSIS — Z98.890 S/P CARPAL TUNNEL RELEASE: ICD-10-CM

## 2019-11-15 DIAGNOSIS — E87.8 HYPOCHLOREMIA: ICD-10-CM

## 2019-11-15 DIAGNOSIS — Z87.898 HISTORY OF PREDIABETES: ICD-10-CM

## 2019-11-15 DIAGNOSIS — M79.602 LEFT ARM PAIN: Primary | ICD-10-CM

## 2019-11-15 DIAGNOSIS — J30.9 ALLERGIC RHINITIS, UNSPECIFIED SEASONALITY, UNSPECIFIED TRIGGER: ICD-10-CM

## 2019-11-15 DIAGNOSIS — E78.5 HYPERLIPIDEMIA, UNSPECIFIED HYPERLIPIDEMIA TYPE: ICD-10-CM

## 2019-11-15 DIAGNOSIS — E66.9 CLASS 2 OBESITY WITH BODY MASS INDEX (BMI) OF 35.0 TO 35.9 IN ADULT, UNSPECIFIED OBESITY TYPE, UNSPECIFIED WHETHER SERIOUS COMORBIDITY PRESENT: ICD-10-CM

## 2019-11-15 DIAGNOSIS — N18.2 STAGE 2 CHRONIC KIDNEY DISEASE: ICD-10-CM

## 2019-11-15 DIAGNOSIS — I10 ESSENTIAL HYPERTENSION: ICD-10-CM

## 2019-11-15 DIAGNOSIS — R73.03 PREDIABETES: ICD-10-CM

## 2019-11-15 DIAGNOSIS — M79.89 LEFT ARM SWELLING: ICD-10-CM

## 2019-11-15 DIAGNOSIS — F90.0 ATTENTION DEFICIT HYPERACTIVITY DISORDER (ADHD), PREDOMINANTLY INATTENTIVE TYPE: ICD-10-CM

## 2019-11-15 PROCEDURE — 99214 OFFICE O/P EST MOD 30 MIN: CPT | Performed by: FAMILY MEDICINE

## 2019-11-15 RX ORDER — LISINOPRIL 40 MG/1
40 TABLET ORAL DAILY
Qty: 30 TABLET | Refills: 3 | Status: SHIPPED | OUTPATIENT
Start: 2019-11-15 | End: 2020-04-06 | Stop reason: SDUPTHER

## 2019-11-15 RX ORDER — DEXTROAMPHETAMINE SACCHARATE, AMPHETAMINE ASPARTATE MONOHYDRATE, DEXTROAMPHETAMINE SULFATE AND AMPHETAMINE SULFATE 6.25; 6.25; 6.25; 6.25 MG/1; MG/1; MG/1; MG/1
25 CAPSULE, EXTENDED RELEASE ORAL EVERY MORNING
Qty: 30 CAPSULE | Refills: 0 | Status: SHIPPED | OUTPATIENT
Start: 2019-11-15 | End: 2019-12-13 | Stop reason: SDUPTHER

## 2019-11-26 ENCOUNTER — TELEPHONE (OUTPATIENT)
Dept: FAMILY MEDICINE CLINIC | Facility: CLINIC | Age: 49
End: 2019-11-26

## 2019-11-26 NOTE — TELEPHONE ENCOUNTER
----- Message from Scottie Borges MD sent at 11/26/2019  7:52 AM CST -----  Regarding: ultrasound of left arm   Please call pt US of left arm shows no evidence of deep vein thrombosis or clot. There likely is a hematoma or collection of blood. Continue ice application and followup and hopefully will resolve on its own.  Thanks

## 2019-12-03 DIAGNOSIS — M79.89 LEFT ARM SWELLING: ICD-10-CM

## 2019-12-03 DIAGNOSIS — M79.602 LEFT ARM PAIN: ICD-10-CM

## 2019-12-05 NOTE — PROGRESS NOTES
Subjective:  Randall Hernandez is a 49 y.o. male who presents for       Patient Active Problem List   Diagnosis   • Hyperlipidemia   • Inattention   • Non morbid obesity   • Prediabetes   • Attention deficit hyperactivity disorder (ADHD), combined type   • Need for vaccination   • Gastroesophageal reflux disease   • Encounter for drug screening   • Tingling in extremities   • Elevated BP without diagnosis of hypertension   • Allergic rhinitis   • Elevated blood pressure reading   • Pruritic rash   • Numbness in both hands   • Bilateral elbow joint pain   • Pain in both wrists   • Ulnar neuropathy of left upper extremity   • General medical examination   • Bilateral carpal tunnel syndrome   • History of prediabetes   • Numbness and tingling in both hands   • Essential hypertension   • Cerumen debris on tympanic membrane of both ears   • Carpal tunnel syndrome   • S/P carpal tunnel release   • Encounter for screening for malignant neoplasm of colon   • FH: colon cancer   • Acute recurrent sinusitis   • Attention deficit hyperactivity disorder (ADHD), predominantly inattentive type   • Class 2 obesity with body mass index (BMI) of 35.0 to 35.9 in adult   • Annual physical exam   • Arthritis of back   • Scoliosis   • Hyponatremia   • Hypochloremia   • Stage 2 chronic kidney disease           Current Outpatient Medications:   •  amphetamine-dextroamphetamine XR (ADDERALL XR) 25 MG 24 hr capsule, Take 1 capsule by mouth Every Morning Take 1 tablet by mouth daily, Disp: 30 capsule, Rfl: 0  •  aspirin 81 MG EC tablet, Take 1 tablet by mouth Daily., Disp: 30 tablet, Rfl: 3  •  atorvastatin (LIPITOR) 80 MG tablet, Take 1 tablet by mouth Daily., Disp: 30 tablet, Rfl: 3  •  chlorthalidone (HYGROTON) 25 MG tablet, Take 1 tablet by mouth Daily., Disp: 30 tablet, Rfl: 3  •  cyclobenzaprine (FLEXERIL) 5 MG tablet, Take 1 tablet by mouth 3 (Three) Times a Day As Needed for Muscle Spasms., Disp: 30 tablet, Rfl: 3  •  fluticasone  (FLONASE) 50 MCG/ACT nasal spray, 2 sprays by Each Nare route Daily., Disp: 16 mL, Rfl: 3  •  lisinopril (PRINIVIL,ZESTRIL) 40 MG tablet, Take 1 tablet by mouth Daily., Disp: 30 tablet, Rfl: 3  •  montelukast (SINGULAIR) 10 MG tablet, TAKE 1 TABLET BY MOUTH EVERY NIGHT, Disp: 30 tablet, Rfl: 0  •  omeprazole (priLOSEC) 40 MG capsule, Take 1 capsule by mouth Daily., Disp: 30 capsule, Rfl: 3  •  vitamin D (ERGOCALCIFEROL) 33733 units capsule capsule, Take 1 capsule by mouth 1 (One) Time Per Week., Disp: 4 capsule, Rfl: 3    HPI        Pt is 47 yo male with management  of obesity, history of prediabetes, HTN,  GERD, vitamin D deficiency, alelrgic rhinitis, ADHD predominantly inattentive type ,sp bilateral carpal tunnel release surgery,  Chronic back pain (arthritis of lumbar spine, short pedicles, mild scoliosis, mild arthritis thoracic spine).  CKD stage 2      101/7//19 pt is here for recheck and followup. Doing well . He has been stressed out due to being behind at work. BP is elevated today he has not been checking it at home. curently on lisinopril 20 mg daily.       11/15/19 pt is here for recheck and refill on ADHD medication. Medication is helping. Takes Adderall XR 25 mg daily. No chest pain no dizziness. BP is up today.  His Fiancee is in the hospital now. He has bees stressed.   His main concern today  Is his left upper arm has been hurting. He has a knot on left upper arm. He concerned about possible hematoma. Hurts to lift things It feels like something is burning in there     12/13/19 pt is here for followup no chest pain no dizziness. Dong well on ADHD medicaiton         Upper Extremity Issue   This is a chronic problem. The current episode started more than 1 year ago. The problem occurs constantly. The problem has been unchanged. Associated symptoms include arthralgias, fatigue, numbness and weakness. Pertinent negatives include no abdominal pain, anorexia, change in bowel habit, chest pain, chills,  congestion, coughing, diaphoresis, fever, headaches, joint swelling, myalgias, nausea, neck pain, swollen glands, urinary symptoms, vertigo, visual change or vomiting. The symptoms are aggravated by stress and exertion (lifting). Treatments tried: ice. The treatment provided no relief.    Shoulder Injury    The incident occurred at the pool and at work. The right shoulder is affected. The injury mechanism is unknown. The quality of the pain is described as aching. The pain radiates to the right arm and right hand. The pain is at a severity of 5/10. The pain is moderate. Associated symptoms include numbness. Pertinent negatives include no chest pain, muscle weakness or tingling. Nothing aggravates the symptoms.    Obesity   This is a chronic problem. The current episode started more than 1 year ago. The problem occurs constantly. The problem has been unchanged. Associated symptoms include arthralgias and numbness. Pertinent negatives include no abdominal pain, anorexia, change in bowel habit, chest pain, chills, congestion, coughing, diaphoresis, fatigue, fever, headaches, joint swelling, myalgias, nausea, rash, sore throat, swollen glands, urinary symptoms, vertigo, visual change, vomiting or weakness. Nothing aggravates the symptoms. He has tried nothing for the symptoms. The treatment provided no relief.   Mental Health Problem   The primary symptoms do not include dysphoric mood, delusions, hallucinations, bizarre behavior, disorganized speech, negative symptoms or somatic symptoms. This is a chronic problem.   The degree of incapacity that he is experiencing as a consequence of his illness is mild. Additional symptoms of the illness include attention impairment. Additional symptoms of the illness do not include anhedonia, insomnia, hypersomnia, appetite change, unexpected weight change, fatigue, agitation, psychomotor retardation, feelings of worthlessness, euphoric mood, increased goal-directed activity, flight  of ideas, inflated self-esteem, decreased need for sleep, distractible, poor judgment, visual change, headaches or abdominal pain. He does not admit to suicidal ideas. He does not have a plan to commit suicide. He does not contemplate injuring another person.   Back Pain   This is a chronic problem. The current episode started more than 1 month ago. The problem occurs constantly. The problem is unchanged. The pain is present in the lumbar spine. The quality of the pain is described as aching. The pain is at a severity of 5/10. The symptoms are aggravated by lying down, position, sitting and standing. Associated symptoms include numbness. Pertinent negatives include no abdominal pain, bladder incontinence, bowel incontinence, chest pain, dysuria, fever, headaches, leg pain, paresis, paresthesias, pelvic pain, perianal numbness, tingling or weakness. He has tried nothing for the symptoms. The treatment provided no relief.    Wrist Pain    The pain is present in the left elbow, left wrist, right wrist and right elbow. This is a chronic problem. The current episode started more than 1 month ago. The problem occurs constantly. The problem has been gradually worsening. The quality of the pain is described as aching. The pain is at a severity of 9/10. The pain is moderate. Associated symptoms include joint locking, a limited range of motion, numbness, stiffness and tingling. Pertinent negatives include no fever, inability to bear weight, itching or joint swelling. The symptoms are aggravated by activity. He has tried NSAIDS for the symptoms. The treatment provided no relief. Family history does not include gout or rheumatoid arthritis. There is no history of diabetes, gout, osteoarthritis or rheumatoid arthritis.   Elbow Injury   This is a chronic problem. The current episode started more than 1 year ago. The problem occurs constantly. The problem has been gradually worsening. Associated symptoms include arthralgias, joint  swelling, numbness, a rash and weakness. Pertinent negatives include no abdominal pain, anorexia, change in bowel habit, chest pain, chills, congestion, coughing, diaphoresis, fatigue, fever, headaches, myalgias, nausea, neck pain, sore throat, swollen glands, urinary symptoms, vertigo, visual change or vomiting. The symptoms are aggravated by bending. He has tried NSAIDs for the symptoms. The treatment provided mild relief.   Hypertension   This is a new problem. The current episode started more than 1 month ago. The problem has been gradually worsening since onset. The problem is uncontrolled. Pertinent negatives include no anxiety, blurred vision, chest pain, headaches, malaise/fatigue, neck pain, orthopnea, palpitations, peripheral edema, PND, shortness of breath or sweats. Risk factors for coronary artery disease include male gender, obesity and sedentary lifestyle. Past treatments include nothing. There are no compliance problems    Review of Systems  Review of Systems   Constitutional: Positive for activity change and fatigue. Negative for appetite change, chills, diaphoresis and fever.   HENT: Negative for congestion, postnasal drip, rhinorrhea, sinus pressure, sinus pain, sneezing, sore throat, trouble swallowing and voice change.    Respiratory: Negative for cough, choking, chest tightness, shortness of breath, wheezing and stridor.    Cardiovascular: Negative for chest pain.   Gastrointestinal: Negative for diarrhea, nausea and vomiting.   Musculoskeletal: Positive for arthralgias.   Neurological: Positive for weakness and numbness. Negative for headaches.   Psychiatric/Behavioral: Positive for decreased concentration.       Patient Active Problem List   Diagnosis   • Hyperlipidemia   • Inattention   • Non morbid obesity   • Prediabetes   • Attention deficit hyperactivity disorder (ADHD), combined type   • Need for vaccination   • Gastroesophageal reflux disease   • Encounter for drug screening   •  Tingling in extremities   • Elevated BP without diagnosis of hypertension   • Allergic rhinitis   • Elevated blood pressure reading   • Pruritic rash   • Numbness in both hands   • Bilateral elbow joint pain   • Pain in both wrists   • Ulnar neuropathy of left upper extremity   • General medical examination   • Bilateral carpal tunnel syndrome   • History of prediabetes   • Numbness and tingling in both hands   • Essential hypertension   • Cerumen debris on tympanic membrane of both ears   • Carpal tunnel syndrome   • S/P carpal tunnel release   • Encounter for screening for malignant neoplasm of colon   • FH: colon cancer   • Acute recurrent sinusitis   • Attention deficit hyperactivity disorder (ADHD), predominantly inattentive type   • Class 2 obesity with body mass index (BMI) of 35.0 to 35.9 in adult   • Annual physical exam   • Arthritis of back   • Scoliosis   • Hyponatremia   • Hypochloremia   • Stage 2 chronic kidney disease     Past Surgical History:   Procedure Laterality Date   • CARPAL TUNNEL RELEASE Right 11/2/2018    Procedure: CARPAL TUNNEL RELEASE;  Surgeon: Judd Mathias MD;  Location: [facility] OR;  Service: Orthopedics   • CARPAL TUNNEL RELEASE  12/07/2018    Left   • CARPAL TUNNEL RELEASE Left 12/7/2018    Procedure: CARPAL TUNNEL RELEASE;  Surgeon: Judd Mathias MD;  Location: [facility] OR;  Service: Orthopedics   • COLONOSCOPY N/A 1/18/2019    Procedure: COLONOSCOPY a friday please ;  Surgeon: Ruslan Bowers MD;  Location: [facility] ENDOSCOPY;  Service: Gastroenterology   • ENDOSCOPY     • PYLOROMYOTOMY       Social History     Socioeconomic History   • Marital status: Single     Spouse name: Not on file   • Number of children: Not on file   • Years of education: Not on file   • Highest education level: Not on file   Occupational History   • Occupation:      Employer: SELF-EMPLOYED     Comment: Patient resides with Addis Rodriguez.   Tobacco Use   • Smoking  status: Former Smoker     Packs/day: 1.00     Years: 6.00     Pack years: 6.00     Types: Cigarettes     Last attempt to quit: 1993     Years since quittin.9   • Smokeless tobacco: Never Used   Substance and Sexual Activity   • Alcohol use: Yes     Comment: 2018 - Kevin reports consumption of alcoholic beverages under a social basis (approximately 3 - 4 per month).     • Drug use: No   • Sexual activity: Defer     Family History   Problem Relation Age of Onset   • Colon cancer Mother         onset age greater than 75y   • Diabetes Mother    • Breast cancer Sister    • Cancer Brother         bladder   • Diabetes Brother    • Hyperlipidemia Brother      Lab on 2019   Component Date Value Ref Range Status   • WBC 2019 7.42  3.40 - 10.80 10*3/mm3 Final   • RBC 2019 5.05  4.14 - 5.80 10*6/mm3 Final   • Hemoglobin 2019 15.0  13.0 - 17.7 g/dL Final   • Hematocrit 2019 46.7  37.5 - 51.0 % Final   • MCV 2019 92.5  79.0 - 97.0 fL Final   • MCH 2019 29.7  26.6 - 33.0 pg Final   • MCHC 2019 32.1  31.5 - 35.7 g/dL Final   • RDW 2019 13.6  12.3 - 15.4 % Final   • RDW-SD 2019 46.5  37.0 - 54.0 fl Final   • MPV 2019 10.5  6.0 - 12.0 fL Final   • Platelets 2019 293  140 - 450 10*3/mm3 Final   • Neutrophil % 2019 55.4  42.7 - 76.0 % Final   • Lymphocyte % 2019 34.5  19.6 - 45.3 % Final   • Monocyte % 2019 6.1  5.0 - 12.0 % Final   • Eosinophil % 2019 2.6  0.3 - 6.2 % Final   • Basophil % 2019 0.9  0.0 - 1.5 % Final   • Immature Grans % 2019 0.5  0.0 - 0.5 % Final   • Neutrophils, Absolute 2019 4.11  1.70 - 7.00 10*3/mm3 Final   • Lymphocytes, Absolute 2019 2.56  0.70 - 3.10 10*3/mm3 Final   • Monocytes, Absolute 2019 0.45  0.10 - 0.90 10*3/mm3 Final   • Eosinophils, Absolute 2019 0.19  0.00 - 0.40 10*3/mm3 Final   • Basophils, Absolute 2019 0.07  0.00 - 0.20 10*3/mm3 Final   • Immature  Grans, Absolute 08/19/2019 0.04  0.00 - 0.05 10*3/mm3 Final   • nRBC 08/19/2019 0.1  0.0 - 0.2 /100 WBC Final   • Glucose 08/19/2019 95  65 - 99 mg/dL Final   • BUN 08/19/2019 15  6 - 20 mg/dL Final   • Creatinine 08/19/2019 0.92  0.76 - 1.27 mg/dL Final   • Sodium 08/19/2019 145  136 - 145 mmol/L Final   • Potassium 08/19/2019 4.6  3.5 - 5.2 mmol/L Final   • Chloride 08/19/2019 106  98 - 107 mmol/L Final   • CO2 08/19/2019 26.8  22.0 - 29.0 mmol/L Final   • Calcium 08/19/2019 9.9  8.6 - 10.5 mg/dL Final   • Total Protein 08/19/2019 7.9  6.0 - 8.5 g/dL Final   • Albumin 08/19/2019 4.80  3.50 - 5.20 g/dL Final   • ALT (SGPT) 08/19/2019 30  1 - 41 U/L Final   • AST (SGOT) 08/19/2019 21  1 - 40 U/L Final   • Alkaline Phosphatase 08/19/2019 102  39 - 117 U/L Final   • Total Bilirubin 08/19/2019 0.6  0.2 - 1.2 mg/dL Final   • eGFR Non  Amer 08/19/2019 87  >60 mL/min/1.73 Final   • Globulin 08/19/2019 3.1  gm/dL Final   • A/G Ratio 08/19/2019 1.5  g/dL Final   • BUN/Creatinine Ratio 08/19/2019 16.3  7.0 - 25.0 Final   • Anion Gap 08/19/2019 12.2  5.0 - 15.0 mmol/L Final   • Hemoglobin A1C 08/19/2019 5.50  4.80 - 5.60 % Final   • Total Cholesterol 08/19/2019 196  0 - 200 mg/dL Final   • Triglycerides 08/19/2019 121  0 - 150 mg/dL Final   • HDL Cholesterol 08/19/2019 47  40 - 60 mg/dL Final   • LDL Cholesterol  08/19/2019 125* 0 - 100 mg/dL Final   • VLDL Cholesterol 08/19/2019 24.2  5 - 40 mg/dL Final   • LDL/HDL Ratio 08/19/2019 2.66   Final   • 25 Hydroxy, Vitamin D 08/19/2019 39.8  30.0 - 100.0 ng/ml Final   • THC, Screen, Urine 08/19/2019 Negative  Negative Final   • Phencyclidine (PCP), Urine 08/19/2019 Negative  Negative Final   • Cocaine Screen, Urine 08/19/2019 Negative  Negative Final   • Methamphetamine, Ur 08/19/2019 Negative  Negative Final   • Opiate Screen 08/19/2019 Negative  Negative Final   • Amphetamine Screen, Urine 08/19/2019 Positive* Negative Final   • Benzodiazepine Screen, Urine 08/19/2019  Negative  Negative Final   • Tricyclic Antidepressants Screen 08/19/2019 Negative  Negative Final   • Methadone Screen, Urine 08/19/2019 Negative  Negative Final   • Barbiturates Screen, Urine 08/19/2019 Negative  Negative Final   • Oxycodone Screen, Urine 08/19/2019 Negative  Negative Final   • Propoxyphene Screen 08/19/2019 Negative  Negative Final   • Buprenorphine, Screen, Urine 08/19/2019 Negative  Negative Final      XR Spine Lumbar 4+ View  Narrative: Five-view lumbar spine    HISTORY: Back pain. Lumbago with bilateral sciatica.    AP, lateral and oblique radiographs of the lumbar spine and spot  film of the lumbosacral junction were obtained.    COMPARISON: None.    Mild levoscoliosis.  Congenitally short pedicles.  Minimal degenerative changes with small marginal osteophytes.  There is a normal lordosis.   Vertebral height and alignment are maintained.  No fracture.  The pedicles are intact.  Impression: CONCLUSION:  Mild levoscoliosis.  Congenitally short pedicles.  Minimal degenerative changes with small marginal osteophytes.    [id number]    Electronically signed by:  Anthony Cm MD  4/8/2019 10:21 AM CDT  Workstation: [employer]  XR Spine Thoracic 3 View  Narrative: Three-view thoracic spine.    HISTORY: Back pain    AP, lateral and swimmer's views of the thoracic spine were  obtained.    COMPARISON: None.    Normal thoracic kyphosis.  Vertebral height and alignment maintained.  Mild degenerative changes.  No fracture.  The pedicles are intact.  No paraspinal widening.  Impression: CONCLUSION:   Mild degenerative changes.    [id number]    Electronically signed by:  Anthony Cm MD  4/8/2019 10:19 AM CDT  Workstation: [id number]    [unfilled]  Immunization History   Administered Date(s) Administered   • Influenza TIV (IM) 03/28/2017   • Tdap 02/17/2017       The following portions of the patient's history were reviewed and updated as appropriate: allergies, current medications, past family history,  "past medical history, past social history, past surgical history and problem list.        Physical Exam  /68 (BP Location: Right arm, Patient Position: Sitting, Cuff Size: Adult)   Pulse 88   Temp 98.5 °F (36.9 °C) (Oral)   Ht 180.3 cm (71\")   Wt 124 kg (273 lb 9.6 oz)   SpO2 98%   BMI 38.16 kg/m²     Physical Exam   Constitutional: He is oriented to person, place, and time. He appears well-developed and well-nourished.   HENT:   Head: Normocephalic and atraumatic.   Right Ear: External ear normal.   Eyes: Pupils are equal, round, and reactive to light. Conjunctivae and EOM are normal.   Neck: Normal range of motion. Neck supple.   Cardiovascular: Normal rate, regular rhythm and normal heart sounds.   No murmur heard.  Pulmonary/Chest: Effort normal and breath sounds normal. No respiratory distress.   Abdominal: Soft. Bowel sounds are normal. He exhibits no distension. There is no tenderness.   Obese abdomen    Musculoskeletal: Normal range of motion. He exhibits tenderness. He exhibits no edema or deformity.   Neurological: He is alert and oriented to person, place, and time. No cranial nerve deficit.   Skin: Skin is warm. No rash noted. He is not diaphoretic. No erythema. No pallor.   Psychiatric: He has a normal mood and affect. His behavior is normal.   Nursing note and vitals reviewed.      Assessment/Plan    Diagnosis Plan   1. S/P carpal tunnel release     2. Stage 2 chronic kidney disease     3. Non morbid obesity     4. Hyperlipidemia, unspecified hyperlipidemia type     5. History of prediabetes     6. Gastroesophageal reflux disease, esophagitis presence not specified     7. Essential hypertension     8. Class 2 obesity with body mass index (BMI) of 35.0 to 35.9 in adult, unspecified obesity type, unspecified whether serious comorbidity present     9. Attention deficit hyperactivity disorder (ADHD), predominantly inattentive type          -went over labwork   -recommend influenza vaccination - " pt declined.   -ckd stage 2 - continue to monitor. Gave information. Advised better hydration BP control   -recommend pt go back on aspirin and lipitor  -for back pain with sciatica/scoliosis - pain is better after PT/OT. Continue with mobic and flexeril PRN.  Consider MRI if not improving   -left upper arm pain - will get US of  Arm. Suspect hematoma.    -right shoulder pain - x-ray today. Consider PT/OT or Orthopedic referral.  Tumeric powder in pill form   - pt doing well postop on carpal tunnel release on right hand   -for cerumen of both ears - debrox ointment PRN. Drug information provided  -for carpal tunnel he is doing better post op  he has failed conservative treatment for >3 months. . He has yet to get surgery on left  Hand   -HTN-  Lisinopril 20  To 40 mg mg daily. Add chlorthalidone 25 mg q daily.    -vitamin D pill once a week   -recommend flu shot she declined   -HLP - lipitor 80mg PO qhs   -  BP was at goal Continue  on lisionpril  But go up form 10 to 20 mg PO q daily drug information provided low salt diet informaiton provided. Side effects discussed BP better controlled today advsied pt to bring readings next visit and get BP machine   - refilled ADHD medication Adderall XR 25 mg PO q daily for ADHD.  [facility] ref number ref number [id number]  - for allergic rhinitis - continue singulair and flonase and will add zyrtec.  -H/O prediabetes - D/C  metformin, recent hga1c normal at 5.4   - hyperlipidemia -continue statin. Recheck lipid panel. Pt on aspirin   - obesity - recommend ketogenic diet along with intermittent fasting. BMI at 38.16.    -advised pt to be safe and call with questions and concerns  -advised to go to ER or call 911 if symptoms get severe  -advised to followup with specialist and referrals   -recheck in 1 month for followup         This document has been electronically signed by Scottie Borges MD on December 13, 2019 7:13 AM

## 2019-12-13 ENCOUNTER — OFFICE VISIT (OUTPATIENT)
Dept: FAMILY MEDICINE CLINIC | Facility: CLINIC | Age: 49
End: 2019-12-13

## 2019-12-13 VITALS
SYSTOLIC BLOOD PRESSURE: 122 MMHG | HEART RATE: 88 BPM | TEMPERATURE: 98.5 F | OXYGEN SATURATION: 98 % | HEIGHT: 71 IN | BODY MASS INDEX: 38.3 KG/M2 | WEIGHT: 273.6 LBS | DIASTOLIC BLOOD PRESSURE: 68 MMHG

## 2019-12-13 DIAGNOSIS — I10 ESSENTIAL HYPERTENSION: ICD-10-CM

## 2019-12-13 DIAGNOSIS — E66.9 CLASS 2 OBESITY WITH BODY MASS INDEX (BMI) OF 35.0 TO 35.9 IN ADULT, UNSPECIFIED OBESITY TYPE, UNSPECIFIED WHETHER SERIOUS COMORBIDITY PRESENT: ICD-10-CM

## 2019-12-13 DIAGNOSIS — E78.5 HYPERLIPIDEMIA, UNSPECIFIED HYPERLIPIDEMIA TYPE: ICD-10-CM

## 2019-12-13 DIAGNOSIS — E66.9 NON MORBID OBESITY: ICD-10-CM

## 2019-12-13 DIAGNOSIS — Z98.890 S/P CARPAL TUNNEL RELEASE: ICD-10-CM

## 2019-12-13 DIAGNOSIS — N18.2 STAGE 2 CHRONIC KIDNEY DISEASE: ICD-10-CM

## 2019-12-13 DIAGNOSIS — K21.9 GASTROESOPHAGEAL REFLUX DISEASE, ESOPHAGITIS PRESENCE NOT SPECIFIED: ICD-10-CM

## 2019-12-13 DIAGNOSIS — F90.0 ATTENTION DEFICIT HYPERACTIVITY DISORDER (ADHD), PREDOMINANTLY INATTENTIVE TYPE: Primary | ICD-10-CM

## 2019-12-13 DIAGNOSIS — Z87.898 HISTORY OF PREDIABETES: ICD-10-CM

## 2019-12-13 PROCEDURE — 99214 OFFICE O/P EST MOD 30 MIN: CPT | Performed by: FAMILY MEDICINE

## 2019-12-13 RX ORDER — DEXTROAMPHETAMINE SACCHARATE, AMPHETAMINE ASPARTATE MONOHYDRATE, DEXTROAMPHETAMINE SULFATE AND AMPHETAMINE SULFATE 6.25; 6.25; 6.25; 6.25 MG/1; MG/1; MG/1; MG/1
25 CAPSULE, EXTENDED RELEASE ORAL EVERY MORNING
Qty: 30 CAPSULE | Refills: 0 | Status: SHIPPED | OUTPATIENT
Start: 2019-12-13 | End: 2020-01-14 | Stop reason: SDUPTHER

## 2020-01-03 NOTE — PROGRESS NOTES
Subjective:  Randall Hernandez is a 49 y.o. male who presents for       Patient Active Problem List   Diagnosis   • Hyperlipidemia   • Inattention   • Non morbid obesity   • Prediabetes   • Attention deficit hyperactivity disorder (ADHD), combined type   • Need for vaccination   • Gastroesophageal reflux disease   • Encounter for drug screening   • Tingling in extremities   • Elevated BP without diagnosis of hypertension   • Allergic rhinitis   • Elevated blood pressure reading   • Pruritic rash   • Numbness in both hands   • Bilateral elbow joint pain   • Pain in both wrists   • Ulnar neuropathy of left upper extremity   • General medical examination   • Bilateral carpal tunnel syndrome   • History of prediabetes   • Numbness and tingling in both hands   • Essential hypertension   • Cerumen debris on tympanic membrane of both ears   • Carpal tunnel syndrome   • S/P carpal tunnel release   • Encounter for screening for malignant neoplasm of colon   • FH: colon cancer   • Acute recurrent sinusitis   • Attention deficit hyperactivity disorder (ADHD), predominantly inattentive type   • Class 2 obesity with body mass index (BMI) of 35.0 to 35.9 in adult   • Annual physical exam   • Arthritis of back   • Scoliosis   • Hyponatremia   • Hypochloremia   • Stage 2 chronic kidney disease           Current Outpatient Medications:   •  amphetamine-dextroamphetamine XR (ADDERALL XR) 25 MG 24 hr capsule, Take 1 capsule by mouth Every Morning Take 1 tablet by mouth daily, Disp: 30 capsule, Rfl: 0  •  aspirin 81 MG EC tablet, Take 1 tablet by mouth Daily., Disp: 30 tablet, Rfl: 3  •  atorvastatin (LIPITOR) 80 MG tablet, Take 1 tablet by mouth Daily., Disp: 30 tablet, Rfl: 3  •  chlorthalidone (HYGROTON) 25 MG tablet, Take 1 tablet by mouth Daily., Disp: 30 tablet, Rfl: 3  •  fluticasone (FLONASE) 50 MCG/ACT nasal spray, 2 sprays by Each Nare route Daily., Disp: 16 mL, Rfl: 3  •  lisinopril (PRINIVIL,ZESTRIL) 40 MG tablet, Take 1  "tablet by mouth Daily., Disp: 30 tablet, Rfl: 3  •  montelukast (SINGULAIR) 10 MG tablet, TAKE 1 TABLET BY MOUTH EVERY NIGHT, Disp: 30 tablet, Rfl: 0  •  omeprazole (priLOSEC) 40 MG capsule, Take 1 capsule by mouth Daily., Disp: 30 capsule, Rfl: 3  •  vitamin D (ERGOCALCIFEROL) 42257 units capsule capsule, Take 1 capsule by mouth 1 (One) Time Per Week., Disp: 4 capsule, Rfl: 3  •  cyclobenzaprine (FLEXERIL) 5 MG tablet, Take 1 tablet by mouth 3 (Three) Times a Day As Needed for Muscle Spasms., Disp: 30 tablet, Rfl: 3         Pt is 47 yo male with management  of obesity, history of prediabetes, HTN,  GERD, vitamin D deficiency, alelrgic rhinitis, ADHD predominantly inattentive type ,sp bilateral carpal tunnel release surgery,  Chronic back pain (arthritis of lumbar spine, short pedicles, mild scoliosis, mild arthritis thoracic spine).  CKD stage 2      101/7//19 pt is here for recheck and followup. Doing well . He has been stressed out due to being behind at work. BP is elevated today he has not been checking it at home. curently on lisinopril 20 mg daily.       11/15/19 pt is here for recheck and refill on ADHD medication. Medication is helping. Takes Adderall XR 25 mg daily. No chest pain no dizziness. BP is up today.  His Fiancee is in the hospital now. He has bees stressed.   His main concern today  Is his left upper arm has been hurting. He has a knot on left upper arm. He concerned about possible hematoma. Hurts to lift things It feels like something is burning in there      1/14/20 pt is here for recheck.  Pt states he has had some GI discomfort for past few days. Feels like his \"belly is swollen\" his taking prilosec for GERD. He ate some chilli last night that made it upset. Had colonoscopy last year  That showed internal/external hemorrhoids. He has not had EGD.  He also has pain in epigastric area and RUQ. No fever no jaundice. States he had history of pyloric stenosis as a baby that was surgically " corrected. Doing well on ADHD medications  Adderall XR 25 mg daily. No chest pain no shortness of air no diziness      Abdominal Pain   This is a chronic problem. The current episode started more than 1 year ago. The problem occurs constantly. The problem has been unchanged. The pain is located in the epigastric region, generalized abdominal region and RUQ. The pain is at a severity of 4/10. The pain is moderate. The quality of the pain is aching and cramping. The abdominal pain does not radiate. Associated symptoms include nausea. Pertinent negatives include no anorexia, arthralgias, belching, constipation, diarrhea, dysuria, fever, flatus, frequency, headaches, hematochezia, hematuria, melena, myalgias, vomiting or weight loss. Nothing aggravates the pain. The pain is relieved by nothing. He has tried nothing for the symptoms. The treatment provided no relief.   Upper Extremity Issue   This is a chronic problem. The current episode started more than 1 year ago. The problem occurs constantly. The problem has been unchanged. Associated symptoms include arthralgias, fatigue, numbness and weakness. Pertinent negatives include no abdominal pain, anorexia, change in bowel habit, chest pain, chills, congestion, coughing, diaphoresis, fever, headaches, joint swelling, myalgias, nausea, neck pain, swollen glands, urinary symptoms, vertigo, visual change or vomiting. The symptoms are aggravated by stress and exertion (lifting). Treatments tried: ice. The treatment provided no relief.    Shoulder Injury    The incident occurred at the pool and at work. The right shoulder is affected. The injury mechanism is unknown. The quality of the pain is described as aching. The pain radiates to the right arm and right hand. The pain is at a severity of 5/10. The pain is moderate. Associated symptoms include numbness. Pertinent negatives include no chest pain, muscle weakness or tingling. Nothing aggravates the symptoms.     Obesity   This is a chronic problem. The current episode started more than 1 year ago. The problem occurs constantly. The problem has been unchanged. Associated symptoms include arthralgias and numbness. Pertinent negatives include no abdominal pain, anorexia, change in bowel habit, chest pain, chills, congestion, coughing, diaphoresis, fatigue, fever, headaches, joint swelling, myalgias, nausea, rash, sore throat, swollen glands, urinary symptoms, vertigo, visual change, vomiting or weakness. Nothing aggravates the symptoms. He has tried nothing for the symptoms. The treatment provided no relief.   Mental Health Problem   The primary symptoms do not include dysphoric mood, delusions, hallucinations, bizarre behavior, disorganized speech, negative symptoms or somatic symptoms. This is a chronic problem.   The degree of incapacity that he is experiencing as a consequence of his illness is mild. Additional symptoms of the illness include attention impairment. Additional symptoms of the illness do not include anhedonia, insomnia, hypersomnia, appetite change, unexpected weight change, fatigue, agitation, psychomotor retardation, feelings of worthlessness, euphoric mood, increased goal-directed activity, flight of ideas, inflated self-esteem, decreased need for sleep, distractible, poor judgment, visual change, headaches or abdominal pain. He does not admit to suicidal ideas. He does not have a plan to commit suicide. He does not contemplate injuring another person.   Back Pain   This is a chronic problem. The current episode started more than 1 month ago. The problem occurs constantly. The problem is unchanged. The pain is present in the lumbar spine. The quality of the pain is described as aching. The pain is at a severity of 5/10. The symptoms are aggravated by lying down, position, sitting and standing. Associated symptoms include numbness. Pertinent negatives include no abdominal pain, bladder  incontinence, bowel incontinence, chest pain, dysuria, fever, headaches, leg pain, paresis, paresthesias, pelvic pain, perianal numbness, tingling or weakness. He has tried nothing for the symptoms. The treatment provided no relief.    Wrist Pain    The pain is present in the left elbow, left wrist, right wrist and right elbow. This is a chronic problem. The current episode started more than 1 month ago. The problem occurs constantly. The problem has been gradually worsening. The quality of the pain is described as aching. The pain is at a severity of 9/10. The pain is moderate. Associated symptoms include joint locking, a limited range of motion, numbness, stiffness and tingling. Pertinent negatives include no fever, inability to bear weight, itching or joint swelling. The symptoms are aggravated by activity. He has tried NSAIDS for the symptoms. The treatment provided no relief. Family history does not include gout or rheumatoid arthritis. There is no history of diabetes, gout, osteoarthritis or rheumatoid arthritis.   Elbow Injury   This is a chronic problem. The current episode started more than 1 year ago. The problem occurs constantly. The problem has been gradually worsening. Associated symptoms include arthralgias, joint swelling, numbness, a rash and weakness. Pertinent negatives include no abdominal pain, anorexia, change in bowel habit, chest pain, chills, congestion, coughing, diaphoresis, fatigue, fever, headaches, myalgias, nausea, neck pain, sore throat, swollen glands, urinary symptoms, vertigo, visual change or vomiting. The symptoms are aggravated by bending. He has tried NSAIDs for the symptoms. The treatment provided mild relief.   Hypertension   This is a new problem. The current episode started more than 1 month ago. The problem has been gradually worsening since onset. The problem is uncontrolled. Pertinent negatives include no anxiety, blurred vision, chest  pain, headaches, malaise/fatigue, neck pain, orthopnea, palpitations, peripheral edema, PND, shortness of breath or sweats. Risk factors for coronary artery disease include male gender, obesity and sedentary lifestyle. Past treatments include nothing. There are no compliance problems    Review of Systems  Review of Systems   Constitutional: Positive for activity change and fatigue. Negative for appetite change, chills, diaphoresis, fever and weight loss.   HENT: Negative for congestion, postnasal drip, rhinorrhea, sinus pressure, sinus pain, sneezing, sore throat, trouble swallowing and voice change.    Respiratory: Negative for cough, choking, chest tightness, shortness of breath, wheezing and stridor.    Cardiovascular: Negative for chest pain.   Gastrointestinal: Positive for abdominal distention, abdominal pain and nausea. Negative for anorexia, constipation, diarrhea, flatus, hematochezia, melena and vomiting.   Genitourinary: Negative for dysuria, frequency and hematuria.   Musculoskeletal: Negative for arthralgias and myalgias.   Neurological: Positive for weakness and numbness. Negative for headaches.   Psychiatric/Behavioral: Positive for agitation, behavioral problems and decreased concentration.       Patient Active Problem List   Diagnosis   • Hyperlipidemia   • Inattention   • Non morbid obesity   • Prediabetes   • Attention deficit hyperactivity disorder (ADHD), combined type   • Need for vaccination   • Gastroesophageal reflux disease   • Encounter for drug screening   • Tingling in extremities   • Elevated BP without diagnosis of hypertension   • Allergic rhinitis   • Elevated blood pressure reading   • Pruritic rash   • Numbness in both hands   • Bilateral elbow joint pain   • Pain in both wrists   • Ulnar neuropathy of left upper extremity   • General medical examination   • Bilateral carpal tunnel syndrome   • History of prediabetes   • Numbness and tingling in both hands   • Essential  hypertension   • Cerumen debris on tympanic membrane of both ears   • Carpal tunnel syndrome   • S/P carpal tunnel release   • Encounter for screening for malignant neoplasm of colon   • FH: colon cancer   • Acute recurrent sinusitis   • Attention deficit hyperactivity disorder (ADHD), predominantly inattentive type   • Class 2 obesity with body mass index (BMI) of 35.0 to 35.9 in adult   • Annual physical exam   • Arthritis of back   • Scoliosis   • Hyponatremia   • Hypochloremia   • Stage 2 chronic kidney disease     Past Surgical History:   Procedure Laterality Date   • CARPAL TUNNEL RELEASE Right 2018    Procedure: CARPAL TUNNEL RELEASE;  Surgeon: Judd Mathias MD;  Location: [facility] OR;  Service: Orthopedics   • CARPAL TUNNEL RELEASE  2018    Left   • CARPAL TUNNEL RELEASE Left 2018    Procedure: CARPAL TUNNEL RELEASE;  Surgeon: Judd Mathias MD;  Location: [facility] OR;  Service: Orthopedics   • COLONOSCOPY N/A 2019    Procedure: COLONOSCOPY a friday please ;  Surgeon: Ruslan Bowers MD;  Location: [facility] ENDOSCOPY;  Service: Gastroenterology   • ENDOSCOPY     • PYLOROMYOTOMY       Social History     Socioeconomic History   • Marital status: Single     Spouse name: Not on file   • Number of children: Not on file   • Years of education: Not on file   • Highest education level: Not on file   Occupational History   • Occupation:      Employer: SELF-EMPLOYED     Comment: Patient resides with Jennifer [place].   Tobacco Use   • Smoking status: Former Smoker     Packs/day: 1.00     Years: 6.00     Pack years: 6.00     Types: Cigarettes     Last attempt to quit:      Years since quittin.0   • Smokeless tobacco: Never Used   Substance and Sexual Activity   • Alcohol use: Yes     Comment: 2018 - Kevin reports consumption of alcoholic beverages under a social basis (approximately 3 - 4 per month).     • Drug use: No   • Sexual activity: Defer      Family History   Problem Relation Age of Onset   • Colon cancer Mother         onset age greater than 75y   • Diabetes Mother    • Breast cancer Sister    • Cancer Brother         bladder   • Diabetes Brother    • Hyperlipidemia Brother      Lab on 08/19/2019   Component Date Value Ref Range Status   • WBC 08/19/2019 7.42  3.40 - 10.80 10*3/mm3 Final   • RBC 08/19/2019 5.05  4.14 - 5.80 10*6/mm3 Final   • Hemoglobin 08/19/2019 15.0  13.0 - 17.7 g/dL Final   • Hematocrit 08/19/2019 46.7  37.5 - 51.0 % Final   • MCV 08/19/2019 92.5  79.0 - 97.0 fL Final   • MCH 08/19/2019 29.7  26.6 - 33.0 pg Final   • MCHC 08/19/2019 32.1  31.5 - 35.7 g/dL Final   • RDW 08/19/2019 13.6  12.3 - 15.4 % Final   • RDW-SD 08/19/2019 46.5  37.0 - 54.0 fl Final   • MPV 08/19/2019 10.5  6.0 - 12.0 fL Final   • Platelets 08/19/2019 293  140 - 450 10*3/mm3 Final   • Neutrophil % 08/19/2019 55.4  42.7 - 76.0 % Final   • Lymphocyte % 08/19/2019 34.5  19.6 - 45.3 % Final   • Monocyte % 08/19/2019 6.1  5.0 - 12.0 % Final   • Eosinophil % 08/19/2019 2.6  0.3 - 6.2 % Final   • Basophil % 08/19/2019 0.9  0.0 - 1.5 % Final   • Immature Grans % 08/19/2019 0.5  0.0 - 0.5 % Final   • Neutrophils, Absolute 08/19/2019 4.11  1.70 - 7.00 10*3/mm3 Final   • Lymphocytes, Absolute 08/19/2019 2.56  0.70 - 3.10 10*3/mm3 Final   • Monocytes, Absolute 08/19/2019 0.45  0.10 - 0.90 10*3/mm3 Final   • Eosinophils, Absolute 08/19/2019 0.19  0.00 - 0.40 10*3/mm3 Final   • Basophils, Absolute 08/19/2019 0.07  0.00 - 0.20 10*3/mm3 Final   • Immature Grans, Absolute 08/19/2019 0.04  0.00 - 0.05 10*3/mm3 Final   • nRBC 08/19/2019 0.1  0.0 - 0.2 /100 WBC Final   • Glucose 08/19/2019 95  65 - 99 mg/dL Final   • BUN 08/19/2019 15  6 - 20 mg/dL Final   • Creatinine 08/19/2019 0.92  0.76 - 1.27 mg/dL Final   • Sodium 08/19/2019 145  136 - 145 mmol/L Final   • Potassium 08/19/2019 4.6  3.5 - 5.2 mmol/L Final   • Chloride 08/19/2019 106  98 - 107 mmol/L Final   • CO2  08/19/2019 26.8  22.0 - 29.0 mmol/L Final   • Calcium 08/19/2019 9.9  8.6 - 10.5 mg/dL Final   • Total Protein 08/19/2019 7.9  6.0 - 8.5 g/dL Final   • Albumin 08/19/2019 4.80  3.50 - 5.20 g/dL Final   • ALT (SGPT) 08/19/2019 30  1 - 41 U/L Final   • AST (SGOT) 08/19/2019 21  1 - 40 U/L Final   • Alkaline Phosphatase 08/19/2019 102  39 - 117 U/L Final   • Total Bilirubin 08/19/2019 0.6  0.2 - 1.2 mg/dL Final   • eGFR Non  Amer 08/19/2019 87  >60 mL/min/1.73 Final   • Globulin 08/19/2019 3.1  gm/dL Final   • A/G Ratio 08/19/2019 1.5  g/dL Final   • BUN/Creatinine Ratio 08/19/2019 16.3  7.0 - 25.0 Final   • Anion Gap 08/19/2019 12.2  5.0 - 15.0 mmol/L Final   • Hemoglobin A1C 08/19/2019 5.50  4.80 - 5.60 % Final   • Total Cholesterol 08/19/2019 196  0 - 200 mg/dL Final   • Triglycerides 08/19/2019 121  0 - 150 mg/dL Final   • HDL Cholesterol 08/19/2019 47  40 - 60 mg/dL Final   • LDL Cholesterol  08/19/2019 125* 0 - 100 mg/dL Final   • VLDL Cholesterol 08/19/2019 24.2  5 - 40 mg/dL Final   • LDL/HDL Ratio 08/19/2019 2.66   Final   • 25 Hydroxy, Vitamin D 08/19/2019 39.8  30.0 - 100.0 ng/ml Final   • THC, Screen, Urine 08/19/2019 Negative  Negative Final   • Phencyclidine (PCP), Urine 08/19/2019 Negative  Negative Final   • Cocaine Screen, Urine 08/19/2019 Negative  Negative Final   • Methamphetamine, Ur 08/19/2019 Negative  Negative Final   • Opiate Screen 08/19/2019 Negative  Negative Final   • Amphetamine Screen, Urine 08/19/2019 Positive* Negative Final   • Benzodiazepine Screen, Urine 08/19/2019 Negative  Negative Final   • Tricyclic Antidepressants Screen 08/19/2019 Negative  Negative Final   • Methadone Screen, Urine 08/19/2019 Negative  Negative Final   • Barbiturates Screen, Urine 08/19/2019 Negative  Negative Final   • Oxycodone Screen, Urine 08/19/2019 Negative  Negative Final   • Propoxyphene Screen 08/19/2019 Negative  Negative Final   • Buprenorphine, Screen, Urine 08/19/2019 Negative  Negative  "Final      XR Spine Lumbar 4+ View  Narrative: Five-view lumbar spine    HISTORY: Back pain. Lumbago with bilateral sciatica.    AP, lateral and oblique radiographs of the lumbar spine and spot  film of the lumbosacral junction were obtained.    COMPARISON: None.    Mild levoscoliosis.  Congenitally short pedicles.  Minimal degenerative changes with small marginal osteophytes.  There is a normal lordosis.   Vertebral height and alignment are maintained.  No fracture.  The pedicles are intact.  Impression: CONCLUSION:  Mild levoscoliosis.  Congenitally short pedicles.  Minimal degenerative changes with small marginal osteophytes.    [id number]    Electronically signed by:  Anthony Cm MD  4/8/2019 10:21 AM CDT  Workstation: [employer]  XR Spine Thoracic 3 View  Narrative: Three-view thoracic spine.    HISTORY: Back pain    AP, lateral and swimmer's views of the thoracic spine were  obtained.    COMPARISON: None.    Normal thoracic kyphosis.  Vertebral height and alignment maintained.  Mild degenerative changes.  No fracture.  The pedicles are intact.  No paraspinal widening.  Impression: CONCLUSION:   Mild degenerative changes.    [id number]    Electronically signed by:  Anthony Cm MD  4/8/2019 10:19 AM CDT  Workstation: [employer]    [unfilled]  Immunization History   Administered Date(s) Administered   • Influenza TIV (IM) 03/28/2017   • Tdap 02/17/2017       The following portions of the patient's history were reviewed and updated as appropriate: allergies, current medications, past family history, past medical history, past social history, past surgical history and problem list.        Physical Exam  /70 (BP Location: Right arm, Patient Position: Sitting, Cuff Size: Adult)   Pulse 84   Temp 98.2 °F (36.8 °C) (Oral)   Resp 20   Ht 180.3 cm (71\")   Wt 128 kg (282 lb)   SpO2 93%   BMI 39.33 kg/m²     Physical Exam   Constitutional: He is oriented to person, place, and time. He appears " well-developed and well-nourished.   HENT:   Head: Normocephalic and atraumatic.   Right Ear: External ear normal.   Eyes: Pupils are equal, round, and reactive to light. Conjunctivae and EOM are normal.   Neck: Normal range of motion. Neck supple.   Cardiovascular: Normal rate, regular rhythm and normal heart sounds.   No murmur heard.  Pulmonary/Chest: Effort normal and breath sounds normal. No respiratory distress.   Abdominal: Soft. Bowel sounds are normal. He exhibits no distension. There is generalized tenderness and tenderness in the right upper quadrant, epigastric area and suprapubic area. There is positive Jane's sign. There is no rigidity, no rebound, no guarding and no tenderness at McBurney's point.   Obese abdomen    Musculoskeletal: Normal range of motion. He exhibits tenderness. He exhibits no edema or deformity.   Neurological: He is alert and oriented to person, place, and time. No cranial nerve deficit.   Skin: Skin is warm. No rash noted. He is not diaphoretic. No erythema. No pallor.   Psychiatric: He has a normal mood and affect. His behavior is normal.   Nursing note and vitals reviewed.      Assessment/Plan    Diagnosis Plan   1. Generalized abdominal pain  CBC Auto Differential    Comprehensive Metabolic Panel    Amylase    Lipase    US Abdomen Complete   2. Stage 2 chronic kidney disease     3. S/P carpal tunnel release     4. Prediabetes     5. Hyperlipidemia, unspecified hyperlipidemia type     6. History of prediabetes     7. Essential hypertension     8. Bilateral carpal tunnel syndrome     9. Attention deficit hyperactivity disorder (ADHD), predominantly inattentive type     10. Allergic rhinitis, unspecified seasonality, unspecified trigger     11. Class 2 obesity with body mass index (BMI) of 35.0 to 35.9 in adult, unspecified obesity type, unspecified whether serious comorbidity present          -went over labwork   -abdominal pain - possible gallbladder vs gastritis,vs esophgitis  vs PUD- recommend pt see Gastroenteorlogist Dr. Bowers. Will get pancreatic enzymes. Also check US of abdomen for possible gallstones. Consider General Surgery referral. Continue prilosec 40 mg daily.    -recommend influenza vaccination - pt declined.   -ckd stage 2 - continue to monitor. Gave information. Advised better hydration BP control   -recommend pt go back on aspirin and lipitor  -for back pain with sciatica/scoliosis - pain is better after PT/OT. Continue with mobic and flexeril PRN.  Consider MRI if not improving   -left upper arm pain - will get US of  Arm. Suspect hematoma.    -right shoulder pain - x-ray today. Consider PT/OT or Orthopedic referral.  Tumeric powder in pill form   - pt doing well postop on carpal tunnel release on right hand   -for cerumen of both ears - debrox ointment PRN. Drug information provided  -for carpal tunnel he is doing better post op  he has failed conservative treatment for >3 months. . He has yet to get surgery on left  Hand   -HTN-  Lisinopril 20  To 40 mg mg daily. Add chlorthalidone 25 mg q daily.    -vitamin D pill once a week   -recommend flu shot she declined   -HLP - lipitor 80mg PO qhs   -  BP was at goal Continue  on lisionpril  But go up form 10 to 20 mg PO q daily drug information provided low salt diet informaiton provided. Side effects discussed BP better controlled today advsied pt to bring readings next visit and get BP machine   - refilled ADHD medication Adderall XR 25 mg PO q daily for ADHD.  [facility] ref number ref number [id number]  - for allergic rhinitis - continue singulair and flonase and will add zyrtec.  -H/O prediabetes - D/C  metformin, recent hga1c normal at 5.4   - hyperlipidemia -continue statin. Recheck lipid panel. Pt on aspirin   - obesity - recommend ketogenic diet along with intermittent fasting. BMI at 38.16.    -advised pt to be safe and call with questions and concerns  -advised to go to ER or call 911 if symptoms get severe  -advised  to followup with specialist and referrals   -recheck in 1 month         This document has been electronically signed by Scottie Borges MD on January 14, 2020 9:33 AM

## 2020-01-14 ENCOUNTER — LAB (OUTPATIENT)
Dept: LAB | Facility: HOSPITAL | Age: 50
End: 2020-01-14

## 2020-01-14 ENCOUNTER — OFFICE VISIT (OUTPATIENT)
Dept: FAMILY MEDICINE CLINIC | Facility: CLINIC | Age: 50
End: 2020-01-14

## 2020-01-14 VITALS
WEIGHT: 282 LBS | BODY MASS INDEX: 39.48 KG/M2 | RESPIRATION RATE: 20 BRPM | HEIGHT: 71 IN | DIASTOLIC BLOOD PRESSURE: 70 MMHG | SYSTOLIC BLOOD PRESSURE: 104 MMHG | TEMPERATURE: 98.2 F | OXYGEN SATURATION: 93 % | HEART RATE: 84 BPM

## 2020-01-14 DIAGNOSIS — J30.9 ALLERGIC RHINITIS, UNSPECIFIED SEASONALITY, UNSPECIFIED TRIGGER: ICD-10-CM

## 2020-01-14 DIAGNOSIS — E66.9 CLASS 2 OBESITY WITH BODY MASS INDEX (BMI) OF 35.0 TO 35.9 IN ADULT, UNSPECIFIED OBESITY TYPE, UNSPECIFIED WHETHER SERIOUS COMORBIDITY PRESENT: ICD-10-CM

## 2020-01-14 DIAGNOSIS — E78.5 HYPERLIPIDEMIA, UNSPECIFIED HYPERLIPIDEMIA TYPE: ICD-10-CM

## 2020-01-14 DIAGNOSIS — I10 ESSENTIAL HYPERTENSION: ICD-10-CM

## 2020-01-14 DIAGNOSIS — R10.84 GENERALIZED ABDOMINAL PAIN: ICD-10-CM

## 2020-01-14 DIAGNOSIS — R10.84 GENERALIZED ABDOMINAL PAIN: Primary | ICD-10-CM

## 2020-01-14 DIAGNOSIS — Z98.890 S/P CARPAL TUNNEL RELEASE: ICD-10-CM

## 2020-01-14 DIAGNOSIS — F90.0 ATTENTION DEFICIT HYPERACTIVITY DISORDER (ADHD), PREDOMINANTLY INATTENTIVE TYPE: ICD-10-CM

## 2020-01-14 DIAGNOSIS — R73.03 PREDIABETES: ICD-10-CM

## 2020-01-14 DIAGNOSIS — G56.03 BILATERAL CARPAL TUNNEL SYNDROME: ICD-10-CM

## 2020-01-14 DIAGNOSIS — N18.2 STAGE 2 CHRONIC KIDNEY DISEASE: ICD-10-CM

## 2020-01-14 DIAGNOSIS — R10.13 EPIGASTRIC PAIN: ICD-10-CM

## 2020-01-14 DIAGNOSIS — Z87.898 HISTORY OF PREDIABETES: ICD-10-CM

## 2020-01-14 LAB
ALBUMIN SERPL-MCNC: 4.1 G/DL (ref 3.5–5.2)
ALBUMIN/GLOB SERPL: 1.2 G/DL
ALP SERPL-CCNC: 92 U/L (ref 39–117)
ALT SERPL W P-5'-P-CCNC: 25 U/L (ref 1–41)
AMYLASE SERPL-CCNC: 81 U/L (ref 28–100)
ANION GAP SERPL CALCULATED.3IONS-SCNC: 14.6 MMOL/L (ref 5–15)
AST SERPL-CCNC: 16 U/L (ref 1–40)
BASOPHILS # BLD AUTO: 0.07 10*3/MM3 (ref 0–0.2)
BASOPHILS NFR BLD AUTO: 1 % (ref 0–1.5)
BILIRUB SERPL-MCNC: 0.4 MG/DL (ref 0.2–1.2)
BUN BLD-MCNC: 14 MG/DL (ref 6–20)
BUN/CREAT SERPL: 12.8 (ref 7–25)
CALCIUM SPEC-SCNC: 9.4 MG/DL (ref 8.6–10.5)
CHLORIDE SERPL-SCNC: 97 MMOL/L (ref 98–107)
CO2 SERPL-SCNC: 25.4 MMOL/L (ref 22–29)
CREAT BLD-MCNC: 1.09 MG/DL (ref 0.76–1.27)
DEPRECATED RDW RBC AUTO: 38.4 FL (ref 37–54)
EOSINOPHIL # BLD AUTO: 0.27 10*3/MM3 (ref 0–0.4)
EOSINOPHIL NFR BLD AUTO: 3.8 % (ref 0.3–6.2)
ERYTHROCYTE [DISTWIDTH] IN BLOOD BY AUTOMATED COUNT: 12.4 % (ref 12.3–15.4)
GFR SERPL CREATININE-BSD FRML MDRD: 71 ML/MIN/1.73
GLOBULIN UR ELPH-MCNC: 3.3 GM/DL
GLUCOSE BLD-MCNC: 112 MG/DL (ref 65–99)
HCT VFR BLD AUTO: 40.3 % (ref 37.5–51)
HGB BLD-MCNC: 13.9 G/DL (ref 13–17.7)
IMM GRANULOCYTES # BLD AUTO: 0.03 10*3/MM3 (ref 0–0.05)
IMM GRANULOCYTES NFR BLD AUTO: 0.4 % (ref 0–0.5)
LIPASE SERPL-CCNC: 35 U/L (ref 13–60)
LYMPHOCYTES # BLD AUTO: 2.45 10*3/MM3 (ref 0.7–3.1)
LYMPHOCYTES NFR BLD AUTO: 34.9 % (ref 19.6–45.3)
MCH RBC QN AUTO: 29.6 PG (ref 26.6–33)
MCHC RBC AUTO-ENTMCNC: 34.5 G/DL (ref 31.5–35.7)
MCV RBC AUTO: 85.7 FL (ref 79–97)
MONOCYTES # BLD AUTO: 0.44 10*3/MM3 (ref 0.1–0.9)
MONOCYTES NFR BLD AUTO: 6.3 % (ref 5–12)
NEUTROPHILS # BLD AUTO: 3.77 10*3/MM3 (ref 1.7–7)
NEUTROPHILS NFR BLD AUTO: 53.6 % (ref 42.7–76)
NRBC BLD AUTO-RTO: 0 /100 WBC (ref 0–0.2)
PLATELET # BLD AUTO: 307 10*3/MM3 (ref 140–450)
PMV BLD AUTO: 9.6 FL (ref 6–12)
POTASSIUM BLD-SCNC: 4.3 MMOL/L (ref 3.5–5.2)
PROT SERPL-MCNC: 7.4 G/DL (ref 6–8.5)
RBC # BLD AUTO: 4.7 10*6/MM3 (ref 4.14–5.8)
SODIUM BLD-SCNC: 137 MMOL/L (ref 136–145)
WBC NRBC COR # BLD: 7.03 10*3/MM3 (ref 3.4–10.8)

## 2020-01-14 PROCEDURE — 82150 ASSAY OF AMYLASE: CPT

## 2020-01-14 PROCEDURE — 99214 OFFICE O/P EST MOD 30 MIN: CPT | Performed by: FAMILY MEDICINE

## 2020-01-14 PROCEDURE — 85025 COMPLETE CBC W/AUTO DIFF WBC: CPT

## 2020-01-14 PROCEDURE — 80053 COMPREHEN METABOLIC PANEL: CPT

## 2020-01-14 PROCEDURE — 83690 ASSAY OF LIPASE: CPT

## 2020-01-14 RX ORDER — DEXTROAMPHETAMINE SACCHARATE, AMPHETAMINE ASPARTATE MONOHYDRATE, DEXTROAMPHETAMINE SULFATE AND AMPHETAMINE SULFATE 6.25; 6.25; 6.25; 6.25 MG/1; MG/1; MG/1; MG/1
25 CAPSULE, EXTENDED RELEASE ORAL EVERY MORNING
Qty: 30 CAPSULE | Refills: 0 | Status: SHIPPED | OUTPATIENT
Start: 2020-01-14 | End: 2020-02-14 | Stop reason: SDUPTHER

## 2020-01-17 ENCOUNTER — TELEPHONE (OUTPATIENT)
Dept: FAMILY MEDICINE CLINIC | Facility: CLINIC | Age: 50
End: 2020-01-17

## 2020-01-20 ENCOUNTER — TELEPHONE (OUTPATIENT)
Dept: FAMILY MEDICINE CLINIC | Facility: CLINIC | Age: 50
End: 2020-01-20

## 2020-01-20 NOTE — TELEPHONE ENCOUNTER
----- Message from Scottie Borges MD sent at 1/15/2020  7:16 AM CST -----  Please call pt    Pancreatic enzymes normal    CBC normal     Kidney function stable but needs to drink more fluids    Proceed with US of abdomen and Gastroenterology referral with EGD for his abdominal pain    Thanks

## 2020-01-27 RX ORDER — MONTELUKAST SODIUM 10 MG/1
TABLET ORAL
Qty: 30 TABLET | Refills: 0 | Status: SHIPPED | OUTPATIENT
Start: 2020-01-27 | End: 2020-06-15 | Stop reason: SDUPTHER

## 2020-02-03 RX ORDER — OMEPRAZOLE 40 MG/1
40 CAPSULE, DELAYED RELEASE ORAL DAILY
Qty: 30 CAPSULE | Refills: 3 | Status: SHIPPED | OUTPATIENT
Start: 2020-02-03 | End: 2020-07-10 | Stop reason: SDUPTHER

## 2020-02-03 RX ORDER — ERGOCALCIFEROL 1.25 MG/1
CAPSULE ORAL
Qty: 4 CAPSULE | Refills: 3 | Status: SHIPPED | OUTPATIENT
Start: 2020-02-03 | End: 2020-05-13 | Stop reason: SDUPTHER

## 2020-02-05 ENCOUNTER — OFFICE VISIT (OUTPATIENT)
Dept: GASTROENTEROLOGY | Facility: CLINIC | Age: 50
End: 2020-02-05

## 2020-02-05 VITALS
HEART RATE: 84 BPM | BODY MASS INDEX: 39.14 KG/M2 | SYSTOLIC BLOOD PRESSURE: 112 MMHG | DIASTOLIC BLOOD PRESSURE: 72 MMHG | WEIGHT: 279.6 LBS | HEIGHT: 71 IN

## 2020-02-05 DIAGNOSIS — R11.14 BILIOUS VOMITING WITH NAUSEA: ICD-10-CM

## 2020-02-05 DIAGNOSIS — R10.13 EPIGASTRIC PAIN: Primary | ICD-10-CM

## 2020-02-05 PROCEDURE — 99214 OFFICE O/P EST MOD 30 MIN: CPT | Performed by: NURSE PRACTITIONER

## 2020-02-05 RX ORDER — SODIUM CHLORIDE 0.9 % (FLUSH) 0.9 %
10 SYRINGE (ML) INJECTION AS NEEDED
Status: CANCELLED | OUTPATIENT
Start: 2020-02-05

## 2020-02-05 RX ORDER — DEXTROSE AND SODIUM CHLORIDE 5; .45 G/100ML; G/100ML
30 INJECTION, SOLUTION INTRAVENOUS CONTINUOUS PRN
Status: CANCELLED | OUTPATIENT
Start: 2020-02-21

## 2020-02-05 RX ORDER — SODIUM CHLORIDE 0.9 % (FLUSH) 0.9 %
3 SYRINGE (ML) INJECTION EVERY 12 HOURS SCHEDULED
Status: CANCELLED | OUTPATIENT
Start: 2020-02-05

## 2020-02-05 NOTE — H&P (VIEW-ONLY)
Chief Complaint   Patient presents with   • Abdominal Pain   • Heartburn       Subjective    Randall Hernandez is a 49 y.o. male. he is here today for follow-up.    49-year-old male presents for follow-up regarding abdominal pain.  He was last seen in this office did have screening colonoscopy which was completed January of last year was normal with repeat recommended in 5 years for surveillance.  States he has had abdominal discomfort his entire life but seems to recently worsened and feels like his abdomen is swollen.  He has been taking Prilosec for GERD for several years.  Reports certain spicy greasy foods seem to make symptoms worse he reports he drinks alcohol on the weekends but not regularly during the week.  He has history of pyloric stenosis as a baby that was surgically corrected.  He reports occasional nausea and vomiting of bilious material.  He has ultrasound scheduled of his gallbladder.  He had lab work primary care  office  amylase, lipase, CBC and CMP.  Labs were stable liver enzymes were normal.    Abdominal Pain   This is a chronic problem. The current episode started more than 1 year ago. The onset quality is gradual. The problem occurs intermittently. The problem has been waxing and waning. The pain is located in the epigastric region. The pain is mild. The quality of the pain is cramping. The abdominal pain radiates to the epigastric region. Associated symptoms include nausea and vomiting. Pertinent negatives include no arthralgias, constipation, diarrhea or fever. His past medical history is significant for GERD.   Heartburn   He complains of abdominal pain and nausea. He reports no sore throat. Associated symptoms include fatigue.      Plan; proceed with ultrasound as planned.  Schedule patient for EGD due to epigastric tenderness chronic reflux and nausea vomiting.       The following portions of the patient's history were reviewed and updated as appropriate:   Past Medical History:      Diagnosis Date   • Allergic rhinitis    • Arthritis    • Backache    • Cellulitis of leg, except foot    • Dyslipidemia    • GERD (gastroesophageal reflux disease)    • Heartburn    • History of Holter monitoring 02/26/2016    Sinus mechanism with a normal average heart rate.No evidence of chronotropic incompetence.Asymptomatic Holter   • Hyperlipidemia    • Hypertension    • Low back pain    • Numbness of lower limb     left leg numbness and tingling      • Obesity, unspecified    • Obstructive sleep apnea of adult     USING C-PAP   • Pain in left hip    • Pain in right hip    • Palpitations    • Skin lesion     insect bite.      • Supraventricular tachycardia (CMS/HCC)    • Weight loss     advised     Past Surgical History:   Procedure Laterality Date   • CARPAL TUNNEL RELEASE Right 11/2/2018    Procedure: CARPAL TUNNEL RELEASE;  Surgeon: Judd Mathias MD;  Location: [facility] OR;  Service: Orthopedics   • CARPAL TUNNEL RELEASE  12/07/2018    Left   • CARPAL TUNNEL RELEASE Left 12/7/2018    Procedure: CARPAL TUNNEL RELEASE;  Surgeon: Judd Mathias MD;  Location: [facility] OR;  Service: Orthopedics   • COLONOSCOPY N/A 1/18/2019    Procedure: COLONOSCOPY a friday please ;  Surgeon: Ruslan Bowers MD;  Location: [facility] ENDOSCOPY;  Service: Gastroenterology   • ENDOSCOPY     • PYLOROMYOTOMY       Family History   Problem Relation Age of Onset   • Colon cancer Mother         onset age greater than 75y   • Diabetes Mother    • Breast cancer Sister    • Cancer Brother         bladder   • Diabetes Brother    • Hyperlipidemia Brother        Prior to Admission medications    Medication Sig Start Date End Date Taking? Authorizing Provider   amphetamine-dextroamphetamine XR (ADDERALL XR) 25 MG 24 hr capsule Take 1 capsule by mouth Every Morning Take 1 tablet by mouth daily 1/14/20   Scottie Borges MD   aspirin 81 MG EC tablet Take 1 tablet by mouth Daily. 9/19/19   Scottie Borges MD   atorvastatin (LIPITOR)  80 MG tablet Take 1 tablet by mouth Daily. 19   Scottie Borges MD   chlorthalidone (HYGROTON) 25 MG tablet Take 1 tablet by mouth Daily. 10/17/19   Scottie Borges MD   cyclobenzaprine (FLEXERIL) 5 MG tablet Take 1 tablet by mouth 3 (Three) Times a Day As Needed for Muscle Spasms. 19   Scottie Borges MD   fluticasone (FLONASE) 50 MCG/ACT nasal spray 2 sprays by Each Nare route Daily. 19   Scottie Borges MD   lisinopril (PRINIVIL,ZESTRIL) 40 MG tablet Take 1 tablet by mouth Daily. 11/15/19   Scottie Borges MD   montelukast (SINGULAIR) 10 MG tablet TAKE 1 TABLET BY MOUTH EVERY NIGHT 20   Scottie Borges MD   omeprazole (priLOSEC) 40 MG capsule TAKE 1 CAPSULE BY MOUTH DAILY 2/3/20   Scottie Borges MD   vitamin D (ERGOCALCIFEROL) 1.25 MG (84573 UT) capsule capsule TAKE 1 CAPSULE BY MOUTH 1 TIME EVERY WEEK 2/3/20   Scottie Borges MD     No Known Allergies  Social History     Socioeconomic History   • Marital status: Single     Spouse name: Not on file   • Number of children: Not on file   • Years of education: Not on file   • Highest education level: Not on file   Occupational History   • Occupation:      Employer: SELF-EMPLOYED     Comment: Patient resides with [facility].   Tobacco Use   • Smoking status: Former Smoker     Packs/day: 1.00     Years: 6.00     Pack years: 6.00     Types: Cigarettes     Last attempt to quit:      Years since quittin.1   • Smokeless tobacco: Never Used   Substance and Sexual Activity   • Alcohol use: Yes     Comment: 2018 - Patinet reports consumption of alcoholic beverages under a social basis (approximately 3 - 4 per month).     • Drug use: No   • Sexual activity: Defer       Review of Systems  Review of Systems   Constitutional: Positive for appetite change and fatigue. Negative for activity change, chills, diaphoresis, fever and unexpected weight change.   HENT: Negative for sore throat and trouble swallowing.   "  Respiratory: Negative for shortness of breath.    Gastrointestinal: Positive for abdominal pain, nausea and vomiting. Negative for abdominal distention, anal bleeding, blood in stool, constipation, diarrhea and rectal pain.   Musculoskeletal: Negative for arthralgias.   Skin: Negative for pallor.   Neurological: Negative for light-headedness.        /72 (BP Location: Left arm)   Pulse 84   Ht 180.3 cm (71\")   Wt 127 kg (279 lb 9.6 oz)   BMI 39.00 kg/m²     Objective    Physical Exam   Constitutional: He is oriented to person, place, and time. He appears well-developed and well-nourished. He is cooperative. No distress.   HENT:   Head: Normocephalic and atraumatic.   Neck: Normal range of motion. Neck supple. No thyromegaly present.   Cardiovascular: Normal rate, regular rhythm and normal heart sounds.   Pulmonary/Chest: Effort normal and breath sounds normal. He has no wheezes. He has no rhonchi. He has no rales.   Abdominal: Soft. Normal appearance and bowel sounds are normal. He exhibits no distension. There is no hepatosplenomegaly. There is tenderness in the right upper quadrant, epigastric area and left upper quadrant. There is no rigidity and no guarding. No hernia.   Lymphadenopathy:     He has no cervical adenopathy.   Neurological: He is alert and oriented to person, place, and time.   Skin: Skin is warm, dry and intact. No rash noted. No pallor.   Psychiatric: He has a normal mood and affect. His speech is normal.     Lab on 01/14/2020   Component Date Value Ref Range Status   • WBC 01/14/2020 7.03  3.40 - 10.80 10*3/mm3 Final   • RBC 01/14/2020 4.70  4.14 - 5.80 10*6/mm3 Final   • Hemoglobin 01/14/2020 13.9  13.0 - 17.7 g/dL Final   • Hematocrit 01/14/2020 40.3  37.5 - 51.0 % Final   • MCV 01/14/2020 85.7  79.0 - 97.0 fL Final   • MCH 01/14/2020 29.6  26.6 - 33.0 pg Final   • MCHC 01/14/2020 34.5  31.5 - 35.7 g/dL Final   • RDW 01/14/2020 12.4  12.3 - 15.4 % Final   • RDW-SD 01/14/2020 38.4  " 37.0 - 54.0 fl Final   • MPV 01/14/2020 9.6  6.0 - 12.0 fL Final   • Platelets 01/14/2020 307  140 - 450 10*3/mm3 Final   • Neutrophil % 01/14/2020 53.6  42.7 - 76.0 % Final   • Lymphocyte % 01/14/2020 34.9  19.6 - 45.3 % Final   • Monocyte % 01/14/2020 6.3  5.0 - 12.0 % Final   • Eosinophil % 01/14/2020 3.8  0.3 - 6.2 % Final   • Basophil % 01/14/2020 1.0  0.0 - 1.5 % Final   • Immature Grans % 01/14/2020 0.4  0.0 - 0.5 % Final   • Neutrophils, Absolute 01/14/2020 3.77  1.70 - 7.00 10*3/mm3 Final   • Lymphocytes, Absolute 01/14/2020 2.45  0.70 - 3.10 10*3/mm3 Final   • Monocytes, Absolute 01/14/2020 0.44  0.10 - 0.90 10*3/mm3 Final   • Eosinophils, Absolute 01/14/2020 0.27  0.00 - 0.40 10*3/mm3 Final   • Basophils, Absolute 01/14/2020 0.07  0.00 - 0.20 10*3/mm3 Final   • Immature Grans, Absolute 01/14/2020 0.03  0.00 - 0.05 10*3/mm3 Final   • nRBC 01/14/2020 0.0  0.0 - 0.2 /100 WBC Final   • Glucose 01/14/2020 112* 65 - 99 mg/dL Final   • BUN 01/14/2020 14  6 - 20 mg/dL Final   • Creatinine 01/14/2020 1.09  0.76 - 1.27 mg/dL Final   • Sodium 01/14/2020 137  136 - 145 mmol/L Final   • Potassium 01/14/2020 4.3  3.5 - 5.2 mmol/L Final   • Chloride 01/14/2020 97* 98 - 107 mmol/L Final   • CO2 01/14/2020 25.4  22.0 - 29.0 mmol/L Final   • Calcium 01/14/2020 9.4  8.6 - 10.5 mg/dL Final   • Total Protein 01/14/2020 7.4  6.0 - 8.5 g/dL Final   • Albumin 01/14/2020 4.10  3.50 - 5.20 g/dL Final   • ALT (SGPT) 01/14/2020 25  1 - 41 U/L Final   • AST (SGOT) 01/14/2020 16  1 - 40 U/L Final   • Alkaline Phosphatase 01/14/2020 92  39 - 117 U/L Final   • Total Bilirubin 01/14/2020 0.4  0.2 - 1.2 mg/dL Final   • eGFR Non African Amer 01/14/2020 71  >60 mL/min/1.73 Final   • Globulin 01/14/2020 3.3  gm/dL Final   • A/G Ratio 01/14/2020 1.2  g/dL Final   • BUN/Creatinine Ratio 01/14/2020 12.8  7.0 - 25.0 Final   • Anion Gap 01/14/2020 14.6  5.0 - 15.0 mmol/L Final   • Amylase 01/14/2020 81  28 - 100 U/L Final   • Lipase 01/14/2020 35   13 - 60 U/L Final     Assessment/Plan      1. Epigastric pain    2. Bilious vomiting with nausea    .       Orders placed during this encounter include:  Orders Placed This Encounter   Procedures   • Follow Anesthesia Guidelines / Standing Orders     Standing Status:   Future   • Obtain Informed Consent     Standing Status:   Future     Order Specific Question:   Informed Consent Given For     Answer:   ESOPHAGOGASTRODUODENOSCOPY possible dilation       ESOPHAGOGASTRODUODENOSCOPY possible dilation Fridays (N/A)    Review and/or summary of lab tests, radiology, procedures, medications. Review and summary of old records and obtaining of history. The risks and benefits of my recommendations, as well as other treatment options were discussed with the patient today. Questions were answered.    No orders of the defined types were placed in this encounter.      Follow-up: Return in about 4 weeks (around 3/4/2020).          This document has been electronically signed by ROLAN Way on February 5, 2020 11:07 AM             Results for orders placed or performed in visit on 01/14/20   CBC Auto Differential   Result Value Ref Range    WBC 7.03 3.40 - 10.80 10*3/mm3    RBC 4.70 4.14 - 5.80 10*6/mm3    Hemoglobin 13.9 13.0 - 17.7 g/dL    Hematocrit 40.3 37.5 - 51.0 %    MCV 85.7 79.0 - 97.0 fL    MCH 29.6 26.6 - 33.0 pg    MCHC 34.5 31.5 - 35.7 g/dL    RDW 12.4 12.3 - 15.4 %    RDW-SD 38.4 37.0 - 54.0 fl    MPV 9.6 6.0 - 12.0 fL    Platelets 307 140 - 450 10*3/mm3    Neutrophil % 53.6 42.7 - 76.0 %    Lymphocyte % 34.9 19.6 - 45.3 %    Monocyte % 6.3 5.0 - 12.0 %    Eosinophil % 3.8 0.3 - 6.2 %    Basophil % 1.0 0.0 - 1.5 %    Immature Grans % 0.4 0.0 - 0.5 %    Neutrophils, Absolute 3.77 1.70 - 7.00 10*3/mm3    Lymphocytes, Absolute 2.45 0.70 - 3.10 10*3/mm3    Monocytes, Absolute 0.44 0.10 - 0.90 10*3/mm3    Eosinophils, Absolute 0.27 0.00 - 0.40 10*3/mm3    Basophils, Absolute 0.07 0.00 - 0.20 10*3/mm3    Immature  Grans, Absolute 0.03 0.00 - 0.05 10*3/mm3    nRBC 0.0 0.0 - 0.2 /100 WBC   Lipase   Result Value Ref Range    Lipase 35 13 - 60 U/L   Amylase   Result Value Ref Range    Amylase 81 28 - 100 U/L   Comprehensive Metabolic Panel   Result Value Ref Range    Glucose 112 (H) 65 - 99 mg/dL    BUN 14 6 - 20 mg/dL    Creatinine 1.09 0.76 - 1.27 mg/dL    Sodium 137 136 - 145 mmol/L    Potassium 4.3 3.5 - 5.2 mmol/L    Chloride 97 (L) 98 - 107 mmol/L    CO2 25.4 22.0 - 29.0 mmol/L    Calcium 9.4 8.6 - 10.5 mg/dL    Total Protein 7.4 6.0 - 8.5 g/dL    Albumin 4.10 3.50 - 5.20 g/dL    ALT (SGPT) 25 1 - 41 U/L    AST (SGOT) 16 1 - 40 U/L    Alkaline Phosphatase 92 39 - 117 U/L    Total Bilirubin 0.4 0.2 - 1.2 mg/dL    eGFR Non African Amer 71 >60 mL/min/1.73    Globulin 3.3 gm/dL    A/G Ratio 1.2 g/dL    BUN/Creatinine Ratio 12.8 7.0 - 25.0    Anion Gap 14.6 5.0 - 15.0 mmol/L   Results for orders placed or performed in visit on 08/19/19   CBC Auto Differential   Result Value Ref Range    WBC 7.42 3.40 - 10.80 10*3/mm3    RBC 5.05 4.14 - 5.80 10*6/mm3    Hemoglobin 15.0 13.0 - 17.7 g/dL    Hematocrit 46.7 37.5 - 51.0 %    MCV 92.5 79.0 - 97.0 fL    MCH 29.7 26.6 - 33.0 pg    MCHC 32.1 31.5 - 35.7 g/dL    RDW 13.6 12.3 - 15.4 %    RDW-SD 46.5 37.0 - 54.0 fl    MPV 10.5 6.0 - 12.0 fL    Platelets 293 140 - 450 10*3/mm3    Neutrophil % 55.4 42.7 - 76.0 %    Lymphocyte % 34.5 19.6 - 45.3 %    Monocyte % 6.1 5.0 - 12.0 %    Eosinophil % 2.6 0.3 - 6.2 %    Basophil % 0.9 0.0 - 1.5 %    Immature Grans % 0.5 0.0 - 0.5 %    Neutrophils, Absolute 4.11 1.70 - 7.00 10*3/mm3    Lymphocytes, Absolute 2.56 0.70 - 3.10 10*3/mm3    Monocytes, Absolute 0.45 0.10 - 0.90 10*3/mm3    Eosinophils, Absolute 0.19 0.00 - 0.40 10*3/mm3    Basophils, Absolute 0.07 0.00 - 0.20 10*3/mm3    Immature Grans, Absolute 0.04 0.00 - 0.05 10*3/mm3    nRBC 0.1 0.0 - 0.2 /100 WBC   Urine Drug Screen - Urine, Clean Catch   Result Value Ref Range    THC, Screen, Urine  Negative Negative    Phencyclidine (PCP), Urine Negative Negative    Cocaine Screen, Urine Negative Negative    Methamphetamine, Ur Negative Negative    Opiate Screen Negative Negative    Amphetamine Screen, Urine Positive (A) Negative    Benzodiazepine Screen, Urine Negative Negative    Tricyclic Antidepressants Screen Negative Negative    Methadone Screen, Urine Negative Negative    Barbiturates Screen, Urine Negative Negative    Oxycodone Screen, Urine Negative Negative    Propoxyphene Screen Negative Negative    Buprenorphine, Screen, Urine Negative Negative   Vitamin D 25 Hydroxy   Result Value Ref Range    25 Hydroxy, Vitamin D 39.8 30.0 - 100.0 ng/ml   Hemoglobin A1c   Result Value Ref Range    Hemoglobin A1C 5.50 4.80 - 5.60 %   Lipid Panel   Result Value Ref Range    Total Cholesterol 196 0 - 200 mg/dL    Triglycerides 121 0 - 150 mg/dL    HDL Cholesterol 47 40 - 60 mg/dL    LDL Cholesterol  125 (H) 0 - 100 mg/dL    VLDL Cholesterol 24.2 5 - 40 mg/dL    LDL/HDL Ratio 2.66    Comprehensive Metabolic Panel   Result Value Ref Range    Glucose 95 65 - 99 mg/dL    BUN 15 6 - 20 mg/dL    Creatinine 0.92 0.76 - 1.27 mg/dL    Sodium 145 136 - 145 mmol/L    Potassium 4.6 3.5 - 5.2 mmol/L    Chloride 106 98 - 107 mmol/L    CO2 26.8 22.0 - 29.0 mmol/L    Calcium 9.9 8.6 - 10.5 mg/dL    Total Protein 7.9 6.0 - 8.5 g/dL    Albumin 4.80 3.50 - 5.20 g/dL    ALT (SGPT) 30 1 - 41 U/L    AST (SGOT) 21 1 - 40 U/L    Alkaline Phosphatase 102 39 - 117 U/L    Total Bilirubin 0.6 0.2 - 1.2 mg/dL    eGFR Non African Amer 87 >60 mL/min/1.73    Globulin 3.1 gm/dL    A/G Ratio 1.5 g/dL    BUN/Creatinine Ratio 16.3 7.0 - 25.0    Anion Gap 12.2 5.0 - 15.0 mmol/L     *Note: Due to a large number of results and/or encounters for the requested time period, some results have not been displayed. A complete set of results can be found in Results Review.

## 2020-02-05 NOTE — PROGRESS NOTES
Chief Complaint   Patient presents with   • Abdominal Pain   • Heartburn       Subjective    Randall Hernandez is a 49 y.o. male. he is here today for follow-up.    49-year-old male presents for follow-up regarding abdominal pain.  He was last seen in this office did have screening colonoscopy which was completed January of last year was normal with repeat recommended in 5 years for surveillance.  States he has had abdominal discomfort his entire life but seems to recently worsened and feels like his abdomen is swollen.  He has been taking Prilosec for GERD for several years.  Reports certain spicy greasy foods seem to make symptoms worse he reports he drinks alcohol on the weekends but not regularly during the week.  He has history of pyloric stenosis as a baby that was surgically corrected.  He reports occasional nausea and vomiting of bilious material.  He has ultrasound scheduled of his gallbladder.  He had lab work primary care  office  amylase, lipase, CBC and CMP.  Labs were stable liver enzymes were normal.    Abdominal Pain   This is a chronic problem. The current episode started more than 1 year ago. The onset quality is gradual. The problem occurs intermittently. The problem has been waxing and waning. The pain is located in the epigastric region. The pain is mild. The quality of the pain is cramping. The abdominal pain radiates to the epigastric region. Associated symptoms include nausea and vomiting. Pertinent negatives include no arthralgias, constipation, diarrhea or fever. His past medical history is significant for GERD.   Heartburn   He complains of abdominal pain and nausea. He reports no sore throat. Associated symptoms include fatigue.      Plan; proceed with ultrasound as planned.  Schedule patient for EGD due to epigastric tenderness chronic reflux and nausea vomiting.       The following portions of the patient's history were reviewed and updated as appropriate:   Past Medical History:    Diagnosis Date   • Allergic rhinitis    • Arthritis    • Backache    • Cellulitis of leg, except foot    • Dyslipidemia    • GERD (gastroesophageal reflux disease)    • Heartburn    • History of Holter monitoring 02/26/2016    Sinus mechanism with a normal average heart rate.No evidence of chronotropic incompetence.Asymptomatic Holter   • Hyperlipidemia    • Hypertension    • Low back pain    • Numbness of lower limb     left leg numbness and tingling      • Obesity, unspecified    • Obstructive sleep apnea of adult     USING C-PAP   • Pain in left hip    • Pain in right hip    • Palpitations    • Skin lesion     insect bite.      • Supraventricular tachycardia (CMS/HCC)    • Weight loss     advised     Past Surgical History:   Procedure Laterality Date   • CARPAL TUNNEL RELEASE Right 11/2/2018    Procedure: CARPAL TUNNEL RELEASE;  Surgeon: Judd Mathias MD;  Location: [facility] OR;  Service: Orthopedics   • CARPAL TUNNEL RELEASE  12/07/2018    Left   • CARPAL TUNNEL RELEASE Left 12/7/2018    Procedure: CARPAL TUNNEL RELEASE;  Surgeon: Judd Mathias MD;  Location: [facility] OR;  Service: Orthopedics   • COLONOSCOPY N/A 1/18/2019    Procedure: COLONOSCOPY a friday please ;  Surgeon: Ruslan Bowers MD;  Location: [facility] ENDOSCOPY;  Service: Gastroenterology   • ENDOSCOPY     • PYLOROMYOTOMY       Family History   Problem Relation Age of Onset   • Colon cancer Mother         onset age greater than 75y   • Diabetes Mother    • Breast cancer Sister    • Cancer Brother         bladder   • Diabetes Brother    • Hyperlipidemia Brother        Prior to Admission medications    Medication Sig Start Date End Date Taking? Authorizing Provider   amphetamine-dextroamphetamine XR (ADDERALL XR) 25 MG 24 hr capsule Take 1 capsule by mouth Every Morning Take 1 tablet by mouth daily 1/14/20   Scottie Borges MD   aspirin 81 MG EC tablet Take 1 tablet by mouth Daily. 9/19/19   Scottie Borges MD   atorvastatin (LIPITOR)  80 MG tablet Take 1 tablet by mouth Daily. 19   Scottie Borges MD   chlorthalidone (HYGROTON) 25 MG tablet Take 1 tablet by mouth Daily. 10/17/19   Scottie Borges MD   cyclobenzaprine (FLEXERIL) 5 MG tablet Take 1 tablet by mouth 3 (Three) Times a Day As Needed for Muscle Spasms. 19   Scottie Borges MD   fluticasone (FLONASE) 50 MCG/ACT nasal spray 2 sprays by Each Nare route Daily. 19   Scottie Borges MD   lisinopril (PRINIVIL,ZESTRIL) 40 MG tablet Take 1 tablet by mouth Daily. 11/15/19   Scottie Borges MD   montelukast (SINGULAIR) 10 MG tablet TAKE 1 TABLET BY MOUTH EVERY NIGHT 20   Scottie Borges MD   omeprazole (priLOSEC) 40 MG capsule TAKE 1 CAPSULE BY MOUTH DAILY 2/3/20   Scottie Borges MD   vitamin D (ERGOCALCIFEROL) 1.25 MG (36956 UT) capsule capsule TAKE 1 CAPSULE BY MOUTH 1 TIME EVERY WEEK 2/3/20   Scottie Borges MD     No Known Allergies  Social History     Socioeconomic History   • Marital status: Single     Spouse name: Not on file   • Number of children: Not on file   • Years of education: Not on file   • Highest education level: Not on file   Occupational History   • Occupation:      Employer: SELF-EMPLOYED     Comment: Patient resides with [facility].   Tobacco Use   • Smoking status: Former Smoker     Packs/day: 1.00     Years: 6.00     Pack years: 6.00     Types: Cigarettes     Last attempt to quit:      Years since quittin.1   • Smokeless tobacco: Never Used   Substance and Sexual Activity   • Alcohol use: Yes     Comment: 2018 - Patinet reports consumption of alcoholic beverages under a social basis (approximately 3 - 4 per month).     • Drug use: No   • Sexual activity: Defer       Review of Systems  Review of Systems   Constitutional: Positive for appetite change and fatigue. Negative for activity change, chills, diaphoresis, fever and unexpected weight change.   HENT: Negative for sore throat and trouble swallowing.   "  Respiratory: Negative for shortness of breath.    Gastrointestinal: Positive for abdominal pain, nausea and vomiting. Negative for abdominal distention, anal bleeding, blood in stool, constipation, diarrhea and rectal pain.   Musculoskeletal: Negative for arthralgias.   Skin: Negative for pallor.   Neurological: Negative for light-headedness.        /72 (BP Location: Left arm)   Pulse 84   Ht 180.3 cm (71\")   Wt 127 kg (279 lb 9.6 oz)   BMI 39.00 kg/m²     Objective    Physical Exam   Constitutional: He is oriented to person, place, and time. He appears well-developed and well-nourished. He is cooperative. No distress.   HENT:   Head: Normocephalic and atraumatic.   Neck: Normal range of motion. Neck supple. No thyromegaly present.   Cardiovascular: Normal rate, regular rhythm and normal heart sounds.   Pulmonary/Chest: Effort normal and breath sounds normal. He has no wheezes. He has no rhonchi. He has no rales.   Abdominal: Soft. Normal appearance and bowel sounds are normal. He exhibits no distension. There is no hepatosplenomegaly. There is tenderness in the right upper quadrant, epigastric area and left upper quadrant. There is no rigidity and no guarding. No hernia.   Lymphadenopathy:     He has no cervical adenopathy.   Neurological: He is alert and oriented to person, place, and time.   Skin: Skin is warm, dry and intact. No rash noted. No pallor.   Psychiatric: He has a normal mood and affect. His speech is normal.     Lab on 01/14/2020   Component Date Value Ref Range Status   • WBC 01/14/2020 7.03  3.40 - 10.80 10*3/mm3 Final   • RBC 01/14/2020 4.70  4.14 - 5.80 10*6/mm3 Final   • Hemoglobin 01/14/2020 13.9  13.0 - 17.7 g/dL Final   • Hematocrit 01/14/2020 40.3  37.5 - 51.0 % Final   • MCV 01/14/2020 85.7  79.0 - 97.0 fL Final   • MCH 01/14/2020 29.6  26.6 - 33.0 pg Final   • MCHC 01/14/2020 34.5  31.5 - 35.7 g/dL Final   • RDW 01/14/2020 12.4  12.3 - 15.4 % Final   • RDW-SD 01/14/2020 38.4  " 37.0 - 54.0 fl Final   • MPV 01/14/2020 9.6  6.0 - 12.0 fL Final   • Platelets 01/14/2020 307  140 - 450 10*3/mm3 Final   • Neutrophil % 01/14/2020 53.6  42.7 - 76.0 % Final   • Lymphocyte % 01/14/2020 34.9  19.6 - 45.3 % Final   • Monocyte % 01/14/2020 6.3  5.0 - 12.0 % Final   • Eosinophil % 01/14/2020 3.8  0.3 - 6.2 % Final   • Basophil % 01/14/2020 1.0  0.0 - 1.5 % Final   • Immature Grans % 01/14/2020 0.4  0.0 - 0.5 % Final   • Neutrophils, Absolute 01/14/2020 3.77  1.70 - 7.00 10*3/mm3 Final   • Lymphocytes, Absolute 01/14/2020 2.45  0.70 - 3.10 10*3/mm3 Final   • Monocytes, Absolute 01/14/2020 0.44  0.10 - 0.90 10*3/mm3 Final   • Eosinophils, Absolute 01/14/2020 0.27  0.00 - 0.40 10*3/mm3 Final   • Basophils, Absolute 01/14/2020 0.07  0.00 - 0.20 10*3/mm3 Final   • Immature Grans, Absolute 01/14/2020 0.03  0.00 - 0.05 10*3/mm3 Final   • nRBC 01/14/2020 0.0  0.0 - 0.2 /100 WBC Final   • Glucose 01/14/2020 112* 65 - 99 mg/dL Final   • BUN 01/14/2020 14  6 - 20 mg/dL Final   • Creatinine 01/14/2020 1.09  0.76 - 1.27 mg/dL Final   • Sodium 01/14/2020 137  136 - 145 mmol/L Final   • Potassium 01/14/2020 4.3  3.5 - 5.2 mmol/L Final   • Chloride 01/14/2020 97* 98 - 107 mmol/L Final   • CO2 01/14/2020 25.4  22.0 - 29.0 mmol/L Final   • Calcium 01/14/2020 9.4  8.6 - 10.5 mg/dL Final   • Total Protein 01/14/2020 7.4  6.0 - 8.5 g/dL Final   • Albumin 01/14/2020 4.10  3.50 - 5.20 g/dL Final   • ALT (SGPT) 01/14/2020 25  1 - 41 U/L Final   • AST (SGOT) 01/14/2020 16  1 - 40 U/L Final   • Alkaline Phosphatase 01/14/2020 92  39 - 117 U/L Final   • Total Bilirubin 01/14/2020 0.4  0.2 - 1.2 mg/dL Final   • eGFR Non African Amer 01/14/2020 71  >60 mL/min/1.73 Final   • Globulin 01/14/2020 3.3  gm/dL Final   • A/G Ratio 01/14/2020 1.2  g/dL Final   • BUN/Creatinine Ratio 01/14/2020 12.8  7.0 - 25.0 Final   • Anion Gap 01/14/2020 14.6  5.0 - 15.0 mmol/L Final   • Amylase 01/14/2020 81  28 - 100 U/L Final   • Lipase 01/14/2020 35   13 - 60 U/L Final     Assessment/Plan      1. Epigastric pain    2. Bilious vomiting with nausea    .       Orders placed during this encounter include:  Orders Placed This Encounter   Procedures   • Follow Anesthesia Guidelines / Standing Orders     Standing Status:   Future   • Obtain Informed Consent     Standing Status:   Future     Order Specific Question:   Informed Consent Given For     Answer:   ESOPHAGOGASTRODUODENOSCOPY possible dilation       ESOPHAGOGASTRODUODENOSCOPY possible dilation Fridays (N/A)    Review and/or summary of lab tests, radiology, procedures, medications. Review and summary of old records and obtaining of history. The risks and benefits of my recommendations, as well as other treatment options were discussed with the patient today. Questions were answered.    No orders of the defined types were placed in this encounter.      Follow-up: Return in about 4 weeks (around 3/4/2020).          This document has been electronically signed by ROLAN Way on February 5, 2020 11:07 AM             Results for orders placed or performed in visit on 01/14/20   CBC Auto Differential   Result Value Ref Range    WBC 7.03 3.40 - 10.80 10*3/mm3    RBC 4.70 4.14 - 5.80 10*6/mm3    Hemoglobin 13.9 13.0 - 17.7 g/dL    Hematocrit 40.3 37.5 - 51.0 %    MCV 85.7 79.0 - 97.0 fL    MCH 29.6 26.6 - 33.0 pg    MCHC 34.5 31.5 - 35.7 g/dL    RDW 12.4 12.3 - 15.4 %    RDW-SD 38.4 37.0 - 54.0 fl    MPV 9.6 6.0 - 12.0 fL    Platelets 307 140 - 450 10*3/mm3    Neutrophil % 53.6 42.7 - 76.0 %    Lymphocyte % 34.9 19.6 - 45.3 %    Monocyte % 6.3 5.0 - 12.0 %    Eosinophil % 3.8 0.3 - 6.2 %    Basophil % 1.0 0.0 - 1.5 %    Immature Grans % 0.4 0.0 - 0.5 %    Neutrophils, Absolute 3.77 1.70 - 7.00 10*3/mm3    Lymphocytes, Absolute 2.45 0.70 - 3.10 10*3/mm3    Monocytes, Absolute 0.44 0.10 - 0.90 10*3/mm3    Eosinophils, Absolute 0.27 0.00 - 0.40 10*3/mm3    Basophils, Absolute 0.07 0.00 - 0.20 10*3/mm3    Immature  Grans, Absolute 0.03 0.00 - 0.05 10*3/mm3    nRBC 0.0 0.0 - 0.2 /100 WBC   Lipase   Result Value Ref Range    Lipase 35 13 - 60 U/L   Amylase   Result Value Ref Range    Amylase 81 28 - 100 U/L   Comprehensive Metabolic Panel   Result Value Ref Range    Glucose 112 (H) 65 - 99 mg/dL    BUN 14 6 - 20 mg/dL    Creatinine 1.09 0.76 - 1.27 mg/dL    Sodium 137 136 - 145 mmol/L    Potassium 4.3 3.5 - 5.2 mmol/L    Chloride 97 (L) 98 - 107 mmol/L    CO2 25.4 22.0 - 29.0 mmol/L    Calcium 9.4 8.6 - 10.5 mg/dL    Total Protein 7.4 6.0 - 8.5 g/dL    Albumin 4.10 3.50 - 5.20 g/dL    ALT (SGPT) 25 1 - 41 U/L    AST (SGOT) 16 1 - 40 U/L    Alkaline Phosphatase 92 39 - 117 U/L    Total Bilirubin 0.4 0.2 - 1.2 mg/dL    eGFR Non African Amer 71 >60 mL/min/1.73    Globulin 3.3 gm/dL    A/G Ratio 1.2 g/dL    BUN/Creatinine Ratio 12.8 7.0 - 25.0    Anion Gap 14.6 5.0 - 15.0 mmol/L   Results for orders placed or performed in visit on 08/19/19   CBC Auto Differential   Result Value Ref Range    WBC 7.42 3.40 - 10.80 10*3/mm3    RBC 5.05 4.14 - 5.80 10*6/mm3    Hemoglobin 15.0 13.0 - 17.7 g/dL    Hematocrit 46.7 37.5 - 51.0 %    MCV 92.5 79.0 - 97.0 fL    MCH 29.7 26.6 - 33.0 pg    MCHC 32.1 31.5 - 35.7 g/dL    RDW 13.6 12.3 - 15.4 %    RDW-SD 46.5 37.0 - 54.0 fl    MPV 10.5 6.0 - 12.0 fL    Platelets 293 140 - 450 10*3/mm3    Neutrophil % 55.4 42.7 - 76.0 %    Lymphocyte % 34.5 19.6 - 45.3 %    Monocyte % 6.1 5.0 - 12.0 %    Eosinophil % 2.6 0.3 - 6.2 %    Basophil % 0.9 0.0 - 1.5 %    Immature Grans % 0.5 0.0 - 0.5 %    Neutrophils, Absolute 4.11 1.70 - 7.00 10*3/mm3    Lymphocytes, Absolute 2.56 0.70 - 3.10 10*3/mm3    Monocytes, Absolute 0.45 0.10 - 0.90 10*3/mm3    Eosinophils, Absolute 0.19 0.00 - 0.40 10*3/mm3    Basophils, Absolute 0.07 0.00 - 0.20 10*3/mm3    Immature Grans, Absolute 0.04 0.00 - 0.05 10*3/mm3    nRBC 0.1 0.0 - 0.2 /100 WBC   Urine Drug Screen - Urine, Clean Catch   Result Value Ref Range    THC, Screen, Urine  Negative Negative    Phencyclidine (PCP), Urine Negative Negative    Cocaine Screen, Urine Negative Negative    Methamphetamine, Ur Negative Negative    Opiate Screen Negative Negative    Amphetamine Screen, Urine Positive (A) Negative    Benzodiazepine Screen, Urine Negative Negative    Tricyclic Antidepressants Screen Negative Negative    Methadone Screen, Urine Negative Negative    Barbiturates Screen, Urine Negative Negative    Oxycodone Screen, Urine Negative Negative    Propoxyphene Screen Negative Negative    Buprenorphine, Screen, Urine Negative Negative   Vitamin D 25 Hydroxy   Result Value Ref Range    25 Hydroxy, Vitamin D 39.8 30.0 - 100.0 ng/ml   Hemoglobin A1c   Result Value Ref Range    Hemoglobin A1C 5.50 4.80 - 5.60 %   Lipid Panel   Result Value Ref Range    Total Cholesterol 196 0 - 200 mg/dL    Triglycerides 121 0 - 150 mg/dL    HDL Cholesterol 47 40 - 60 mg/dL    LDL Cholesterol  125 (H) 0 - 100 mg/dL    VLDL Cholesterol 24.2 5 - 40 mg/dL    LDL/HDL Ratio 2.66    Comprehensive Metabolic Panel   Result Value Ref Range    Glucose 95 65 - 99 mg/dL    BUN 15 6 - 20 mg/dL    Creatinine 0.92 0.76 - 1.27 mg/dL    Sodium 145 136 - 145 mmol/L    Potassium 4.6 3.5 - 5.2 mmol/L    Chloride 106 98 - 107 mmol/L    CO2 26.8 22.0 - 29.0 mmol/L    Calcium 9.9 8.6 - 10.5 mg/dL    Total Protein 7.9 6.0 - 8.5 g/dL    Albumin 4.80 3.50 - 5.20 g/dL    ALT (SGPT) 30 1 - 41 U/L    AST (SGOT) 21 1 - 40 U/L    Alkaline Phosphatase 102 39 - 117 U/L    Total Bilirubin 0.6 0.2 - 1.2 mg/dL    eGFR Non African Amer 87 >60 mL/min/1.73    Globulin 3.1 gm/dL    A/G Ratio 1.5 g/dL    BUN/Creatinine Ratio 16.3 7.0 - 25.0    Anion Gap 12.2 5.0 - 15.0 mmol/L     *Note: Due to a large number of results and/or encounters for the requested time period, some results have not been displayed. A complete set of results can be found in Results Review.

## 2020-02-11 ENCOUNTER — HOSPITAL ENCOUNTER (OUTPATIENT)
Dept: ULTRASOUND IMAGING | Facility: HOSPITAL | Age: 50
Discharge: HOME | End: 2020-02-11
Admitting: FAMILY MEDICINE

## 2020-02-11 ENCOUNTER — TELEPHONE (OUTPATIENT)
Dept: FAMILY MEDICINE CLINIC | Facility: CLINIC | Age: 50
End: 2020-02-11

## 2020-02-11 DIAGNOSIS — R10.84 GENERALIZED ABDOMINAL PAIN: ICD-10-CM

## 2020-02-11 PROCEDURE — 76700 US EXAM ABDOM COMPLETE: CPT

## 2020-02-11 NOTE — TELEPHONE ENCOUNTER
----- Message from Scottie Borges MD sent at 2/11/2020  9:42 AM CST -----  Please let pt know pt has fatty liver    Recommend weight loss ,diet and exercise    Recheck on next visit Thanks

## 2020-02-14 ENCOUNTER — OFFICE VISIT (OUTPATIENT)
Dept: FAMILY MEDICINE CLINIC | Facility: CLINIC | Age: 50
End: 2020-02-14

## 2020-02-14 VITALS
WEIGHT: 279.4 LBS | HEART RATE: 90 BPM | HEIGHT: 71 IN | TEMPERATURE: 97.9 F | BODY MASS INDEX: 39.11 KG/M2 | OXYGEN SATURATION: 98 % | SYSTOLIC BLOOD PRESSURE: 110 MMHG | DIASTOLIC BLOOD PRESSURE: 68 MMHG

## 2020-02-14 DIAGNOSIS — J30.9 ALLERGIC RHINITIS, UNSPECIFIED SEASONALITY, UNSPECIFIED TRIGGER: ICD-10-CM

## 2020-02-14 DIAGNOSIS — R11.2 NAUSEA AND VOMITING, INTRACTABILITY OF VOMITING NOT SPECIFIED, UNSPECIFIED VOMITING TYPE: ICD-10-CM

## 2020-02-14 DIAGNOSIS — K76.0 FATTY LIVER: ICD-10-CM

## 2020-02-14 DIAGNOSIS — E66.9 NON MORBID OBESITY: ICD-10-CM

## 2020-02-14 DIAGNOSIS — I10 ESSENTIAL HYPERTENSION: ICD-10-CM

## 2020-02-14 DIAGNOSIS — E66.9 CLASS 2 OBESITY WITH BODY MASS INDEX (BMI) OF 35.0 TO 35.9 IN ADULT, UNSPECIFIED OBESITY TYPE, UNSPECIFIED WHETHER SERIOUS COMORBIDITY PRESENT: ICD-10-CM

## 2020-02-14 DIAGNOSIS — N18.2 STAGE 2 CHRONIC KIDNEY DISEASE: Primary | ICD-10-CM

## 2020-02-14 DIAGNOSIS — R13.10 DYSPHAGIA, UNSPECIFIED TYPE: ICD-10-CM

## 2020-02-14 DIAGNOSIS — R41.840 INATTENTION: ICD-10-CM

## 2020-02-14 DIAGNOSIS — E78.5 HYPERLIPIDEMIA, UNSPECIFIED HYPERLIPIDEMIA TYPE: ICD-10-CM

## 2020-02-14 DIAGNOSIS — R73.03 PREDIABETES: ICD-10-CM

## 2020-02-14 DIAGNOSIS — F90.0 ATTENTION DEFICIT HYPERACTIVITY DISORDER (ADHD), PREDOMINANTLY INATTENTIVE TYPE: ICD-10-CM

## 2020-02-14 PROCEDURE — 99214 OFFICE O/P EST MOD 30 MIN: CPT | Performed by: FAMILY MEDICINE

## 2020-02-14 RX ORDER — DEXTROAMPHETAMINE SACCHARATE, AMPHETAMINE ASPARTATE MONOHYDRATE, DEXTROAMPHETAMINE SULFATE AND AMPHETAMINE SULFATE 6.25; 6.25; 6.25; 6.25 MG/1; MG/1; MG/1; MG/1
25 CAPSULE, EXTENDED RELEASE ORAL EVERY MORNING
Qty: 30 CAPSULE | Refills: 0 | Status: SHIPPED | OUTPATIENT
Start: 2020-02-14 | End: 2020-05-13

## 2020-02-14 NOTE — PATIENT INSTRUCTIONS
Fatty Liver Disease    Fatty liver disease occurs when too much fat has built up in your liver cells. Fatty liver disease is also called hepatic steatosis or steatohepatitis. The liver removes harmful substances from your bloodstream and produces fluids that your body needs. It also helps your body use and store energy from the food you eat.  In many cases, fatty liver disease does not cause symptoms or problems. It is often diagnosed when tests are being done for other reasons. However, over time, fatty liver can cause inflammation that may lead to more serious liver problems, such as scarring of the liver (cirrhosis) and liver failure.  Fatty liver is associated with insulin resistance, increased body fat, high blood pressure (hypertension), and high cholesterol. These are features of metabolic syndrome and increase your risk for stroke, diabetes, and heart disease.  What are the causes?  This condition may be caused by:  · Drinking too much alcohol.  · Poor nutrition.  · Obesity.  · Cushing's syndrome.  · Diabetes.  · High cholesterol.  · Certain drugs.  · Poisons.  · Some viral infections.  · Pregnancy.  What increases the risk?  You are more likely to develop this condition if you:  · Abuse alcohol.  · Are overweight.  · Have diabetes.  · Have hepatitis.  · Have a high triglyceride level.  · Are pregnant.  What are the signs or symptoms?  Fatty liver disease often does not cause symptoms. If symptoms do develop, they can include:  · Fatigue.  · Weakness.  · Weight loss.  · Confusion.  · Abdominal pain.  · Nausea and vomiting.  · Yellowing of your skin and the white parts of your eyes (jaundice).  · Itchy skin.  How is this diagnosed?  This condition may be diagnosed by:  · A physical exam and medical history.  · Blood tests.  · Imaging tests, such as an ultrasound, CT scan, or MRI.  · A liver biopsy. A small sample of liver tissue is removed using a needle. The sample is then looked at under a microscope.  How  is this treated?  Fatty liver disease is often caused by other health conditions. Treatment for fatty liver may involve medicines and lifestyle changes to manage conditions such as:  · Alcoholism.  · High cholesterol.  · Diabetes.  · Being overweight or obese.  Follow these instructions at home:    · Do not drink alcohol. If you have trouble quitting, ask your health care provider how to safely quit with the help of medicine or a supervised program. This is important to keep your condition from getting worse.  · Eat a healthy diet as told by your health care provider. Ask your health care provider about working with a diet and nutrition specialist (dietitian) to develop an eating plan.  · Exercise regularly. This can help you lose weight and control your cholesterol and diabetes. Talk to your health care provider about an exercise plan and which activities are best for you.  · Take over-the-counter and prescription medicines only as told by your health care provider.  · Keep all follow-up visits as told by your health care provider. This is important.  Contact a health care provider if:  You have trouble controlling your:  · Blood sugar. This is especially important if you have diabetes.  · Cholesterol.  · Drinking of alcohol.  Get help right away if:  · You have abdominal pain.  · You have jaundice.  · You have nausea and vomiting.  · You vomit blood or material that looks like coffee grounds.  · You have stools that are black, tar-like, or bloody.  Summary  · Fatty liver disease develops when too much fat builds up in the cells of your liver.  · Fatty liver disease often causes no symptoms or problems. However, over time, fatty liver can cause inflammation that may lead to more serious liver problems, such as scarring of the liver (cirrhosis).  · You are more likely to develop this condition if you abuse alcohol, are pregnant, are overweight, have diabetes, have hepatitis, or have high triglyceride  levels.  · Contact your health care provider if you have trouble controlling your weight, blood sugar, cholesterol, or drinking of alcohol.  This information is not intended to replace advice given to you by your health care provider. Make sure you discuss any questions you have with your health care provider.  Document Released: 02/02/2007 Document Revised: 09/26/2018 Document Reviewed: 09/26/2018  [employer] Interactive Patient Education © 2019 [employer]  Nonalcoholic Fatty Liver Disease Diet  Nonalcoholic fatty liver disease is a condition that causes fat to accumulate in and around the liver. The disease makes it harder for the liver to work the way that it should. Following a healthy diet can help to keep nonalcoholic fatty liver disease under control. It can also help to prevent or improve conditions that are associated with the disease, such as heart disease, diabetes, high blood pressure, and abnormal cholesterol levels. Along with regular exercise, this diet:  · Promotes weight loss.  · Helps to control blood sugar levels.  · Helps to improve the way that the body uses insulin.  What do I need to know about this diet?  · Use the glycemic index (GI) to plan your meals. The index tells you how quickly a food will raise your blood sugar. Choose low-GI foods. These foods take a longer time to raise blood sugar.  · Keep track of how many calories you take in. Eating the right amount of calories will help you to achieve a healthy weight.  · You may want to follow a Mediterranean diet. This diet includes a lot of vegetables, lean meats or fish, whole grains, fruits, and healthy oils and fats.  What foods can I eat?  Grains  Whole grains, such as whole-wheat or whole-grain breads, crackers, tortillas, cereals, and pasta. Stone-ground whole wheat. Pumpernickel bread. Unsweetened oatmeal. Bulgur. Barley. Quinoa. Brown or wild rice. Corn or whole-wheat flour tortillas.  Vegetables  Lettuce. Spinach. Peas. Beets.  Cauliflower. Cabbage. Broccoli. Carrots. Tomatoes. Squash. Eggplant. Herbs. Peppers. Onions. Cucumbers. [place] sprouts. Yams and sweet potatoes. Beans. Lentils.  Fruits  Bananas. Apples. Oranges. Grapes. Papaya. [place]. Pomegranate. Kiwi. Grapefruit. Cherries.  Meats and Other Protein Sources  Seafood and shellfish. Lean meats. Poultry. Tofu.  Dairy  Low-fat or fat-free dairy products, such as yogurt, cottage cheese, and cheese.  Beverages  Water. Sugar-free drinks. Tea. Coffee. Low-fat or skim milk. Milk alternatives, such as soy or almond milk. Real fruit juice.  Condiments  Mustard. Relish. Low-fat, low-sugar ketchup and barbecue sauce. Low-fat or fat-free mayonnaise.  Sweets and Desserts  Sugar-free sweets.  Fats and Oils  Avocado. Canola or olive oil. Nuts and nut butters. Seeds.  The items listed above may not be a complete list of recommended foods or beverages. Contact your dietitian for more options.  What foods are not recommended?  Palm oil and coconut oil. Processed foods. Fried foods. Sweetened drinks, such as sweet tea, milkshakes, snow cones, iced sweet drinks, and sodas. Alcohol. Sweets. Foods that contain a lot of salt or sodium.  The items listed above may not be a complete list of foods and beverages to avoid. Contact your dietitian for more information.  This information is not intended to replace advice given to you by your health care provider. Make sure you discuss any questions you have with your health care provider.  Document Released: 05/03/2016 Document Revised: 05/25/2017 Document Reviewed: 01/12/2016  [employer] Interactive Patient Education © 2019 [employer]

## 2020-02-18 RX ORDER — FLUTICASONE PROPIONATE 50 MCG
SPRAY, SUSPENSION (ML) NASAL
Qty: 16 G | Refills: 3 | Status: SHIPPED | OUTPATIENT
Start: 2020-02-18 | End: 2020-11-25 | Stop reason: SDUPTHER

## 2020-02-21 ENCOUNTER — ANESTHESIA (OUTPATIENT)
Dept: GASTROENTEROLOGY | Facility: HOSPITAL | Age: 50
End: 2020-02-21

## 2020-02-21 ENCOUNTER — HOSPITAL ENCOUNTER (OUTPATIENT)
Facility: HOSPITAL | Age: 50
Setting detail: HOSPITAL OUTPATIENT SURGERY
Discharge: HOME | End: 2020-02-21
Attending: INTERNAL MEDICINE | Admitting: INTERNAL MEDICINE

## 2020-02-21 ENCOUNTER — ANESTHESIA EVENT (OUTPATIENT)
Dept: GASTROENTEROLOGY | Facility: HOSPITAL | Age: 50
End: 2020-02-21

## 2020-02-21 VITALS
SYSTOLIC BLOOD PRESSURE: 101 MMHG | WEIGHT: 273 LBS | HEART RATE: 82 BPM | RESPIRATION RATE: 20 BRPM | DIASTOLIC BLOOD PRESSURE: 72 MMHG | BODY MASS INDEX: 38.08 KG/M2 | OXYGEN SATURATION: 92 % | TEMPERATURE: 97.2 F

## 2020-02-21 DIAGNOSIS — R11.14 BILIOUS VOMITING WITH NAUSEA: ICD-10-CM

## 2020-02-21 DIAGNOSIS — R10.13 EPIGASTRIC PAIN: ICD-10-CM

## 2020-02-21 PROCEDURE — 25010000002 PROPOFOL 10 MG/ML EMULSION: Performed by: NURSE ANESTHETIST, CERTIFIED REGISTERED

## 2020-02-21 PROCEDURE — 25010000002 MIDAZOLAM PER 1 MG: Performed by: NURSE ANESTHETIST, CERTIFIED REGISTERED

## 2020-02-21 PROCEDURE — 43239 EGD BIOPSY SINGLE/MULTIPLE: CPT | Performed by: INTERNAL MEDICINE

## 2020-02-21 PROCEDURE — 88305 TISSUE EXAM BY PATHOLOGIST: CPT | Performed by: INTERNAL MEDICINE

## 2020-02-21 PROCEDURE — 88305 TISSUE EXAM BY PATHOLOGIST: CPT | Performed by: PATHOLOGY

## 2020-02-21 RX ORDER — LIDOCAINE HYDROCHLORIDE 20 MG/ML
INJECTION, SOLUTION INTRAVENOUS AS NEEDED
Status: DISCONTINUED | OUTPATIENT
Start: 2020-02-21 | End: 2020-02-21 | Stop reason: SURG

## 2020-02-21 RX ORDER — DEXTROSE AND SODIUM CHLORIDE 5; .45 G/100ML; G/100ML
30 INJECTION, SOLUTION INTRAVENOUS CONTINUOUS PRN
Status: DISCONTINUED | OUTPATIENT
Start: 2020-02-21 | End: 2020-02-21 | Stop reason: HOSPADM

## 2020-02-21 RX ORDER — MIDAZOLAM HYDROCHLORIDE 1 MG/ML
INJECTION INTRAMUSCULAR; INTRAVENOUS AS NEEDED
Status: DISCONTINUED | OUTPATIENT
Start: 2020-02-21 | End: 2020-02-21 | Stop reason: SURG

## 2020-02-21 RX ORDER — PROPOFOL 10 MG/ML
VIAL (ML) INTRAVENOUS AS NEEDED
Status: DISCONTINUED | OUTPATIENT
Start: 2020-02-21 | End: 2020-02-21 | Stop reason: SURG

## 2020-02-21 RX ADMIN — DEXTROSE AND SODIUM CHLORIDE 30 ML/HR: 5; 450 INJECTION, SOLUTION INTRAVENOUS at 10:38

## 2020-02-21 RX ADMIN — LIDOCAINE HYDROCHLORIDE 100 MG: 20 INJECTION, SOLUTION INTRAVENOUS at 12:00

## 2020-02-21 RX ADMIN — PROPOFOL 100 MG: 10 INJECTION, EMULSION INTRAVENOUS at 12:02

## 2020-02-21 RX ADMIN — MIDAZOLAM HYDROCHLORIDE 2 MG: 2 INJECTION, SOLUTION INTRAMUSCULAR; INTRAVENOUS at 12:02

## 2020-02-21 RX ADMIN — PROPOFOL 40 MG: 10 INJECTION, EMULSION INTRAVENOUS at 12:04

## 2020-02-21 RX ADMIN — PROPOFOL 20 MG: 10 INJECTION, EMULSION INTRAVENOUS at 12:06

## 2020-02-21 NOTE — ANESTHESIA PREPROCEDURE EVALUATION
Anesthesia Evaluation     Patient summary reviewed and Nursing notes reviewed   NPO Solid Status: > 8 hours  NPO Liquid Status: > 2 hours           Airway   Mallampati: III  TM distance: >3 FB  Neck ROM: full  no difficulty expected and Possible difficult intubation  Dental    (+) poor dentition    Pulmonary - normal exam   (+) a smoker Former, sleep apnea,   Cardiovascular - normal exam    (+) hypertension, hyperlipidemia,       Neuro/Psych  (+) numbness, psychiatric history ADHD,     GI/Hepatic/Renal/Endo    (+) obesity,  GERD,  liver disease fatty liver disease, renal disease (Stage 2 chronic kidney disease),     Musculoskeletal     Abdominal   (+) obese,    Substance History   (+) alcohol use,      OB/GYN          Other   arthritis,                        Anesthesia Plan    ASA 3     MAC     intravenous induction     Anesthetic plan, all risks, benefits, and alternatives have been provided, discussed and informed consent has been obtained with: patient.

## 2020-02-21 NOTE — ANESTHESIA POSTPROCEDURE EVALUATION
Patient: Randall Hernandez    Procedure Summary     Date:  02/21/20 Room / Location:  [facility] ENDOSCOPY 1 / [facility] ENDOSCOPY    Anesthesia Start:  1155 Anesthesia Stop:  1209    Procedure:  ESOPHAGOGASTRODUODENOSCOPY possible dilation Fridays (N/A ) Diagnosis:       Epigastric pain      Bilious vomiting with nausea      (Epigastric pain [R10.13])      (Bilious vomiting with nausea [R11.14])    Surgeon:  Ruslan Bowers MD Provider:  Cristino Montenegro CRNA    Anesthesia Type:  MAC ASA Status:  3          Anesthesia Type: MAC    Vitals  No vitals data found for the desired time range.          Post Anesthesia Care and Evaluation    Patient location during evaluation: bedside  Patient participation: complete - patient participated  Level of consciousness: awake and awake and alert  Pain score: 0  Pain management: satisfactory to patient  Airway patency: patent  Anesthetic complications: No anesthetic complications  PONV Status: none  Cardiovascular status: acceptable and stable  Respiratory status: acceptable, room air and spontaneous ventilation  Hydration status: acceptable

## 2020-02-24 LAB
LAB AP CASE REPORT: NORMAL
PATH REPORT.FINAL DX SPEC: NORMAL
PATH REPORT.GROSS SPEC: NORMAL

## 2020-02-27 ENCOUNTER — OFFICE VISIT (OUTPATIENT)
Dept: GASTROENTEROLOGY | Facility: CLINIC | Age: 50
End: 2020-02-27

## 2020-02-27 VITALS
BODY MASS INDEX: 38.19 KG/M2 | DIASTOLIC BLOOD PRESSURE: 82 MMHG | WEIGHT: 272.8 LBS | SYSTOLIC BLOOD PRESSURE: 132 MMHG | HEIGHT: 71 IN | HEART RATE: 81 BPM

## 2020-02-27 DIAGNOSIS — K21.00 GASTROESOPHAGEAL REFLUX DISEASE WITH ESOPHAGITIS: Primary | ICD-10-CM

## 2020-02-27 PROCEDURE — 99213 OFFICE O/P EST LOW 20 MIN: CPT | Performed by: NURSE PRACTITIONER

## 2020-02-27 NOTE — PATIENT INSTRUCTIONS

## 2020-02-27 NOTE — PROGRESS NOTES
Chief Complaint   Patient presents with   • Abdominal Pain   • Heartburn       Subjective    Randall Hernandez is a 49 y.o. male. he is here today for follow-up.    49-year-old male presents to discuss EGD results.  States since modifying his diet taking PPI symptoms have greatly improved and he has not had episode of nausea vomiting in a few weeks.    Heartburn   He complains of abdominal pain (much better but some reflux symptoms intermittently ) and heartburn. He reports no globus sensation, no nausea or no sore throat. This is a chronic problem. The problem occurs rarely. The problem has been gradually improving. The heartburn duration is less than a minute. The heartburn is located in the substernum. The heartburn is of moderate intensity. Pertinent negatives include no fatigue. He has tried a PPI for the symptoms. The treatment provided significant relief. Past procedures include an EGD.   EGD noted mildly severe esophagitis, gastritis and normal duodenum.  Duodenal biopsy noted no significant histologic abnormality.  Antrum biopsy noted reactive gastropathy.  Distal esophagus biopsy noted reactive changes squamous mucosa.         The following portions of the patient's history were reviewed and updated as appropriate:   Past Medical History:   Diagnosis Date   • Allergic rhinitis    • Arthritis    • Backache    • Cellulitis of leg, except foot    • Dyslipidemia    • GERD (gastroesophageal reflux disease)    • Heartburn    • History of Holter monitoring 02/26/2016    Sinus mechanism with a normal average heart rate.No evidence of chronotropic incompetence.Asymptomatic Holter   • Hyperlipidemia    • Hypertension    • Low back pain    • Numbness of lower limb     left leg numbness and tingling      • Obesity, unspecified    • Obstructive sleep apnea of adult     USING C-PAP   • Pain in left hip    • Pain in right hip    • Palpitations    • Skin lesion     insect bite.      • Supraventricular tachycardia (CMS/HCC)     • Weight loss     advised     Past Surgical History:   Procedure Laterality Date   • CARPAL TUNNEL RELEASE Right 11/2/2018    Procedure: CARPAL TUNNEL RELEASE;  Surgeon: Judd Mahtias MD;  Location: [facility] OR;  Service: Orthopedics   • CARPAL TUNNEL RELEASE  12/07/2018    Left   • CARPAL TUNNEL RELEASE Left 12/7/2018    Procedure: CARPAL TUNNEL RELEASE;  Surgeon: Judd Mathias MD;  Location: [facility] OR;  Service: Orthopedics   • COLONOSCOPY N/A 1/18/2019    Procedure: COLONOSCOPY a friday please ;  Surgeon: Ruslan Bowers MD;  Location: [facility] ENDOSCOPY;  Service: Gastroenterology   • ENDOSCOPY     • ENDOSCOPY N/A 2/21/2020    Procedure: ESOPHAGOGASTRODUODENOSCOPY possible dilation Fridays;  Surgeon: Ruslan Bowers MD;  Location: [facility] ENDOSCOPY;  Service: Gastroenterology;  Laterality: N/A;   • PYLOROMYOTOMY       Family History   Problem Relation Age of Onset   • Colon cancer Mother         onset age greater than 75y   • Diabetes Mother    • Breast cancer Sister    • Cancer Brother         bladder   • Diabetes Brother    • Hyperlipidemia Brother        Prior to Admission medications    Medication Sig Start Date End Date Taking? Authorizing Provider   amphetamine-dextroamphetamine XR (ADDERALL XR) 25 MG 24 hr capsule Take 1 capsule by mouth Every Morning Take 1 tablet by mouth daily 2/14/20  Yes Scottie Borges MD   aspirin 81 MG EC tablet Take 1 tablet by mouth Daily. 9/19/19  Yes Scottie Borges MD   atorvastatin (LIPITOR) 80 MG tablet Take 1 tablet by mouth Daily. 8/20/19  Yes Scottie Borges MD   chlorthalidone (HYGROTON) 25 MG tablet Take 1 tablet by mouth Daily. 10/17/19  Yes Scottie Borges MD   cyclobenzaprine (FLEXERIL) 5 MG tablet Take 1 tablet by mouth 3 (Three) Times a Day As Needed for Muscle Spasms. 4/8/19  Yes Scottie Borges MD   fluticasone (FLONASE) 50 MCG/ACT nasal spray INSTILL 2 SPRAYS IN EACH NOSTRIL EVERY DAY 2/18/20  Yes Scottie Borges MD   lisinopril  (PRINIVIL,ZESTRIL) 40 MG tablet Take 1 tablet by mouth Daily. 11/15/19  Yes Scottie Borges MD   montelukast (SINGULAIR) 10 MG tablet TAKE 1 TABLET BY MOUTH EVERY NIGHT 20  Yes Scottie Borges MD   omeprazole (priLOSEC) 40 MG capsule TAKE 1 CAPSULE BY MOUTH DAILY 2/3/20  Yes Scottie Borges MD   vitamin D (ERGOCALCIFEROL) 1.25 MG (66099 UT) capsule capsule TAKE 1 CAPSULE BY MOUTH 1 TIME EVERY WEEK 2/3/20  Yes Scottie Borges MD     No Known Allergies  Social History     Socioeconomic History   • Marital status: Significant Other     Spouse name: Not on file   • Number of children: Not on file   • Years of education: Not on file   • Highest education level: Not on file   Occupational History   • Occupation:      Employer: SELF-EMPLOYED     Comment: Patient [place] with [place].   Tobacco Use   • Smoking status: Former Smoker     Packs/day: 1.00     Years: 6.00     Pack years: 6.00     Types: Cigarettes     Last attempt to quit:      Years since quittin.1   • Smokeless tobacco: Never Used   Substance and Sexual Activity   • Alcohol use: Yes     Comment: 2018 - Shelleynet reports consumption of alcoholic beverages under a social basis (approximately 3 - 4 per month).     • Drug use: No   • Sexual activity: Defer       Review of Systems  Review of Systems   Constitutional: Negative for activity change, appetite change, chills, diaphoresis, fatigue, fever and unexpected weight change.   HENT: Negative for sore throat and trouble swallowing.    Respiratory: Negative for shortness of breath.    Gastrointestinal: Positive for abdominal pain (much better but some reflux symptoms intermittently ) and heartburn. Negative for abdominal distention, anal bleeding, blood in stool, constipation, diarrhea, nausea, rectal pain and vomiting.   Musculoskeletal: Negative for arthralgias.   Skin: Negative for pallor.   Neurological: Negative for light-headedness.        /82 (BP  "Location: Left arm)   Pulse 81   Ht 180.3 cm (71\")   Wt 124 kg (272 lb 12.8 oz)   BMI 38.05 kg/m²     Objective    Physical Exam   Constitutional: He is oriented to person, place, and time. He appears well-developed and well-nourished. He is cooperative. No distress.   HENT:   Head: Normocephalic and atraumatic.   Neck: Normal range of motion. Neck supple. No thyromegaly present.   Cardiovascular: Normal rate, regular rhythm and normal heart sounds.   Pulmonary/Chest: Effort normal and breath sounds normal. He has no wheezes. He has no rhonchi. He has no rales.   Abdominal: Soft. Normal appearance and bowel sounds are normal. He exhibits no distension. There is no hepatosplenomegaly. There is no tenderness. There is no rigidity and no guarding. No hernia.   Lymphadenopathy:     He has no cervical adenopathy.   Neurological: He is alert and oriented to person, place, and time.   Skin: Skin is warm, dry and intact. No rash noted. No pallor.   Psychiatric: He has a normal mood and affect. His speech is normal.     Admission on 02/21/2020, Discharged on 02/21/2020   Component Date Value Ref Range Status   • Case Report 02/21/2020    Final                    Value:Surgical Pathology Report                         Case: [id number]                                  Authorizing Provider:  Ruslan Bowers MD        Collected:           02/21/2020 12:08 PM          Ordering Location:     [facility]             Received:            02/21/2020 01:42 PM                                 [facility]                                                     Pathologist:           Loi San MD                                                         Specimens:   1) - Small Intestine, Duodenum, small bowel                                                         2) - Gastric, Antrum                                                                                3) - Esophagus, Distal                                 "                                    • Final Diagnosis 02/21/2020    Final                    Value:This result contains rich text formatting which cannot be displayed here.   • Gross Description 02/21/2020    Final                    Value:This result contains rich text formatting which cannot be displayed here.     Assessment/Plan      1. Gastroesophageal reflux disease with esophagitis    .   Continue current regimen.  Follow-up in GI office as needed will need repeat colonoscopy in 4 years for surveillance.  Orders placed during this encounter include:  No orders of the defined types were placed in this encounter.      * Surgery not found *    Review and/or summary of lab tests, radiology, procedures, medications. Review and summary of old records and obtaining of history. The risks and benefits of my recommendations, as well as other treatment options were discussed with the patient today. Questions were answered.    No orders of the defined types were placed in this encounter.      Follow-up: Return if symptoms worsen or fail to improve.          This document has been electronically signed by ROLAN Way on February 27, 2020 5:17 PM             Results for orders placed or performed during the hospital encounter of 02/21/20   Tissue Pathology Exam   Result Value Ref Range    Case Report       Surgical Pathology Report                         Case: [id number]                                  Authorizing Provider:  Ruslan Bowers MD        Collected:           02/21/2020 12:08 PM          Ordering Location:     [facility]             Received:            02/21/2020 01:42 PM                                 [facility]                                                     Pathologist:           Loi San MD                                                         Specimens:   1) - Small Intestine, Duodenum, small bowel                                                         2) -  Gastric, Antrum                                                                                3) - Esophagus, Distal                                                                     Final Diagnosis       1.  MUCOSA, DUODENUM:  NO SIGNIFICANT HISTOLOGIC ABNORMALITY.    2.  MUCOSA, ANTRUM OF STOMACH:  REACTIVE GASTROPATHY,.    3.  MUCOSA, DISTAL ESOPHAGUS:  REACTIVE CHANGE OF SQUAMOUS MUCOSA.      Gross Description       Received for examination are 3 containers, each of which have nodular bits of white soft tissue measuring 0.3-0.5 cc in aggregate.  All specimens are embedded as labeled.  1A duodenum; 2A antrum of stomach; 3A distal esophagus.     Results for orders placed or performed in visit on 01/14/20   CBC Auto Differential   Result Value Ref Range    WBC 7.03 3.40 - 10.80 10*3/mm3    RBC 4.70 4.14 - 5.80 10*6/mm3    Hemoglobin 13.9 13.0 - 17.7 g/dL    Hematocrit 40.3 37.5 - 51.0 %    MCV 85.7 79.0 - 97.0 fL    MCH 29.6 26.6 - 33.0 pg    MCHC 34.5 31.5 - 35.7 g/dL    RDW 12.4 12.3 - 15.4 %    RDW-SD 38.4 37.0 - 54.0 fl    MPV 9.6 6.0 - 12.0 fL    Platelets 307 140 - 450 10*3/mm3    Neutrophil % 53.6 42.7 - 76.0 %    Lymphocyte % 34.9 19.6 - 45.3 %    Monocyte % 6.3 5.0 - 12.0 %    Eosinophil % 3.8 0.3 - 6.2 %    Basophil % 1.0 0.0 - 1.5 %    Immature Grans % 0.4 0.0 - 0.5 %    Neutrophils, Absolute 3.77 1.70 - 7.00 10*3/mm3    Lymphocytes, Absolute 2.45 0.70 - 3.10 10*3/mm3    Monocytes, Absolute 0.44 0.10 - 0.90 10*3/mm3    Eosinophils, Absolute 0.27 0.00 - 0.40 10*3/mm3    Basophils, Absolute 0.07 0.00 - 0.20 10*3/mm3    Immature Grans, Absolute 0.03 0.00 - 0.05 10*3/mm3    nRBC 0.0 0.0 - 0.2 /100 WBC   Lipase   Result Value Ref Range    Lipase 35 13 - 60 U/L   Amylase   Result Value Ref Range    Amylase 81 28 - 100 U/L   Comprehensive Metabolic Panel   Result Value Ref Range    Glucose 112 (H) 65 - 99 mg/dL    BUN 14 6 - 20 mg/dL    Creatinine 1.09 0.76 - 1.27 mg/dL    Sodium 137 136 - 145 mmol/L     Potassium 4.3 3.5 - 5.2 mmol/L    Chloride 97 (L) 98 - 107 mmol/L    CO2 25.4 22.0 - 29.0 mmol/L    Calcium 9.4 8.6 - 10.5 mg/dL    Total Protein 7.4 6.0 - 8.5 g/dL    Albumin 4.10 3.50 - 5.20 g/dL    ALT (SGPT) 25 1 - 41 U/L    AST (SGOT) 16 1 - 40 U/L    Alkaline Phosphatase 92 39 - 117 U/L    Total Bilirubin 0.4 0.2 - 1.2 mg/dL    eGFR Non African Amer 71 >60 mL/min/1.73    Globulin 3.3 gm/dL    A/G Ratio 1.2 g/dL    BUN/Creatinine Ratio 12.8 7.0 - 25.0    Anion Gap 14.6 5.0 - 15.0 mmol/L   Results for orders placed or performed in visit on 08/19/19   CBC Auto Differential   Result Value Ref Range    WBC 7.42 3.40 - 10.80 10*3/mm3    RBC 5.05 4.14 - 5.80 10*6/mm3    Hemoglobin 15.0 13.0 - 17.7 g/dL    Hematocrit 46.7 37.5 - 51.0 %    MCV 92.5 79.0 - 97.0 fL    MCH 29.7 26.6 - 33.0 pg    MCHC 32.1 31.5 - 35.7 g/dL    RDW 13.6 12.3 - 15.4 %    RDW-SD 46.5 37.0 - 54.0 fl    MPV 10.5 6.0 - 12.0 fL    Platelets 293 140 - 450 10*3/mm3    Neutrophil % 55.4 42.7 - 76.0 %    Lymphocyte % 34.5 19.6 - 45.3 %    Monocyte % 6.1 5.0 - 12.0 %    Eosinophil % 2.6 0.3 - 6.2 %    Basophil % 0.9 0.0 - 1.5 %    Immature Grans % 0.5 0.0 - 0.5 %    Neutrophils, Absolute 4.11 1.70 - 7.00 10*3/mm3    Lymphocytes, Absolute 2.56 0.70 - 3.10 10*3/mm3    Monocytes, Absolute 0.45 0.10 - 0.90 10*3/mm3    Eosinophils, Absolute 0.19 0.00 - 0.40 10*3/mm3    Basophils, Absolute 0.07 0.00 - 0.20 10*3/mm3    Immature Grans, Absolute 0.04 0.00 - 0.05 10*3/mm3    nRBC 0.1 0.0 - 0.2 /100 WBC   Urine Drug Screen - Urine, Clean Catch   Result Value Ref Range    THC, Screen, Urine Negative Negative    Phencyclidine (PCP), Urine Negative Negative    Cocaine Screen, Urine Negative Negative    Methamphetamine, Ur Negative Negative    Opiate Screen Negative Negative    Amphetamine Screen, Urine Positive (A) Negative    Benzodiazepine Screen, Urine Negative Negative    Tricyclic Antidepressants Screen Negative Negative    Methadone Screen, Urine Negative  Negative    Barbiturates Screen, Urine Negative Negative    Oxycodone Screen, Urine Negative Negative    Propoxyphene Screen Negative Negative    Buprenorphine, Screen, Urine Negative Negative   Vitamin D 25 Hydroxy   Result Value Ref Range    25 Hydroxy, Vitamin D 39.8 30.0 - 100.0 ng/ml   Hemoglobin A1c   Result Value Ref Range    Hemoglobin A1C 5.50 4.80 - 5.60 %   Lipid Panel   Result Value Ref Range    Total Cholesterol 196 0 - 200 mg/dL    Triglycerides 121 0 - 150 mg/dL    HDL Cholesterol 47 40 - 60 mg/dL    LDL Cholesterol  125 (H) 0 - 100 mg/dL    VLDL Cholesterol 24.2 5 - 40 mg/dL    LDL/HDL Ratio 2.66    Comprehensive Metabolic Panel   Result Value Ref Range    Glucose 95 65 - 99 mg/dL    BUN 15 6 - 20 mg/dL    Creatinine 0.92 0.76 - 1.27 mg/dL    Sodium 145 136 - 145 mmol/L    Potassium 4.6 3.5 - 5.2 mmol/L    Chloride 106 98 - 107 mmol/L    CO2 26.8 22.0 - 29.0 mmol/L    Calcium 9.9 8.6 - 10.5 mg/dL    Total Protein 7.9 6.0 - 8.5 g/dL    Albumin 4.80 3.50 - 5.20 g/dL    ALT (SGPT) 30 1 - 41 U/L    AST (SGOT) 21 1 - 40 U/L    Alkaline Phosphatase 102 39 - 117 U/L    Total Bilirubin 0.6 0.2 - 1.2 mg/dL    eGFR Non African Amer 87 >60 mL/min/1.73    Globulin 3.1 gm/dL    A/G Ratio 1.5 g/dL    BUN/Creatinine Ratio 16.3 7.0 - 25.0    Anion Gap 12.2 5.0 - 15.0 mmol/L     *Note: Due to a large number of results and/or encounters for the requested time period, some results have not been displayed. A complete set of results can be found in Results Review.

## 2020-03-05 RX ORDER — CHLORTHALIDONE 25 MG/1
25 TABLET ORAL DAILY
Qty: 30 TABLET | Refills: 3 | Status: SHIPPED | OUTPATIENT
Start: 2020-03-05 | End: 2020-06-15

## 2020-04-06 RX ORDER — LISINOPRIL 40 MG/1
40 TABLET ORAL DAILY
Qty: 30 TABLET | Refills: 3 | Status: SHIPPED | OUTPATIENT
Start: 2020-04-06 | End: 2020-10-16 | Stop reason: SDUPTHER

## 2020-05-08 NOTE — PROGRESS NOTES
Subjective:  Randall Hernandez is a 49 y.o. male who presents for       Patient Active Problem List   Diagnosis   • Hyperlipidemia   • Inattention   • Non morbid obesity   • Prediabetes   • Attention deficit hyperactivity disorder (ADHD), combined type   • Need for vaccination   • Gastroesophageal reflux disease   • Encounter for drug screening   • Tingling in extremities   • Elevated BP without diagnosis of hypertension   • Allergic rhinitis   • Elevated blood pressure reading   • Pruritic rash   • Numbness in both hands   • Bilateral elbow joint pain   • Pain in both wrists   • Ulnar neuropathy of left upper extremity   • General medical examination   • Bilateral carpal tunnel syndrome   • History of prediabetes   • Numbness and tingling in both hands   • Essential hypertension   • Cerumen debris on tympanic membrane of both ears   • Carpal tunnel syndrome   • S/P carpal tunnel release   • Encounter for screening for malignant neoplasm of colon   • FH: colon cancer   • Acute recurrent sinusitis   • Attention deficit hyperactivity disorder (ADHD), predominantly inattentive type   • Class 2 obesity with body mass index (BMI) of 35.0 to 35.9 in adult   • Annual physical exam   • Arthritis of back   • Scoliosis   • Hyponatremia   • Hypochloremia   • Stage 2 chronic kidney disease   • Epigastric pain   • Bilious vomiting with nausea   • Fatty liver   • Dysphagia   • Nausea and vomiting   • Gastritis without bleeding           Current Outpatient Medications:   •  aspirin 81 MG EC tablet, Take 1 tablet by mouth Daily., Disp: 30 tablet, Rfl: 3  •  atorvastatin (LIPITOR) 80 MG tablet, Take 1 tablet by mouth Daily., Disp: 30 tablet, Rfl: 3  •  chlorthalidone (HYGROTON) 25 MG tablet, TAKE 1 TABLET BY MOUTH DAILY, Disp: 30 tablet, Rfl: 3  •  fluticasone (FLONASE) 50 MCG/ACT nasal spray, INSTILL 2 SPRAYS IN EACH NOSTRIL EVERY DAY, Disp: 16 g, Rfl: 3  •  lisinopril (PRINIVIL,ZESTRIL) 40 MG tablet, Take 1 tablet by mouth  Daily., Disp: 30 tablet, Rfl: 3  •  montelukast (SINGULAIR) 10 MG tablet, TAKE 1 TABLET BY MOUTH EVERY NIGHT, Disp: 30 tablet, Rfl: 0  •  omeprazole (priLOSEC) 40 MG capsule, TAKE 1 CAPSULE BY MOUTH DAILY, Disp: 30 capsule, Rfl: 3  •  vitamin D (ERGOCALCIFEROL) 1.25 MG (12537 UT) capsule capsule, Take 1 capsule by mouth 1 (One) Time Per Week., Disp: 4 capsule, Rfl: 3  •  amphetamine-dextroamphetamine XR (Adderall XR) 25 MG 24 hr capsule, Take 1 capsule by mouth Every Morning Take 1 tablet by mouth daily, Disp: 30 capsule, Rfl: 0    HPI        Pt is 49 yo male with management  of obesity, history of prediabetes, HTN,  GERD, vitamin D deficiency, alelrgic rhinitis, ADHD predominantly inattentive type ,sp bilateral carpal tunnel release surgery,  Chronic back pain (arthritis of lumbar spine, short pedicles, mild scoliosis, mild arthritis thoracic spine).  CKD stage 2 , fatty liver, gastirits, esophagitis        2/14/20 pt is here for recheck and followup. Since last visit pt has seen GI for nausea/vomiting and has schedule EGD with Dr. Bowers on 2/21/20. Also had US on 2/11/20 at [facility] that showed fatty liver. Amylase and lipase normal.  In regards to ADHD medication is helping and taking Adderall XR 25 mg daily . Does have RUQ discomfort.     5/13/20 Telemedicine visit today. Pt is doing well no fever no chills. 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 Doing okay on medicaitons. BP stable. scine last visit pt had EGD that showed gastritis and esophagitis. He does still take prilosec.  His abdominal pain has helped. . He continues to take ADHD medication Adderall XR 25 mg daily but ran out of it a few days ago.  It continues to help with ADHD         Abdominal Pain   This is a chronic problem. The current episode started more than 1 year ago. The problem occurs constantly. The problem has been unchanged. The pain is located in the epigastric region, generalized abdominal region and RUQ. The pain is at a severity  of 4/10. The pain is moderate. The quality of the pain is aching and cramping. The abdominal pain does not radiate. Associated symptoms include nausea. Pertinent negatives include no anorexia, arthralgias, belching, constipation, diarrhea, dysuria, fever, flatus, frequency, headaches, hematochezia, hematuria, melena, myalgias, vomiting or weight loss. Nothing aggravates the pain. The pain is relieved by nothing. He has tried nothing for the symptoms. The treatment provided no relief.   Upper Extremity Issue   This is a chronic problem. The current episode started more than 1 year ago. The problem occurs constantly. The problem has been unchanged. Associated symptoms include arthralgias, fatigue, numbness and weakness. Pertinent negatives include no abdominal pain, anorexia, change in bowel habit, chest pain, chills, congestion, coughing, diaphoresis, fever, headaches, joint swelling, myalgias, nausea, neck pain, swollen glands, urinary symptoms, vertigo, visual change or vomiting. The symptoms are aggravated by stress and exertion (lifting). Treatments tried: ice. The treatment provided no relief.    Shoulder Injury    The incident occurred at the pool and at work. The right shoulder is affected. The injury mechanism is unknown. The quality of the pain is described as aching. The pain radiates to the right arm and right hand. The pain is at a severity of 5/10. The pain is moderate. Associated symptoms include numbness. Pertinent negatives include no chest pain, muscle weakness or tingling. Nothing aggravates the symptoms.    Obesity   This is a chronic problem. The current episode started more than 1 year ago. The problem occurs constantly. The problem has been unchanged. Associated symptoms include arthralgias and numbness. Pertinent negatives include no abdominal pain, anorexia, change in bowel habit, chest pain, chills, congestion, coughing, diaphoresis, fatigue, fever, headaches, joint  swelling, myalgias, nausea, rash, sore throat, swollen glands, urinary symptoms, vertigo, visual change, vomiting or weakness. Nothing aggravates the symptoms. He has tried nothing for the symptoms. The treatment provided no relief.   Mental Health Problem   The primary symptoms do not include dysphoric mood, delusions, hallucinations, bizarre behavior, disorganized speech, negative symptoms or somatic symptoms. This is a chronic problem.   The degree of incapacity that he is experiencing as a consequence of his illness is mild. Additional symptoms of the illness include attention impairment. Additional symptoms of the illness do not include anhedonia, insomnia, hypersomnia, appetite change, unexpected weight change, fatigue, agitation, psychomotor retardation, feelings of worthlessness, euphoric mood, increased goal-directed activity, flight of ideas, inflated self-esteem, decreased need for sleep, distractible, poor judgment, visual change, headaches or abdominal pain. He does not admit to suicidal ideas. He does not have a plan to commit suicide. He does not contemplate injuring another person.   Back Pain   This is a chronic problem. The current episode started more than 1 month ago. The problem occurs constantly. The problem is unchanged. The pain is present in the lumbar spine. The quality of the pain is described as aching. The pain is at a severity of 5/10. The symptoms are aggravated by lying down, position, sitting and standing. Associated symptoms include numbness. Pertinent negatives include no abdominal pain, bladder incontinence, bowel incontinence, chest pain, dysuria, fever, headaches, leg pain, paresis, paresthesias, pelvic pain, perianal numbness, tingling or weakness. He has tried nothing for the symptoms. The treatment provided no relief.    Wrist Pain    The pain is present in the left elbow, left wrist, right wrist and right elbow. This is a chronic problem. The current episode started more  than 1 month ago. The problem occurs constantly. The problem has been gradually worsening. The quality of the pain is described as aching. The pain is at a severity of 9/10. The pain is moderate. Associated symptoms include joint locking, a limited range of motion, numbness, stiffness and tingling. Pertinent negatives include no fever, inability to bear weight, itching or joint swelling. The symptoms are aggravated by activity. He has tried NSAIDS for the symptoms. The treatment provided no relief. Family history does not include gout or rheumatoid arthritis. There is no history of diabetes, gout, osteoarthritis or rheumatoid arthritis.   Elbow Injury   This is a chronic problem. The current episode started more than 1 year ago. The problem occurs constantly. The problem has been gradually worsening. Associated symptoms include arthralgias, joint swelling, numbness, a rash and weakness. Pertinent negatives include no abdominal pain, anorexia, change in bowel habit, chest pain, chills, congestion, coughing, diaphoresis, fatigue, fever, headaches, myalgias, nausea, neck pain, sore throat, swollen glands, urinary symptoms, vertigo, visual change or vomiting. The symptoms are aggravated by bending. He has tried NSAIDs for the symptoms. The treatment provided mild relief.   Hypertension   This is a new problem. The current episode started more than 1 month ago. The problem has been gradually worsening since onset. The problem is uncontrolled. Pertinent negatives include no anxiety, blurred vision, chest pain, headaches, malaise/fatigue, neck pain, orthopnea, palpitations, peripheral edema, PND, shortness of breath or sweats. Risk factors for coronary artery disease include male gender, obesity and sedentary lifestyle. Past treatments include nothing. There are no compliance problems    Review of Systems  Review of Systems   Constitutional: Positive for activity change and fatigue. Negative for appetite change, chills,  diaphoresis and fever.   HENT: Negative for congestion, postnasal drip, rhinorrhea, sinus pressure, sinus pain, sneezing, sore throat, trouble swallowing and voice change.    Respiratory: Negative for cough, choking, chest tightness, shortness of breath, wheezing and stridor.    Cardiovascular: Negative for chest pain.   Gastrointestinal: Positive for abdominal pain. Negative for diarrhea, nausea and vomiting.   Musculoskeletal: Positive for arthralgias.   Neurological: Positive for weakness and numbness. Negative for headaches.   Psychiatric/Behavioral: Positive for decreased concentration. The patient is nervous/anxious.         Depressed mood        Patient Active Problem List   Diagnosis   • Hyperlipidemia   • Inattention   • Non morbid obesity   • Prediabetes   • Attention deficit hyperactivity disorder (ADHD), combined type   • Need for vaccination   • Gastroesophageal reflux disease   • Encounter for drug screening   • Tingling in extremities   • Elevated BP without diagnosis of hypertension   • Allergic rhinitis   • Elevated blood pressure reading   • Pruritic rash   • Numbness in both hands   • Bilateral elbow joint pain   • Pain in both wrists   • Ulnar neuropathy of left upper extremity   • General medical examination   • Bilateral carpal tunnel syndrome   • History of prediabetes   • Numbness and tingling in both hands   • Essential hypertension   • Cerumen debris on tympanic membrane of both ears   • Carpal tunnel syndrome   • S/P carpal tunnel release   • Encounter for screening for malignant neoplasm of colon   • FH: colon cancer   • Acute recurrent sinusitis   • Attention deficit hyperactivity disorder (ADHD), predominantly inattentive type   • Class 2 obesity with body mass index (BMI) of 35.0 to 35.9 in adult   • Annual physical exam   • Arthritis of back   • Scoliosis   • Hyponatremia   • Hypochloremia   • Stage 2 chronic kidney disease   • Epigastric pain   • Bilious vomiting with nausea   •  Fatty liver   • Dysphagia   • Nausea and vomiting   • Gastritis without bleeding     Past Surgical History:   Procedure Laterality Date   • CARPAL TUNNEL RELEASE Right 2018    Procedure: CARPAL TUNNEL RELEASE;  Surgeon: Judd Mathias MD;  Location: [facility] OR;  Service: Orthopedics   • CARPAL TUNNEL RELEASE  2018    Left   • CARPAL TUNNEL RELEASE Left 2018    Procedure: CARPAL TUNNEL RELEASE;  Surgeon: Judd Mathias MD;  Location: [facility] OR;  Service: Orthopedics   • COLONOSCOPY N/A 2019    Procedure: COLONOSCOPY a friday please ;  Surgeon: Ruslan Bowers MD;  Location: [facility] ENDOSCOPY;  Service: Gastroenterology   • ENDOSCOPY     • ENDOSCOPY N/A 2020    Procedure: ESOPHAGOGASTRODUODENOSCOPY possible dilation ;  Surgeon: Ruslan Bowers MD;  Location: [facility] ENDOSCOPY;  Service: Gastroenterology;  Laterality: N/A;   • PYLOROMYOTOMY       Social History     Socioeconomic History   • Marital status: Significant Other     Spouse name: Not on file   • Number of children: Not on file   • Years of education: Not on file   • Highest education level: Not on file   Occupational History   • Occupation:      Employer: SELF-EMPLOYED     Comment: Patient resides with Addis Rodriguez.   Tobacco Use   • Smoking status: Former Smoker     Packs/day: 1.00     Years: 6.00     Pack years: 6.00     Types: Cigarettes     Last attempt to quit:      Years since quittin.3   • Smokeless tobacco: Never Used   Substance and Sexual Activity   • Alcohol use: Yes     Comment: 2018 - Patinet reports consumption of alcoholic beverages under a social basis (approximately 3 - 4 per month).     • Drug use: No   • Sexual activity: Defer     Family History   Problem Relation Age of Onset   • Colon cancer Mother         onset age greater than 75y   • Diabetes Mother    • Breast cancer Sister    • Cancer Brother         bladder   • Diabetes Brother    • Hyperlipidemia  Brother      Admission on 02/21/2020, Discharged on 02/21/2020   Component Date Value Ref Range Status   • Case Report 02/21/2020    Final                    Value:Surgical Pathology Report                         Case: [id number]                                  Authorizing Provider:  Ruslan Bowers MD        Collected:           02/21/2020 12:08 PM          Ordering Location:     [facility]             Received:            02/21/2020 01:42 PM                                 [place] [facility]                                                     Pathologist:           Loi San MD                                                         Specimens:   1) - Small Intestine, Duodenum, small bowel                                                         2) - Gastric, Antrum                                                                                3) - Esophagus, Distal                                                                    • Final Diagnosis 02/21/2020    Final                    Value:This result contains rich text formatting which cannot be displayed here.   • Gross Description 02/21/2020    Final                    Value:This result contains rich text formatting which cannot be displayed here.   Lab on 01/14/2020   Component Date Value Ref Range Status   • WBC 01/14/2020 7.03  3.40 - 10.80 10*3/mm3 Final   • RBC 01/14/2020 4.70  4.14 - 5.80 10*6/mm3 Final   • Hemoglobin 01/14/2020 13.9  13.0 - 17.7 g/dL Final   • Hematocrit 01/14/2020 40.3  37.5 - 51.0 % Final   • MCV 01/14/2020 85.7  79.0 - 97.0 fL Final   • MCH 01/14/2020 29.6  26.6 - 33.0 pg Final   • MCHC 01/14/2020 34.5  31.5 - 35.7 g/dL Final   • RDW 01/14/2020 12.4  12.3 - 15.4 % Final   • RDW-SD 01/14/2020 38.4  37.0 - 54.0 fl Final   • MPV 01/14/2020 9.6  6.0 - 12.0 fL Final   • Platelets 01/14/2020 307  140 - 450 10*3/mm3 Final   • Neutrophil % 01/14/2020 53.6  42.7 - 76.0 % Final   • Lymphocyte % 01/14/2020 34.9  19.6 -  45.3 % Final   • Monocyte % 01/14/2020 6.3  5.0 - 12.0 % Final   • Eosinophil % 01/14/2020 3.8  0.3 - 6.2 % Final   • Basophil % 01/14/2020 1.0  0.0 - 1.5 % Final   • Immature Grans % 01/14/2020 0.4  0.0 - 0.5 % Final   • Neutrophils, Absolute 01/14/2020 3.77  1.70 - 7.00 10*3/mm3 Final   • Lymphocytes, Absolute 01/14/2020 2.45  0.70 - 3.10 10*3/mm3 Final   • Monocytes, Absolute 01/14/2020 0.44  0.10 - 0.90 10*3/mm3 Final   • Eosinophils, Absolute 01/14/2020 0.27  0.00 - 0.40 10*3/mm3 Final   • Basophils, Absolute 01/14/2020 0.07  0.00 - 0.20 10*3/mm3 Final   • Immature Grans, Absolute 01/14/2020 0.03  0.00 - 0.05 10*3/mm3 Final   • nRBC 01/14/2020 0.0  0.0 - 0.2 /100 WBC Final   • Glucose 01/14/2020 112* 65 - 99 mg/dL Final   • BUN 01/14/2020 14  6 - 20 mg/dL Final   • Creatinine 01/14/2020 1.09  0.76 - 1.27 mg/dL Final   • Sodium 01/14/2020 137  136 - 145 mmol/L Final   • Potassium 01/14/2020 4.3  3.5 - 5.2 mmol/L Final   • Chloride 01/14/2020 97* 98 - 107 mmol/L Final   • CO2 01/14/2020 25.4  22.0 - 29.0 mmol/L Final   • Calcium 01/14/2020 9.4  8.6 - 10.5 mg/dL Final   • Total Protein 01/14/2020 7.4  6.0 - 8.5 g/dL Final   • Albumin 01/14/2020 4.10  3.50 - 5.20 g/dL Final   • ALT (SGPT) 01/14/2020 25  1 - 41 U/L Final   • AST (SGOT) 01/14/2020 16  1 - 40 U/L Final   • Alkaline Phosphatase 01/14/2020 92  39 - 117 U/L Final   • Total Bilirubin 01/14/2020 0.4  0.2 - 1.2 mg/dL Final   • eGFR Non African Amer 01/14/2020 71  >60 mL/min/1.73 Final   • Globulin 01/14/2020 3.3  gm/dL Final   • A/G Ratio 01/14/2020 1.2  g/dL Final   • BUN/Creatinine Ratio 01/14/2020 12.8  7.0 - 25.0 Final   • Anion Gap 01/14/2020 14.6  5.0 - 15.0 mmol/L Final   • Amylase 01/14/2020 81  28 - 100 U/L Final   • Lipase 01/14/2020 35  13 - 60 U/L Final      US Abdomen Complete  Narrative: PROCEDURE: Complete abdominal sonogram    COMPARISON: None    HISTORY: Abdominal pain    FINDINGS: Realtime grayscale and color-flow imaging is obtained  of  "the abdomen.    The gallbladder is normal.No shadowing stones, pericholecystic  fluid or wall thickening.   There is increased echogenicity throughout the liver most  consistent with fatty infiltration or other parenchymal liver  disease. No focal lesions or intrahepatic biliary ductal  dilatation.   The visualized common duct is within normal limits, measuring 5  mm in diameter.   The visualized kidneys are normal in echogenicity, without  hydronephrosis.  The pancreas is obscured by bowel gas and cannot be evaluated.    The spleen appears normal.  The aorta and inferior vena cava are obscured by gas and cannot  be evaluated.  Impression: CONCLUSION:  Fatty liver.    Electronically signed by:  Tyshawn Marin MD  2/11/2020 9:14 AM [facility]  Workstation: [id number]    @Recroup@  Immunization History   Administered Date(s) Administered   • Influenza TIV (IM) 03/28/2017   • Tdap 02/17/2017       The following portions of the patient's history were reviewed and updated as appropriate: allergies, current medications, past family history, past medical history, past social history, past surgical history and problem list.        Physical Exam  Ht 180.3 cm (71\")   Wt 122 kg (270 lb)   BMI 37.66 kg/m²     Physical Exam   Constitutional: He is oriented to person, place, and time. He appears well-developed and well-nourished.   HENT:   Head: Normocephalic and atraumatic.   Right Ear: External ear normal.   Eyes: Pupils are equal, round, and reactive to light. Conjunctivae and EOM are normal.   Neck: Normal range of motion. Neck supple.   Cardiovascular: Normal rate, regular rhythm and normal heart sounds.   No murmur heard.  Pulmonary/Chest: Effort normal and breath sounds normal. No respiratory distress.   Abdominal: Soft. Bowel sounds are normal. He exhibits no distension. There is no tenderness.   Musculoskeletal: Normal range of motion. He exhibits no edema or deformity.   Neurological: He is alert and oriented to person, " place, and time. No cranial nerve deficit.   Skin: Skin is warm. No rash noted. He is not diaphoretic. No erythema. No pallor.   Psychiatric: He has a normal mood and affect. His behavior is normal.   Nursing note and vitals reviewed.      Assessment/Plan    Diagnosis Plan   1. Attention deficit hyperactivity disorder (ADHD), predominantly inattentive type  amphetamine-dextroamphetamine XR (Adderall XR) 25 MG 24 hr capsule   2. Stage 2 chronic kidney disease     3. Prediabetes     4. Hyperlipidemia, unspecified hyperlipidemia type     5. Gastroesophageal reflux disease with esophagitis     6. Essential hypertension     7. Fatty liver     8. Class 2 obesity with body mass index (BMI) of 35.0 to 35.9 in adult, unspecified obesity type, unspecified whether serious comorbidity present     9. Allergic rhinitis, unspecified seasonality, unspecified trigger     10. Gastritis without bleeding, unspecified chronicity, unspecified gastritis type        -labwork before next visit   -abdominal pain/fatty liver/nausea vomiting/Gastritis/GERD with esophagitis - continue prilosec 40 mg daily.  GI following had recent EGD   -ckd stage 2 - continue to monitor. Gave information. Advised better hydration BP control   -recommend pt go back on aspirin and lipitor  -for back pain with sciatica/scoliosis - pain is better after PT/OT. Continue with mobic and flexeril PRN.  Consider MRI if not improving   -left upper arm pain - will get US of  Arm. Suspect hematoma.    -right shoulder pain - x-ray today. Consider PT/OT or Orthopedic referral.  Tumeric powder in pill form   - pt doing well postop on carpal tunnel release on right hand   -for cerumen of both ears - debrox ointment PRN. Drug information provided  -for carpal tunnel he is doing better post op  he has failed conservative treatment for >3 months. . He has yet to get surgery on left  Hand   -Vitamin D deficiency vitamin D pill once a week d   -HLP - lipitor 80mg PO qhs   - HTN -   BP was at goal Continue  on lisionpril  But go up form 10 to 20 mg PO q daily drug information provided low salt diet informaiton provided. Side effects discussed BP better controlled today advsied pt to bring readings next visit and get BP machine   - refilled ADHD medication Adderall XR 25 mg PO q daily for ADHD.  [facility] ref number ref number refer number [id number]  - for allergic rhinitis - continue singulair and flonase and will add zyrtec.  -H/O prediabetes - D/C  metformin, recent hga1c normal at 5.4   - hyperlipidemia -continue statin. Recheck lipid panel. Pt on aspirin   - obesity - recommend ketogenic diet along with intermittent fasting. BMI at 38.16.    -advised pt to be safe and call with questions and concerns  -advised to go to ER or call 911 if symptoms get severe  -advised to followup with specialist and referrals   -advised pt to be safe during COVID-19 pandemic  -recheck in 4 weeks   This visit has been rescheduled as a phone visit to comply with patient safety concerns in accordance with CDC recommendations. Total time of discussion was 25minutes.            This document has been electronically signed by Scottie Borges MD on May 13, 2020 09:42

## 2020-05-13 ENCOUNTER — OFFICE VISIT (OUTPATIENT)
Dept: FAMILY MEDICINE CLINIC | Facility: CLINIC | Age: 50
End: 2020-05-13

## 2020-05-13 VITALS — HEIGHT: 71 IN | WEIGHT: 270 LBS | BODY MASS INDEX: 37.8 KG/M2

## 2020-05-13 DIAGNOSIS — I10 ESSENTIAL HYPERTENSION: ICD-10-CM

## 2020-05-13 DIAGNOSIS — J30.9 ALLERGIC RHINITIS, UNSPECIFIED SEASONALITY, UNSPECIFIED TRIGGER: ICD-10-CM

## 2020-05-13 DIAGNOSIS — F90.0 ATTENTION DEFICIT HYPERACTIVITY DISORDER (ADHD), PREDOMINANTLY INATTENTIVE TYPE: ICD-10-CM

## 2020-05-13 DIAGNOSIS — N18.2 STAGE 2 CHRONIC KIDNEY DISEASE: ICD-10-CM

## 2020-05-13 DIAGNOSIS — K29.70 GASTRITIS WITHOUT BLEEDING, UNSPECIFIED CHRONICITY, UNSPECIFIED GASTRITIS TYPE: ICD-10-CM

## 2020-05-13 DIAGNOSIS — Z02.83 ENCOUNTER FOR DRUG SCREENING: Primary | ICD-10-CM

## 2020-05-13 DIAGNOSIS — R73.03 PREDIABETES: ICD-10-CM

## 2020-05-13 DIAGNOSIS — K76.0 FATTY LIVER: ICD-10-CM

## 2020-05-13 DIAGNOSIS — E66.9 CLASS 2 OBESITY WITH BODY MASS INDEX (BMI) OF 35.0 TO 35.9 IN ADULT, UNSPECIFIED OBESITY TYPE, UNSPECIFIED WHETHER SERIOUS COMORBIDITY PRESENT: ICD-10-CM

## 2020-05-13 DIAGNOSIS — K21.00 GASTROESOPHAGEAL REFLUX DISEASE WITH ESOPHAGITIS: ICD-10-CM

## 2020-05-13 DIAGNOSIS — E78.5 HYPERLIPIDEMIA, UNSPECIFIED HYPERLIPIDEMIA TYPE: ICD-10-CM

## 2020-05-13 PROCEDURE — 99213 OFFICE O/P EST LOW 20 MIN: CPT | Performed by: FAMILY MEDICINE

## 2020-05-13 RX ORDER — ERGOCALCIFEROL 1.25 MG/1
50000 CAPSULE ORAL WEEKLY
Qty: 4 CAPSULE | Refills: 3 | Status: SHIPPED | OUTPATIENT
Start: 2020-05-13 | End: 2020-07-26 | Stop reason: SDUPTHER

## 2020-05-13 RX ORDER — DEXTROAMPHETAMINE SACCHARATE, AMPHETAMINE ASPARTATE MONOHYDRATE, DEXTROAMPHETAMINE SULFATE AND AMPHETAMINE SULFATE 6.25; 6.25; 6.25; 6.25 MG/1; MG/1; MG/1; MG/1
25 CAPSULE, EXTENDED RELEASE ORAL EVERY MORNING
Qty: 30 CAPSULE | Refills: 0 | Status: SHIPPED | OUTPATIENT
Start: 2020-05-13 | End: 2020-06-15 | Stop reason: SDUPTHER

## 2020-05-13 RX ORDER — ASPIRIN 81 MG/1
81 TABLET ORAL DAILY
Qty: 30 TABLET | Refills: 3 | Status: SHIPPED | OUTPATIENT
Start: 2020-05-13 | End: 2020-11-06 | Stop reason: SDUPTHER

## 2020-06-02 RX ORDER — ATORVASTATIN CALCIUM 80 MG/1
80 TABLET, FILM COATED ORAL DAILY
Qty: 30 TABLET | Refills: 3 | Status: SHIPPED | OUTPATIENT
Start: 2020-06-02 | End: 2021-03-09 | Stop reason: CLARIF

## 2020-06-08 NOTE — PROGRESS NOTES
Subjective:  Randall Hernandez is a 49 y.o. male who presents for       Patient Active Problem List   Diagnosis   • Hyperlipidemia   • Inattention   • Non morbid obesity   • Prediabetes   • Attention deficit hyperactivity disorder (ADHD), combined type   • Need for vaccination   • Gastroesophageal reflux disease   • Encounter for drug screening   • Tingling in extremities   • Elevated BP without diagnosis of hypertension   • Allergic rhinitis   • Elevated blood pressure reading   • Pruritic rash   • Numbness in both hands   • Bilateral elbow joint pain   • Pain in both wrists   • Ulnar neuropathy of left upper extremity   • General medical examination   • Bilateral carpal tunnel syndrome   • History of prediabetes   • Numbness and tingling in both hands   • Essential hypertension   • Cerumen debris on tympanic membrane of both ears   • Carpal tunnel syndrome   • S/P carpal tunnel release   • Encounter for screening for malignant neoplasm of colon   • FH: colon cancer   • Acute recurrent sinusitis   • Attention deficit hyperactivity disorder (ADHD), predominantly inattentive type   • Class 2 obesity with body mass index (BMI) of 35.0 to 35.9 in adult   • Annual physical exam   • Arthritis of back   • Scoliosis   • Hyponatremia   • Hypochloremia   • Stage 2 chronic kidney disease   • Epigastric pain   • Bilious vomiting with nausea   • Fatty liver   • Dysphagia   • Nausea and vomiting   • Gastritis without bleeding   • Class 2 obesity with body mass index (BMI) of 38.0 to 38.9 in adult   • Mixed hyperlipidemia   • Dizziness   • Hypotension           Current Outpatient Medications:   •  amphetamine-dextroamphetamine XR (Adderall XR) 25 MG 24 hr capsule, Take 1 capsule by mouth Every Morning Take 1 tablet by mouth daily, Disp: 30 capsule, Rfl: 0  •  aspirin 81 MG EC tablet, Take 1 tablet by mouth Daily., Disp: 30 tablet, Rfl: 3  •  atorvastatin (LIPITOR) 80 MG tablet, Take 1 tablet by mouth Daily., Disp: 30 tablet,  Rfl: 3  •  fluticasone (FLONASE) 50 MCG/ACT nasal spray, INSTILL 2 SPRAYS IN EACH NOSTRIL EVERY DAY, Disp: 16 g, Rfl: 3  •  lisinopril (PRINIVIL,ZESTRIL) 40 MG tablet, Take 1 tablet by mouth Daily., Disp: 30 tablet, Rfl: 3  •  montelukast (SINGULAIR) 10 MG tablet, Take 1 tablet by mouth Every Night., Disp: 30 tablet, Rfl: 3  •  omeprazole (priLOSEC) 40 MG capsule, TAKE 1 CAPSULE BY MOUTH DAILY, Disp: 30 capsule, Rfl: 3  •  vitamin D (ERGOCALCIFEROL) 1.25 MG (70793 UT) capsule capsule, Take 1 capsule by mouth 1 (One) Time Per Week., Disp: 4 capsule, Rfl: 3      Pt is 49 yo male with management  of obesity, history of prediabetes, HTN,  GERD, vitamin D deficiency, alelrgic rhinitis, ADHD predominantly inattentive type ,sp bilateral carpal tunnel release surgery,  Chronic back pain (arthritis of lumbar spine, short pedicles, mild scoliosis, mild arthritis thoracic spine).  CKD stage 2 , fatty liver, gastirits, esophagitis       5/13/20 Telemedicine visit today. Pt is doing well no fever no chills. 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 Doing okay on medicaitons. BP stable. scine last visit pt had EGD that showed gastritis and esophagitis. He does still take prilosec.  His abdominal pain has helped. . He continues to take ADHD medication Adderall XR 25 mg daily but ran out of it a few days ago.  It continues to help with ADHD     6/15/20 In office visit for recheck on pt's obsity, ADHD, HTN, prediabetes, GERD, pt is due for new labwork. He has been feeling lightheadedness and dizzy for past month. He states he susana dizzy whenever he gets up from supine to standing position. He currenty takes lisinopril 40 mg daily and chlrothalidone 25 mg PO q daily.  No chest pain no palpitations. BP on lower side today.  He did have a party this weekened. He drank alcohol this weekend       Dizziness   This is a recurrent problem. The problem occurs constantly. Associated symptoms include arthralgias, fatigue, numbness and  weakness. Pertinent negatives include no abdominal pain, anorexia, change in bowel habit, chest pain, chills, congestion, coughing, diaphoresis, fever, headaches, joint swelling, myalgias, nausea, neck pain, rash, sore throat, swollen glands, urinary symptoms, vertigo, visual change or vomiting. Nothing aggravates the symptoms. He has tried nothing for the symptoms. The treatment provided no relief.      Obesity   This is a chronic problem. The current episode started more than 1 year ago. The problem occurs constantly. The problem has been unchanged. Associated symptoms include arthralgias and numbness. Pertinent negatives include no abdominal pain, anorexia, change in bowel habit, chest pain, chills, congestion, coughing, diaphoresis, fatigue, fever, headaches, joint swelling, myalgias, nausea, rash, sore throat, swollen glands, urinary symptoms, vertigo, visual change, vomiting or weakness. Nothing aggravates the symptoms. He has tried nothing for the symptoms. The treatment provided no relief.   Mental Health Problem   The primary symptoms do not include dysphoric mood, delusions, hallucinations, bizarre behavior, disorganized speech, negative symptoms or somatic symptoms. This is a chronic problem.   The degree of incapacity that he is experiencing as a consequence of his illness is mild. Additional symptoms of the illness include attention impairment. Additional symptoms of the illness do not include anhedonia, insomnia, hypersomnia, appetite change, unexpected weight change, fatigue, agitation, psychomotor retardation, feelings of worthlessness, euphoric mood, increased goal-directed activity, flight of ideas, inflated self-esteem, decreased need for sleep, distractible, poor judgment, visual change, headaches or abdominal pain. He does not admit to suicidal ideas. He does not have a plan to commit suicide. He does not contemplate injuring another person.   Hypertension   This is a new problem. The current  episode started more than 1 month ago. The problem has been gradually improving t. The problem is uncontrolled. Pertinent negatives include no anxiety, blurred vision, chest pain, headaches, malaise/fatigue, neck pain, orthopnea, palpitations, peripheral edema, PND, shortness of breath or sweats. Risk factors for coronary artery disease include male gender, obesity and sedentary lifestyle. Past treatments include nothing. There are no compliance problems       Review of Systems  Review of Systems   Constitutional: Positive for activity change and fatigue. Negative for appetite change, chills, diaphoresis and fever.   HENT: Negative for congestion, postnasal drip, rhinorrhea, sinus pressure, sinus pain, sneezing, sore throat, trouble swallowing and voice change.    Respiratory: Negative for cough, choking, chest tightness, shortness of breath, wheezing and stridor.    Cardiovascular: Negative for chest pain.   Gastrointestinal: Negative for abdominal pain, anorexia, change in bowel habit, diarrhea, nausea and vomiting.   Musculoskeletal: Positive for arthralgias. Negative for joint swelling, myalgias and neck pain.   Skin: Negative for rash.   Neurological: Positive for dizziness, weakness and numbness. Negative for vertigo and headaches.   Psychiatric/Behavioral: Positive for decreased concentration.       Patient Active Problem List   Diagnosis   • Hyperlipidemia   • Inattention   • Non morbid obesity   • Prediabetes   • Attention deficit hyperactivity disorder (ADHD), combined type   • Need for vaccination   • Gastroesophageal reflux disease   • Encounter for drug screening   • Tingling in extremities   • Elevated BP without diagnosis of hypertension   • Allergic rhinitis   • Elevated blood pressure reading   • Pruritic rash   • Numbness in both hands   • Bilateral elbow joint pain   • Pain in both wrists   • Ulnar neuropathy of left upper extremity   • General medical examination   • Bilateral carpal tunnel  syndrome   • History of prediabetes   • Numbness and tingling in both hands   • Essential hypertension   • Cerumen debris on tympanic membrane of both ears   • Carpal tunnel syndrome   • S/P carpal tunnel release   • Encounter for screening for malignant neoplasm of colon   • FH: colon cancer   • Acute recurrent sinusitis   • Attention deficit hyperactivity disorder (ADHD), predominantly inattentive type   • Class 2 obesity with body mass index (BMI) of 35.0 to 35.9 in adult   • Annual physical exam   • Arthritis of back   • Scoliosis   • Hyponatremia   • Hypochloremia   • Stage 2 chronic kidney disease   • Epigastric pain   • Bilious vomiting with nausea   • Fatty liver   • Dysphagia   • Nausea and vomiting   • Gastritis without bleeding   • Class 2 obesity with body mass index (BMI) of 38.0 to 38.9 in adult   • Mixed hyperlipidemia   • Dizziness   • Hypotension     Past Surgical History:   Procedure Laterality Date   • CARPAL TUNNEL RELEASE Right 11/2/2018    Procedure: CARPAL TUNNEL RELEASE;  Surgeon: Judd Mathias MD;  Location: [facility] OR;  Service: Orthopedics   • CARPAL TUNNEL RELEASE  12/07/2018    Left   • CARPAL TUNNEL RELEASE Left 12/7/2018    Procedure: CARPAL TUNNEL RELEASE;  Surgeon: Judd Mathias MD;  Location: [facility] OR;  Service: Orthopedics   • COLONOSCOPY N/A 1/18/2019    Procedure: COLONOSCOPY a friday please ;  Surgeon: Ruslan Bowers MD;  Location: [facility] ENDOSCOPY;  Service: Gastroenterology   • ENDOSCOPY     • ENDOSCOPY N/A 2/21/2020    Procedure: ESOPHAGOGASTRODUODENOSCOPY possible dilation Fridays;  Surgeon: Ruslan Bowers MD;  Location: [facility] ENDOSCOPY;  Service: Gastroenterology;  Laterality: N/A;   • PYLOROMYOTOMY       Social History     Socioeconomic History   • Marital status: Significant Other     Spouse name: Not on file   • Number of children: Not on file   • Years of education: Not on file   • Highest education level: Not on file   Occupational History   •  Occupation:      Employer: SELF-EMPLOYED     Comment: Patient resides with Addis Rodriguez.   Tobacco Use   • Smoking status: Former Smoker     Packs/day: 1.00     Years: 6.00     Pack years: 6.00     Types: Cigarettes     Last attempt to quit:      Years since quittin.4   • Smokeless tobacco: Never Used   Substance and Sexual Activity   • Alcohol use: Yes     Comment: 2018 - Shelleynet reports consumption of alcoholic beverages under a social basis (approximately 3 - 4 per month).     • Drug use: No   • Sexual activity: Defer     Family History   Problem Relation Age of Onset   • Colon cancer Mother         onset age greater than 75y   • Diabetes Mother    • Breast cancer Sister    • Cancer Brother         bladder   • Diabetes Brother    • Hyperlipidemia Brother      Admission on 2020, Discharged on 2020   Component Date Value Ref Range Status   • Case Report 2020    Final                    Value:Surgical Pathology Report                         Case: [id number]                                  Authorizing Provider:  Ruslan Bowers MD        Collected:           2020 12:08 PM          Ordering Location:     [facility]             Received:            2020 01:42 PM                                 [place] [facility]                                                     Pathologist:           Loi San MD                                                         Specimens:   1) - Small Intestine, Duodenum, small bowel                                                         2) - Gastric, Antrum                                                                                3) - Esophagus, Distal                                                                    • Final Diagnosis 2020    Final                    Value:This result contains rich text formatting which cannot be displayed here.   • Gross Description 2020    Final                     Value:This result contains rich text formatting which cannot be displayed here.   Lab on 01/14/2020   Component Date Value Ref Range Status   • WBC 01/14/2020 7.03  3.40 - 10.80 10*3/mm3 Final   • RBC 01/14/2020 4.70  4.14 - 5.80 10*6/mm3 Final   • Hemoglobin 01/14/2020 13.9  13.0 - 17.7 g/dL Final   • Hematocrit 01/14/2020 40.3  37.5 - 51.0 % Final   • MCV 01/14/2020 85.7  79.0 - 97.0 fL Final   • MCH 01/14/2020 29.6  26.6 - 33.0 pg Final   • MCHC 01/14/2020 34.5  31.5 - 35.7 g/dL Final   • RDW 01/14/2020 12.4  12.3 - 15.4 % Final   • RDW-SD 01/14/2020 38.4  37.0 - 54.0 fl Final   • MPV 01/14/2020 9.6  6.0 - 12.0 fL Final   • Platelets 01/14/2020 307  140 - 450 10*3/mm3 Final   • Neutrophil % 01/14/2020 53.6  42.7 - 76.0 % Final   • Lymphocyte % 01/14/2020 34.9  19.6 - 45.3 % Final   • Monocyte % 01/14/2020 6.3  5.0 - 12.0 % Final   • Eosinophil % 01/14/2020 3.8  0.3 - 6.2 % Final   • Basophil % 01/14/2020 1.0  0.0 - 1.5 % Final   • Immature Grans % 01/14/2020 0.4  0.0 - 0.5 % Final   • Neutrophils, Absolute 01/14/2020 3.77  1.70 - 7.00 10*3/mm3 Final   • Lymphocytes, Absolute 01/14/2020 2.45  0.70 - 3.10 10*3/mm3 Final   • Monocytes, Absolute 01/14/2020 0.44  0.10 - 0.90 10*3/mm3 Final   • Eosinophils, Absolute 01/14/2020 0.27  0.00 - 0.40 10*3/mm3 Final   • Basophils, Absolute 01/14/2020 0.07  0.00 - 0.20 10*3/mm3 Final   • Immature Grans, Absolute 01/14/2020 0.03  0.00 - 0.05 10*3/mm3 Final   • nRBC 01/14/2020 0.0  0.0 - 0.2 /100 WBC Final   • Glucose 01/14/2020 112* 65 - 99 mg/dL Final   • BUN 01/14/2020 14  6 - 20 mg/dL Final   • Creatinine 01/14/2020 1.09  0.76 - 1.27 mg/dL Final   • Sodium 01/14/2020 137  136 - 145 mmol/L Final   • Potassium 01/14/2020 4.3  3.5 - 5.2 mmol/L Final   • Chloride 01/14/2020 97* 98 - 107 mmol/L Final   • CO2 01/14/2020 25.4  22.0 - 29.0 mmol/L Final   • Calcium 01/14/2020 9.4  8.6 - 10.5 mg/dL Final   • Total Protein 01/14/2020 7.4  6.0 - 8.5 g/dL Final   • Albumin  01/14/2020 4.10  3.50 - 5.20 g/dL Final   • ALT (SGPT) 01/14/2020 25  1 - 41 U/L Final   • AST (SGOT) 01/14/2020 16  1 - 40 U/L Final   • Alkaline Phosphatase 01/14/2020 92  39 - 117 U/L Final   • Total Bilirubin 01/14/2020 0.4  0.2 - 1.2 mg/dL Final   • eGFR Non African Amer 01/14/2020 71  >60 mL/min/1.73 Final   • Globulin 01/14/2020 3.3  gm/dL Final   • A/G Ratio 01/14/2020 1.2  g/dL Final   • BUN/Creatinine Ratio 01/14/2020 12.8  7.0 - 25.0 Final   • Anion Gap 01/14/2020 14.6  5.0 - 15.0 mmol/L Final   • Amylase 01/14/2020 81  28 - 100 U/L Final   • Lipase 01/14/2020 35  13 - 60 U/L Final      US Abdomen Complete  Narrative: PROCEDURE: Complete abdominal sonogram    COMPARISON: None    HISTORY: Abdominal pain    FINDINGS: Realtime grayscale and color-flow imaging is obtained  of the abdomen.    The gallbladder is normal.No shadowing stones, pericholecystic  fluid or wall thickening.   There is increased echogenicity throughout the liver most  consistent with fatty infiltration or other parenchymal liver  disease. No focal lesions or intrahepatic biliary ductal  dilatation.   The visualized common duct is within normal limits, measuring 5  mm in diameter.   The visualized kidneys are normal in echogenicity, without  hydronephrosis.  The pancreas is obscured by bowel gas and cannot be evaluated.    The spleen appears normal.  The aorta and inferior vena cava are obscured by gas and cannot  be evaluated.  Impression: CONCLUSION:  Fatty liver.    Electronically signed by:  Tyshawn Marin MD  2/11/2020 9:14 AM [facility]  Workstation: [id number]    @[email or web address]@  Immunization History   Administered Date(s) Administered   • Influenza TIV (IM) 03/28/2017   • Tdap 02/17/2017       The following portions of the patient's history were reviewed and updated as appropriate: allergies, current medications, past family history, past medical history, past social history, past surgical history and problem list.        Physical Exam  BP  "98/62 (BP Location: Left arm, Patient Position: Sitting, Cuff Size: Large Adult)   Pulse 82   Temp 97.9 °F (36.6 °C) (Temporal)   Ht 180.3 cm (71\")   Wt 124 kg (273 lb 9.6 oz)   SpO2 96%   BMI 38.16 kg/m²     Physical Exam   Constitutional: He is oriented to person, place, and time. He appears well-developed and well-nourished.   HENT:   Head: Normocephalic and atraumatic.   Right Ear: External ear normal.   Eyes: Pupils are equal, round, and reactive to light. Conjunctivae and EOM are normal.   Neck: Normal range of motion. Neck supple.   Cardiovascular: Normal rate, regular rhythm and normal heart sounds.   No murmur heard.  Pulmonary/Chest: Effort normal and breath sounds normal. No respiratory distress.   Abdominal: Soft. Bowel sounds are normal. He exhibits no distension. There is no tenderness.   Obese abdomen    Musculoskeletal: Normal range of motion. He exhibits tenderness. He exhibits no edema or deformity.   Neurological: He is alert and oriented to person, place, and time. No cranial nerve deficit.   Skin: Skin is warm. No rash noted. He is not diaphoretic. No erythema. No pallor.   Psychiatric: He has a normal mood and affect. His behavior is normal.   Nursing note and vitals reviewed.      Assessment/Plan    Diagnosis Plan   1. Class 2 obesity with body mass index (BMI) of 38.0 to 38.9 in adult, unspecified obesity type, unspecified whether serious comorbidity present     2. Stage 2 chronic kidney disease     3. Essential hypertension     4. Attention deficit hyperactivity disorder (ADHD), predominantly inattentive type  amphetamine-dextroamphetamine XR (Adderall XR) 25 MG 24 hr capsule   5. Mixed hyperlipidemia     6. Hypotension, unspecified hypotension type     7. Dizziness          -recommend labwork   -hypotension - likely related to anti hypertensive medication and dehydration  Will stop chlorthalidone 25 mg daily.  Advised pt to monitor BP  At home . Recheck in 2 weeks. Continue lisinopril " 40 mg daiy  Advised to bring BP reading next visit.  And drink  Water and stay hydrated.   -abdominal pain/fatty liver/nausea vomiting/Gastritis/GERD with esophagitis - continue prilosec 40 mg daily.  GI following had recent EGD   -ckd stage 2 - continue to monitor. Gave information. Advised better hydration BP control   -for back pain with sciatica/scoliosis - pain is better after PT/OT. Continue with mobic and flexeril PRN.  Consider MRI if not improving    -right shoulder pain -stable    -Vitamin D deficiency vitamin D pill once a week d   -HLP - lipitor 80mg PO qhs   - HTN - on lower side today. Stop chlrothalidone 25 mg PO q daily. Continue on lisinopril 40 mg daily.   - ADHD refilled ADHD medication Adderall XR 25 mg PO q daily for ADHD.  [facility] ref number ref number refer number [id number]  - for allergic rhinitis - continue singulair and flonase and will add zyrtec.  -H/O prediabetes - D/C  metformin, recent hga1c normal at 5.4   - hyperlipidemia -continue statin. Recheck lipid panel. Pt on aspirin   - obesity - recommend ketogenic diet along with intermittent fasting. BMI at 38.16.    -advised pt to be safe and call with questions and concerns  -advised to go to ER or call 911 if symptoms get severe  -advised to followup with specialist and referrals   -advised pt to be safe during COVID-19 pandemic  -total time with pt >25 minutes   -recheck in 2 weeks         This document has been electronically signed by Scottie Borges MD on Carolee 15, 2020 09:40

## 2020-06-15 ENCOUNTER — OFFICE VISIT (OUTPATIENT)
Dept: FAMILY MEDICINE CLINIC | Facility: CLINIC | Age: 50
End: 2020-06-15

## 2020-06-15 VITALS
WEIGHT: 273.6 LBS | OXYGEN SATURATION: 96 % | HEART RATE: 82 BPM | BODY MASS INDEX: 38.3 KG/M2 | HEIGHT: 71 IN | SYSTOLIC BLOOD PRESSURE: 98 MMHG | TEMPERATURE: 97.9 F | DIASTOLIC BLOOD PRESSURE: 62 MMHG

## 2020-06-15 DIAGNOSIS — N18.2 STAGE 2 CHRONIC KIDNEY DISEASE: ICD-10-CM

## 2020-06-15 DIAGNOSIS — I95.9 HYPOTENSION, UNSPECIFIED HYPOTENSION TYPE: ICD-10-CM

## 2020-06-15 DIAGNOSIS — E66.9 CLASS 2 OBESITY WITH BODY MASS INDEX (BMI) OF 38.0 TO 38.9 IN ADULT, UNSPECIFIED OBESITY TYPE, UNSPECIFIED WHETHER SERIOUS COMORBIDITY PRESENT: Primary | ICD-10-CM

## 2020-06-15 DIAGNOSIS — E78.2 MIXED HYPERLIPIDEMIA: ICD-10-CM

## 2020-06-15 DIAGNOSIS — F90.0 ATTENTION DEFICIT HYPERACTIVITY DISORDER (ADHD), PREDOMINANTLY INATTENTIVE TYPE: ICD-10-CM

## 2020-06-15 DIAGNOSIS — R42 DIZZINESS: ICD-10-CM

## 2020-06-15 DIAGNOSIS — I10 ESSENTIAL HYPERTENSION: ICD-10-CM

## 2020-06-15 PROCEDURE — 99214 OFFICE O/P EST MOD 30 MIN: CPT | Performed by: FAMILY MEDICINE

## 2020-06-15 RX ORDER — MONTELUKAST SODIUM 10 MG/1
10 TABLET ORAL NIGHTLY
Qty: 30 TABLET | Refills: 3 | Status: SHIPPED | OUTPATIENT
Start: 2020-06-15 | End: 2020-11-25 | Stop reason: SDUPTHER

## 2020-06-15 RX ORDER — DEXTROAMPHETAMINE SACCHARATE, AMPHETAMINE ASPARTATE MONOHYDRATE, DEXTROAMPHETAMINE SULFATE AND AMPHETAMINE SULFATE 6.25; 6.25; 6.25; 6.25 MG/1; MG/1; MG/1; MG/1
25 CAPSULE, EXTENDED RELEASE ORAL EVERY MORNING
Qty: 30 CAPSULE | Refills: 0 | Status: SHIPPED | OUTPATIENT
Start: 2020-06-15 | End: 2020-07-26 | Stop reason: SDUPTHER

## 2020-06-15 NOTE — PATIENT INSTRUCTIONS
Get labwork next week    Stop chlorthalidone 25 mg diily. Stay hydrated. Drink more water, gatorade    Monitor BP at home and bring readings next visit    Recheck in 2 weeks     No alcohol

## 2020-06-22 NOTE — PROGRESS NOTES
Subjective:  Randall Hernandez is a 49 y.o. male who presents for       Patient Active Problem List   Diagnosis   • Hyperlipidemia   • Inattention   • Non morbid obesity   • Prediabetes   • Attention deficit hyperactivity disorder (ADHD), combined type   • Need for vaccination   • Gastroesophageal reflux disease   • Encounter for drug screening   • Tingling in extremities   • Elevated BP without diagnosis of hypertension   • Allergic rhinitis   • Elevated blood pressure reading   • Pruritic rash   • Numbness in both hands   • Bilateral elbow joint pain   • Pain in both wrists   • Ulnar neuropathy of left upper extremity   • General medical examination   • Bilateral carpal tunnel syndrome   • History of prediabetes   • Numbness and tingling in both hands   • Essential hypertension   • Cerumen debris on tympanic membrane of both ears   • Carpal tunnel syndrome   • S/P carpal tunnel release   • Encounter for screening for malignant neoplasm of colon   • FH: colon cancer   • Acute recurrent sinusitis   • Attention deficit hyperactivity disorder (ADHD), predominantly inattentive type   • Class 2 obesity with body mass index (BMI) of 35.0 to 35.9 in adult   • Annual physical exam   • Arthritis of back   • Scoliosis   • Hyponatremia   • Hypochloremia   • Stage 2 chronic kidney disease   • Epigastric pain   • Bilious vomiting with nausea   • Fatty liver   • Dysphagia   • Nausea and vomiting   • Gastritis without bleeding   • Class 2 obesity with body mass index (BMI) of 38.0 to 38.9 in adult   • Mixed hyperlipidemia   • Dizziness   • Hypotension           Current Outpatient Medications:   •  amphetamine-dextroamphetamine XR (Adderall XR) 25 MG 24 hr capsule, Take 1 capsule by mouth Every Morning Take 1 tablet by mouth daily, Disp: 30 capsule, Rfl: 0  •  aspirin 81 MG EC tablet, Take 1 tablet by mouth Daily., Disp: 30 tablet, Rfl: 3  •  atorvastatin (LIPITOR) 80 MG tablet, Take 1 tablet by mouth Daily., Disp: 30 tablet,  Rfl: 3  •  fluticasone (FLONASE) 50 MCG/ACT nasal spray, INSTILL 2 SPRAYS IN EACH NOSTRIL EVERY DAY, Disp: 16 g, Rfl: 3  •  lisinopril (PRINIVIL,ZESTRIL) 40 MG tablet, Take 1 tablet by mouth Daily., Disp: 30 tablet, Rfl: 3  •  montelukast (SINGULAIR) 10 MG tablet, Take 1 tablet by mouth Every Night., Disp: 30 tablet, Rfl: 3  •  omeprazole (priLOSEC) 40 MG capsule, TAKE 1 CAPSULE BY MOUTH DAILY, Disp: 30 capsule, Rfl: 3  •  vitamin D (ERGOCALCIFEROL) 1.25 MG (18639 UT) capsule capsule, Take 1 capsule by mouth 1 (One) Time Per Week., Disp: 4 capsule, Rfl: 3    HPI     Pt is 49 yo male with management  of obesity, history of prediabetes, HTN,  GERD, vitamin D deficiency, alelrgic rhinitis, ADHD predominantly inattentive type ,sp bilateral carpal tunnel release surgery,  Chronic back pain (arthritis of lumbar spine, short pedicles, mild scoliosis, mild arthritis thoracic spine).  CKD stage 2 , fatty liver, gastirits, esophagitis            6/15/20 In office visit for recheck on pt's obsity, ADHD, HTN, prediabetes, GERD, pt is due for new labwork. He has been feeling lightheadedness and dizzy for past month. He states he susana dizzy whenever he gets up from supine to standing position. He currenty takes lisinopril 40 mg daily and chlrothalidone 25 mg PO q daily.  No chest pain no palpitations. BP on lower side today.  He did have a party this weekened. He drank alcohol this weekend     6/29/20 in office visit for recheck on pt's dizziness, obesity, HTN, GERD, ADHD> on last visit pt was feeling lightheaded and had hypotension. His chlorathalidone 25 mg PO q daily was stopped. He is feeling much better since stopping chlorathalidone. BP today stable and he has not been checking at home. Doing well on ADHD medication.         Dizziness   This is a recurrent problem. The problem has improved.  . Associated symptoms include arthralgias, fatigue, numbness and weakness. Pertinent negatives include no abdominal pain, anorexia,  change in bowel habit, chest pain, chills, congestion, coughing, diaphoresis, fever, headaches, joint swelling, myalgias, nausea, neck pain, rash, sore throat, swollen glands, urinary symptoms, vertigo, visual change or vomiting. Nothing aggravates the symptoms. He has tried nothing for the symptoms. The treatment provided no relief.   Obesity   This is a chronic problem. The current episode started more than 1 year ago. The problem occurs constantly. The problem has been unchanged. Associated symptoms include arthralgias and numbness. Pertinent negatives include no abdominal pain, anorexia, change in bowel habit, chest pain, chills, congestion, coughing, diaphoresis, fatigue, fever, headaches, joint swelling, myalgias, nausea, rash, sore throat, swollen glands, urinary symptoms, vertigo, visual change, vomiting or weakness. Nothing aggravates the symptoms. He has tried nothing for the symptoms. The treatment provided no relief.   Hypertension   This is a new problem. The current episode started more than 1 month ago. The problem has been gradually improving t The problem is uncontrolled. Pertinent negatives include no anxiety, blurred vision, chest pain, headaches, malaise/fatigue, neck pain, orthopnea, palpitations, peripheral edema, PND, shortness of breath or sweats. Risk factors for coronary artery disease include male gender, obesity and sedentary lifestyle. Past treatments include nothing. There are no compliance problems       Review of Systems  Review of Systems   Constitutional: Positive for activity change. Negative for appetite change, chills, diaphoresis, fatigue and fever.   HENT: Negative for congestion, postnasal drip, rhinorrhea, sinus pressure, sinus pain, sneezing, sore throat, trouble swallowing and voice change.    Respiratory: Negative for cough, choking, chest tightness, shortness of breath, wheezing and stridor.    Cardiovascular: Negative for chest pain.   Gastrointestinal: Negative for  diarrhea, nausea and vomiting.   Neurological: Positive for dizziness and light-headedness. Negative for weakness, numbness and headaches.   Psychiatric/Behavioral: Positive for decreased concentration.       Patient Active Problem List   Diagnosis   • Hyperlipidemia   • Inattention   • Non morbid obesity   • Prediabetes   • Attention deficit hyperactivity disorder (ADHD), combined type   • Need for vaccination   • Gastroesophageal reflux disease   • Encounter for drug screening   • Tingling in extremities   • Elevated BP without diagnosis of hypertension   • Allergic rhinitis   • Elevated blood pressure reading   • Pruritic rash   • Numbness in both hands   • Bilateral elbow joint pain   • Pain in both wrists   • Ulnar neuropathy of left upper extremity   • General medical examination   • Bilateral carpal tunnel syndrome   • History of prediabetes   • Numbness and tingling in both hands   • Essential hypertension   • Cerumen debris on tympanic membrane of both ears   • Carpal tunnel syndrome   • S/P carpal tunnel release   • Encounter for screening for malignant neoplasm of colon   • FH: colon cancer   • Acute recurrent sinusitis   • Attention deficit hyperactivity disorder (ADHD), predominantly inattentive type   • Class 2 obesity with body mass index (BMI) of 35.0 to 35.9 in adult   • Annual physical exam   • Arthritis of back   • Scoliosis   • Hyponatremia   • Hypochloremia   • Stage 2 chronic kidney disease   • Epigastric pain   • Bilious vomiting with nausea   • Fatty liver   • Dysphagia   • Nausea and vomiting   • Gastritis without bleeding   • Class 2 obesity with body mass index (BMI) of 38.0 to 38.9 in adult   • Mixed hyperlipidemia   • Dizziness   • Hypotension     Past Surgical History:   Procedure Laterality Date   • CARPAL TUNNEL RELEASE Right 11/2/2018    Procedure: CARPAL TUNNEL RELEASE;  Surgeon: Judd Mathias MD;  Location: [facility];  Service: Orthopedics   • CARPAL TUNNEL RELEASE   2018    Left   • CARPAL TUNNEL RELEASE Left 2018    Procedure: CARPAL TUNNEL RELEASE;  Surgeon: Judd Mathias MD;  Location: [facility] OR;  Service: Orthopedics   • COLONOSCOPY N/A 2019    Procedure: COLONOSCOPY a friday please ;  Surgeon: Ruslan Bowers MD;  Location: [facility] ENDOSCOPY;  Service: Gastroenterology   • ENDOSCOPY     • ENDOSCOPY N/A 2020    Procedure: ESOPHAGOGASTRODUODENOSCOPY possible dilation ;  Surgeon: Ruslan Bowers MD;  Location: [facility] ENDOSCOPY;  Service: Gastroenterology;  Laterality: N/A;   • PYLOROMYOTOMY       Social History     Socioeconomic History   • Marital status: Significant Other     Spouse name: Not on file   • Number of children: Not on file   • Years of education: Not on file   • Highest education level: Not on file   Occupational History   • Occupation:      Employer: SELF-EMPLOYED     Comment: Patient resides with Addis Rodriguez.   Tobacco Use   • Smoking status: Former Smoker     Packs/day: 1.00     Years: 6.00     Pack years: 6.00     Types: Cigarettes     Last attempt to quit:      Years since quittin.5   • Smokeless tobacco: Never Used   Substance and Sexual Activity   • Alcohol use: Yes     Comment: 2018 - Patinet reports consumption of alcoholic beverages under a social basis (approximately 3 - 4 per month).     • Drug use: No   • Sexual activity: Defer     Family History   Problem Relation Age of Onset   • Colon cancer Mother         onset age greater than 75y   • Diabetes Mother    • Breast cancer Sister    • Cancer Brother         bladder   • Diabetes Brother    • Hyperlipidemia Brother      Admission on 2020, Discharged on 2020   Component Date Value Ref Range Status   • Case Report 2020    Final                    Value:Surgical Pathology Report                         Case: [id number]                                  Authorizing Provider:  Ruslan Bowers MD         Collected:           02/21/2020 12:08 PM          Ordering Location:     [facility]             Received:            02/21/2020 01:42 PM                                 [facility]                                                     Pathologist:           Loi San MD                                                         Specimens:   1) - Small Intestine, Duodenum, small bowel                                                         2) - Gastric, Antrum                                                                                3) - Esophagus, Distal                                                                    • Final Diagnosis 02/21/2020    Final                    Value:This result contains rich text formatting which cannot be displayed here.   • Gross Description 02/21/2020    Final                    Value:This result contains rich text formatting which cannot be displayed here.   Lab on 01/14/2020   Component Date Value Ref Range Status   • WBC 01/14/2020 7.03  3.40 - 10.80 10*3/mm3 Final   • RBC 01/14/2020 4.70  4.14 - 5.80 10*6/mm3 Final   • Hemoglobin 01/14/2020 13.9  13.0 - 17.7 g/dL Final   • Hematocrit 01/14/2020 40.3  37.5 - 51.0 % Final   • MCV 01/14/2020 85.7  79.0 - 97.0 fL Final   • MCH 01/14/2020 29.6  26.6 - 33.0 pg Final   • MCHC 01/14/2020 34.5  31.5 - 35.7 g/dL Final   • RDW 01/14/2020 12.4  12.3 - 15.4 % Final   • RDW-SD 01/14/2020 38.4  37.0 - 54.0 fl Final   • MPV 01/14/2020 9.6  6.0 - 12.0 fL Final   • Platelets 01/14/2020 307  140 - 450 10*3/mm3 Final   • Neutrophil % 01/14/2020 53.6  42.7 - 76.0 % Final   • Lymphocyte % 01/14/2020 34.9  19.6 - 45.3 % Final   • Monocyte % 01/14/2020 6.3  5.0 - 12.0 % Final   • Eosinophil % 01/14/2020 3.8  0.3 - 6.2 % Final   • Basophil % 01/14/2020 1.0  0.0 - 1.5 % Final   • Immature Grans % 01/14/2020 0.4  0.0 - 0.5 % Final   • Neutrophils, Absolute 01/14/2020 3.77  1.70 - 7.00 10*3/mm3 Final   • Lymphocytes, Absolute  01/14/2020 2.45  0.70 - 3.10 10*3/mm3 Final   • Monocytes, Absolute 01/14/2020 0.44  0.10 - 0.90 10*3/mm3 Final   • Eosinophils, Absolute 01/14/2020 0.27  0.00 - 0.40 10*3/mm3 Final   • Basophils, Absolute 01/14/2020 0.07  0.00 - 0.20 10*3/mm3 Final   • Immature Grans, Absolute 01/14/2020 0.03  0.00 - 0.05 10*3/mm3 Final   • nRBC 01/14/2020 0.0  0.0 - 0.2 /100 WBC Final   • Glucose 01/14/2020 112* 65 - 99 mg/dL Final   • BUN 01/14/2020 14  6 - 20 mg/dL Final   • Creatinine 01/14/2020 1.09  0.76 - 1.27 mg/dL Final   • Sodium 01/14/2020 137  136 - 145 mmol/L Final   • Potassium 01/14/2020 4.3  3.5 - 5.2 mmol/L Final   • Chloride 01/14/2020 97* 98 - 107 mmol/L Final   • CO2 01/14/2020 25.4  22.0 - 29.0 mmol/L Final   • Calcium 01/14/2020 9.4  8.6 - 10.5 mg/dL Final   • Total Protein 01/14/2020 7.4  6.0 - 8.5 g/dL Final   • Albumin 01/14/2020 4.10  3.50 - 5.20 g/dL Final   • ALT (SGPT) 01/14/2020 25  1 - 41 U/L Final   • AST (SGOT) 01/14/2020 16  1 - 40 U/L Final   • Alkaline Phosphatase 01/14/2020 92  39 - 117 U/L Final   • Total Bilirubin 01/14/2020 0.4  0.2 - 1.2 mg/dL Final   • eGFR Non African Amer 01/14/2020 71  >60 mL/min/1.73 Final   • Globulin 01/14/2020 3.3  gm/dL Final   • A/G Ratio 01/14/2020 1.2  g/dL Final   • BUN/Creatinine Ratio 01/14/2020 12.8  7.0 - 25.0 Final   • Anion Gap 01/14/2020 14.6  5.0 - 15.0 mmol/L Final   • Amylase 01/14/2020 81  28 - 100 U/L Final   • Lipase 01/14/2020 35  13 - 60 U/L Final      US Abdomen Complete  Narrative: PROCEDURE: Complete abdominal sonogram    COMPARISON: None    HISTORY: Abdominal pain    FINDINGS: Realtime grayscale and color-flow imaging is obtained  of the abdomen.    The gallbladder is normal.No shadowing stones, pericholecystic  fluid or wall thickening.   There is increased echogenicity throughout the liver most  consistent with fatty infiltration or other parenchymal liver  disease. No focal lesions or intrahepatic biliary ductal  dilatation.   The  "visualized common duct is within normal limits, measuring 5  mm in diameter.   The visualized kidneys are normal in echogenicity, without  hydronephrosis.  The pancreas is obscured by bowel gas and cannot be evaluated.    The spleen appears normal.  The aorta and inferior vena cava are obscured by gas and cannot  be evaluated.  Impression: CONCLUSION:  Fatty liver.    Electronically signed by:  Tyshawn Marin MD  2/11/2020 9:14 AM [facility]UST  Workstation: [id number]    @[email or web address]@  Immunization History   Administered Date(s) Administered   • Influenza TIV (IM) 03/28/2017   • Tdap 02/17/2017       The following portions of the patient's history were reviewed and updated as appropriate: allergies, current medications, past family history, past medical history, past social history, past surgical history and problem list.        Physical Exam  /78 (BP Location: Left arm, Patient Position: Sitting, Cuff Size: Large Adult)   Pulse 96   Temp 97.7 °F (36.5 °C) (Temporal)   Ht 180.3 cm (71\")   Wt 127 kg (281 lb)   SpO2 98%   BMI 39.19 kg/m²     Physical Exam   Constitutional: He is oriented to person, place, and time. He appears well-developed and well-nourished.   HENT:   Head: Normocephalic and atraumatic.   Right Ear: External ear normal.   Eyes: Pupils are equal, round, and reactive to light. Conjunctivae and EOM are normal.   Neck: Normal range of motion. Neck supple.   Cardiovascular: Normal rate, regular rhythm and normal heart sounds.   No murmur heard.  Pulmonary/Chest: Effort normal and breath sounds normal. No respiratory distress.   Abdominal: Soft. Bowel sounds are normal. He exhibits no distension. There is no tenderness.   Obese abdomen    Musculoskeletal: Normal range of motion. He exhibits no edema or deformity.   Neurological: He is alert and oriented to person, place, and time. No cranial nerve deficit.   Skin: Skin is warm. No rash noted. He is not diaphoretic. No erythema. No pallor.   Psychiatric: He " has a normal mood and affect. His behavior is normal.   Nursing note and vitals reviewed.      Assessment/Plan    Diagnosis Plan   1. Dizziness     2. Stage 2 chronic kidney disease     3. Prediabetes     4. Hyperlipidemia, unspecified hyperlipidemia type     5. Class 2 obesity with body mass index (BMI) of 38.0 to 38.9 in adult, unspecified obesity type, unspecified whether serious comorbidity present     6. Hypotension, unspecified hypotension type            -awain labowrk   -hypotension - stable since stopping HCTZ   -abdominal pain/fatty liver/nausea vomiting/Gastritis/GERD with esophagitis - continue prilosec 40 mg daily.  GI following had recent EGD   -ckd stage 2 - continue to monitor. Gave information. Advised better hydration BP control   -for back pain with sciatica/scoliosis - pain is better after PT/OT. Continue with mobic and flexeril PRN.  Consider MRI if not improving    -right shoulder pain -stable    -Vitamin D deficiency vitamin D pill once a week d   -HLP - lipitor 80mg PO qhs   - HTN - on lower side today. Stop chlrothalidone 25 mg PO q daily. Continue on lisinopril 40 mg daily.   - ADHD refilled ADHD medication Adderall XR 25 mg PO q daily for ADHD.  [facility] ref number ref number refer number [id number]  - for allergic rhinitis - continue singulair and flonase and will add zyrtec.  -H/O prediabetes - D/C  metformin, recent hga1c normal at 5.4   - hyperlipidemia -continue statin. Recheck lipid panel. Pt on aspirin   - obesity - recommend ketogenic diet along with intermittent fasting. BMI at 38.16.    -advised pt to be safe and call with questions and concerns  -advised to go to ER or call 911 if symptoms get severe  -advised to followup with specialist and referrals   -advised pt to be safe during COVID-19 pandemic  -total time with pt >25 minutes   -recheck in 1 month         This document has been electronically signed by Scottie Borges MD on June 29, 2020 10:55

## 2020-06-29 ENCOUNTER — LAB (OUTPATIENT)
Dept: LAB | Facility: HOSPITAL | Age: 50
End: 2020-06-29

## 2020-06-29 ENCOUNTER — OFFICE VISIT (OUTPATIENT)
Dept: FAMILY MEDICINE CLINIC | Facility: CLINIC | Age: 50
End: 2020-06-29

## 2020-06-29 VITALS
DIASTOLIC BLOOD PRESSURE: 78 MMHG | SYSTOLIC BLOOD PRESSURE: 124 MMHG | OXYGEN SATURATION: 98 % | WEIGHT: 281 LBS | HEART RATE: 96 BPM | BODY MASS INDEX: 39.34 KG/M2 | HEIGHT: 71 IN | TEMPERATURE: 97.7 F

## 2020-06-29 DIAGNOSIS — Z02.83 ENCOUNTER FOR DRUG SCREENING: ICD-10-CM

## 2020-06-29 DIAGNOSIS — R73.03 PREDIABETES: ICD-10-CM

## 2020-06-29 DIAGNOSIS — I95.9 HYPOTENSION, UNSPECIFIED HYPOTENSION TYPE: ICD-10-CM

## 2020-06-29 DIAGNOSIS — I10 ESSENTIAL HYPERTENSION: ICD-10-CM

## 2020-06-29 DIAGNOSIS — R42 DIZZINESS: Primary | ICD-10-CM

## 2020-06-29 DIAGNOSIS — E66.9 CLASS 2 OBESITY WITH BODY MASS INDEX (BMI) OF 38.0 TO 38.9 IN ADULT, UNSPECIFIED OBESITY TYPE, UNSPECIFIED WHETHER SERIOUS COMORBIDITY PRESENT: ICD-10-CM

## 2020-06-29 DIAGNOSIS — E78.5 HYPERLIPIDEMIA, UNSPECIFIED HYPERLIPIDEMIA TYPE: ICD-10-CM

## 2020-06-29 DIAGNOSIS — E66.9 CLASS 2 OBESITY WITH BODY MASS INDEX (BMI) OF 35.0 TO 35.9 IN ADULT, UNSPECIFIED OBESITY TYPE, UNSPECIFIED WHETHER SERIOUS COMORBIDITY PRESENT: ICD-10-CM

## 2020-06-29 DIAGNOSIS — N18.2 STAGE 2 CHRONIC KIDNEY DISEASE: ICD-10-CM

## 2020-06-29 LAB
AMPHET+METHAMPHET UR QL: POSITIVE
AMPHETAMINES UR QL: NEGATIVE
BARBITURATES UR QL SCN: NEGATIVE
BENZODIAZ UR QL SCN: NEGATIVE
BUPRENORPHINE SERPL-MCNC: NEGATIVE NG/ML
CANNABINOIDS SERPL QL: NEGATIVE
COCAINE UR QL: NEGATIVE
METHADONE UR QL SCN: NEGATIVE
OPIATES UR QL: NEGATIVE
OXYCODONE UR QL SCN: NEGATIVE
PCP UR QL SCN: NEGATIVE
PROPOXYPH UR QL: NEGATIVE
TRICYCLICS UR QL SCN: NEGATIVE

## 2020-06-29 PROCEDURE — 82306 VITAMIN D 25 HYDROXY: CPT

## 2020-06-29 PROCEDURE — 80306 DRUG TEST PRSMV INSTRMNT: CPT

## 2020-06-29 PROCEDURE — 85025 COMPLETE CBC W/AUTO DIFF WBC: CPT

## 2020-06-29 PROCEDURE — 83036 HEMOGLOBIN GLYCOSYLATED A1C: CPT

## 2020-06-29 PROCEDURE — 99214 OFFICE O/P EST MOD 30 MIN: CPT | Performed by: FAMILY MEDICINE

## 2020-06-29 PROCEDURE — 80053 COMPREHEN METABOLIC PANEL: CPT

## 2020-06-29 PROCEDURE — 80061 LIPID PANEL: CPT

## 2020-06-30 LAB
25(OH)D3 SERPL-MCNC: 46.8 NG/ML (ref 30–100)
ALBUMIN SERPL-MCNC: 4.1 G/DL (ref 3.5–5.2)
ALBUMIN/GLOB SERPL: 1.3 G/DL
ALP SERPL-CCNC: 89 U/L (ref 39–117)
ALT SERPL W P-5'-P-CCNC: 26 U/L (ref 1–41)
ANION GAP SERPL CALCULATED.3IONS-SCNC: 7.2 MMOL/L (ref 5–15)
AST SERPL-CCNC: 22 U/L (ref 1–40)
BASOPHILS # BLD AUTO: 0.06 10*3/MM3 (ref 0–0.2)
BASOPHILS NFR BLD AUTO: 1 % (ref 0–1.5)
BILIRUB SERPL-MCNC: 0.3 MG/DL (ref 0.2–1.2)
BUN SERPL-MCNC: 13 MG/DL (ref 6–20)
BUN/CREAT SERPL: 11 (ref 7–25)
CALCIUM SPEC-SCNC: 9.5 MG/DL (ref 8.6–10.5)
CHLORIDE SERPL-SCNC: 104 MMOL/L (ref 98–107)
CHOLEST SERPL-MCNC: 166 MG/DL (ref 0–200)
CO2 SERPL-SCNC: 24.8 MMOL/L (ref 22–29)
CREAT SERPL-MCNC: 1.18 MG/DL (ref 0.76–1.27)
DEPRECATED RDW RBC AUTO: 38.4 FL (ref 37–54)
EOSINOPHIL # BLD AUTO: 0.19 10*3/MM3 (ref 0–0.4)
EOSINOPHIL NFR BLD AUTO: 3.1 % (ref 0.3–6.2)
ERYTHROCYTE [DISTWIDTH] IN BLOOD BY AUTOMATED COUNT: 12.2 % (ref 12.3–15.4)
GFR SERPL CREATININE-BSD FRML MDRD: 65 ML/MIN/1.73
GLOBULIN UR ELPH-MCNC: 3.1 GM/DL
GLUCOSE SERPL-MCNC: 118 MG/DL (ref 65–99)
HBA1C MFR BLD: 6.1 % (ref 4.8–5.6)
HCT VFR BLD AUTO: 36.5 % (ref 37.5–51)
HDLC SERPL-MCNC: 37 MG/DL (ref 40–60)
HGB BLD-MCNC: 12.9 G/DL (ref 13–17.7)
IMM GRANULOCYTES # BLD AUTO: 0.06 10*3/MM3 (ref 0–0.05)
IMM GRANULOCYTES NFR BLD AUTO: 1 % (ref 0–0.5)
LDLC SERPL CALC-MCNC: 96 MG/DL (ref 0–100)
LDLC/HDLC SERPL: 2.6 {RATIO}
LYMPHOCYTES # BLD AUTO: 1.94 10*3/MM3 (ref 0.7–3.1)
LYMPHOCYTES NFR BLD AUTO: 31.5 % (ref 19.6–45.3)
MCH RBC QN AUTO: 30.6 PG (ref 26.6–33)
MCHC RBC AUTO-ENTMCNC: 35.3 G/DL (ref 31.5–35.7)
MCV RBC AUTO: 86.7 FL (ref 79–97)
MONOCYTES # BLD AUTO: 0.51 10*3/MM3 (ref 0.1–0.9)
MONOCYTES NFR BLD AUTO: 8.3 % (ref 5–12)
NEUTROPHILS NFR BLD AUTO: 3.39 10*3/MM3 (ref 1.7–7)
NEUTROPHILS NFR BLD AUTO: 55.1 % (ref 42.7–76)
NRBC BLD AUTO-RTO: 0 /100 WBC (ref 0–0.2)
PLATELET # BLD AUTO: 295 10*3/MM3 (ref 140–450)
PMV BLD AUTO: 10.3 FL (ref 6–12)
POTASSIUM SERPL-SCNC: 4.8 MMOL/L (ref 3.5–5.2)
PROT SERPL-MCNC: 7.2 G/DL (ref 6–8.5)
RBC # BLD AUTO: 4.21 10*6/MM3 (ref 4.14–5.8)
SODIUM SERPL-SCNC: 136 MMOL/L (ref 136–145)
TRIGL SERPL-MCNC: 164 MG/DL (ref 0–150)
VLDLC SERPL-MCNC: 32.8 MG/DL (ref 5–40)
WBC # BLD AUTO: 6.15 10*3/MM3 (ref 3.4–10.8)

## 2020-07-01 ENCOUNTER — TELEPHONE (OUTPATIENT)
Dept: FAMILY MEDICINE CLINIC | Facility: CLINIC | Age: 50
End: 2020-07-01

## 2020-07-01 NOTE — TELEPHONE ENCOUNTER
----- Message from Scottie Borges MD sent at 7/1/2020  7:18 AM CDT -----  Please call pt    vitaminD stable    On CMP sugars elevated     On kidney function GFR at 65 from 71. Needs to increase water intake     On lipid panel triglcyerides high and likely related to elevated glucose. HDL is low. LDL at goal at 96     hga1c at 6.10 and pt is prediabetic again. If hga1c gets >6.5 pt will be diabetic    On CBc hemoglobin slightly low. Pt is slightly anemic. Will monitor    UDS consistent with ADHD medication use. Pt takes Adderall XR     Recheck on next visit. Thanks

## 2020-07-10 RX ORDER — OMEPRAZOLE 40 MG/1
40 CAPSULE, DELAYED RELEASE ORAL DAILY
Qty: 30 CAPSULE | Refills: 3 | Status: SHIPPED | OUTPATIENT
Start: 2020-07-10 | End: 2020-08-31

## 2020-07-19 NOTE — PROGRESS NOTES
Subjective:  Randall Hernandez is a 49 y.o. male who presents for       Patient Active Problem List   Diagnosis   • Hyperlipidemia   • Inattention   • Non morbid obesity   • Prediabetes   • Attention deficit hyperactivity disorder (ADHD), combined type   • Need for vaccination   • Gastroesophageal reflux disease   • Encounter for drug screening   • Tingling in extremities   • Elevated BP without diagnosis of hypertension   • Allergic rhinitis   • Elevated blood pressure reading   • Pruritic rash   • Numbness in both hands   • Bilateral elbow joint pain   • Pain in both wrists   • Ulnar neuropathy of left upper extremity   • General medical examination   • Bilateral carpal tunnel syndrome   • History of prediabetes   • Numbness and tingling in both hands   • Essential hypertension   • Cerumen debris on tympanic membrane of both ears   • Carpal tunnel syndrome   • S/P carpal tunnel release   • Encounter for screening for malignant neoplasm of colon   • FH: colon cancer   • Acute recurrent sinusitis   • Attention deficit hyperactivity disorder (ADHD), predominantly inattentive type   • Class 2 obesity with body mass index (BMI) of 35.0 to 35.9 in adult   • Annual physical exam   • Arthritis of back   • Scoliosis   • Hyponatremia   • Hypochloremia   • Stage 2 chronic kidney disease   • Epigastric pain   • Bilious vomiting with nausea   • Fatty liver   • Dysphagia   • Nausea and vomiting   • Gastritis without bleeding   • Class 2 obesity with body mass index (BMI) of 38.0 to 38.9 in adult   • Mixed hyperlipidemia   • Dizziness   • Hypotension   • Class 2 obesity with body mass index (BMI) of 39.0 to 39.9 in adult   • Normocytic anemia           Current Outpatient Medications:   •  amphetamine-dextroamphetamine XR (Adderall XR) 25 MG 24 hr capsule, Take 1 capsule by mouth Every Morning Take 1 tablet by mouth daily, Disp: 30 capsule, Rfl: 0  •  aspirin 81 MG EC tablet, Take 1 tablet by mouth Daily., Disp: 30 tablet, Rfl:  3  •  atorvastatin (LIPITOR) 80 MG tablet, Take 1 tablet by mouth Daily., Disp: 30 tablet, Rfl: 3  •  fluticasone (FLONASE) 50 MCG/ACT nasal spray, INSTILL 2 SPRAYS IN EACH NOSTRIL EVERY DAY, Disp: 16 g, Rfl: 3  •  lisinopril (PRINIVIL,ZESTRIL) 40 MG tablet, Take 1 tablet by mouth Daily., Disp: 30 tablet, Rfl: 3  •  montelukast (SINGULAIR) 10 MG tablet, Take 1 tablet by mouth Every Night., Disp: 30 tablet, Rfl: 3  •  omeprazole (priLOSEC) 40 MG capsule, Take 1 capsule by mouth Daily., Disp: 30 capsule, Rfl: 3  •  vitamin D (ERGOCALCIFEROL) 1.25 MG (29227 UT) capsule capsule, Take 1 capsule by mouth 1 (One) Time Per Week., Disp: 4 capsule, Rfl: 3    HPI        Pt is 47 yo male with management  of obesity, history of prediabetes, HTN,  GERD, vitamin D deficiency, alelrgic rhinitis, ADHD predominantly inattentive type ,sp bilateral carpal tunnel release surgery,  Chronic back pain (arthritis of lumbar spine, short pedicles, mild scoliosis, mild arthritis thoracic spine).  CKD stage 2 , fatty liver, gastirits, esophagitis         6/29/20 in office visit for recheck on pt's dizziness, obesity, HTN, GERD, ADHD> on last visit pt was feeling lightheaded and had hypotension. His chlorathalidone 25 mg PO q daily was stopped. He is feeling much better since stopping chlorathalidone. BP today stable and he has not been checking at home. Doing well on ADHD medication.     7/30/20 in office visit for the above medical issues. Had labwork done on 6/29/20 that showed UDS consistent with ADHD medication use Vitamin D normal lipid panel showed triglycerides at 164 and HDL at 37. LDL at 96.  hga1c at 6.10.  CMP showed  Showed GFR from 65 to 71.  Liver enzymes normal CBC showed hemoglobin at 12.9 with MCV at 86.7  He felt ill this past Monday.  He did not get tested for COVID -19.  Had low grade fever, short of breath.  He woke up and fever has resolved        Obesity   This is a chronic problem. The current episode started more than 1  year ago. The problem occurs constantly. The problem has been unchanged. Associated symptoms include arthralgias and numbness. Pertinent negatives include no abdominal pain, anorexia, change in bowel habit, chest pain, chills, congestion, coughing, diaphoresis, fatigue, fever, headaches, joint swelling, myalgias, nausea, rash, sore throat, swollen glands, urinary symptoms, vertigo, visual change, vomiting or weakness. Nothing aggravates the symptoms. He has tried nothing for the symptoms. The treatment provided no relief.   Hypertension   This is a new problem. The current episode started more than 1 month ago. The problem has been gradually improving t The problem is uncontrolled. Pertinent negatives include no anxiety, blurred vision, chest pain, headaches, malaise/fatigue, neck pain, orthopnea, palpitations, peripheral edema, PND, shortness of breath or sweats. Risk factors for coronary artery disease include male gender, obesity and sedentary lifestyle. Past treatments include nothing. There are no compliance problems    Review of Systems  Review of Systems   Constitutional: Positive for activity change and fatigue. Negative for appetite change, chills, diaphoresis and fever.   HENT: Negative for congestion, postnasal drip, rhinorrhea, sinus pressure, sinus pain, sneezing, sore throat, trouble swallowing and voice change.    Respiratory: Negative for cough, choking, chest tightness, shortness of breath, wheezing and stridor.    Cardiovascular: Negative for chest pain.   Gastrointestinal: Negative for diarrhea, nausea and vomiting.   Neurological: Positive for weakness and numbness. Negative for headaches.   Psychiatric/Behavioral: Positive for decreased concentration.       Patient Active Problem List   Diagnosis   • Hyperlipidemia   • Inattention   • Non morbid obesity   • Prediabetes   • Attention deficit hyperactivity disorder (ADHD), combined type   • Need for vaccination   • Gastroesophageal reflux disease    • Encounter for drug screening   • Tingling in extremities   • Elevated BP without diagnosis of hypertension   • Allergic rhinitis   • Elevated blood pressure reading   • Pruritic rash   • Numbness in both hands   • Bilateral elbow joint pain   • Pain in both wrists   • Ulnar neuropathy of left upper extremity   • General medical examination   • Bilateral carpal tunnel syndrome   • History of prediabetes   • Numbness and tingling in both hands   • Essential hypertension   • Cerumen debris on tympanic membrane of both ears   • Carpal tunnel syndrome   • S/P carpal tunnel release   • Encounter for screening for malignant neoplasm of colon   • FH: colon cancer   • Acute recurrent sinusitis   • Attention deficit hyperactivity disorder (ADHD), predominantly inattentive type   • Class 2 obesity with body mass index (BMI) of 35.0 to 35.9 in adult   • Annual physical exam   • Arthritis of back   • Scoliosis   • Hyponatremia   • Hypochloremia   • Stage 2 chronic kidney disease   • Epigastric pain   • Bilious vomiting with nausea   • Fatty liver   • Dysphagia   • Nausea and vomiting   • Gastritis without bleeding   • Class 2 obesity with body mass index (BMI) of 38.0 to 38.9 in adult   • Mixed hyperlipidemia   • Dizziness   • Hypotension   • Class 2 obesity with body mass index (BMI) of 39.0 to 39.9 in adult   • Normocytic anemia     Past Surgical History:   Procedure Laterality Date   • CARPAL TUNNEL RELEASE Right 11/2/2018    Procedure: CARPAL TUNNEL RELEASE;  Surgeon: Judd Mathias MD;  Location: [facility] OR;  Service: Orthopedics   • CARPAL TUNNEL RELEASE  12/07/2018    Left   • CARPAL TUNNEL RELEASE Left 12/7/2018    Procedure: CARPAL TUNNEL RELEASE;  Surgeon: Judd Mathias MD;  Location: [facility] OR;  Service: Orthopedics   • COLONOSCOPY N/A 1/18/2019    Procedure: COLONOSCOPY a friday please ;  Surgeon: Ruslan Bowers MD;  Location: [facility] ENDOSCOPY;  Service: Gastroenterology   • ENDOSCOPY     •  ENDOSCOPY N/A 2020    Procedure: ESOPHAGOGASTRODUODENOSCOPY possible dilation ;  Surgeon: Ruslan Bowers MD;  Location: [facility] ENDOSCOPY;  Service: Gastroenterology;  Laterality: N/A;   • PYLOROMYOTOMY       Social History     Socioeconomic History   • Marital status: Significant Other     Spouse name: Not on file   • Number of children: Not on file   • Years of education: Not on file   • Highest education level: Not on file   Occupational History   • Occupation:      Employer: SELF-EMPLOYED     Comment: Patient resides with Addis Rodriguez.   Tobacco Use   • Smoking status: Former Smoker     Packs/day: 1.00     Years: 6.00     Pack years: 6.00     Types: Cigarettes     Last attempt to quit:      Years since quittin.5   • Smokeless tobacco: Never Used   Substance and Sexual Activity   • Alcohol use: Yes     Comment: 2018 - Kevin reports consumption of alcoholic beverages under a social basis (approximately 3 - 4 per month).     • Drug use: No   • Sexual activity: Defer     Family History   Problem Relation Age of Onset   • Colon cancer Mother         onset age greater than 75y   • Diabetes Mother    • Breast cancer Sister    • Cancer Brother         bladder   • Diabetes Brother    • Hyperlipidemia Brother      Lab on 2020   Component Date Value Ref Range Status   • WBC 2020 6.15  3.40 - 10.80 10*3/mm3 Final   • RBC 2020 4.21  4.14 - 5.80 10*6/mm3 Final   • Hemoglobin 2020 12.9* 13.0 - 17.7 g/dL Final   • Hematocrit 2020 36.5* 37.5 - 51.0 % Final   • MCV 2020 86.7  79.0 - 97.0 fL Final   • MCH 2020 30.6  26.6 - 33.0 pg Final   • MCHC 2020 35.3  31.5 - 35.7 g/dL Final   • RDW 2020 12.2* 12.3 - 15.4 % Final   • RDW-SD 2020 38.4  37.0 - 54.0 fl Final   • MPV 2020 10.3  6.0 - 12.0 fL Final   • Platelets 2020 295  140 - 450 10*3/mm3 Final   • Neutrophil % 2020 55.1  42.7 - 76.0 % Final   •  Lymphocyte % 06/29/2020 31.5  19.6 - 45.3 % Final   • Monocyte % 06/29/2020 8.3  5.0 - 12.0 % Final   • Eosinophil % 06/29/2020 3.1  0.3 - 6.2 % Final   • Basophil % 06/29/2020 1.0  0.0 - 1.5 % Final   • Immature Grans % 06/29/2020 1.0* 0.0 - 0.5 % Final   • Neutrophils, Absolute 06/29/2020 3.39  1.70 - 7.00 10*3/mm3 Final   • Lymphocytes, Absolute 06/29/2020 1.94  0.70 - 3.10 10*3/mm3 Final   • Monocytes, Absolute 06/29/2020 0.51  0.10 - 0.90 10*3/mm3 Final   • Eosinophils, Absolute 06/29/2020 0.19  0.00 - 0.40 10*3/mm3 Final   • Basophils, Absolute 06/29/2020 0.06  0.00 - 0.20 10*3/mm3 Final   • Immature Grans, Absolute 06/29/2020 0.06* 0.00 - 0.05 10*3/mm3 Final   • nRBC 06/29/2020 0.0  0.0 - 0.2 /100 WBC Final   • Glucose 06/29/2020 118* 65 - 99 mg/dL Final   • BUN 06/29/2020 13  6 - 20 mg/dL Final   • Creatinine 06/29/2020 1.18  0.76 - 1.27 mg/dL Final   • Sodium 06/29/2020 136  136 - 145 mmol/L Final   • Potassium 06/29/2020 4.8  3.5 - 5.2 mmol/L Final   • Chloride 06/29/2020 104  98 - 107 mmol/L Final   • CO2 06/29/2020 24.8  22.0 - 29.0 mmol/L Final   • Calcium 06/29/2020 9.5  8.6 - 10.5 mg/dL Final   • Total Protein 06/29/2020 7.2  6.0 - 8.5 g/dL Final   • Albumin 06/29/2020 4.10  3.50 - 5.20 g/dL Final   • ALT (SGPT) 06/29/2020 26  1 - 41 U/L Final   • AST (SGOT) 06/29/2020 22  1 - 40 U/L Final   • Alkaline Phosphatase 06/29/2020 89  39 - 117 U/L Final   • Total Bilirubin 06/29/2020 0.3  0.2 - 1.2 mg/dL Final   • eGFR Non African Amer 06/29/2020 65  >60 mL/min/1.73 Final   • Globulin 06/29/2020 3.1  gm/dL Final   • A/G Ratio 06/29/2020 1.3  g/dL Final   • BUN/Creatinine Ratio 06/29/2020 11.0  7.0 - 25.0 Final   • Anion Gap 06/29/2020 7.2  5.0 - 15.0 mmol/L Final   • Hemoglobin A1C 06/29/2020 6.10* 4.80 - 5.60 % Final   • Total Cholesterol 06/29/2020 166  0 - 200 mg/dL Final   • Triglycerides 06/29/2020 164* 0 - 150 mg/dL Final   • HDL Cholesterol 06/29/2020 37* 40 - 60 mg/dL Final   • LDL Cholesterol   06/29/2020 96  0 - 100 mg/dL Final   • VLDL Cholesterol 06/29/2020 32.8  5 - 40 mg/dL Final   • LDL/HDL Ratio 06/29/2020 2.60   Final   • 25 Hydroxy, Vitamin D 06/29/2020 46.8  30.0 - 100.0 ng/ml Final   • THC, Screen, Urine 06/29/2020 Negative  Negative Final   • Phencyclidine (PCP), Urine 06/29/2020 Negative  Negative Final   • Cocaine Screen, Urine 06/29/2020 Negative  Negative Final   • Methamphetamine, Ur 06/29/2020 Negative  Negative Final   • Opiate Screen 06/29/2020 Negative  Negative Final   • Amphetamine Screen, Urine 06/29/2020 Positive* Negative Final   • Benzodiazepine Screen, Urine 06/29/2020 Negative  Negative Final   • Tricyclic Antidepressants Screen 06/29/2020 Negative  Negative Final   • Methadone Screen, Urine 06/29/2020 Negative  Negative Final   • Barbiturates Screen, Urine 06/29/2020 Negative  Negative Final   • Oxycodone Screen, Urine 06/29/2020 Negative  Negative Final   • Propoxyphene Screen 06/29/2020 Negative  Negative Final   • Buprenorphine, Screen, Urine 06/29/2020 Negative  Negative Final   Admission on 02/21/2020, Discharged on 02/21/2020   Component Date Value Ref Range Status   • Case Report 02/21/2020    Final                    Value:Surgical Pathology Report                         Case: [id number]                                  Authorizing Provider:  Ruslan Bowers MD        Collected:           02/21/2020 12:08 PM          Ordering Location:     [facility]             Received:            02/21/2020 01:42 PM                                 [facility]                                                     Pathologist:           Loi San MD                                                         Specimens:   1) - Small Intestine, Duodenum, small bowel                                                         2) - Gastric, Antrum                                                                                3) - Esophagus, Distal                           "                                          • Final Diagnosis 02/21/2020    Final                    Value:This result contains rich text formatting which cannot be displayed here.   • Gross Description 02/21/2020    Final                    Value:This result contains rich text formatting which cannot be displayed here.      US Abdomen Complete  Narrative: PROCEDURE: Complete abdominal sonogram    COMPARISON: None    HISTORY: Abdominal pain    FINDINGS: Realtime grayscale and color-flow imaging is obtained  of the abdomen.    The gallbladder is normal.No shadowing stones, pericholecystic  fluid or wall thickening.   There is increased echogenicity throughout the liver most  consistent with fatty infiltration or other parenchymal liver  disease. No focal lesions or intrahepatic biliary ductal  dilatation.   The visualized common duct is within normal limits, measuring 5  mm in diameter.   The visualized kidneys are normal in echogenicity, without  hydronephrosis.  The pancreas is obscured by bowel gas and cannot be evaluated.    The spleen appears normal.  The aorta and inferior vena cava are obscured by gas and cannot  be evaluated.  Impression: CONCLUSION:  Fatty liver.    Electronically signed by:  Tyshawn Marin MD  2/11/2020 9:14 AM [facility]  Workstation: [id number]    @[email or web address]@  Immunization History   Administered Date(s) Administered   • Influenza TIV (IM) 03/28/2017   • Tdap 02/17/2017       The following portions of the patient's history were reviewed and updated as appropriate: allergies, current medications, past family history, past medical history, past social history, past surgical history and problem list.        Physical Exam  Pulse 84   Temp 97.3 °F (36.3 °C) Comment: per screener  Ht 180.3 cm (71\")   Wt 127 kg (280 lb 9.6 oz)   PF 98 L/min   BMI 39.14 kg/m²     Physical Exam   Constitutional: He is oriented to person, place, and time. He appears well-developed and well-nourished.   HENT:   Head: " Normocephalic and atraumatic.   Right Ear: External ear normal.   Eyes: Pupils are equal, round, and reactive to light. Conjunctivae and EOM are normal.   Neck: Normal range of motion. Neck supple.   Cardiovascular: Normal rate, regular rhythm and normal heart sounds.   No murmur heard.  Pulmonary/Chest: Effort normal and breath sounds normal. No respiratory distress.   Abdominal: Soft. Bowel sounds are normal. He exhibits no distension. There is no tenderness.   Obese abdomen    Musculoskeletal: Normal range of motion. He exhibits no edema or deformity.   Neurological: He is alert and oriented to person, place, and time. No cranial nerve deficit.   Skin: Skin is warm. No rash noted. He is not diaphoretic. No erythema. No pallor.   Psychiatric: He has a normal mood and affect. His behavior is normal.   Nursing note and vitals reviewed.      Assessment/Plan    Diagnosis Plan   1. Class 2 obesity with body mass index (BMI) of 39.0 to 39.9 in adult, unspecified obesity type, unspecified whether serious comorbidity present     2. Attention deficit hyperactivity disorder (ADHD), predominantly inattentive type  amphetamine-dextroamphetamine XR (Adderall XR) 25 MG 24 hr capsule   3. Stage 2 chronic kidney disease     4. Hyperlipidemia, unspecified hyperlipidemia type     5. Prediabetes     6. Normocytic anemia        Went over labwork  -pt was recently ill recommend pt get tested for COVID-19   -normocytic anemia - continue to monitor   -hypotension - stable since stopping HCTZ   -abdominal pain/fatty liver/nausea vomiting/Gastritis/GERD with esophagitis - continue prilosec 40 mg daily.  GI following had recent EGD   -ckd stage 2 - continue to monitor. Gave information. Advised better hydration BP control   -for back pain with sciatica/scoliosis - pain is better after PT/OT. Continue with mobic and flexeril PRN.  Consider MRI if not improving    -right shoulder pain -stable    -Vitamin D deficiency vitamin D pill once a  week d   -HLP - lipitor 80mg PO qhs   - HTN - on lower side today. Stop chlrothalidone 25 mg PO q daily. Continue on lisinopril 40 mg daily.   - ADHD refilled ADHD medication Adderall XR 25 mg PO q daily for ADHD.  [facility] ref number ref number refer number [id number]  - for allergic rhinitis - continue singulair and flonase and will add zyrtec.  -prediabetes - prediabetes meal plan information provided.     hyperlipidemia -continue statin. Recheck lipid panel. Pt on aspirin   - obesity - recommend ketogenic diet along with intermittent fasting. BMI at 38.16.    -advised pt to be safe and call with questions and concerns  -advised to go to ER or call 911 if symptoms get severe  -advised to followup with specialist and referrals   -advised pt to be safe during COVID-19 pandemic  -total time with pt >25 minutes           This document has been electronically signed by Scottie Borges MD on July 30, 2020 09:31

## 2020-07-26 DIAGNOSIS — F90.0 ATTENTION DEFICIT HYPERACTIVITY DISORDER (ADHD), PREDOMINANTLY INATTENTIVE TYPE: ICD-10-CM

## 2020-07-27 RX ORDER — ERGOCALCIFEROL 1.25 MG/1
50000 CAPSULE ORAL WEEKLY
Qty: 4 CAPSULE | Refills: 3 | Status: SHIPPED | OUTPATIENT
Start: 2020-07-27 | End: 2020-11-16 | Stop reason: SDUPTHER

## 2020-07-27 RX ORDER — DEXTROAMPHETAMINE SACCHARATE, AMPHETAMINE ASPARTATE MONOHYDRATE, DEXTROAMPHETAMINE SULFATE AND AMPHETAMINE SULFATE 6.25; 6.25; 6.25; 6.25 MG/1; MG/1; MG/1; MG/1
25 CAPSULE, EXTENDED RELEASE ORAL EVERY MORNING
Qty: 30 CAPSULE | Refills: 0 | Status: SHIPPED | OUTPATIENT
Start: 2020-07-27 | End: 2020-07-30 | Stop reason: SDUPTHER

## 2020-07-30 ENCOUNTER — OFFICE VISIT (OUTPATIENT)
Dept: FAMILY MEDICINE CLINIC | Facility: CLINIC | Age: 50
End: 2020-07-30

## 2020-07-30 VITALS
TEMPERATURE: 97.3 F | SYSTOLIC BLOOD PRESSURE: 130 MMHG | DIASTOLIC BLOOD PRESSURE: 80 MMHG | HEIGHT: 71 IN | WEIGHT: 280.6 LBS | HEART RATE: 84 BPM | BODY MASS INDEX: 39.28 KG/M2

## 2020-07-30 DIAGNOSIS — D64.9 NORMOCYTIC ANEMIA: ICD-10-CM

## 2020-07-30 DIAGNOSIS — R73.03 PREDIABETES: ICD-10-CM

## 2020-07-30 DIAGNOSIS — N18.2 STAGE 2 CHRONIC KIDNEY DISEASE: ICD-10-CM

## 2020-07-30 DIAGNOSIS — E78.5 HYPERLIPIDEMIA, UNSPECIFIED HYPERLIPIDEMIA TYPE: ICD-10-CM

## 2020-07-30 DIAGNOSIS — E66.9 CLASS 2 OBESITY WITH BODY MASS INDEX (BMI) OF 39.0 TO 39.9 IN ADULT, UNSPECIFIED OBESITY TYPE, UNSPECIFIED WHETHER SERIOUS COMORBIDITY PRESENT: Primary | ICD-10-CM

## 2020-07-30 DIAGNOSIS — F90.0 ATTENTION DEFICIT HYPERACTIVITY DISORDER (ADHD), PREDOMINANTLY INATTENTIVE TYPE: ICD-10-CM

## 2020-07-30 PROCEDURE — 99214 OFFICE O/P EST MOD 30 MIN: CPT | Performed by: FAMILY MEDICINE

## 2020-07-30 RX ORDER — DEXTROAMPHETAMINE SACCHARATE, AMPHETAMINE ASPARTATE MONOHYDRATE, DEXTROAMPHETAMINE SULFATE AND AMPHETAMINE SULFATE 6.25; 6.25; 6.25; 6.25 MG/1; MG/1; MG/1; MG/1
25 CAPSULE, EXTENDED RELEASE ORAL EVERY MORNING
Qty: 30 CAPSULE | Refills: 0 | Status: SHIPPED | OUTPATIENT
Start: 2020-07-30 | End: 2020-08-31 | Stop reason: SDUPTHER

## 2020-07-30 RX ORDER — DEXTROAMPHETAMINE SACCHARATE, AMPHETAMINE ASPARTATE MONOHYDRATE, DEXTROAMPHETAMINE SULFATE AND AMPHETAMINE SULFATE 6.25; 6.25; 6.25; 6.25 MG/1; MG/1; MG/1; MG/1
25 CAPSULE, EXTENDED RELEASE ORAL EVERY MORNING
Qty: 30 CAPSULE | Refills: 0 | Status: CANCELLED | OUTPATIENT
Start: 2020-07-30

## 2020-07-30 NOTE — PATIENT INSTRUCTIONS
Preventing Type 2 Diabetes Mellitus  Type 2 diabetes (type 2 diabetes mellitus) is a long-term (chronic) disease that affects blood sugar (glucose) levels. Normally, a hormone called insulin allows glucose to enter cells in the body. The cells use glucose for energy. In type 2 diabetes, one or both of these problems may be present:  · The body does not make enough insulin.  · The body does not respond properly to insulin that it makes (insulin resistance).  Insulin resistance or lack of insulin causes excess glucose to build up in the blood instead of going into cells. As a result, high blood glucose (hyperglycemia) develops, which can cause many complications. Being overweight or obese and having an inactive (sedentary) lifestyle can increase your risk for diabetes. Type 2 diabetes can be delayed or prevented by making certain nutrition and lifestyle changes.  What nutrition changes can be made?    · Eat healthy meals and snacks regularly. Keep a healthy snack with you for when you get hungry between meals, such as fruit or a handful of nuts.  · Eat lean meats and proteins that are low in saturated fats, such as chicken, fish, egg whites, and beans. Avoid processed meats.  · Eat plenty of fruits and vegetables and plenty of grains that have not been processed (whole grains). It is recommended that you eat:  ? 1½?2 cups of fruit every day.  ? 2½?3 cups of vegetables every day.  ? 6?8 oz of whole grains every day, such as oats, whole wheat, bulgur, brown rice, quinoa, and millet.  · Eat low-fat dairy products, such as milk, yogurt, and cheese.  · Eat foods that contain healthy fats, such as nuts, avocado, olive oil, and canola oil.  · Drink water throughout the day. Avoid drinks that contain added sugar, such as soda or sweet tea.  · Follow instructions from your health care provider about specific eating or drinking restrictions.  · Control how much food you eat at a time (portion size).  ? Check food labels to find  out the serving sizes of foods.  ? Use a kitchen scale to weigh amounts of foods.  · Saute or steam food instead of frying it. Cook with water or broth instead of oils or butter.  · Limit your intake of:  ? Salt (sodium). Have no more than 1 tsp (2,400 mg) of sodium a day. If you have heart disease or high blood pressure, have less than ½?¾ tsp (1,500 mg) of sodium a day.  ? Saturated fat. This is fat that is solid at room temperature, such as butter or fat on meat.  What lifestyle changes can be made?  Activity    · Do moderate-intensity physical activity for at least 30 minutes on at least 5 days of the week, or as much as told by your health care provider.  · Ask your health care provider what activities are safe for you. A mix of physical activities may be best, such as walking, swimming, cycling, and strength training.  · Try to add physical activity into your day. For example:  ? Park in spots that are farther away than usual, so that you walk more. For example, park in a far corner of the parking lot when you go to the office or the grocery store.  ? Take a walk during your lunch break.  ? Use stairs instead of elevators or escalators.  Weight Loss  · Lose weight as directed. Your health care provider can determine how much weight loss is best for you and can help you lose weight safely.  · If you are overweight or obese, you may be instructed to lose at least 5?7 % of your body weight.  Alcohol and Tobacco    · Limit alcohol intake to no more than 1 drink a day for nonpregnant women and 2 drinks a day for men. One drink equals 12 oz of beer, 5 oz of wine, or 1½ oz of hard liquor.  · Do not use any tobacco products, such as cigarettes, chewing tobacco, and e-cigarettes. If you need help quitting, ask your health care provider.  Work With Your Health Care Provider  · Have your blood glucose tested regularly, as told by your health care provider.  · Discuss your risk factors and how you can reduce your risk for  diabetes.  · Get screening tests as told by your health care provider. You may have screening tests regularly, especially if you have certain risk factors for type 2 diabetes.  · Make an appointment with a diet and nutrition specialist (registered dietitian). A registered dietitian can help you make a healthy eating plan and can help you understand portion sizes and food labels.  Why are these changes important?  · It is possible to prevent or delay type 2 diabetes and related health problems by making lifestyle and nutrition changes.  · It can be difficult to recognize signs of type 2 diabetes. The best way to avoid possible damage to your body is to take actions to prevent the disease before you develop symptoms.  What can happen if changes are not made?  · Your blood glucose levels may keep increasing. Having high blood glucose for a long time is dangerous. Too much glucose in your blood can damage your blood vessels, heart, kidneys, nerves, and eyes.  · You may develop prediabetes or type 2 diabetes. Type 2 diabetes can lead to many chronic health problems and complications, such as:  ? Heart disease.  ? Stroke.  ? Blindness.  ? Kidney disease.  ? Depression.  ? Poor circulation in the feet and legs, which could lead to surgical removal (amputation) in severe cases.  Where to find support  · Ask your health care provider to recommend a registered dietitian, diabetes educator, or weight loss program.  · Look for local or online weight loss groups.  · Join a gym, fitness club, or outdoor activity group, such as a walking club.  Where to find more information  To learn more about diabetes and diabetes prevention, visit:  · American Diabetes Association (ADA): www.diabetes.org  · National [place] of Diabetes and Digestive and Kidney Diseases: www.niddk.nih.gov/health-information/diabetes  To learn more about healthy eating, visit:  · The U.S. Department of Agriculture (USDA), Choose My Plate:  www.choosemyplate.gov/food-groups  · Office of Disease Prevention and Health Promotion (ODPHP), Dietary Guidelines: www.health.gov/dietaryguidelines  Summary  · You can reduce your risk for type 2 diabetes by increasing your physical activity, eating healthy foods, and losing weight as directed.  · Talk with your health care provider about your risk for type 2 diabetes. Ask about any blood tests or screening tests that you need to have.  This information is not intended to replace advice given to you by your health care provider. Make sure you discuss any questions you have with your health care provider.  Document Released: 04/10/2017 Document Revised: 04/10/2020 Document Reviewed: 02/07/2017  [employer] Patient Education © 2020 [employer]    Prediabetes Eating Plan  Prediabetes is a condition that causes blood sugar (glucose) levels to be higher than normal. This increases the risk for developing diabetes. In order to prevent diabetes from developing, your health care provider may recommend a diet and other lifestyle changes to help you:  · Control your blood glucose levels.  · Improve your cholesterol levels.  · Manage your blood pressure.  Your health care provider may recommend working with a diet and nutrition specialist (dietitian) to make a meal plan that is best for you.  What are tips for following this plan?  Lifestyle  · Set weight loss goals with the help of your health care team. It is recommended that most people with prediabetes lose 7% of their current body weight.  · Exercise for at least 30 minutes at least 5 days a week.  · Attend a support group or seek ongoing support from a mental health counselor.  · Take over-the-counter and prescription medicines only as told by your health care provider.  Reading food labels  · Read food labels to check the amount of fat, salt (sodium), and sugar in prepackaged foods. Avoid foods that have:  ? Saturated fats.  ? Trans fats.  ? Added sugars.  · Avoid foods  that have more than 300 milligrams (mg) of sodium per serving. Limit your daily sodium intake to less than 2,300 mg each day.  Shopping  · Avoid buying pre-made and processed foods.  Cooking  · Cook with olive oil. Do not use butter, lard, or ghee.  · Bake, broil, grill, or boil foods. Avoid frying.  Meal planning    · Work with your dietitian to develop an eating plan that is right for you. This may include:  ? Tracking how many calories you take in. Use a food diary, notebook, or mobile application to track what you eat at each meal.  ? Using the glycemic index (GI) to plan your meals. The index tells you how quickly a food will raise your blood glucose. Choose low-GI foods. These foods take a longer time to raise blood glucose.  · Consider following a Mediterranean diet. This diet includes:  ? Several servings each day of fresh fruits and vegetables.  ? Eating fish at least twice a week.  ? Several servings each day of whole grains, beans, nuts, and seeds.  ? Using olive oil instead of other fats.  ? Moderate alcohol consumption.  ? Eating small amounts of red meat and whole-fat dairy.  · If you have high blood pressure, you may need to limit your sodium intake or follow a diet such as the DASH eating plan. DASH is an eating plan that aims to lower high blood pressure.  What foods are recommended?  The items listed below may not be a complete list. Talk with your dietitian about what dietary choices are best for you.  Grains  Whole grains, such as whole-wheat or whole-grain breads, crackers, cereals, and pasta. Unsweetened oatmeal. Bulgur. Barley. Quinoa. Brown rice. Corn or whole-wheat flour tortillas or taco shells.  Vegetables  Lettuce. Spinach. Peas. Beets. Cauliflower. Cabbage. Broccoli. Carrots. Tomatoes. Squash. Eggplant. Herbs. Peppers. Onions. Cucumbers. [place] sprouts.  Fruits  Berries. Bananas. Apples. Oranges. Grapes. Papaya. [place]. Pomegranate. Kiwi. Grapefruit. Cherries.  Meats and other protein  foods  Seafood. Poultry without skin. Lean cuts of pork and beef. Tofu. Eggs. Nuts. Beans.  Dairy  Low-fat or fat-free dairy products, such as yogurt, cottage cheese, and cheese.  Beverages  Water. Tea. Coffee. Sugar-free or diet soda. Man water. Lowfat or no-fat milk. Milk alternatives, such as soy or almond milk.  Fats and oils  Olive oil. Canola oil. Sunflower oil. Grapeseed oil. Avocado. Walnuts.  Sweets and desserts  Sugar-free or low-fat pudding. Sugar-free or low-fat ice cream and other frozen treats.  Seasoning and other foods  Herbs. Sodium-free spices. Mustard. Relish. Low-fat, low-sugar ketchup. Low-fat, low-sugar barbecue sauce. Low-fat or fat-free mayonnaise.  What foods are not recommended?  The items listed below may not be a complete list. Talk with your dietitian about what dietary choices are best for you.  Grains  Refined white flour and flour products, such as bread, pasta, snack foods, and cereals.  Vegetables  Canned vegetables. Frozen vegetables with butter or cream sauce.  Fruits  Fruits canned with syrup.  Meats and other protein foods  Fatty cuts of meat. Poultry with skin. Breaded or fried meat. Processed meats.  Dairy  Full-fat yogurt, cheese, or milk.  Beverages  Sweetened drinks, such as sweet iced tea and soda.  Fats and oils  Butter. Lard. Ghee.  Sweets and desserts  Baked goods, such as cake, cupcakes, pastries, cookies, and cheesecake.  Seasoning and other foods  Spice mixes with added salt. Ketchup. Barbecue sauce. Mayonnaise.  Summary  · To prevent diabetes from developing, you may need to make diet and other lifestyle changes to help control blood sugar, improve cholesterol levels, and manage your blood pressure.  · Set weight loss goals with the help of your health care team. It is recommended that most people with prediabetes lose 7 percent of their current body weight.  · Consider following a Mediterranean diet that includes plenty of fresh fruits and vegetables, whole  grains, beans, nuts, seeds, fish, lean meat, low-fat dairy, and healthy oils.  This information is not intended to replace advice given to you by your health care provider. Make sure you discuss any questions you have with your health care provider.  Document Released: 05/03/2016 Document Revised: 04/10/2020 Document Reviewed: 02/21/2018  [employer] Patient Education © 2020 [employer]    Prediabetes  Prediabetes is the condition of having a blood sugar (blood glucose) level that is higher than it should be, but not high enough for you to be diagnosed with type 2 diabetes. Having prediabetes puts you at risk for developing type 2 diabetes (type 2 diabetes mellitus). Prediabetes may be called impaired glucose tolerance or impaired fasting glucose.  Prediabetes usually does not cause symptoms. Your health care provider can diagnose this condition with blood tests. You may be tested for prediabetes if you are overweight and if you have at least one other risk factor for prediabetes.  What is blood glucose, and how is it measured?  Blood glucose refers to the amount of glucose in your bloodstream. Glucose comes from eating foods that contain sugars and starches (carbohydrates), which the body breaks down into glucose. Your blood glucose level may be measured in mg/dL (milligrams per deciliter) or mmol/L (millimoles per liter). Your blood glucose may be checked with one or more of the following blood tests:  · A fasting blood glucose (FBG) test. You will not be allowed to eat (you will fast) for 8 hours or longer before a blood sample is taken.  ? A normal range for FBG is  mg/dl (3.9-5.6 mmol/L).  · An A1c (hemoglobin A1c) blood test. This test provides information about blood glucose control over the previous 2?3 months.  · An oral glucose tolerance test (OGTT). This test measures your blood glucose at two times:  ? After fasting. This is your baseline level.  ? Two hours after you drink a beverage that contains  glucose.  You may be diagnosed with prediabetes:  · If your FBG is 100?125 mg/dL (5.6-6.9 mmol/L).  · If your A1c level is 5.7?6.4%.  · If your OGTT result is 140?199 mg/dL (7.8-11 mmol/L).  These blood tests may be repeated to confirm your diagnosis.  How can this condition affect me?  The pancreas produces a hormone (insulin) that helps to move glucose from the bloodstream into cells. When cells in the body do not respond properly to insulin that the body makes (insulin resistance), excess glucose builds up in the blood instead of going into cells. As a result, high blood glucose (hyperglycemia) can develop, which can cause many complications. Hyperglycemia is a symptom of prediabetes.  Having high blood glucose for a long time is dangerous. Too much glucose in your blood can damage your nerves and blood vessels. Long-term damage can lead to complications from diabetes, which may include:  · Heart disease.  · Stroke.  · Blindness.  · Kidney disease.  · Depression.  · Poor circulation in the feet and legs, which could lead to surgical removal (amputation) in severe cases.  What can increase my risk?  Risk factors for prediabetes include:  · Having a family member with type 2 diabetes.  · Being overweight or obese.  · Being older than age 45.  · Being of , -American, /, or / descent.  · Having an inactive (sedentary) lifestyle.  · Having a history of heart disease.  · History of gestational diabetes or polycystic ovary syndrome (PCOS), in women.  · Having low levels of good cholesterol (HDL-C) or high levels of blood fats (triglycerides).  · Having high blood pressure.  What actions can I take to prevent diabetes?         · Be physically active.  ? Do moderate-intensity physical activity for 30 or more minutes on 5 or more days of the week, or as much as told by your health care provider. This could be brisk walking, biking, or water aerobics.  ? Ask your  health care provider what activities are safe for you. A mix of physical activities may be best, such as walking, swimming, cycling, and strength training.  · Lose weight as told by your health care provider.  ? Losing 5-7% of your body weight can reverse insulin resistance.  ? Your health care provider can determine how much weight loss is best for you and can help you lose weight safely.  · Follow a healthy meal plan. This includes eating lean proteins, complex carbohydrates, fresh fruits and vegetables, low-fat dairy products, and healthy fats.  ? Follow instructions from your health care provider about eating or drinking restrictions.  ? Make an appointment to see a diet and nutrition specialist (registered dietitian) to help you create a healthy eating plan that is right for you.  · Do not smoke or use any tobacco products, such as cigarettes, chewing tobacco, and e-cigarettes. If you need help quitting, ask your health care provider.  · Take over-the-counter and prescription medicines as told by your health care provider. You may be prescribed medicines that help lower the risk of type 2 diabetes.  · Keep all follow-up visits as told by your health care provider. This is important.  Summary  · Prediabetes is the condition of having a blood sugar (blood glucose) level that is higher than it should be, but not high enough for you to be diagnosed with type 2 diabetes.  · Having prediabetes puts you at risk for developing type 2 diabetes (type 2 diabetes mellitus).  · To help prevent type 2 diabetes, make lifestyle changes such as being physically active and eating a healthy diet. Lose weight as told by your health care provider.  This information is not intended to replace advice given to you by your health care provider. Make sure you discuss any questions you have with your health care provider.  Document Released: 04/10/2017 Document Revised: 04/10/2020 Document Reviewed: 02/07/2017  [employer] Patient Education  © 2020 [employer]

## 2020-08-19 NOTE — PROGRESS NOTES
Subjective:  Randall Hernandez is a 49 y.o. male who presents for       Patient Active Problem List   Diagnosis   • Hyperlipidemia   • Inattention   • Non morbid obesity   • Prediabetes   • Attention deficit hyperactivity disorder (ADHD), combined type   • Need for vaccination   • Gastroesophageal reflux disease   • Encounter for drug screening   • Tingling in extremities   • Elevated BP without diagnosis of hypertension   • Allergic rhinitis   • Elevated blood pressure reading   • Pruritic rash   • Numbness in both hands   • Bilateral elbow joint pain   • Pain in both wrists   • Ulnar neuropathy of left upper extremity   • General medical examination   • Bilateral carpal tunnel syndrome   • History of prediabetes   • Numbness and tingling in both hands   • Essential hypertension   • Cerumen debris on tympanic membrane of both ears   • Carpal tunnel syndrome   • S/P carpal tunnel release   • Encounter for screening for malignant neoplasm of colon   • FH: colon cancer   • Acute recurrent sinusitis   • Attention deficit hyperactivity disorder (ADHD), predominantly inattentive type   • Class 2 obesity with body mass index (BMI) of 35.0 to 35.9 in adult   • Annual physical exam   • Arthritis of back   • Scoliosis   • Hyponatremia   • Hypochloremia   • Stage 2 chronic kidney disease   • Epigastric pain   • Bilious vomiting with nausea   • Fatty liver   • Dysphagia   • Nausea and vomiting   • Gastritis without bleeding   • Class 2 obesity with body mass index (BMI) of 38.0 to 38.9 in adult   • Mixed hyperlipidemia   • Dizziness   • Hypotension   • Class 2 obesity with body mass index (BMI) of 39.0 to 39.9 in adult   • Normocytic anemia   • Vitamin D deficiency   • Anxiety           Current Outpatient Medications:   •  amphetamine-dextroamphetamine XR (Adderall XR) 25 MG 24 hr capsule, Take 1 capsule by mouth Every Morning Take 1 tablet by mouth daily, Disp: 30 capsule, Rfl: 0  •  aspirin 81 MG EC tablet, Take 1 tablet  by mouth Daily., Disp: 30 tablet, Rfl: 3  •  atorvastatin (LIPITOR) 80 MG tablet, Take 1 tablet by mouth Daily., Disp: 30 tablet, Rfl: 3  •  fluticasone (FLONASE) 50 MCG/ACT nasal spray, INSTILL 2 SPRAYS IN EACH NOSTRIL EVERY DAY, Disp: 16 g, Rfl: 3  •  lisinopril (PRINIVIL,ZESTRIL) 40 MG tablet, Take 1 tablet by mouth Daily., Disp: 30 tablet, Rfl: 3  •  montelukast (SINGULAIR) 10 MG tablet, Take 1 tablet by mouth Every Night., Disp: 30 tablet, Rfl: 3  •  omeprazole (priLOSEC) 40 MG capsule, Take 1 capsule by mouth Daily., Disp: 30 capsule, Rfl: 3  •  vitamin D (ERGOCALCIFEROL) 1.25 MG (57653 UT) capsule capsule, Take 1 capsule by mouth 1 (One) Time Per Week., Disp: 4 capsule, Rfl: 3           Pt is 50 yo male with management  of obesity, history of prediabetes, HTN,  GERD, vitamin D deficiency, alelrgic rhinitis, ADHD predominantly inattentive type ,sp bilateral carpal tunnel release surgery,  Chronic back pain (arthritis of lumbar spine, short pedicles, mild scoliosis, mild arthritis thoracic spine).  CKD stage 2 , fatty liver, gastirits, esophagitis           7/30/20 in office visit for the above medical issues. Had labwork done on 6/29/20 that showed UDS consistent with ADHD medication use Vitamin D normal lipid panel showed triglycerides at 164 and HDL at 37. LDL at 96.  hga1c at 6.10.  CMP showed  Showed GFR from 65 to 71.  Liver enzymes normal CBC showed hemoglobin at 12.9 with MCV at 86.7  He felt ill this past Monday.  He did not get tested for COVID -19.  Had low grade fever, short of breath.  He woke up and fever has resolved    8/31/20 in office visit for recheck on pt's above medical issues. Pt is needing refill on ADHD medication. Doing well on ADHD medication no chest pain no dizziness. No palpations. His main issues is heartburn. He is on prilosec 40 mg daily.  He ate some food yesterday that made stomach upset. His last EGD earlier this year showed esophagitis and gastritis.          Heartburn   He  complains of abdominal pain and globus sensation. He reports no belching, no chest pain, no choking, no coughing, no dysphagia, no early satiety, no heartburn, no hoarse voice, no nausea, no sore throat or no wheezing. This is a recurrent problem. The current episode started today. The problem has been unchanged. The symptoms are aggravated by certain foods. Associated symptoms include fatigue. There are no known risk factors. He has tried a PPI for the symptoms. The treatment provided moderate relief. Past procedures include an EGD. Past procedures do not include an abdominal ultrasound, esophageal manometry, esophageal pH monitoring or a UGI. Past invasive treatments do not include gastroplasty, gastroplication or reflux surgery.   Obesity   This is a chronic problem. The current episode started more than 1 year ago. The problem occurs constantly. The problem has been unchanged. Associated symptoms include arthralgias and numbness. Pertinent negatives include no abdominal pain, anorexia, change in bowel habit, chest pain, chills, congestion, coughing, diaphoresis, fatigue, fever, headaches, joint swelling, myalgias, nausea, rash, sore throat, swollen glands, urinary symptoms, vertigo, visual change, vomiting or weakness. Nothing aggravates the symptoms. He has tried nothing for the symptoms. The treatment provided no relief.   Hypertension   This is a new problem. The current episode started more than 1 month ago. The problem has been gradually improving t The problem is uncontrolled. Pertinent negatives include no anxiety, blurred vision, chest pain, headaches, malaise/fatigue, neck pain, orthopnea, palpitations, peripheral edema, PND, shortness of breath or sweats. Risk factors for coronary artery disease include male gender, obesity and sedentary lifestyle. Past treatments include nothing. There are no compliance problems    Review of Systems  Review of Systems   Constitutional: Positive for activity change and  fatigue. Negative for appetite change, chills, diaphoresis and fever.   HENT: Negative for congestion, hoarse voice, postnasal drip, rhinorrhea, sinus pressure, sinus pain, sneezing, sore throat, trouble swallowing and voice change.    Respiratory: Negative for cough, choking, chest tightness, shortness of breath, wheezing and stridor.    Cardiovascular: Negative for chest pain.   Gastrointestinal: Positive for abdominal pain. Negative for diarrhea, dysphagia, heartburn, nausea and vomiting.   Musculoskeletal: Positive for arthralgias.   Neurological: Positive for weakness and numbness. Negative for headaches.   Psychiatric/Behavioral: Positive for decreased concentration.       Patient Active Problem List   Diagnosis   • Hyperlipidemia   • Inattention   • Non morbid obesity   • Prediabetes   • Attention deficit hyperactivity disorder (ADHD), combined type   • Need for vaccination   • Gastroesophageal reflux disease   • Encounter for drug screening   • Tingling in extremities   • Elevated BP without diagnosis of hypertension   • Allergic rhinitis   • Elevated blood pressure reading   • Pruritic rash   • Numbness in both hands   • Bilateral elbow joint pain   • Pain in both wrists   • Ulnar neuropathy of left upper extremity   • General medical examination   • Bilateral carpal tunnel syndrome   • History of prediabetes   • Numbness and tingling in both hands   • Essential hypertension   • Cerumen debris on tympanic membrane of both ears   • Carpal tunnel syndrome   • S/P carpal tunnel release   • Encounter for screening for malignant neoplasm of colon   • FH: colon cancer   • Acute recurrent sinusitis   • Attention deficit hyperactivity disorder (ADHD), predominantly inattentive type   • Class 2 obesity with body mass index (BMI) of 35.0 to 35.9 in adult   • Annual physical exam   • Arthritis of back   • Scoliosis   • Hyponatremia   • Hypochloremia   • Stage 2 chronic kidney disease   • Epigastric pain   • Bilious  vomiting with nausea   • Fatty liver   • Dysphagia   • Nausea and vomiting   • Gastritis without bleeding   • Class 2 obesity with body mass index (BMI) of 38.0 to 38.9 in adult   • Mixed hyperlipidemia   • Dizziness   • Hypotension   • Class 2 obesity with body mass index (BMI) of 39.0 to 39.9 in adult   • Normocytic anemia   • Vitamin D deficiency   • Anxiety     Past Surgical History:   Procedure Laterality Date   • CARPAL TUNNEL RELEASE Right 2018    Procedure: CARPAL TUNNEL RELEASE;  Surgeon: Judd Mathias MD;  Location: [facility] OR;  Service: Orthopedics   • CARPAL TUNNEL RELEASE  2018    Left   • CARPAL TUNNEL RELEASE Left 2018    Procedure: CARPAL TUNNEL RELEASE;  Surgeon: Judd Mathias MD;  Location: [facility] OR;  Service: Orthopedics   • COLONOSCOPY N/A 2019    Procedure: COLONOSCOPY a friday please ;  Surgeon: Ruslan Bowers MD;  Location: [facility] ENDOSCOPY;  Service: Gastroenterology   • ENDOSCOPY     • ENDOSCOPY N/A 2020    Procedure: ESOPHAGOGASTRODUODENOSCOPY possible dilation ;  Surgeon: Ruslan Bowers MD;  Location: [facility] ENDOSCOPY;  Service: Gastroenterology;  Laterality: N/A;   • PYLOROMYOTOMY       Social History     Socioeconomic History   • Marital status: Significant Other     Spouse name: Not on file   • Number of children: Not on file   • Years of education: Not on file   • Highest education level: Not on file   Occupational History   • Occupation:      Employer: SELF-EMPLOYED     Comment: [facility] [place] [facility] [facility] [place]   Tobacco Use   • Smoking status: Former Smoker     Packs/day: 1.00     Years: 6.00     Pack years: 6.00     Types: Cigarettes     Last attempt to quit:      Years since quittin.6   • Smokeless tobacco: Never Used   Substance and Sexual Activity   • Alcohol use: Yes     Comment: 2018 - [place] reports consumption of alcoholic beverages under a social basis (approximately 3 - 4 per  month).     • Drug use: No   • Sexual activity: Defer     Family History   Problem Relation Age of Onset   • Colon cancer Mother         onset age greater than 75y   • Diabetes Mother    • Breast cancer Sister    • Cancer Brother         bladder   • Diabetes Brother    • Hyperlipidemia Brother      Lab on 06/29/2020   Component Date Value Ref Range Status   • WBC 06/29/2020 6.15  3.40 - 10.80 10*3/mm3 Final   • RBC 06/29/2020 4.21  4.14 - 5.80 10*6/mm3 Final   • Hemoglobin 06/29/2020 12.9* 13.0 - 17.7 g/dL Final   • Hematocrit 06/29/2020 36.5* 37.5 - 51.0 % Final   • MCV 06/29/2020 86.7  79.0 - 97.0 fL Final   • MCH 06/29/2020 30.6  26.6 - 33.0 pg Final   • MCHC 06/29/2020 35.3  31.5 - 35.7 g/dL Final   • RDW 06/29/2020 12.2* 12.3 - 15.4 % Final   • RDW-SD 06/29/2020 38.4  37.0 - 54.0 fl Final   • MPV 06/29/2020 10.3  6.0 - 12.0 fL Final   • Platelets 06/29/2020 295  140 - 450 10*3/mm3 Final   • Neutrophil % 06/29/2020 55.1  42.7 - 76.0 % Final   • Lymphocyte % 06/29/2020 31.5  19.6 - 45.3 % Final   • Monocyte % 06/29/2020 8.3  5.0 - 12.0 % Final   • Eosinophil % 06/29/2020 3.1  0.3 - 6.2 % Final   • Basophil % 06/29/2020 1.0  0.0 - 1.5 % Final   • Immature Grans % 06/29/2020 1.0* 0.0 - 0.5 % Final   • Neutrophils, Absolute 06/29/2020 3.39  1.70 - 7.00 10*3/mm3 Final   • Lymphocytes, Absolute 06/29/2020 1.94  0.70 - 3.10 10*3/mm3 Final   • Monocytes, Absolute 06/29/2020 0.51  0.10 - 0.90 10*3/mm3 Final   • Eosinophils, Absolute 06/29/2020 0.19  0.00 - 0.40 10*3/mm3 Final   • Basophils, Absolute 06/29/2020 0.06  0.00 - 0.20 10*3/mm3 Final   • Immature Grans, Absolute 06/29/2020 0.06* 0.00 - 0.05 10*3/mm3 Final   • nRBC 06/29/2020 0.0  0.0 - 0.2 /100 WBC Final   • Glucose 06/29/2020 118* 65 - 99 mg/dL Final   • BUN 06/29/2020 13  6 - 20 mg/dL Final   • Creatinine 06/29/2020 1.18  0.76 - 1.27 mg/dL Final   • Sodium 06/29/2020 136  136 - 145 mmol/L Final   • Potassium 06/29/2020 4.8  3.5 - 5.2 mmol/L Final   • Chloride  06/29/2020 104  98 - 107 mmol/L Final   • CO2 06/29/2020 24.8  22.0 - 29.0 mmol/L Final   • Calcium 06/29/2020 9.5  8.6 - 10.5 mg/dL Final   • Total Protein 06/29/2020 7.2  6.0 - 8.5 g/dL Final   • Albumin 06/29/2020 4.10  3.50 - 5.20 g/dL Final   • ALT (SGPT) 06/29/2020 26  1 - 41 U/L Final   • AST (SGOT) 06/29/2020 22  1 - 40 U/L Final   • Alkaline Phosphatase 06/29/2020 89  39 - 117 U/L Final   • Total Bilirubin 06/29/2020 0.3  0.2 - 1.2 mg/dL Final   • eGFR Non African Amer 06/29/2020 65  >60 mL/min/1.73 Final   • Globulin 06/29/2020 3.1  gm/dL Final   • A/G Ratio 06/29/2020 1.3  g/dL Final   • BUN/Creatinine Ratio 06/29/2020 11.0  7.0 - 25.0 Final   • Anion Gap 06/29/2020 7.2  5.0 - 15.0 mmol/L Final   • Hemoglobin A1C 06/29/2020 6.10* 4.80 - 5.60 % Final   • Total Cholesterol 06/29/2020 166  0 - 200 mg/dL Final   • Triglycerides 06/29/2020 164* 0 - 150 mg/dL Final   • HDL Cholesterol 06/29/2020 37* 40 - 60 mg/dL Final   • LDL Cholesterol  06/29/2020 96  0 - 100 mg/dL Final   • VLDL Cholesterol 06/29/2020 32.8  5 - 40 mg/dL Final   • LDL/HDL Ratio 06/29/2020 2.60   Final   • 25 Hydroxy, Vitamin D 06/29/2020 46.8  30.0 - 100.0 ng/ml Final   • THC, Screen, Urine 06/29/2020 Negative  Negative Final   • Phencyclidine (PCP), Urine 06/29/2020 Negative  Negative Final   • Cocaine Screen, Urine 06/29/2020 Negative  Negative Final   • Methamphetamine, Ur 06/29/2020 Negative  Negative Final   • Opiate Screen 06/29/2020 Negative  Negative Final   • Amphetamine Screen, Urine 06/29/2020 Positive* Negative Final   • Benzodiazepine Screen, Urine 06/29/2020 Negative  Negative Final   • Tricyclic Antidepressants Screen 06/29/2020 Negative  Negative Final   • Methadone Screen, Urine 06/29/2020 Negative  Negative Final   • Barbiturates Screen, Urine 06/29/2020 Negative  Negative Final   • Oxycodone Screen, Urine 06/29/2020 Negative  Negative Final   • Propoxyphene Screen 06/29/2020 Negative  Negative Final   • Buprenorphine,  "Screen, Urine 06/29/2020 Negative  Negative Final      US Abdomen Complete  Narrative: PROCEDURE: Complete abdominal sonogram    COMPARISON: None    HISTORY: Abdominal pain    FINDINGS: Realtime grayscale and color-flow imaging is obtained  of the abdomen.    The gallbladder is normal.No shadowing stones, pericholecystic  fluid or wall thickening.   There is increased echogenicity throughout the liver most  consistent with fatty infiltration or other parenchymal liver  disease. No focal lesions or intrahepatic biliary ductal  dilatation.   The visualized common duct is within normal limits, measuring 5  mm in diameter.   The visualized kidneys are normal in echogenicity, without  hydronephrosis.  The pancreas is obscured by bowel gas and cannot be evaluated.    The spleen appears normal.  The aorta and inferior vena cava are obscured by gas and cannot  be evaluated.  Impression: CONCLUSION:  Fatty liver.    Electronically signed by:  Tyshawn Marin MD  2/11/2020 9:14 AM CST  Workstation: [id number]    @[email or web address]@  Immunization History   Administered Date(s) Administered   • Influenza TIV (IM) 03/28/2017, 03/28/2017   • Tdap 05/23/2016, 02/17/2017, 09/20/2019       The following portions of the patient's history were reviewed and updated as appropriate: allergies, current medications, past family history, past medical history, past social history, past surgical history and problem list.        Physical Exam   /78 (BP Location: Right arm, Patient Position: Sitting, Cuff Size: Large Adult)   Pulse 88   Temp 96.2 °F (35.7 °C)   Ht 180.3 cm (71\")   Wt 127 kg (281 lb)   SpO2 98%   BMI 39.19 kg/m²     Physical Exam   Constitutional: He is oriented to person, place, and time. He appears well-developed and well-nourished.   HENT:   Head: Normocephalic and atraumatic.   Right Ear: External ear normal.   Eyes: Pupils are equal, round, and reactive to light. Conjunctivae and EOM are normal.   Neck: Normal range of " motion. Neck supple.   Cardiovascular: Normal rate, regular rhythm and normal heart sounds.   No murmur heard.  Pulmonary/Chest: Effort normal and breath sounds normal. No respiratory distress.   Abdominal: Soft. Bowel sounds are normal. He exhibits no distension. There is tenderness.   Obese abdomen    Musculoskeletal: Normal range of motion. He exhibits no edema or deformity.   Neurological: He is alert and oriented to person, place, and time. No cranial nerve deficit.   Skin: Skin is warm. No rash noted. He is not diaphoretic. No erythema. No pallor.   Psychiatric: He has a normal mood and affect. His behavior is normal.   Nursing note and vitals reviewed.      Assessment/Plan    Diagnosis Plan   1. Class 2 obesity with body mass index (BMI) of 39.0 to 39.9 in adult, unspecified obesity type, unspecified whether serious comorbidity present  CBC Auto Differential    Comprehensive Metabolic Panel    Hemoglobin A1c    Lipid Panel    Vitamin D 25 Hydroxy    Hepatitis C antibody   2. Hyperlipidemia, unspecified hyperlipidemia type  CBC Auto Differential    Comprehensive Metabolic Panel    Hemoglobin A1c    Lipid Panel    Vitamin D 25 Hydroxy    Hepatitis C antibody   3. Attention deficit hyperactivity disorder (ADHD), predominantly inattentive type  CBC Auto Differential    Comprehensive Metabolic Panel    Hemoglobin A1c    Lipid Panel    Vitamin D 25 Hydroxy    Hepatitis C antibody   4. Stage 2 chronic kidney disease  CBC Auto Differential    Comprehensive Metabolic Panel    Hemoglobin A1c    Lipid Panel    Vitamin D 25 Hydroxy    Hepatitis C antibody   5. Fatty liver  CBC Auto Differential    Comprehensive Metabolic Panel    Hemoglobin A1c    Lipid Panel    Vitamin D 25 Hydroxy    Hepatitis C antibody   6. Mixed hyperlipidemia  CBC Auto Differential    Comprehensive Metabolic Panel    Hemoglobin A1c    Lipid Panel    Vitamin D 25 Hydroxy    Hepatitis C antibody   7. Essential hypertension  CBC Auto Differential     Comprehensive Metabolic Panel    Hemoglobin A1c    Lipid Panel    Vitamin D 25 Hydroxy    Hepatitis C antibody   8. Need for hepatitis C screening test  Hepatitis C antibody   9. Vitamin D deficiency  Vitamin D 25 Hydroxy   10. Annual physical exam     11. Normocytic anemia            Went over labwork  -annual physical exam today  -recommend influenza vaccination, recommend shingles vaccination   -normocytic anemia - continue to monitor   -hypotension - stable since stopping HCTZ   -abdominal pain/fatty liver/nausea vomiting/Gastritis/GERD with esophagitis - stop prilosec start protonix 40 mg daily. .  GI following had recent EGD   -ckd stage 2 - continue to monitor. Gave information. Advised better hydration BP control   -for back pain with sciatica/scoliosis - pain is better after PT/OT. Continue with mobic and flexeril PRN.  Consider MRI if not improving    -right shoulder pain -stable    -Vitamin D deficiency vitamin D pill once a week d   -HLP - lipitor 80mg PO qhs   -HTN - on lower side today. Stop chlrothalidone 25 mg PO q daily. Continue on lisinopril 40 mg daily.   -ADHD refilled ADHD medication Adderall XR 25 mg PO q daily for ADHD.  [facility] [place] ref number ref number reference # [id number].  Will get UDS.   -allergic rhinitis - continue singulair and flonase and will add zyrtec.  -prediabetes - prediabetes meal plan information provided.     hyperlipidemia -continue statin. Recheck lipid panel. Pt on aspirin   -obesity - recommend ketogenic diet along with intermittent fasting. Counseled weight loss >5 minutes  BMI at 39.19     -advised pt to be safe and call with questions and concerns  -advised to go to ER or call 911 if symptoms get severe  -advised to followup with specialist and referrals   -advised pt to be safe during COVID-19 pandemic  -total time with pt >25 minutes   -recheck in 4 weeks         This document has been electronically signed by Scottie Borges MD on August 31, 2020 09:49

## 2020-08-31 ENCOUNTER — OFFICE VISIT (OUTPATIENT)
Dept: FAMILY MEDICINE CLINIC | Facility: CLINIC | Age: 50
End: 2020-08-31

## 2020-08-31 VITALS
WEIGHT: 281 LBS | TEMPERATURE: 96.2 F | HEIGHT: 71 IN | HEART RATE: 88 BPM | OXYGEN SATURATION: 98 % | SYSTOLIC BLOOD PRESSURE: 126 MMHG | DIASTOLIC BLOOD PRESSURE: 78 MMHG | BODY MASS INDEX: 39.34 KG/M2

## 2020-08-31 DIAGNOSIS — E55.9 VITAMIN D DEFICIENCY: ICD-10-CM

## 2020-08-31 DIAGNOSIS — K21.00 GASTROESOPHAGEAL REFLUX DISEASE WITH ESOPHAGITIS: ICD-10-CM

## 2020-08-31 DIAGNOSIS — F90.0 ATTENTION DEFICIT HYPERACTIVITY DISORDER (ADHD), PREDOMINANTLY INATTENTIVE TYPE: ICD-10-CM

## 2020-08-31 DIAGNOSIS — K76.0 FATTY LIVER: ICD-10-CM

## 2020-08-31 DIAGNOSIS — D64.9 NORMOCYTIC ANEMIA: ICD-10-CM

## 2020-08-31 DIAGNOSIS — Z00.00 ANNUAL PHYSICAL EXAM: ICD-10-CM

## 2020-08-31 DIAGNOSIS — I10 ESSENTIAL HYPERTENSION: ICD-10-CM

## 2020-08-31 DIAGNOSIS — E66.9 CLASS 2 OBESITY WITH BODY MASS INDEX (BMI) OF 39.0 TO 39.9 IN ADULT, UNSPECIFIED OBESITY TYPE, UNSPECIFIED WHETHER SERIOUS COMORBIDITY PRESENT: ICD-10-CM

## 2020-08-31 DIAGNOSIS — Z02.83 ENCOUNTER FOR DRUG SCREENING: Primary | ICD-10-CM

## 2020-08-31 DIAGNOSIS — Z11.59 NEED FOR HEPATITIS C SCREENING TEST: ICD-10-CM

## 2020-08-31 DIAGNOSIS — K29.70 GASTRITIS WITHOUT BLEEDING, UNSPECIFIED CHRONICITY, UNSPECIFIED GASTRITIS TYPE: ICD-10-CM

## 2020-08-31 DIAGNOSIS — N18.2 STAGE 2 CHRONIC KIDNEY DISEASE: ICD-10-CM

## 2020-08-31 DIAGNOSIS — E78.2 MIXED HYPERLIPIDEMIA: ICD-10-CM

## 2020-08-31 PROBLEM — F41.9 ANXIETY: Status: ACTIVE | Noted: 2020-08-31

## 2020-08-31 PROCEDURE — 99213 OFFICE O/P EST LOW 20 MIN: CPT | Performed by: FAMILY MEDICINE

## 2020-08-31 RX ORDER — PANTOPRAZOLE SODIUM 40 MG/1
40 TABLET, DELAYED RELEASE ORAL DAILY
Qty: 30 TABLET | Refills: 3 | Status: SHIPPED | OUTPATIENT
Start: 2020-08-31 | End: 2021-01-25 | Stop reason: SDUPTHER

## 2020-08-31 RX ORDER — DEXTROAMPHETAMINE SACCHARATE, AMPHETAMINE ASPARTATE MONOHYDRATE, DEXTROAMPHETAMINE SULFATE AND AMPHETAMINE SULFATE 6.25; 6.25; 6.25; 6.25 MG/1; MG/1; MG/1; MG/1
25 CAPSULE, EXTENDED RELEASE ORAL EVERY MORNING
Qty: 30 CAPSULE | Refills: 0 | Status: SHIPPED | OUTPATIENT
Start: 2020-08-31 | End: 2020-09-28 | Stop reason: SDUPTHER

## 2020-09-16 NOTE — PROGRESS NOTES
Subjective:  Randall Hernandez is a 49 y.o. male who presents for       Patient Active Problem List   Diagnosis   • Hyperlipidemia   • Inattention   • Non morbid obesity   • Prediabetes   • Attention deficit hyperactivity disorder (ADHD), combined type   • Need for vaccination   • Gastroesophageal reflux disease   • Encounter for drug screening   • Tingling in extremities   • Elevated BP without diagnosis of hypertension   • Allergic rhinitis   • Elevated blood pressure reading   • Pruritic rash   • Numbness in both hands   • Bilateral elbow joint pain   • Pain in both wrists   • Ulnar neuropathy of left upper extremity   • General medical examination   • Bilateral carpal tunnel syndrome   • History of prediabetes   • Numbness and tingling in both hands   • Essential hypertension   • Cerumen debris on tympanic membrane of both ears   • Carpal tunnel syndrome   • S/P carpal tunnel release   • Encounter for screening for malignant neoplasm of colon   • FH: colon cancer   • Acute recurrent sinusitis   • Attention deficit hyperactivity disorder (ADHD), predominantly inattentive type   • Class 2 obesity with body mass index (BMI) of 35.0 to 35.9 in adult   • Annual physical exam   • Arthritis of back   • Scoliosis   • Hyponatremia   • Hypochloremia   • Stage 2 chronic kidney disease   • Epigastric pain   • Bilious vomiting with nausea   • Fatty liver   • Dysphagia   • Nausea and vomiting   • Gastritis without bleeding   • Class 2 obesity with body mass index (BMI) of 38.0 to 38.9 in adult   • Mixed hyperlipidemia   • Dizziness   • Hypotension   • Class 2 obesity with body mass index (BMI) of 39.0 to 39.9 in adult   • Normocytic anemia   • Vitamin D deficiency   • Anxiety           Current Outpatient Medications:   •  amphetamine-dextroamphetamine XR (Adderall XR) 25 MG 24 hr capsule, Take 1 capsule by mouth Every Morning Take 1 tablet by mouth daily, Disp: 30 capsule, Rfl: 0  •  aspirin 81 MG EC tablet, Take 1 tablet  by mouth Daily., Disp: 30 tablet, Rfl: 3  •  atorvastatin (LIPITOR) 80 MG tablet, Take 1 tablet by mouth Daily., Disp: 30 tablet, Rfl: 3  •  fluticasone (FLONASE) 50 MCG/ACT nasal spray, INSTILL 2 SPRAYS IN EACH NOSTRIL EVERY DAY, Disp: 16 g, Rfl: 3  •  lisinopril (PRINIVIL,ZESTRIL) 40 MG tablet, Take 1 tablet by mouth Daily., Disp: 30 tablet, Rfl: 3  •  montelukast (SINGULAIR) 10 MG tablet, Take 1 tablet by mouth Every Night., Disp: 30 tablet, Rfl: 3  •  pantoprazole (Protonix) 40 MG EC tablet, Take 1 tablet by mouth Daily., Disp: 30 tablet, Rfl: 3  •  vitamin D (ERGOCALCIFEROL) 1.25 MG (05808 UT) capsule capsule, Take 1 capsule by mouth 1 (One) Time Per Week., Disp: 4 capsule, Rfl: 3    HPI                Pt is 50 yo male with management  of obesity, history of prediabetes, HTN,  GERD, vitamin D deficiency, alelrgic rhinitis, ADHD predominantly inattentive type ,sp bilateral carpal tunnel release surgery,  Chronic back pain (arthritis of lumbar spine, short pedicles, mild scoliosis, mild arthritis thoracic spine).  CKD stage 2 , fatty liver, gastirits, esophagitis         8/31/20 in office visit for recheck on pt's above medical issues. Pt is needing refill on ADHD medication. Doing well on ADHD medication no chest pain no dizziness. No palpations. His main issues is heartburn. He is on prilosec 40 mg daily.  He ate some food yesterday that made stomach upset. His last EGD earlier this year showed esophagitis and gastritis.     9/28/20 in office visit for recheck on pt's above medical issues. He has yet to get labwork ordered on 8/31/20.  He is also here for refill on ADHD medication doing well overall. Since stopping water pill for HTN pt has had no dizziness. BP stable today. No chest pain no palpitations doing well on ADHD medication . Doing well on ADHD medication           Obesity   This is a chronic problem. The current episode started more than 1 year ago. The problem occurs constantly. The problem has  been unchanged. Associated symptoms include arthralgias and numbness. Pertinent negatives include no abdominal pain, anorexia, change in bowel habit, chest pain, chills, congestion, coughing, diaphoresis, fatigue, fever, headaches, joint swelling, myalgias, nausea, rash, sore throat, swollen glands, urinary symptoms, vertigo, visual change, vomiting or weakness. Nothing aggravates the symptoms. He has tried nothing for the symptoms. The treatment provided no relief.   Hypertension   This is a new problem. The current episode started more than 1 month ago. The problem has been gradually improving tThe problem is controlled. Pertinent negatives include no anxiety, blurred vision, chest pain, headaches, malaise/fatigue, neck pain, orthopnea, palpitations, peripheral edema, PND, shortness of breath or sweats. Risk factors for coronary artery disease include male gender, obesity and sedentary lifestyle. Past treatments include diuretics and ace-inhibitor There are no compliance problems. Treatment has provided significant relief     Review of Systems  Review of Systems   Constitutional: Positive for activity change and fatigue. Negative for appetite change, chills, diaphoresis and fever.   HENT: Negative for congestion, postnasal drip, rhinorrhea, sinus pressure, sinus pain, sneezing, sore throat, trouble swallowing and voice change.    Respiratory: Negative for cough, choking, chest tightness, shortness of breath, wheezing and stridor.    Cardiovascular: Negative for chest pain.   Gastrointestinal: Negative for diarrhea, nausea and vomiting.   Musculoskeletal: Positive for arthralgias.   Neurological: Positive for weakness and numbness. Negative for headaches.   Psychiatric/Behavioral: Positive for decreased concentration. Negative for behavioral problems.       Patient Active Problem List   Diagnosis   • Hyperlipidemia   • Inattention   • Non morbid obesity   • Prediabetes   • Attention deficit hyperactivity disorder  (ADHD), combined type   • Need for vaccination   • Gastroesophageal reflux disease   • Encounter for drug screening   • Tingling in extremities   • Elevated BP without diagnosis of hypertension   • Allergic rhinitis   • Elevated blood pressure reading   • Pruritic rash   • Numbness in both hands   • Bilateral elbow joint pain   • Pain in both wrists   • Ulnar neuropathy of left upper extremity   • General medical examination   • Bilateral carpal tunnel syndrome   • History of prediabetes   • Numbness and tingling in both hands   • Essential hypertension   • Cerumen debris on tympanic membrane of both ears   • Carpal tunnel syndrome   • S/P carpal tunnel release   • Encounter for screening for malignant neoplasm of colon   • FH: colon cancer   • Acute recurrent sinusitis   • Attention deficit hyperactivity disorder (ADHD), predominantly inattentive type   • Class 2 obesity with body mass index (BMI) of 35.0 to 35.9 in adult   • Annual physical exam   • Arthritis of back   • Scoliosis   • Hyponatremia   • Hypochloremia   • Stage 2 chronic kidney disease   • Epigastric pain   • Bilious vomiting with nausea   • Fatty liver   • Dysphagia   • Nausea and vomiting   • Gastritis without bleeding   • Class 2 obesity with body mass index (BMI) of 38.0 to 38.9 in adult   • Mixed hyperlipidemia   • Dizziness   • Hypotension   • Class 2 obesity with body mass index (BMI) of 39.0 to 39.9 in adult   • Normocytic anemia   • Vitamin D deficiency   • Anxiety     Past Surgical History:   Procedure Laterality Date   • CARPAL TUNNEL RELEASE Right 11/2/2018    Procedure: CARPAL TUNNEL RELEASE;  Surgeon: Judd Mathias MD;  Location: [facility];  Service: Orthopedics   • CARPAL TUNNEL RELEASE  12/07/2018    Left   • CARPAL TUNNEL RELEASE Left 12/7/2018    Procedure: CARPAL TUNNEL RELEASE;  Surgeon: Judd Mathias MD;  Location: [facility];  Service: Orthopedics   • COLONOSCOPY N/A 1/18/2019    Procedure: COLONOSCOPY a friday  please ;  Surgeon: Ruslan Bowers MD;  Location: [facility] ENDOSCOPY;  Service: Gastroenterology   • ENDOSCOPY     • ENDOSCOPY N/A 2020    Procedure: ESOPHAGOGASTRODUODENOSCOPY possible dilation ;  Surgeon: Ruslan Bowers MD;  Location: [facility] ENDOSCOPY;  Service: Gastroenterology;  Laterality: N/A;   • PYLOROMYOTOMY       Social History     Socioeconomic History   • Marital status: Significant Other     Spouse name: Not on file   • Number of children: Not on file   • Years of education: Not on file   • Highest education level: Not on file   Occupational History   • Occupation:      Employer: SELF-EMPLOYED     Comment: Patient resides with Addis Rodriguez.   Tobacco Use   • Smoking status: Former Smoker     Packs/day: 1.00     Years: 6.00     Pack years: 6.00     Types: Cigarettes     Quit date:      Years since quittin.7   • Smokeless tobacco: Never Used   Substance and Sexual Activity   • Alcohol use: Yes     Comment: 2018 - Patinet reports consumption of alcoholic beverages under a social basis (approximately 3 - 4 per month).     • Drug use: No   • Sexual activity: Defer     Family History   Problem Relation Age of Onset   • Colon cancer Mother         onset age greater than 75y   • Diabetes Mother    • Breast cancer Sister    • Cancer Brother         bladder   • Diabetes Brother    • Hyperlipidemia Brother      Lab on 2020   Component Date Value Ref Range Status   • WBC 2020 6.15  3.40 - 10.80 10*3/mm3 Final   • RBC 2020 4.21  4.14 - 5.80 10*6/mm3 Final   • Hemoglobin 2020 12.9* 13.0 - 17.7 g/dL Final   • Hematocrit 2020 36.5* 37.5 - 51.0 % Final   • MCV 2020 86.7  79.0 - 97.0 fL Final   • MCH 2020 30.6  26.6 - 33.0 pg Final   • MCHC 2020 35.3  31.5 - 35.7 g/dL Final   • RDW 2020 12.2* 12.3 - 15.4 % Final   • RDW-SD 2020 38.4  37.0 - 54.0 fl Final   • MPV 2020 10.3  6.0 - 12.0 fL Final   •  Platelets 06/29/2020 295  140 - 450 10*3/mm3 Final   • Neutrophil % 06/29/2020 55.1  42.7 - 76.0 % Final   • Lymphocyte % 06/29/2020 31.5  19.6 - 45.3 % Final   • Monocyte % 06/29/2020 8.3  5.0 - 12.0 % Final   • Eosinophil % 06/29/2020 3.1  0.3 - 6.2 % Final   • Basophil % 06/29/2020 1.0  0.0 - 1.5 % Final   • Immature Grans % 06/29/2020 1.0* 0.0 - 0.5 % Final   • Neutrophils, Absolute 06/29/2020 3.39  1.70 - 7.00 10*3/mm3 Final   • Lymphocytes, Absolute 06/29/2020 1.94  0.70 - 3.10 10*3/mm3 Final   • Monocytes, Absolute 06/29/2020 0.51  0.10 - 0.90 10*3/mm3 Final   • Eosinophils, Absolute 06/29/2020 0.19  0.00 - 0.40 10*3/mm3 Final   • Basophils, Absolute 06/29/2020 0.06  0.00 - 0.20 10*3/mm3 Final   • Immature Grans, Absolute 06/29/2020 0.06* 0.00 - 0.05 10*3/mm3 Final   • nRBC 06/29/2020 0.0  0.0 - 0.2 /100 WBC Final   • Glucose 06/29/2020 118* 65 - 99 mg/dL Final   • BUN 06/29/2020 13  6 - 20 mg/dL Final   • Creatinine 06/29/2020 1.18  0.76 - 1.27 mg/dL Final   • Sodium 06/29/2020 136  136 - 145 mmol/L Final   • Potassium 06/29/2020 4.8  3.5 - 5.2 mmol/L Final   • Chloride 06/29/2020 104  98 - 107 mmol/L Final   • CO2 06/29/2020 24.8  22.0 - 29.0 mmol/L Final   • Calcium 06/29/2020 9.5  8.6 - 10.5 mg/dL Final   • Total Protein 06/29/2020 7.2  6.0 - 8.5 g/dL Final   • Albumin 06/29/2020 4.10  3.50 - 5.20 g/dL Final   • ALT (SGPT) 06/29/2020 26  1 - 41 U/L Final   • AST (SGOT) 06/29/2020 22  1 - 40 U/L Final   • Alkaline Phosphatase 06/29/2020 89  39 - 117 U/L Final   • Total Bilirubin 06/29/2020 0.3  0.2 - 1.2 mg/dL Final   • eGFR Non African Amer 06/29/2020 65  >60 mL/min/1.73 Final   • Globulin 06/29/2020 3.1  gm/dL Final   • A/G Ratio 06/29/2020 1.3  g/dL Final   • BUN/Creatinine Ratio 06/29/2020 11.0  7.0 - 25.0 Final   • Anion Gap 06/29/2020 7.2  5.0 - 15.0 mmol/L Final   • Hemoglobin A1C 06/29/2020 6.10* 4.80 - 5.60 % Final   • Total Cholesterol 06/29/2020 166  0 - 200 mg/dL Final   • Triglycerides  06/29/2020 164* 0 - 150 mg/dL Final   • HDL Cholesterol 06/29/2020 37* 40 - 60 mg/dL Final   • LDL Cholesterol  06/29/2020 96  0 - 100 mg/dL Final   • VLDL Cholesterol 06/29/2020 32.8  5 - 40 mg/dL Final   • LDL/HDL Ratio 06/29/2020 2.60   Final   • 25 Hydroxy, Vitamin D 06/29/2020 46.8  30.0 - 100.0 ng/ml Final   • THC, Screen, Urine 06/29/2020 Negative  Negative Final   • Phencyclidine (PCP), Urine 06/29/2020 Negative  Negative Final   • Cocaine Screen, Urine 06/29/2020 Negative  Negative Final   • Methamphetamine, Ur 06/29/2020 Negative  Negative Final   • Opiate Screen 06/29/2020 Negative  Negative Final   • Amphetamine Screen, Urine 06/29/2020 Positive* Negative Final   • Benzodiazepine Screen, Urine 06/29/2020 Negative  Negative Final   • Tricyclic Antidepressants Screen 06/29/2020 Negative  Negative Final   • Methadone Screen, Urine 06/29/2020 Negative  Negative Final   • Barbiturates Screen, Urine 06/29/2020 Negative  Negative Final   • Oxycodone Screen, Urine 06/29/2020 Negative  Negative Final   • Propoxyphene Screen 06/29/2020 Negative  Negative Final   • Buprenorphine, Screen, Urine 06/29/2020 Negative  Negative Final      US Abdomen Complete  Narrative: PROCEDURE: Complete abdominal sonogram    COMPARISON: None    HISTORY: Abdominal pain    FINDINGS: Realtime grayscale and color-flow imaging is obtained  of the abdomen.    The gallbladder is normal.No shadowing stones, pericholecystic  fluid or wall thickening.   There is increased echogenicity throughout the liver most  consistent with fatty infiltration or other parenchymal liver  disease. No focal lesions or intrahepatic biliary ductal  dilatation.   The visualized common duct is within normal limits, measuring 5  mm in diameter.   The visualized kidneys are normal in echogenicity, without  hydronephrosis.  The pancreas is obscured by bowel gas and cannot be evaluated.    The spleen appears normal.  The aorta and inferior vena cava are obscured by  "gas and cannot  be evaluated.  Impression: CONCLUSION:  Fatty liver.    Electronically signed by:  Tyshawn Marin MD  2/11/2020 9:14 AM [facility]  Workstation: [id number]    @[employer]@  Immunization History   Administered Date(s) Administered   • Influenza TIV (IM) 03/28/2017, 03/28/2017   • Tdap 05/23/2016, 02/17/2017, 09/20/2019       The following portions of the patient's history were reviewed and updated as appropriate: allergies, current medications, past family history, past medical history, past social history, past surgical history and problem list.        Physical Exam  /74 (BP Location: Right arm, Patient Position: Sitting, Cuff Size: Adult)   Pulse 72   Temp 96.6 °F (35.9 °C)   Ht 180.3 cm (71\")   Wt 128 kg (282 lb 3.2 oz)   SpO2 97%   BMI 39.36 kg/m²     Physical Exam  Vitals signs and nursing note reviewed.   Constitutional:       Appearance: He is well-developed. He is not diaphoretic.   HENT:      Head: Normocephalic and atraumatic.      Right Ear: External ear normal.   Eyes:      Conjunctiva/sclera: Conjunctivae normal.      Pupils: Pupils are equal, round, and reactive to light.   Neck:      Musculoskeletal: Normal range of motion and neck supple.   Cardiovascular:      Rate and Rhythm: Normal rate and regular rhythm.      Heart sounds: Normal heart sounds. No murmur.   Pulmonary:      Effort: Pulmonary effort is normal. No respiratory distress.      Breath sounds: Normal breath sounds.   Abdominal:      General: Bowel sounds are normal. There is no distension.      Palpations: Abdomen is soft.      Tenderness: There is no abdominal tenderness.      Comments: Obese abdomen    Musculoskeletal: Normal range of motion.         General: No deformity.   Skin:     General: Skin is warm.      Coloration: Skin is not pale.      Findings: No erythema or rash.   Neurological:      Mental Status: He is alert and oriented to person, place, and time.      Cranial Nerves: No cranial nerve deficit. "   Psychiatric:         Behavior: Behavior normal.         Assessment/Plan    Diagnosis Plan   1. Hyperlipidemia, unspecified hyperlipidemia type     2. Attention deficit hyperactivity disorder (ADHD), predominantly inattentive type  amphetamine-dextroamphetamine XR (Adderall XR) 25 MG 24 hr capsule   3. Essential hypertension     4. Class 2 obesity with body mass index (BMI) of 39.0 to 39.9 in adult, unspecified obesity type, unspecified whether serious comorbidity present             Went over labwork  -recommend influenza vaccination, recommend shingles vaccination - pt declined    -normocytic anemia - continue to monitor   -abdominal pain/fatty liver/nausea vomiting/Gastritis/GERD with esophagitis - stop prilosec start protonix 40 mg daily. .  GI following had recent EGD   -ckd stage 2 - continue to monitor. Gave information. Advised better hydration BP control   -for back pain with sciatica/scoliosis - pain is better after PT/OT. Continue with mobic and flexeril PRN.  Consider MRI if not improving    -right shoulder pain -stable    -Vitamin D deficiency vitamin D pill once a week d   -HLP - lipitor 80mg PO qhs   -HTN - stable on lisinopril 40 mg daily. stopped diuretic   -ADHD refilled ADHD medication Adderall XR 25 mg PO q daily for ADHD.  [facility] ref number ref number reference # [id number].  Will get UDS.   -GERD - on protonix 40 mg PO q daily.    -allergic rhinitis - continue singulair and flonase and will add zyrtec.  -prediabetes - prediabetes meal plan information provided.     hyperlipidemia - on lipitor 80 mg PO qhs   -obesity - recommend ketogenic diet along with intermittent fasting. Counseled weight loss >5 minutes  BMI at 39.19     -advised pt to be safe and call with questions and concerns  -advised to go to ER or call 911 if symptoms get severe  -advised to followup with specialist and referrals   -advised pt to be safe during COVID-19 pandemic  -total time with pt >25 minutes   -recheck in 1 month          This document has been electronically signed by Scottie Borges MD on September 28, 2020 09:31 CDT

## 2020-09-28 ENCOUNTER — OFFICE VISIT (OUTPATIENT)
Dept: FAMILY MEDICINE CLINIC | Facility: CLINIC | Age: 50
End: 2020-09-28

## 2020-09-28 VITALS
HEIGHT: 71 IN | DIASTOLIC BLOOD PRESSURE: 74 MMHG | OXYGEN SATURATION: 97 % | HEART RATE: 72 BPM | WEIGHT: 282.2 LBS | TEMPERATURE: 96.6 F | BODY MASS INDEX: 39.51 KG/M2 | SYSTOLIC BLOOD PRESSURE: 126 MMHG

## 2020-09-28 DIAGNOSIS — F90.0 ATTENTION DEFICIT HYPERACTIVITY DISORDER (ADHD), PREDOMINANTLY INATTENTIVE TYPE: ICD-10-CM

## 2020-09-28 DIAGNOSIS — E78.5 HYPERLIPIDEMIA, UNSPECIFIED HYPERLIPIDEMIA TYPE: Primary | ICD-10-CM

## 2020-09-28 DIAGNOSIS — E66.9 CLASS 2 OBESITY WITH BODY MASS INDEX (BMI) OF 39.0 TO 39.9 IN ADULT, UNSPECIFIED OBESITY TYPE, UNSPECIFIED WHETHER SERIOUS COMORBIDITY PRESENT: ICD-10-CM

## 2020-09-28 DIAGNOSIS — I10 ESSENTIAL HYPERTENSION: ICD-10-CM

## 2020-09-28 PROCEDURE — 99213 OFFICE O/P EST LOW 20 MIN: CPT | Performed by: FAMILY MEDICINE

## 2020-09-28 RX ORDER — DEXTROAMPHETAMINE SACCHARATE, AMPHETAMINE ASPARTATE MONOHYDRATE, DEXTROAMPHETAMINE SULFATE AND AMPHETAMINE SULFATE 6.25; 6.25; 6.25; 6.25 MG/1; MG/1; MG/1; MG/1
25 CAPSULE, EXTENDED RELEASE ORAL EVERY MORNING
Qty: 30 CAPSULE | Refills: 0 | Status: SHIPPED | OUTPATIENT
Start: 2020-09-28 | End: 2020-10-28 | Stop reason: SDUPTHER

## 2020-10-14 NOTE — PROGRESS NOTES
Subjective:  Randall Hernandez is a 50 y.o. male who presents for ADHD      Patient Active Problem List   Diagnosis   • Hyperlipidemia   • Inattention   • Non morbid obesity   • Prediabetes   • Attention deficit hyperactivity disorder (ADHD), combined type   • Need for vaccination   • Gastroesophageal reflux disease   • Encounter for drug screening   • Tingling in extremities   • Elevated BP without diagnosis of hypertension   • Allergic rhinitis   • Elevated blood pressure reading   • Pruritic rash   • Numbness in both hands   • Bilateral elbow joint pain   • Pain in both wrists   • Ulnar neuropathy of left upper extremity   • General medical examination   • Bilateral carpal tunnel syndrome   • History of prediabetes   • Numbness and tingling in both hands   • Essential hypertension   • Cerumen debris on tympanic membrane of both ears   • Carpal tunnel syndrome   • S/P carpal tunnel release   • Encounter for screening for malignant neoplasm of colon   • FH: colon cancer   • Acute recurrent sinusitis   • Attention deficit hyperactivity disorder (ADHD), predominantly inattentive type   • Class 2 obesity with body mass index (BMI) of 35.0 to 35.9 in adult   • Annual physical exam   • Arthritis of back   • Scoliosis   • Hyponatremia   • Hypochloremia   • Stage 2 chronic kidney disease   • Epigastric pain   • Bilious vomiting with nausea   • Fatty liver   • Dysphagia   • Nausea and vomiting   • Gastritis without bleeding   • Class 2 obesity with body mass index (BMI) of 38.0 to 38.9 in adult   • Mixed hyperlipidemia   • Dizziness   • Hypotension   • Class 2 obesity with body mass index (BMI) of 39.0 to 39.9 in adult   • Normocytic anemia   • Vitamin D deficiency   • Anxiety           Current Outpatient Medications:   •  amphetamine-dextroamphetamine XR (Adderall XR) 25 MG 24 hr capsule, Take 1 capsule by mouth Every Morning Take 1 tablet by mouth daily, Disp: 30 capsule, Rfl: 0  •  aspirin 81 MG EC tablet, Take 1  tablet by mouth Daily., Disp: 30 tablet, Rfl: 3  •  atorvastatin (LIPITOR) 80 MG tablet, Take 1 tablet by mouth Daily., Disp: 30 tablet, Rfl: 3  •  fluticasone (FLONASE) 50 MCG/ACT nasal spray, INSTILL 2 SPRAYS IN EACH NOSTRIL EVERY DAY, Disp: 16 g, Rfl: 3  •  lisinopril (PRINIVIL,ZESTRIL) 40 MG tablet, Take 1 tablet by mouth Daily., Disp: 30 tablet, Rfl: 3  •  montelukast (SINGULAIR) 10 MG tablet, Take 1 tablet by mouth Every Night., Disp: 30 tablet, Rfl: 3  •  pantoprazole (Protonix) 40 MG EC tablet, Take 1 tablet by mouth Daily., Disp: 30 tablet, Rfl: 3  •  vitamin D (ERGOCALCIFEROL) 1.25 MG (17647 UT) capsule capsule, Take 1 capsule by mouth 1 (One) Time Per Week., Disp: 4 capsule, Rfl: 3              Pt is 51 yo male with management  of obesity, history of prediabetes, HTN,  GERD, vitamin D deficiency, alelrgic rhinitis, ADHD predominantly inattentive type ,sp bilateral carpal tunnel release surgery,  Chronic back pain (arthritis of lumbar spine, short pedicles, mild scoliosis, mild arthritis thoracic spine).  CKD stage 2 , fatty liver, gastirits, esophagitis      9/28/20 in office visit for recheck on pt's above medical issues. He has yet to get labwork ordered on 8/31/20.  He is also here for refill on ADHD medication doing well overall. Since stopping water pill for HTN pt has had no dizziness. BP stable today. No chest pain no palpitations doing well on ADHD medication . Doing well on ADHD medication     10/28l/20 in office visit for recheck on pt's above medical issues. Pt recently had labwork that is pending to recheck his HLP, prediabetes, CKD stage 2, vitamin D deficiency.  Doing well except almost running out of protonix. It does help with GERD  Doing well on ADHD medication on Adderall XR 25 mg daily.  He is in the process of moving.  No chest pain no dizziness no palpitations             Heartburn  He reports no chest pain, no choking, no coughing, no nausea, no sore throat or no wheezing. This is a  chronic problem. The current episode started more than 1 month ago. The problem occurs constantly. The problem has been gradually improving. The symptoms are aggravated by certain foods. Associated symptoms include fatigue. He has tried a PPI (prilosec protonix ) for the symptoms. The treatment provided significant relief. Past procedures do not include an abdominal ultrasound, an EGD, esophageal manometry, esophageal pH monitoring, H. pylori antibody titer or a UGI. Past invasive treatments do not include gastroplasty, gastroplication or reflux surgery.   Obesity   This is a chronic problem. The current episode started more than 1 year ago. The problem occurs constantly. The problem has been worsening  . Associated symptoms include arthralgias and numbness. Pertinent negatives include no abdominal pain, anorexia, change in bowel habit, chest pain, chills, congestion, coughing, diaphoresis, fatigue, fever, headaches, joint swelling, myalgias, nausea, rash, sore throat, swollen glands, urinary symptoms, vertigo, visual change, vomiting or weakness. Nothing aggravates the symptoms. He has tried nothing for the symptoms. The treatment provided no relief.   Hypertension   This is a chronic  problem. The current episode started more than 1 month ago. The problem has been gradually improving tThe problem is controlled. Pertinent negatives include no anxiety, blurred vision, chest pain, headaches, malaise/fatigue, neck pain, orthopnea, palpitations, peripheral edema, PND, shortness of breath or sweats. Risk factors for coronary artery disease include male gender, obesity and sedentary lifestyle. Past treatments include diuretics and ace-inhibitor There are no compliance problems. Treatment has provided significant relief        Review of Systems  Review of Systems   Constitutional: Positive for activity change and fatigue. Negative for appetite change, chills, diaphoresis and fever.   HENT: Negative for congestion,  postnasal drip, rhinorrhea, sinus pressure, sinus pain, sneezing, sore throat, trouble swallowing and voice change.    Respiratory: Negative for cough, choking, chest tightness, shortness of breath, wheezing and stridor.    Cardiovascular: Negative for chest pain.   Gastrointestinal: Negative for diarrhea, nausea and vomiting.   Musculoskeletal: Positive for arthralgias.   Neurological: Positive for weakness and numbness. Negative for headaches.   Psychiatric/Behavioral: Positive for decreased concentration.       Patient Active Problem List   Diagnosis   • Hyperlipidemia   • Inattention   • Non morbid obesity   • Prediabetes   • Attention deficit hyperactivity disorder (ADHD), combined type   • Need for vaccination   • Gastroesophageal reflux disease   • Encounter for drug screening   • Tingling in extremities   • Elevated BP without diagnosis of hypertension   • Allergic rhinitis   • Elevated blood pressure reading   • Pruritic rash   • Numbness in both hands   • Bilateral elbow joint pain   • Pain in both wrists   • Ulnar neuropathy of left upper extremity   • General medical examination   • Bilateral carpal tunnel syndrome   • History of prediabetes   • Numbness and tingling in both hands   • Essential hypertension   • Cerumen debris on tympanic membrane of both ears   • Carpal tunnel syndrome   • S/P carpal tunnel release   • Encounter for screening for malignant neoplasm of colon   • FH: colon cancer   • Acute recurrent sinusitis   • Attention deficit hyperactivity disorder (ADHD), predominantly inattentive type   • Class 2 obesity with body mass index (BMI) of 35.0 to 35.9 in adult   • Annual physical exam   • Arthritis of back   • Scoliosis   • Hyponatremia   • Hypochloremia   • Stage 2 chronic kidney disease   • Epigastric pain   • Bilious vomiting with nausea   • Fatty liver   • Dysphagia   • Nausea and vomiting   • Gastritis without bleeding   • Class 2 obesity with body mass index (BMI) of 38.0 to 38.9  in adult   • Mixed hyperlipidemia   • Dizziness   • Hypotension   • Class 2 obesity with body mass index (BMI) of 39.0 to 39.9 in adult   • Normocytic anemia   • Vitamin D deficiency   • Anxiety     Past Surgical History:   Procedure Laterality Date   • CARPAL TUNNEL RELEASE Right 2018    Procedure: CARPAL TUNNEL RELEASE;  Surgeon: Judd Mathias MD;  Location: [facility] OR;  Service: Orthopedics   • CARPAL TUNNEL RELEASE  2018    Left   • CARPAL TUNNEL RELEASE Left 2018    Procedure: CARPAL TUNNEL RELEASE;  Surgeon: Judd Mathias MD;  Location: [facility] OR;  Service: Orthopedics   • COLONOSCOPY N/A 2019    Procedure: COLONOSCOPY a friday please ;  Surgeon: Ruslan Bowers MD;  Location: [facility] ENDOSCOPY;  Service: Gastroenterology   • ENDOSCOPY     • ENDOSCOPY N/A 2020    Procedure: ESOPHAGOGASTRODUODENOSCOPY possible dilation ;  Surgeon: Ruslan Bowers MD;  Location: [facility] ENDOSCOPY;  Service: Gastroenterology;  Laterality: N/A;   • PYLOROMYOTOMY       Social History     Socioeconomic History   • Marital status: Significant Other     Spouse name: Not on file   • Number of children: Not on file   • Years of education: Not on file   • Highest education level: Not on file   Occupational History   • Occupation:      Employer: SELF-EMPLOYED     Comment: Patient resides with [place].   Tobacco Use   • Smoking status: Former Smoker     Packs/day: 1.00     Years: 6.00     Pack years: 6.00     Types: Cigarettes     Quit date:      Years since quittin.8   • Smokeless tobacco: Never Used   Substance and Sexual Activity   • Alcohol use: Yes     Comment: 2018 - Kevin reports consumption of alcoholic beverages under a social basis (approximately 3 - 4 per month).     • Drug use: No   • Sexual activity: Defer     Family History   Problem Relation Age of Onset   • Colon cancer Mother         onset age greater than 75y   • Diabetes Mother     • Breast cancer Sister    • Cancer Brother         bladder   • Diabetes Brother    • Hyperlipidemia Brother      Lab on 06/29/2020   Component Date Value Ref Range Status   • WBC 06/29/2020 6.15  3.40 - 10.80 10*3/mm3 Final   • RBC 06/29/2020 4.21  4.14 - 5.80 10*6/mm3 Final   • Hemoglobin 06/29/2020 12.9* 13.0 - 17.7 g/dL Final   • Hematocrit 06/29/2020 36.5* 37.5 - 51.0 % Final   • MCV 06/29/2020 86.7  79.0 - 97.0 fL Final   • MCH 06/29/2020 30.6  26.6 - 33.0 pg Final   • MCHC 06/29/2020 35.3  31.5 - 35.7 g/dL Final   • RDW 06/29/2020 12.2* 12.3 - 15.4 % Final   • RDW-SD 06/29/2020 38.4  37.0 - 54.0 fl Final   • MPV 06/29/2020 10.3  6.0 - 12.0 fL Final   • Platelets 06/29/2020 295  140 - 450 10*3/mm3 Final   • Neutrophil % 06/29/2020 55.1  42.7 - 76.0 % Final   • Lymphocyte % 06/29/2020 31.5  19.6 - 45.3 % Final   • Monocyte % 06/29/2020 8.3  5.0 - 12.0 % Final   • Eosinophil % 06/29/2020 3.1  0.3 - 6.2 % Final   • Basophil % 06/29/2020 1.0  0.0 - 1.5 % Final   • Immature Grans % 06/29/2020 1.0* 0.0 - 0.5 % Final   • Neutrophils, Absolute 06/29/2020 3.39  1.70 - 7.00 10*3/mm3 Final   • Lymphocytes, Absolute 06/29/2020 1.94  0.70 - 3.10 10*3/mm3 Final   • Monocytes, Absolute 06/29/2020 0.51  0.10 - 0.90 10*3/mm3 Final   • Eosinophils, Absolute 06/29/2020 0.19  0.00 - 0.40 10*3/mm3 Final   • Basophils, Absolute 06/29/2020 0.06  0.00 - 0.20 10*3/mm3 Final   • Immature Grans, Absolute 06/29/2020 0.06* 0.00 - 0.05 10*3/mm3 Final   • nRBC 06/29/2020 0.0  0.0 - 0.2 /100 WBC Final   • Glucose 06/29/2020 118* 65 - 99 mg/dL Final   • BUN 06/29/2020 13  6 - 20 mg/dL Final   • Creatinine 06/29/2020 1.18  0.76 - 1.27 mg/dL Final   • Sodium 06/29/2020 136  136 - 145 mmol/L Final   • Potassium 06/29/2020 4.8  3.5 - 5.2 mmol/L Final   • Chloride 06/29/2020 104  98 - 107 mmol/L Final   • CO2 06/29/2020 24.8  22.0 - 29.0 mmol/L Final   • Calcium 06/29/2020 9.5  8.6 - 10.5 mg/dL Final   • Total Protein 06/29/2020 7.2  6.0 - 8.5  g/dL Final   • Albumin 06/29/2020 4.10  3.50 - 5.20 g/dL Final   • ALT (SGPT) 06/29/2020 26  1 - 41 U/L Final   • AST (SGOT) 06/29/2020 22  1 - 40 U/L Final   • Alkaline Phosphatase 06/29/2020 89  39 - 117 U/L Final   • Total Bilirubin 06/29/2020 0.3  0.2 - 1.2 mg/dL Final   • eGFR Non African Amer 06/29/2020 65  >60 mL/min/1.73 Final   • Globulin 06/29/2020 3.1  gm/dL Final   • A/G Ratio 06/29/2020 1.3  g/dL Final   • BUN/Creatinine Ratio 06/29/2020 11.0  7.0 - 25.0 Final   • Anion Gap 06/29/2020 7.2  5.0 - 15.0 mmol/L Final   • Hemoglobin A1C 06/29/2020 6.10* 4.80 - 5.60 % Final   • Total Cholesterol 06/29/2020 166  0 - 200 mg/dL Final   • Triglycerides 06/29/2020 164* 0 - 150 mg/dL Final   • HDL Cholesterol 06/29/2020 37* 40 - 60 mg/dL Final   • LDL Cholesterol  06/29/2020 96  0 - 100 mg/dL Final   • VLDL Cholesterol 06/29/2020 32.8  5 - 40 mg/dL Final   • LDL/HDL Ratio 06/29/2020 2.60   Final   • 25 Hydroxy, Vitamin D 06/29/2020 46.8  30.0 - 100.0 ng/ml Final   • THC, Screen, Urine 06/29/2020 Negative  Negative Final   • Phencyclidine (PCP), Urine 06/29/2020 Negative  Negative Final   • Cocaine Screen, Urine 06/29/2020 Negative  Negative Final   • Methamphetamine, Ur 06/29/2020 Negative  Negative Final   • Opiate Screen 06/29/2020 Negative  Negative Final   • Amphetamine Screen, Urine 06/29/2020 Positive* Negative Final   • Benzodiazepine Screen, Urine 06/29/2020 Negative  Negative Final   • Tricyclic Antidepressants Screen 06/29/2020 Negative  Negative Final   • Methadone Screen, Urine 06/29/2020 Negative  Negative Final   • Barbiturates Screen, Urine 06/29/2020 Negative  Negative Final   • Oxycodone Screen, Urine 06/29/2020 Negative  Negative Final   • Propoxyphene Screen 06/29/2020 Negative  Negative Final   • Buprenorphine, Screen, Urine 06/29/2020 Negative  Negative Final      US Abdomen Complete  Narrative: PROCEDURE: Complete abdominal sonogram    COMPARISON: None    HISTORY: Abdominal pain    FINDINGS:  "Realtime grayscale and color-flow imaging is obtained  of the abdomen.    The gallbladder is normal.No shadowing stones, pericholecystic  fluid or wall thickening.   There is increased echogenicity throughout the liver most  consistent with fatty infiltration or other parenchymal liver  disease. No focal lesions or intrahepatic biliary ductal  dilatation.   The visualized common duct is within normal limits, measuring 5  mm in diameter.   The visualized kidneys are normal in echogenicity, without  hydronephrosis.  The pancreas is obscured by bowel gas and cannot be evaluated.    The spleen appears normal.  The aorta and inferior vena cava are obscured by gas and cannot  be evaluated.  Impression: CONCLUSION:  Fatty liver.    Electronically signed by:  Tyshawn Marin MD  2/11/2020 9:14 AM [facility]  Workstation: [id number]    [email or web address]  Immunization History   Administered Date(s) Administered   • Influenza TIV (IM) 03/28/2017, 03/28/2017   • Tdap 05/23/2016, 02/17/2017, 09/20/2019       The following portions of the patient's history were reviewed and updated as appropriate: allergies, current medications, past family history, past medical history, past social history, past surgical history and problem list.        Physical Exam  /78 (BP Location: Right arm, Patient Position: Sitting, Cuff Size: Large Adult)   Pulse 84   Temp 96.5 °F (35.8 °C)   Ht 180.3 cm (71\")   Wt 130 kg (287 lb)   SpO2 98%   BMI 40.03 kg/m²     Physical Exam  Vitals signs and nursing note reviewed.   Constitutional:       Appearance: He is well-developed. He is not diaphoretic.   HENT:      Head: Normocephalic and atraumatic.      Right Ear: External ear normal.   Eyes:      Conjunctiva/sclera: Conjunctivae normal.      Pupils: Pupils are equal, round, and reactive to light.   Neck:      Musculoskeletal: Normal range of motion and neck supple.   Cardiovascular:      Rate and Rhythm: Normal rate and regular rhythm.      Heart sounds: Normal " heart sounds. No murmur.   Pulmonary:      Effort: Pulmonary effort is normal. No respiratory distress.      Breath sounds: Normal breath sounds.   Abdominal:      General: Bowel sounds are normal. There is no distension.      Palpations: Abdomen is soft.      Tenderness: There is no abdominal tenderness.      Comments: Obese abdomen    Musculoskeletal: Normal range of motion.         General: No deformity.   Skin:     General: Skin is warm.      Coloration: Skin is not pale.      Findings: No erythema or rash.   Neurological:      Mental Status: He is alert and oriented to person, place, and time.      Cranial Nerves: No cranial nerve deficit.   Psychiatric:         Behavior: Behavior normal.         Assessment/Plan    Diagnosis Plan   1. Attention deficit hyperactivity disorder (ADHD), predominantly inattentive type  amphetamine-dextroamphetamine XR (Adderall XR) 25 MG 24 hr capsule   2. Essential hypertension     3. Hyperlipidemia, unspecified hyperlipidemia type     4. Mixed hyperlipidemia     5. Prediabetes     6. Stage 2 chronic kidney disease     7. Vitamin D deficiency     8. Gastroesophageal reflux disease, unspecified whether esophagitis present             -awaiting labwork   -recommend influenza vaccination, recommend shingles vaccination - pt declined    -normocytic anemia - continue to monitor   -abdominal pain/fatty liver/nausea vomiting/Gastritis/GERD with esophagitis - stop prilosec start protonix 40 mg daily. .  GI following had recent EGD   -ckd stage 2 - stable continue to monitor. Gave information. Advised better hydration BP control   -for back pain with sciatica/scoliosis - pain is better after PT/OT. Continue with mobic and flexeril PRN.  Consider MRI if not improving    -right shoulder pain -stable    -Vitamin D deficiency vitamin D pill once a week d   -HLP -  Stable lipitor 80mg PO qhs   -HTN - stable on lisinopril 40 mg daily.   -ADHD refilled ADHD medication Adderall XR 25 mg PO q daily  for ADHD.  [facility] ref number ref number reference # [id number].  awaiting UDS   -GERD - stable on protonix 40 mg PO q daily.  will work on PA   -allergic rhinitis - continue singulair and flonase and will add zyrtec.  -prediabetes - prediabetes meal plan information provided.    - morbid obesity - recommend ketogenic diet along with intermittent fasting. Counseled weight loss >5 minutes  BMI at 40.03   -advised pt to be safe and call with questions and concerns  -advised to go to ER or call 911 if symptoms get severe  -advised to followup with specialist and referrals   -advised pt to be safe during COVID-19 pandemic  -total time with pt >25 minutes   -recheck in 1 month         This document has been electronically signed by Scottie Borges MD on October 28, 2020 08:49 CDT

## 2020-10-16 RX ORDER — LISINOPRIL 40 MG/1
40 TABLET ORAL DAILY
Qty: 30 TABLET | Refills: 3 | Status: SHIPPED | OUTPATIENT
Start: 2020-10-16 | End: 2021-03-24 | Stop reason: SDUPTHER

## 2020-10-27 ENCOUNTER — LAB (OUTPATIENT)
Dept: LAB | Facility: HOSPITAL | Age: 50
End: 2020-10-27

## 2020-10-27 DIAGNOSIS — K76.0 FATTY LIVER: ICD-10-CM

## 2020-10-27 DIAGNOSIS — I10 ESSENTIAL HYPERTENSION: ICD-10-CM

## 2020-10-27 DIAGNOSIS — Z11.59 NEED FOR HEPATITIS C SCREENING TEST: ICD-10-CM

## 2020-10-27 DIAGNOSIS — Z02.83 ENCOUNTER FOR DRUG SCREENING: ICD-10-CM

## 2020-10-27 DIAGNOSIS — F90.0 ATTENTION DEFICIT HYPERACTIVITY DISORDER (ADHD), PREDOMINANTLY INATTENTIVE TYPE: ICD-10-CM

## 2020-10-27 DIAGNOSIS — E78.2 MIXED HYPERLIPIDEMIA: ICD-10-CM

## 2020-10-27 DIAGNOSIS — E55.9 VITAMIN D DEFICIENCY: ICD-10-CM

## 2020-10-27 DIAGNOSIS — N18.2 STAGE 2 CHRONIC KIDNEY DISEASE: ICD-10-CM

## 2020-10-27 DIAGNOSIS — E66.9 CLASS 2 OBESITY WITH BODY MASS INDEX (BMI) OF 39.0 TO 39.9 IN ADULT, UNSPECIFIED OBESITY TYPE, UNSPECIFIED WHETHER SERIOUS COMORBIDITY PRESENT: ICD-10-CM

## 2020-10-27 PROCEDURE — 83036 HEMOGLOBIN GLYCOSYLATED A1C: CPT

## 2020-10-27 PROCEDURE — 86803 HEPATITIS C AB TEST: CPT

## 2020-10-27 PROCEDURE — 82306 VITAMIN D 25 HYDROXY: CPT

## 2020-10-27 PROCEDURE — 80053 COMPREHEN METABOLIC PANEL: CPT

## 2020-10-27 PROCEDURE — 85025 COMPLETE CBC W/AUTO DIFF WBC: CPT

## 2020-10-27 PROCEDURE — 80306 DRUG TEST PRSMV INSTRMNT: CPT

## 2020-10-27 PROCEDURE — 80061 LIPID PANEL: CPT

## 2020-10-28 ENCOUNTER — OFFICE VISIT (OUTPATIENT)
Dept: FAMILY MEDICINE CLINIC | Facility: CLINIC | Age: 50
End: 2020-10-28

## 2020-10-28 VITALS
OXYGEN SATURATION: 98 % | SYSTOLIC BLOOD PRESSURE: 110 MMHG | HEIGHT: 71 IN | HEART RATE: 84 BPM | WEIGHT: 287 LBS | BODY MASS INDEX: 40.18 KG/M2 | DIASTOLIC BLOOD PRESSURE: 78 MMHG | TEMPERATURE: 96.5 F

## 2020-10-28 DIAGNOSIS — K21.9 GASTROESOPHAGEAL REFLUX DISEASE, UNSPECIFIED WHETHER ESOPHAGITIS PRESENT: ICD-10-CM

## 2020-10-28 DIAGNOSIS — E55.9 VITAMIN D DEFICIENCY: ICD-10-CM

## 2020-10-28 DIAGNOSIS — F90.0 ATTENTION DEFICIT HYPERACTIVITY DISORDER (ADHD), PREDOMINANTLY INATTENTIVE TYPE: Primary | ICD-10-CM

## 2020-10-28 DIAGNOSIS — N18.2 STAGE 2 CHRONIC KIDNEY DISEASE: ICD-10-CM

## 2020-10-28 DIAGNOSIS — E78.2 MIXED HYPERLIPIDEMIA: ICD-10-CM

## 2020-10-28 DIAGNOSIS — R73.03 PREDIABETES: ICD-10-CM

## 2020-10-28 DIAGNOSIS — I10 ESSENTIAL HYPERTENSION: ICD-10-CM

## 2020-10-28 DIAGNOSIS — E78.5 HYPERLIPIDEMIA, UNSPECIFIED HYPERLIPIDEMIA TYPE: ICD-10-CM

## 2020-10-28 LAB
25(OH)D3 SERPL-MCNC: 48.6 NG/ML (ref 30–100)
ALBUMIN SERPL-MCNC: 4.5 G/DL (ref 3.5–5.2)
ALBUMIN/GLOB SERPL: 1.5 G/DL
ALP SERPL-CCNC: 104 U/L (ref 39–117)
ALT SERPL W P-5'-P-CCNC: 33 U/L (ref 1–41)
AMPHET+METHAMPHET UR QL: POSITIVE
AMPHETAMINES UR QL: NEGATIVE
ANION GAP SERPL CALCULATED.3IONS-SCNC: 9.6 MMOL/L (ref 5–15)
AST SERPL-CCNC: 24 U/L (ref 1–40)
BARBITURATES UR QL SCN: NEGATIVE
BASOPHILS # BLD AUTO: 0.1 10*3/MM3 (ref 0–0.2)
BASOPHILS NFR BLD AUTO: 1.7 % (ref 0–1.5)
BENZODIAZ UR QL SCN: NEGATIVE
BILIRUB SERPL-MCNC: 0.5 MG/DL (ref 0–1.2)
BUN SERPL-MCNC: 16 MG/DL (ref 6–20)
BUN/CREAT SERPL: 17.4 (ref 7–25)
BUPRENORPHINE SERPL-MCNC: NEGATIVE NG/ML
CALCIUM SPEC-SCNC: 9.3 MG/DL (ref 8.6–10.5)
CANNABINOIDS SERPL QL: NEGATIVE
CHLORIDE SERPL-SCNC: 105 MMOL/L (ref 98–107)
CHOLEST SERPL-MCNC: 195 MG/DL (ref 0–200)
CO2 SERPL-SCNC: 25.4 MMOL/L (ref 22–29)
COCAINE UR QL: NEGATIVE
CREAT SERPL-MCNC: 0.92 MG/DL (ref 0.76–1.27)
DEPRECATED RDW RBC AUTO: 37.8 FL (ref 37–54)
EOSINOPHIL # BLD AUTO: 0.18 10*3/MM3 (ref 0–0.4)
EOSINOPHIL NFR BLD AUTO: 3 % (ref 0.3–6.2)
ERYTHROCYTE [DISTWIDTH] IN BLOOD BY AUTOMATED COUNT: 11.9 % (ref 12.3–15.4)
GFR SERPL CREATININE-BSD FRML MDRD: 86 ML/MIN/1.73
GLOBULIN UR ELPH-MCNC: 3 GM/DL
GLUCOSE SERPL-MCNC: 100 MG/DL (ref 65–99)
HBA1C MFR BLD: 5.9 % (ref 4.8–5.6)
HCT VFR BLD AUTO: 42.2 % (ref 37.5–51)
HCV AB SER DONR QL: NORMAL
HDLC SERPL-MCNC: 39 MG/DL (ref 40–60)
HGB BLD-MCNC: 15 G/DL (ref 13–17.7)
IMM GRANULOCYTES # BLD AUTO: 0.05 10*3/MM3 (ref 0–0.05)
IMM GRANULOCYTES NFR BLD AUTO: 0.8 % (ref 0–0.5)
LDLC SERPL CALC-MCNC: 130 MG/DL (ref 0–100)
LDLC/HDLC SERPL: 3.25 {RATIO}
LYMPHOCYTES # BLD AUTO: 2.43 10*3/MM3 (ref 0.7–3.1)
LYMPHOCYTES NFR BLD AUTO: 40.6 % (ref 19.6–45.3)
MCH RBC QN AUTO: 30.8 PG (ref 26.6–33)
MCHC RBC AUTO-ENTMCNC: 35.5 G/DL (ref 31.5–35.7)
MCV RBC AUTO: 86.7 FL (ref 79–97)
METHADONE UR QL SCN: NEGATIVE
MONOCYTES # BLD AUTO: 0.43 10*3/MM3 (ref 0.1–0.9)
MONOCYTES NFR BLD AUTO: 7.2 % (ref 5–12)
NEUTROPHILS NFR BLD AUTO: 2.79 10*3/MM3 (ref 1.7–7)
NEUTROPHILS NFR BLD AUTO: 46.7 % (ref 42.7–76)
NRBC BLD AUTO-RTO: 0 /100 WBC (ref 0–0.2)
OPIATES UR QL: NEGATIVE
OXYCODONE UR QL SCN: NEGATIVE
PCP UR QL SCN: NEGATIVE
PLATELET # BLD AUTO: 310 10*3/MM3 (ref 140–450)
PMV BLD AUTO: 10 FL (ref 6–12)
POTASSIUM SERPL-SCNC: 4.7 MMOL/L (ref 3.5–5.2)
PROPOXYPH UR QL: NEGATIVE
PROT SERPL-MCNC: 7.5 G/DL (ref 6–8.5)
RBC # BLD AUTO: 4.87 10*6/MM3 (ref 4.14–5.8)
SODIUM SERPL-SCNC: 140 MMOL/L (ref 136–145)
TRICYCLICS UR QL SCN: NEGATIVE
TRIGL SERPL-MCNC: 147 MG/DL (ref 0–150)
VLDLC SERPL-MCNC: 26 MG/DL (ref 5–40)
WBC # BLD AUTO: 5.98 10*3/MM3 (ref 3.4–10.8)

## 2020-10-28 PROCEDURE — 99213 OFFICE O/P EST LOW 20 MIN: CPT | Performed by: FAMILY MEDICINE

## 2020-10-28 RX ORDER — DEXTROAMPHETAMINE SACCHARATE, AMPHETAMINE ASPARTATE MONOHYDRATE, DEXTROAMPHETAMINE SULFATE AND AMPHETAMINE SULFATE 6.25; 6.25; 6.25; 6.25 MG/1; MG/1; MG/1; MG/1
25 CAPSULE, EXTENDED RELEASE ORAL EVERY MORNING
Qty: 30 CAPSULE | Refills: 0 | Status: SHIPPED | OUTPATIENT
Start: 2020-10-28 | End: 2020-11-06 | Stop reason: SDUPTHER

## 2020-10-29 ENCOUNTER — TELEPHONE (OUTPATIENT)
Dept: FAMILY MEDICINE CLINIC | Facility: CLINIC | Age: 50
End: 2020-10-29

## 2020-10-30 ENCOUNTER — TELEPHONE (OUTPATIENT)
Dept: FAMILY MEDICINE CLINIC | Facility: CLINIC | Age: 50
End: 2020-10-30

## 2020-10-30 NOTE — TELEPHONE ENCOUNTER
----- Message from Scottie Borges MD sent at 10/29/2020 10:12 AM CDT -----  Please let pt know all labwork stable     Except on lipid panel LDL at 130 up form 96. Make sure pt is taking lipitor 80 mg PO qhs. HDL low at 39. Recommend heart healthy diet more vegetables and lean protein      hga1c improved from 6.10 to 5.90 continue to watch sugar and carb iintake. CBC normal    Recheck on next visit. Thanks

## 2020-11-06 DIAGNOSIS — F90.0 ATTENTION DEFICIT HYPERACTIVITY DISORDER (ADHD), PREDOMINANTLY INATTENTIVE TYPE: ICD-10-CM

## 2020-11-06 RX ORDER — DEXTROAMPHETAMINE SACCHARATE, AMPHETAMINE ASPARTATE MONOHYDRATE, DEXTROAMPHETAMINE SULFATE AND AMPHETAMINE SULFATE 6.25; 6.25; 6.25; 6.25 MG/1; MG/1; MG/1; MG/1
25 CAPSULE, EXTENDED RELEASE ORAL EVERY MORNING
Qty: 30 CAPSULE | Refills: 0 | Status: SHIPPED | OUTPATIENT
Start: 2020-11-06 | End: 2021-02-03 | Stop reason: SDUPTHER

## 2020-11-06 RX ORDER — ASPIRIN 81 MG/1
81 TABLET ORAL DAILY
Qty: 30 TABLET | Refills: 3 | Status: SHIPPED | OUTPATIENT
Start: 2020-11-06 | End: 2021-03-24 | Stop reason: SDUPTHER

## 2020-11-16 RX ORDER — ERGOCALCIFEROL 1.25 MG/1
50000 CAPSULE ORAL WEEKLY
Qty: 4 CAPSULE | Refills: 3 | Status: SHIPPED | OUTPATIENT
Start: 2020-11-16 | End: 2021-03-24 | Stop reason: SDUPTHER

## 2020-11-25 RX ORDER — MONTELUKAST SODIUM 10 MG/1
10 TABLET ORAL NIGHTLY
Qty: 30 TABLET | Refills: 3 | Status: SHIPPED | OUTPATIENT
Start: 2020-11-25 | End: 2020-12-23 | Stop reason: SDUPTHER

## 2020-11-25 RX ORDER — FLUTICASONE PROPIONATE 50 MCG
2 SPRAY, SUSPENSION (ML) NASAL DAILY
Qty: 16 G | Refills: 3 | Status: SHIPPED | OUTPATIENT
Start: 2020-11-25 | End: 2021-03-24 | Stop reason: SDUPTHER

## 2020-12-04 NOTE — PROGRESS NOTES
Subjective:  Randall Hernandez is a 50 y.o. male who presents for ADHD       Patient Active Problem List   Diagnosis   • Hyperlipidemia   • Inattention   • Non morbid obesity   • Prediabetes   • Attention deficit hyperactivity disorder (ADHD), combined type   • Need for vaccination   • Gastroesophageal reflux disease   • Encounter for drug screening   • Tingling in extremities   • Elevated BP without diagnosis of hypertension   • Allergic rhinitis   • Elevated blood pressure reading   • Pruritic rash   • Numbness in both hands   • Bilateral elbow joint pain   • Pain in both wrists   • Ulnar neuropathy of left upper extremity   • General medical examination   • Bilateral carpal tunnel syndrome   • History of prediabetes   • Numbness and tingling in both hands   • Essential hypertension   • Cerumen debris on tympanic membrane of both ears   • Carpal tunnel syndrome   • S/P carpal tunnel release   • Encounter for screening for malignant neoplasm of colon   • FH: colon cancer   • Acute recurrent sinusitis   • Attention deficit hyperactivity disorder (ADHD), predominantly inattentive type   • Class 2 obesity with body mass index (BMI) of 35.0 to 35.9 in adult   • Annual physical exam   • Arthritis of back   • Scoliosis   • Hyponatremia   • Hypochloremia   • Stage 2 chronic kidney disease   • Epigastric pain   • Bilious vomiting with nausea   • Fatty liver   • Dysphagia   • Nausea and vomiting   • Gastritis without bleeding   • Class 2 obesity with body mass index (BMI) of 38.0 to 38.9 in adult   • Mixed hyperlipidemia   • Dizziness   • Hypotension   • Class 2 obesity with body mass index (BMI) of 39.0 to 39.9 in adult   • Normocytic anemia   • Vitamin D deficiency   • Anxiety           Current Outpatient Medications:   •  amphetamine-dextroamphetamine XR (Adderall XR) 25 MG 24 hr capsule, Take 1 capsule by mouth Every Morning Take 1 tablet by mouth daily, Disp: 30 capsule, Rfl: 0  •  aspirin (aspirin) 81 MG EC tablet,  Take 1 tablet by mouth Daily., Disp: 30 tablet, Rfl: 3  •  atorvastatin (LIPITOR) 80 MG tablet, Take 1 tablet by mouth Daily., Disp: 30 tablet, Rfl: 3  •  fluticasone (FLONASE) 50 MCG/ACT nasal spray, 2 sprays by Each Nare route Daily., Disp: 16 g, Rfl: 3  •  lisinopril (PRINIVIL,ZESTRIL) 40 MG tablet, Take 1 tablet by mouth Daily., Disp: 30 tablet, Rfl: 3  •  montelukast (SINGULAIR) 10 MG tablet, Take 1 tablet by mouth Every Night., Disp: 30 tablet, Rfl: 3  •  pantoprazole (Protonix) 40 MG EC tablet, Take 1 tablet by mouth Daily., Disp: 30 tablet, Rfl: 3  •  vitamin D (ERGOCALCIFEROL) 1.25 MG (14307 UT) capsule capsule, Take 1 capsule by mouth 1 (One) Time Per Week., Disp: 4 capsule, Rfl: 3    HPI        Pt is 49 yo male with management  of obesity, history of prediabetes, HTN,  GERD, vitamin D deficiency, alelrgic rhinitis, ADHD predominantly inattentive type ,sp bilateral carpal tunnel release surgery,  Chronic back pain (arthritis of lumbar spine, short pedicles, mild scoliosis, mild arthritis thoracic spine).  CKD stage 2 , fatty liver, gastirits, esophagitis        9/28/20 in office visit for recheck on pt's above medical issues. He has yet to get labwork ordered on 8/31/20.  He is also here for refill on ADHD medication doing well overall. Since stopping water pill for HTN pt has had no dizziness. BP stable today. No chest pain no palpitations doing well on ADHD medication . Doing well on ADHD medication     12/23/20 in office visit for recheck on pt's above medical issues. Pt had labwork done on 10/27/20 that shwoed UDS consistent with ADHD medication use. Hep C antibody test negative. Vitamin D normal lipid panel showed HLD at 39 and LDL at 130. hga1c at 5.90. CMP showed glucose at 100  CBC showed normal hemoglobin.    Pt's mother recently passed away.  Pt appears down today. Doing well on medicaiton.  Has not been taking ADHD medication.  Pt has had some cough and itchy cough but no fever. Wife was  positive for COVID-19 but pt did not get tested. He also lost his dog of 16 years.         Obesity   This is a chronic problem. The current episode started more than 1 year ago. The problem occurs constantly. The problem has been unchanged. Associated symptoms include arthralgias and numbness. Pertinent negatives include no abdominal pain, anorexia, change in bowel habit, chest pain, chills, congestion, coughing, diaphoresis, fatigue, fever, headaches, joint swelling, myalgias, nausea, rash, sore throat, swollen glands, urinary symptoms, vertigo, visual change, vomiting or weakness. Nothing aggravates the symptoms. He has tried nothing for the symptoms. The treatment provided no relief.   Hypertension   This is a new problem. The current episode started more than 1 month ago. The problem has been gradually improving tThe problem is controlled. Pertinent negatives include no anxiety, blurred vision, chest pain, headaches, malaise/fatigue, neck pain, orthopnea, palpitations, peripheral edema, PND, shortness of breath or sweats. Risk factors for coronary artery disease include male gender, obesity and sedentary lifestyle. Past treatments include diuretics and ace-inhibitor There are no compliance problems. Treatment has provided significant relief     Review of Systems  Review of Systems   Constitutional: Positive for activity change and fatigue. Negative for appetite change, chills, diaphoresis and fever.   HENT: Negative for congestion, postnasal drip, rhinorrhea, sinus pressure, sinus pain, sneezing, sore throat, trouble swallowing and voice change.    Respiratory: Negative for cough, choking, chest tightness, shortness of breath, wheezing and stridor.    Cardiovascular: Negative for chest pain.   Gastrointestinal: Negative for diarrhea, nausea and vomiting.   Musculoskeletal: Positive for arthralgias.   Neurological: Positive for weakness and numbness. Negative for headaches.   Psychiatric/Behavioral: Positive for  decreased concentration.       Patient Active Problem List   Diagnosis   • Hyperlipidemia   • Inattention   • Non morbid obesity   • Prediabetes   • Attention deficit hyperactivity disorder (ADHD), combined type   • Need for vaccination   • Gastroesophageal reflux disease   • Encounter for drug screening   • Tingling in extremities   • Elevated BP without diagnosis of hypertension   • Allergic rhinitis   • Elevated blood pressure reading   • Pruritic rash   • Numbness in both hands   • Bilateral elbow joint pain   • Pain in both wrists   • Ulnar neuropathy of left upper extremity   • General medical examination   • Bilateral carpal tunnel syndrome   • History of prediabetes   • Numbness and tingling in both hands   • Essential hypertension   • Cerumen debris on tympanic membrane of both ears   • Carpal tunnel syndrome   • S/P carpal tunnel release   • Encounter for screening for malignant neoplasm of colon   • FH: colon cancer   • Acute recurrent sinusitis   • Attention deficit hyperactivity disorder (ADHD), predominantly inattentive type   • Class 2 obesity with body mass index (BMI) of 35.0 to 35.9 in adult   • Annual physical exam   • Arthritis of back   • Scoliosis   • Hyponatremia   • Hypochloremia   • Stage 2 chronic kidney disease   • Epigastric pain   • Bilious vomiting with nausea   • Fatty liver   • Dysphagia   • Nausea and vomiting   • Gastritis without bleeding   • Class 2 obesity with body mass index (BMI) of 38.0 to 38.9 in adult   • Mixed hyperlipidemia   • Dizziness   • Hypotension   • Class 2 obesity with body mass index (BMI) of 39.0 to 39.9 in adult   • Normocytic anemia   • Vitamin D deficiency   • Anxiety     Past Surgical History:   Procedure Laterality Date   • CARPAL TUNNEL RELEASE Right 11/2/2018    Procedure: CARPAL TUNNEL RELEASE;  Surgeon: Judd Mathias MD;  Location: [facility];  Service: Orthopedics   • CARPAL TUNNEL RELEASE  12/07/2018    Left   • CARPAL TUNNEL RELEASE Left  2018    Procedure: CARPAL TUNNEL RELEASE;  Surgeon: Judd Mathias MD;  Location: [facility] OR;  Service: Orthopedics   • COLONOSCOPY N/A 2019    Procedure: COLONOSCOPY a friday please ;  Surgeon: Ruslan Bowers MD;  Location: [facility] ENDOSCOPY;  Service: Gastroenterology   • ENDOSCOPY     • ENDOSCOPY N/A 2020    Procedure: ESOPHAGOGASTRODUODENOSCOPY possible dilation ;  Surgeon: Ruslan Bowers MD;  Location: [facility] ENDOSCOPY;  Service: Gastroenterology;  Laterality: N/A;   • PYLOROMYOTOMY       Social History     Socioeconomic History   • Marital status: Significant Other     Spouse name: Not on file   • Number of children: Not on file   • Years of education: Not on file   • Highest education level: Not on file   Occupational History   • Occupation:      Employer: SELF-EMPLOYED     Comment: Patient resides with Addis Rodriguez.   Tobacco Use   • Smoking status: Former Smoker     Packs/day: 1.00     Years: 6.00     Pack years: 6.00     Types: Cigarettes     Quit date:      Years since quittin.9   • Smokeless tobacco: Never Used   Substance and Sexual Activity   • Alcohol use: Yes     Comment: 2018 - Shelleynet reports consumption of alcoholic beverages under a social basis (approximately 3 - 4 per month).     • Drug use: No   • Sexual activity: Defer     Family History   Problem Relation Age of Onset   • Colon cancer Mother         onset age greater than 75y   • Diabetes Mother    • Breast cancer Sister    • Cancer Brother         bladder   • Diabetes Brother    • Hyperlipidemia Brother      Lab on 10/27/2020   Component Date Value Ref Range Status   • WBC 10/27/2020 5.98  3.40 - 10.80 10*3/mm3 Final   • RBC 10/27/2020 4.87  4.14 - 5.80 10*6/mm3 Final   • Hemoglobin 10/27/2020 15.0  13.0 - 17.7 g/dL Final   • Hematocrit 10/27/2020 42.2  37.5 - 51.0 % Final   • MCV 10/27/2020 86.7  79.0 - 97.0 fL Final   • MCH 10/27/2020 30.8  26.6 - 33.0 pg Final   •  MCHC 10/27/2020 35.5  31.5 - 35.7 g/dL Final   • RDW 10/27/2020 11.9* 12.3 - 15.4 % Final   • RDW-SD 10/27/2020 37.8  37.0 - 54.0 fl Final   • MPV 10/27/2020 10.0  6.0 - 12.0 fL Final   • Platelets 10/27/2020 310  140 - 450 10*3/mm3 Final   • Neutrophil % 10/27/2020 46.7  42.7 - 76.0 % Final   • Lymphocyte % 10/27/2020 40.6  19.6 - 45.3 % Final   • Monocyte % 10/27/2020 7.2  5.0 - 12.0 % Final   • Eosinophil % 10/27/2020 3.0  0.3 - 6.2 % Final   • Basophil % 10/27/2020 1.7* 0.0 - 1.5 % Final   • Immature Grans % 10/27/2020 0.8* 0.0 - 0.5 % Final   • Neutrophils, Absolute 10/27/2020 2.79  1.70 - 7.00 10*3/mm3 Final   • Lymphocytes, Absolute 10/27/2020 2.43  0.70 - 3.10 10*3/mm3 Final   • Monocytes, Absolute 10/27/2020 0.43  0.10 - 0.90 10*3/mm3 Final   • Eosinophils, Absolute 10/27/2020 0.18  0.00 - 0.40 10*3/mm3 Final   • Basophils, Absolute 10/27/2020 0.10  0.00 - 0.20 10*3/mm3 Final   • Immature Grans, Absolute 10/27/2020 0.05  0.00 - 0.05 10*3/mm3 Final   • nRBC 10/27/2020 0.0  0.0 - 0.2 /100 WBC Final   • Glucose 10/27/2020 100* 65 - 99 mg/dL Final   • BUN 10/27/2020 16  6 - 20 mg/dL Final   • Creatinine 10/27/2020 0.92  0.76 - 1.27 mg/dL Final   • Sodium 10/27/2020 140  136 - 145 mmol/L Final   • Potassium 10/27/2020 4.7  3.5 - 5.2 mmol/L Final   • Chloride 10/27/2020 105  98 - 107 mmol/L Final   • CO2 10/27/2020 25.4  22.0 - 29.0 mmol/L Final   • Calcium 10/27/2020 9.3  8.6 - 10.5 mg/dL Final   • Total Protein 10/27/2020 7.5  6.0 - 8.5 g/dL Final   • Albumin 10/27/2020 4.50  3.50 - 5.20 g/dL Final   • ALT (SGPT) 10/27/2020 33  1 - 41 U/L Final   • AST (SGOT) 10/27/2020 24  1 - 40 U/L Final   • Alkaline Phosphatase 10/27/2020 104  39 - 117 U/L Final   • Total Bilirubin 10/27/2020 0.5  0.0 - 1.2 mg/dL Final   • eGFR Non African Amer 10/27/2020 86  >60 mL/min/1.73 Final   • Globulin 10/27/2020 3.0  gm/dL Final   • A/G Ratio 10/27/2020 1.5  g/dL Final   • BUN/Creatinine Ratio 10/27/2020 17.4  7.0 - 25.0 Final   •  Anion Gap 10/27/2020 9.6  5.0 - 15.0 mmol/L Final   • Hemoglobin A1C 10/27/2020 5.90* 4.80 - 5.60 % Final   • Total Cholesterol 10/27/2020 195  0 - 200 mg/dL Final   • Triglycerides 10/27/2020 147  0 - 150 mg/dL Final   • HDL Cholesterol 10/27/2020 39* 40 - 60 mg/dL Final   • LDL Cholesterol  10/27/2020 130* 0 - 100 mg/dL Final   • VLDL Cholesterol 10/27/2020 26  5 - 40 mg/dL Final   • LDL/HDL Ratio 10/27/2020 3.25   Final   • 25 Hydroxy, Vitamin D 10/27/2020 48.6  30.0 - 100.0 ng/ml Final   • Hepatitis C Ab 10/27/2020 Non-Reactive  Non-Reactive Final   • THC, Screen, Urine 10/27/2020 Negative  Negative Final   • Phencyclidine (PCP), Urine 10/27/2020 Negative  Negative Final   • Cocaine Screen, Urine 10/27/2020 Negative  Negative Final   • Methamphetamine, Ur 10/27/2020 Negative  Negative Final   • Opiate Screen 10/27/2020 Negative  Negative Final   • Amphetamine Screen, Urine 10/27/2020 Positive* Negative Final   • Benzodiazepine Screen, Urine 10/27/2020 Negative  Negative Final   • Tricyclic Antidepressants Screen 10/27/2020 Negative  Negative Final   • Methadone Screen, Urine 10/27/2020 Negative  Negative Final   • Barbiturates Screen, Urine 10/27/2020 Negative  Negative Final   • Oxycodone Screen, Urine 10/27/2020 Negative  Negative Final   • Propoxyphene Screen 10/27/2020 Negative  Negative Final   • Buprenorphine, Screen, Urine 10/27/2020 Negative  Negative Final   Lab on 06/29/2020   Component Date Value Ref Range Status   • WBC 06/29/2020 6.15  3.40 - 10.80 10*3/mm3 Final   • RBC 06/29/2020 4.21  4.14 - 5.80 10*6/mm3 Final   • Hemoglobin 06/29/2020 12.9* 13.0 - 17.7 g/dL Final   • Hematocrit 06/29/2020 36.5* 37.5 - 51.0 % Final   • MCV 06/29/2020 86.7  79.0 - 97.0 fL Final   • MCH 06/29/2020 30.6  26.6 - 33.0 pg Final   • MCHC 06/29/2020 35.3  31.5 - 35.7 g/dL Final   • RDW 06/29/2020 12.2* 12.3 - 15.4 % Final   • RDW-SD 06/29/2020 38.4  37.0 - 54.0 fl Final   • MPV 06/29/2020 10.3  6.0 - 12.0 fL Final   •  Platelets 06/29/2020 295  140 - 450 10*3/mm3 Final   • Neutrophil % 06/29/2020 55.1  42.7 - 76.0 % Final   • Lymphocyte % 06/29/2020 31.5  19.6 - 45.3 % Final   • Monocyte % 06/29/2020 8.3  5.0 - 12.0 % Final   • Eosinophil % 06/29/2020 3.1  0.3 - 6.2 % Final   • Basophil % 06/29/2020 1.0  0.0 - 1.5 % Final   • Immature Grans % 06/29/2020 1.0* 0.0 - 0.5 % Final   • Neutrophils, Absolute 06/29/2020 3.39  1.70 - 7.00 10*3/mm3 Final   • Lymphocytes, Absolute 06/29/2020 1.94  0.70 - 3.10 10*3/mm3 Final   • Monocytes, Absolute 06/29/2020 0.51  0.10 - 0.90 10*3/mm3 Final   • Eosinophils, Absolute 06/29/2020 0.19  0.00 - 0.40 10*3/mm3 Final   • Basophils, Absolute 06/29/2020 0.06  0.00 - 0.20 10*3/mm3 Final   • Immature Grans, Absolute 06/29/2020 0.06* 0.00 - 0.05 10*3/mm3 Final   • nRBC 06/29/2020 0.0  0.0 - 0.2 /100 WBC Final   • Glucose 06/29/2020 118* 65 - 99 mg/dL Final   • BUN 06/29/2020 13  6 - 20 mg/dL Final   • Creatinine 06/29/2020 1.18  0.76 - 1.27 mg/dL Final   • Sodium 06/29/2020 136  136 - 145 mmol/L Final   • Potassium 06/29/2020 4.8  3.5 - 5.2 mmol/L Final   • Chloride 06/29/2020 104  98 - 107 mmol/L Final   • CO2 06/29/2020 24.8  22.0 - 29.0 mmol/L Final   • Calcium 06/29/2020 9.5  8.6 - 10.5 mg/dL Final   • Total Protein 06/29/2020 7.2  6.0 - 8.5 g/dL Final   • Albumin 06/29/2020 4.10  3.50 - 5.20 g/dL Final   • ALT (SGPT) 06/29/2020 26  1 - 41 U/L Final   • AST (SGOT) 06/29/2020 22  1 - 40 U/L Final   • Alkaline Phosphatase 06/29/2020 89  39 - 117 U/L Final   • Total Bilirubin 06/29/2020 0.3  0.2 - 1.2 mg/dL Final   • eGFR Non African Amer 06/29/2020 65  >60 mL/min/1.73 Final   • Globulin 06/29/2020 3.1  gm/dL Final   • A/G Ratio 06/29/2020 1.3  g/dL Final   • BUN/Creatinine Ratio 06/29/2020 11.0  7.0 - 25.0 Final   • Anion Gap 06/29/2020 7.2  5.0 - 15.0 mmol/L Final   • Hemoglobin A1C 06/29/2020 6.10* 4.80 - 5.60 % Final   • Total Cholesterol 06/29/2020 166  0 - 200 mg/dL Final   • Triglycerides  06/29/2020 164* 0 - 150 mg/dL Final   • HDL Cholesterol 06/29/2020 37* 40 - 60 mg/dL Final   • LDL Cholesterol  06/29/2020 96  0 - 100 mg/dL Final   • VLDL Cholesterol 06/29/2020 32.8  5 - 40 mg/dL Final   • LDL/HDL Ratio 06/29/2020 2.60   Final   • 25 Hydroxy, Vitamin D 06/29/2020 46.8  30.0 - 100.0 ng/ml Final   • THC, Screen, Urine 06/29/2020 Negative  Negative Final   • Phencyclidine (PCP), Urine 06/29/2020 Negative  Negative Final   • Cocaine Screen, Urine 06/29/2020 Negative  Negative Final   • Methamphetamine, Ur 06/29/2020 Negative  Negative Final   • Opiate Screen 06/29/2020 Negative  Negative Final   • Amphetamine Screen, Urine 06/29/2020 Positive* Negative Final   • Benzodiazepine Screen, Urine 06/29/2020 Negative  Negative Final   • Tricyclic Antidepressants Screen 06/29/2020 Negative  Negative Final   • Methadone Screen, Urine 06/29/2020 Negative  Negative Final   • Barbiturates Screen, Urine 06/29/2020 Negative  Negative Final   • Oxycodone Screen, Urine 06/29/2020 Negative  Negative Final   • Propoxyphene Screen 06/29/2020 Negative  Negative Final   • Buprenorphine, Screen, Urine 06/29/2020 Negative  Negative Final      US Abdomen Complete  Narrative: PROCEDURE: Complete abdominal sonogram    COMPARISON: None    HISTORY: Abdominal pain    FINDINGS: Realtime grayscale and color-flow imaging is obtained  of the abdomen.    The gallbladder is normal.No shadowing stones, pericholecystic  fluid or wall thickening.   There is increased echogenicity throughout the liver most  consistent with fatty infiltration or other parenchymal liver  disease. No focal lesions or intrahepatic biliary ductal  dilatation.   The visualized common duct is within normal limits, measuring 5  mm in diameter.   The visualized kidneys are normal in echogenicity, without  hydronephrosis.  The pancreas is obscured by bowel gas and cannot be evaluated.    The spleen appears normal.  The aorta and inferior vena cava are obscured by  "gas and cannot  be evaluated.  Impression: CONCLUSION:  Fatty liver.    Electronically signed by:  Tyshawn Marin MD  2/11/2020 9:14 AM [facility]  Workstation: [id number]    @[email or web address]@  Immunization History   Administered Date(s) Administered   • Influenza TIV (IM) 03/28/2017, 03/28/2017   • Tdap 05/23/2016, 02/17/2017, 09/20/2019       The following portions of the patient's history were reviewed and updated as appropriate: allergies, current medications, past family history, past medical history, past social history, past surgical history and problem list.        Physical Exam  /60 (BP Location: Right arm, Patient Position: Sitting, Cuff Size: Large Adult)   Pulse 110   Temp 97.3 °F (36.3 °C)   Ht 180.3 cm (71\")   Wt 127 kg (279 lb 9.6 oz)   SpO2 95%   BMI 39.00 kg/m²     Physical Exam  Abdominal:      Comments: Obese abdomen    Musculoskeletal:         General: Tenderness present.         Assessment/Plan    Diagnosis Plan   1. Prediabetes     2. Mixed hyperlipidemia     3. Essential hypertension     4. Attention deficit hyperactivity disorder (ADHD), predominantly inattentive type     5. Stage 2 chronic kidney disease     6. Class 2 obesity with body mass index (BMI) of 39.0 to 39.9 in adult, unspecified obesity type, unspecified whether serious comorbidity present             -Went over labwork  -recommend influenza vaccination, recommend shingles vaccination - pt declined    -abdominal pain/fatty liver/nausea vomiting/Gastritis/GERD with esophagitis - on protonix 40 mg daily. .  GI following had recent EGD   -ckd stage 2 - continue to monitor. Gave information. Advised better hydration BP control   -for back pain with sciatica/scoliosis - pain is better after PT/OT. Continue with mobic and flexeril PRN.  Consider MRI if not improving    -right shoulder pain -stable    -Vitamin D deficiency vitamin D pill once a week d   -HLP - lipitor 80 mg PO qhs   -HTN - stable on lisinopril 40 mg daily.  -ADHD refilled " ADHD medication Adderall XR 25 mg PO q daily for ADHD.  ERWIN reviewed   -GERD - on protonix 40 mg PO q daily.    -allergic rhinitis - continue singulair and flonase and will add zyrtec.  -prediabetes - prediabetes meal plan information provided.     hyperlipidemia - on lipitor 80 mg PO qhs   -obesity - recommend ketogenic diet along with intermittent fasting. Counseled weight loss >5 minutes  BMI at 39.00  -advised pt to be safe and call with questions and concerns  -advised to go to ER or call 911 if symptoms get severe  -advised to followup with specialist and referrals   -advised pt to be safe during COVID-19 pandemic  -total time with pt >25 minutes   -recheck in 1 month           This document has been electronically signed by Scottie Borges MD on December 23, 2020 15:56 CST

## 2020-12-22 ENCOUNTER — TELEPHONE (OUTPATIENT)
Dept: FAMILY MEDICINE CLINIC | Facility: CLINIC | Age: 50
End: 2020-12-22

## 2020-12-23 ENCOUNTER — OFFICE VISIT (OUTPATIENT)
Dept: FAMILY MEDICINE CLINIC | Facility: CLINIC | Age: 50
End: 2020-12-23

## 2020-12-23 VITALS
TEMPERATURE: 97.3 F | WEIGHT: 279.6 LBS | HEIGHT: 71 IN | HEART RATE: 110 BPM | DIASTOLIC BLOOD PRESSURE: 60 MMHG | BODY MASS INDEX: 39.14 KG/M2 | SYSTOLIC BLOOD PRESSURE: 102 MMHG | OXYGEN SATURATION: 95 %

## 2020-12-23 DIAGNOSIS — N18.2 STAGE 2 CHRONIC KIDNEY DISEASE: ICD-10-CM

## 2020-12-23 DIAGNOSIS — F90.0 ATTENTION DEFICIT HYPERACTIVITY DISORDER (ADHD), PREDOMINANTLY INATTENTIVE TYPE: ICD-10-CM

## 2020-12-23 DIAGNOSIS — E66.9 CLASS 2 OBESITY WITH BODY MASS INDEX (BMI) OF 39.0 TO 39.9 IN ADULT, UNSPECIFIED OBESITY TYPE, UNSPECIFIED WHETHER SERIOUS COMORBIDITY PRESENT: ICD-10-CM

## 2020-12-23 DIAGNOSIS — E78.2 MIXED HYPERLIPIDEMIA: ICD-10-CM

## 2020-12-23 DIAGNOSIS — R73.03 PREDIABETES: Primary | ICD-10-CM

## 2020-12-23 DIAGNOSIS — I10 ESSENTIAL HYPERTENSION: ICD-10-CM

## 2020-12-23 PROCEDURE — 99214 OFFICE O/P EST MOD 30 MIN: CPT | Performed by: FAMILY MEDICINE

## 2020-12-23 RX ORDER — MONTELUKAST SODIUM 10 MG/1
10 TABLET ORAL NIGHTLY
Qty: 30 TABLET | Refills: 3 | Status: SHIPPED | OUTPATIENT
Start: 2020-12-23 | End: 2021-03-24 | Stop reason: SDUPTHER

## 2021-01-25 RX ORDER — PANTOPRAZOLE SODIUM 40 MG/1
40 TABLET, DELAYED RELEASE ORAL DAILY
Qty: 30 TABLET | Refills: 3 | Status: SHIPPED | OUTPATIENT
Start: 2021-01-25 | End: 2021-02-03 | Stop reason: SDUPTHER

## 2021-02-03 ENCOUNTER — OFFICE VISIT (OUTPATIENT)
Dept: FAMILY MEDICINE CLINIC | Facility: CLINIC | Age: 51
End: 2021-02-03

## 2021-02-03 VITALS
HEIGHT: 71 IN | SYSTOLIC BLOOD PRESSURE: 128 MMHG | BODY MASS INDEX: 39.65 KG/M2 | TEMPERATURE: 97.7 F | WEIGHT: 283.2 LBS | DIASTOLIC BLOOD PRESSURE: 86 MMHG

## 2021-02-03 DIAGNOSIS — K21.9 GASTROESOPHAGEAL REFLUX DISEASE, UNSPECIFIED WHETHER ESOPHAGITIS PRESENT: ICD-10-CM

## 2021-02-03 DIAGNOSIS — E55.9 VITAMIN D DEFICIENCY: ICD-10-CM

## 2021-02-03 DIAGNOSIS — N52.9 ERECTILE DYSFUNCTION, UNSPECIFIED ERECTILE DYSFUNCTION TYPE: Primary | ICD-10-CM

## 2021-02-03 DIAGNOSIS — R73.03 PREDIABETES: ICD-10-CM

## 2021-02-03 DIAGNOSIS — Z02.83 ENCOUNTER FOR DRUG SCREENING: ICD-10-CM

## 2021-02-03 DIAGNOSIS — F90.0 ATTENTION DEFICIT HYPERACTIVITY DISORDER (ADHD), PREDOMINANTLY INATTENTIVE TYPE: ICD-10-CM

## 2021-02-03 DIAGNOSIS — N18.2 STAGE 2 CHRONIC KIDNEY DISEASE: ICD-10-CM

## 2021-02-03 DIAGNOSIS — E66.9 CLASS 2 OBESITY WITH BODY MASS INDEX (BMI) OF 39.0 TO 39.9 IN ADULT, UNSPECIFIED OBESITY TYPE, UNSPECIFIED WHETHER SERIOUS COMORBIDITY PRESENT: ICD-10-CM

## 2021-02-03 DIAGNOSIS — I10 ESSENTIAL HYPERTENSION: ICD-10-CM

## 2021-02-03 PROCEDURE — 99214 OFFICE O/P EST MOD 30 MIN: CPT | Performed by: FAMILY MEDICINE

## 2021-02-03 RX ORDER — SILDENAFIL 50 MG/1
50 TABLET, FILM COATED ORAL AS NEEDED
Qty: 10 TABLET | Refills: 3 | Status: SHIPPED | OUTPATIENT
Start: 2021-02-03 | End: 2021-02-04 | Stop reason: SDUPTHER

## 2021-02-03 RX ORDER — DEXTROAMPHETAMINE SACCHARATE, AMPHETAMINE ASPARTATE MONOHYDRATE, DEXTROAMPHETAMINE SULFATE AND AMPHETAMINE SULFATE 6.25; 6.25; 6.25; 6.25 MG/1; MG/1; MG/1; MG/1
25 CAPSULE, EXTENDED RELEASE ORAL EVERY MORNING
Qty: 30 CAPSULE | Refills: 0 | Status: SHIPPED | OUTPATIENT
Start: 2021-02-03 | End: 2021-03-24 | Stop reason: SDUPTHER

## 2021-02-03 RX ORDER — PANTOPRAZOLE SODIUM 40 MG/1
40 TABLET, DELAYED RELEASE ORAL DAILY
Qty: 30 TABLET | Refills: 3 | Status: SHIPPED | OUTPATIENT
Start: 2021-02-03 | End: 2021-11-02 | Stop reason: SDUPTHER

## 2021-02-03 NOTE — PATIENT INSTRUCTIONS
Sildenafil tablets (Erectile Dysfunction)  What is this medicine?  SILDENAFIL (kvng DEN a ruben) is used to treat erection problems in men.  This medicine may be used for other purposes; ask your health care provider or pharmacist if you have questions.  COMMON BRAND NAME(S): Viagra  What should I tell my health care provider before I take this medicine?  They need to know if you have any of these conditions:  · bleeding disorders  · eye or vision problems, including a rare inherited eye disease called retinitis pigmentosa  · anatomical deformation of the penis, Peyronie's disease, or history of priapism (painful and prolonged erection)  · heart disease, angina, a history of heart attack, irregular heart beats, or other heart problems  · high or low blood pressure  · history of blood diseases, like sickle cell anemia or leukemia  · history of stomach bleeding  · kidney disease  · liver disease  · stroke  · an unusual or allergic reaction to sildenafil, other medicines, foods, dyes, or preservatives  · pregnant or trying to get pregnant  · breast-feeding  How should I use this medicine?  Take this medicine by mouth with a glass of water. Follow the directions on the prescription label. The dose is usually taken 1 hour before sexual activity. You should not take the dose more than once per day. Do not take your medicine more often than directed.  Talk to your pediatrician regarding the use of this medicine in children. This medicine is not used in children for this condition.  Overdosage: If you think you have taken too much of this medicine contact a poison control center or emergency room at once.  NOTE: This medicine is only for you. Do not share this medicine with others.  What if I miss a dose?  This does not apply. Do not take double or extra doses.  What may interact with this medicine?  Do not take this medicine with any of the following medications:  · cisapride  · nitrates like amyl nitrite, isosorbide  dinitrate, isosorbide mononitrate, nitroglycerin  · riociguat  This medicine may also interact with the following medications:  · antiviral medicines for HIV or AIDS  · bosentan  · certain medicines for benign prostatic hyperplasia (BPH)  · certain medicines for blood pressure  · certain medicines for fungal infections like ketoconazole and itraconazole  · cimetidine  · erythromycin  · rifampin  This list may not describe all possible interactions. Give your health care provider a list of all the medicines, herbs, non-prescription drugs, or dietary supplements you use. Also tell them if you smoke, drink alcohol, or use illegal drugs. Some items may interact with your medicine.  What should I watch for while using this medicine?  If you notice any changes in your vision while taking this drug, call your doctor or health care professional as soon as possible. Stop using this medicine and call your health care provider right away if you have a loss of sight in one or both eyes.  Contact your doctor or health care professional right away if you have an erection that lasts longer than 4 hours or if it becomes painful. This may be a sign of a serious problem and must be treated right away to prevent permanent damage.  If you experience symptoms of nausea, dizziness, chest pain or arm pain upon initiation of sexual activity after taking this medicine, you should refrain from further activity and call your doctor or health care professional as soon as possible.  Do not drink alcohol to excess (examples, 5 glasses of wine or 5 shots of whiskey) when taking this medicine. When taken in excess, alcohol can increase your chances of getting a headache or getting dizzy, increasing your heart rate or lowering your blood pressure.  Using this medicine does not protect you or your partner against HIV infection (the virus that causes AIDS) or other sexually transmitted diseases.  What side effects may I notice from receiving this  medicine?  Side effects that you should report to your doctor or health care professional as soon as possible:  · allergic reactions like skin rash, itching or hives, swelling of the face, lips, or tongue  · breathing problems  · changes in hearing  · changes in vision  · chest pain  · fast, irregular heartbeat  · prolonged or painful erection  · seizures  Side effects that usually do not require medical attention (report to your doctor or health care professional if they continue or are bothersome):  · back pain  · dizziness  · flushing  · headache  · indigestion  · muscle aches  · nausea  · stuffy or runny nose  This list may not describe all possible side effects. Call your doctor for medical advice about side effects. You may report side effects to FDA at 8-078-AEA-2658.  Where should I keep my medicine?  Keep out of reach of children.  Store at room temperature between 15 and 30 degrees C (59 and 86 degrees F). Throw away any unused medicine after the expiration date.  NOTE: This sheet is a summary. It may not cover all possible information. If you have questions about this medicine, talk to your doctor, pharmacist, or health care provider.  © 2020 [employer]vier/Gold Standard (2016-11-30 12:00:25)    Erectile Dysfunction  Erectile dysfunction (ED) is the inability to get or keep an erection in order to have sexual intercourse. Erectile dysfunction may include:  · Inability to get an erection.  · Lack of enough hardness of the erection to allow penetration.  · Loss of the erection before sex is finished.  What are the causes?  This condition may be caused by:  · Certain medicines, such as:  ? Pain relievers.  ? Antihistamines.  ? Antidepressants.  ? Blood pressure medicines.  ? Water pills (diuretics).  ? Ulcer medicines.  ? Muscle relaxants.  ? Drugs.  · Excessive drinking.  · Psychological causes, such as:  ? Anxiety.  ? Depression.  ? Sadness.  ? Exhaustion.  ? Performance fear.  ? Stress.  · Physical causes, such  as:  ? Artery problems. This may include diabetes, smoking, liver disease, or atherosclerosis.  ? High blood pressure.  ? Hormonal problems, such as low testosterone.  ? Obesity.  ? Nerve problems. This may include back or pelvic injuries, diabetes mellitus, multiple sclerosis, or Parkinson disease.  What are the signs or symptoms?  Symptoms of this condition include:  · Inability to get an erection.  · Lack of enough hardness of the erection to allow penetration.  · Loss of the erection before sex is finished.  · Normal erections at some times, but with frequent unsatisfactory episodes.  · Low sexual satisfaction in either partner due to erection problems.  · A curved penis occurring with erection. The curve may cause pain or the penis may be too curved to allow for intercourse.  · Never having nighttime erections.  How is this diagnosed?  This condition is often diagnosed by:  · Performing a physical exam to find other diseases or specific problems with the penis.  · Asking you detailed questions about the problem.  · Performing blood tests to check for diabetes mellitus or to measure hormone levels.  · Performing other tests to check for underlying health conditions.  · Performing an ultrasound exam to check for scarring.  · Performing a test to check blood flow to the penis.  · Doing a sleep study at home to measure nighttime erections.  How is this treated?  This condition may be treated by:  · Medicine taken by mouth to help you achieve an erection (oral medicine).  · Hormone replacement therapy to replace low testosterone levels.  · Medicine that is injected into the penis. Your health care provider may instruct you how to give yourself these injections at home.  · Vacuum pump. This is a pump with a ring on it. The pump and ring are placed on the penis and used to create pressure that helps the penis become erect.  · Penile implant surgery. In this procedure, you may receive:  ? An inflatable implant. This  consists of cylinders, a pump, and a reservoir. The cylinders can be inflated with a fluid that helps to create an erection, and they can be deflated after intercourse.  ? A semi-rigid implant. This consists of two silicone rubber rods. The rods provide some rigidity. They are also flexible, so the penis can both curve downward in its normal position and become straight for sexual intercourse.  · Blood vessel surgery, to improve blood flow to the penis. During this procedure, a blood vessel from a different part of the body is placed into the penis to allow blood to flow around (bypass) damaged or blocked blood vessels.  · Lifestyle changes, such as exercising more, losing weight, and quitting smoking.  Follow these instructions at home:  Medicines    · Take over-the-counter and prescription medicines only as told by your health care provider. Do not increase the dosage without first discussing it with your health care provider.  · If you are using self-injections, perform injections as directed by your health care provider. Make sure to avoid any veins that are on the surface of the penis. After giving an injection, apply pressure to the injection site for 5 minutes.  General instructions  · Exercise regularly, as directed by your health care provider. Work with your health care provider to lose weight, if needed.  · Do not use any products that contain nicotine or tobacco, such as cigarettes and e-cigarettes. If you need help quitting, ask your health care provider.  · Before using a vacuum pump, read the instructions that come with the pump and discuss any questions with your health care provider.  · Keep all follow-up visits as told by your health care provider. This is important.  Contact a health care provider if:  · You feel nauseous.  · You vomit.  Get help right away if:  · You are taking oral or injectable medicines and you have an erection that lasts longer than 4 hours. If your health care provider is  unavailable, go to the nearest emergency room for evaluation. An erection that lasts much longer than 4 hours can result in permanent damage to your penis.  · You have severe pain in your groin or abdomen.  · You develop redness or severe swelling of your penis.  · You have redness spreading up into your groin or lower abdomen.  · You are unable to urinate.  · You experience chest pain or a rapid heart beat (palpitations) after taking oral medicines.  Summary  · Erectile dysfunction (ED) is the inability to get or keep an erection during sexual intercourse. This problem can usually be treated successfully.  · This condition is diagnosed based on a physical exam, your symptoms, and tests to determine the cause. Treatment varies depending on the cause, and may include medicines, hormone therapy, surgery, or vacuum pump.  · You may need follow-up visits to make sure that you are using your medicines or devices correctly.  · Get help right away if you are taking or injecting medicines and you have an erection that lasts longer than 4 hours.  This information is not intended to replace advice given to you by your health care provider. Make sure you discuss any questions you have with your health care provider.  Document Revised: 11/30/2018 Document Reviewed: 01/03/2018  [employer] Patient Education © 2020 [employer]    Testosterone Test  Why am I having this test?  Testosterone is a hormone made by the adrenal glands in the abdomen in both males and females. In males, it is also made by the testicles. Starting at puberty, testosterone stimulates the development of secondary sex characteristics in males. This includes a deeper voice, muscle and body hair growth, and penis enlargement.  In females, testosterone is also produced in the ovaries. The female body converts testosterone into estradiol, the main female sex hormone.  An abnormal level of testosterone can cause health issues in both males and females. You may have  this test if your health care provider suspects that an abnormal testosterone level is causing or contributing to other health problems.  In males, an abnormally low testosterone level can cause:  · Inability to have children (infertility).  · Trouble getting or maintaining an erection (erectile dysfunction).  · Delayed puberty.  In females, an abnormally high testosterone level can cause:  · Infertility.  · Polycystic ovary syndrome (PCOS).  · Development of masculine features (virilization).  What is being tested?  This test measures the amount of total testosterone in your blood.  What kind of sample is taken?    A blood sample is required for this test. It is usually collected by inserting a needle into a blood vessel. The sample is most often collected in the morning because that is when testosterone is usually the highest.  Tell a health care provider about:  · Any allergies you have.  · All medicines you are taking, including vitamins, herbs, eye drops, creams, and over-the-counter medicines.  · Any blood disorders you have.  · Any surgeries you have had.  · Any medical conditions you have.  · Whether you are pregnant or may be pregnant.  How are the results reported?  Your test results will be reported as a value that indicates how much testosterone is in your blood. This will be given as nanograms of testosterone per deciliter of blood (ng/dL).  Your health care provider will compare your test results to normal ranges that were established after testing a large group of people (reference ranges). Reference ranges may vary among labs and hospitals. For this test, common reference ranges for total testosterone are:  · Male:  ? 7 months to 9 years old: less than 30 ng/dL.  ? 10-13 years old: less than 300 ng/dL.  ? 14-15 years old: 170-540 ng/dL.  ? 16-19 years old: 250-910 ng/dL.  ? 20 years and older: 280-1,080 ng/dL.  · Female:  ? 7 months to 9 years old: less than 30 ng/dL.  ? 10-13 years old: less than 40  ng/dL.  ? 14-15 years old: less than 60 ng/dL.  ? 16-19 years old: less than 70 ng/dL.  ? 20 years and older: less than 70 ng/dL.  What do the results mean?  A result that is within your reference range means that you have a normal amount of testosterone in your blood.  In males:  · A high testosterone level may mean that you:  ? Have certain types of tumors.  ? Have an overactive thyroid gland (hyperthyroidism).  ? Currently use anabolic steroids or used anabolic steroids in the past.  ? Have an inherited disorder that affects the adrenal glands (congenital adrenal hyperplasia).  ? Are starting puberty early (precocious puberty).  · A low testosterone level may mean that you:  ? Have certain genetic diseases.  ? Have had certain viral infections, such as mumps.  ? Have a condition that affects the pituitary gland.  ? Have injured your testicles.  In females:  · A high testosterone level may mean that you have:  ? Certain types of tumors, such as ovarian or adrenal gland tumors.  ? An inherited disorder that affects certain cells in the adrenal glands (congenital adrenal hyperplasia).  ? Polycystic ovary syndrome.  · A low testosterone level usually will not cause health problems.  Talk with your health care provider about what your results mean.  Questions to ask your health care provider  Ask your health care provider, or the department that is doing the test:  · When will my results be ready?  · How will I get my results?  · What are my treatment options?  · What other tests do I need?  · What are my next steps?  Summary  · Testosterone is a hormone made by the adrenal glands in the abdomen in both males and females. In males, it is also made by the testicles. Starting at puberty, testosterone stimulates the development of secondary sex characteristics in males.  · In females, testosterone is also produced in the ovaries. The female body converts testosterone into estradiol, the main female sex hormone.  · An  abnormal level of testosterone can cause health issues in both males and females. You may have this test if your health care provider suspects that an abnormal testosterone level is causing or contributing to other health problems.  This information is not intended to replace advice given to you by your health care provider. Make sure you discuss any questions you have with your health care provider.  Document Revised: 06/04/2020 Document Reviewed: 09/18/2018  [employer] Patient Education © 2020 [employer]

## 2021-02-04 RX ORDER — SILDENAFIL 50 MG/1
50 TABLET, FILM COATED ORAL AS NEEDED
Qty: 10 TABLET | Refills: 3 | Status: SHIPPED | OUTPATIENT
Start: 2021-02-04 | End: 2021-02-10

## 2021-02-10 RX ORDER — TADALAFIL 10 MG/1
10 TABLET ORAL DAILY PRN
Qty: 30 TABLET | Refills: 3 | Status: SHIPPED | OUTPATIENT
Start: 2021-02-10 | End: 2022-11-17 | Stop reason: SDUPTHER

## 2021-02-19 ENCOUNTER — TELEPHONE (OUTPATIENT)
Dept: FAMILY MEDICINE CLINIC | Facility: CLINIC | Age: 51
End: 2021-02-19

## 2021-03-02 ENCOUNTER — LAB (OUTPATIENT)
Dept: LAB | Facility: HOSPITAL | Age: 51
End: 2021-03-02

## 2021-03-02 DIAGNOSIS — E55.9 VITAMIN D DEFICIENCY: ICD-10-CM

## 2021-03-02 DIAGNOSIS — K21.9 GASTROESOPHAGEAL REFLUX DISEASE, UNSPECIFIED WHETHER ESOPHAGITIS PRESENT: ICD-10-CM

## 2021-03-02 DIAGNOSIS — F90.0 ATTENTION DEFICIT HYPERACTIVITY DISORDER (ADHD), PREDOMINANTLY INATTENTIVE TYPE: ICD-10-CM

## 2021-03-02 DIAGNOSIS — N52.9 ERECTILE DYSFUNCTION, UNSPECIFIED ERECTILE DYSFUNCTION TYPE: ICD-10-CM

## 2021-03-02 DIAGNOSIS — R73.03 PREDIABETES: ICD-10-CM

## 2021-03-02 DIAGNOSIS — I10 ESSENTIAL HYPERTENSION: ICD-10-CM

## 2021-03-02 DIAGNOSIS — Z02.83 ENCOUNTER FOR DRUG SCREENING: ICD-10-CM

## 2021-03-02 DIAGNOSIS — E66.9 CLASS 2 OBESITY WITH BODY MASS INDEX (BMI) OF 39.0 TO 39.9 IN ADULT, UNSPECIFIED OBESITY TYPE, UNSPECIFIED WHETHER SERIOUS COMORBIDITY PRESENT: ICD-10-CM

## 2021-03-02 DIAGNOSIS — N18.2 STAGE 2 CHRONIC KIDNEY DISEASE: ICD-10-CM

## 2021-03-02 PROCEDURE — 80061 LIPID PANEL: CPT

## 2021-03-02 PROCEDURE — 80306 DRUG TEST PRSMV INSTRMNT: CPT

## 2021-03-02 PROCEDURE — 83540 ASSAY OF IRON: CPT

## 2021-03-02 PROCEDURE — 84403 ASSAY OF TOTAL TESTOSTERONE: CPT

## 2021-03-02 PROCEDURE — 80053 COMPREHEN METABOLIC PANEL: CPT

## 2021-03-02 PROCEDURE — 83036 HEMOGLOBIN GLYCOSYLATED A1C: CPT

## 2021-03-02 PROCEDURE — 82306 VITAMIN D 25 HYDROXY: CPT

## 2021-03-02 PROCEDURE — 84466 ASSAY OF TRANSFERRIN: CPT

## 2021-03-02 PROCEDURE — 85025 COMPLETE CBC W/AUTO DIFF WBC: CPT

## 2021-03-02 PROCEDURE — 82728 ASSAY OF FERRITIN: CPT

## 2021-03-02 PROCEDURE — 82607 VITAMIN B-12: CPT

## 2021-03-03 ENCOUNTER — TELEPHONE (OUTPATIENT)
Dept: FAMILY MEDICINE CLINIC | Facility: CLINIC | Age: 51
End: 2021-03-03

## 2021-03-03 LAB
25(OH)D3 SERPL-MCNC: 37.3 NG/ML
ALBUMIN SERPL-MCNC: 4.2 G/DL (ref 3.5–5.2)
ALBUMIN/GLOB SERPL: 1.3 G/DL
ALP SERPL-CCNC: 79 U/L (ref 39–117)
ALT SERPL W P-5'-P-CCNC: 37 U/L (ref 1–41)
AMPHET+METHAMPHET UR QL: POSITIVE
AMPHETAMINES UR QL: NEGATIVE
ANION GAP SERPL CALCULATED.3IONS-SCNC: 11 MMOL/L (ref 5–15)
AST SERPL-CCNC: 25 U/L (ref 1–40)
BARBITURATES UR QL SCN: NEGATIVE
BASOPHILS # BLD AUTO: 0.08 10*3/MM3 (ref 0–0.2)
BASOPHILS NFR BLD AUTO: 1.5 % (ref 0–1.5)
BENZODIAZ UR QL SCN: NEGATIVE
BILIRUB SERPL-MCNC: 0.6 MG/DL (ref 0–1.2)
BUN SERPL-MCNC: 13 MG/DL (ref 6–20)
BUN/CREAT SERPL: 12.3 (ref 7–25)
BUPRENORPHINE SERPL-MCNC: NEGATIVE NG/ML
CALCIUM SPEC-SCNC: 9.4 MG/DL (ref 8.6–10.5)
CANNABINOIDS SERPL QL: NEGATIVE
CHLORIDE SERPL-SCNC: 104 MMOL/L (ref 98–107)
CHOLEST SERPL-MCNC: 287 MG/DL (ref 0–200)
CO2 SERPL-SCNC: 26 MMOL/L (ref 22–29)
COCAINE UR QL: NEGATIVE
CREAT SERPL-MCNC: 1.06 MG/DL (ref 0.76–1.27)
DEPRECATED RDW RBC AUTO: 39.6 FL (ref 37–54)
EOSINOPHIL # BLD AUTO: 0.25 10*3/MM3 (ref 0–0.4)
EOSINOPHIL NFR BLD AUTO: 4.7 % (ref 0.3–6.2)
ERYTHROCYTE [DISTWIDTH] IN BLOOD BY AUTOMATED COUNT: 12.5 % (ref 12.3–15.4)
FERRITIN SERPL-MCNC: 383 NG/ML (ref 30–400)
GFR SERPL CREATININE-BSD FRML MDRD: 73 ML/MIN/1.73
GLOBULIN UR ELPH-MCNC: 3.2 GM/DL
GLUCOSE SERPL-MCNC: 89 MG/DL (ref 65–99)
HBA1C MFR BLD: 5.98 % (ref 4.8–5.6)
HCT VFR BLD AUTO: 44.7 % (ref 37.5–51)
HDLC SERPL-MCNC: 37 MG/DL (ref 40–60)
HGB BLD-MCNC: 15.7 G/DL (ref 13–17.7)
IMM GRANULOCYTES # BLD AUTO: 0.13 10*3/MM3 (ref 0–0.05)
IMM GRANULOCYTES NFR BLD AUTO: 2.4 % (ref 0–0.5)
IRON 24H UR-MRATE: 108 MCG/DL (ref 59–158)
IRON SATN MFR SERPL: 34 % (ref 20–50)
LDLC SERPL CALC-MCNC: 222 MG/DL (ref 0–100)
LDLC/HDLC SERPL: 5.95 {RATIO}
LYMPHOCYTES # BLD AUTO: 2.3 10*3/MM3 (ref 0.7–3.1)
LYMPHOCYTES NFR BLD AUTO: 42.9 % (ref 19.6–45.3)
MCH RBC QN AUTO: 30.8 PG (ref 26.6–33)
MCHC RBC AUTO-ENTMCNC: 35.1 G/DL (ref 31.5–35.7)
MCV RBC AUTO: 87.8 FL (ref 79–97)
METHADONE UR QL SCN: NEGATIVE
MONOCYTES # BLD AUTO: 0.48 10*3/MM3 (ref 0.1–0.9)
MONOCYTES NFR BLD AUTO: 9 % (ref 5–12)
NEUTROPHILS NFR BLD AUTO: 2.12 10*3/MM3 (ref 1.7–7)
NEUTROPHILS NFR BLD AUTO: 39.5 % (ref 42.7–76)
NRBC BLD AUTO-RTO: 0 /100 WBC (ref 0–0.2)
OPIATES UR QL: NEGATIVE
OXYCODONE UR QL SCN: NEGATIVE
PCP UR QL SCN: NEGATIVE
PLATELET # BLD AUTO: 285 10*3/MM3 (ref 140–450)
PMV BLD AUTO: 9.7 FL (ref 6–12)
POTASSIUM SERPL-SCNC: 4.9 MMOL/L (ref 3.5–5.2)
PROPOXYPH UR QL: NEGATIVE
PROT SERPL-MCNC: 7.4 G/DL (ref 6–8.5)
RBC # BLD AUTO: 5.09 10*6/MM3 (ref 4.14–5.8)
SODIUM SERPL-SCNC: 141 MMOL/L (ref 136–145)
TESTOST SERPL-MCNC: 217 NG/DL (ref 193–740)
TIBC SERPL-MCNC: 319 MCG/DL (ref 298–536)
TRANSFERRIN SERPL-MCNC: 214 MG/DL (ref 200–360)
TRICYCLICS UR QL SCN: NEGATIVE
TRIGL SERPL-MCNC: 149 MG/DL (ref 0–150)
VIT B12 BLD-MCNC: 483 PG/ML (ref 211–946)
VLDLC SERPL-MCNC: 28 MG/DL (ref 5–40)
WBC # BLD AUTO: 5.36 10*3/MM3 (ref 3.4–10.8)

## 2021-03-09 ENCOUNTER — TELEPHONE (OUTPATIENT)
Dept: FAMILY MEDICINE CLINIC | Facility: CLINIC | Age: 51
End: 2021-03-09

## 2021-03-09 RX ORDER — ROSUVASTATIN CALCIUM 20 MG/1
20 TABLET, COATED ORAL NIGHTLY
Qty: 30 TABLET | Refills: 3 | Status: SHIPPED | OUTPATIENT
Start: 2021-03-09 | End: 2021-03-24 | Stop reason: SDUPTHER

## 2021-03-09 NOTE — TELEPHONE ENCOUNTER
----- Message from Scottie Borges MD sent at 3/9/2021  7:57 AM CST -----  Please let pt know that all labwork stable except his lipid pandl shows total cholesterol at 287 and LDL at 222. If pt taking lipitor 80 mg pO qhs have him start on crestor 20 mg PO qhs. Needs to adhere to heart healthy diet    Testosterone levels low normal.      Rest of labwork stable    Recheck on next visit. Thanks

## 2021-03-09 NOTE — PROGRESS NOTES
Chief Complaint  ADD, Heartburn, and Hypertension    Subjective          Randall Hernandez presents to [facility]  History of Present Illness     Pt is 49 yo male with management  of obesity, history of prediabetes, HTN,  GERD, vitamin D deficiency, alelrgic rhinitis, ADHD predominantly inattentive type ,sp bilateral carpal tunnel release surgery,  Chronic back pain (arthritis of lumbar spine, short pedicles, mild scoliosis, mild arthritis thoracic spine).  CKD stage 2 , fatty liver, gastirits, esophagitis      2/3/21 in office visit for recheck on pt's above medical issues  Doing well on medications. BP stable today doing well on ADHD medication. His main concern is Erectile dysfunction for past year. States has difficult time having an erection and maintaining an erection no pain with sex. Has not tried ED medication pills. Is able to ejaculate     3/24/21 in office visit for recheck on pt's above medical issues.  Pt had labwork done on 3/2/21 that showed stable iron and ferritin, Vitamin B12 levels normal UDS consistent with ADHD medicaiton use. Vitamin D at 37.3. lipid panel showed total cholesterol at 287 with HDL at 37 and LDL at 222. hga1c at 5.98. from 5.90 CMP showed GFR at 73 from 86. CBC showed normal hemoglobin. Testosterone was at 217. Pt continues to take his medications for HTN ,HLP, allergic rhinitis and ADHD. BP stable today. No chest pain no dizziness. ADHD medicaiton helping        Obesity   This is a chronic problem. The current episode started more than 1 year ago. The problem occurs constantly. The problem has been unchanged. Associated symptoms include arthralgias and numbness. Pertinent negatives include no abdominal pain, anorexia, change in bowel habit, chest pain, chills, congestion, coughing, diaphoresis, fatigue, fever, headaches, joint swelling, myalgias, nausea, rash, sore throat, swollen glands, urinary symptoms, vertigo, visual change, vomiting or  "weakness. Nothing aggravates the symptoms. He has tried nothing for the symptoms. The treatment provided no relief.   Hypertension   This is a new problem. The current episode started more than 1year ago. The problem has been gradually improving tThe problem is controlled. Pertinent negatives include no anxiety, blurred vision, chest pain, headaches, malaise/fatigue, neck pain, orthopnea, palpitations, peripheral edema, PND, shortness of breath or sweats. Risk factors for coronary artery disease include male gender, obesity and sedentary lifestyle. Past treatments include diuretics and ace-inhibitor There are no compliance problems. Treatment has provided significant relief       Objective   Vital Signs:   /76 (BP Location: Right arm, Patient Position: Sitting, Cuff Size: Large Adult)   Pulse 94   Temp 98.7 °F (37.1 °C)   Ht 180.3 cm (71\")   Wt 129 kg (285 lb)   SpO2 92%   BMI 39.75 kg/m²     /76 (BP Location: Right arm, Patient Position: Sitting, Cuff Size: Large Adult)   Pulse 94   Temp 98.7 °F (37.1 °C)   Ht 180.3 cm (71\")   Wt 129 kg (285 lb)   SpO2 92%   BMI 39.75 kg/m²     Physical Exam  Vitals and nursing note reviewed.   Constitutional:       Appearance: He is well-developed. He is not diaphoretic.   HENT:      Head: Normocephalic and atraumatic.      Right Ear: External ear normal.   Eyes:      Conjunctiva/sclera: Conjunctivae normal.      Pupils: Pupils are equal, round, and reactive to light.   Cardiovascular:      Rate and Rhythm: Normal rate and regular rhythm.      Heart sounds: Normal heart sounds. No murmur heard.     Pulmonary:      Effort: Pulmonary effort is normal. No respiratory distress.      Breath sounds: Normal breath sounds.   Abdominal:      General: Bowel sounds are normal. There is no distension.      Palpations: Abdomen is soft.      Tenderness: There is no abdominal tenderness.   Musculoskeletal:         General: Tenderness present. No deformity. Normal range of " motion.      Cervical back: Normal range of motion and neck supple.   Skin:     General: Skin is warm.      Coloration: Skin is not pale.      Findings: No erythema or rash.   Neurological:      Mental Status: He is alert and oriented to person, place, and time.      Cranial Nerves: No cranial nerve deficit.   Psychiatric:         Behavior: Behavior normal.        Result Review :                 Assessment and Plan    Diagnoses and all orders for this visit:    1. Essential hypertension (Primary)    2. Stage 2 chronic kidney disease    3. History of prediabetes    4. Mixed hyperlipidemia    5. Attention deficit hyperactivity disorder (ADHD), predominantly inattentive type  -     amphetamine-dextroamphetamine XR (Adderall XR) 25 MG 24 hr capsule; Take 1 capsule by mouth Every Morning Take 1 tablet by mouth daily  Dispense: 30 capsule; Refill: 0    6. Prediabetes    7. Class 2 severe obesity with serious comorbidity and body mass index (BMI) of 39.0 to 39.9 in adult, unspecified obesity type (CMS/HCC)    Other orders  -     lisinopril (PRINIVIL,ZESTRIL) 40 MG tablet; Take 1 tablet by mouth Daily.  Dispense: 30 tablet; Refill: 3  -     vitamin D (ERGOCALCIFEROL) 1.25 MG (48556 UT) capsule capsule; Take 1 capsule by mouth 1 (One) Time Per Week.  Dispense: 4 capsule; Refill: 3  -     aspirin (aspirin) 81 MG EC tablet; Take 1 tablet by mouth Daily.  Dispense: 30 tablet; Refill: 3  -     montelukast (SINGULAIR) 10 MG tablet; Take 1 tablet by mouth Every Night.  Dispense: 30 tablet; Refill: 3  -     fluticasone (FLONASE) 50 MCG/ACT nasal spray; 2 sprays by Each Nare route Daily.  Dispense: 16 g; Refill: 3  -     rosuvastatin (Crestor) 20 MG tablet; Take 1 tablet by mouth Every Night.  Dispense: 30 tablet; Refill: 3             -Went over labwork  -abdominal pain/fatty liver/nausea vomiting/Gastritis/GERD with esophagitis - on protonix 40 mg daily. .  GI following had recent EGD   -ckd stage 2 - continue to monitor. Gave  information. Advised better hydration BP control   -for back pain with sciatica/scoliosis - pain is better after PT/OT. Continue with mobic and flexeril PRN.  Consider MRI if not improving    -right shoulder pain -stable    -Vitamin D deficiency vitamin D pill once a week d   -HLP - crestor 20 mg PO qhs   -HTN - stable on lisinopril 40 mg daily.  -ADHD refilled ADHD medication Adderall XR 25 mg PO q daily for ADHD.  ERWIN reviewed   -GERD - on protonix 40 mg PO q daily.    -allergic rhinitis - continue singulair and flonase and will add zyrtec.  -prediabetes - prediabetes meal plan information provided.     hyperlipidemia - on lipitor 80 mg PO qhs   -obesity - recommend ketogenic diet along with intermittent fasting. Counseled weight loss >5 minutes  BMI at 39.75   -advised pt to be safe and call with questions and concerns  -advised to go to ER or call 911 if symptoms get severe  -advised to followup with specialist and referrals   -advised pt to be safe during COVID-19 pandemic  -recheck in 1 month        This document has been electronically signed by Scotite Borges MD on March 24, 2021 16:02 CDT          Follow Up   Return in about 1 month (around 4/24/2021).  Patient was given instructions and counseling regarding his condition or for health maintenance advice. Please see specific information pulled into the AVS if appropriate.

## 2021-03-23 ENCOUNTER — TELEPHONE (OUTPATIENT)
Dept: FAMILY MEDICINE CLINIC | Facility: CLINIC | Age: 51
End: 2021-03-23

## 2021-03-24 ENCOUNTER — OFFICE VISIT (OUTPATIENT)
Dept: FAMILY MEDICINE CLINIC | Facility: CLINIC | Age: 51
End: 2021-03-24

## 2021-03-24 VITALS
TEMPERATURE: 98.7 F | DIASTOLIC BLOOD PRESSURE: 76 MMHG | SYSTOLIC BLOOD PRESSURE: 128 MMHG | OXYGEN SATURATION: 92 % | HEART RATE: 94 BPM | HEIGHT: 71 IN | BODY MASS INDEX: 39.9 KG/M2 | WEIGHT: 285 LBS

## 2021-03-24 DIAGNOSIS — Z87.898 HISTORY OF PREDIABETES: ICD-10-CM

## 2021-03-24 DIAGNOSIS — I10 ESSENTIAL HYPERTENSION: Primary | ICD-10-CM

## 2021-03-24 DIAGNOSIS — N18.2 STAGE 2 CHRONIC KIDNEY DISEASE: ICD-10-CM

## 2021-03-24 DIAGNOSIS — R73.03 PREDIABETES: ICD-10-CM

## 2021-03-24 DIAGNOSIS — E66.01 CLASS 2 SEVERE OBESITY WITH SERIOUS COMORBIDITY AND BODY MASS INDEX (BMI) OF 39.0 TO 39.9 IN ADULT, UNSPECIFIED OBESITY TYPE (HCC): ICD-10-CM

## 2021-03-24 DIAGNOSIS — E78.2 MIXED HYPERLIPIDEMIA: ICD-10-CM

## 2021-03-24 DIAGNOSIS — F90.0 ATTENTION DEFICIT HYPERACTIVITY DISORDER (ADHD), PREDOMINANTLY INATTENTIVE TYPE: ICD-10-CM

## 2021-03-24 PROCEDURE — 99214 OFFICE O/P EST MOD 30 MIN: CPT | Performed by: FAMILY MEDICINE

## 2021-03-24 RX ORDER — MONTELUKAST SODIUM 10 MG/1
10 TABLET ORAL NIGHTLY
Qty: 30 TABLET | Refills: 3 | Status: SHIPPED | OUTPATIENT
Start: 2021-03-24 | End: 2021-11-02 | Stop reason: SDUPTHER

## 2021-03-24 RX ORDER — ASPIRIN 81 MG/1
81 TABLET ORAL DAILY
Qty: 30 TABLET | Refills: 3 | Status: SHIPPED | OUTPATIENT
Start: 2021-03-24 | End: 2021-07-16 | Stop reason: SDUPTHER

## 2021-03-24 RX ORDER — FLUTICASONE PROPIONATE 50 MCG
2 SPRAY, SUSPENSION (ML) NASAL DAILY
Qty: 16 G | Refills: 3 | Status: SHIPPED | OUTPATIENT
Start: 2021-03-24 | End: 2021-11-02 | Stop reason: SDUPTHER

## 2021-03-24 RX ORDER — ERGOCALCIFEROL 1.25 MG/1
50000 CAPSULE ORAL WEEKLY
Qty: 4 CAPSULE | Refills: 3 | Status: SHIPPED | OUTPATIENT
Start: 2021-03-24 | End: 2021-08-31 | Stop reason: SDUPTHER

## 2021-03-24 RX ORDER — LISINOPRIL 40 MG/1
40 TABLET ORAL DAILY
Qty: 30 TABLET | Refills: 3 | Status: SHIPPED | OUTPATIENT
Start: 2021-03-24 | End: 2021-05-21

## 2021-03-24 RX ORDER — ROSUVASTATIN CALCIUM 20 MG/1
20 TABLET, COATED ORAL NIGHTLY
Qty: 30 TABLET | Refills: 3 | Status: SHIPPED | OUTPATIENT
Start: 2021-03-24 | End: 2022-08-25 | Stop reason: DRUGHIGH

## 2021-03-24 RX ORDER — DEXTROAMPHETAMINE SACCHARATE, AMPHETAMINE ASPARTATE MONOHYDRATE, DEXTROAMPHETAMINE SULFATE AND AMPHETAMINE SULFATE 6.25; 6.25; 6.25; 6.25 MG/1; MG/1; MG/1; MG/1
25 CAPSULE, EXTENDED RELEASE ORAL EVERY MORNING
Qty: 30 CAPSULE | Refills: 0 | Status: SHIPPED | OUTPATIENT
Start: 2021-03-24 | End: 2021-04-26 | Stop reason: SDUPTHER

## 2021-03-24 NOTE — PATIENT INSTRUCTIONS
Do not take lipitor anymore take crestor or rosuvastatin for cholesterol     Rosuvastatin Tablets  What is this medicine?  ROSUVASTATIN (brook NOMAN va sta tin) is known as a HMG-CoA reductase inhibitor or 'statin'. It lowers cholesterol and triglycerides in the blood. This drug may also reduce the risk of heart attack, stroke, or other health problems in patients with risk factors for heart disease. Diet and lifestyle changes are often used with this drug.  This medicine may be used for other purposes; ask your health care provider or pharmacist if you have questions.  COMMON BRAND NAME(S): Crestor  What should I tell my health care provider before I take this medicine?  They need to know if you have any of these conditions:  · diabetes  · if you often drink alcohol  · history of stroke  · kidney disease  · liver disease  · muscle aches or weakness  · thyroid disease  · an unusual or allergic reaction to rosuvastatin, other medicines, foods, dyes, or preservatives  · pregnant or trying to get pregnant  · breast-feeding  How should I use this medicine?  Take this medicine by mouth with a glass of water. Follow the directions on the prescription label. Do not cut, crush or chew this medicine. You can take this medicine with or without food. Take your doses at regular intervals. Do not take your medicine more often than directed.  Talk to your pediatrician regarding the use of this medicine in children. While this drug may be prescribed for children as young as 7 years old for selected conditions, precautions do apply.  Overdosage: If you think you have taken too much of this medicine contact a poison control center or emergency room at once.  NOTE: This medicine is only for you. Do not share this medicine with others.  What if I miss a dose?  If you miss a dose, take it as soon as you can. If your next dose is to be taken in less than 12 hours, then do not take the missed dose. Take the next dose at your regular time.  Do not take double or extra doses.  What may interact with this medicine?  Do not take this medicine with any of the following medications:  · herbal medicines like red yeast rice  This medicine may also interact with the following medications:  · alcohol  · antacids containing aluminum hydroxide or magnesium hydroxide  · cyclosporine  · other medicines for high cholesterol  · some medicines for HIV infection  · warfarin  This list may not describe all possible interactions. Give your health care provider a list of all the medicines, herbs, non-prescription drugs, or dietary supplements you use. Also tell them if you smoke, drink alcohol, or use illegal drugs. Some items may interact with your medicine.  What should I watch for while using this medicine?  Visit your doctor or health care professional for regular check-ups. You may need regular tests to make sure your liver is working properly.  Your health care professional may tell you to stop taking this medicine if you develop muscle problems. If your muscle problems do not go away after stopping this medicine, contact your health care professional.  Do not become pregnant while taking this medicine. Women should inform their health care professional if they wish to become pregnant or think they might be pregnant. There is a potential for serious side effects to an unborn child. Talk to your health care professional or pharmacist for more information. Do not breast-feed an infant while taking this medicine.  This medicine may increase blood sugar. Ask your healthcare provider if changes in diet or medicines are needed if you have diabetes.  If you are going to need surgery or other procedure, tell your doctor that you are using this medicine.  This drug is only part of a total heart-health program. Your doctor or a dietician can suggest a low-cholesterol and low-fat diet to help. Avoid alcohol and smoking, and keep a proper exercise schedule.  This medicine may  cause a decrease in Co-Enzyme Q-10. You should make sure that you get enough Co-Enzyme Q-10 while you are taking this medicine. Discuss the foods you eat and the vitamins you take with your health care professional.  What side effects may I notice from receiving this medicine?  Side effects that you should report to your doctor or health care professional as soon as possible:  · allergic reactions like skin rash, itching or hives, swelling of the face, lips, or tongue  · confusion  · joint pain  · loss of memory  · redness, blistering, peeling or loosening of the skin, including inside the mouth  · signs and symptoms of high blood sugar such as being more thirsty or hungry or having to urinate more than normal. You may also feel very tired or have blurry vision.  · signs and symptoms of muscle injury like dark urine; trouble passing urine or change in the amount of urine; unusually weak or tired; muscle pain or side or back pain  · yellowing of the eyes or skin  Side effects that usually do not require medical attention (report to your doctor or health care professional if they continue or are bothersome):  · constipation  · diarrhea  · dizziness  · gas  · headache  · nausea  · stomach pain  · trouble sleeping  · upset stomach  This list may not describe all possible side effects. Call your doctor for medical advice about side effects. You may report side effects to FDA at 3-681-FDA-6221.  Where should I keep my medicine?  Keep out of the reach of children.  Store at room temperature between 20 and 25 degrees C (68 and 77 degrees F). Keep container tightly closed (protect from moisture). Throw away any unused medicine after the expiration date.  NOTE: This sheet is a summary. It may not cover all possible information. If you have questions about this medicine, talk to your doctor, pharmacist, or health care provider.  © 2021 [employer] (2019-10-10 08:25:08)

## 2021-04-13 NOTE — PROGRESS NOTES
Chief Complaint  Chronic Kidney Disease, Hypertension, and ADD    Subjective          Randall Hernandez presents to [facility]     Pt is 49 yo male with management  of obesity, history of prediabetes, HTN,  GERD, vitamin D deficiency, alelrgic rhinitis, ADHD predominantly inattentive type ,sp bilateral carpal tunnel release surgery,  Chronic back pain (arthritis of lumbar spine, short pedicles, mild scoliosis, mild arthritis thoracic spine).  CKD stage 2 , fatty liver, gastirits, esophagitis      3/24/21 in office visit for recheck on pt's above medical issues.  Pt had labwork done on 3/2/21 that showed stable iron and ferritin, Vitamin B12 levels normal UDS consistent with ADHD medicaiton use. Vitamin D at 37.3. lipid panel showed total cholesterol at 287 with HDL at 37 and LDL at 222. hga1c at 5.98. from 5.90 CMP showed GFR at 73 from 86. CBC showed normal hemoglobin. Testosterone was at 217. Pt continues to take his medications for HTN ,HLP, allergic rhinitis and ADHD. BP stable today. No chest pain no dizziness. ADHD medicaiton helping     4/26/21 in office visit for recheck on pt's above medical issues. Pt continues to take his medications for HLP, HTN, ADHD, vitamin D deficiency. He was able to get cialis which helps but cost him 200$.  Doing well on ADHD medications. BP stable. His allergies have been causing issues despite pt taking singulair and flonase nasal spray.              Hyperlipidemia  This is a chronic problem. The current episode started more than 1 year ago. The problem is uncontrolled. Exacerbating diseases include liver disease and obesity. He has no history of chronic renal disease, diabetes, hypothyroidism or nephrotic syndrome. Current antihyperlipidemic treatment includes statins. The current treatment provides moderate improvement of lipids. Risk factors for coronary artery disease include male sex, obesity, hypertension, dyslipidemia and a sedentary  "lifestyle.     Obesity   This is a chronic problem. The current episode started more than 1 year ago. The problem occurs constantly. The problem has been unchanged. Associated symptoms include arthralgias and numbness. Pertinent negatives include no abdominal pain, anorexia, change in bowel habit, chest pain, chills, congestion, coughing, diaphoresis, fatigue, fever, headaches, joint swelling, myalgias, nausea, rash, sore throat, swollen glands, urinary symptoms, vertigo, visual change, vomiting or weakness. Nothing aggravates the symptoms. He has tried nothing for the symptoms. The treatment provided no relief.   Hypertension   This is a new problem. The current episode started more than 1year ago. The problem has been gradually improving tThe problem is controlled. Pertinent negatives include no anxiety, blurred vision, chest pain, headaches, malaise/fatigue, neck pain, orthopnea, palpitations, peripheral edema, PND, shortness of breath or sweats. Risk factors for coronary artery disease include male gender, obesity and sedentary lifestyle. Past treatments include diuretics and ace-inhibitor There are no compliance problems. Treatment has provided significant relief      Objective   Vital Signs:   /68 (BP Location: Left arm, Patient Position: Sitting, Cuff Size: Large Adult)   Pulse 98   Temp 97.3 °F (36.3 °C)   Ht 180.3 cm (71\")   Wt 124 kg (274 lb)   SpO2 96%   BMI 38.22 kg/m²     /68 (BP Location: Left arm, Patient Position: Sitting, Cuff Size: Large Adult)   Pulse 98   Temp 97.3 °F (36.3 °C)   Ht 180.3 cm (71\")   Wt 124 kg (274 lb)   SpO2 96%   BMI 38.22 kg/m²     Physical Exam  Vitals and nursing note reviewed.   Constitutional:       Appearance: He is well-developed. He is not diaphoretic.   HENT:      Head: Normocephalic and atraumatic.      Right Ear: External ear normal.   Eyes:      Conjunctiva/sclera: Conjunctivae normal.      Pupils: Pupils are equal, round, and reactive to " light.   Cardiovascular:      Rate and Rhythm: Normal rate and regular rhythm.      Heart sounds: Normal heart sounds. No murmur heard.     Pulmonary:      Effort: Pulmonary effort is normal. No respiratory distress.      Breath sounds: Normal breath sounds.   Abdominal:      General: Bowel sounds are normal. There is no distension.      Palpations: Abdomen is soft.      Tenderness: There is no abdominal tenderness.      Comments: Obese abdomen    Musculoskeletal:         General: Tenderness present. No deformity. Normal range of motion.      Cervical back: Normal range of motion and neck supple.   Skin:     General: Skin is warm.      Coloration: Skin is not pale.      Findings: No erythema or rash.   Neurological:      Mental Status: He is alert and oriented to person, place, and time.      Cranial Nerves: No cranial nerve deficit.   Psychiatric:         Behavior: Behavior normal.        Result Review :                 Assessment and Plan    Diagnoses and all orders for this visit:    1. Attention deficit hyperactivity disorder (ADHD), predominantly inattentive type (Primary)  -     amphetamine-dextroamphetamine XR (Adderall XR) 25 MG 24 hr capsule; Take 1 capsule by mouth Every Morning Take 1 tablet by mouth daily  Dispense: 30 capsule; Refill: 0    2. Mixed hyperlipidemia    3. Essential hypertension    4. Prediabetes    5. Fatty liver    6. Class 2 severe obesity with serious comorbidity and body mass index (BMI) of 38.0 to 38.9 in adult, unspecified obesity type (CMS/HCC)    Other orders  -     levocetirizine (Xyzal) 5 MG tablet; Take 1 tablet by mouth Every Evening.  Dispense: 30 tablet; Refill: 3             -Went over labwork  -abdominal pain/fatty liver/nausea vomiting/Gastritis/GERD with esophagitis - on protonix 40 mg daily. .  GI following had recent EGD   -ckd stage 2 - continue to monitor. Gave information. Advised better hydration BP control   -for back pain with sciatica/scoliosis - pain is better  after PT/OT. Continue with mobic and flexeril PRN.  Consider MRI if not improving    -right shoulder pain -stable    -Vitamin D deficiency vitamin D pill once a week d   -HLP - crestor 20 mg PO qhs   -HTN - stable on lisinopril 40 mg daily.  -ADHD refilled ADHD medication Adderall XR 25 mg PO q daily for ADHD.  ERWIN reviewed   -GERD - on protonix 40 mg PO q daily.    -allergic rhinitis - continue singulair and flonase and will add xyzal 5 mg daily.    -prediabetes - prediabetes meal plan information provided.     hyperlipidemia - on lipitor 80 mg PO qhs   -obesity - recommend ketogenic diet along with intermittent fasting. Counseled weight loss >5 minutes  BMI at 38.22   -advised pt to be safe and call with questions and concerns  -advised to go to ER or call 911 if symptoms get severe  -advised to followup with specialist and referrals   -advised pt to be safe during COVID-19 pandemic  I spent 25 minutes caring for Randall on this date of service. This time includes time spent by me in the following activities: preparing for the visit, reviewing tests, obtaining and/or reviewing a separately obtained history, performing a medically appropriate examination and/or evaluation, counseling and educating the patient/family/caregiver, ordering medications, tests, or procedures, referring and communicating with other health care professionals, documenting information in the medical record and care coordination.           This document has been electronically signed by Scottie Borges MD on April 26, 2021 16:10 CDT          Follow Up   Return in about 1 month (around 5/26/2021).  Patient was given instructions and counseling regarding his condition or for health maintenance advice. Please see specific information pulled into the AVS if appropriate.

## 2021-04-23 ENCOUNTER — TELEPHONE (OUTPATIENT)
Dept: FAMILY MEDICINE CLINIC | Facility: CLINIC | Age: 51
End: 2021-04-23

## 2021-04-26 ENCOUNTER — OFFICE VISIT (OUTPATIENT)
Dept: FAMILY MEDICINE CLINIC | Facility: CLINIC | Age: 51
End: 2021-04-26

## 2021-04-26 VITALS
HEIGHT: 71 IN | OXYGEN SATURATION: 96 % | TEMPERATURE: 97.3 F | SYSTOLIC BLOOD PRESSURE: 120 MMHG | DIASTOLIC BLOOD PRESSURE: 68 MMHG | WEIGHT: 274 LBS | BODY MASS INDEX: 38.36 KG/M2 | HEART RATE: 98 BPM

## 2021-04-26 DIAGNOSIS — I10 ESSENTIAL HYPERTENSION: ICD-10-CM

## 2021-04-26 DIAGNOSIS — F90.0 ATTENTION DEFICIT HYPERACTIVITY DISORDER (ADHD), PREDOMINANTLY INATTENTIVE TYPE: Primary | ICD-10-CM

## 2021-04-26 DIAGNOSIS — E66.01 CLASS 2 SEVERE OBESITY WITH SERIOUS COMORBIDITY AND BODY MASS INDEX (BMI) OF 38.0 TO 38.9 IN ADULT, UNSPECIFIED OBESITY TYPE (HCC): ICD-10-CM

## 2021-04-26 DIAGNOSIS — R73.03 PREDIABETES: ICD-10-CM

## 2021-04-26 DIAGNOSIS — E78.2 MIXED HYPERLIPIDEMIA: ICD-10-CM

## 2021-04-26 DIAGNOSIS — K76.0 FATTY LIVER: ICD-10-CM

## 2021-04-26 PROCEDURE — 99214 OFFICE O/P EST MOD 30 MIN: CPT | Performed by: FAMILY MEDICINE

## 2021-04-26 RX ORDER — DEXTROAMPHETAMINE SACCHARATE, AMPHETAMINE ASPARTATE MONOHYDRATE, DEXTROAMPHETAMINE SULFATE AND AMPHETAMINE SULFATE 6.25; 6.25; 6.25; 6.25 MG/1; MG/1; MG/1; MG/1
25 CAPSULE, EXTENDED RELEASE ORAL EVERY MORNING
Qty: 30 CAPSULE | Refills: 0 | Status: SHIPPED | OUTPATIENT
Start: 2021-04-26 | End: 2021-06-21 | Stop reason: SDUPTHER

## 2021-04-26 RX ORDER — LEVOCETIRIZINE DIHYDROCHLORIDE 5 MG/1
5 TABLET, FILM COATED ORAL EVERY EVENING
Qty: 30 TABLET | Refills: 3 | Status: SHIPPED | OUTPATIENT
Start: 2021-04-26 | End: 2021-09-08

## 2021-04-26 NOTE — PATIENT INSTRUCTIONS
Visine or soothe XP for eye drops Levocetirizine Oral Tablets  What is this medicine?  LEVOCETIRIZINE (ELLI hooper) is an antihistamine. This medicine is used to treat or prevent symptoms of allergies. It is also used to help reduce itchy skin rash and hives.  This medicine may be used for other purposes; ask your health care provider or pharmacist if you have questions.  COMMON BRAND NAME(S): Xyzal, Xyzal Allergy 24 Hour  What should I tell my health care provider before I take this medicine?  They need to know if you have any of these conditions:  · kidney disease  · an unusual or allergic reaction to levocetirizine, cetirizine, hydroxyzine, other medicines, foods, dyes, or preservatives  · pregnant or trying to get pregnant  · breast-feeding  How should I use this medicine?  Take this medicine by mouth with a glass of water. Take it at night. The tablet may be split in half. Do not chew the tablets. Follow the directions on the prescription label. You can take it with or without food. Do not take more medicine than directed. You may need to take this medicine for several days before your symptoms improve.  Talk to your pediatrician regarding the use of this medicine in children. While this drug may be prescribed for children as young as 6 years old for selected conditions, precautions do apply.  Overdosage: If you think you have taken too much of this medicine contact a poison control center or emergency room at once.  NOTE: This medicine is only for you. Do not share this medicine with others.  What if I miss a dose?  If you miss a dose, take it as soon as you can. If it is almost time for your next dose, take only that dose. Do not take double or extra doses.  What may interact with this medicine?  · alcohol  · MAOIs like Carbex, Eldepryl, Marplan, Nardil, and Parnate  · medicines that cause drowsiness or sleep  · other medicines for colds or allergies  · ritonavir  · theophylline  This list may not  describe all possible interactions. Give your health care provider a list of all the medicines, herbs, non-prescription drugs, or dietary supplements you use. Also tell them if you smoke, drink alcohol, or use illegal drugs. Some items may interact with your medicine.  What should I watch for while using this medicine?  Visit your doctor or health care professional for regular checks on your health. Tell your doctor or healthcare professional if your symptoms do not start to get better or if they get worse.  You may get drowsy or dizzy. Do not drive, use machinery, or do anything that needs mental alertness until you know how this medicine affects you. Do not stand or sit up quickly, especially if you are an older patient. This reduces the risk of dizzy or fainting spells. Alcohol may interfere with the effect of this medicine. Avoid alcoholic drinks.  Your mouth may get dry. Chewing sugarless gum or sucking hard candy, and drinking plenty of water may help. Contact your doctor if the problem does not go away or is severe.  What side effects may I notice from receiving this medicine?  Side effects that you should report to your doctor or health care professional as soon as possible:  · allergic reactions like skin rash, itching or hives, swelling of the face, lips, or tongue  · changes in vision or hearing  · fever  · trouble passing urine or change in the amount of urine  Side effects that usually do not require medical attention (report to your doctor or health care professional if they continue or are bothersome):  · cough  · dizziness  · drowsiness or tiredness  · dry mouth  · muscle aches  This list may not describe all possible side effects. Call your doctor for medical advice about side effects. You may report side effects to FDA at 1-651-FDA-9336.  Where should I keep my medicine?  Keep out of the reach of children.  Store at room temperature between 15 and 30 degrees C (59 and 86 degrees F). Throw away any  unused medicine after the expiration date.  NOTE: This sheet is a summary. It may not cover all possible information. If you have questions about this medicine, talk to your doctor, pharmacist, or health care provider.  © 2021 [employer] Standard (2009-09-09 11:17:47)

## 2021-05-20 ENCOUNTER — TELEPHONE (OUTPATIENT)
Dept: FAMILY MEDICINE CLINIC | Facility: CLINIC | Age: 51
End: 2021-05-20

## 2021-05-21 ENCOUNTER — OFFICE VISIT (OUTPATIENT)
Dept: FAMILY MEDICINE CLINIC | Facility: CLINIC | Age: 51
End: 2021-05-21

## 2021-05-21 VITALS
SYSTOLIC BLOOD PRESSURE: 122 MMHG | DIASTOLIC BLOOD PRESSURE: 70 MMHG | BODY MASS INDEX: 37.8 KG/M2 | RESPIRATION RATE: 20 BRPM | WEIGHT: 270 LBS | OXYGEN SATURATION: 99 % | TEMPERATURE: 97.1 F | HEART RATE: 122 BPM | HEIGHT: 71 IN

## 2021-05-21 DIAGNOSIS — R73.03 PREDIABETES: ICD-10-CM

## 2021-05-21 DIAGNOSIS — E78.2 MIXED HYPERLIPIDEMIA: ICD-10-CM

## 2021-05-21 DIAGNOSIS — I10 ESSENTIAL HYPERTENSION: ICD-10-CM

## 2021-05-21 DIAGNOSIS — R00.0 TACHYCARDIA: Primary | ICD-10-CM

## 2021-05-21 DIAGNOSIS — Z02.83 ENCOUNTER FOR DRUG SCREENING: ICD-10-CM

## 2021-05-21 DIAGNOSIS — E66.01 CLASS 2 SEVERE OBESITY WITH SERIOUS COMORBIDITY AND BODY MASS INDEX (BMI) OF 37.0 TO 37.9 IN ADULT, UNSPECIFIED OBESITY TYPE (HCC): ICD-10-CM

## 2021-05-21 DIAGNOSIS — E55.9 VITAMIN D DEFICIENCY: ICD-10-CM

## 2021-05-21 DIAGNOSIS — R00.2 PALPITATIONS: ICD-10-CM

## 2021-05-21 DIAGNOSIS — F90.0 ATTENTION DEFICIT HYPERACTIVITY DISORDER (ADHD), PREDOMINANTLY INATTENTIVE TYPE: ICD-10-CM

## 2021-05-21 PROCEDURE — 99214 OFFICE O/P EST MOD 30 MIN: CPT | Performed by: FAMILY MEDICINE

## 2021-05-21 PROCEDURE — 93010 ELECTROCARDIOGRAM REPORT: CPT | Performed by: INTERNAL MEDICINE

## 2021-05-21 PROCEDURE — 93005 ELECTROCARDIOGRAM TRACING: CPT | Performed by: FAMILY MEDICINE

## 2021-05-21 RX ORDER — METOPROLOL SUCCINATE 25 MG/1
25 TABLET, EXTENDED RELEASE ORAL DAILY
Qty: 30 TABLET | Refills: 3 | Status: SHIPPED | OUTPATIENT
Start: 2021-05-21 | End: 2021-10-08

## 2021-05-21 RX ORDER — LISINOPRIL 20 MG/1
20 TABLET ORAL DAILY
Qty: 30 TABLET | Refills: 3 | Status: SHIPPED | OUTPATIENT
Start: 2021-05-21 | End: 2021-10-08

## 2021-05-21 NOTE — PATIENT INSTRUCTIONS
Cut back on lisinopril from 40 to 20 mg daily    Start on toprol XL 25 mg daily    Hold taking adderall since this can make heart rate high until you get your heart checked      Metoprolol Extended-Release Tablets  What is this medicine?  METOPROLOL (me TOE proe lole) is a beta blocker. It decreases the amount of work your heart has to do and helps your heart beat regularly. It treats high blood pressure and/or prevent chest pain (also called angina). It also treats heart failure.  This medicine may be used for other purposes; ask your health care provider or pharmacist if you have questions.  COMMON BRAND NAME(S): toprol, Toprol XL  What should I tell my health care provider before I take this medicine?  They need to know if you have any of these conditions:  · diabetes  · heart or vessel disease like slow heart rate, worsening heart failure, heart block, sick sinus syndrome or Raynaud's disease  · kidney disease  · liver disease  · lung or breathing disease, like asthma or emphysema  · pheochromocytoma  · thyroid disease  · an unusual or allergic reaction to metoprolol, other beta-blockers, medicines, foods, dyes, or preservatives  · pregnant or trying to get pregnant  · breast-feeding  How should I use this medicine?  Take this drug by mouth. Take it as directed on the prescription label at the same time every day. Take it with food. You may cut the tablet in half if it is scored (has a line in the middle of it). This may help you swallow the tablet if the whole tablet is too big. Be sure to take both halves. Do not take just one-half of the tablet. Keep taking it unless your health care provider tells you to stop.  Talk to your health care provider about the use of this drug in children. While it may be prescribed for children as young as 6 for selected conditions, precautions do apply.  Overdosage: If you think you have taken too much of this medicine contact a poison control center or emergency room at  once.  NOTE: This medicine is only for you. Do not share this medicine with others.  What if I miss a dose?  If you miss a dose, take it as soon as you can. If it is almost time for your next dose, take only that dose. Do not take double or extra doses.  What may interact with this medicine?  This medicine may interact with the following medications:  · certain medicines for blood pressure, heart disease, irregular heart beat  · certain medicines for depression, like monoamine oxidase (MAO) inhibitors, fluoxetine, or paroxetine  · clonidine  · dobutamine  · epinephrine  · isoproterenol  · reserpine  This list may not describe all possible interactions. Give your health care provider a list of all the medicines, herbs, non-prescription drugs, or dietary supplements you use. Also tell them if you smoke, drink alcohol, or use illegal drugs. Some items may interact with your medicine.  What should I watch for while using this medicine?  Visit your doctor or health care professional for regular check ups. Contact your doctor right away if your symptoms worsen. Check your blood pressure and pulse rate regularly. Ask your health care professional what your blood pressure and pulse rate should be, and when you should contact them.  You may get drowsy or dizzy. Do not drive, use machinery, or do anything that needs mental alertness until you know how this medicine affects you. Do not sit or stand up quickly, especially if you are an older patient. This reduces the risk of dizzy or fainting spells. Contact your doctor if these symptoms continue. Alcohol may interfere with the effect of this medicine. Avoid alcoholic drinks.  This medicine may increase blood sugar. Ask your healthcare provider if changes in diet or medicines are needed if you have diabetes.  What side effects may I notice from receiving this medicine?  Side effects that you should report to your doctor or health care professional as soon as  possible:  · allergic reactions like skin rash, itching or hives  · cold or numb hands or feet  · depression  · difficulty breathing  · faint  · fever with sore throat  · irregular heartbeat, chest pain  · rapid weight gain  ·   signs and symptoms of high blood sugar such as being more thirsty or hungry or having to urinate more than normal. You may also feel very tired or have blurry vision.  · swollen legs or ankles  Side effects that usually do not require medical attention (report to your doctor or health care professional if they continue or are bothersome):  · anxiety or nervousness  · change in sex drive or performance  · dry skin  · headache  · nightmares or trouble sleeping  · short term memory loss  · stomach upset or diarrhea  This list may not describe all possible side effects. Call your doctor for medical advice about side effects. You may report side effects to FDA at 9-009-FDA-6404.  Where should I keep my medicine?  Keep out of the reach of children and pets.  Store at room temperature between 20 and 25 degrees C (68 and 77 degrees F). Throw away any unused drug after the expiration date.  NOTE: This sheet is a summary. It may not cover all possible information. If you have questions about this medicine, talk to your doctor, pharmacist, or health care provider.  © 2021 [employer] (2020-07-30 18:23:00)

## 2021-05-26 LAB
QT INTERVAL: 326 MS
QTC INTERVAL: 435 MS

## 2021-06-07 NOTE — PROGRESS NOTES
Chief Complaint  ADD, Hypertension, Heartburn, Chronic Kidney Disease, and Arthritis    Subjective          Randall Hernandez presents to [facility]  History of Present Illness     Pt is 51 yo male with management  of obesity, history of prediabetes, HTN,  GERD, vitamin D deficiency, alelrgic rhinitis, ADHD predominantly inattentive type ,sp bilateral carpal tunnel release surgery,  Chronic back pain (arthritis of lumbar spine, short pedicles, mild scoliosis, mild arthritis thoracic spine).  CKD stage 2 , fatty liver, gastirits, esophagitis, PAULINA, echocardiogram on 6/15/21 (grade 1 diastolic dysfunction, right ventricular dilation         5/21/21 in office visit for recheck on pt's above medical issues. Pt got back from [place] recently.  HIs BP is stable.  Doing well on ADHD medication. Continues to take his allergy medication. He still has low fatigue and low libidio. He does have tachycardia today and HR in 110s. He denies any chest pain, dizziness or palpitations he states he does not drink enough fluids. He did take an OTC decongestant today but could not remember the name. His last EKG was in 2018 showed NSR with non specific t wave changes inferior leads  He does have a history of heart palpitations and saw Cardiology several years ago.      6/21/21 in office visit for recheck on pt's above medical issues. Pt was referred to Cardiology and saw them on 6/8/221 for tachycardia, chest pain shortness of breath. Pt was ordered TST and echo. Results on echocardiogram on 6/15/21 showed EF of 61-65% with grade 1 diastolic dysfunction right ventricular cavity is dilated.  Estimated right ventricular systolic pressure from tricuspid regurgitation is normal. His TST showed normal functional capacity with a low risk study. No ST-T wave changes suggestive of ischemia, no arrhythmia was noted. Pt continues to take his medications for his seasonal allergies, HLP, ED HTN, allergic rhinitis,  vitamin D deficiency. Pt is diong well overall no chest pain no dizziness. HR stable today. He is now back on his ADHD medication. Pt sees Cardiology tomorrow         Chronic Kidney Disease  This is a chronic problem. The current episode started more than 1 year ago. The problem occurs constantly. The problem has been unchanged. Associated symptoms include arthralgias and fatigue. Pertinent negatives include no abdominal pain, anorexia, change in bowel habit, chest pain, chills, congestion, coughing, diaphoresis, fever, headaches, joint swelling, myalgias, nausea, neck pain, numbness, rash, sore throat, swollen glands, urinary symptoms, vertigo, visual change, vomiting or weakness. Nothing aggravates the symptoms. He has tried nothing for the symptoms. The treatment provided no relief.   Fatigue  This is a chronic problem. The current episode started more than 1 year ago. The problem occurs constantly. The problem has been unchanged. Associated symptoms include arthralgias and fatigue. Pertinent negatives include no abdominal pain, anorexia, change in bowel habit, chest pain, chills, congestion, coughing, diaphoresis, fever, headaches, joint swelling, myalgias, nausea, neck pain, numbness, rash, sore throat, swollen glands, urinary symptoms, vertigo, visual change, vomiting or weakness. Nothing aggravates the symptoms. He has tried nothing for the symptoms. The treatment provided no relief.   Male  Problem  The patient's pertinent negatives include no genital injury, genital itching, genital lesions, pelvic pain, penile discharge, penile pain, priapism, scrotal swelling or testicular pain. This is a chronic problem. The current episode started more than 1 month ago. The problem occurs constantly. The problem has been unchanged. The patient is experiencing no pain. Pertinent negatives include no abdominal pain, anorexia, chest pain, chills, constipation, coughing, diarrhea, discolored urine, dysuria, fever, flank  pain, frequency, headaches, hematuria, hesitancy, joint pain, joint swelling, nausea, painful intercourse, rash, shortness of breath, sore throat, urgency, urinary retention or vomiting. Nothing aggravates the symptoms. The treatment provided no relief. He is sexually active. He never uses condoms. His past medical history is significant for an erectile aid use and erectile dysfunction. There is no history of BPH, chlamydia, cryptorchidism, a femoral hernia, gonorrhea, herpes simplex, HIV, an inguinal hernia, kidney stones, prostatitis, sickle cell disease, syphilis or varicocele.   Heart Problem  This is a chronic problem. The current episode started more than 1 year ago. The problem occurs constantly. The problem has been unchanged. Associated symptoms include arthralgias and fatigue. Pertinent negatives include no abdominal pain, anorexia, change in bowel habit, chest pain, chills, congestion, coughing, diaphoresis, fever, headaches, joint swelling, myalgias, nausea, neck pain, numbness, rash, sore throat, swollen glands, urinary symptoms, vertigo, visual change, vomiting or weakness. Nothing aggravates the symptoms. He has tried nothing for the symptoms. The treatment provided no relief.   Hyperlipidemia  This is a chronic problem. The current episode started more than 1 year ago. The problem is uncontrolled. Exacerbating diseases include liver disease and obesity. He has no history of chronic renal disease, diabetes, hypothyroidism or nephrotic syndrome. Current antihyperlipidemic treatment includes statins. The current treatment provides moderate improvement of lipids. Risk factors for coronary artery disease include male sex, obesity, hypertension, dyslipidemia and a sedentary lifestyle.    Obesity   This is a chronic problem. The current episode started more than 1 year ago. The problem occurs constantly. The problem has been unchanged. Associated symptoms include arthralgias and numbness. Pertinent  "negatives include no abdominal pain, anorexia, change in bowel habit, chest pain, chills, congestion, coughing, diaphoresis, fatigue, fever, headaches, joint swelling, myalgias, nausea, rash, sore throat, swollen glands, urinary symptoms, vertigo, visual change, vomiting or weakness. Nothing aggravates the symptoms. He has tried nothing for the symptoms. The treatment provided no relief.   Hypertension   This is a new problem. The current episode started more than 1year ago. The problem has been gradually improving tThe problem is controlled. Pertinent negatives include no anxiety, blurred vision, chest pain, headaches, malaise/fatigue, neck pain, orthopnea, palpitations, peripheral edema, PND, shortness of breath or sweats. Risk factors for coronary artery disease include male gender, obesity and sedentary lifestyle. Past treatments include diuretics and ace-inhibitor There are no compliance problems. Treatment has provided significant relief     Objective   Vital Signs:   /84 (BP Location: Left arm, Patient Position: Sitting, Cuff Size: Large Adult)   Pulse 86   Temp 98 °F (36.7 °C)   Ht 180.3 cm (71\")   Wt 127 kg (279 lb)   SpO2 98%   BMI 38.91 kg/m²       Physical Exam  Vitals and nursing note reviewed.   Constitutional:       Appearance: He is well-developed. He is not diaphoretic.   HENT:      Head: Normocephalic and atraumatic.      Right Ear: External ear normal.   Eyes:      Conjunctiva/sclera: Conjunctivae normal.      Pupils: Pupils are equal, round, and reactive to light.   Cardiovascular:      Rate and Rhythm: Normal rate and regular rhythm.      Heart sounds: Normal heart sounds. No murmur heard.     Pulmonary:      Effort: Pulmonary effort is normal. No respiratory distress.      Breath sounds: Normal breath sounds.   Abdominal:      General: Bowel sounds are normal. There is no distension.      Palpations: Abdomen is soft.      Tenderness: There is no abdominal tenderness.      Comments: " Obese abdomen    Musculoskeletal:         General: No deformity. Normal range of motion.      Cervical back: Normal range of motion and neck supple.   Skin:     General: Skin is warm.      Coloration: Skin is not pale.      Findings: No erythema or rash.   Neurological:      Mental Status: He is alert and oriented to person, place, and time.      Cranial Nerves: No cranial nerve deficit.   Psychiatric:         Behavior: Behavior normal.        Result Review :                 Assessment and Plan    Diagnoses and all orders for this visit:    1. Essential hypertension (Primary)    2. Mixed hyperlipidemia    3. Prediabetes    4. Vitamin D deficiency    5. Attention deficit hyperactivity disorder (ADHD), predominantly inattentive type  -     amphetamine-dextroamphetamine XR (Adderall XR) 25 MG 24 hr capsule; Take 1 capsule by mouth Every Morning Take 1 tablet by mouth daily  Dispense: 30 capsule; Refill: 0    6. Stage 2 chronic kidney disease    7. Tachycardia             -recommend labwork  -advised pt to start using his CPAP machine.    -tachycardia/palpitations - Cardiology following. Had recent echocardiogrram and TST.    -abdominal pain/fatty liver/nausea vomiting/Gastritis/GERD with esophagitis - on protonix 40 mg daily. .  GI following had recent EGD   -ckd stage 2 - continue to monitor. Gave information. Advised better hydration BP control   - back pain with sciatica/scoliosis - pain is better after PT/OT. Continue with mobic and flexeril PRN.  Consider MRI if not improving    -Vitamin D deficiency vitamin D pill once a week d   -HLP - stable crestor 20 mg PO qhs. Recommend heart healthy diet  -HTN - on lisionpril 20 mg daily on toprol XL 25 mg daily.    -ADHD parminder Adderrall XR 25 mg arsh advised pt to hold ADHD medication due to his tachycardia today until he is evaluated by Cardiologist. Pt voiced understanding   -GERD - on protonix 40 mg PO q daily.    -allergic rhinitis - continue singulair and flonase and  will add xyzal 5 mg daily.    -tachycardia - EKG today   -prediabetes - prediabetes meal plan .     hyperlipidemia - on lipitor 80 mg PO qhs  recommend heart healthy diet   -obesity - recommend ketogenic diet along with intermittent fasting. Counseled weight loss >5 minutes  BMI at 38.91   -advised pt to be safe and call with questions and concerns  -advised to go to ER or call 911 if symptoms get severe  -advised to followup with specialist and referrals   -advised pt to be safe during COVID-19 pandemic  I spent 30  minutes caring for Randall on this date of service. This time includes time spent by me in the following activities: preparing for the visit, reviewing tests, obtaining and/or reviewing a separately obtained history, performing a medically appropriate examination and/or evaluation, counseling and educating the patient/family/caregiver, ordering medications, tests, or procedures, referring and communicating with other health care professionals, documenting information in the medical record, independently interpreting results and communicating that information with the patient/family/caregiver and care coordination.        This document has been electronically signed by Scottie Borges MD on June 21, 2021 16:26 CDT             Follow Up   Return in about 1 month (around 7/21/2021).  Patient was given instructions and counseling regarding his condition or for health maintenance advice. Please see specific information pulled into the AVS if appropriate.

## 2021-06-08 ENCOUNTER — OFFICE VISIT (OUTPATIENT)
Dept: CARDIOLOGY | Facility: CLINIC | Age: 51
End: 2021-06-08

## 2021-06-08 VITALS
HEIGHT: 71 IN | BODY MASS INDEX: 37.8 KG/M2 | SYSTOLIC BLOOD PRESSURE: 110 MMHG | WEIGHT: 270 LBS | DIASTOLIC BLOOD PRESSURE: 68 MMHG | HEART RATE: 86 BPM | OXYGEN SATURATION: 98 %

## 2021-06-08 DIAGNOSIS — R07.89 CHEST PAIN, ATYPICAL: ICD-10-CM

## 2021-06-08 DIAGNOSIS — F90.2 ATTENTION DEFICIT HYPERACTIVITY DISORDER (ADHD), COMBINED TYPE: ICD-10-CM

## 2021-06-08 DIAGNOSIS — R00.0 TACHYCARDIA: Primary | ICD-10-CM

## 2021-06-08 PROBLEM — R00.2 PALPITATIONS: Status: ACTIVE | Noted: 2021-06-08

## 2021-06-08 PROCEDURE — 99214 OFFICE O/P EST MOD 30 MIN: CPT | Performed by: NURSE PRACTITIONER

## 2021-06-08 NOTE — PROGRESS NOTES
Rapid Heart Rate (chief complaint)      History of Present Illness    Mr. Hernandez is a 50 year old patient referred from PCP for tachycardia. He was 122 in the office and EKG showed 107bpm. He has had evidence of tachycardia from prior EKG in 2018.   At first he did not complain of any symptoms, but further questioning prompted his SO to speak up and tell me about shortness of breath, chest tightness, and fatigue.     This chest tightness sounds be exertional, but not severe. He knows that he has gained weight and feels that a lot of his fatigue and dyspnea is from that.     Of note, he is on ASA, Statin      Cardiac Risk Factors:  The 10-year ASCVD risk score (Shaji JEFFREY JrJunior, et al., 2013) is: 8.1%    Values used to calculate the score:      Age: 50 years      Sex: Male      Is Non- : No      Diabetic: No      Tobacco smoker: No      Systolic Blood Pressure: 110 mmHg      Is BP treated: Yes      HDL Cholesterol: 37 mg/dL      Total Cholesterol: 287 mg/dL      Past Medical History:   Diagnosis Date   • Allergic rhinitis    • Arthritis    • Backache    • Cellulitis of leg, except foot    • Dyslipidemia    • GERD (gastroesophageal reflux disease)    • Heartburn    • History of Holter monitoring 02/26/2016    Sinus mechanism with a normal average heart rate.No evidence of chronotropic incompetence.Asymptomatic Holter   • Hyperlipidemia    • Hypertension    • Low back pain    • Numbness of lower limb     left leg numbness and tingling      • Obesity, unspecified    • Obstructive sleep apnea of adult     USING C-PAP   • Pain in left hip    • Pain in right hip    • Palpitations    • Skin lesion     insect bite.      • Supraventricular tachycardia (CMS/HCC)    • Weight loss     advised     Past Surgical History:   Procedure Laterality Date   • CARPAL TUNNEL RELEASE Right 11/2/2018    Procedure: CARPAL TUNNEL RELEASE;  Surgeon: Judd Mathias MD;  Location: [facility];  Service: Orthopedics   •  CARPAL TUNNEL RELEASE  2018    Left   • CARPAL TUNNEL RELEASE Left 2018    Procedure: CARPAL TUNNEL RELEASE;  Surgeon: Judd Mathias MD;  Location: [facility] OR;  Service: Orthopedics   • COLONOSCOPY N/A 2019    Procedure: COLONOSCOPY a friday please ;  Surgeon: Ruslan Bowers MD;  Location: [facility] ENDOSCOPY;  Service: Gastroenterology   • ENDOSCOPY     • ENDOSCOPY N/A 2020    Procedure: ESOPHAGOGASTRODUODENOSCOPY possible dilation ;  Surgeon: Ruslan Bowers MD;  Location: [facility] ENDOSCOPY;  Service: Gastroenterology;  Laterality: N/A;   • PYLOROMYOTOMY       Social History     Socioeconomic History   • Marital status: Significant Other     Spouse name: Not on file   • Number of children: Not on file   • Years of education: Not on file   • Highest education level: Not on file   Tobacco Use   • Smoking status: Former Smoker     Packs/day: 1.00     Years: 6.00     Pack years: 6.00     Types: Cigarettes     Quit date:      Years since quittin.4   • Smokeless tobacco: Never Used   Substance and Sexual Activity   • Alcohol use: Yes     Comment: 2018 - Kevin reports consumption of alcoholic beverages under a social basis (approximately 3 - 4 per month).     • Drug use: No   • Sexual activity: Defer     Family History   Problem Relation Age of Onset   • Colon cancer Mother         onset age greater than 75y   • Diabetes Mother    • Breast cancer Sister    • Cancer Brother         bladder   • Diabetes Brother    • Hyperlipidemia Brother        ALLERGIES:  No Known Allergies    Advance Care Planning   ACP discussion was declined by the patient. Patient does not have an advance directive, declines further assistance.       Review of Systems   Constitutional: Positive for malaise/fatigue. Negative for chills, decreased appetite and fever.   HENT: Negative.    Eyes: Negative.    Cardiovascular: Positive for chest pain and dyspnea on exertion. Negative for claudication,  irregular heartbeat, leg swelling and palpitations.   Respiratory: Negative for cough, shortness of breath and wheezing.    Endocrine: Negative.    Skin: Negative for dry skin, flushing and rash.   Musculoskeletal: Negative for falls and myalgias.   Gastrointestinal: Negative for abdominal pain, change in bowel habit and melena.   Genitourinary: Negative for frequency and hematuria.   Neurological: Negative for dizziness, light-headedness, loss of balance and weakness.   Psychiatric/Behavioral: Negative for altered mental status and memory loss. The patient is not nervous/anxious.        Current Outpatient Medications   Medication Sig Dispense Refill   • amphetamine-dextroamphetamine XR (Adderall XR) 25 MG 24 hr capsule Take 1 capsule by mouth Every Morning Take 1 tablet by mouth daily 30 capsule 0   • aspirin (aspirin) 81 MG EC tablet Take 1 tablet by mouth Daily. 30 tablet 3   • fluticasone (FLONASE) 50 MCG/ACT nasal spray 2 sprays by Each Nare route Daily. 16 g 3   • levocetirizine (Xyzal) 5 MG tablet Take 1 tablet by mouth Every Evening. 30 tablet 3   • lisinopril (PRINIVIL,ZESTRIL) 20 MG tablet Take 1 tablet by mouth Daily. 30 tablet 3   • metoprolol succinate XL (Toprol XL) 25 MG 24 hr tablet Take 1 tablet by mouth Daily. 30 tablet 3   • montelukast (SINGULAIR) 10 MG tablet Take 1 tablet by mouth Every Night. 30 tablet 3   • pantoprazole (Protonix) 40 MG EC tablet Take 1 tablet by mouth Daily. 30 tablet 3   • rosuvastatin (Crestor) 20 MG tablet Take 1 tablet by mouth Every Night. 30 tablet 3   • tadalafil (Cialis) 10 MG tablet Take 1 tablet by mouth Daily As Needed for Erectile Dysfunction. 30 tablet 3   • vitamin D (ERGOCALCIFEROL) 1.25 MG (44698 UT) capsule capsule Take 1 capsule by mouth 1 (One) Time Per Week. 4 capsule 3     No current facility-administered medications for this visit.       OBJECTIVE:    Physical Exam:   Constitutional:       General: Not in acute distress.     Appearance: Well-developed.   "  HENT:      Head: Normocephalic and atraumatic.   Neck:      Vascular: No JVD.   Pulmonary:      Effort: Pulmonary effort is normal. No respiratory distress.      Breath sounds: Normal breath sounds. No wheezing. No rales.   Cardiovascular:      Normal rate. Regular rhythm.   Pulses:     Intact distal pulses.   Abdominal:      General: Bowel sounds are normal.      Palpations: Abdomen is soft.   Musculoskeletal: Normal range of motion.      Cervical back: Normal range of motion. Skin:     General: Skin is warm and dry.      Findings: No erythema.   Neurological:      Mental Status: Alert and oriented to person, place, and time.   Psychiatric:         Behavior: Behavior normal.         Thought Content: Thought content normal.         Judgment: Judgment normal.       Vitals:    06/08/21 1427   BP: 110/68   BP Location: Left arm   Patient Position: Sitting   Cuff Size: Adult   Pulse: 86   SpO2: 98%   Weight: 122 kg (270 lb)   Height: 180.3 cm (71\")       DATA REVIEWED by me:       No radiology results for the last 30 days.       Labs Reviewed by me: BMP, CBC, LIPID, TSH  Lab Results   Component Value Date    GLUCOSE 89 03/02/2021    CALCIUM 9.4 03/02/2021     03/02/2021    K 4.9 03/02/2021    CO2 26.0 03/02/2021     03/02/2021    BUN 13 03/02/2021    CREATININE 1.06 03/02/2021    EGFRIFNONA 73 03/02/2021    BCR 12.3 03/02/2021    ANIONGAP 11.0 03/02/2021     Lab Results   Component Value Date    WBC 5.36 03/02/2021    HGB 15.7 03/02/2021    HCT 44.7 03/02/2021    MCV 87.8 03/02/2021     03/02/2021     Lab Results   Component Value Date    CHOL 287 (H) 03/02/2021    CHOL 195 10/27/2020    CHOL 166 06/29/2020     Lab Results   Component Value Date    TRIG 149 03/02/2021    TRIG 147 10/27/2020    TRIG 164 (H) 06/29/2020     Lab Results   Component Value Date    HDL 37 (L) 03/02/2021    HDL 39 (L) 10/27/2020    HDL 37 (L) 06/29/2020     No components found for: LDLCALC  Lab Results   Component Value " Date     (H) 03/02/2021     (H) 10/27/2020    LDL 96 06/29/2020     No results found for: HDLLDLRATIO  No components found for: CHOLHDL  Lab Results   Component Value Date    TSH 2.780 11/06/2017     No results found for: PROBNP      The following portions of the patient's history were reviewed and updated as appropriate: allergies, current medications, past family history, past medical history, past social history, past surgical history and problem list.  Old records that were reviewed and pertinent information is included in the above objective data.     ASSESSMENT/PLAN:     Diagnosis Plan   1. Tachycardia  PCP started on Toprol XL 25mg  No indication to stop ADHD medication if his ADHD still requires treatment. He says he only takes as neede.d     Tachycardia is an expected result of stimulant medication. However, he has more complaints than he told PCP. He has been having exertional fatigue/CP/SOA.    Risks versus benefits to be discussed with prescriber.   Risks of heart failure or AF with prolonged tachycardia.     OTC decongestants can make worse.     Toprol XL and cessation of medication has his HR at 86bpm today.       2. Chest pain/PAULION/Fatigue  Echo  TST    A TST was recommended to the patient as the best non-invasive testing for obstructive CAD.  Risks and benefits were discussed.  Specifically, the patient was informed that they would need to obtain an expected heart rate and exercise for an expected time.  They were also informed that these variables are combined with EKG data to calculate a DTS.  I did inform them that if they were unable to complete the study that we would discuss transitioning to a MPS/  I also informed them that if they were able to  complete the study that results are not dichotomous: Negative or positive.  In fact, I spent some time explaining that the results can come back one of three ways: 1.) low-risk; 2.) moderate-risk; and 3.). High-risk.  I did explain to the  patient that low risk treadmill stress tests portend a good cardiovascular prognosis, though not perfect.  In this situation, we would continue with risk factor modifications.  A moderate-risk treadmill necessitates additional information that would be gathered by a  myocardial perfusion stress.  A high-risk treadmill would elicit conversations about invasive coronary angiography.          Patient's Body mass index is 37.66 kg/m². indicating that he is obese (BMI >30). Obesity-related health conditions include the following: hypertension. Obesity is unchanged. BMI is is above average; BMI management plan is completed. We discussed portion control and increasing exercise..    Follow up after testing.           This document has been electronically signed by ROLAN Haro on June 8, 2021 14:41 CDT

## 2021-06-21 ENCOUNTER — OFFICE VISIT (OUTPATIENT)
Dept: FAMILY MEDICINE CLINIC | Facility: CLINIC | Age: 51
End: 2021-06-21

## 2021-06-21 VITALS
DIASTOLIC BLOOD PRESSURE: 84 MMHG | SYSTOLIC BLOOD PRESSURE: 128 MMHG | OXYGEN SATURATION: 98 % | WEIGHT: 279 LBS | HEIGHT: 71 IN | HEART RATE: 86 BPM | TEMPERATURE: 98 F | BODY MASS INDEX: 39.06 KG/M2

## 2021-06-21 DIAGNOSIS — E55.9 VITAMIN D DEFICIENCY: ICD-10-CM

## 2021-06-21 DIAGNOSIS — I10 ESSENTIAL HYPERTENSION: Primary | ICD-10-CM

## 2021-06-21 DIAGNOSIS — R73.03 PREDIABETES: ICD-10-CM

## 2021-06-21 DIAGNOSIS — E78.2 MIXED HYPERLIPIDEMIA: ICD-10-CM

## 2021-06-21 DIAGNOSIS — F90.0 ATTENTION DEFICIT HYPERACTIVITY DISORDER (ADHD), PREDOMINANTLY INATTENTIVE TYPE: ICD-10-CM

## 2021-06-21 DIAGNOSIS — N18.2 STAGE 2 CHRONIC KIDNEY DISEASE: ICD-10-CM

## 2021-06-21 DIAGNOSIS — R00.0 TACHYCARDIA: ICD-10-CM

## 2021-06-21 PROCEDURE — 99214 OFFICE O/P EST MOD 30 MIN: CPT | Performed by: FAMILY MEDICINE

## 2021-06-21 RX ORDER — DEXTROAMPHETAMINE SACCHARATE, AMPHETAMINE ASPARTATE MONOHYDRATE, DEXTROAMPHETAMINE SULFATE AND AMPHETAMINE SULFATE 6.25; 6.25; 6.25; 6.25 MG/1; MG/1; MG/1; MG/1
25 CAPSULE, EXTENDED RELEASE ORAL EVERY MORNING
Qty: 30 CAPSULE | Refills: 0 | Status: SHIPPED | OUTPATIENT
Start: 2021-06-21 | End: 2021-07-30 | Stop reason: SDUPTHER

## 2021-06-22 ENCOUNTER — OFFICE VISIT (OUTPATIENT)
Dept: CARDIOLOGY | Facility: CLINIC | Age: 51
End: 2021-06-22

## 2021-06-22 VITALS
HEART RATE: 89 BPM | WEIGHT: 280.6 LBS | HEIGHT: 71 IN | BODY MASS INDEX: 39.28 KG/M2 | DIASTOLIC BLOOD PRESSURE: 70 MMHG | OXYGEN SATURATION: 96 % | SYSTOLIC BLOOD PRESSURE: 110 MMHG

## 2021-06-22 DIAGNOSIS — R00.0 TACHYCARDIA: Primary | ICD-10-CM

## 2021-06-22 DIAGNOSIS — R00.2 PALPITATIONS: ICD-10-CM

## 2021-06-22 PROCEDURE — 99213 OFFICE O/P EST LOW 20 MIN: CPT | Performed by: NURSE PRACTITIONER

## 2021-06-22 NOTE — PROGRESS NOTES
Rapid Heart Rate and Results (chief complaint)      History of Present Illness    Mr. Hernandez is a 50 year old patient referred from PCP for tachycardia. He was 122 in the office and EKG showed 107bpm. He has had evidence of tachycardia from prior EKG in 2018.   At first he did not complain of any symptoms, but further questioning prompted his SO to speak up and tell me about shortness of breath, chest tightness, and fatigue.     This chest tightness sounds be exertional, but not severe. He knows that he has gained weight and feels that a lot of his fatigue and dyspnea is from that.     Of note, he is on ASA, Statin    6/22/21: 2 week follow up for results. Normal echo. Normal TST. Doing well. Will stop and get strap for face mask for PAULINA/CPAP. No complaints.       Cardiac Risk Factors:  The 10-year ASCVD risk score (Shaji JEFFREY Jr., et al., 2013) is: 8.1%    Values used to calculate the score:      Age: 50 years      Sex: Male      Is Non- : No      Diabetic: No      Tobacco smoker: No      Systolic Blood Pressure: 110 mmHg      Is BP treated: Yes      HDL Cholesterol: 37 mg/dL      Total Cholesterol: 287 mg/dL      Past Medical History:   Diagnosis Date   • Allergic rhinitis    • Arthritis    • Backache    • Cellulitis of leg, except foot    • Dyslipidemia    • GERD (gastroesophageal reflux disease)    • Heartburn    • History of Holter monitoring 02/26/2016    Sinus mechanism with a normal average heart rate.No evidence of chronotropic incompetence.Asymptomatic Holter   • Hyperlipidemia    • Hypertension    • Low back pain    • Numbness of lower limb     left leg numbness and tingling      • Obesity, unspecified    • Obstructive sleep apnea of adult     USING C-PAP   • Pain in left hip    • Pain in right hip    • Palpitations    • Skin lesion     insect bite.      • Supraventricular tachycardia (CMS/HCC)    • Weight loss     advised     Past Surgical History:   Procedure Laterality Date   •  CARPAL TUNNEL RELEASE Right 2018    Procedure: CARPAL TUNNEL RELEASE;  Surgeon: Judd Mathias MD;  Location: [facility] OR;  Service: Orthopedics   • CARPAL TUNNEL RELEASE  2018    Left   • CARPAL TUNNEL RELEASE Left 2018    Procedure: CARPAL TUNNEL RELEASE;  Surgeon: Judd Mathias MD;  Location: [facility] OR;  Service: Orthopedics   • COLONOSCOPY N/A 2019    Procedure: COLONOSCOPY a friday please ;  Surgeon: Ruslan Bowers MD;  Location: [facility] ENDOSCOPY;  Service: Gastroenterology   • ENDOSCOPY     • ENDOSCOPY N/A 2020    Procedure: ESOPHAGOGASTRODUODENOSCOPY possible dilation ;  Surgeon: Ruslan Bowers MD;  Location: [facility] ENDOSCOPY;  Service: Gastroenterology;  Laterality: N/A;   • PYLOROMYOTOMY       Social History     Socioeconomic History   • Marital status: Significant Other     Spouse name: Not on file   • Number of children: Not on file   • Years of education: Not on file   • Highest education level: Not on file   Tobacco Use   • Smoking status: Former Smoker     Packs/day: 1.00     Years: 6.00     Pack years: 6.00     Types: Cigarettes     Quit date:      Years since quittin.4   • Smokeless tobacco: Never Used   Substance and Sexual Activity   • Alcohol use: Yes     Comment: 2018 - Kevin reports consumption of alcoholic beverages under a social basis (approximately 3 - 4 per month).     • Drug use: No   • Sexual activity: Defer     Family History   Problem Relation Age of Onset   • Colon cancer Mother         onset age greater than 75y   • Diabetes Mother    • Breast cancer Sister    • Cancer Brother         bladder   • Diabetes Brother    • Hyperlipidemia Brother        ALLERGIES:  No Known Allergies    Advance Care Planning   ACP discussion was declined by the patient. Patient does not have an advance directive, declines further assistance.       Review of Systems   Constitutional: Positive for malaise/fatigue. Negative for chills,  decreased appetite and fever.   HENT: Negative.    Eyes: Negative.    Cardiovascular: Positive for chest pain and dyspnea on exertion. Negative for claudication, irregular heartbeat, leg swelling and palpitations.   Respiratory: Negative for cough, shortness of breath and wheezing.    Endocrine: Negative.    Skin: Negative for dry skin, flushing and rash.   Musculoskeletal: Negative for falls and myalgias.   Gastrointestinal: Negative for abdominal pain, change in bowel habit and melena.   Genitourinary: Negative for frequency and hematuria.   Neurological: Negative for dizziness, light-headedness, loss of balance and weakness.   Psychiatric/Behavioral: Negative for altered mental status and memory loss. The patient is not nervous/anxious.        Current Outpatient Medications   Medication Sig Dispense Refill   • amphetamine-dextroamphetamine XR (Adderall XR) 25 MG 24 hr capsule Take 1 capsule by mouth Every Morning Take 1 tablet by mouth daily 30 capsule 0   • aspirin (aspirin) 81 MG EC tablet Take 1 tablet by mouth Daily. 30 tablet 3   • fluticasone (FLONASE) 50 MCG/ACT nasal spray 2 sprays by Each Nare route Daily. 16 g 3   • levocetirizine (Xyzal) 5 MG tablet Take 1 tablet by mouth Every Evening. 30 tablet 3   • lisinopril (PRINIVIL,ZESTRIL) 20 MG tablet Take 1 tablet by mouth Daily. 30 tablet 3   • metoprolol succinate XL (Toprol XL) 25 MG 24 hr tablet Take 1 tablet by mouth Daily. 30 tablet 3   • montelukast (SINGULAIR) 10 MG tablet Take 1 tablet by mouth Every Night. 30 tablet 3   • pantoprazole (Protonix) 40 MG EC tablet Take 1 tablet by mouth Daily. 30 tablet 3   • rosuvastatin (Crestor) 20 MG tablet Take 1 tablet by mouth Every Night. 30 tablet 3   • tadalafil (Cialis) 10 MG tablet Take 1 tablet by mouth Daily As Needed for Erectile Dysfunction. 30 tablet 3   • vitamin D (ERGOCALCIFEROL) 1.25 MG (52436 UT) capsule capsule Take 1 capsule by mouth 1 (One) Time Per Week. 4 capsule 3     No current  "facility-administered medications for this visit.       OBJECTIVE:    Physical Exam:   Constitutional:       General: Not in acute distress.     Appearance: Healthy appearance. Well-developed.   HENT:      Head: Normocephalic and atraumatic.   Neck:      Vascular: No JVD.   Pulmonary:      Effort: Pulmonary effort is normal. No respiratory distress.      Breath sounds: Normal breath sounds. No wheezing. No rales.   Cardiovascular:      Normal rate. Regular rhythm.   Pulses:     Intact distal pulses.   Abdominal:      General: Bowel sounds are normal.      Palpations: Abdomen is soft.   Musculoskeletal: Normal range of motion.      Cervical back: Normal range of motion. Skin:     General: Skin is warm and dry.      Findings: No erythema.   Neurological:      Mental Status: Alert and oriented to person, place, and time.   Psychiatric:         Behavior: Behavior normal.         Thought Content: Thought content normal.         Judgment: Judgment normal.       Vitals:    06/22/21 1543   BP: 110/70   BP Location: Left arm   Patient Position: Sitting   Cuff Size: Adult   Pulse: 89   SpO2: 96%   Weight: 127 kg (280 lb 9.6 oz)   Height: 180.3 cm (71\")       DATA REVIEWED by me:   Results for orders placed in visit on 06/15/21    Adult Transthoracic Echo Complete w/ Color, Spectral and Contrast if Necessary Per Protocol    Interpretation Summary  · Estimated left ventricular EF = 63% Left ventricular ejection fraction appears to be 61 - 65%. Left ventricular systolic function is normal.  · Left ventricular diastolic function is consistent with (grade I) impaired relaxation.  · The right ventricular cavity is mildly dilated.  · Saline test results are negative.  · Estimated right ventricular systolic pressure from tricuspid regurgitation is normal (<35 mmHg).    6/22/21:  Interpretation Summary       · The patient reported dyspnea during the stress test.     · The patient reported shortness of breath and fatigue during the " stress test.     Patient exercise according to Kenny protocol for 7 minutes and 12 seconds with the METs achieved 9.5  This represented normal functional capacity.  Patient had appropriate heart rate response and increased from 93 bpm to 155 representing 92% of age-matched predicted heart rate.    Mild hypertension blood pressure response.  The blood pressure increased from 115 to 178 mmHg.  Test was stopped due to target heart rate achieved and fatigability and dyspnea.  No ST-T wave changes suggestive of ischemia.  No arrhythmia noted.     Clinical impression     #1Normal functions capacity with a low risk study with +7 [facility] treadmill score     #2 No ST-T wave changes suggesting ischemia     #3 No Arrhythmia noted            No radiology results for the last 30 days.       Labs Reviewed by me: BMP, CBC, LIPID, TSH  Lab Results   Component Value Date    GLUCOSE 89 03/02/2021    CALCIUM 9.4 03/02/2021     03/02/2021    K 4.9 03/02/2021    CO2 26.0 03/02/2021     03/02/2021    BUN 13 03/02/2021    CREATININE 1.06 03/02/2021    EGFRIFNONA 73 03/02/2021    BCR 12.3 03/02/2021    ANIONGAP 11.0 03/02/2021     Lab Results   Component Value Date    WBC 5.36 03/02/2021    HGB 15.7 03/02/2021    HCT 44.7 03/02/2021    MCV 87.8 03/02/2021     03/02/2021     Lab Results   Component Value Date    CHOL 287 (H) 03/02/2021    CHOL 195 10/27/2020    CHOL 166 06/29/2020     Lab Results   Component Value Date    TRIG 149 03/02/2021    TRIG 147 10/27/2020    TRIG 164 (H) 06/29/2020     Lab Results   Component Value Date    HDL 37 (L) 03/02/2021    HDL 39 (L) 10/27/2020    HDL 37 (L) 06/29/2020     No components found for: LDLCALC  Lab Results   Component Value Date     (H) 03/02/2021     (H) 10/27/2020    LDL 96 06/29/2020     No results found for: HDLLDLRATIO  No components found for: CHOLHDL  Lab Results   Component Value Date    TSH 2.780 11/06/2017     No results found for: PROBNP      The  following portions of the patient's history were reviewed and updated as appropriate: allergies, current medications, past family history, past medical history, past social history, past surgical history and problem list.  Old records that were reviewed and pertinent information is included in the above objective data.     ASSESSMENT/PLAN:     Diagnosis Plan   1. Tachycardia  PCP started on Toprol XL 25mg. This has helped resting HR.  No indication to stop ADHD medication if his ADHD still requires treatment. He says he only takes as needed     Risks versus benefits to be discussed with prescriber.   Risks of heart failure or AF with prolonged tachycardia.     OTC decongestants can make worse.     Toprol XL and cessation of medication has his HR at 86bpm today.    RV dilation expected on echo with hx of PAULINA  - Used to have CPAP but does not have strap anymore. Will stop today at [facility] to get materials. [employer] do not have in stock.        2. Chest pain/PAULINO/Fatigue  - improved    Echo - WNL  TST- WNL         Patient's Body mass index is 39.14 kg/m². indicating that he is obese (BMI >30). Obesity-related health conditions include the following: hypertension. Obesity is unchanged. BMI is is above average; BMI management plan is completed. We discussed portion control and increasing exercise..    F/U with PCP. See as needed.           This document has been electronically signed by ROLAN Haro on June 22, 2021 16:32 CDT

## 2021-07-02 ENCOUNTER — TELEPHONE (OUTPATIENT)
Dept: FAMILY MEDICINE CLINIC | Facility: CLINIC | Age: 51
End: 2021-07-02

## 2021-07-02 NOTE — TELEPHONE ENCOUNTER
Patients wife called and stated that they are at [facility] and they have not received the prescription for the CPap machine... Asked if you could resend it to this fax number. (fax. [phone])..they stated that they had a couple of fax numbers previously and stated maybe it was sent to a different number

## 2021-07-02 NOTE — TELEPHONE ENCOUNTER
Faxed prescription to [facility] received confirmation from company for 7/2/21 at 9:55

## 2021-07-05 NOTE — PROGRESS NOTES
Chief Complaint  Heartburn, Rapid Heart Rate, Palpitations, Chronic Kidney Disease, Back Pain, Vitamin D Deficiency, Hyperlipidemia, Hypertension, ADHD, and Allergies    Subjective          Randall Hernandez presents to [facility]  History of Present Illness     Pt is 51 yo male with management  of obesity, history of prediabetes, HTN,  GERD, vitamin D deficiency, alelrgic rhinitis, ADHD predominantly inattentive type ,sp bilateral carpal tunnel release surgery,  Chronic back pain (arthritis of lumbar spine, short pedicles, mild scoliosis, mild arthritis thoracic spine).  CKD stage 2 , fatty liver, gastirits, esophagitis, PAULINA, echocardiogram on 6/15/21 (grade 1 diastolic dysfunction, right ventricular dilation          6/21/21 in office visit for recheck on pt's above medical issues. Pt was referred to Cardiology and saw them on 6/8/221 for tachycardia, chest pain shortness of breath. Pt was ordered TST and echo. Results on echocardiogram on 6/15/21 showed EF of 61-65% with grade 1 diastolic dysfunction right ventricular cavity is dilated.  Estimated right ventricular systolic pressure from tricuspid regurgitation is normal. His TST showed normal functional capacity with a low risk study. No ST-T wave changes suggestive of ischemia, no arrhythmia was noted. Pt continues to take his medications for his seasonal allergies, HLP, ED HTN, allergic rhinitis, vitamin D deficiency. Pt is diong well overall no chest pain no dizziness. HR stable today. He is now back on his ADHD medication. Pt sees Cardiology tomorrow     7/30/21 in office visit for recheck on pt's above medical issues. Pt has labwork pending. He continues to take medications for ADHD, allergic rhinitis, hypertension, GERD,   BP is stable today. Pt gained 11 lbs since his last visit . He did see Cardiology on 6/22/21 for his tachycardia.  Pt was recommended to continue with ADHD medcation and toprol XL 25 mg daily.  Pt states he  is doing well on medications. No chest pain no dizziness. HR is elevated today and usually this occurs when he rides his motorcycle.  Denies any syncopal episodes         Chronic Kidney Disease  This is a chronic problem. The current episode started more than 1 year ago. The problem occurs constantly. The problem has been unchanged. Associated symptoms include arthralgias and fatigue. Pertinent negatives include no abdominal pain, anorexia, change in bowel habit, chest pain, chills, congestion, coughing, diaphoresis, fever, headaches, joint swelling, myalgias, nausea, neck pain, numbness, rash, sore throat, swollen glands, urinary symptoms, vertigo, visual change, vomiting or weakness. Nothing aggravates the symptoms. He has tried nothing for the symptoms. The treatment provided no relief.     Male  Problem  The patient's pertinent negatives include no genital injury, genital itching, genital lesions, pelvic pain, penile discharge, penile pain, priapism, scrotal swelling or testicular pain. This is a chronic problem. The current episode started more than 1 month ago. The problem occurs constantly. The problem has been unchanged. The patient is experiencing no pain. Pertinent negatives include no abdominal pain, anorexia, chest pain, chills, constipation, coughing, diarrhea, discolored urine, dysuria, fever, flank pain, frequency, headaches, hematuria, hesitancy, joint pain, joint swelling, nausea, painful intercourse, rash, shortness of breath, sore throat, urgency, urinary retention or vomiting. Nothing aggravates the symptoms. The treatment provided no relief. He is sexually active. He never uses condoms. His past medical history is significant for an erectile aid use and erectile dysfunction. There is no history of BPH, chlamydia, cryptorchidism, a femoral hernia, gonorrhea, herpes simplex, HIV, an inguinal hernia, kidney stones, prostatitis, sickle cell disease, syphilis or varicocele.   Heart Problem  This is  a chronic problem. The current episode started more than 1 year ago. The problem occurs constantly. The problem has been unchanged. Associated symptoms include arthralgias and fatigue. Pertinent negatives include no abdominal pain, anorexia, change in bowel habit, chest pain, chills, congestion, coughing, diaphoresis, fever, headaches, joint swelling, myalgias, nausea, neck pain, numbness, rash, sore throat, swollen glands, urinary symptoms, vertigo, visual change, vomiting or weakness. Nothing aggravates the symptoms. He has tried nothing for the symptoms. The treatment provided no relief.   Hyperlipidemia  This is a chronic problem. The current episode started more than 1 year ago. The problem is uncontrolled. Exacerbating diseases include liver disease and obesity. He has no history of chronic renal disease, diabetes, hypothyroidism or nephrotic syndrome. Current antihyperlipidemic treatment includes statins. The current treatment provides moderate improvement of lipids. Risk factors for coronary artery disease include male sex, obesity, hypertension, dyslipidemia and a sedentary lifestyle.    Obesity   This is a chronic problem. The current episode started more than 1 year ago. The problem occurs constantly. The problem has been worsening Associated symptoms include arthralgias and numbness. Pertinent negatives include no abdominal pain, anorexia, change in bowel habit, chest pain, chills, congestion, coughing, diaphoresis, fatigue, fever, headaches, joint swelling, myalgias, nausea, rash, sore throat, swollen glands, urinary symptoms, vertigo, visual change, vomiting or weakness. Nothing aggravates the symptoms. He has tried nothing for the symptoms. The treatment provided no relief.   Hypertension   This is a chronic problem. The current episode started more than 1year ago. The problem has been gradually improving tThe problem is controlled. Pertinent negatives include no anxiety, blurred vision, chest  "pain, headaches, malaise/fatigue, neck pain, orthopnea, palpitations, peripheral edema, PND, shortness of breath or sweats. Risk factors for coronary artery disease include male gender, obesity and sedentary lifestyle. Past treatments include diuretics and ace-inhibitor There are no compliance problems. Treatment has provided significant relief       Objective   Vital Signs:   /64 (BP Location: Right arm, Patient Position: Sitting, Cuff Size: Large Adult)   Pulse 100   Temp 97.8 °F (36.6 °C)   Ht 180.3 cm (71\")   Wt 132 kg (290 lb 9.6 oz)   SpO2 96%   BMI 40.53 kg/m²     Physical Exam  Vitals and nursing note reviewed.   Constitutional:       Appearance: He is well-developed. He is not diaphoretic.   HENT:      Head: Normocephalic and atraumatic.      Right Ear: External ear normal.   Eyes:      Conjunctiva/sclera: Conjunctivae normal.      Pupils: Pupils are equal, round, and reactive to light.   Cardiovascular:      Rate and Rhythm: Regular rhythm. Tachycardia present.      Heart sounds: Normal heart sounds. No murmur heard.     Pulmonary:      Effort: Pulmonary effort is normal. No respiratory distress.      Breath sounds: Normal breath sounds.   Abdominal:      General: Bowel sounds are normal. There is no distension.      Palpations: Abdomen is soft.      Tenderness: There is no abdominal tenderness.      Comments: Obese abdomen    Musculoskeletal:         General: Tenderness present. No deformity. Normal range of motion.      Cervical back: Normal range of motion and neck supple.   Skin:     General: Skin is warm.      Coloration: Skin is not pale.      Findings: No erythema or rash.   Neurological:      Mental Status: He is alert and oriented to person, place, and time.      Cranial Nerves: No cranial nerve deficit.   Psychiatric:         Behavior: Behavior normal.        Result Review :                 Assessment and Plan    Diagnoses and all orders for this visit:    1. Essential hypertension " (Primary)    2. Mixed hyperlipidemia    3. Prediabetes    4. Vitamin D deficiency    5. Gastroesophageal reflux disease, unspecified whether esophagitis present    6. Fatty liver    7. Stage 2 chronic kidney disease    8. Attention deficit hyperactivity disorder (ADHD), predominantly inattentive type  -     amphetamine-dextroamphetamine XR (Adderall XR) 25 MG 24 hr capsule; Take 1 capsule by mouth Every Morning Take 1 tablet by mouth daily  Dispense: 30 capsule; Refill: 0    9. Morbid obesity (CMS/HCC)    10. Tachycardia             -recommend labwork  -advised pt to start using his CPAP machine.    -recommend COVID-19 vaccination   -tachycardia/palpitations - Cardiology following. Had recent echocardiogrram and TST.  on toprol XL 25 mg daily.  Advised pt to get a pulse ox and not take ADHD medication if HR >100.    -abdominal pain/fatty liver/nausea vomiting/Gastritis/GERD with esophagitis - on protonix 40 mg daily. .  GI following had recent EGD   -ckd stage 2 - continue to monitor. Gave information. Advised better hydration BP control   - back pain with sciatica/scoliosis - pain is better after PT/OT. Continue with mobic and flexeril PRN.  Consider MRI if not improving    -Vitamin D deficiency vitamin D pill once a week d   -HLP - stable crestor 20 mg PO qhs. Recommend heart healthy diet  -HTN - on lisionpril 20 mg daily on toprol XL 25 mg daily.    -ADHD parminder Adderrall XR 25 mg caban advised pt to hold ADHD medication due to his tachycardia today until he is evaluated by Cardiologist. Pt voiced understanding   -GERD - on protonix 40 mg PO q daily.    -allergic rhinitis - continue singulair and flonase and will add xyzal 5 mg daily.    -tachycardia - EKG today   -prediabetes - prediabetes meal plan .     hyperlipidemia - on lipitor 80 mg PO qhs  recommend heart healthy diet   -morbid obesity - recommend ketogenic diet along with intermittent fasting. Counseled weight loss >5 minutes  BMI at 40.53   -advised pt to  be safe and call with questions and concerns  -advised to go to ER or call 911 if symptoms get severe  -advised to followup with specialist and referrals   -advised pt to be safe during COVID-19 pandemic  I spent 30  minutes caring for Randall on this date of service. This time includes time spent by me in the following activities: preparing for the visit, reviewing tests, obtaining and/or reviewing a separately obtained history, performing a medically appropriate examination and/or evaluation, counseling and educating the patient/family/caregiver, ordering medications, tests, or procedures, referring and communicating with other health care professionals, documenting information in the medical record, independently interpreting results and communicating that information with the patient/family/caregiver and care coordination.   -recheck in 1 month         This document has been electronically signed by Scottie Borges MD on July 30, 2021 15:31 CDT            Follow Up   Return in about 1 month (around 8/30/2021).  Patient was given instructions and counseling regarding his condition or for health maintenance advice. Please see specific information pulled into the AVS if appropriate.

## 2021-07-16 RX ORDER — ASPIRIN 81 MG/1
81 TABLET ORAL DAILY
Qty: 30 TABLET | Refills: 3 | Status: SHIPPED | OUTPATIENT
Start: 2021-07-16 | End: 2021-12-07

## 2021-07-30 ENCOUNTER — LAB (OUTPATIENT)
Dept: LAB | Facility: HOSPITAL | Age: 51
End: 2021-07-30

## 2021-07-30 ENCOUNTER — OFFICE VISIT (OUTPATIENT)
Dept: FAMILY MEDICINE CLINIC | Facility: CLINIC | Age: 51
End: 2021-07-30

## 2021-07-30 VITALS
DIASTOLIC BLOOD PRESSURE: 64 MMHG | SYSTOLIC BLOOD PRESSURE: 128 MMHG | HEART RATE: 100 BPM | WEIGHT: 290.6 LBS | TEMPERATURE: 97.8 F | OXYGEN SATURATION: 96 % | BODY MASS INDEX: 40.68 KG/M2 | HEIGHT: 71 IN

## 2021-07-30 DIAGNOSIS — E66.01 MORBID OBESITY (HCC): ICD-10-CM

## 2021-07-30 DIAGNOSIS — I10 ESSENTIAL HYPERTENSION: ICD-10-CM

## 2021-07-30 DIAGNOSIS — N18.2 STAGE 2 CHRONIC KIDNEY DISEASE: ICD-10-CM

## 2021-07-30 DIAGNOSIS — F90.0 ATTENTION DEFICIT HYPERACTIVITY DISORDER (ADHD), PREDOMINANTLY INATTENTIVE TYPE: ICD-10-CM

## 2021-07-30 DIAGNOSIS — E78.2 MIXED HYPERLIPIDEMIA: ICD-10-CM

## 2021-07-30 DIAGNOSIS — Z02.83 ENCOUNTER FOR DRUG SCREENING: ICD-10-CM

## 2021-07-30 DIAGNOSIS — E55.9 VITAMIN D DEFICIENCY: ICD-10-CM

## 2021-07-30 DIAGNOSIS — K21.9 GASTROESOPHAGEAL REFLUX DISEASE, UNSPECIFIED WHETHER ESOPHAGITIS PRESENT: ICD-10-CM

## 2021-07-30 DIAGNOSIS — R00.0 TACHYCARDIA: ICD-10-CM

## 2021-07-30 DIAGNOSIS — E66.01 CLASS 2 SEVERE OBESITY WITH SERIOUS COMORBIDITY AND BODY MASS INDEX (BMI) OF 37.0 TO 37.9 IN ADULT, UNSPECIFIED OBESITY TYPE (HCC): ICD-10-CM

## 2021-07-30 DIAGNOSIS — I10 ESSENTIAL HYPERTENSION: Primary | ICD-10-CM

## 2021-07-30 DIAGNOSIS — K76.0 FATTY LIVER: ICD-10-CM

## 2021-07-30 DIAGNOSIS — R73.03 PREDIABETES: ICD-10-CM

## 2021-07-30 PROCEDURE — 84403 ASSAY OF TOTAL TESTOSTERONE: CPT

## 2021-07-30 PROCEDURE — 85025 COMPLETE CBC W/AUTO DIFF WBC: CPT

## 2021-07-30 PROCEDURE — 83036 HEMOGLOBIN GLYCOSYLATED A1C: CPT

## 2021-07-30 PROCEDURE — 80061 LIPID PANEL: CPT

## 2021-07-30 PROCEDURE — 99214 OFFICE O/P EST MOD 30 MIN: CPT | Performed by: FAMILY MEDICINE

## 2021-07-30 PROCEDURE — 80053 COMPREHEN METABOLIC PANEL: CPT

## 2021-07-30 PROCEDURE — 80306 DRUG TEST PRSMV INSTRMNT: CPT

## 2021-07-30 PROCEDURE — 84402 ASSAY OF FREE TESTOSTERONE: CPT

## 2021-07-30 RX ORDER — DEXTROAMPHETAMINE SACCHARATE, AMPHETAMINE ASPARTATE MONOHYDRATE, DEXTROAMPHETAMINE SULFATE AND AMPHETAMINE SULFATE 6.25; 6.25; 6.25; 6.25 MG/1; MG/1; MG/1; MG/1
25 CAPSULE, EXTENDED RELEASE ORAL EVERY MORNING
Qty: 30 CAPSULE | Refills: 0 | Status: SHIPPED | OUTPATIENT
Start: 2021-07-30 | End: 2021-08-31 | Stop reason: SDUPTHER

## 2021-07-30 NOTE — PATIENT INSTRUCTIONS
Make sure you take metoprolol succinate daily    Get a pulse ox if HR is >100 do not take ADHD medication

## 2021-07-31 LAB
ALBUMIN SERPL-MCNC: 4.4 G/DL (ref 3.5–5.2)
ALBUMIN/GLOB SERPL: 1.4 G/DL
ALP SERPL-CCNC: 79 U/L (ref 39–117)
ALT SERPL W P-5'-P-CCNC: 30 U/L (ref 1–41)
ANION GAP SERPL CALCULATED.3IONS-SCNC: 12.4 MMOL/L (ref 5–15)
AST SERPL-CCNC: 26 U/L (ref 1–40)
BASOPHILS # BLD AUTO: 0.07 10*3/MM3 (ref 0–0.2)
BASOPHILS NFR BLD AUTO: 1.1 % (ref 0–1.5)
BILIRUB SERPL-MCNC: 0.4 MG/DL (ref 0–1.2)
BUN SERPL-MCNC: 17 MG/DL (ref 6–20)
BUN/CREAT SERPL: 16 (ref 7–25)
CALCIUM SPEC-SCNC: 9.5 MG/DL (ref 8.6–10.5)
CHLORIDE SERPL-SCNC: 104 MMOL/L (ref 98–107)
CHOLEST SERPL-MCNC: 173 MG/DL (ref 0–200)
CO2 SERPL-SCNC: 23.6 MMOL/L (ref 22–29)
CREAT SERPL-MCNC: 1.06 MG/DL (ref 0.76–1.27)
DEPRECATED RDW RBC AUTO: 42.4 FL (ref 37–54)
EOSINOPHIL # BLD AUTO: 0.27 10*3/MM3 (ref 0–0.4)
EOSINOPHIL NFR BLD AUTO: 4.2 % (ref 0.3–6.2)
ERYTHROCYTE [DISTWIDTH] IN BLOOD BY AUTOMATED COUNT: 12.9 % (ref 12.3–15.4)
GFR SERPL CREATININE-BSD FRML MDRD: 73 ML/MIN/1.73
GLOBULIN UR ELPH-MCNC: 3.1 GM/DL
GLUCOSE SERPL-MCNC: 95 MG/DL (ref 65–99)
HBA1C MFR BLD: 5.9 % (ref 4.8–5.6)
HCT VFR BLD AUTO: 43.2 % (ref 37.5–51)
HDLC SERPL-MCNC: 37 MG/DL (ref 40–60)
HGB BLD-MCNC: 14.3 G/DL (ref 13–17.7)
IMM GRANULOCYTES # BLD AUTO: 0.05 10*3/MM3 (ref 0–0.05)
IMM GRANULOCYTES NFR BLD AUTO: 0.8 % (ref 0–0.5)
LDLC SERPL CALC-MCNC: 117 MG/DL (ref 0–100)
LDLC/HDLC SERPL: 3.11 {RATIO}
LYMPHOCYTES # BLD AUTO: 2.29 10*3/MM3 (ref 0.7–3.1)
LYMPHOCYTES NFR BLD AUTO: 35.3 % (ref 19.6–45.3)
MCH RBC QN AUTO: 30 PG (ref 26.6–33)
MCHC RBC AUTO-ENTMCNC: 33.1 G/DL (ref 31.5–35.7)
MCV RBC AUTO: 90.8 FL (ref 79–97)
MONOCYTES # BLD AUTO: 0.46 10*3/MM3 (ref 0.1–0.9)
MONOCYTES NFR BLD AUTO: 7.1 % (ref 5–12)
NEUTROPHILS NFR BLD AUTO: 3.34 10*3/MM3 (ref 1.7–7)
NEUTROPHILS NFR BLD AUTO: 51.5 % (ref 42.7–76)
NRBC BLD AUTO-RTO: 0 /100 WBC (ref 0–0.2)
PLATELET # BLD AUTO: 284 10*3/MM3 (ref 140–450)
PMV BLD AUTO: 9.8 FL (ref 6–12)
POTASSIUM SERPL-SCNC: 4.6 MMOL/L (ref 3.5–5.2)
PROT SERPL-MCNC: 7.5 G/DL (ref 6–8.5)
RBC # BLD AUTO: 4.76 10*6/MM3 (ref 4.14–5.8)
SODIUM SERPL-SCNC: 140 MMOL/L (ref 136–145)
TRIGL SERPL-MCNC: 104 MG/DL (ref 0–150)
VLDLC SERPL-MCNC: 19 MG/DL (ref 5–40)
WBC # BLD AUTO: 6.48 10*3/MM3 (ref 3.4–10.8)

## 2021-08-02 ENCOUNTER — TELEPHONE (OUTPATIENT)
Dept: FAMILY MEDICINE CLINIC | Facility: CLINIC | Age: 51
End: 2021-08-02

## 2021-08-02 NOTE — TELEPHONE ENCOUNTER
----- Message from Scottie Borges MD sent at 8/1/2021  4:56 AM CDT -----  Please let pt know all labwork stable so far.     Lipid panel improved with LDL from 222 to 117     Hga1c at 5.90 from 5.98    Awaiting testosterone results    Recheck on  next visit thanks

## 2021-08-06 LAB
TESTOST FREE SERPL-MCNC: 6.9 PG/ML (ref 7.2–24)
TESTOST SERPL-MCNC: 200 NG/DL (ref 264–916)

## 2021-08-09 ENCOUNTER — TELEPHONE (OUTPATIENT)
Dept: FAMILY MEDICINE CLINIC | Facility: CLINIC | Age: 51
End: 2021-08-09

## 2021-08-09 NOTE — TELEPHONE ENCOUNTER
----- Message from Scottie Borges MD sent at 8/8/2021 10:11 AM CDT -----  Please let pt know testosterone levels low. May need repeat or referral to Endocrinology in future for replacement therapy    Recheck on next visit thanks

## 2021-08-17 NOTE — PROGRESS NOTES
Chief Complaint  Heartburn, Chronic Kidney Disease, Back Pain, Vitamin D Deficiency, Hyperlipidemia, Hypertension, ADD, and Allergies    Subjective          Randall Hernandez presents to [facility]  History of Present Illness  Pt is 49 yo male with management  of obesity, history of prediabetes, HTN,  GERD, vitamin D deficiency, alelrgic rhinitis, ADHD predominantly inattentive type ,sp bilateral carpal tunnel release surgery,  Chronic back pain (arthritis of lumbar spine, short pedicles, mild scoliosis, mild arthritis thoracic spine).  CKD stage 2 , fatty liver, gastirits, esophagitis, PAULINA, echocardiogram on 6/15/21 (grade 1 diastolic dysfunction, right ventricular dilation        7/30/21 in office visit for recheck on pt's above medical issues. Pt has labwork pending. He continues to take medications for ADHD, allergic rhinitis, hypertension, GERD,   BP is stable today. Pt gained 11 lbs since his last visit . He did see Cardiology on 6/22/21 for his tachycardia.  Pt was recommended to continue with ADHD medcation and toprol XL 25 mg daily.  Pt states he is doing well on medications. No chest pain no dizziness. HR is elevated today and usually this occurs when he rides his motorcycle.  Denies any syncopal episodes     8/31/21 in office visit for recheck on pt's above medical issues. Pt had labwork christopher on 7/30/21 that showed low testosterone levels. UDS consistent with ADD medication use lipid panel showed LDL at 117 from 222 HDL at 37. hga1c was at 5.90. CBC showed normal hemoglobin and wBC. CMP showed GFR at 73 and normal liver enzymes. Pt continues to take his medicaitons for HTN, GERD, HLP, ED vitamin D deficiency and ADD. Pt doing well on ADD medications. BP stable today. Pt lost 3 lbs since his last visit. Doing well on medications            Chronic Kidney Disease  This is a chronic problem. The current episode started more than 1 year ago. The problem occurs constantly. The  problem has been unchanged. Associated symptoms include arthralgias and fatigue. Pertinent negatives include no abdominal pain, anorexia, change in bowel habit, chest pain, chills, congestion, coughing, diaphoresis, fever, headaches, joint swelling, myalgias, nausea, neck pain, numbness, rash, sore throat, swollen glands, urinary symptoms, vertigo, visual change, vomiting or weakness. Nothing aggravates the symptoms. He has tried nothing for the symptoms. The treatment provided no relief.      Male  Problem  The patient's pertinent negatives include no genital injury, genital itching, genital lesions, pelvic pain, penile discharge, penile pain, priapism, scrotal swelling or testicular pain. This is a chronic problem. The current episode started more than 1 month ago. The problem occurs constantly. The problem has been unchanged. The patient is experiencing no pain. Pertinent negatives include no abdominal pain, anorexia, chest pain, chills, constipation, coughing, diarrhea, discolored urine, dysuria, fever, flank pain, frequency, headaches, hematuria, hesitancy, joint pain, joint swelling, nausea, painful intercourse, rash, shortness of breath, sore throat, urgency, urinary retention or vomiting. Nothing aggravates the symptoms. The treatment provided no relief. He is sexually active. He never uses condoms. His past medical history is significant for an erectile aid use and erectile dysfunction. There is no history of BPH, chlamydia, cryptorchidism, a femoral hernia, gonorrhea, herpes simplex, HIV, an inguinal hernia, kidney stones, prostatitis, sickle cell disease, syphilis or varicocele.   Heart Problem  This is a chronic problem. The current episode started more than 1 year ago. The problem occurs constantly. The problem has been unchanged. Associated symptoms include arthralgias and fatigue. Pertinent negatives include no abdominal pain, anorexia, change in bowel habit, chest  pain, chills, congestion, coughing, diaphoresis, fever, headaches, joint swelling, myalgias, nausea, neck pain, numbness, rash, sore throat, swollen glands, urinary symptoms, vertigo, visual change, vomiting or weakness. Nothing aggravates the symptoms. He has tried nothing for the symptoms. The treatment provided no relief.   Hyperlipidemia  This is a chronic problem. The current episode started more than 1 year ago. The problem is uncontrolled. Exacerbating diseases include liver disease and obesity. He has no history of chronic renal disease, diabetes, hypothyroidism or nephrotic syndrome. Current antihyperlipidemic treatment includes statins. The current treatment provides moderate improvement of lipids. Risk factors for coronary artery disease include male sex, obesity, hypertension, dyslipidemia and a sedentary lifestyle.    Obesity   This is a chronic problem. The current episode started more than 1 year ago. The problem occurs constantly. The problem has been worsening Associated symptoms include arthralgias and numbness. Pertinent negatives include no abdominal pain, anorexia, change in bowel habit, chest pain, chills, congestion, coughing, diaphoresis, fatigue, fever, headaches, joint swelling, myalgias, nausea, rash, sore throat, swollen glands, urinary symptoms, vertigo, visual change, vomiting or weakness. Nothing aggravates the symptoms. He has tried nothing for the symptoms. The treatment provided no relief.   Hypertension   This is a chronic problem. The current episode started more than 1year ago. The problem has been gradually improving tThe problem is controlled. Pertinent negatives include no anxiety, blurred vision, chest pain, headaches, malaise/fatigue, neck pain, orthopnea, palpitations, peripheral edema, PND, shortness of breath or sweats. Risk factors for coronary artery disease include male gender, obesity and sedentary lifestyle. Past treatments include diuretics and ace-inhibitor There  "are no compliance problems. Treatment has provided significant relief      Objective   Vital Signs:   /76 (BP Location: Right arm, Patient Position: Sitting, Cuff Size: Large Adult)   Pulse 84   Temp 97.1 °F (36.2 °C)   Ht 180.3 cm (71\")   Wt 130 kg (287 lb 9.6 oz)   SpO2 98%   BMI 40.11 kg/m²     /76 (BP Location: Right arm, Patient Position: Sitting, Cuff Size: Large Adult)   Pulse 84   Temp 97.1 °F (36.2 °C)   Ht 180.3 cm (71\")   Wt 130 kg (287 lb 9.6 oz)   SpO2 98%   BMI 40.11 kg/m²     Physical Exam  Vitals and nursing note reviewed.   Constitutional:       Appearance: He is well-developed. He is not diaphoretic.   HENT:      Head: Normocephalic and atraumatic.      Right Ear: External ear normal.   Eyes:      Conjunctiva/sclera: Conjunctivae normal.      Pupils: Pupils are equal, round, and reactive to light.   Cardiovascular:      Rate and Rhythm: Normal rate and regular rhythm.      Heart sounds: Normal heart sounds. No murmur heard.     Pulmonary:      Effort: Pulmonary effort is normal. No respiratory distress.      Breath sounds: Normal breath sounds.   Abdominal:      General: Bowel sounds are normal. There is no distension.      Palpations: Abdomen is soft.      Tenderness: There is no abdominal tenderness.      Comments: Obese abdomen    Musculoskeletal:         General: Tenderness present. No deformity. Normal range of motion.      Cervical back: Normal range of motion and neck supple.   Skin:     General: Skin is warm.      Coloration: Skin is not pale.      Findings: No erythema or rash.   Neurological:      Mental Status: He is alert and oriented to person, place, and time.      Cranial Nerves: No cranial nerve deficit.   Psychiatric:         Behavior: Behavior normal.        Result Review :                 Assessment and Plan    Diagnoses and all orders for this visit:    1. Erectile dysfunction, unspecified erectile dysfunction type (Primary)  -     CBC Auto Differential; " Future  -     Comprehensive Metabolic Panel; Future  -     Hemoglobin A1c; Future  -     Lipid Panel; Future  -     Vitamin D 25 Hydroxy; Future  -     Vitamin B12; Future  -     Testosterone, F Eqlib & T LC / MS; Future    2. Stage 2 chronic kidney disease    3. Vitamin D deficiency    4. Mixed hyperlipidemia    5. Prediabetes    6. Gastroesophageal reflux disease, unspecified whether esophagitis present    7. Fatty liver    8. Attention deficit hyperactivity disorder (ADHD), predominantly inattentive type  -     amphetamine-dextroamphetamine XR (Adderall XR) 25 MG 24 hr capsule; Take 1 capsule by mouth Every Morning Take 1 tablet by mouth daily  Dispense: 30 capsule; Refill: 0    9. Morbid (severe) obesity due to excess calories (CMS/[facility])    Other orders  -     vitamin D (ERGOCALCIFEROL) 1.25 MG (62787 UT) capsule capsule; Take 1 capsule by mouth 1 (One) Time Per Week.  Dispense: 4 capsule; Refill: 3         -went over labowrk   -advised pt to start using his CPAP machine.    -recommend COVID-19 vaccination   -tachycardia/palpitations - Cardiology following. Had recent echocardiogrram and TST.  on toprol XL 25 mg daily.  Advised pt to get a pulse ox and not take ADHD medication if HR >100.    -abdominal pain/fatty liver/nausea vomiting/Gastritis/GERD with esophagitis - on protonix 40 mg daily. .  GI following had recent EGD   -ckd stage 2 - continue to monitor. Gave information. Advised better hydration BP control   - back pain with sciatica/scoliosis - pain is better after PT/OT.  -Vitamin D deficiency vitamin D pill once a week   -HLP - stable crestor 20 mg PO qhs. Recommend heart healthy diet  -HTN - on lisionpril 20 mg daily on toprol XL 25 mg daily.    -ADHD parminder Adderrall XR 25 mg caban   -GERD - on protonix 40 mg PO q daily.    -allergic rhinitis - continue singulair and flonase and will add xyzal 5 mg daily.    -prediabetes - prediabetes meal plan .     hyperlipidemia - on lipitor 80 mg PO qhs  recommend  heart healthy diet   -morbid obesity - recommend ketogenic diet along with intermittent fasting. Counseled weight loss >5 minutes  BMI at 40.11  -advised pt to be safe and call with questions and concerns  -advised to go to ER or call 911 if symptoms get severe  -advised to followup with specialist and referrals   -advised pt to be safe during COVID-19 pandemic  I spent 30  minutes caring for Randall on this date of service. This time includes time spent by me in the following activities: preparing for the visit, reviewing tests, obtaining and/or reviewing a separately obtained history, performing a medically appropriate examination and/or evaluation, counseling and educating the patient/family/caregiver, ordering medications, tests, or procedures, referring and communicating with other health care professionals, documenting information in the medical record, independently interpreting results and communicating that information with the patient/family/caregiver and care coordination.   -recheck in 2 month        This document has been electronically signed by Scottie Borges MD on August 31, 2021 15:57 CDT        Follow Up   Return in about 2 months (around 10/31/2021).  Patient was given instructions and counseling regarding his condition or for health maintenance advice. Please see specific information pulled into the AVS if appropriate.

## 2021-08-30 ENCOUNTER — TELEPHONE (OUTPATIENT)
Dept: FAMILY MEDICINE CLINIC | Facility: CLINIC | Age: 51
End: 2021-08-30

## 2021-08-31 ENCOUNTER — OFFICE VISIT (OUTPATIENT)
Dept: FAMILY MEDICINE CLINIC | Facility: CLINIC | Age: 51
End: 2021-08-31

## 2021-08-31 VITALS
SYSTOLIC BLOOD PRESSURE: 118 MMHG | TEMPERATURE: 97.1 F | HEIGHT: 71 IN | WEIGHT: 287.6 LBS | HEART RATE: 84 BPM | OXYGEN SATURATION: 98 % | BODY MASS INDEX: 40.26 KG/M2 | DIASTOLIC BLOOD PRESSURE: 76 MMHG

## 2021-08-31 DIAGNOSIS — R73.03 PREDIABETES: ICD-10-CM

## 2021-08-31 DIAGNOSIS — E55.9 VITAMIN D DEFICIENCY: ICD-10-CM

## 2021-08-31 DIAGNOSIS — K21.9 GASTROESOPHAGEAL REFLUX DISEASE, UNSPECIFIED WHETHER ESOPHAGITIS PRESENT: ICD-10-CM

## 2021-08-31 DIAGNOSIS — K76.0 FATTY LIVER: ICD-10-CM

## 2021-08-31 DIAGNOSIS — E66.01 MORBID (SEVERE) OBESITY DUE TO EXCESS CALORIES (HCC): ICD-10-CM

## 2021-08-31 DIAGNOSIS — E78.2 MIXED HYPERLIPIDEMIA: ICD-10-CM

## 2021-08-31 DIAGNOSIS — F90.0 ATTENTION DEFICIT HYPERACTIVITY DISORDER (ADHD), PREDOMINANTLY INATTENTIVE TYPE: ICD-10-CM

## 2021-08-31 DIAGNOSIS — N52.9 ERECTILE DYSFUNCTION, UNSPECIFIED ERECTILE DYSFUNCTION TYPE: Primary | ICD-10-CM

## 2021-08-31 DIAGNOSIS — N18.2 STAGE 2 CHRONIC KIDNEY DISEASE: ICD-10-CM

## 2021-08-31 PROCEDURE — 99214 OFFICE O/P EST MOD 30 MIN: CPT | Performed by: FAMILY MEDICINE

## 2021-08-31 RX ORDER — DEXTROAMPHETAMINE SACCHARATE, AMPHETAMINE ASPARTATE MONOHYDRATE, DEXTROAMPHETAMINE SULFATE AND AMPHETAMINE SULFATE 6.25; 6.25; 6.25; 6.25 MG/1; MG/1; MG/1; MG/1
25 CAPSULE, EXTENDED RELEASE ORAL EVERY MORNING
Qty: 30 CAPSULE | Refills: 0 | Status: SHIPPED | OUTPATIENT
Start: 2021-08-31 | End: 2021-11-02 | Stop reason: SDUPTHER

## 2021-08-31 RX ORDER — ERGOCALCIFEROL 1.25 MG/1
50000 CAPSULE ORAL WEEKLY
Qty: 4 CAPSULE | Refills: 3 | Status: SHIPPED | OUTPATIENT
Start: 2021-08-31 | End: 2021-11-02 | Stop reason: SDUPTHER

## 2021-09-08 RX ORDER — LEVOCETIRIZINE DIHYDROCHLORIDE 5 MG/1
5 TABLET, FILM COATED ORAL EVERY EVENING
Qty: 30 TABLET | Refills: 3 | Status: SHIPPED | OUTPATIENT
Start: 2021-09-08 | End: 2022-05-04

## 2021-10-08 RX ORDER — LISINOPRIL 20 MG/1
20 TABLET ORAL DAILY
Qty: 30 TABLET | Refills: 0 | Status: SHIPPED | OUTPATIENT
Start: 2021-10-08 | End: 2021-11-08

## 2021-10-08 RX ORDER — METOPROLOL SUCCINATE 25 MG/1
25 TABLET, EXTENDED RELEASE ORAL DAILY
Qty: 30 TABLET | Refills: 0 | Status: SHIPPED | OUTPATIENT
Start: 2021-10-08 | End: 2021-11-02

## 2021-11-01 ENCOUNTER — TELEPHONE (OUTPATIENT)
Dept: FAMILY MEDICINE CLINIC | Facility: CLINIC | Age: 51
End: 2021-11-01

## 2021-11-02 ENCOUNTER — OFFICE VISIT (OUTPATIENT)
Dept: FAMILY MEDICINE CLINIC | Facility: CLINIC | Age: 51
End: 2021-11-02

## 2021-11-02 VITALS
HEIGHT: 71 IN | DIASTOLIC BLOOD PRESSURE: 86 MMHG | HEART RATE: 90 BPM | OXYGEN SATURATION: 96 % | SYSTOLIC BLOOD PRESSURE: 142 MMHG | WEIGHT: 289.4 LBS | TEMPERATURE: 98.4 F | BODY MASS INDEX: 40.52 KG/M2

## 2021-11-02 DIAGNOSIS — I10 ESSENTIAL HYPERTENSION: ICD-10-CM

## 2021-11-02 DIAGNOSIS — K21.9 GASTROESOPHAGEAL REFLUX DISEASE, UNSPECIFIED WHETHER ESOPHAGITIS PRESENT: ICD-10-CM

## 2021-11-02 DIAGNOSIS — N18.2 STAGE 2 CHRONIC KIDNEY DISEASE: ICD-10-CM

## 2021-11-02 DIAGNOSIS — E78.2 MIXED HYPERLIPIDEMIA: ICD-10-CM

## 2021-11-02 DIAGNOSIS — E55.9 VITAMIN D DEFICIENCY: ICD-10-CM

## 2021-11-02 DIAGNOSIS — K76.0 FATTY LIVER: ICD-10-CM

## 2021-11-02 DIAGNOSIS — F90.0 ATTENTION DEFICIT HYPERACTIVITY DISORDER (ADHD), PREDOMINANTLY INATTENTIVE TYPE: ICD-10-CM

## 2021-11-02 DIAGNOSIS — E66.01 MORBID (SEVERE) OBESITY DUE TO EXCESS CALORIES (HCC): Primary | ICD-10-CM

## 2021-11-02 PROCEDURE — 99214 OFFICE O/P EST MOD 30 MIN: CPT | Performed by: FAMILY MEDICINE

## 2021-11-02 RX ORDER — FLUTICASONE PROPIONATE 50 MCG
2 SPRAY, SUSPENSION (ML) NASAL DAILY
Qty: 16 G | Refills: 3 | Status: SHIPPED | OUTPATIENT
Start: 2021-11-02 | End: 2022-08-10

## 2021-11-02 RX ORDER — PANTOPRAZOLE SODIUM 40 MG/1
40 TABLET, DELAYED RELEASE ORAL DAILY
Qty: 30 TABLET | Refills: 3 | Status: SHIPPED | OUTPATIENT
Start: 2021-11-02 | End: 2022-08-26

## 2021-11-02 RX ORDER — DEXTROAMPHETAMINE SACCHARATE, AMPHETAMINE ASPARTATE MONOHYDRATE, DEXTROAMPHETAMINE SULFATE AND AMPHETAMINE SULFATE 6.25; 6.25; 6.25; 6.25 MG/1; MG/1; MG/1; MG/1
25 CAPSULE, EXTENDED RELEASE ORAL EVERY MORNING
Qty: 30 CAPSULE | Refills: 0 | Status: SHIPPED | OUTPATIENT
Start: 2021-11-02 | End: 2022-05-04 | Stop reason: SDUPTHER

## 2021-11-02 RX ORDER — METOPROLOL SUCCINATE 50 MG/1
50 TABLET, EXTENDED RELEASE ORAL DAILY
Qty: 30 TABLET | Refills: 3 | Status: SHIPPED | OUTPATIENT
Start: 2021-11-02 | End: 2022-03-04

## 2021-11-02 RX ORDER — MONTELUKAST SODIUM 10 MG/1
10 TABLET ORAL NIGHTLY
Qty: 30 TABLET | Refills: 3 | Status: SHIPPED | OUTPATIENT
Start: 2021-11-02 | End: 2021-12-20

## 2021-11-02 RX ORDER — ERGOCALCIFEROL 1.25 MG/1
50000 CAPSULE ORAL WEEKLY
Qty: 4 CAPSULE | Refills: 3 | Status: SHIPPED | OUTPATIENT
Start: 2021-11-02 | End: 2022-05-25

## 2021-11-02 NOTE — PATIENT INSTRUCTIONS
Go up on toprl XL from 25 to 50 mg daily. Can take 2 pills of toprol XL 25 mg = 50 mg daily    Recheck in 6 weeks    Monitor BP at home. Should be less than 130/90.  If less than 100/60 call me or go back to taking toprol XL 25 mg daily.     If heart rate less than 60 then go back to toprol XL 25 mg daily.         Localized Dermabrasion Text: The patient was draped in routine manner.  Localized dermabrasion using 3 x 17 mm wire brush was performed in routine manner to papillary dermis. This spot dermabrasion is being performed to complete skin cancer reconstruction. It also will eliminate the other sun damaged precancerous cells that are known to be part of the regional effect of a lifetime's worth of sun exposure. This localized dermabrasion is therapeutic and should not be considered cosmetic in any regard. Localized Dermabrasion With Wire Brush Text: The patient was draped in routine manner.  Localized dermabrasion using 3 x 17 mm wire brush was performed in routine manner to papillary dermis. This spot dermabrasion is being performed to complete skin cancer reconstruction. It also will eliminate the other sun damaged precancerous cells that are known to be part of the regional effect of a lifetime's worth of sun exposure. This localized dermabrasion is therapeutic and should not be considered cosmetic in any regard.

## 2021-11-08 RX ORDER — LISINOPRIL 20 MG/1
20 TABLET ORAL DAILY
Qty: 30 TABLET | Refills: 0 | Status: SHIPPED | OUTPATIENT
Start: 2021-11-08 | End: 2021-12-07

## 2021-11-08 RX ORDER — METOPROLOL SUCCINATE 25 MG/1
25 TABLET, EXTENDED RELEASE ORAL DAILY
Qty: 30 TABLET | Refills: 0 | OUTPATIENT
Start: 2021-11-08

## 2021-11-08 NOTE — TELEPHONE ENCOUNTER
Rx Refill Note  Requested Prescriptions     Pending Prescriptions Disp Refills   • lisinopril (PRINIVIL,ZESTRIL) 20 MG tablet [Pharmacy Med Name: LISINOPRIL 20MG TABLETS] 30 tablet 0     Sig: TAKE 1 TABLET BY MOUTH DAILY     Refused Prescriptions Disp Refills   • metoprolol succinate XL (TOPROL-XL) 25 MG 24 hr tablet [Pharmacy Med Name: METOPROLOL ER SUCCINATE 25MG TABS] 30 tablet 0     Sig: TAKE 1 TABLET BY MOUTH DAILY      Last office visit with prescribing clinician: 11/2/2021      Next office visit with prescribing clinician: 12/17/2021   {TIP  Encounters:      METOPROLOL 25 MG D/CD ON 11/2/21 BY PROVIDER FOR DOSE INCREASE.  NEW RX WAS SENT AT THAT TIME.    {TIP  Please add Last Relevant Lab Date if appropriate:  {TIP  Is Refill Pharmacy correct? YES  Leonidas Rosado LPN  11/08/21, 08:41 CST

## 2021-11-22 ENCOUNTER — TELEPHONE (OUTPATIENT)
Dept: FAMILY MEDICINE CLINIC | Facility: CLINIC | Age: 51
End: 2021-11-22

## 2021-11-22 NOTE — TELEPHONE ENCOUNTER
LEFT VOICEMAIL TO LET PATIENT KNOW THAT DR SNELL WILL BE OUT THE AFTERNOON OF THE 17TH OF DEC AND WE NEED TO RESCHEDULE

## 2021-12-07 RX ORDER — ASPIRIN 81 MG/1
TABLET, COATED ORAL
Qty: 30 TABLET | Refills: 3 | Status: SHIPPED | OUTPATIENT
Start: 2021-12-07 | End: 2022-05-04

## 2021-12-07 RX ORDER — LISINOPRIL 20 MG/1
20 TABLET ORAL DAILY
Qty: 30 TABLET | Refills: 3 | Status: SHIPPED | OUTPATIENT
Start: 2021-12-07 | End: 2022-08-04

## 2021-12-20 RX ORDER — MONTELUKAST SODIUM 10 MG/1
10 TABLET ORAL NIGHTLY
Qty: 30 TABLET | Refills: 3 | Status: SHIPPED | OUTPATIENT
Start: 2021-12-20

## 2022-03-04 RX ORDER — METOPROLOL SUCCINATE 50 MG/1
50 TABLET, EXTENDED RELEASE ORAL DAILY
Qty: 30 TABLET | Refills: 3 | Status: SHIPPED | OUTPATIENT
Start: 2022-03-04 | End: 2022-07-06

## 2022-05-02 NOTE — PROGRESS NOTES
Subjective:  Randall Hernandez is a 51 y.o. male who presents for       Patient Active Problem List   Diagnosis   • Hyperlipidemia   • Inattention   • Non morbid obesity   • Prediabetes   • Attention deficit hyperactivity disorder (ADHD), combined type   • Need for vaccination   • Gastroesophageal reflux disease   • Encounter for drug screening   • Tingling in extremities   • Elevated BP without diagnosis of hypertension   • Allergic rhinitis   • Elevated blood pressure reading   • Pruritic rash   • Numbness in both hands   • Bilateral elbow joint pain   • Pain in both wrists   • Ulnar neuropathy of left upper extremity   • General medical examination   • Bilateral carpal tunnel syndrome   • History of prediabetes   • Numbness and tingling in both hands   • Essential hypertension   • Cerumen debris on tympanic membrane of both ears   • Carpal tunnel syndrome   • S/P carpal tunnel release   • Encounter for screening for malignant neoplasm of colon   • FH: colon cancer   • Acute recurrent sinusitis   • Attention deficit hyperactivity disorder (ADHD), predominantly inattentive type   • Class 2 obesity with body mass index (BMI) of 35.0 to 35.9 in adult   • Annual physical exam   • Arthritis of back   • Scoliosis   • Hyponatremia   • Hypochloremia   • Stage 2 chronic kidney disease   • Epigastric pain   • Bilious vomiting with nausea   • Fatty liver   • Dysphagia   • Nausea and vomiting   • Gastritis without bleeding   • Class 2 obesity with body mass index (BMI) of 38.0 to 38.9 in adult   • Mixed hyperlipidemia   • Dizziness   • Hypotension   • Class 2 obesity with body mass index (BMI) of 39.0 to 39.9 in adult   • Normocytic anemia   • Vitamin D deficiency   • Anxiety   • Class 2 severe obesity with serious comorbidity and body mass index (BMI) of 39.0 to 39.9 in adult (HCC)   • Palpitations   • Tachycardia   • Erectile dysfunction   • Morbid (severe) obesity due to excess calories (HCC)   • Right shoulder pain   •  Bilateral hip pain   • PAULINA on CPAP   • Low testosterone           Current Outpatient Medications:   •  amphetamine-dextroamphetamine XR (Adderall XR) 25 MG 24 hr capsule, Take 1 capsule by mouth Every Morning Take 1 tablet by mouth daily, Disp: 30 capsule, Rfl: 0  •  fluticasone (FLONASE) 50 MCG/ACT nasal spray, 2 sprays by Each Nare route Daily., Disp: 16 g, Rfl: 3  •  lisinopril (PRINIVIL,ZESTRIL) 20 MG tablet, TAKE 1 TABLET BY MOUTH DAILY, Disp: 30 tablet, Rfl: 3  •  metoprolol succinate XL (TOPROL-XL) 50 MG 24 hr tablet, TAKE 1 TABLET BY MOUTH DAILY, Disp: 30 tablet, Rfl: 3  •  montelukast (SINGULAIR) 10 MG tablet, TAKE 1 TABLET BY MOUTH EVERY NIGHT, Disp: 30 tablet, Rfl: 3  •  pantoprazole (Protonix) 40 MG EC tablet, Take 1 tablet by mouth Daily., Disp: 30 tablet, Rfl: 3  •  rosuvastatin (Crestor) 20 MG tablet, Take 1 tablet by mouth Every Night., Disp: 30 tablet, Rfl: 3  •  tadalafil (Cialis) 10 MG tablet, Take 1 tablet by mouth Daily As Needed for Erectile Dysfunction., Disp: 30 tablet, Rfl: 3  •  vitamin D (ERGOCALCIFEROL) 1.25 MG (47664 UT) capsule capsule, Take 1 capsule by mouth 1 (One) Time Per Week., Disp: 4 capsule, Rfl: 3  •  fexofenadine (Allegra Allergy) 60 MG tablet, Take 1 tablet by mouth 2 (Two) Times a Day., Disp: 60 tablet, Rfl: 3  •  nabumetone (Relafen) 750 MG tablet, Take 1 tablet by mouth 2 (Two) Times a Day., Disp: 30 tablet, Rfl: 1     Pt is 50 yo male with management  of obesity, history of prediabetes, HTN,  GERD, vitamin D deficiency, alelrgic rhinitis, ADHD predominantly inattentive type ,sp bilateral carpal tunnel release surgery,  Chronic back pain (arthritis of lumbar spine, short pedicles, mild scoliosis, mild arthritis thoracic spine).  CKD stage 2 , fatty liver, gastirits, esophagitis, PAULINA, echocardiogram on 6/15/21 (grade 1 diastolic dysfunction, right ventricular dilation     11/2/21 in office visit for recheck on pt's above medical issues . Pt has yet to get labwork ordered  on 8/31/21. Pt continues to take his medicaitons for ADD, HTN, HLP, GERD, ED, vitamin D deficiency. Pt's BP elevated today. Denies any dennis pain or dizziness .       5/4/22 in office visit for recheck. Pt is due for new labwork. He states his right hip and right shoulder hurt for past few months. No recent trauma pain is 4/10 on severity.  He is taking nothing for pain.  He is not working currently.      Hip Pain   The incident occurred more than 1 week ago. The incident occurred at home. There was no injury mechanism. The pain is present in the right hip. The quality of the pain is described as aching. The pain is at a severity of 4/10. The pain is moderate. The pain has been constant since onset. Associated symptoms include numbness. Pertinent negatives include no inability to bear weight, loss of motion, loss of sensation, muscle weakness or tingling. The symptoms are aggravated by movement. He has tried nothing for the symptoms. The treatment provided no relief.   Shoulder Injury   The right shoulder is affected. There was no injury mechanism. The quality of the pain is described as aching. The pain is at a severity of 4/10. The pain is moderate. Associated symptoms include numbness. Pertinent negatives include no chest pain, muscle weakness or tingling. The symptoms are aggravated by movement and overhead lifting. He has tried nothing for the symptoms. The treatment provided no relief.   Chronic Kidney Disease  This is a chronic problem. The current episode started more than 1 year ago. The problem occurs constantly. The problem has been unchanged. Associated symptoms include arthralgias and fatigue. Pertinent negatives include no abdominal pain, anorexia, change in bowel habit, chest pain, chills, congestion, coughing, diaphoresis, fever, headaches, joint swelling, myalgias, nausea, neck pain, numbness, rash, sore throat, swollen glands, urinary symptoms, vertigo, visual change, vomiting or  weakness. Nothing aggravates the symptoms. He has tried nothing for the symptoms. The treatment provided no relief.   Hyperlipidemia  This is a chronic problem. The current episode started more than 1 year ago. The problem is uncontrolled. Exacerbating diseases include liver disease and obesity. He has no history of chronic renal disease, diabetes, hypothyroidism or nephrotic syndrome. Current antihyperlipidemic treatment includes statins. The current treatment provides moderate improvement of lipids. Risk factors for coronary artery disease include male sex, obesity, hypertension, dyslipidemia and a sedentary lifestyle.    Obesity   This is a chronic problem. The current episode started more than 1 year ago. The problem occurs constantly. The problem has been worsening Associated symptoms include arthralgias and numbness. Pertinent negatives include no abdominal pain, anorexia, change in bowel habit, chest pain, chills, congestion, coughing, diaphoresis, fatigue, fever, headaches, joint swelling, myalgias, nausea, rash, sore throat, swollen glands, urinary symptoms, vertigo, visual change, vomiting or weakness. Nothing aggravates the symptoms. He has tried nothing for the symptoms. The treatment provided no relief.   Hypertension   This is a chronic problem. The current episode started more than 1year ago. The problem has been gradually improving tThe problem is controlled. Pertinent negatives include no anxiety, blurred vision, chest pain, headaches, malaise/fatigue, neck pain, orthopnea, palpitations, peripheral edema, PND, shortness of breath or sweats. Risk factors for coronary artery disease include male gender, obesity and sedentary lifestyle. Past treatments include diuretics and ace-inhibitor There are no compliance problems. Treatment has provided significant relief     Review of Systems  Review of Systems   Constitutional: Positive for activity change and fatigue. Negative for appetite change, chills,  diaphoresis and fever.   HENT: Negative for congestion, postnasal drip, rhinorrhea, sinus pressure, sinus pain, sneezing, sore throat, trouble swallowing and voice change.    Respiratory: Negative for cough, choking, chest tightness, shortness of breath, wheezing and stridor.    Cardiovascular: Negative for chest pain.   Gastrointestinal: Negative for diarrhea, nausea and vomiting.   Musculoskeletal: Positive for arthralgias and back pain.        Right hip pain, right shoulder pain    Neurological: Positive for weakness and numbness. Negative for tingling and headaches.   Psychiatric/Behavioral: Positive for decreased concentration.       Patient Active Problem List   Diagnosis   • Hyperlipidemia   • Inattention   • Non morbid obesity   • Prediabetes   • Attention deficit hyperactivity disorder (ADHD), combined type   • Need for vaccination   • Gastroesophageal reflux disease   • Encounter for drug screening   • Tingling in extremities   • Elevated BP without diagnosis of hypertension   • Allergic rhinitis   • Elevated blood pressure reading   • Pruritic rash   • Numbness in both hands   • Bilateral elbow joint pain   • Pain in both wrists   • Ulnar neuropathy of left upper extremity   • General medical examination   • Bilateral carpal tunnel syndrome   • History of prediabetes   • Numbness and tingling in both hands   • Essential hypertension   • Cerumen debris on tympanic membrane of both ears   • Carpal tunnel syndrome   • S/P carpal tunnel release   • Encounter for screening for malignant neoplasm of colon   • FH: colon cancer   • Acute recurrent sinusitis   • Attention deficit hyperactivity disorder (ADHD), predominantly inattentive type   • Class 2 obesity with body mass index (BMI) of 35.0 to 35.9 in adult   • Annual physical exam   • Arthritis of back   • Scoliosis   • Hyponatremia   • Hypochloremia   • Stage 2 chronic kidney disease   • Epigastric pain   • Bilious vomiting with nausea   • Fatty liver   •  Dysphagia   • Nausea and vomiting   • Gastritis without bleeding   • Class 2 obesity with body mass index (BMI) of 38.0 to 38.9 in adult   • Mixed hyperlipidemia   • Dizziness   • Hypotension   • Class 2 obesity with body mass index (BMI) of 39.0 to 39.9 in adult   • Normocytic anemia   • Vitamin D deficiency   • Anxiety   • Class 2 severe obesity with serious comorbidity and body mass index (BMI) of 39.0 to 39.9 in adult ([facility])   • Palpitations   • Tachycardia   • Erectile dysfunction   • Morbid (severe) obesity due to excess calories ([facility])   • Right shoulder pain   • Bilateral hip pain   • PAULINA on CPAP   • Low testosterone     Past Surgical History:   Procedure Laterality Date   • CARPAL TUNNEL RELEASE Right 2018    Procedure: CARPAL TUNNEL RELEASE;  Surgeon: Judd Mathias MD;  Location: [facility], [place] OR;  Service: Orthopedics   • CARPAL TUNNEL RELEASE  2018    Left   • CARPAL TUNNEL RELEASE Left 2018    Procedure: CARPAL TUNNEL RELEASE;  Surgeon: Judd Mathias MD;  Location: [facility], [place] OR;  Service: Orthopedics   • COLONOSCOPY N/A 2019    Procedure: COLONOSCOPY a friday please ;  Surgeon: Ruslan Bowers MD;  Location: [facility], [place] ENDOSCOPY;  Service: Gastroenterology   • ENDOSCOPY     • ENDOSCOPY N/A 2020    Procedure: ESOPHAGOGASTRODUODENOSCOPY possible dilation ;  Surgeon: Ruslan Bowers MD;  Location: [facility] [place] ENDOSCOPY;  Service: Gastroenterology;  Laterality: N/A;   • PYLOROMYOTOMY       Social History     Socioeconomic History   • Marital status: Significant Other   Tobacco Use   • Smoking status: Former Smoker     Packs/day: 1.00     Years: 6.00     Pack years: 6.00     Types: Cigarettes     Quit date:      Years since quittin.3   • Smokeless tobacco: Never Used   Substance and Sexual Activity   • Alcohol use: Yes     Comment: 2018 - Patinet reports consumption of alcoholic beverages under a social basis (approximately 3 - 4 per month).     • Drug use: No   •  Sexual activity: Defer     Family History   Problem Relation Age of Onset   • Colon cancer Mother         onset age greater than 75y   • Diabetes Mother    • Breast cancer Sister    • Cancer Brother         bladder   • Diabetes Brother    • Hyperlipidemia Brother      No visits with results within 6 Month(s) from this visit.   Latest known visit with results is:   Lab on 07/30/2021   Component Date Value Ref Range Status   • WBC 07/30/2021 6.48  3.40 - 10.80 10*3/mm3 Final   • RBC 07/30/2021 4.76  4.14 - 5.80 10*6/mm3 Final   • Hemoglobin 07/30/2021 14.3  13.0 - 17.7 g/dL Final   • Hematocrit 07/30/2021 43.2  37.5 - 51.0 % Final   • MCV 07/30/2021 90.8  79.0 - 97.0 fL Final   • MCH 07/30/2021 30.0  26.6 - 33.0 pg Final   • MCHC 07/30/2021 33.1  31.5 - 35.7 g/dL Final   • RDW 07/30/2021 12.9  12.3 - 15.4 % Final   • RDW-SD 07/30/2021 42.4  37.0 - 54.0 fl Final   • MPV 07/30/2021 9.8  6.0 - 12.0 fL Final   • Platelets 07/30/2021 284  140 - 450 10*3/mm3 Final   • Neutrophil % 07/30/2021 51.5  42.7 - 76.0 % Final   • Lymphocyte % 07/30/2021 35.3  19.6 - 45.3 % Final   • Monocyte % 07/30/2021 7.1  5.0 - 12.0 % Final   • Eosinophil % 07/30/2021 4.2  0.3 - 6.2 % Final   • Basophil % 07/30/2021 1.1  0.0 - 1.5 % Final   • Immature Grans % 07/30/2021 0.8 (A) 0.0 - 0.5 % Final   • Neutrophils, Absolute 07/30/2021 3.34  1.70 - 7.00 10*3/mm3 Final   • Lymphocytes, Absolute 07/30/2021 2.29  0.70 - 3.10 10*3/mm3 Final   • Monocytes, Absolute 07/30/2021 0.46  0.10 - 0.90 10*3/mm3 Final   • Eosinophils, Absolute 07/30/2021 0.27  0.00 - 0.40 10*3/mm3 Final   • Basophils, Absolute 07/30/2021 0.07  0.00 - 0.20 10*3/mm3 Final   • Immature Grans, Absolute 07/30/2021 0.05  0.00 - 0.05 10*3/mm3 Final   • nRBC 07/30/2021 0.0  0.0 - 0.2 /100 WBC Final   • Glucose 07/30/2021 95  65 - 99 mg/dL Final   • BUN 07/30/2021 17  6 - 20 mg/dL Final   • Creatinine 07/30/2021 1.06  0.76 - 1.27 mg/dL Final   • Sodium 07/30/2021 140  136 - 145 mmol/L  Final   • Potassium 07/30/2021 4.6  3.5 - 5.2 mmol/L Final   • Chloride 07/30/2021 104  98 - 107 mmol/L Final   • CO2 07/30/2021 23.6  22.0 - 29.0 mmol/L Final   • Calcium 07/30/2021 9.5  8.6 - 10.5 mg/dL Final   • Total Protein 07/30/2021 7.5  6.0 - 8.5 g/dL Final   • Albumin 07/30/2021 4.40  3.50 - 5.20 g/dL Final   • ALT (SGPT) 07/30/2021 30  1 - 41 U/L Final   • AST (SGOT) 07/30/2021 26  1 - 40 U/L Final   • Alkaline Phosphatase 07/30/2021 79  39 - 117 U/L Final   • Total Bilirubin 07/30/2021 0.4  0.0 - 1.2 mg/dL Final   • eGFR Non  Amer 07/30/2021 73  >60 mL/min/1.73 Final   • Globulin 07/30/2021 3.1  gm/dL Final   • A/G Ratio 07/30/2021 1.4  g/dL Final   • BUN/Creatinine Ratio 07/30/2021 16.0  7.0 - 25.0 Final   • Anion Gap 07/30/2021 12.4  5.0 - 15.0 mmol/L Final   • Hemoglobin A1C 07/30/2021 5.90 (A) 4.80 - 5.60 % Final   • Total Cholesterol 07/30/2021 173  0 - 200 mg/dL Final   • Triglycerides 07/30/2021 104  0 - 150 mg/dL Final   • HDL Cholesterol 07/30/2021 37 (A) 40 - 60 mg/dL Final   • LDL Cholesterol  07/30/2021 117 (A) 0 - 100 mg/dL Final   • VLDL Cholesterol 07/30/2021 19  5 - 40 mg/dL Final   • LDL/HDL Ratio 07/30/2021 3.11   Final   • Testosterone, Total 07/30/2021 200 (A) 264 - 916 ng/dL Final    Adult male reference interval is based on a population of  healthy nonobese males (BMI <30) between 19 and 39 years old.  Heather et.al. JCEM 2017,102;8287-6795. PMID: [id number].   • Testosterone, Free 07/30/2021 6.9 (A) 7.2 - 24.0 pg/mL Final   • THC, Screen, Urine 07/30/2021 Negative  Negative Final   • Phencyclidine (PCP), Urine 07/30/2021 Negative  Negative Final   • Cocaine Screen, Urine 07/30/2021 Negative  Negative Final   • Methamphetamine, Ur 07/30/2021 Negative  Negative Final   • Opiate Screen 07/30/2021 Negative  Negative Final   • Amphetamine Screen, Urine 07/30/2021 Positive (A) Negative Final   • Benzodiazepine Screen, Urine 07/30/2021 Negative  Negative Final   • Tricyclic  "Antidepressants Screen 07/30/2021 Negative  Negative Final   • Methadone Screen, Urine 07/30/2021 Negative  Negative Final   • Barbiturates Screen, Urine 07/30/2021 Negative  Negative Final   • Oxycodone Screen, Urine 07/30/2021 Negative  Negative Final   • Propoxyphene Screen 07/30/2021 Negative  Negative Final   • Buprenorphine, Screen, Urine 07/30/2021 Negative  Negative Final      Treadmill Stress Test  · The patient reported dyspnea during the stress test.     · The patient reported shortness of breath and fatigue during the stress   test.     Patient exercise according to Kenny protocol for 7 minutes and 12 seconds   with the METs achieved 9.5  This represented normal functional capacity.    Patient had appropriate heart rate response and increased from 93 bpm to   155 representing 92% of age-matched predicted heart rate.    Mild   hypertension blood pressure response.  The blood pressure increased from   115 to 178 mmHg.  Test was stopped due to target heart rate achieved and   fatigability and dyspnea.  No ST-T wave changes suggestive of ischemia.    No arrhythmia noted.     Clinical impression     #1Normal functions capacity with a low risk study with +7 [facility] treadmill   score     #2 No ST-T wave changes suggesting ischemia     #3 No Arrhythmia noted       [unfilled]  Immunization History   Administered Date(s) Administered   • Influenza TIV (IM) 03/28/2017, 03/28/2017   • Tdap 05/23/2016, 02/17/2017, 09/20/2019       The following portions of the patient's history were reviewed and updated as appropriate: allergies, current medications, past family history, past medical history, past social history, past surgical history and problem list.        Physical Exam  /82 (BP Location: Left arm, Patient Position: Sitting, Cuff Size: Large Adult)   Pulse 84   Temp 96.8 °F (36 °C)   Ht 180.3 cm (71\")   Wt 134 kg (295 lb 9.6 oz)   SpO2 95%   BMI 41.23 kg/m²     Physical Exam  Vitals and nursing note " reviewed.   Constitutional:       Appearance: He is well-developed. He is not diaphoretic.   HENT:      Head: Normocephalic and atraumatic.      Right Ear: External ear normal.   Eyes:      Conjunctiva/sclera: Conjunctivae normal.      Pupils: Pupils are equal, round, and reactive to light.   Cardiovascular:      Rate and Rhythm: Normal rate and regular rhythm.      Heart sounds: Normal heart sounds. No murmur heard.  Pulmonary:      Effort: Pulmonary effort is normal. No respiratory distress.      Breath sounds: Normal breath sounds.   Abdominal:      General: Bowel sounds are normal. There is no distension.      Palpations: Abdomen is soft.      Tenderness: There is no abdominal tenderness.      Comments: Obese abdomen    Musculoskeletal:         General: Tenderness present. No deformity.      Right shoulder: Tenderness and bony tenderness present.      Cervical back: Normal range of motion and neck supple.      Right hip: Tenderness and bony tenderness present. Decreased range of motion.      Left hip: Tenderness and bony tenderness present. Decreased range of motion.   Skin:     General: Skin is warm.      Coloration: Skin is not pale.      Findings: No erythema or rash.   Neurological:      Mental Status: He is alert and oriented to person, place, and time.      Cranial Nerves: No cranial nerve deficit.   Psychiatric:         Behavior: Behavior normal.         Assessment/Plan    Diagnosis Plan   1. Attention deficit hyperactivity disorder (ADHD), predominantly inattentive type  amphetamine-dextroamphetamine XR (Adderall XR) 25 MG 24 hr capsule   2. Stage 2 chronic kidney disease     3. Vitamin D deficiency     4. Prediabetes     5. Mixed hyperlipidemia     6. Morbid (severe) obesity due to excess calories (HCC)     7. Essential hypertension     8. Need for pneumococcal vaccine     9. Right shoulder pain, unspecified chronicity  XR Hips Bilateral With or Without Pelvis 2 View    XR Shoulder 2+ View Right   10.  Bilateral hip pain  XR Hips Bilateral With or Without Pelvis 2 View    XR Shoulder 2+ View Right   11. PAULINA on CPAP  Ambulatory Referral to Sleep Medicine   12. Low testosterone  Testosterone, F Eqlib & T LC / MS   13. Allergic rhinitis, unspecified seasonality, unspecified trigger              -recommend labwork   -will refer to sleep medicine for his new CPAP  -right hip and shoulder pain - will get x-ray. Consider PT/OT or Orthopedic. Start on relafen 750 mg PO BID drug information provided   -tachycardia/palpitations - Cardiology following. Had recent echocardiogrram and TST.  on toprol XL 50 mg daily.  Advised pt to get a pulse ox and not take ADHD medication if HR >100.    -abdominal pain/fatty liver/nausea vomiting/Gastritis/GERD with esophagitis - on protonix 40 mg daily. .  GI following had recent EGD   -ckd stage 2 - continue to monitor. Gave information. Advised better hydration BP control   -back pain with sciatica/scoliosis - pain is better after PT/OT.  -Vitamin D deficiency vitamin D pill once a week   -HLP - stable crestor 20 mg PO qhs. Recommend heart healthy diet  -HTN - on lisionpril 20 mg daily go up on toprol XL from 25 to 50 mg daily   -ADHD parminder Adderrall XR 25 mg caban   -GERD - on protonix 40 mg PO q daily.    -allergic rhinitis - continue singulair and flonase will try allegra 60 mg PO bid instead of zyzal   -PAULINA on CPAP - recommend pt start using machine   -prediabetes - prediabetes meal plan .     hyperlipidemia - on lipitor 80 mg PO qhs  recommend heart healthy diet   -morbid obesity - recommend ketogenic diet along with intermittent fasting. Counseled weight loss >5 minutes  BMI at 41.23   -advised pt to be safe and call with questions and concerns  -advised to go to ER or call 911 if symptoms get severe  -advised to followup with specialist and referrals   -advised pt to be safe during COVID-19 pandemic  I spent 30  minutes caring for Randall on this date of service. This time includes time  spent by me in the following activities: preparing for the visit, reviewing tests, obtaining and/or reviewing a separately obtained history, performing a medically appropriate examination and/or evaluation, counseling and educating the patient/family/caregiver, ordering medications, tests, or procedures, referring and communicating with other health care professionals, documenting information in the medical record, independently interpreting results and communicating that information with the patient/family/caregiver and care coordination  -recheck in 1 month         This document has been electronically signed by Scottie Borges MD on May 4, 2022 09:51 CDT

## 2022-05-04 ENCOUNTER — LAB (OUTPATIENT)
Dept: LAB | Facility: HOSPITAL | Age: 52
End: 2022-05-04

## 2022-05-04 ENCOUNTER — OFFICE VISIT (OUTPATIENT)
Dept: FAMILY MEDICINE CLINIC | Facility: CLINIC | Age: 52
End: 2022-05-04

## 2022-05-04 VITALS
TEMPERATURE: 96.8 F | HEART RATE: 84 BPM | HEIGHT: 71 IN | BODY MASS INDEX: 41.38 KG/M2 | SYSTOLIC BLOOD PRESSURE: 110 MMHG | OXYGEN SATURATION: 95 % | DIASTOLIC BLOOD PRESSURE: 82 MMHG | WEIGHT: 295.6 LBS

## 2022-05-04 DIAGNOSIS — R79.89 LOW TESTOSTERONE: ICD-10-CM

## 2022-05-04 DIAGNOSIS — F90.0 ATTENTION DEFICIT HYPERACTIVITY DISORDER (ADHD), PREDOMINANTLY INATTENTIVE TYPE: Primary | ICD-10-CM

## 2022-05-04 DIAGNOSIS — M25.552 BILATERAL HIP PAIN: ICD-10-CM

## 2022-05-04 DIAGNOSIS — G47.33 OSA ON CPAP: ICD-10-CM

## 2022-05-04 DIAGNOSIS — E55.9 VITAMIN D DEFICIENCY: ICD-10-CM

## 2022-05-04 DIAGNOSIS — E66.01 MORBID (SEVERE) OBESITY DUE TO EXCESS CALORIES: ICD-10-CM

## 2022-05-04 DIAGNOSIS — E78.2 MIXED HYPERLIPIDEMIA: ICD-10-CM

## 2022-05-04 DIAGNOSIS — M25.551 BILATERAL HIP PAIN: ICD-10-CM

## 2022-05-04 DIAGNOSIS — I10 ESSENTIAL HYPERTENSION: ICD-10-CM

## 2022-05-04 DIAGNOSIS — N18.2 STAGE 2 CHRONIC KIDNEY DISEASE: ICD-10-CM

## 2022-05-04 DIAGNOSIS — M25.511 RIGHT SHOULDER PAIN, UNSPECIFIED CHRONICITY: ICD-10-CM

## 2022-05-04 DIAGNOSIS — Z99.89 OSA ON CPAP: ICD-10-CM

## 2022-05-04 DIAGNOSIS — R73.03 PREDIABETES: ICD-10-CM

## 2022-05-04 DIAGNOSIS — Z23 NEED FOR PNEUMOCOCCAL VACCINE: ICD-10-CM

## 2022-05-04 DIAGNOSIS — N52.9 ERECTILE DYSFUNCTION, UNSPECIFIED ERECTILE DYSFUNCTION TYPE: ICD-10-CM

## 2022-05-04 DIAGNOSIS — J30.9 ALLERGIC RHINITIS, UNSPECIFIED SEASONALITY, UNSPECIFIED TRIGGER: ICD-10-CM

## 2022-05-04 LAB
25(OH)D3 SERPL-MCNC: 32.7 NG/ML (ref 30–100)
ALBUMIN SERPL-MCNC: 4.2 G/DL (ref 3.5–5.2)
ALBUMIN/GLOB SERPL: 1.2 G/DL
ALP SERPL-CCNC: 104 U/L (ref 39–117)
ALT SERPL W P-5'-P-CCNC: 30 U/L (ref 1–41)
ANION GAP SERPL CALCULATED.3IONS-SCNC: 10.7 MMOL/L (ref 5–15)
AST SERPL-CCNC: 19 U/L (ref 1–40)
BASOPHILS # BLD AUTO: 0.07 10*3/MM3 (ref 0–0.2)
BASOPHILS NFR BLD AUTO: 1 % (ref 0–1.5)
BILIRUB SERPL-MCNC: 0.5 MG/DL (ref 0–1.2)
BUN SERPL-MCNC: 11 MG/DL (ref 6–20)
BUN/CREAT SERPL: 10.2 (ref 7–25)
CALCIUM SPEC-SCNC: 9.7 MG/DL (ref 8.6–10.5)
CHLORIDE SERPL-SCNC: 103 MMOL/L (ref 98–107)
CHOLEST SERPL-MCNC: 295 MG/DL (ref 0–200)
CO2 SERPL-SCNC: 23.3 MMOL/L (ref 22–29)
CREAT SERPL-MCNC: 1.08 MG/DL (ref 0.76–1.27)
DEPRECATED RDW RBC AUTO: 39.1 FL (ref 37–54)
EGFRCR SERPLBLD CKD-EPI 2021: 82.1 ML/MIN/1.73
EOSINOPHIL # BLD AUTO: 0.17 10*3/MM3 (ref 0–0.4)
EOSINOPHIL NFR BLD AUTO: 2.4 % (ref 0.3–6.2)
ERYTHROCYTE [DISTWIDTH] IN BLOOD BY AUTOMATED COUNT: 12.5 % (ref 12.3–15.4)
GLOBULIN UR ELPH-MCNC: 3.5 GM/DL
GLUCOSE SERPL-MCNC: 119 MG/DL (ref 65–99)
HBA1C MFR BLD: 6.5 % (ref 4.8–5.6)
HCT VFR BLD AUTO: 44.7 % (ref 37.5–51)
HDLC SERPL-MCNC: 35 MG/DL (ref 40–60)
HGB BLD-MCNC: 15.9 G/DL (ref 13–17.7)
IMM GRANULOCYTES # BLD AUTO: 0.06 10*3/MM3 (ref 0–0.05)
IMM GRANULOCYTES NFR BLD AUTO: 0.8 % (ref 0–0.5)
LDLC SERPL CALC-MCNC: 212 MG/DL (ref 0–100)
LDLC/HDLC SERPL: 6.05 {RATIO}
LYMPHOCYTES # BLD AUTO: 1.97 10*3/MM3 (ref 0.7–3.1)
LYMPHOCYTES NFR BLD AUTO: 27.6 % (ref 19.6–45.3)
MCH RBC QN AUTO: 30.9 PG (ref 26.6–33)
MCHC RBC AUTO-ENTMCNC: 35.6 G/DL (ref 31.5–35.7)
MCV RBC AUTO: 86.8 FL (ref 79–97)
MONOCYTES # BLD AUTO: 0.48 10*3/MM3 (ref 0.1–0.9)
MONOCYTES NFR BLD AUTO: 6.7 % (ref 5–12)
NEUTROPHILS NFR BLD AUTO: 4.38 10*3/MM3 (ref 1.7–7)
NEUTROPHILS NFR BLD AUTO: 61.5 % (ref 42.7–76)
NRBC BLD AUTO-RTO: 0 /100 WBC (ref 0–0.2)
PLATELET # BLD AUTO: 279 10*3/MM3 (ref 140–450)
PMV BLD AUTO: 9.4 FL (ref 6–12)
POTASSIUM SERPL-SCNC: 4.5 MMOL/L (ref 3.5–5.2)
PROT SERPL-MCNC: 7.7 G/DL (ref 6–8.5)
RBC # BLD AUTO: 5.15 10*6/MM3 (ref 4.14–5.8)
SODIUM SERPL-SCNC: 137 MMOL/L (ref 136–145)
TRIGL SERPL-MCNC: 241 MG/DL (ref 0–150)
VIT B12 BLD-MCNC: 480 PG/ML (ref 211–946)
VLDLC SERPL-MCNC: 48 MG/DL (ref 5–40)
WBC NRBC COR # BLD: 7.13 10*3/MM3 (ref 3.4–10.8)

## 2022-05-04 PROCEDURE — 82306 VITAMIN D 25 HYDROXY: CPT

## 2022-05-04 PROCEDURE — 84402 ASSAY OF FREE TESTOSTERONE: CPT

## 2022-05-04 PROCEDURE — 99214 OFFICE O/P EST MOD 30 MIN: CPT | Performed by: FAMILY MEDICINE

## 2022-05-04 PROCEDURE — 83036 HEMOGLOBIN GLYCOSYLATED A1C: CPT

## 2022-05-04 PROCEDURE — 85025 COMPLETE CBC W/AUTO DIFF WBC: CPT

## 2022-05-04 PROCEDURE — 80053 COMPREHEN METABOLIC PANEL: CPT

## 2022-05-04 PROCEDURE — 84403 ASSAY OF TOTAL TESTOSTERONE: CPT

## 2022-05-04 PROCEDURE — 82607 VITAMIN B-12: CPT

## 2022-05-04 PROCEDURE — 80061 LIPID PANEL: CPT

## 2022-05-04 RX ORDER — NABUMETONE 750 MG/1
750 TABLET, FILM COATED ORAL 2 TIMES DAILY
Qty: 30 TABLET | Refills: 1 | Status: SHIPPED | OUTPATIENT
Start: 2022-05-04 | End: 2022-05-10 | Stop reason: CLARIF

## 2022-05-04 RX ORDER — DEXTROAMPHETAMINE SACCHARATE, AMPHETAMINE ASPARTATE MONOHYDRATE, DEXTROAMPHETAMINE SULFATE AND AMPHETAMINE SULFATE 6.25; 6.25; 6.25; 6.25 MG/1; MG/1; MG/1; MG/1
25 CAPSULE, EXTENDED RELEASE ORAL EVERY MORNING
Qty: 30 CAPSULE | Refills: 0 | Status: SHIPPED | OUTPATIENT
Start: 2022-05-04 | End: 2022-06-07 | Stop reason: SDUPTHER

## 2022-05-04 RX ORDER — FEXOFENADINE HYDROCHLORIDE 60 MG/1
60 TABLET, FILM COATED ORAL 2 TIMES DAILY
Qty: 60 TABLET | Refills: 3 | Status: SHIPPED | OUTPATIENT
Start: 2022-05-04 | End: 2023-03-10

## 2022-05-04 NOTE — PATIENT INSTRUCTIONS
Get x-ray    Will refer to sleep medicine    Relafen 750 mg twice a day for pain . Take with meals and water stop aspirin    New allergy mediation is allegra. Do not take xyzal. Continue flonase    Recheck in 1 month

## 2022-05-09 ENCOUNTER — TELEPHONE (OUTPATIENT)
Dept: FAMILY MEDICINE CLINIC | Facility: CLINIC | Age: 52
End: 2022-05-09

## 2022-05-09 RX ORDER — METFORMIN HYDROCHLORIDE 500 MG/1
500 TABLET, EXTENDED RELEASE ORAL
Qty: 30 TABLET | Refills: 3 | Status: SHIPPED | OUTPATIENT
Start: 2022-05-09 | End: 2022-10-18

## 2022-05-09 NOTE — TELEPHONE ENCOUNTER
----- Message from Scottie Borges MD sent at 5/5/2022  7:37 AM CDT -----  Please call pt    Labwork now shows pt is diabetic from being prediabetic. Hga1c at 6.5.  highly recommend diet exercise and weight loss. Recommend watching his sugar and carb intake. Recommend pt start on metformin  mg PO q daily give 30 pills and 3 refills. May cause GI upset and diarrhea. Will discuss more on next visit    On lipid panel all levels uncontrolled and please verify if pt is taking crestor.  If he is taking crestor 20 mg PO qhs regulary go up to crestor 40 mg PO qhs give 30 pills and 3 refills if pt agreeable. Recommend pt take with CoQ10 OTC to help reduce side effects     Rest of labwork stable    Recheck on next visit thanks

## 2022-05-10 ENCOUNTER — TELEPHONE (OUTPATIENT)
Dept: FAMILY MEDICINE CLINIC | Facility: CLINIC | Age: 52
End: 2022-05-10

## 2022-05-10 DIAGNOSIS — M19.019 AC JOINT ARTHROPATHY: ICD-10-CM

## 2022-05-10 DIAGNOSIS — M25.511 RIGHT SHOULDER PAIN, UNSPECIFIED CHRONICITY: Primary | ICD-10-CM

## 2022-05-10 RX ORDER — CELECOXIB 50 MG/1
50 CAPSULE ORAL 2 TIMES DAILY
Qty: 60 CAPSULE | Refills: 1 | Status: SHIPPED | OUTPATIENT
Start: 2022-05-10 | End: 2022-07-06

## 2022-05-10 NOTE — TELEPHONE ENCOUNTER
Insurance denied [place] for Nabumetone. Pt must try and fail 2 preferred drugs. Celecoxib, diclofenac, diclofenac gel, indomethacin, ketorolac tab, meloxicam, sulindac. Please advise.

## 2022-05-10 NOTE — TELEPHONE ENCOUNTER
Try celebrex 50 mg PO BID give 60 pills and 1 refill. Pt advised to take with meals and water. Thanks     Message text       Sent pt a [employer] message informing.

## 2022-05-14 LAB
TESTOST FREE MFR SERPL: 2.41 % (ref 1.5–4.2)
TESTOST FREE SERPL-MCNC: 4 NG/DL (ref 5–21)
TESTOST SERPL-MCNC: 166.1 NG/DL (ref 264–916)

## 2022-05-17 ENCOUNTER — OFFICE VISIT (OUTPATIENT)
Dept: SLEEP MEDICINE | Facility: HOSPITAL | Age: 52
End: 2022-05-17

## 2022-05-17 VITALS
SYSTOLIC BLOOD PRESSURE: 133 MMHG | WEIGHT: 283 LBS | HEIGHT: 71 IN | DIASTOLIC BLOOD PRESSURE: 92 MMHG | HEART RATE: 72 BPM | BODY MASS INDEX: 39.62 KG/M2 | OXYGEN SATURATION: 95 %

## 2022-05-17 DIAGNOSIS — G47.33 OBSTRUCTIVE SLEEP APNEA, ADULT: Primary | ICD-10-CM

## 2022-05-17 DIAGNOSIS — J34.2 NASAL SEPTAL DEFORMITY: ICD-10-CM

## 2022-05-17 DIAGNOSIS — E66.01 MORBID OBESITY: ICD-10-CM

## 2022-05-17 PROCEDURE — 99214 OFFICE O/P EST MOD 30 MIN: CPT | Performed by: NURSE PRACTITIONER

## 2022-05-17 NOTE — PROGRESS NOTES
New Patient Sleep Medicine Consultation    Encounter Date: 2022         Patient's Primary Care Provider: Scottie Borges MD  Referring Provider: Scottie Borges MD  Reason for consultation/chief complaint: snoring, awakening gasping for breath, witnessed apneas, excessive daytime sleepiness, unrefreshing sleep and insomnia - was controlled on CPAP, but his current machine is malfunctioning    Randall Hernandez is a 51 y.o. male who admits to snoring, unrestful sleep, high blood pressure, gasping during sleep, excessive daytime sleepiness, stopping breathing during sleep, frequent unexplained arousals, irritability, sleeping less than 6 hours per night, disturbed or restless sleep, memory loss, difficulty falling asleep and difficulty staying asleep. These symptoms have been gradually returning since he has been having issues with his CPAP machine.    He denies cataplexy, sleep paralysis, or hypnagogic hallucinations. His bedtime is ~ 2100. He  falls asleep after 30 minutes, and is up 4-5 times per night. He wakes up ~ 0600. He endorses 4-6 hours of sleep. He drinks 1 cups of coffee, 0 teas, and 0 sodas per day. He drinks 2-3 alcoholic beverages per week. He is not a current smoker. He does not take sedatives or hypnotics. He has no sleepiness with driving. He rarely naps.    Patient states that he was on CPAP many years ago, but he had significant weight loss and stopped using it. He was retested 5-6 years ago and requalified for PAP therapy, so he started using a new machine. He states that his current machine is malfunctioning and having significant pressure fluctuation.    [place] - 12    Prior Sleep Testin. PSG on 2016, AHI of 120   2. CPAP titration on 2016, recommended 15 cm H2O   3. Currently on ?    Past Medical History:   Diagnosis Date   • Allergic rhinitis    • Arthritis    • Backache    • Cellulitis of leg, except foot    • Dyslipidemia    • GERD (gastroesophageal reflux disease)     • Heartburn    • History of Holter monitoring 2016    Sinus mechanism with a normal average heart rate.No evidence of chronotropic incompetence.Asymptomatic Holter   • Hyperlipidemia    • Hypertension    • Low back pain    • Numbness of lower limb     left leg numbness and tingling      • Obesity, unspecified    • Obstructive sleep apnea of adult     USING C-PAP   • Pain in left hip    • Pain in right hip    • Palpitations    • Skin lesion     insect bite.      • Supraventricular tachycardia (HCC)    • Weight loss     advised     Social History     Socioeconomic History   • Marital status: Significant Other   Tobacco Use   • Smoking status: Former Smoker     Packs/day: 1.00     Years: 6.00     Pack years: 6.00     Types: Cigarettes     Quit date:      Years since quittin.3   • Smokeless tobacco: Never Used   Substance and Sexual Activity   • Alcohol use: Yes     Comment: 2018 - Kevin reports consumption of alcoholic beverages under a social basis (approximately 3 - 4 per month).     • Drug use: No   • Sexual activity: Defer     Family History   Problem Relation Age of Onset   • Colon cancer Mother         onset age greater than 75y   • Diabetes Mother    • Breast cancer Sister    • Cancer Brother         bladder   • Diabetes Brother    • Hyperlipidemia Brother      Prior T&A, UPPP, maxillofacial, or bariatric surgery: None  Family history of sleep disorders: None  Other family history + for: As above  Occupation:   Marital status:   Children: 2  Has 2 brothers and 2 sisters  Smoking history: smoked 1 ppds from age 16 until 26      Review of Systems:  Constitutional: positive for fatigue  Ears, nose, mouth, throat, and face: positive for snoring  Respiratory: negative  Cardiovascular: negative  Gastrointestinal: negative  Genitourinary:negative  Musculoskeletal:negative  Neurological: positive for dizziness  Behavioral/Psych: negative  Patient advised to discuss any positive  "ROS with PCP.      Vitals:    05/17/22 1458   BP: 133/92   Pulse: 72   SpO2: 95%           05/17/22  1458   Weight: 128 kg (283 lb)       Body mass index is 39.49 kg/m². Class 2 Severe Obesity (BMI >=35 and <=39.9). Obesity-related health conditions include the following: obstructive sleep apnea, hypertension, dyslipidemias and GERD. Obesity is unchanged. BMI is is above average; BMI management plan is completed. I recommend portion control and increasing exercise.    Tobacco Use: Medium Risk   • Smoking Tobacco Use: Former Smoker   • Smokeless Tobacco Use: Never Used       Physical Exam:        General: Alert. Cooperative. Well developed. No acute distress.   Head/Neck:  Normocephalic. Symmetrical. Atraumatic.     Neck circumference: 20.5\"             Eyes: Sclera clear. No icterus. PERRLA. Normal EOM.             Ears: No deformities. Normal hearing.             Nose: No septal deviation. No drainage.          Throat: No oral lesions. No thrush. Moist mucous membranes. Trachea midline    Tongue is normal     Dentition is fair       Pharynx: Posterior pharyngeal pillars are narrow    Mallampati score of IV (only hard palate visible)    Pharynx is nonerythematous, with both tonsils mildly enlarged   Chest Wall:  Normal shape. Symmetric expansion with respiration. No tenderness.          Lungs:  Clear to auscultation bilaterally. No wheezes. No rhonchi. No rales. Respirations regular, even and unlabored.            Heart:  Regular rhythm and normal rate. Normal S1 and S2. No murmur.     Abdomen:  Soft, non-tender and non-distended. Normal bowel sounds. No masses.  Extremities:  Moves all extremities well. No edema.           Pulses: Pulses palpable and equal bilaterally.               Skin: Dry. Intact. No bleeding. No rash.           Neuro: Moves all 4 extremities and cranial nerves grossly intact.  Psychiatric: Normal mood and affect.      Current Outpatient Medications:   •  amphetamine-dextroamphetamine XR " (Adderall XR) 25 MG 24 hr capsule, Take 1 capsule by mouth Every Morning Take 1 tablet by mouth daily, Disp: 30 capsule, Rfl: 0  •  celecoxib (CeleBREX) 50 MG capsule, Take 1 capsule by mouth 2 (Two) Times a Day., Disp: 60 capsule, Rfl: 1  •  fexofenadine (Allegra Allergy) 60 MG tablet, Take 1 tablet by mouth 2 (Two) Times a Day., Disp: 60 tablet, Rfl: 3  •  fluticasone (FLONASE) 50 MCG/ACT nasal spray, 2 sprays by Each Nare route Daily., Disp: 16 g, Rfl: 3  •  lisinopril (PRINIVIL,ZESTRIL) 20 MG tablet, TAKE 1 TABLET BY MOUTH DAILY, Disp: 30 tablet, Rfl: 3  •  metFORMIN ER (GLUCOPHAGE-XR) 500 MG 24 hr tablet, Take 1 tablet by mouth Daily With Breakfast., Disp: 30 tablet, Rfl: 3  •  metoprolol succinate XL (TOPROL-XL) 50 MG 24 hr tablet, TAKE 1 TABLET BY MOUTH DAILY, Disp: 30 tablet, Rfl: 3  •  montelukast (SINGULAIR) 10 MG tablet, TAKE 1 TABLET BY MOUTH EVERY NIGHT, Disp: 30 tablet, Rfl: 3  •  pantoprazole (Protonix) 40 MG EC tablet, Take 1 tablet by mouth Daily., Disp: 30 tablet, Rfl: 3  •  rosuvastatin (Crestor) 20 MG tablet, Take 1 tablet by mouth Every Night., Disp: 30 tablet, Rfl: 3  •  tadalafil (Cialis) 10 MG tablet, Take 1 tablet by mouth Daily As Needed for Erectile Dysfunction., Disp: 30 tablet, Rfl: 3  •  vitamin D (ERGOCALCIFEROL) 1.25 MG (03646 UT) capsule capsule, Take 1 capsule by mouth 1 (One) Time Per Week., Disp: 4 capsule, Rfl: 3    WBC   Date Value Ref Range Status   05/04/2022 7.13 3.40 - 10.80 10*3/mm3 Final     RBC   Date Value Ref Range Status   05/04/2022 5.15 4.14 - 5.80 10*6/mm3 Final     Hemoglobin   Date Value Ref Range Status   05/04/2022 15.9 13.0 - 17.7 g/dL Final     Hematocrit   Date Value Ref Range Status   05/04/2022 44.7 37.5 - 51.0 % Final     MCV   Date Value Ref Range Status   05/04/2022 86.8 79.0 - 97.0 fL Final     MCH   Date Value Ref Range Status   05/04/2022 30.9 26.6 - 33.0 pg Final     MCHC   Date Value Ref Range Status   05/04/2022 35.6 31.5 - 35.7 g/dL Final     RDW    Date Value Ref Range Status   05/04/2022 12.5 12.3 - 15.4 % Final     RDW-SD   Date Value Ref Range Status   05/04/2022 39.1 37.0 - 54.0 fl Final     MPV   Date Value Ref Range Status   05/04/2022 9.4 6.0 - 12.0 fL Final     Platelets   Date Value Ref Range Status   05/04/2022 279 140 - 450 10*3/mm3 Final     Neutrophil %   Date Value Ref Range Status   05/04/2022 61.5 42.7 - 76.0 % Final     Lymphocyte %   Date Value Ref Range Status   05/04/2022 27.6 19.6 - 45.3 % Final     Monocyte %   Date Value Ref Range Status   05/04/2022 6.7 5.0 - 12.0 % Final     Eosinophil %   Date Value Ref Range Status   05/04/2022 2.4 0.3 - 6.2 % Final     Basophil %   Date Value Ref Range Status   05/04/2022 1.0 0.0 - 1.5 % Final     Immature Grans %   Date Value Ref Range Status   05/04/2022 0.8 (H) 0.0 - 0.5 % Final     Neutrophils, Absolute   Date Value Ref Range Status   05/04/2022 4.38 1.70 - 7.00 10*3/mm3 Final     Lymphocytes, Absolute   Date Value Ref Range Status   05/04/2022 1.97 0.70 - 3.10 10*3/mm3 Final     Monocytes, Absolute   Date Value Ref Range Status   05/04/2022 0.48 0.10 - 0.90 10*3/mm3 Final     Eosinophils, Absolute   Date Value Ref Range Status   05/04/2022 0.17 0.00 - 0.40 10*3/mm3 Final     Basophils, Absolute   Date Value Ref Range Status   05/04/2022 0.07 0.00 - 0.20 10*3/mm3 Final     Immature Grans, Absolute   Date Value Ref Range Status   05/04/2022 0.06 (H) 0.00 - 0.05 10*3/mm3 Final     nRBC   Date Value Ref Range Status   05/04/2022 0.0 0.0 - 0.2 /100 WBC Final     Iron   Date Value Ref Range Status   03/02/2021 108 59 - 158 mcg/dL Final     Ferritin   Date Value Ref Range Status   03/02/2021 383.00 30.00 - 400.00 ng/mL Final     Lab Results   Component Value Date    GLUCOSE 119 (H) 05/04/2022    BUN 11 05/04/2022    CREATININE 1.08 05/04/2022    EGFRIFNONA 73 07/30/2021    BCR 10.2 05/04/2022    K 4.5 05/04/2022    CO2 23.3 05/04/2022    CALCIUM 9.7 05/04/2022    ALBUMIN 4.20 05/04/2022    AST 19  05/04/2022    ALT 30 05/04/2022         ASSESSMENT:  1. Obstructive sleep apnea - New (to me), no additional work-up planned (3)  1. Obtain records  2. Script for new CPAP @ 15 ([facility])  3. Follow up in 30-90 days with compliance report, or sooner if trouble with new PAP adaptation  2. Nasal septal deformity - New (to me), no additional work-up planned (3)  1. Recommend referral to ENT - patient wishes to hold off at this time, this is a chronic problem  2. Continue Flonase daily  3. Morbid obesity - BMI 39.5 - stable chronic illness      I spent 30 minutes caring for Randall on this date of service. This time includes time spent by me in the following activities: preparing for the visit, reviewing tests, obtaining and/or reviewing a separately obtained history, performing a medically appropriate examination and/or evaluation , counseling and educating the patient/family/caregiver, ordering medications, tests, or procedures, referring and communicating with other health care professionals , documenting information in the medical record and care coordination; discussing PAP therapy, PAP compliance and PAP maintenance    Patient to follow up in 31-90 days with compliance report. Patient agrees to return sooner if changes in symptoms.           This document has been electronically signed by ROLAN Romero on May 17, 2022 15:04 CDT          CC: Scottie Borges MD Jamora, David C, MD

## 2022-05-25 ENCOUNTER — OFFICE VISIT (OUTPATIENT)
Dept: ORTHOPEDIC SURGERY | Facility: CLINIC | Age: 52
End: 2022-05-25

## 2022-05-25 VITALS — BODY MASS INDEX: 39.48 KG/M2 | WEIGHT: 282 LBS | HEIGHT: 71 IN

## 2022-05-25 DIAGNOSIS — M25.511 CHRONIC RIGHT SHOULDER PAIN: Primary | ICD-10-CM

## 2022-05-25 DIAGNOSIS — G89.29 CHRONIC RIGHT SHOULDER PAIN: Primary | ICD-10-CM

## 2022-05-25 DIAGNOSIS — M75.51 BURSITIS OF RIGHT SHOULDER: ICD-10-CM

## 2022-05-25 PROCEDURE — 99214 OFFICE O/P EST MOD 30 MIN: CPT | Performed by: NURSE PRACTITIONER

## 2022-05-25 PROCEDURE — 20610 DRAIN/INJ JOINT/BURSA W/O US: CPT | Performed by: NURSE PRACTITIONER

## 2022-05-25 RX ORDER — BUPIVACAINE HYDROCHLORIDE 5 MG/ML
2 INJECTION, SOLUTION PERINEURAL
Status: COMPLETED | OUTPATIENT
Start: 2022-05-25 | End: 2022-05-25

## 2022-05-25 RX ORDER — TRIAMCINOLONE ACETONIDE 40 MG/ML
40 INJECTION, SUSPENSION INTRA-ARTICULAR; INTRAMUSCULAR
Status: COMPLETED | OUTPATIENT
Start: 2022-05-25 | End: 2022-05-25

## 2022-05-25 RX ADMIN — BUPIVACAINE HYDROCHLORIDE 2 ML: 5 INJECTION, SOLUTION PERINEURAL at 14:51

## 2022-05-25 RX ADMIN — TRIAMCINOLONE ACETONIDE 40 MG: 40 INJECTION, SUSPENSION INTRA-ARTICULAR; INTRAMUSCULAR at 14:51
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
## 2022-06-07 ENCOUNTER — OFFICE VISIT (OUTPATIENT)
Dept: FAMILY MEDICINE CLINIC | Facility: CLINIC | Age: 52
End: 2022-06-07

## 2022-06-07 VITALS
OXYGEN SATURATION: 98 % | HEART RATE: 62 BPM | WEIGHT: 280.2 LBS | TEMPERATURE: 97.6 F | HEIGHT: 71 IN | BODY MASS INDEX: 39.23 KG/M2 | SYSTOLIC BLOOD PRESSURE: 112 MMHG | DIASTOLIC BLOOD PRESSURE: 72 MMHG

## 2022-06-07 DIAGNOSIS — R68.82 LOW LIBIDO: ICD-10-CM

## 2022-06-07 DIAGNOSIS — N18.2 STAGE 2 CHRONIC KIDNEY DISEASE: ICD-10-CM

## 2022-06-07 DIAGNOSIS — R53.82 CHRONIC FATIGUE: ICD-10-CM

## 2022-06-07 DIAGNOSIS — R79.89 LOW TESTOSTERONE IN MALE: ICD-10-CM

## 2022-06-07 DIAGNOSIS — E11.8 CONTROLLED TYPE 2 DIABETES MELLITUS WITH COMPLICATION, WITHOUT LONG-TERM CURRENT USE OF INSULIN: Primary | ICD-10-CM

## 2022-06-07 DIAGNOSIS — I10 ESSENTIAL HYPERTENSION: ICD-10-CM

## 2022-06-07 DIAGNOSIS — K76.0 FATTY LIVER: ICD-10-CM

## 2022-06-07 DIAGNOSIS — E78.2 MIXED HYPERLIPIDEMIA: ICD-10-CM

## 2022-06-07 DIAGNOSIS — E55.9 VITAMIN D DEFICIENCY: ICD-10-CM

## 2022-06-07 DIAGNOSIS — F90.0 ATTENTION DEFICIT HYPERACTIVITY DISORDER (ADHD), PREDOMINANTLY INATTENTIVE TYPE: ICD-10-CM

## 2022-06-07 DIAGNOSIS — E66.01 CLASS 2 SEVERE OBESITY WITH SERIOUS COMORBIDITY AND BODY MASS INDEX (BMI) OF 39.0 TO 39.9 IN ADULT, UNSPECIFIED OBESITY TYPE: ICD-10-CM

## 2022-06-07 PROCEDURE — 99214 OFFICE O/P EST MOD 30 MIN: CPT | Performed by: FAMILY MEDICINE

## 2022-06-07 RX ORDER — DEXTROAMPHETAMINE SACCHARATE, AMPHETAMINE ASPARTATE MONOHYDRATE, DEXTROAMPHETAMINE SULFATE AND AMPHETAMINE SULFATE 6.25; 6.25; 6.25; 6.25 MG/1; MG/1; MG/1; MG/1
25 CAPSULE, EXTENDED RELEASE ORAL EVERY MORNING
Qty: 30 CAPSULE | Refills: 0 | Status: SHIPPED | OUTPATIENT
Start: 2022-06-07 | End: 2022-07-06 | Stop reason: SDUPTHER

## 2022-06-30 RX ORDER — TADALAFIL 10 MG/1
10 TABLET ORAL DAILY PRN
Qty: 30 TABLET | Refills: 3 | Status: SHIPPED | OUTPATIENT
Start: 2022-06-30

## 2022-06-30 NOTE — TELEPHONE ENCOUNTER
Rx Refill Note  Requested Prescriptions     Pending Prescriptions Disp Refills   • tadalafil (Cialis) 10 MG tablet [Pharmacy Med Name: SILDENAFIL 50MG TABLETS] 30 tablet 3     Sig: Take 1 tablet by mouth Daily As Needed for Erectile Dysfunction.      Last office visit with prescribing clinician: 6/7/2022      Next office visit with prescribing clinician: 7/6/2022   {TIP  Encounters    {TIP  Please add Last Relevant Lab Date if appropriate  {TIP  Is Refill Pharmacy correct? yes  Leonidas Rosado LPN  06/30/22, 13:41 CDT

## 2022-07-06 DIAGNOSIS — F90.0 ATTENTION DEFICIT HYPERACTIVITY DISORDER (ADHD), PREDOMINANTLY INATTENTIVE TYPE: ICD-10-CM

## 2022-07-06 RX ORDER — DEXTROAMPHETAMINE SACCHARATE, AMPHETAMINE ASPARTATE MONOHYDRATE, DEXTROAMPHETAMINE SULFATE AND AMPHETAMINE SULFATE 6.25; 6.25; 6.25; 6.25 MG/1; MG/1; MG/1; MG/1
25 CAPSULE, EXTENDED RELEASE ORAL EVERY MORNING
Qty: 30 CAPSULE | Refills: 0 | Status: SHIPPED | OUTPATIENT
Start: 2022-07-06 | End: 2022-08-02 | Stop reason: SDUPTHER

## 2022-07-06 RX ORDER — CELECOXIB 50 MG/1
CAPSULE ORAL
Qty: 60 CAPSULE | Refills: 1 | Status: SHIPPED | OUTPATIENT
Start: 2022-07-06 | End: 2023-02-22

## 2022-07-06 RX ORDER — METOPROLOL SUCCINATE 50 MG/1
50 TABLET, EXTENDED RELEASE ORAL DAILY
Qty: 30 TABLET | Refills: 1 | Status: SHIPPED | OUTPATIENT
Start: 2022-07-06 | End: 2023-02-13

## 2022-08-02 ENCOUNTER — OFFICE VISIT (OUTPATIENT)
Dept: FAMILY MEDICINE CLINIC | Facility: CLINIC | Age: 52
End: 2022-08-02

## 2022-08-02 VITALS
TEMPERATURE: 98.2 F | OXYGEN SATURATION: 97 % | HEIGHT: 71 IN | DIASTOLIC BLOOD PRESSURE: 72 MMHG | SYSTOLIC BLOOD PRESSURE: 124 MMHG | BODY MASS INDEX: 37.72 KG/M2 | WEIGHT: 269.4 LBS | HEART RATE: 78 BPM

## 2022-08-02 DIAGNOSIS — E66.01 CLASS 2 SEVERE OBESITY WITH SERIOUS COMORBIDITY AND BODY MASS INDEX (BMI) OF 37.0 TO 37.9 IN ADULT, UNSPECIFIED OBESITY TYPE: ICD-10-CM

## 2022-08-02 DIAGNOSIS — E11.8 CONTROLLED TYPE 2 DIABETES MELLITUS WITH COMPLICATION, WITHOUT LONG-TERM CURRENT USE OF INSULIN: Primary | ICD-10-CM

## 2022-08-02 DIAGNOSIS — I10 ESSENTIAL HYPERTENSION: ICD-10-CM

## 2022-08-02 DIAGNOSIS — N18.2 STAGE 2 CHRONIC KIDNEY DISEASE: ICD-10-CM

## 2022-08-02 DIAGNOSIS — F90.2 ATTENTION DEFICIT HYPERACTIVITY DISORDER (ADHD), COMBINED TYPE: ICD-10-CM

## 2022-08-02 DIAGNOSIS — K21.9 GASTROESOPHAGEAL REFLUX DISEASE, UNSPECIFIED WHETHER ESOPHAGITIS PRESENT: ICD-10-CM

## 2022-08-02 DIAGNOSIS — E78.2 MIXED HYPERLIPIDEMIA: ICD-10-CM

## 2022-08-02 DIAGNOSIS — F90.0 ATTENTION DEFICIT HYPERACTIVITY DISORDER (ADHD), PREDOMINANTLY INATTENTIVE TYPE: ICD-10-CM

## 2022-08-02 DIAGNOSIS — E55.9 VITAMIN D DEFICIENCY: ICD-10-CM

## 2022-08-02 PROCEDURE — 99214 OFFICE O/P EST MOD 30 MIN: CPT | Performed by: FAMILY MEDICINE

## 2022-08-02 RX ORDER — DEXTROAMPHETAMINE SACCHARATE, AMPHETAMINE ASPARTATE MONOHYDRATE, DEXTROAMPHETAMINE SULFATE AND AMPHETAMINE SULFATE 6.25; 6.25; 6.25; 6.25 MG/1; MG/1; MG/1; MG/1
25 CAPSULE, EXTENDED RELEASE ORAL EVERY MORNING
Qty: 30 CAPSULE | Refills: 0 | Status: SHIPPED | OUTPATIENT
Start: 2022-08-02 | End: 2022-09-13 | Stop reason: SDUPTHER

## 2022-08-04 RX ORDER — LISINOPRIL 20 MG/1
20 TABLET ORAL DAILY
Qty: 30 TABLET | Refills: 0 | Status: SHIPPED | OUTPATIENT
Start: 2022-08-04 | End: 2022-09-06

## 2022-08-10 RX ORDER — FLUTICASONE PROPIONATE 50 MCG
SPRAY, SUSPENSION (ML) NASAL
Qty: 16 G | Refills: 3 | Status: SHIPPED | OUTPATIENT
Start: 2022-08-10 | End: 2022-12-08

## 2022-08-23 ENCOUNTER — LAB (OUTPATIENT)
Dept: LAB | Facility: HOSPITAL | Age: 52
End: 2022-08-23

## 2022-08-23 ENCOUNTER — TRANSCRIBE ORDERS (OUTPATIENT)
Dept: LAB | Facility: HOSPITAL | Age: 52
End: 2022-08-23

## 2022-08-23 DIAGNOSIS — E11.8 CONTROLLED TYPE 2 DIABETES MELLITUS WITH COMPLICATION, WITHOUT LONG-TERM CURRENT USE OF INSULIN: ICD-10-CM

## 2022-08-23 DIAGNOSIS — F90.2 ATTENTION DEFICIT HYPERACTIVITY DISORDER (ADHD), COMBINED TYPE: ICD-10-CM

## 2022-08-23 DIAGNOSIS — N13.8 BPH WITH URINARY OBSTRUCTION: ICD-10-CM

## 2022-08-23 DIAGNOSIS — E78.2 MIXED HYPERLIPIDEMIA: ICD-10-CM

## 2022-08-23 DIAGNOSIS — N40.1 BPH WITH URINARY OBSTRUCTION: ICD-10-CM

## 2022-08-23 DIAGNOSIS — N18.2 STAGE 2 CHRONIC KIDNEY DISEASE: ICD-10-CM

## 2022-08-23 DIAGNOSIS — I10 ESSENTIAL HYPERTENSION: ICD-10-CM

## 2022-08-23 DIAGNOSIS — N40.1 BPH WITH URINARY OBSTRUCTION: Primary | ICD-10-CM

## 2022-08-23 DIAGNOSIS — K21.9 GASTROESOPHAGEAL REFLUX DISEASE, UNSPECIFIED WHETHER ESOPHAGITIS PRESENT: ICD-10-CM

## 2022-08-23 DIAGNOSIS — N13.8 BPH WITH URINARY OBSTRUCTION: Primary | ICD-10-CM

## 2022-08-23 DIAGNOSIS — E55.9 VITAMIN D DEFICIENCY: ICD-10-CM

## 2022-08-23 PROCEDURE — 85025 COMPLETE CBC W/AUTO DIFF WBC: CPT

## 2022-08-23 PROCEDURE — 82607 VITAMIN B-12: CPT

## 2022-08-23 PROCEDURE — 82306 VITAMIN D 25 HYDROXY: CPT

## 2022-08-23 PROCEDURE — 82570 ASSAY OF URINE CREATININE: CPT

## 2022-08-23 PROCEDURE — 83036 HEMOGLOBIN GLYCOSYLATED A1C: CPT

## 2022-08-23 PROCEDURE — G0103 PSA SCREENING: HCPCS

## 2022-08-23 PROCEDURE — 82043 UR ALBUMIN QUANTITATIVE: CPT

## 2022-08-23 PROCEDURE — 80061 LIPID PANEL: CPT

## 2022-08-23 PROCEDURE — 80053 COMPREHEN METABOLIC PANEL: CPT

## 2022-08-24 LAB
25(OH)D3 SERPL-MCNC: 52.8 NG/ML (ref 30–100)
ALBUMIN SERPL-MCNC: 4.5 G/DL (ref 3.5–5.2)
ALBUMIN UR-MCNC: 1.8 MG/DL
ALBUMIN/GLOB SERPL: 1.3 G/DL
ALP SERPL-CCNC: 90 U/L (ref 39–117)
ALT SERPL W P-5'-P-CCNC: 25 U/L (ref 1–41)
ANION GAP SERPL CALCULATED.3IONS-SCNC: 8.7 MMOL/L (ref 5–15)
AST SERPL-CCNC: 22 U/L (ref 1–40)
BASOPHILS # BLD AUTO: 0.07 10*3/MM3 (ref 0–0.2)
BASOPHILS NFR BLD AUTO: 1 % (ref 0–1.5)
BILIRUB SERPL-MCNC: 0.6 MG/DL (ref 0–1.2)
BUN SERPL-MCNC: 16 MG/DL (ref 6–20)
BUN/CREAT SERPL: 12.9 (ref 7–25)
CALCIUM SPEC-SCNC: 9.8 MG/DL (ref 8.6–10.5)
CHLORIDE SERPL-SCNC: 105 MMOL/L (ref 98–107)
CHOLEST SERPL-MCNC: 262 MG/DL (ref 0–200)
CO2 SERPL-SCNC: 26.3 MMOL/L (ref 22–29)
CREAT SERPL-MCNC: 1.24 MG/DL (ref 0.76–1.27)
CREAT UR-MCNC: 286.1 MG/DL
DEPRECATED RDW RBC AUTO: 40.2 FL (ref 37–54)
EGFRCR SERPLBLD CKD-EPI 2021: 69.5 ML/MIN/1.73
EOSINOPHIL # BLD AUTO: 0.16 10*3/MM3 (ref 0–0.4)
EOSINOPHIL NFR BLD AUTO: 2.3 % (ref 0.3–6.2)
ERYTHROCYTE [DISTWIDTH] IN BLOOD BY AUTOMATED COUNT: 12.4 % (ref 12.3–15.4)
GLOBULIN UR ELPH-MCNC: 3.6 GM/DL
GLUCOSE SERPL-MCNC: 93 MG/DL (ref 65–99)
HBA1C MFR BLD: 5.5 % (ref 4.8–5.6)
HCT VFR BLD AUTO: 45.3 % (ref 37.5–51)
HDLC SERPL-MCNC: 35 MG/DL (ref 40–60)
HGB BLD-MCNC: 15.4 G/DL (ref 13–17.7)
IMM GRANULOCYTES # BLD AUTO: 0.04 10*3/MM3 (ref 0–0.05)
IMM GRANULOCYTES NFR BLD AUTO: 0.6 % (ref 0–0.5)
LDLC SERPL CALC-MCNC: 198 MG/DL (ref 0–100)
LDLC/HDLC SERPL: 5.61 {RATIO}
LYMPHOCYTES # BLD AUTO: 2.41 10*3/MM3 (ref 0.7–3.1)
LYMPHOCYTES NFR BLD AUTO: 34.1 % (ref 19.6–45.3)
MCH RBC QN AUTO: 30.6 PG (ref 26.6–33)
MCHC RBC AUTO-ENTMCNC: 34 G/DL (ref 31.5–35.7)
MCV RBC AUTO: 90.1 FL (ref 79–97)
MICROALBUMIN/CREAT UR: 6.3 MG/G
MONOCYTES # BLD AUTO: 0.44 10*3/MM3 (ref 0.1–0.9)
MONOCYTES NFR BLD AUTO: 6.2 % (ref 5–12)
NEUTROPHILS NFR BLD AUTO: 3.95 10*3/MM3 (ref 1.7–7)
NEUTROPHILS NFR BLD AUTO: 55.8 % (ref 42.7–76)
NRBC BLD AUTO-RTO: 0 /100 WBC (ref 0–0.2)
PLATELET # BLD AUTO: 322 10*3/MM3 (ref 140–450)
PMV BLD AUTO: 10.1 FL (ref 6–12)
POTASSIUM SERPL-SCNC: 5.1 MMOL/L (ref 3.5–5.2)
PROT SERPL-MCNC: 8.1 G/DL (ref 6–8.5)
PSA SERPL-MCNC: 0.87 NG/ML (ref 0–4)
RBC # BLD AUTO: 5.03 10*6/MM3 (ref 4.14–5.8)
SODIUM SERPL-SCNC: 140 MMOL/L (ref 136–145)
TRIGL SERPL-MCNC: 153 MG/DL (ref 0–150)
VIT B12 BLD-MCNC: 546 PG/ML (ref 211–946)
VLDLC SERPL-MCNC: 29 MG/DL (ref 5–40)
WBC NRBC COR # BLD: 7.07 10*3/MM3 (ref 3.4–10.8)

## 2022-08-25 ENCOUNTER — TELEPHONE (OUTPATIENT)
Dept: FAMILY MEDICINE CLINIC | Facility: CLINIC | Age: 52
End: 2022-08-25

## 2022-08-25 RX ORDER — ROSUVASTATIN CALCIUM 40 MG/1
40 TABLET, COATED ORAL NIGHTLY
Qty: 30 TABLET | Refills: 3 | Status: SHIPPED | OUTPATIENT
Start: 2022-08-25 | End: 2023-02-13

## 2022-08-25 NOTE — TELEPHONE ENCOUNTER
----- Message from Scottie Borges MD sent at 8/24/2022  8:51 PM CDT -----  Please call pt    On CMP his kidney function shows GFR down from 80s to 60s range. Recommend pt increase his water intake. He is close to being to CKD stage 3 range.     Hga1c has improved from 6.50 to 5.50 and continue good work watching his sugars    On lipid panel  LDL cholesterol continues to be high. Recommend heart healthy diet. If pt is taking crestor 20 mg pO qhs recommend he go up to 40 mg PO qhs. Give 30 pills and 3 refills. Will need to consider starting on repatha injections every 2 weeks to lower his cholesterol. Will discuss on next visit     Thanks

## 2022-08-26 RX ORDER — PANTOPRAZOLE SODIUM 40 MG/1
40 TABLET, DELAYED RELEASE ORAL DAILY
Qty: 30 TABLET | Refills: 3 | Status: SHIPPED | OUTPATIENT
Start: 2022-08-26 | End: 2023-01-18

## 2022-09-06 RX ORDER — LISINOPRIL 20 MG/1
20 TABLET ORAL DAILY
Qty: 30 TABLET | Refills: 0 | Status: SHIPPED | OUTPATIENT
Start: 2022-09-06

## 2022-09-13 DIAGNOSIS — F90.0 ATTENTION DEFICIT HYPERACTIVITY DISORDER (ADHD), PREDOMINANTLY INATTENTIVE TYPE: ICD-10-CM

## 2022-09-14 RX ORDER — DEXTROAMPHETAMINE SACCHARATE, AMPHETAMINE ASPARTATE MONOHYDRATE, DEXTROAMPHETAMINE SULFATE AND AMPHETAMINE SULFATE 6.25; 6.25; 6.25; 6.25 MG/1; MG/1; MG/1; MG/1
25 CAPSULE, EXTENDED RELEASE ORAL EVERY MORNING
Qty: 30 CAPSULE | Refills: 0 | Status: SHIPPED | OUTPATIENT
Start: 2022-09-14 | End: 2022-10-14 | Stop reason: SDUPTHER

## 2022-09-14 NOTE — TELEPHONE ENCOUNTER
Rx Refill Note  Requested Prescriptions     Pending Prescriptions Disp Refills   • amphetamine-dextroamphetamine XR (Adderall XR) 25 MG 24 hr capsule 30 capsule 0     Sig: Take 1 capsule by mouth Every Morning Take 1 tablet by mouth daily      Last office visit with prescribing clinician: 8/2/2022      Next office visit with prescribing clinician: 10/14/2022   {TIP  Encounters:    PT CANCELLED APPOINTMENT FOR THIS MONTH DUE TO WIFE HAVING COVID.    {TIP  Please add Last Relevant Lab Date if appropriate  {TIP  Is Refill Pharmacy correct? YES  Leonidas Rosado LPN  09/14/22, 08:47 CDT

## 2022-10-07 ENCOUNTER — TRANSCRIBE ORDERS (OUTPATIENT)
Dept: LAB | Facility: HOSPITAL | Age: 52
End: 2022-10-07

## 2022-10-07 ENCOUNTER — LAB (OUTPATIENT)
Dept: LAB | Facility: HOSPITAL | Age: 52
End: 2022-10-07

## 2022-10-07 DIAGNOSIS — N40.1 BPH WITH URINARY OBSTRUCTION: Primary | ICD-10-CM

## 2022-10-07 DIAGNOSIS — N13.8 BPH WITH URINARY OBSTRUCTION: ICD-10-CM

## 2022-10-07 DIAGNOSIS — N40.1 BPH WITH URINARY OBSTRUCTION: ICD-10-CM

## 2022-10-07 DIAGNOSIS — N13.8 BPH WITH URINARY OBSTRUCTION: Primary | ICD-10-CM

## 2022-10-07 PROCEDURE — 85027 COMPLETE CBC AUTOMATED: CPT

## 2022-10-07 PROCEDURE — 84403 ASSAY OF TOTAL TESTOSTERONE: CPT

## 2022-10-08 LAB
DEPRECATED RDW RBC AUTO: 42.2 FL (ref 37–54)
ERYTHROCYTE [DISTWIDTH] IN BLOOD BY AUTOMATED COUNT: 12.8 % (ref 12.3–15.4)
HCT VFR BLD AUTO: 47.5 % (ref 37.5–51)
HGB BLD-MCNC: 16.4 G/DL (ref 13–17.7)
MCH RBC QN AUTO: 30.9 PG (ref 26.6–33)
MCHC RBC AUTO-ENTMCNC: 34.5 G/DL (ref 31.5–35.7)
MCV RBC AUTO: 89.6 FL (ref 79–97)
PLATELET # BLD AUTO: 245 10*3/MM3 (ref 140–450)
PMV BLD AUTO: 9.8 FL (ref 6–12)
RBC # BLD AUTO: 5.3 10*6/MM3 (ref 4.14–5.8)
TESTOST SERPL-MCNC: 812 NG/DL (ref 193–740)
WBC NRBC COR # BLD: 6.48 10*3/MM3 (ref 3.4–10.8)

## 2022-10-14 ENCOUNTER — OFFICE VISIT (OUTPATIENT)
Dept: FAMILY MEDICINE CLINIC | Facility: CLINIC | Age: 52
End: 2022-10-14

## 2022-10-14 VITALS
TEMPERATURE: 98.3 F | OXYGEN SATURATION: 98 % | WEIGHT: 270 LBS | SYSTOLIC BLOOD PRESSURE: 110 MMHG | DIASTOLIC BLOOD PRESSURE: 70 MMHG | HEART RATE: 92 BPM | BODY MASS INDEX: 37.8 KG/M2 | HEIGHT: 71 IN

## 2022-10-14 DIAGNOSIS — I10 ESSENTIAL HYPERTENSION: ICD-10-CM

## 2022-10-14 DIAGNOSIS — N18.2 STAGE 2 CHRONIC KIDNEY DISEASE: ICD-10-CM

## 2022-10-14 DIAGNOSIS — E66.01 CLASS 2 SEVERE OBESITY WITH SERIOUS COMORBIDITY AND BODY MASS INDEX (BMI) OF 37.0 TO 37.9 IN ADULT, UNSPECIFIED OBESITY TYPE: ICD-10-CM

## 2022-10-14 DIAGNOSIS — F90.0 ATTENTION DEFICIT HYPERACTIVITY DISORDER (ADHD), PREDOMINANTLY INATTENTIVE TYPE: Primary | ICD-10-CM

## 2022-10-14 DIAGNOSIS — K21.9 GASTROESOPHAGEAL REFLUX DISEASE, UNSPECIFIED WHETHER ESOPHAGITIS PRESENT: ICD-10-CM

## 2022-10-14 DIAGNOSIS — E11.8 CONTROLLED TYPE 2 DIABETES MELLITUS WITH COMPLICATION, WITHOUT LONG-TERM CURRENT USE OF INSULIN: ICD-10-CM

## 2022-10-14 DIAGNOSIS — E78.2 MIXED HYPERLIPIDEMIA: ICD-10-CM

## 2022-10-14 PROCEDURE — 99214 OFFICE O/P EST MOD 30 MIN: CPT | Performed by: FAMILY MEDICINE

## 2022-10-14 RX ORDER — SYRINGE WITH NEEDLE, 1 ML 25GX5/8"
SYRINGE, EMPTY DISPOSABLE MISCELLANEOUS
COMMUNITY
Start: 2022-08-24

## 2022-10-14 RX ORDER — TESTOSTERONE CYPIONATE 200 MG/ML
INJECTION, SOLUTION INTRAMUSCULAR
COMMUNITY
Start: 2022-10-11

## 2022-10-14 RX ORDER — DEXTROAMPHETAMINE SACCHARATE, AMPHETAMINE ASPARTATE MONOHYDRATE, DEXTROAMPHETAMINE SULFATE AND AMPHETAMINE SULFATE 6.25; 6.25; 6.25; 6.25 MG/1; MG/1; MG/1; MG/1
25 CAPSULE, EXTENDED RELEASE ORAL EVERY MORNING
Qty: 30 CAPSULE | Refills: 0 | Status: SHIPPED | OUTPATIENT
Start: 2022-10-14 | End: 2022-11-17 | Stop reason: SDUPTHER

## 2022-10-14 NOTE — PATIENT INSTRUCTIONS
Get labwork fasting    Recommend repatha injections every 2 weeks to lower cholesterol inject in abdomne    Recheck in 4 weeks

## 2022-10-17 ENCOUNTER — TRANSCRIBE ORDERS (OUTPATIENT)
Dept: LAB | Facility: HOSPITAL | Age: 52
End: 2022-10-17

## 2022-10-17 ENCOUNTER — LAB (OUTPATIENT)
Dept: LAB | Facility: HOSPITAL | Age: 52
End: 2022-10-17

## 2022-10-17 DIAGNOSIS — E29.1 HYPOGONADISM MALE: ICD-10-CM

## 2022-10-17 DIAGNOSIS — N18.2 STAGE 2 CHRONIC KIDNEY DISEASE: ICD-10-CM

## 2022-10-17 DIAGNOSIS — E11.8 CONTROLLED TYPE 2 DIABETES MELLITUS WITH COMPLICATION, WITHOUT LONG-TERM CURRENT USE OF INSULIN: ICD-10-CM

## 2022-10-17 DIAGNOSIS — E29.1 HYPOGONADISM MALE: Primary | ICD-10-CM

## 2022-10-17 DIAGNOSIS — K21.9 GASTROESOPHAGEAL REFLUX DISEASE, UNSPECIFIED WHETHER ESOPHAGITIS PRESENT: ICD-10-CM

## 2022-10-17 DIAGNOSIS — I10 ESSENTIAL HYPERTENSION: ICD-10-CM

## 2022-10-17 DIAGNOSIS — E78.2 MIXED HYPERLIPIDEMIA: ICD-10-CM

## 2022-10-17 DIAGNOSIS — F90.0 ATTENTION DEFICIT HYPERACTIVITY DISORDER (ADHD), PREDOMINANTLY INATTENTIVE TYPE: ICD-10-CM

## 2022-10-17 LAB
25(OH)D3 SERPL-MCNC: 48.1 NG/ML (ref 30–100)
ALBUMIN SERPL-MCNC: 4.5 G/DL (ref 3.5–5.2)
ALBUMIN UR-MCNC: <1.2 MG/DL
ALBUMIN/GLOB SERPL: 1.6 G/DL
ALP SERPL-CCNC: 76 U/L (ref 39–117)
ALT SERPL W P-5'-P-CCNC: 23 U/L (ref 1–41)
AMPHET+METHAMPHET UR QL: POSITIVE
AMPHETAMINES UR QL: NEGATIVE
ANION GAP SERPL CALCULATED.3IONS-SCNC: 10 MMOL/L (ref 5–15)
AST SERPL-CCNC: 25 U/L (ref 1–40)
BARBITURATES UR QL SCN: NEGATIVE
BASOPHILS # BLD AUTO: 0.06 10*3/MM3 (ref 0–0.2)
BASOPHILS NFR BLD AUTO: 1 % (ref 0–1.5)
BENZODIAZ UR QL SCN: NEGATIVE
BILIRUB SERPL-MCNC: 0.7 MG/DL (ref 0–1.2)
BUN SERPL-MCNC: 13 MG/DL (ref 6–20)
BUN/CREAT SERPL: 12.4 (ref 7–25)
BUPRENORPHINE SERPL-MCNC: NEGATIVE NG/ML
CALCIUM SPEC-SCNC: 9.7 MG/DL (ref 8.6–10.5)
CANNABINOIDS SERPL QL: NEGATIVE
CHLORIDE SERPL-SCNC: 101 MMOL/L (ref 98–107)
CHOLEST SERPL-MCNC: 141 MG/DL (ref 0–200)
CO2 SERPL-SCNC: 27 MMOL/L (ref 22–29)
COCAINE UR QL: NEGATIVE
CREAT SERPL-MCNC: 1.05 MG/DL (ref 0.76–1.27)
CREAT UR-MCNC: 123.1 MG/DL
DEPRECATED RDW RBC AUTO: 44.4 FL (ref 37–54)
EGFRCR SERPLBLD CKD-EPI 2021: 84.4 ML/MIN/1.73
EOSINOPHIL # BLD AUTO: 0.18 10*3/MM3 (ref 0–0.4)
EOSINOPHIL NFR BLD AUTO: 2.9 % (ref 0.3–6.2)
ERYTHROCYTE [DISTWIDTH] IN BLOOD BY AUTOMATED COUNT: 13.1 % (ref 12.3–15.4)
GLOBULIN UR ELPH-MCNC: 2.8 GM/DL
GLUCOSE SERPL-MCNC: 103 MG/DL (ref 65–99)
HBA1C MFR BLD: 5.3 % (ref 4.8–5.6)
HCT VFR BLD AUTO: 51.8 % (ref 37.5–51)
HDLC SERPL-MCNC: 38 MG/DL (ref 40–60)
HGB BLD-MCNC: 17.5 G/DL (ref 13–17.7)
IMM GRANULOCYTES # BLD AUTO: 0.02 10*3/MM3 (ref 0–0.05)
IMM GRANULOCYTES NFR BLD AUTO: 0.3 % (ref 0–0.5)
LDLC SERPL CALC-MCNC: 88 MG/DL (ref 0–100)
LDLC/HDLC SERPL: 2.33 {RATIO}
LYMPHOCYTES # BLD AUTO: 2.25 10*3/MM3 (ref 0.7–3.1)
LYMPHOCYTES NFR BLD AUTO: 35.8 % (ref 19.6–45.3)
MCH RBC QN AUTO: 31.3 PG (ref 26.6–33)
MCHC RBC AUTO-ENTMCNC: 33.8 G/DL (ref 31.5–35.7)
MCV RBC AUTO: 92.7 FL (ref 79–97)
METHADONE UR QL SCN: NEGATIVE
MICROALBUMIN/CREAT UR: NORMAL MG/G{CREAT}
MONOCYTES # BLD AUTO: 0.57 10*3/MM3 (ref 0.1–0.9)
MONOCYTES NFR BLD AUTO: 9.1 % (ref 5–12)
NEUTROPHILS NFR BLD AUTO: 3.2 10*3/MM3 (ref 1.7–7)
NEUTROPHILS NFR BLD AUTO: 50.9 % (ref 42.7–76)
NRBC BLD AUTO-RTO: 0 /100 WBC (ref 0–0.2)
OPIATES UR QL: NEGATIVE
OXYCODONE UR QL SCN: NEGATIVE
PCP UR QL SCN: NEGATIVE
PLATELET # BLD AUTO: 294 10*3/MM3 (ref 140–450)
PMV BLD AUTO: 10.2 FL (ref 6–12)
POTASSIUM SERPL-SCNC: 5.3 MMOL/L (ref 3.5–5.2)
PROPOXYPH UR QL: NEGATIVE
PROT SERPL-MCNC: 7.3 G/DL (ref 6–8.5)
RBC # BLD AUTO: 5.59 10*6/MM3 (ref 4.14–5.8)
SODIUM SERPL-SCNC: 138 MMOL/L (ref 136–145)
TESTOST SERPL-MCNC: 284 NG/DL (ref 193–740)
TRICYCLICS UR QL SCN: NEGATIVE
TRIGL SERPL-MCNC: 73 MG/DL (ref 0–150)
VIT B12 BLD-MCNC: 522 PG/ML (ref 211–946)
VLDLC SERPL-MCNC: 15 MG/DL (ref 5–40)
WBC NRBC COR # BLD: 6.28 10*3/MM3 (ref 3.4–10.8)

## 2022-10-17 PROCEDURE — 80306 DRUG TEST PRSMV INSTRMNT: CPT

## 2022-10-17 PROCEDURE — 85025 COMPLETE CBC W/AUTO DIFF WBC: CPT

## 2022-10-17 PROCEDURE — 80061 LIPID PANEL: CPT

## 2022-10-17 PROCEDURE — 82306 VITAMIN D 25 HYDROXY: CPT

## 2022-10-17 PROCEDURE — 82570 ASSAY OF URINE CREATININE: CPT

## 2022-10-17 PROCEDURE — 83036 HEMOGLOBIN GLYCOSYLATED A1C: CPT

## 2022-10-17 PROCEDURE — 80053 COMPREHEN METABOLIC PANEL: CPT

## 2022-10-17 PROCEDURE — 82043 UR ALBUMIN QUANTITATIVE: CPT

## 2022-10-17 PROCEDURE — 84403 ASSAY OF TOTAL TESTOSTERONE: CPT

## 2022-10-17 PROCEDURE — 82607 VITAMIN B-12: CPT

## 2022-10-18 RX ORDER — METFORMIN HYDROCHLORIDE 500 MG/1
500 TABLET, EXTENDED RELEASE ORAL
Qty: 30 TABLET | Refills: 3 | Status: SHIPPED | OUTPATIENT
Start: 2022-10-18 | End: 2023-02-06

## 2022-11-07 ENCOUNTER — LAB (OUTPATIENT)
Dept: LAB | Facility: HOSPITAL | Age: 52
End: 2022-11-07

## 2022-11-07 ENCOUNTER — TRANSCRIBE ORDERS (OUTPATIENT)
Dept: LAB | Facility: HOSPITAL | Age: 52
End: 2022-11-07

## 2022-11-07 DIAGNOSIS — E29.1 MALE HYPOGONADISM: ICD-10-CM

## 2022-11-07 DIAGNOSIS — E29.1 MALE HYPOGONADISM: Primary | ICD-10-CM

## 2022-11-07 LAB — TESTOST SERPL-MCNC: 318 NG/DL (ref 193–740)

## 2022-11-07 PROCEDURE — 84403 ASSAY OF TOTAL TESTOSTERONE: CPT

## 2022-11-17 ENCOUNTER — OFFICE VISIT (OUTPATIENT)
Dept: FAMILY MEDICINE CLINIC | Facility: CLINIC | Age: 52
End: 2022-11-17

## 2022-11-17 VITALS
DIASTOLIC BLOOD PRESSURE: 62 MMHG | TEMPERATURE: 97.7 F | SYSTOLIC BLOOD PRESSURE: 110 MMHG | HEIGHT: 71 IN | HEART RATE: 82 BPM | OXYGEN SATURATION: 96 % | BODY MASS INDEX: 38.14 KG/M2 | WEIGHT: 272.4 LBS

## 2022-11-17 DIAGNOSIS — E78.2 MIXED HYPERLIPIDEMIA: ICD-10-CM

## 2022-11-17 DIAGNOSIS — G47.33 OSA ON CPAP: ICD-10-CM

## 2022-11-17 DIAGNOSIS — K76.0 FATTY LIVER: ICD-10-CM

## 2022-11-17 DIAGNOSIS — E55.9 VITAMIN D DEFICIENCY: ICD-10-CM

## 2022-11-17 DIAGNOSIS — E11.8 CONTROLLED TYPE 2 DIABETES MELLITUS WITH COMPLICATION, WITHOUT LONG-TERM CURRENT USE OF INSULIN: ICD-10-CM

## 2022-11-17 DIAGNOSIS — Z99.89 OSA ON CPAP: ICD-10-CM

## 2022-11-17 DIAGNOSIS — F90.0 ATTENTION DEFICIT HYPERACTIVITY DISORDER (ADHD), PREDOMINANTLY INATTENTIVE TYPE: ICD-10-CM

## 2022-11-17 DIAGNOSIS — E66.01 CLASS 2 SEVERE OBESITY WITH SERIOUS COMORBIDITY AND BODY MASS INDEX (BMI) OF 37.0 TO 37.9 IN ADULT, UNSPECIFIED OBESITY TYPE: Primary | ICD-10-CM

## 2022-11-17 DIAGNOSIS — R79.89 LOW TESTOSTERONE: ICD-10-CM

## 2022-11-17 PROCEDURE — 99214 OFFICE O/P EST MOD 30 MIN: CPT | Performed by: FAMILY MEDICINE

## 2022-11-17 RX ORDER — DEXTROAMPHETAMINE SACCHARATE, AMPHETAMINE ASPARTATE MONOHYDRATE, DEXTROAMPHETAMINE SULFATE AND AMPHETAMINE SULFATE 6.25; 6.25; 6.25; 6.25 MG/1; MG/1; MG/1; MG/1
25 CAPSULE, EXTENDED RELEASE ORAL EVERY MORNING
Qty: 30 CAPSULE | Refills: 0 | Status: SHIPPED | OUTPATIENT
Start: 2022-11-17 | End: 2023-01-10 | Stop reason: SDUPTHER

## 2022-11-28 NOTE — PROGRESS NOTES
Subjective:  Randall Hernandez is a 52 y.o. male who presents for       Patient Active Problem List   Diagnosis   • Hyperlipidemia   • Inattention   • Non morbid obesity   • Prediabetes   • Attention deficit hyperactivity disorder (ADHD), combined type   • Need for vaccination   • Gastroesophageal reflux disease   • Encounter for drug screening   • Tingling in extremities   • Elevated BP without diagnosis of hypertension   • Allergic rhinitis   • Elevated blood pressure reading   • Pruritic rash   • Numbness in both hands   • Bilateral elbow joint pain   • Pain in both wrists   • Ulnar neuropathy of left upper extremity   • General medical examination   • Bilateral carpal tunnel syndrome   • History of prediabetes   • Numbness and tingling in both hands   • Essential hypertension   • Cerumen debris on tympanic membrane of both ears   • Carpal tunnel syndrome   • S/P carpal tunnel release   • Encounter for screening for malignant neoplasm of colon   • FH: colon cancer   • Acute recurrent sinusitis   • Attention deficit hyperactivity disorder (ADHD), predominantly inattentive type   • Class 2 obesity with body mass index (BMI) of 35.0 to 35.9 in adult   • Annual physical exam   • Arthritis of back   • Scoliosis   • Hyponatremia   • Hypochloremia   • Stage 2 chronic kidney disease   • Epigastric pain   • Bilious vomiting with nausea   • Fatty liver   • Dysphagia   • Nausea and vomiting   • Gastritis without bleeding   • Class 2 obesity with body mass index (BMI) of 38.0 to 38.9 in adult   • Mixed hyperlipidemia   • Dizziness   • Hypotension   • Class 2 obesity with body mass index (BMI) of 39.0 to 39.9 in adult   • Normocytic anemia   • Vitamin D deficiency   • Anxiety   • Class 2 severe obesity with serious comorbidity and body mass index (BMI) of 39.0 to 39.9 in adult (HCC)   • Palpitations   • Tachycardia   • Erectile dysfunction   • Morbid (severe) obesity due to excess calories (HCC)   • Right shoulder pain   •  "Bilateral hip pain   • PAULINA on CPAP   • Low testosterone   • Controlled type 2 diabetes mellitus with complication, without long-term current use of insulin ([facility])   • Class 2 severe obesity with serious comorbidity and body mass index (BMI) of 37.0 to 37.9 in adult ([facility])           Current Outpatient Medications:   •  amphetamine-dextroamphetamine XR (Adderall XR) 25 MG 24 hr capsule, Take 1 capsule by mouth Every Morning Take 1 tablet by mouth daily, Disp: 30 capsule, Rfl: 0  •  B-D 3CC LUER-STEFFEN SYR 01UT2-1/2 22G X 1-1/2\" 3 ML misc, , Disp: , Rfl:   •  celecoxib (CeleBREX) 50 MG capsule, TAKE 1 CAPSULE BY MOUTH TWICE DAILY, Disp: 60 capsule, Rfl: 1  •  Evolocumab (REPATHA) solution prefilled syringe injection, Inject 1 mL under the skin into the appropriate area as directed Every 14 (Fourteen) Days., Disp: 2 mL, Rfl: 3  •  fexofenadine (Allegra Allergy) 60 MG tablet, Take 1 tablet by mouth 2 (Two) Times a Day., Disp: 60 tablet, Rfl: 3  •  fluticasone (FLONASE) 50 MCG/ACT nasal spray, SHAKE LIQUID AND USE 2 SPRAYS IN EACH NOSTRIL DAILY, Disp: 16 g, Rfl: 3  •  lisinopril (PRINIVIL,ZESTRIL) 20 MG tablet, TAKE 1 TABLET BY MOUTH DAILY, Disp: 30 tablet, Rfl: 0  •  metFORMIN ER (GLUCOPHAGE-XR) 500 MG 24 hr tablet, TAKE 1 TABLET BY MOUTH DAILY WITH BREAKFAST, Disp: 30 tablet, Rfl: 3  •  metoprolol succinate XL (TOPROL-XL) 50 MG 24 hr tablet, TAKE 1 TABLET BY MOUTH DAILY, Disp: 30 tablet, Rfl: 1  •  montelukast (SINGULAIR) 10 MG tablet, TAKE 1 TABLET BY MOUTH EVERY NIGHT, Disp: 30 tablet, Rfl: 3  •  pantoprazole (PROTONIX) 40 MG EC tablet, TAKE 1 TABLET BY MOUTH DAILY, Disp: 30 tablet, Rfl: 3  •  rosuvastatin (Crestor) 40 MG tablet, Take 1 tablet by mouth Every Night., Disp: 30 tablet, Rfl: 3  •  tadalafil (Cialis) 10 MG tablet, Take 1 tablet by mouth Daily As Needed for Erectile Dysfunction., Disp: 30 tablet, Rfl: 3  •  Testosterone Cypionate (DEPOTESTOTERONE CYPIONATE) 200 MG/ML injection, , Disp: , Rfl:     HPI    Review " of Systems  Review of Systems    Patient Active Problem List   Diagnosis   • Hyperlipidemia   • Inattention   • Non morbid obesity   • Prediabetes   • Attention deficit hyperactivity disorder (ADHD), combined type   • Need for vaccination   • Gastroesophageal reflux disease   • Encounter for drug screening   • Tingling in extremities   • Elevated BP without diagnosis of hypertension   • Allergic rhinitis   • Elevated blood pressure reading   • Pruritic rash   • Numbness in both hands   • Bilateral elbow joint pain   • Pain in both wrists   • Ulnar neuropathy of left upper extremity   • General medical examination   • Bilateral carpal tunnel syndrome   • History of prediabetes   • Numbness and tingling in both hands   • Essential hypertension   • Cerumen debris on tympanic membrane of both ears   • Carpal tunnel syndrome   • S/P carpal tunnel release   • Encounter for screening for malignant neoplasm of colon   • FH: colon cancer   • Acute recurrent sinusitis   • Attention deficit hyperactivity disorder (ADHD), predominantly inattentive type   • Class 2 obesity with body mass index (BMI) of 35.0 to 35.9 in adult   • Annual physical exam   • Arthritis of back   • Scoliosis   • Hyponatremia   • Hypochloremia   • Stage 2 chronic kidney disease   • Epigastric pain   • Bilious vomiting with nausea   • Fatty liver   • Dysphagia   • Nausea and vomiting   • Gastritis without bleeding   • Class 2 obesity with body mass index (BMI) of 38.0 to 38.9 in adult   • Mixed hyperlipidemia   • Dizziness   • Hypotension   • Class 2 obesity with body mass index (BMI) of 39.0 to 39.9 in adult   • Normocytic anemia   • Vitamin D deficiency   • Anxiety   • Class 2 severe obesity with serious comorbidity and body mass index (BMI) of 39.0 to 39.9 in adult (HCC)   • Palpitations   • Tachycardia   • Erectile dysfunction   • Morbid (severe) obesity due to excess calories (HCC)   • Right shoulder pain   • Bilateral hip pain   • PAULINA on CPAP   •  Low testosterone   • Controlled type 2 diabetes mellitus with complication, without long-term current use of insulin (HCC)   • Class 2 severe obesity with serious comorbidity and body mass index (BMI) of 37.0 to 37.9 in adult (HCC)     Past Surgical History:   Procedure Laterality Date   • CARPAL TUNNEL RELEASE Right 2018    Procedure: CARPAL TUNNEL RELEASE;  Surgeon: Judd Mathias MD;  Location: [facility] OR;  Service: Orthopedics   • CARPAL TUNNEL RELEASE  2018    Left   • CARPAL TUNNEL RELEASE Left 2018    Procedure: CARPAL TUNNEL RELEASE;  Surgeon: Judd Mathias MD;  Location: [facility] OR;  Service: Orthopedics   • COLONOSCOPY N/A 2019    Procedure: COLONOSCOPY a friday please ;  Surgeon: Ruslan Bowers MD;  Location: [facility] ENDOSCOPY;  Service: Gastroenterology   • ENDOSCOPY     • ENDOSCOPY N/A 2020    Procedure: ESOPHAGOGASTRODUODENOSCOPY possible dilation ;  Surgeon: Ruslan Bowers MD;  Location: [facility] ENDOSCOPY;  Service: Gastroenterology;  Laterality: N/A;   • PYLOROMYOTOMY       Social History     Socioeconomic History   • Marital status: Significant Other   Tobacco Use   • Smoking status: Former     Packs/day: 1.00     Years: 6.00     Pack years: 6.00     Types: Cigarettes     Quit date:      Years since quittin.9   • Smokeless tobacco: Never   Vaping Use   • Vaping Use: Never used   Substance and Sexual Activity   • Alcohol use: Yes     Comment: occassional   • Drug use: No   • Sexual activity: Defer     Family History   Problem Relation Age of Onset   • Colon cancer Mother         onset age greater than 75y   • Diabetes Mother    • Breast cancer Sister    • Cancer Brother         bladder   • Diabetes Brother    • Hyperlipidemia Brother      Lab on 2022   Component Date Value Ref Range Status   • Testosterone, Total 2022 318.00  193.00 - 740.00 ng/dL Final   Lab on 10/17/2022   Component Date Value Ref Range Status   • WBC 10/17/2022  6.28  3.40 - 10.80 10*3/mm3 Final   • RBC 10/17/2022 5.59  4.14 - 5.80 10*6/mm3 Final   • Hemoglobin 10/17/2022 17.5  13.0 - 17.7 g/dL Final   • Hematocrit 10/17/2022 51.8 (H)  37.5 - 51.0 % Final   • MCV 10/17/2022 92.7  79.0 - 97.0 fL Final   • MCH 10/17/2022 31.3  26.6 - 33.0 pg Final   • MCHC 10/17/2022 33.8  31.5 - 35.7 g/dL Final   • RDW 10/17/2022 13.1  12.3 - 15.4 % Final   • RDW-SD 10/17/2022 44.4  37.0 - 54.0 fl Final   • MPV 10/17/2022 10.2  6.0 - 12.0 fL Final   • Platelets 10/17/2022 294  140 - 450 10*3/mm3 Final   • Neutrophil % 10/17/2022 50.9  42.7 - 76.0 % Final   • Lymphocyte % 10/17/2022 35.8  19.6 - 45.3 % Final   • Monocyte % 10/17/2022 9.1  5.0 - 12.0 % Final   • Eosinophil % 10/17/2022 2.9  0.3 - 6.2 % Final   • Basophil % 10/17/2022 1.0  0.0 - 1.5 % Final   • Immature Grans % 10/17/2022 0.3  0.0 - 0.5 % Final   • Neutrophils, Absolute 10/17/2022 3.20  1.70 - 7.00 10*3/mm3 Final   • Lymphocytes, Absolute 10/17/2022 2.25  0.70 - 3.10 10*3/mm3 Final   • Monocytes, Absolute 10/17/2022 0.57  0.10 - 0.90 10*3/mm3 Final   • Eosinophils, Absolute 10/17/2022 0.18  0.00 - 0.40 10*3/mm3 Final   • Basophils, Absolute 10/17/2022 0.06  0.00 - 0.20 10*3/mm3 Final   • Immature Grans, Absolute 10/17/2022 0.02  0.00 - 0.05 10*3/mm3 Final   • nRBC 10/17/2022 0.0  0.0 - 0.2 /100 WBC Final   • Glucose 10/17/2022 103 (H)  65 - 99 mg/dL Final   • BUN 10/17/2022 13  6 - 20 mg/dL Final   • Creatinine 10/17/2022 1.05  0.76 - 1.27 mg/dL Final   • Sodium 10/17/2022 138  136 - 145 mmol/L Final   • Potassium 10/17/2022 5.3 (H)  3.5 - 5.2 mmol/L Final   • Chloride 10/17/2022 101  98 - 107 mmol/L Final   • CO2 10/17/2022 27.0  22.0 - 29.0 mmol/L Final   • Calcium 10/17/2022 9.7  8.6 - 10.5 mg/dL Final   • Total Protein 10/17/2022 7.3  6.0 - 8.5 g/dL Final   • Albumin 10/17/2022 4.50  3.50 - 5.20 g/dL Final   • ALT (SGPT) 10/17/2022 23  1 - 41 U/L Final   • AST (SGOT) 10/17/2022 25  1 - 40 U/L Final   • Alkaline Phosphatase  10/17/2022 76  39 - 117 U/L Final   • Total Bilirubin 10/17/2022 0.7  0.0 - 1.2 mg/dL Final   • Globulin 10/17/2022 2.8  gm/dL Final   • A/G Ratio 10/17/2022 1.6  g/dL Final   • BUN/Creatinine Ratio 10/17/2022 12.4  7.0 - 25.0 Final   • Anion Gap 10/17/2022 10.0  5.0 - 15.0 mmol/L Final   • eGFR 10/17/2022 84.4  >60.0 mL/min/1.73 Final    National Kidney Foundation and American Society of Nephrology (ASN) Task Force recommended calculation based on the Chronic Kidney Disease Epidemiology Collaboration (CKD-EPI) equation refit without adjustment for race.   • Hemoglobin A1C 10/17/2022 5.30  4.80 - 5.60 % Final   • Total Cholesterol 10/17/2022 141  0 - 200 mg/dL Final   • Triglycerides 10/17/2022 73  0 - 150 mg/dL Final   • HDL Cholesterol 10/17/2022 38 (L)  40 - 60 mg/dL Final   • LDL Cholesterol  10/17/2022 88  0 - 100 mg/dL Final   • VLDL Cholesterol 10/17/2022 15  5 - 40 mg/dL Final   • LDL/HDL Ratio 10/17/2022 2.33   Final   • 25 Hydroxy, Vitamin D 10/17/2022 48.1  30.0 - 100.0 ng/ml Final   • Vitamin B-12 10/17/2022 522  211 - 946 pg/mL Final   • Microalbumin/Creatinine Ratio 10/17/2022    Final    Unable to calculate   • Creatinine, Urine 10/17/2022 123.1  mg/dL Final   • Microalbumin, Urine 10/17/2022 <1.2  mg/dL Final   • THC, Screen, Urine 10/17/2022 Negative  Negative Final   • Phencyclidine (PCP), Urine 10/17/2022 Negative  Negative Final   • Cocaine Screen, Urine 10/17/2022 Negative  Negative Final   • Methamphetamine, Ur 10/17/2022 Negative  Negative Final   • Opiate Screen 10/17/2022 Negative  Negative Final   • Amphetamine Screen, Urine 10/17/2022 Positive (A)  Negative Final   • Benzodiazepine Screen, Urine 10/17/2022 Negative  Negative Final   • Tricyclic Antidepressants Screen 10/17/2022 Negative  Negative Final   • Methadone Screen, Urine 10/17/2022 Negative  Negative Final   • Barbiturates Screen, Urine 10/17/2022 Negative  Negative Final   • Oxycodone Screen, Urine 10/17/2022 Negative  Negative  Final   • Propoxyphene Screen 10/17/2022 Negative  Negative Final   • Buprenorphine, Screen, Urine 10/17/2022 Negative  Negative Final   • Testosterone, Total 10/17/2022 284.00  193.00 - 740.00 ng/dL Final   Lab on 10/07/2022   Component Date Value Ref Range Status   • Testosterone, Total 10/07/2022 812.00 (H)  193.00 - 740.00 ng/dL Final   • WBC 10/07/2022 6.48  3.40 - 10.80 10*3/mm3 Final   • RBC 10/07/2022 5.30  4.14 - 5.80 10*6/mm3 Final   • Hemoglobin 10/07/2022 16.4  13.0 - 17.7 g/dL Final   • Hematocrit 10/07/2022 47.5  37.5 - 51.0 % Final   • MCV 10/07/2022 89.6  79.0 - 97.0 fL Final   • MCH 10/07/2022 30.9  26.6 - 33.0 pg Final   • MCHC 10/07/2022 34.5  31.5 - 35.7 g/dL Final   • RDW 10/07/2022 12.8  12.3 - 15.4 % Final   • RDW-SD 10/07/2022 42.2  37.0 - 54.0 fl Final   • MPV 10/07/2022 9.8  6.0 - 12.0 fL Final   • Platelets 10/07/2022 245  140 - 450 10*3/mm3 Final   Lab on 08/23/2022   Component Date Value Ref Range Status   • WBC 08/23/2022 7.07  3.40 - 10.80 10*3/mm3 Final   • RBC 08/23/2022 5.03  4.14 - 5.80 10*6/mm3 Final   • Hemoglobin 08/23/2022 15.4  13.0 - 17.7 g/dL Final   • Hematocrit 08/23/2022 45.3  37.5 - 51.0 % Final   • MCV 08/23/2022 90.1  79.0 - 97.0 fL Final   • MCH 08/23/2022 30.6  26.6 - 33.0 pg Final   • MCHC 08/23/2022 34.0  31.5 - 35.7 g/dL Final   • RDW 08/23/2022 12.4  12.3 - 15.4 % Final   • RDW-SD 08/23/2022 40.2  37.0 - 54.0 fl Final   • MPV 08/23/2022 10.1  6.0 - 12.0 fL Final   • Platelets 08/23/2022 322  140 - 450 10*3/mm3 Final   • Neutrophil % 08/23/2022 55.8  42.7 - 76.0 % Final   • Lymphocyte % 08/23/2022 34.1  19.6 - 45.3 % Final   • Monocyte % 08/23/2022 6.2  5.0 - 12.0 % Final   • Eosinophil % 08/23/2022 2.3  0.3 - 6.2 % Final   • Basophil % 08/23/2022 1.0  0.0 - 1.5 % Final   • Immature Grans % 08/23/2022 0.6 (H)  0.0 - 0.5 % Final   • Neutrophils, Absolute 08/23/2022 3.95  1.70 - 7.00 10*3/mm3 Final   • Lymphocytes, Absolute 08/23/2022 2.41  0.70 - 3.10 10*3/mm3  Final   • Monocytes, Absolute 08/23/2022 0.44  0.10 - 0.90 10*3/mm3 Final   • Eosinophils, Absolute 08/23/2022 0.16  0.00 - 0.40 10*3/mm3 Final   • Basophils, Absolute 08/23/2022 0.07  0.00 - 0.20 10*3/mm3 Final   • Immature Grans, Absolute 08/23/2022 0.04  0.00 - 0.05 10*3/mm3 Final   • nRBC 08/23/2022 0.0  0.0 - 0.2 /100 WBC Final   • Glucose 08/23/2022 93  65 - 99 mg/dL Final   • BUN 08/23/2022 16  6 - 20 mg/dL Final   • Creatinine 08/23/2022 1.24  0.76 - 1.27 mg/dL Final   • Sodium 08/23/2022 140  136 - 145 mmol/L Final   • Potassium 08/23/2022 5.1  3.5 - 5.2 mmol/L Final   • Chloride 08/23/2022 105  98 - 107 mmol/L Final   • CO2 08/23/2022 26.3  22.0 - 29.0 mmol/L Final   • Calcium 08/23/2022 9.8  8.6 - 10.5 mg/dL Final   • Total Protein 08/23/2022 8.1  6.0 - 8.5 g/dL Final   • Albumin 08/23/2022 4.50  3.50 - 5.20 g/dL Final   • ALT (SGPT) 08/23/2022 25  1 - 41 U/L Final   • AST (SGOT) 08/23/2022 22  1 - 40 U/L Final   • Alkaline Phosphatase 08/23/2022 90  39 - 117 U/L Final   • Total Bilirubin 08/23/2022 0.6  0.0 - 1.2 mg/dL Final   • Globulin 08/23/2022 3.6  gm/dL Final   • A/G Ratio 08/23/2022 1.3  g/dL Final   • BUN/Creatinine Ratio 08/23/2022 12.9  7.0 - 25.0 Final   • Anion Gap 08/23/2022 8.7  5.0 - 15.0 mmol/L Final   • eGFR 08/23/2022 69.5  >60.0 mL/min/1.73 Final    National Kidney Foundation and American Society of Nephrology (ASN) Task Force recommended calculation based on the Chronic Kidney Disease Epidemiology Collaboration (CKD-EPI) equation refit without adjustment for race.   • Hemoglobin A1C 08/23/2022 5.50  4.80 - 5.60 % Final   • Total Cholesterol 08/23/2022 262 (H)  0 - 200 mg/dL Final   • Triglycerides 08/23/2022 153 (H)  0 - 150 mg/dL Final   • HDL Cholesterol 08/23/2022 35 (L)  40 - 60 mg/dL Final   • LDL Cholesterol  08/23/2022 198 (H)  0 - 100 mg/dL Final   • VLDL Cholesterol 08/23/2022 29  5 - 40 mg/dL Final   • LDL/HDL Ratio 08/23/2022 5.61   Final   • 25 Hydroxy, Vitamin D  08/23/2022 52.8  30.0 - 100.0 ng/ml Final   • Vitamin B-12 08/23/2022 546  211 - 946 pg/mL Final   • Microalbumin/Creatinine Ratio 08/23/2022 6.3  mg/g Final   • Creatinine, Urine 08/23/2022 286.1  mg/dL Final   • Microalbumin, Urine 08/23/2022 1.8  mg/dL Final   • PSA 08/23/2022 0.869  0.000 - 4.000 ng/mL Final      XR Hips Bilateral With or Without Pelvis 2 View  Narrative: Procedure:  Bilateral right and left hips, pelvis        Indication:  Hip pain   .    Technique:  4 views right and left hips. Single frontal view  pelvis.   .    Prior relevant exam: Right hip April 1, 2016..      No evidence of acute bony, soft tissue, or joint abnormality is  noted. Bone mineralization is within normal limits. The  visualized joint spaces appear intact.  Normal right and left hips. Normal pelvis.  Impression: 1.  Normal right and left hips. Normal pelvis.       Electronically signed by:  Chay Solorzano MD  5/5/2022 2:21 PM CDT  Workstation: [id number]  XR Shoulder 2+ View Right  Narrative: Procedure: Right    shoulder    INDICATION: Right shoulder pain   .    Technique: 3    views.    Prior examination: None.    FINDINGS: Mild degenerative changes right acromioclavicular  joint. Normal glenohumeral joint. Right shoulder is otherwise  unremarkable.  Impression: Mild degenerative changes right acromioclavicular  joint as described above. Otherwise unremarkable right shoulder.    Electronically signed by:  Chay Solorzano MD  5/5/2022 2:18 PM [employer]  Workstation: [email or web address]    [email or web address]  Immunization History   Administered Date(s) Administered   • Influenza TIV (IM) 03/28/2017, 03/28/2017   • Tdap 05/23/2016, 02/17/2017, 09/20/2019       The following portions of the patient's history were reviewed and updated as appropriate: allergies, current medications, past family history, past medical history, past social history, past surgical history and problem list.        Physical Exam  Physical Exam    [unfilled]  No diagnosis found.                      This document has been electronically signed by Scottie Borges MD on November 28, 2022 09:43 CST

## 2022-11-28 NOTE — PROGRESS NOTES
Subjective:  Randall Hernandez is a 52 y.o. male who presents for       Patient Active Problem List   Diagnosis   • Hyperlipidemia   • Inattention   • Non morbid obesity   • Prediabetes   • Attention deficit hyperactivity disorder (ADHD), combined type   • Need for vaccination   • Gastroesophageal reflux disease   • Encounter for drug screening   • Tingling in extremities   • Elevated BP without diagnosis of hypertension   • Allergic rhinitis   • Elevated blood pressure reading   • Pruritic rash   • Numbness in both hands   • Bilateral elbow joint pain   • Pain in both wrists   • Ulnar neuropathy of left upper extremity   • General medical examination   • Bilateral carpal tunnel syndrome   • History of prediabetes   • Numbness and tingling in both hands   • Essential hypertension   • Cerumen debris on tympanic membrane of both ears   • Carpal tunnel syndrome   • S/P carpal tunnel release   • Encounter for screening for malignant neoplasm of colon   • FH: colon cancer   • Acute recurrent sinusitis   • Attention deficit hyperactivity disorder (ADHD), predominantly inattentive type   • Class 2 obesity with body mass index (BMI) of 35.0 to 35.9 in adult   • Annual physical exam   • Arthritis of back   • Scoliosis   • Hyponatremia   • Hypochloremia   • Stage 2 chronic kidney disease   • Epigastric pain   • Bilious vomiting with nausea   • Fatty liver   • Dysphagia   • Nausea and vomiting   • Gastritis without bleeding   • Class 2 obesity with body mass index (BMI) of 38.0 to 38.9 in adult   • Mixed hyperlipidemia   • Dizziness   • Hypotension   • Class 2 obesity with body mass index (BMI) of 39.0 to 39.9 in adult   • Normocytic anemia   • Vitamin D deficiency   • Anxiety   • Class 2 severe obesity with serious comorbidity and body mass index (BMI) of 39.0 to 39.9 in adult (HCC)   • Palpitations   • Tachycardia   • Erectile dysfunction   • Morbid (severe) obesity due to excess calories (HCC)   • Right shoulder pain   •  "Bilateral hip pain   • PAULINA on CPAP   • Low testosterone   • Controlled type 2 diabetes mellitus with complication, without long-term current use of insulin ([facility])   • Class 2 severe obesity with serious comorbidity and body mass index (BMI) of 37.0 to 37.9 in adult ([facility])           Current Outpatient Medications:   •  amphetamine-dextroamphetamine XR (Adderall XR) 25 MG 24 hr capsule, Take 1 capsule by mouth Every Morning Take 1 tablet by mouth daily, Disp: 30 capsule, Rfl: 0  •  B-D 3CC LUER-STEFFEN SYR 40JN6-0/2 22G X 1-1/2\" 3 ML misc, , Disp: , Rfl:   •  celecoxib (CeleBREX) 50 MG capsule, TAKE 1 CAPSULE BY MOUTH TWICE DAILY, Disp: 60 capsule, Rfl: 1  •  Evolocumab (REPATHA) solution prefilled syringe injection, Inject 1 mL under the skin into the appropriate area as directed Every 14 (Fourteen) Days., Disp: 2 mL, Rfl: 3  •  fexofenadine (Allegra Allergy) 60 MG tablet, Take 1 tablet by mouth 2 (Two) Times a Day., Disp: 60 tablet, Rfl: 3  •  fluticasone (FLONASE) 50 MCG/ACT nasal spray, SHAKE LIQUID AND USE 2 SPRAYS IN EACH NOSTRIL DAILY, Disp: 16 g, Rfl: 3  •  lisinopril (PRINIVIL,ZESTRIL) 20 MG tablet, TAKE 1 TABLET BY MOUTH DAILY, Disp: 30 tablet, Rfl: 0  •  metFORMIN ER (GLUCOPHAGE-XR) 500 MG 24 hr tablet, TAKE 1 TABLET BY MOUTH DAILY WITH BREAKFAST, Disp: 30 tablet, Rfl: 3  •  metoprolol succinate XL (TOPROL-XL) 50 MG 24 hr tablet, TAKE 1 TABLET BY MOUTH DAILY, Disp: 30 tablet, Rfl: 1  •  montelukast (SINGULAIR) 10 MG tablet, TAKE 1 TABLET BY MOUTH EVERY NIGHT, Disp: 30 tablet, Rfl: 3  •  pantoprazole (PROTONIX) 40 MG EC tablet, TAKE 1 TABLET BY MOUTH DAILY, Disp: 30 tablet, Rfl: 3  •  rosuvastatin (Crestor) 40 MG tablet, Take 1 tablet by mouth Every Night., Disp: 30 tablet, Rfl: 3  •  tadalafil (Cialis) 10 MG tablet, Take 1 tablet by mouth Daily As Needed for Erectile Dysfunction., Disp: 30 tablet, Rfl: 3  •  Testosterone Cypionate (DEPOTESTOTERONE CYPIONATE) 200 MG/ML injection, , Disp: , Rfl:     HPI    Review " of Systems  Review of Systems    Patient Active Problem List   Diagnosis   • Hyperlipidemia   • Inattention   • Non morbid obesity   • Prediabetes   • Attention deficit hyperactivity disorder (ADHD), combined type   • Need for vaccination   • Gastroesophageal reflux disease   • Encounter for drug screening   • Tingling in extremities   • Elevated BP without diagnosis of hypertension   • Allergic rhinitis   • Elevated blood pressure reading   • Pruritic rash   • Numbness in both hands   • Bilateral elbow joint pain   • Pain in both wrists   • Ulnar neuropathy of left upper extremity   • General medical examination   • Bilateral carpal tunnel syndrome   • History of prediabetes   • Numbness and tingling in both hands   • Essential hypertension   • Cerumen debris on tympanic membrane of both ears   • Carpal tunnel syndrome   • S/P carpal tunnel release   • Encounter for screening for malignant neoplasm of colon   • FH: colon cancer   • Acute recurrent sinusitis   • Attention deficit hyperactivity disorder (ADHD), predominantly inattentive type   • Class 2 obesity with body mass index (BMI) of 35.0 to 35.9 in adult   • Annual physical exam   • Arthritis of back   • Scoliosis   • Hyponatremia   • Hypochloremia   • Stage 2 chronic kidney disease   • Epigastric pain   • Bilious vomiting with nausea   • Fatty liver   • Dysphagia   • Nausea and vomiting   • Gastritis without bleeding   • Class 2 obesity with body mass index (BMI) of 38.0 to 38.9 in adult   • Mixed hyperlipidemia   • Dizziness   • Hypotension   • Class 2 obesity with body mass index (BMI) of 39.0 to 39.9 in adult   • Normocytic anemia   • Vitamin D deficiency   • Anxiety   • Class 2 severe obesity with serious comorbidity and body mass index (BMI) of 39.0 to 39.9 in adult (HCC)   • Palpitations   • Tachycardia   • Erectile dysfunction   • Morbid (severe) obesity due to excess calories (HCC)   • Right shoulder pain   • Bilateral hip pain   • PAULINA on CPAP   •  Low testosterone   • Controlled type 2 diabetes mellitus with complication, without long-term current use of insulin (HCC)   • Class 2 severe obesity with serious comorbidity and body mass index (BMI) of 37.0 to 37.9 in adult (HCC)     Past Surgical History:   Procedure Laterality Date   • CARPAL TUNNEL RELEASE Right 2018    Procedure: CARPAL TUNNEL RELEASE;  Surgeon: Judd Mathias MD;  Location: [facility] OR;  Service: Orthopedics   • CARPAL TUNNEL RELEASE  2018    Left   • CARPAL TUNNEL RELEASE Left 2018    Procedure: CARPAL TUNNEL RELEASE;  Surgeon: Judd Mathias MD;  Location: [facility] OR;  Service: Orthopedics   • COLONOSCOPY N/A 2019    Procedure: COLONOSCOPY a friday please ;  Surgeon: Ruslan Bowers MD;  Location: [facility] ENDOSCOPY;  Service: Gastroenterology   • ENDOSCOPY     • ENDOSCOPY N/A 2020    Procedure: ESOPHAGOGASTRODUODENOSCOPY possible dilation ;  Surgeon: Ruslan Bowers MD;  Location: [facility] ENDOSCOPY;  Service: Gastroenterology;  Laterality: N/A;   • PYLOROMYOTOMY       Social History     Socioeconomic History   • Marital status: Significant Other   Tobacco Use   • Smoking status: Former     Packs/day: 1.00     Years: 6.00     Pack years: 6.00     Types: Cigarettes     Quit date:      Years since quittin.9   • Smokeless tobacco: Never   Vaping Use   • Vaping Use: Never used   Substance and Sexual Activity   • Alcohol use: Yes     Comment: occassional   • Drug use: No   • Sexual activity: Defer     Family History   Problem Relation Age of Onset   • Colon cancer Mother         onset age greater than 75y   • Diabetes Mother    • Breast cancer Sister    • Cancer Brother         bladder   • Diabetes Brother    • Hyperlipidemia Brother      Lab on 2022   Component Date Value Ref Range Status   • Testosterone, Total 2022 318.00  193.00 - 740.00 ng/dL Final   Lab on 10/17/2022   Component Date Value Ref Range Status   • WBC 10/17/2022  6.28  3.40 - 10.80 10*3/mm3 Final   • RBC 10/17/2022 5.59  4.14 - 5.80 10*6/mm3 Final   • Hemoglobin 10/17/2022 17.5  13.0 - 17.7 g/dL Final   • Hematocrit 10/17/2022 51.8 (H)  37.5 - 51.0 % Final   • MCV 10/17/2022 92.7  79.0 - 97.0 fL Final   • MCH 10/17/2022 31.3  26.6 - 33.0 pg Final   • MCHC 10/17/2022 33.8  31.5 - 35.7 g/dL Final   • RDW 10/17/2022 13.1  12.3 - 15.4 % Final   • RDW-SD 10/17/2022 44.4  37.0 - 54.0 fl Final   • MPV 10/17/2022 10.2  6.0 - 12.0 fL Final   • Platelets 10/17/2022 294  140 - 450 10*3/mm3 Final   • Neutrophil % 10/17/2022 50.9  42.7 - 76.0 % Final   • Lymphocyte % 10/17/2022 35.8  19.6 - 45.3 % Final   • Monocyte % 10/17/2022 9.1  5.0 - 12.0 % Final   • Eosinophil % 10/17/2022 2.9  0.3 - 6.2 % Final   • Basophil % 10/17/2022 1.0  0.0 - 1.5 % Final   • Immature Grans % 10/17/2022 0.3  0.0 - 0.5 % Final   • Neutrophils, Absolute 10/17/2022 3.20  1.70 - 7.00 10*3/mm3 Final   • Lymphocytes, Absolute 10/17/2022 2.25  0.70 - 3.10 10*3/mm3 Final   • Monocytes, Absolute 10/17/2022 0.57  0.10 - 0.90 10*3/mm3 Final   • Eosinophils, Absolute 10/17/2022 0.18  0.00 - 0.40 10*3/mm3 Final   • Basophils, Absolute 10/17/2022 0.06  0.00 - 0.20 10*3/mm3 Final   • Immature Grans, Absolute 10/17/2022 0.02  0.00 - 0.05 10*3/mm3 Final   • nRBC 10/17/2022 0.0  0.0 - 0.2 /100 WBC Final   • Glucose 10/17/2022 103 (H)  65 - 99 mg/dL Final   • BUN 10/17/2022 13  6 - 20 mg/dL Final   • Creatinine 10/17/2022 1.05  0.76 - 1.27 mg/dL Final   • Sodium 10/17/2022 138  136 - 145 mmol/L Final   • Potassium 10/17/2022 5.3 (H)  3.5 - 5.2 mmol/L Final   • Chloride 10/17/2022 101  98 - 107 mmol/L Final   • CO2 10/17/2022 27.0  22.0 - 29.0 mmol/L Final   • Calcium 10/17/2022 9.7  8.6 - 10.5 mg/dL Final   • Total Protein 10/17/2022 7.3  6.0 - 8.5 g/dL Final   • Albumin 10/17/2022 4.50  3.50 - 5.20 g/dL Final   • ALT (SGPT) 10/17/2022 23  1 - 41 U/L Final   • AST (SGOT) 10/17/2022 25  1 - 40 U/L Final   • Alkaline Phosphatase  10/17/2022 76  39 - 117 U/L Final   • Total Bilirubin 10/17/2022 0.7  0.0 - 1.2 mg/dL Final   • Globulin 10/17/2022 2.8  gm/dL Final   • A/G Ratio 10/17/2022 1.6  g/dL Final   • BUN/Creatinine Ratio 10/17/2022 12.4  7.0 - 25.0 Final   • Anion Gap 10/17/2022 10.0  5.0 - 15.0 mmol/L Final   • eGFR 10/17/2022 84.4  >60.0 mL/min/1.73 Final    National Kidney Foundation and American Society of Nephrology (ASN) Task Force recommended calculation based on the Chronic Kidney Disease Epidemiology Collaboration (CKD-EPI) equation refit without adjustment for race.   • Hemoglobin A1C 10/17/2022 5.30  4.80 - 5.60 % Final   • Total Cholesterol 10/17/2022 141  0 - 200 mg/dL Final   • Triglycerides 10/17/2022 73  0 - 150 mg/dL Final   • HDL Cholesterol 10/17/2022 38 (L)  40 - 60 mg/dL Final   • LDL Cholesterol  10/17/2022 88  0 - 100 mg/dL Final   • VLDL Cholesterol 10/17/2022 15  5 - 40 mg/dL Final   • LDL/HDL Ratio 10/17/2022 2.33   Final   • 25 Hydroxy, Vitamin D 10/17/2022 48.1  30.0 - 100.0 ng/ml Final   • Vitamin B-12 10/17/2022 522  211 - 946 pg/mL Final   • Microalbumin/Creatinine Ratio 10/17/2022    Final    Unable to calculate   • Creatinine, Urine 10/17/2022 123.1  mg/dL Final   • Microalbumin, Urine 10/17/2022 <1.2  mg/dL Final   • THC, Screen, Urine 10/17/2022 Negative  Negative Final   • Phencyclidine (PCP), Urine 10/17/2022 Negative  Negative Final   • Cocaine Screen, Urine 10/17/2022 Negative  Negative Final   • Methamphetamine, Ur 10/17/2022 Negative  Negative Final   • Opiate Screen 10/17/2022 Negative  Negative Final   • Amphetamine Screen, Urine 10/17/2022 Positive (A)  Negative Final   • Benzodiazepine Screen, Urine 10/17/2022 Negative  Negative Final   • Tricyclic Antidepressants Screen 10/17/2022 Negative  Negative Final   • Methadone Screen, Urine 10/17/2022 Negative  Negative Final   • Barbiturates Screen, Urine 10/17/2022 Negative  Negative Final   • Oxycodone Screen, Urine 10/17/2022 Negative  Negative  Final   • Propoxyphene Screen 10/17/2022 Negative  Negative Final   • Buprenorphine, Screen, Urine 10/17/2022 Negative  Negative Final   • Testosterone, Total 10/17/2022 284.00  193.00 - 740.00 ng/dL Final   Lab on 10/07/2022   Component Date Value Ref Range Status   • Testosterone, Total 10/07/2022 812.00 (H)  193.00 - 740.00 ng/dL Final   • WBC 10/07/2022 6.48  3.40 - 10.80 10*3/mm3 Final   • RBC 10/07/2022 5.30  4.14 - 5.80 10*6/mm3 Final   • Hemoglobin 10/07/2022 16.4  13.0 - 17.7 g/dL Final   • Hematocrit 10/07/2022 47.5  37.5 - 51.0 % Final   • MCV 10/07/2022 89.6  79.0 - 97.0 fL Final   • MCH 10/07/2022 30.9  26.6 - 33.0 pg Final   • MCHC 10/07/2022 34.5  31.5 - 35.7 g/dL Final   • RDW 10/07/2022 12.8  12.3 - 15.4 % Final   • RDW-SD 10/07/2022 42.2  37.0 - 54.0 fl Final   • MPV 10/07/2022 9.8  6.0 - 12.0 fL Final   • Platelets 10/07/2022 245  140 - 450 10*3/mm3 Final   Lab on 08/23/2022   Component Date Value Ref Range Status   • WBC 08/23/2022 7.07  3.40 - 10.80 10*3/mm3 Final   • RBC 08/23/2022 5.03  4.14 - 5.80 10*6/mm3 Final   • Hemoglobin 08/23/2022 15.4  13.0 - 17.7 g/dL Final   • Hematocrit 08/23/2022 45.3  37.5 - 51.0 % Final   • MCV 08/23/2022 90.1  79.0 - 97.0 fL Final   • MCH 08/23/2022 30.6  26.6 - 33.0 pg Final   • MCHC 08/23/2022 34.0  31.5 - 35.7 g/dL Final   • RDW 08/23/2022 12.4  12.3 - 15.4 % Final   • RDW-SD 08/23/2022 40.2  37.0 - 54.0 fl Final   • MPV 08/23/2022 10.1  6.0 - 12.0 fL Final   • Platelets 08/23/2022 322  140 - 450 10*3/mm3 Final   • Neutrophil % 08/23/2022 55.8  42.7 - 76.0 % Final   • Lymphocyte % 08/23/2022 34.1  19.6 - 45.3 % Final   • Monocyte % 08/23/2022 6.2  5.0 - 12.0 % Final   • Eosinophil % 08/23/2022 2.3  0.3 - 6.2 % Final   • Basophil % 08/23/2022 1.0  0.0 - 1.5 % Final   • Immature Grans % 08/23/2022 0.6 (H)  0.0 - 0.5 % Final   • Neutrophils, Absolute 08/23/2022 3.95  1.70 - 7.00 10*3/mm3 Final   • Lymphocytes, Absolute 08/23/2022 2.41  0.70 - 3.10 10*3/mm3  Final   • Monocytes, Absolute 08/23/2022 0.44  0.10 - 0.90 10*3/mm3 Final   • Eosinophils, Absolute 08/23/2022 0.16  0.00 - 0.40 10*3/mm3 Final   • Basophils, Absolute 08/23/2022 0.07  0.00 - 0.20 10*3/mm3 Final   • Immature Grans, Absolute 08/23/2022 0.04  0.00 - 0.05 10*3/mm3 Final   • nRBC 08/23/2022 0.0  0.0 - 0.2 /100 WBC Final   • Glucose 08/23/2022 93  65 - 99 mg/dL Final   • BUN 08/23/2022 16  6 - 20 mg/dL Final   • Creatinine 08/23/2022 1.24  0.76 - 1.27 mg/dL Final   • Sodium 08/23/2022 140  136 - 145 mmol/L Final   • Potassium 08/23/2022 5.1  3.5 - 5.2 mmol/L Final   • Chloride 08/23/2022 105  98 - 107 mmol/L Final   • CO2 08/23/2022 26.3  22.0 - 29.0 mmol/L Final   • Calcium 08/23/2022 9.8  8.6 - 10.5 mg/dL Final   • Total Protein 08/23/2022 8.1  6.0 - 8.5 g/dL Final   • Albumin 08/23/2022 4.50  3.50 - 5.20 g/dL Final   • ALT (SGPT) 08/23/2022 25  1 - 41 U/L Final   • AST (SGOT) 08/23/2022 22  1 - 40 U/L Final   • Alkaline Phosphatase 08/23/2022 90  39 - 117 U/L Final   • Total Bilirubin 08/23/2022 0.6  0.0 - 1.2 mg/dL Final   • Globulin 08/23/2022 3.6  gm/dL Final   • A/G Ratio 08/23/2022 1.3  g/dL Final   • BUN/Creatinine Ratio 08/23/2022 12.9  7.0 - 25.0 Final   • Anion Gap 08/23/2022 8.7  5.0 - 15.0 mmol/L Final   • eGFR 08/23/2022 69.5  >60.0 mL/min/1.73 Final    National Kidney Foundation and American Society of Nephrology (ASN) Task Force recommended calculation based on the Chronic Kidney Disease Epidemiology Collaboration (CKD-EPI) equation refit without adjustment for race.   • Hemoglobin A1C 08/23/2022 5.50  4.80 - 5.60 % Final   • Total Cholesterol 08/23/2022 262 (H)  0 - 200 mg/dL Final   • Triglycerides 08/23/2022 153 (H)  0 - 150 mg/dL Final   • HDL Cholesterol 08/23/2022 35 (L)  40 - 60 mg/dL Final   • LDL Cholesterol  08/23/2022 198 (H)  0 - 100 mg/dL Final   • VLDL Cholesterol 08/23/2022 29  5 - 40 mg/dL Final   • LDL/HDL Ratio 08/23/2022 5.61   Final   • 25 Hydroxy, Vitamin D  08/23/2022 52.8  30.0 - 100.0 ng/ml Final   • Vitamin B-12 08/23/2022 546  211 - 946 pg/mL Final   • Microalbumin/Creatinine Ratio 08/23/2022 6.3  mg/g Final   • Creatinine, Urine 08/23/2022 286.1  mg/dL Final   • Microalbumin, Urine 08/23/2022 1.8  mg/dL Final   • PSA 08/23/2022 0.869  0.000 - 4.000 ng/mL Final      XR Hips Bilateral With or Without Pelvis 2 View  Narrative: Procedure:  Bilateral right and left hips, pelvis        Indication:  Hip pain   .    Technique:  4 views right and left hips. Single frontal view  pelvis.   .    Prior relevant exam: Right hip April 1, 2016..      No evidence of acute bony, soft tissue, or joint abnormality is  noted. Bone mineralization is within normal limits. The  visualized joint spaces appear intact.  Normal right and left hips. Normal pelvis.  Impression: 1.  Normal right and left hips. Normal pelvis.       Electronically signed by:  Chay Solorzano MD  5/5/2022 2:21 PM CDT  Workstation: [id number]  XR Shoulder 2+ View Right  Narrative: Procedure: Right    shoulder    INDICATION: Right shoulder pain   .    Technique: 3    views.    Prior examination: None.    FINDINGS: Mild degenerative changes right acromioclavicular  joint. Normal glenohumeral joint. Right shoulder is otherwise  unremarkable.  Impression: Mild degenerative changes right acromioclavicular  joint as described above. Otherwise unremarkable right shoulder.    Electronically signed by:  Chay Solorzano MD  5/5/2022 2:18 PM [facility]  Workstation: [id number]    @[email or web address]@  Immunization History   Administered Date(s) Administered   • Influenza TIV (IM) 03/28/2017, 03/28/2017   • Tdap 05/23/2016, 02/17/2017, 09/20/2019       The following portions of the patient's history were reviewed and updated as appropriate: allergies, current medications, past family history, past medical history, past social history, past surgical history and problem list.        Physical Exam  Physical Exam    [unfilled]  No diagnosis found.                      This document has been electronically signed by Scottie Borges MD on November 28, 2022 09:45 CST

## 2022-11-28 NOTE — PROGRESS NOTES
Subjective:  Randall Hernandez is a 52 y.o. male who presents for       Patient Active Problem List   Diagnosis   • Hyperlipidemia   • Inattention   • Non morbid obesity   • Prediabetes   • Attention deficit hyperactivity disorder (ADHD), combined type   • Need for vaccination   • Gastroesophageal reflux disease   • Encounter for drug screening   • Tingling in extremities   • Elevated BP without diagnosis of hypertension   • Allergic rhinitis   • Elevated blood pressure reading   • Pruritic rash   • Numbness in both hands   • Bilateral elbow joint pain   • Pain in both wrists   • Ulnar neuropathy of left upper extremity   • General medical examination   • Bilateral carpal tunnel syndrome   • History of prediabetes   • Numbness and tingling in both hands   • Essential hypertension   • Cerumen debris on tympanic membrane of both ears   • Carpal tunnel syndrome   • S/P carpal tunnel release   • Encounter for screening for malignant neoplasm of colon   • FH: colon cancer   • Acute recurrent sinusitis   • Attention deficit hyperactivity disorder (ADHD), predominantly inattentive type   • Class 2 obesity with body mass index (BMI) of 35.0 to 35.9 in adult   • Annual physical exam   • Arthritis of back   • Scoliosis   • Hyponatremia   • Hypochloremia   • Stage 2 chronic kidney disease   • Epigastric pain   • Bilious vomiting with nausea   • Fatty liver   • Dysphagia   • Nausea and vomiting   • Gastritis without bleeding   • Class 2 obesity with body mass index (BMI) of 38.0 to 38.9 in adult   • Mixed hyperlipidemia   • Dizziness   • Hypotension   • Class 2 obesity with body mass index (BMI) of 39.0 to 39.9 in adult   • Normocytic anemia   • Vitamin D deficiency   • Anxiety   • Class 2 severe obesity with serious comorbidity and body mass index (BMI) of 39.0 to 39.9 in adult (HCC)   • Palpitations   • Tachycardia   • Erectile dysfunction   • Morbid (severe) obesity due to excess calories (HCC)   • Right shoulder pain   •  "Bilateral hip pain   • PAULINA on CPAP   • Low testosterone   • Controlled type 2 diabetes mellitus with complication, without long-term current use of insulin ([facility])   • Class 2 severe obesity with serious comorbidity and body mass index (BMI) of 37.0 to 37.9 in adult (Former[facility])           Current Outpatient Medications:   •  B-D 3CC LUER-STEFFEN SYR 25JB1-4/2 22G X 1-1/2\" 3 ML misc, , Disp: , Rfl:   •  celecoxib (CeleBREX) 50 MG capsule, TAKE 1 CAPSULE BY MOUTH TWICE DAILY, Disp: 60 capsule, Rfl: 1  •  Evolocumab (REPATHA) solution prefilled syringe injection, Inject 1 mL under the skin into the appropriate area as directed Every 14 (Fourteen) Days., Disp: 2 mL, Rfl: 3  •  fexofenadine (Allegra Allergy) 60 MG tablet, Take 1 tablet by mouth 2 (Two) Times a Day., Disp: 60 tablet, Rfl: 3  •  fluticasone (FLONASE) 50 MCG/ACT nasal spray, SHAKE LIQUID AND USE 2 SPRAYS IN EACH NOSTRIL DAILY, Disp: 16 g, Rfl: 3  •  lisinopril (PRINIVIL,ZESTRIL) 20 MG tablet, TAKE 1 TABLET BY MOUTH DAILY, Disp: 30 tablet, Rfl: 0  •  metFORMIN ER (GLUCOPHAGE-XR) 500 MG 24 hr tablet, TAKE 1 TABLET BY MOUTH DAILY WITH BREAKFAST, Disp: 30 tablet, Rfl: 3  •  metoprolol succinate XL (TOPROL-XL) 50 MG 24 hr tablet, TAKE 1 TABLET BY MOUTH DAILY, Disp: 30 tablet, Rfl: 1  •  montelukast (SINGULAIR) 10 MG tablet, TAKE 1 TABLET BY MOUTH EVERY NIGHT, Disp: 30 tablet, Rfl: 3  •  pantoprazole (PROTONIX) 40 MG EC tablet, TAKE 1 TABLET BY MOUTH DAILY, Disp: 30 tablet, Rfl: 3  •  rosuvastatin (Crestor) 40 MG tablet, Take 1 tablet by mouth Every Night., Disp: 30 tablet, Rfl: 3  •  tadalafil (Cialis) 10 MG tablet, Take 1 tablet by mouth Daily As Needed for Erectile Dysfunction., Disp: 30 tablet, Rfl: 3  •  Testosterone Cypionate (DEPOTESTOTERONE CYPIONATE) 200 MG/ML injection, , Disp: , Rfl:   •  methylphenidate (Concerta) 18 MG CR tablet, Take 1 tablet by mouth Every Morning, Disp: 30 tablet, Rfl: 0    HPI          Pt is 53 yo male with management  of obesity, history of " prediabetes, HTN,  GERD, vitamin D deficiency, alelrgic rhinitis, ADHD predominantly inattentive type ,sp bilateral carpal tunnel release surgery,  Chronic back pain (arthritis of lumbar spine, short pedicles, mild scoliosis, mild arthritis thoracic spine).  CKD stage 2 , fatty liver, gastirits, esophagitis, PAULINA, echocardiogram on 6/15/21 (grade 1 diastolic dysfunction, right ventricular dilatio     11/17/22 in office visit for recheck. Pt was started on repatha last visit for his uncontroleld. Pt had labwork on 10/17/22 that showed hga1c normal vitamin D and b12 normal UDS consistent with  ADHD medication use. Lipid panel showed LDL at 88 from 198 HDL at 38 triglycerides down from 153 to 73. CMP shoewd GFR at 84.4 and normal liver enzymes potassium at 5.3 glucose at 103. CBC showed normal WBC and hemoglobin BP is stable. Pt is doing better in regards to libido energy and fatigue. He has not taken repatha for his cholesterol.  He has changed  His diet.   ]    11/17/22 in office visit for recheck. Pt continues to followup with sleep medicine for his PAULINA with last appt on 12/20/22. Pt has yet to get labwork ordered on 11/17/22 his last PSA test and testosterone test on 12/16/22 was normal. Pt doing well on medications including for DM type 2, HLP and ADHD. No chest pain no dizziness      Diabetes  He presents for his follow-up diabetic visit. He has type 2 diabetes mellitus. His disease course has been stable. Pertinent negatives for hypoglycemia include no headaches. Associated symptoms include fatigue and weakness. Pertinent negatives for diabetes include no chest pain. Risk factors for coronary artery disease include diabetes mellitus, male sex, obesity, hypertension, sedentary lifestyle and stress. When asked about meal planning, he reported none. He has had a previous visit with a dietitian. He participates in exercise daily. An ACE inhibitor/angiotensin II receptor blocker is being taken. He does not see a  podiatrist.Eye exam is not current.   Fatigue  This is a recurrent problem. The current episode started more than 1 month ago. The problem occurs constantly. The problem has been unchanged. Associated symptoms include arthralgias, fatigue, numbness and weakness. Pertinent negatives include no anorexia, chest pain, chills, congestion, coughing, diaphoresis, fever, headaches, nausea, rash, sore throat or vomiting. He has tried nothing for the symptoms. The treatment provided no relief.   Male  Problem  The patient's pertinent negatives include no genital injury, genital itching, genital lesions, pelvic pain, penile discharge, penile pain, priapism, scrotal swelling or testicular pain. Primary symptoms comment: low libido . This is a new problem. The current episode started today. The problem has been unchanged. Pertinent negatives include no anorexia, chest pain, chills, constipation, coughing, diarrhea, discolored urine, dysuria, fever, flank pain, frequency, headaches, hematuria, hesitancy, joint pain, joint swelling, nausea, painful intercourse, rash, shortness of breath, sore throat, urgency, urinary retention or vomiting. Nothing aggravates the symptoms. He has tried nothing for the symptoms. The treatment provided no relief. He is sexually active. He consistently uses condoms. There is no history of BPH, chlamydia, cryptorchidism, erectile aid use, erectile dysfunction, a femoral hernia, gonorrhea, herpes simplex, HIV, an inguinal hernia, kidney stones, prostatitis, sickle cell disease, syphilis or varicocele.   Chronic Kidney Disease  This is a chronic problem. The current episode started more than 1 year ago. The problem occurs constantly. The problem has been unchanged. Associated symptoms include arthralgias and fatigue. Pertinent negatives include no abdominal pain, anorexia, change in bowel habit, chest pain, chills, congestion, coughing, diaphoresis, fever, headaches, joint  swelling, myalgias, nausea, neck pain, numbness, rash, sore throat, swollen glands, urinary symptoms, vertigo, visual change, vomiting or weakness. Nothing aggravates the symptoms. He has tried nothing for the symptoms. The treatment provided no relief.   Hyperlipidemia  This is a chronic problem. The current episode started more than 1 year ago. The problem is uncontrolled. Exacerbating diseases include liver disease and obesity. He has no history of chronic renal disease, diabetes, hypothyroidism or nephrotic syndrome. Current antihyperlipidemic treatment includes statins. The current treatment provides moderate improvement of lipids. Risk factors for coronary artery disease include male sex, obesity, hypertension, dyslipidemia and a sedentary lifestyle.   Obesity   This is a chronic problem. The current episode started more than 1 year ago. The problem occurs constantly. The problem has been improving  Associated symptoms include arthralgias and numbness. Pertinent negatives include no abdominal pain, anorexia, change in bowel habit, chest pain, chills, congestion, coughing, diaphoresis, fatigue, fever, headaches, joint swelling, myalgias, nausea, rash, sore throat, swollen glands, urinary symptoms, vertigo, visual change, vomiting or weakness. Nothing aggravates the symptoms. He has tried nothing for the symptoms. The treatment provided no relief.   Hypertension   This is a chronic problem. The current episode started more than 1year ago. The problem has been gradually improving tThe problem is controlled. Pertinent negatives include no anxiety, blurred vision, chest pain, headaches, malaise/fatigue, neck pain, orthopnea, palpitations, peripheral edema, PND, shortness of breath or sweats. Risk factors for coronary artery disease include male gender, obesity and sedentary lifestyle. Past treatments include diuretics and ace-inhibitor There are no compliance problems. Treatment has provided significant  relief     Review of Systems  Review of Systems   Constitutional: Positive for activity change and fatigue. Negative for appetite change, chills, diaphoresis and fever.   HENT: Negative for congestion, postnasal drip, rhinorrhea, sinus pressure, sinus pain, sneezing, sore throat, trouble swallowing and voice change.    Respiratory: Negative for cough, choking, chest tightness, shortness of breath, wheezing and stridor.    Cardiovascular: Negative for chest pain.   Gastrointestinal: Negative for diarrhea, nausea and vomiting.   Musculoskeletal: Positive for arthralgias.   Neurological: Positive for weakness and numbness. Negative for headaches.   Psychiatric/Behavioral: Positive for decreased concentration.       Patient Active Problem List   Diagnosis   • Hyperlipidemia   • Inattention   • Non morbid obesity   • Prediabetes   • Attention deficit hyperactivity disorder (ADHD), combined type   • Need for vaccination   • Gastroesophageal reflux disease   • Encounter for drug screening   • Tingling in extremities   • Elevated BP without diagnosis of hypertension   • Allergic rhinitis   • Elevated blood pressure reading   • Pruritic rash   • Numbness in both hands   • Bilateral elbow joint pain   • Pain in both wrists   • Ulnar neuropathy of left upper extremity   • General medical examination   • Bilateral carpal tunnel syndrome   • History of prediabetes   • Numbness and tingling in both hands   • Essential hypertension   • Cerumen debris on tympanic membrane of both ears   • Carpal tunnel syndrome   • S/P carpal tunnel release   • Encounter for screening for malignant neoplasm of colon   • FH: colon cancer   • Acute recurrent sinusitis   • Attention deficit hyperactivity disorder (ADHD), predominantly inattentive type   • Class 2 obesity with body mass index (BMI) of 35.0 to 35.9 in adult   • Annual physical exam   • Arthritis of back   • Scoliosis   • Hyponatremia   • Hypochloremia   • Stage 2 chronic kidney disease    • Epigastric pain   • Bilious vomiting with nausea   • Fatty liver   • Dysphagia   • Nausea and vomiting   • Gastritis without bleeding   • Class 2 obesity with body mass index (BMI) of 38.0 to 38.9 in adult   • Mixed hyperlipidemia   • Dizziness   • Hypotension   • Class 2 obesity with body mass index (BMI) of 39.0 to 39.9 in adult   • Normocytic anemia   • Vitamin D deficiency   • Anxiety   • Class 2 severe obesity with serious comorbidity and body mass index (BMI) of 39.0 to 39.9 in adult (formerly [facility])   • Palpitations   • Tachycardia   • Erectile dysfunction   • Morbid (severe) obesity due to excess calories (formerly [facility])   • Right shoulder pain   • Bilateral hip pain   • PAULINA on CPAP   • Low testosterone   • Controlled type 2 diabetes mellitus with complication, without long-term current use of insulin (formerly [facility])   • Class 2 severe obesity with serious comorbidity and body mass index (BMI) of 37.0 to 37.9 in adult (formerly [facility])     Past Surgical History:   Procedure Laterality Date   • CARPAL TUNNEL RELEASE Right 11/2/2018    Procedure: CARPAL TUNNEL RELEASE;  Surgeon: Judd Mathias MD;  Location: [facility] OR;  Service: Orthopedics   • CARPAL TUNNEL RELEASE  12/07/2018    Left   • CARPAL TUNNEL RELEASE Left 12/7/2018    Procedure: CARPAL TUNNEL RELEASE;  Surgeon: Judd Mathias MD;  Location: [facility] OR;  Service: Orthopedics   • COLONOSCOPY N/A 1/18/2019    Procedure: COLONOSCOPY a friday please ;  Surgeon: Ruslan Bowesr MD;  Location: [facility] ENDOSCOPY;  Service: Gastroenterology   • ENDOSCOPY     • ENDOSCOPY N/A 2/21/2020    Procedure: ESOPHAGOGASTRODUODENOSCOPY possible dilation Fridays;  Surgeon: Ruslan Bowers MD;  Location: [facility] ENDOSCOPY;  Service: Gastroenterology;  Laterality: N/A;   • PYLOROMYOTOMY       Social History     Socioeconomic History   • Marital status: Significant Other   Tobacco Use   • Smoking status: Former     Packs/day: 1.00     Years: 6.00     Pack years: 6.00     Types: Cigarettes     Quit  date:      Years since quittin.0   • Smokeless tobacco: Never   Vaping Use   • Vaping Use: Never used   Substance and Sexual Activity   • Alcohol use: Yes     Comment: occassional   • Drug use: No   • Sexual activity: Defer     Family History   Problem Relation Age of Onset   • Colon cancer Mother         onset age greater than 75y   • Diabetes Mother    • Breast cancer Sister    • Cancer Brother         bladder   • Diabetes Brother    • Hyperlipidemia Brother      Lab on 2022   Component Date Value Ref Range Status   • PSA 2022 0.648  0.000 - 4.000 ng/mL Final   • Testosterone, Total 2022 672.00  193.00 - 740.00 ng/dL Final   Lab on 2022   Component Date Value Ref Range Status   • Testosterone, Total 2022 318.00  193.00 - 740.00 ng/dL Final   Lab on 10/17/2022   Component Date Value Ref Range Status   • WBC 10/17/2022 6.28  3.40 - 10.80 10*3/mm3 Final   • RBC 10/17/2022 5.59  4.14 - 5.80 10*6/mm3 Final   • Hemoglobin 10/17/2022 17.5  13.0 - 17.7 g/dL Final   • Hematocrit 10/17/2022 51.8 (H)  37.5 - 51.0 % Final   • MCV 10/17/2022 92.7  79.0 - 97.0 fL Final   • MCH 10/17/2022 31.3  26.6 - 33.0 pg Final   • MCHC 10/17/2022 33.8  31.5 - 35.7 g/dL Final   • RDW 10/17/2022 13.1  12.3 - 15.4 % Final   • RDW-SD 10/17/2022 44.4  37.0 - 54.0 fl Final   • MPV 10/17/2022 10.2  6.0 - 12.0 fL Final   • Platelets 10/17/2022 294  140 - 450 10*3/mm3 Final   • Neutrophil % 10/17/2022 50.9  42.7 - 76.0 % Final   • Lymphocyte % 10/17/2022 35.8  19.6 - 45.3 % Final   • Monocyte % 10/17/2022 9.1  5.0 - 12.0 % Final   • Eosinophil % 10/17/2022 2.9  0.3 - 6.2 % Final   • Basophil % 10/17/2022 1.0  0.0 - 1.5 % Final   • Immature Grans % 10/17/2022 0.3  0.0 - 0.5 % Final   • Neutrophils, Absolute 10/17/2022 3.20  1.70 - 7.00 10*3/mm3 Final   • Lymphocytes, Absolute 10/17/2022 2.25  0.70 - 3.10 10*3/mm3 Final   • Monocytes, Absolute 10/17/2022 0.57  0.10 - 0.90 10*3/mm3 Final   • Eosinophils, Absolute  10/17/2022 0.18  0.00 - 0.40 10*3/mm3 Final   • Basophils, Absolute 10/17/2022 0.06  0.00 - 0.20 10*3/mm3 Final   • Immature Grans, Absolute 10/17/2022 0.02  0.00 - 0.05 10*3/mm3 Final   • nRBC 10/17/2022 0.0  0.0 - 0.2 /100 WBC Final   • Glucose 10/17/2022 103 (H)  65 - 99 mg/dL Final   • BUN 10/17/2022 13  6 - 20 mg/dL Final   • Creatinine 10/17/2022 1.05  0.76 - 1.27 mg/dL Final   • Sodium 10/17/2022 138  136 - 145 mmol/L Final   • Potassium 10/17/2022 5.3 (H)  3.5 - 5.2 mmol/L Final   • Chloride 10/17/2022 101  98 - 107 mmol/L Final   • CO2 10/17/2022 27.0  22.0 - 29.0 mmol/L Final   • Calcium 10/17/2022 9.7  8.6 - 10.5 mg/dL Final   • Total Protein 10/17/2022 7.3  6.0 - 8.5 g/dL Final   • Albumin 10/17/2022 4.50  3.50 - 5.20 g/dL Final   • ALT (SGPT) 10/17/2022 23  1 - 41 U/L Final   • AST (SGOT) 10/17/2022 25  1 - 40 U/L Final   • Alkaline Phosphatase 10/17/2022 76  39 - 117 U/L Final   • Total Bilirubin 10/17/2022 0.7  0.0 - 1.2 mg/dL Final   • Globulin 10/17/2022 2.8  gm/dL Final   • A/G Ratio 10/17/2022 1.6  g/dL Final   • BUN/Creatinine Ratio 10/17/2022 12.4  7.0 - 25.0 Final   • Anion Gap 10/17/2022 10.0  5.0 - 15.0 mmol/L Final   • eGFR 10/17/2022 84.4  >60.0 mL/min/1.73 Final    National Kidney Foundation and American Society of Nephrology (ASN) Task Force recommended calculation based on the Chronic Kidney Disease Epidemiology Collaboration (CKD-EPI) equation refit without adjustment for race.   • Hemoglobin A1C 10/17/2022 5.30  4.80 - 5.60 % Final   • Total Cholesterol 10/17/2022 141  0 - 200 mg/dL Final   • Triglycerides 10/17/2022 73  0 - 150 mg/dL Final   • HDL Cholesterol 10/17/2022 38 (L)  40 - 60 mg/dL Final   • LDL Cholesterol  10/17/2022 88  0 - 100 mg/dL Final   • VLDL Cholesterol 10/17/2022 15  5 - 40 mg/dL Final   • LDL/HDL Ratio 10/17/2022 2.33   Final   • 25 Hydroxy, Vitamin D 10/17/2022 48.1  30.0 - 100.0 ng/ml Final   • Vitamin B-12 10/17/2022 522  211 - 946 pg/mL Final   •  Microalbumin/Creatinine Ratio 10/17/2022    Final    Unable to calculate   • Creatinine, Urine 10/17/2022 123.1  mg/dL Final   • Microalbumin, Urine 10/17/2022 <1.2  mg/dL Final   • THC, Screen, Urine 10/17/2022 Negative  Negative Final   • Phencyclidine (PCP), Urine 10/17/2022 Negative  Negative Final   • Cocaine Screen, Urine 10/17/2022 Negative  Negative Final   • Methamphetamine, Ur 10/17/2022 Negative  Negative Final   • Opiate Screen 10/17/2022 Negative  Negative Final   • Amphetamine Screen, Urine 10/17/2022 Positive (A)  Negative Final   • Benzodiazepine Screen, Urine 10/17/2022 Negative  Negative Final   • Tricyclic Antidepressants Screen 10/17/2022 Negative  Negative Final   • Methadone Screen, Urine 10/17/2022 Negative  Negative Final   • Barbiturates Screen, Urine 10/17/2022 Negative  Negative Final   • Oxycodone Screen, Urine 10/17/2022 Negative  Negative Final   • Propoxyphene Screen 10/17/2022 Negative  Negative Final   • Buprenorphine, Screen, Urine 10/17/2022 Negative  Negative Final   • Testosterone, Total 10/17/2022 284.00  193.00 - 740.00 ng/dL Final   Lab on 10/07/2022   Component Date Value Ref Range Status   • Testosterone, Total 10/07/2022 812.00 (H)  193.00 - 740.00 ng/dL Final   • WBC 10/07/2022 6.48  3.40 - 10.80 10*3/mm3 Final   • RBC 10/07/2022 5.30  4.14 - 5.80 10*6/mm3 Final   • Hemoglobin 10/07/2022 16.4  13.0 - 17.7 g/dL Final   • Hematocrit 10/07/2022 47.5  37.5 - 51.0 % Final   • MCV 10/07/2022 89.6  79.0 - 97.0 fL Final   • MCH 10/07/2022 30.9  26.6 - 33.0 pg Final   • MCHC 10/07/2022 34.5  31.5 - 35.7 g/dL Final   • RDW 10/07/2022 12.8  12.3 - 15.4 % Final   • RDW-SD 10/07/2022 42.2  37.0 - 54.0 fl Final   • MPV 10/07/2022 9.8  6.0 - 12.0 fL Final   • Platelets 10/07/2022 245  140 - 450 10*3/mm3 Final   Lab on 08/23/2022   Component Date Value Ref Range Status   • WBC 08/23/2022 7.07  3.40 - 10.80 10*3/mm3 Final   • RBC 08/23/2022 5.03  4.14 - 5.80 10*6/mm3 Final   • Hemoglobin  08/23/2022 15.4  13.0 - 17.7 g/dL Final   • Hematocrit 08/23/2022 45.3  37.5 - 51.0 % Final   • MCV 08/23/2022 90.1  79.0 - 97.0 fL Final   • MCH 08/23/2022 30.6  26.6 - 33.0 pg Final   • MCHC 08/23/2022 34.0  31.5 - 35.7 g/dL Final   • RDW 08/23/2022 12.4  12.3 - 15.4 % Final   • RDW-SD 08/23/2022 40.2  37.0 - 54.0 fl Final   • MPV 08/23/2022 10.1  6.0 - 12.0 fL Final   • Platelets 08/23/2022 322  140 - 450 10*3/mm3 Final   • Neutrophil % 08/23/2022 55.8  42.7 - 76.0 % Final   • Lymphocyte % 08/23/2022 34.1  19.6 - 45.3 % Final   • Monocyte % 08/23/2022 6.2  5.0 - 12.0 % Final   • Eosinophil % 08/23/2022 2.3  0.3 - 6.2 % Final   • Basophil % 08/23/2022 1.0  0.0 - 1.5 % Final   • Immature Grans % 08/23/2022 0.6 (H)  0.0 - 0.5 % Final   • Neutrophils, Absolute 08/23/2022 3.95  1.70 - 7.00 10*3/mm3 Final   • Lymphocytes, Absolute 08/23/2022 2.41  0.70 - 3.10 10*3/mm3 Final   • Monocytes, Absolute 08/23/2022 0.44  0.10 - 0.90 10*3/mm3 Final   • Eosinophils, Absolute 08/23/2022 0.16  0.00 - 0.40 10*3/mm3 Final   • Basophils, Absolute 08/23/2022 0.07  0.00 - 0.20 10*3/mm3 Final   • Immature Grans, Absolute 08/23/2022 0.04  0.00 - 0.05 10*3/mm3 Final   • nRBC 08/23/2022 0.0  0.0 - 0.2 /100 WBC Final   • Glucose 08/23/2022 93  65 - 99 mg/dL Final   • BUN 08/23/2022 16  6 - 20 mg/dL Final   • Creatinine 08/23/2022 1.24  0.76 - 1.27 mg/dL Final   • Sodium 08/23/2022 140  136 - 145 mmol/L Final   • Potassium 08/23/2022 5.1  3.5 - 5.2 mmol/L Final   • Chloride 08/23/2022 105  98 - 107 mmol/L Final   • CO2 08/23/2022 26.3  22.0 - 29.0 mmol/L Final   • Calcium 08/23/2022 9.8  8.6 - 10.5 mg/dL Final   • Total Protein 08/23/2022 8.1  6.0 - 8.5 g/dL Final   • Albumin 08/23/2022 4.50  3.50 - 5.20 g/dL Final   • ALT (SGPT) 08/23/2022 25  1 - 41 U/L Final   • AST (SGOT) 08/23/2022 22  1 - 40 U/L Final   • Alkaline Phosphatase 08/23/2022 90  39 - 117 U/L Final   • Total Bilirubin 08/23/2022 0.6  0.0 - 1.2 mg/dL Final   • Globulin  08/23/2022 3.6  gm/dL Final   • A/G Ratio 08/23/2022 1.3  g/dL Final   • BUN/Creatinine Ratio 08/23/2022 12.9  7.0 - 25.0 Final   • Anion Gap 08/23/2022 8.7  5.0 - 15.0 mmol/L Final   • eGFR 08/23/2022 69.5  >60.0 mL/min/1.73 Final    National Kidney Foundation and American Society of Nephrology (ASN) Task Force recommended calculation based on the Chronic Kidney Disease Epidemiology Collaboration (CKD-EPI) equation refit without adjustment for race.   • Hemoglobin A1C 08/23/2022 5.50  4.80 - 5.60 % Final   • Total Cholesterol 08/23/2022 262 (H)  0 - 200 mg/dL Final   • Triglycerides 08/23/2022 153 (H)  0 - 150 mg/dL Final   • HDL Cholesterol 08/23/2022 35 (L)  40 - 60 mg/dL Final   • LDL Cholesterol  08/23/2022 198 (H)  0 - 100 mg/dL Final   • VLDL Cholesterol 08/23/2022 29  5 - 40 mg/dL Final   • LDL/HDL Ratio 08/23/2022 5.61   Final   • 25 Hydroxy, Vitamin D 08/23/2022 52.8  30.0 - 100.0 ng/ml Final   • Vitamin B-12 08/23/2022 546  211 - 946 pg/mL Final   • Microalbumin/Creatinine Ratio 08/23/2022 6.3  mg/g Final   • Creatinine, Urine 08/23/2022 286.1  mg/dL Final   • Microalbumin, Urine 08/23/2022 1.8  mg/dL Final   • PSA 08/23/2022 0.869  0.000 - 4.000 ng/mL Final      XR Hips Bilateral With or Without Pelvis 2 View  Narrative: Procedure:  Bilateral right and left hips, pelvis        Indication:  Hip pain   .    Technique:  4 views right and left hips. Single frontal view  pelvis.   .    Prior relevant exam: Right hip April 1, 2016..      No evidence of acute bony, soft tissue, or joint abnormality is  noted. Bone mineralization is within normal limits. The  visualized joint spaces appear intact.  Normal right and left hips. Normal pelvis.  Impression: 1.  Normal right and left hips. Normal pelvis.       Electronically signed by:  Chay Solorzano MD  5/5/2022 2:21 PM CDT  Workstation: [id number]  XR Shoulder 2+ View Right  Narrative: Procedure: Right    shoulder    INDICATION: Right shoulder pain   .    Technique: 3   "  views.    Prior examination: None.    FINDINGS: Mild degenerative changes right acromioclavicular  joint. Normal glenohumeral joint. Right shoulder is otherwise  unremarkable.  Impression: Mild degenerative changes right acromioclavicular  joint as described above. Otherwise unremarkable right shoulder.    Electronically signed by:  Chay Solorzano MD  5/5/2022 2:18 PM CDT  Workstation: [id number]    [email or web address]  Immunization History   Administered Date(s) Administered   • Influenza TIV (IM) 03/28/2017, 03/28/2017   • Tdap 05/23/2016, 02/17/2017, 09/20/2019       The following portions of the patient's history were reviewed and updated as appropriate: allergies, current medications, past family history, past medical history, past social history, past surgical history and problem list.        Physical Exam  /78 (BP Location: Right arm, Patient Position: Sitting, Cuff Size: Adult)   Pulse 82   Temp 97.9 °F (36.6 °C)   Ht 180.3 cm (71\")   Wt 126 kg (277 lb 6.4 oz)   SpO2 95%   BMI 38.69 kg/m²     Physical Exam  Vitals and nursing note reviewed.   Constitutional:       Appearance: He is well-developed. He is not diaphoretic.   HENT:      Head: Normocephalic and atraumatic.      Right Ear: External ear normal.   Eyes:      Conjunctiva/sclera: Conjunctivae normal.      Pupils: Pupils are equal, round, and reactive to light.   Cardiovascular:      Rate and Rhythm: Normal rate and regular rhythm.      Heart sounds: Normal heart sounds. No murmur heard.  Pulmonary:      Effort: Pulmonary effort is normal. No respiratory distress.      Breath sounds: Normal breath sounds.   Abdominal:      General: Bowel sounds are normal. There is no distension.      Palpations: Abdomen is soft.      Tenderness: There is no abdominal tenderness.      Comments: Obese abdomen    Musculoskeletal:         General: Tenderness present. No deformity. Normal range of motion.      Cervical back: Normal range of motion and neck supple. "   Skin:     General: Skin is warm.      Coloration: Skin is not pale.      Findings: No erythema or rash.   Neurological:      Mental Status: He is alert and oriented to person, place, and time.      Cranial Nerves: No cranial nerve deficit.   Psychiatric:         Behavior: Behavior normal.         [unfilled]   Diagnosis Plan   1. Controlled type 2 diabetes mellitus with complication, without long-term current use of insulin (HCC)        2. Essential hypertension        3. Fatty liver        4. Mixed hyperlipidemia        5. Low testosterone        6. Morbid (severe) obesity due to excess calories (HCC)        7. Stage 2 chronic kidney disease        8. Vitamin D deficiency        9. Attention deficit hyperactivity disorder (ADHD), predominantly inattentive type  methylphenidate (Concerta) 18 MG CR tablet      10. Encounter for drug screening  Urine Drug Screen - Urine, Clean Catch      11. Class 2 severe obesity with serious comorbidity and body mass index (BMI) of 38.0 to 38.9 in adult, unspecified obesity type (HCC)                   -recommend labwork   -recommend influenza vaccination -   Pt declined   -recommend diabetic eye exam   -right hip and  Right shoulder pain - Orthopedic following on celebrex  -DM type 2-  Recommend ADA diet. On metformin  mg PO q daily.   -tachycardia/palpitations - Cardiology following. Had recent echocardiogrram and TST.  on toprol XL 50 mg daily.   -abdominal pain/fatty liver/nausea vomiting/Gastritis/GERD with esophagitis - on protonix 40 mg daily. .  GI following had recent EGD   -low testosterone/low libido/fatigue - referred to Urology now on testosterone injections   -ckd stage 2 - continue to monitor.  -Vitamin D deficiency vitamin D pill once a week   -HLP - on crestor 20 mg PO qhs. Recommend heart healthy diet recommend repatha injection gave drug information.   -HTN - on lisionpril 20 mg daily go up on  Toprol XL  50 mg daily   -ADD- stable  on Adderrall XR 25  mg caban. Will try Concerta 18 mg daily.     -GERD - on protonix 40 mg PO q daily.    -allergic rhinitis - continue singulair and flonase on allegra 60 mg PO BID   -PAULINA on CPAP - recommend pt start using machine. Sleep medicine following    hyperlipidemia - on crestor 40 mg PO qhs hold on  repatha injjections 140 mg subq every 2 weeks   - obesity - recommend ketogenic diet along with intermittent fasting. Counseled weight loss >5 minutes  BMI at 38.69   -advised pt to be safe and call with questions and concerns  -advised to go to ER or call 911 if symptoms get severe  -advised to followup with specialist and referrals   -advised pt to be safe during COVID-19 pandemic  I spent 30  minutes caring for Randall on this date of service. This time includes time spent by me in the following activities: preparing for the visit, reviewing tests, obtaining and/or reviewing a separately obtained history, performing a medically appropriate examination and/or evaluation, counseling and educating the patient/family/caregiver, ordering medications, tests, or procedures, referring and communicating with other health care professionals, documenting information in the medical record, independently interpreting results and communicating that information with the patient/family/caregiver and care coordination        This document has been electronically signed by Scottie Borges MD on January 17, 2023 15:40 CST

## 2022-12-08 RX ORDER — FLUTICASONE PROPIONATE 50 MCG
SPRAY, SUSPENSION (ML) NASAL
Qty: 16 G | Refills: 3 | Status: SHIPPED | OUTPATIENT
Start: 2022-12-08

## 2022-12-16 ENCOUNTER — TRANSCRIBE ORDERS (OUTPATIENT)
Dept: LAB | Facility: HOSPITAL | Age: 52
End: 2022-12-16

## 2022-12-16 ENCOUNTER — LAB (OUTPATIENT)
Dept: LAB | Facility: HOSPITAL | Age: 52
End: 2022-12-16

## 2022-12-16 DIAGNOSIS — E29.1 HYPOGONADISM MALE: ICD-10-CM

## 2022-12-16 DIAGNOSIS — Z79.899 ENCOUNTER FOR LONG-TERM (CURRENT) USE OF OTHER MEDICATIONS: ICD-10-CM

## 2022-12-16 DIAGNOSIS — Z79.899 ENCOUNTER FOR LONG-TERM (CURRENT) USE OF OTHER MEDICATIONS: Primary | ICD-10-CM

## 2022-12-16 PROCEDURE — G0103 PSA SCREENING: HCPCS

## 2022-12-16 PROCEDURE — 84403 ASSAY OF TOTAL TESTOSTERONE: CPT

## 2022-12-17 LAB
PSA SERPL-MCNC: 0.65 NG/ML (ref 0–4)
TESTOST SERPL-MCNC: 672 NG/DL (ref 193–740)

## 2022-12-20 ENCOUNTER — OFFICE VISIT (OUTPATIENT)
Dept: SLEEP MEDICINE | Facility: HOSPITAL | Age: 52
End: 2022-12-20

## 2022-12-20 VITALS
OXYGEN SATURATION: 95 % | HEART RATE: 80 BPM | DIASTOLIC BLOOD PRESSURE: 86 MMHG | BODY MASS INDEX: 38.08 KG/M2 | SYSTOLIC BLOOD PRESSURE: 154 MMHG | HEIGHT: 71 IN | WEIGHT: 272 LBS

## 2022-12-20 DIAGNOSIS — F51.04 PSYCHOPHYSIOLOGICAL INSOMNIA: ICD-10-CM

## 2022-12-20 DIAGNOSIS — G47.33 OBSTRUCTIVE SLEEP APNEA: Primary | ICD-10-CM

## 2022-12-20 DIAGNOSIS — E66.01 MORBID OBESITY: ICD-10-CM

## 2022-12-20 PROCEDURE — 99213 OFFICE O/P EST LOW 20 MIN: CPT | Performed by: NURSE PRACTITIONER

## 2022-12-20 NOTE — PROGRESS NOTES
Sleep Clinic Follow Up    Date: 2022  Primary Care Provider: Scottie Borges MD    Last office visit: 2022 (I reviewed this note)    CC: Follow up: PAULINA on CPAP, new machine follow up      Interim History:  Since the last visit:    1) severe PAULINA -  Randall Hernandez has remained compliant with CPAP. He denies machine issues, dry mouth, headaches, or pressures intolerance. He denies abnormal dreams, sleep paralysis, nasal congestion, URI sx. He has an occasional mask leak around the bridge of his nose and he has purchased a mask liner to try.  He likes his new machine better than his previous one and denies any other issues.    2) Patient denies RLS symptoms.    Sleep Testin. PSG on 2016, AHI of 120   2. CPAP titration on 2016, recommended 15 cm H2O   3. Currently on 15 cm H2O    PAP Data:    Time frame: 10/25/2022-2022   Compliance: 100%  Average use on days used: 7 hrs 42 min  Percent of days with usage greater than or equal to 4 hours: 96%  PAP range: 15 cm H2O  Average 90% pressure: 15 cmH2O  Leak: 0 minutes  Average AHI: 0.6 events/hr  Mask type: Full face mask  [facility]  Machine type: ResMed AirSense 10    Bed time: 2200  Sleep latency: 30 minutes  Number of times awakens during the night: 2-3  Wake time: 0500  Estimated total sleep time at night: 3-6 hours  Caffeine intake: 2 cups of coffee, 0 cups of tea, and 0 sodas per day  Alcohol intake: 12 drinks per week  Nap time: none/rare   Sleepiness with Driving: none     [place] - 11  [place] Sleepiness Scale  Sitting and reading: Slight chance of dozing  Watching TV: Slight chance of dozing  Sitting, inactive in a public place (e.g. a theatre or a meeting): Moderate chance of dozing  As a passenger in a car for an hour without a break: Moderate chance of dozing  Lying down to rest in the afternoon when circumstances permit: Moderate chance of dozing  Sitting and talking to someone: Slight chance of  dozing  Sitting quietly after a lunch without alcohol: Moderate chance of dozing  In a car, while stopped for a few minutes in traffic: Would never doze  Total score: 11    PMHx, FH, SH reviewed and pertinent changes are: Reportedly unchanged from last office visit with us.      Review of Systems:   Negative for chest pain, SOA, fever, chills, cough, N/V/D, abdominal pain.    Smoking:none    Randall Hernandez  reports that he quit smoking about 29 years ago. His smoking use included cigarettes. He has a 6.00 pack-year smoking history. He has never used smokeless tobacco.      Physical Exam:  Vitals:    12/20/22 1433   BP: 154/86   Pulse: 80   SpO2: 95%           12/20/22  1433   Weight: 123 kg (272 lb)     Body mass index is 37.95 kg/m². Class 2 Severe Obesity (BMI >=35 and <=39.9). Obesity-related health conditions include the following: obstructive sleep apnea, hypertension, dyslipidemias and GERD. Obesity is unchanged. BMI is is above average; BMI management plan is completed. I recommend portion control and increasing exercise.    Gen:                No distress, conversant, pleasant, appears stated age, alert, oriented  Eyes:               Anicteric sclera, moist conjunctiva, no lid lag                           PERRL, EOMI   Heent:             NC/AT                          Oropharynx clear                          Normal hearing  Lungs:             Normal effort, non-labored breathing         CV:                  Normal S1/S2                          No lower extremity edema  ABD:               Soft, rounded, non-distended                 Psych:             Appropriate affect  Neuro:             CN 2-12 appear intact    Past Medical History:   Diagnosis Date   • Allergic rhinitis    • Arthritis    • Backache    • Cellulitis of leg, except foot    • Dyslipidemia    • GERD (gastroesophageal reflux disease)    • Heartburn    • History of Holter monitoring 02/26/2016    Sinus mechanism with a normal average heart  "rate.No evidence of chronotropic incompetence.Asymptomatic Holter   • Hyperlipidemia    • Hypertension    • Low back pain    • Numbness of lower limb     left leg numbness and tingling      • Obesity, unspecified    • Obstructive sleep apnea of adult     USING C-PAP   • Pain in left hip    • Pain in right hip    • Palpitations    • Skin lesion     insect bite.      • Supraventricular tachycardia (HCC)    • Weight loss     advised       Current Outpatient Medications:   •  amphetamine-dextroamphetamine XR (Adderall XR) 25 MG 24 hr capsule, Take 1 capsule by mouth Every Morning Take 1 tablet by mouth daily, Disp: 30 capsule, Rfl: 0  •  B-D 3CC LUER-STEFFEN SYR 67RC7-5/2 22G X 1-1/2\" 3 ML misc, , Disp: , Rfl:   •  celecoxib (CeleBREX) 50 MG capsule, TAKE 1 CAPSULE BY MOUTH TWICE DAILY, Disp: 60 capsule, Rfl: 1  •  Evolocumab (REPATHA) solution prefilled syringe injection, Inject 1 mL under the skin into the appropriate area as directed Every 14 (Fourteen) Days., Disp: 2 mL, Rfl: 3  •  fexofenadine (Allegra Allergy) 60 MG tablet, Take 1 tablet by mouth 2 (Two) Times a Day., Disp: 60 tablet, Rfl: 3  •  fluticasone (FLONASE) 50 MCG/ACT nasal spray, SHAKE LIQUID AND USE 2 SPRAYS IN EACH NOSTRIL DAILY, Disp: 16 g, Rfl: 3  •  lisinopril (PRINIVIL,ZESTRIL) 20 MG tablet, TAKE 1 TABLET BY MOUTH DAILY, Disp: 30 tablet, Rfl: 0  •  metFORMIN ER (GLUCOPHAGE-XR) 500 MG 24 hr tablet, TAKE 1 TABLET BY MOUTH DAILY WITH BREAKFAST, Disp: 30 tablet, Rfl: 3  •  metoprolol succinate XL (TOPROL-XL) 50 MG 24 hr tablet, TAKE 1 TABLET BY MOUTH DAILY, Disp: 30 tablet, Rfl: 1  •  montelukast (SINGULAIR) 10 MG tablet, TAKE 1 TABLET BY MOUTH EVERY NIGHT, Disp: 30 tablet, Rfl: 3  •  pantoprazole (PROTONIX) 40 MG EC tablet, TAKE 1 TABLET BY MOUTH DAILY, Disp: 30 tablet, Rfl: 3  •  rosuvastatin (Crestor) 40 MG tablet, Take 1 tablet by mouth Every Night., Disp: 30 tablet, Rfl: 3  •  tadalafil (Cialis) 10 MG tablet, Take 1 tablet by mouth Daily As Needed " for Erectile Dysfunction., Disp: 30 tablet, Rfl: 3  •  Testosterone Cypionate (DEPOTESTOTERONE CYPIONATE) 200 MG/ML injection, , Disp: , Rfl:     WBC   Date Value Ref Range Status   10/17/2022 6.28 3.40 - 10.80 10*3/mm3 Final     RBC   Date Value Ref Range Status   10/17/2022 5.59 4.14 - 5.80 10*6/mm3 Final     Hemoglobin   Date Value Ref Range Status   10/17/2022 17.5 13.0 - 17.7 g/dL Final     Hematocrit   Date Value Ref Range Status   10/17/2022 51.8 (H) 37.5 - 51.0 % Final     MCV   Date Value Ref Range Status   10/17/2022 92.7 79.0 - 97.0 fL Final     MCH   Date Value Ref Range Status   10/17/2022 31.3 26.6 - 33.0 pg Final     MCHC   Date Value Ref Range Status   10/17/2022 33.8 31.5 - 35.7 g/dL Final     RDW   Date Value Ref Range Status   10/17/2022 13.1 12.3 - 15.4 % Final     RDW-SD   Date Value Ref Range Status   10/17/2022 44.4 37.0 - 54.0 fl Final     MPV   Date Value Ref Range Status   10/17/2022 10.2 6.0 - 12.0 fL Final     Platelets   Date Value Ref Range Status   10/17/2022 294 140 - 450 10*3/mm3 Final     Neutrophil %   Date Value Ref Range Status   10/17/2022 50.9 42.7 - 76.0 % Final     Lymphocyte %   Date Value Ref Range Status   10/17/2022 35.8 19.6 - 45.3 % Final     Monocyte %   Date Value Ref Range Status   10/17/2022 9.1 5.0 - 12.0 % Final     Eosinophil %   Date Value Ref Range Status   10/17/2022 2.9 0.3 - 6.2 % Final     Basophil %   Date Value Ref Range Status   10/17/2022 1.0 0.0 - 1.5 % Final     Immature Grans %   Date Value Ref Range Status   10/17/2022 0.3 0.0 - 0.5 % Final     Neutrophils, Absolute   Date Value Ref Range Status   10/17/2022 3.20 1.70 - 7.00 10*3/mm3 Final     Lymphocytes, Absolute   Date Value Ref Range Status   10/17/2022 2.25 0.70 - 3.10 10*3/mm3 Final     Monocytes, Absolute   Date Value Ref Range Status   10/17/2022 0.57 0.10 - 0.90 10*3/mm3 Final     Eosinophils, Absolute   Date Value Ref Range Status   10/17/2022 0.18 0.00 - 0.40 10*3/mm3 Final     Basophils,  Absolute   Date Value Ref Range Status   10/17/2022 0.06 0.00 - 0.20 10*3/mm3 Final     Immature Grans, Absolute   Date Value Ref Range Status   10/17/2022 0.02 0.00 - 0.05 10*3/mm3 Final     nRBC   Date Value Ref Range Status   10/17/2022 0.0 0.0 - 0.2 /100 WBC Final       Lab Results   Component Value Date    GLUCOSE 103 (H) 10/17/2022    BUN 13 10/17/2022    CREATININE 1.05 10/17/2022    EGFRIFNONA 73 07/30/2021    BCR 12.4 10/17/2022    K 5.3 (H) 10/17/2022    CO2 27.0 10/17/2022    CALCIUM 9.7 10/17/2022    ALBUMIN 4.50 10/17/2022    AST 25 10/17/2022    ALT 23 10/17/2022       Assessment and Plan:    1. Obstructive sleep apnea - Established, stable (1)  1. Compliant with PAP therapy  2. Continue PAP as prescribed  3. Script for PAP supplies  4. Pertinent labs were reviewed as listed above  5. Return to clinic in 1 year with compliance report unless change in symptoms in interim period  2. Insomnia - sleep onset and or maintenance - New (to me), no additional work-up planned (3)    1.   Recommend use of Benadryl or Tylenol PM OTC PRN - discussed potential side effects   2.   Follow up sooner if needed  3. Morbid obesity - BMI 38 - stable chronic illness      I spent 15 minutes caring for Randall on this date of service. This time includes time spent by me in the following activities: preparing for the visit, reviewing tests, obtaining and/or reviewing a separately obtained history, performing a medically appropriate examination and/or evaluation , counseling and educating the patient/family/caregiver, documenting information in the medical record and care coordination; discussing PAP therapy, PAP compliance and PAP maintenance    RTC in 12 months. Patient agrees to return sooner if changes in symptoms.        This document has been electronically signed by ROLAN Romero on December 20, 2022 14:40 CST          CC: Scottie Borges MD          No ref. provider found

## 2023-01-10 DIAGNOSIS — F90.0 ATTENTION DEFICIT HYPERACTIVITY DISORDER (ADHD), PREDOMINANTLY INATTENTIVE TYPE: ICD-10-CM

## 2023-01-10 RX ORDER — DEXTROAMPHETAMINE SACCHARATE, AMPHETAMINE ASPARTATE MONOHYDRATE, DEXTROAMPHETAMINE SULFATE AND AMPHETAMINE SULFATE 6.25; 6.25; 6.25; 6.25 MG/1; MG/1; MG/1; MG/1
25 CAPSULE, EXTENDED RELEASE ORAL EVERY MORNING
Qty: 30 CAPSULE | Refills: 0 | Status: SHIPPED | OUTPATIENT
Start: 2023-01-10 | End: 2023-01-17 | Stop reason: SDUPTHER

## 2023-01-17 ENCOUNTER — OFFICE VISIT (OUTPATIENT)
Dept: FAMILY MEDICINE CLINIC | Facility: CLINIC | Age: 53
End: 2023-01-17
Payer: COMMERCIAL

## 2023-01-17 VITALS
SYSTOLIC BLOOD PRESSURE: 128 MMHG | HEIGHT: 71 IN | HEART RATE: 82 BPM | TEMPERATURE: 97.9 F | WEIGHT: 277.4 LBS | DIASTOLIC BLOOD PRESSURE: 78 MMHG | OXYGEN SATURATION: 95 % | BODY MASS INDEX: 38.84 KG/M2

## 2023-01-17 DIAGNOSIS — Z02.83 ENCOUNTER FOR DRUG SCREENING: ICD-10-CM

## 2023-01-17 DIAGNOSIS — E11.8 CONTROLLED TYPE 2 DIABETES MELLITUS WITH COMPLICATION, WITHOUT LONG-TERM CURRENT USE OF INSULIN: Primary | ICD-10-CM

## 2023-01-17 DIAGNOSIS — I10 ESSENTIAL HYPERTENSION: ICD-10-CM

## 2023-01-17 DIAGNOSIS — N18.2 STAGE 2 CHRONIC KIDNEY DISEASE: ICD-10-CM

## 2023-01-17 DIAGNOSIS — E66.01 CLASS 2 SEVERE OBESITY WITH SERIOUS COMORBIDITY AND BODY MASS INDEX (BMI) OF 38.0 TO 38.9 IN ADULT, UNSPECIFIED OBESITY TYPE: ICD-10-CM

## 2023-01-17 DIAGNOSIS — R79.89 LOW TESTOSTERONE: ICD-10-CM

## 2023-01-17 DIAGNOSIS — E78.2 MIXED HYPERLIPIDEMIA: ICD-10-CM

## 2023-01-17 DIAGNOSIS — F90.0 ATTENTION DEFICIT HYPERACTIVITY DISORDER (ADHD), PREDOMINANTLY INATTENTIVE TYPE: ICD-10-CM

## 2023-01-17 DIAGNOSIS — E66.01 MORBID (SEVERE) OBESITY DUE TO EXCESS CALORIES: ICD-10-CM

## 2023-01-17 DIAGNOSIS — E55.9 VITAMIN D DEFICIENCY: ICD-10-CM

## 2023-01-17 DIAGNOSIS — K76.0 FATTY LIVER: ICD-10-CM

## 2023-01-17 PROCEDURE — 99214 OFFICE O/P EST MOD 30 MIN: CPT | Performed by: FAMILY MEDICINE

## 2023-01-17 RX ORDER — DEXTROAMPHETAMINE SACCHARATE, AMPHETAMINE ASPARTATE MONOHYDRATE, DEXTROAMPHETAMINE SULFATE AND AMPHETAMINE SULFATE 6.25; 6.25; 6.25; 6.25 MG/1; MG/1; MG/1; MG/1
25 CAPSULE, EXTENDED RELEASE ORAL EVERY MORNING
Qty: 30 CAPSULE | Refills: 0 | Status: SHIPPED | OUTPATIENT
Start: 2023-01-17 | End: 2023-01-17

## 2023-01-17 RX ORDER — METHYLPHENIDATE HYDROCHLORIDE 18 MG/1
18 TABLET ORAL EVERY MORNING
Qty: 30 TABLET | Refills: 0 | Status: SHIPPED | OUTPATIENT
Start: 2023-01-17 | End: 2023-02-22

## 2023-01-17 NOTE — PATIENT INSTRUCTIONS
Please get labwork fasting     Call Dr. Skinner for diabetic eye exam    Concerta 18 mg daily to replace Adderall until adderall comes in    Recheck in 1 month

## 2023-01-18 RX ORDER — PANTOPRAZOLE SODIUM 40 MG/1
40 TABLET, DELAYED RELEASE ORAL DAILY
Qty: 30 TABLET | Refills: 3 | Status: SHIPPED | OUTPATIENT
Start: 2023-01-18

## 2023-02-06 RX ORDER — METFORMIN HYDROCHLORIDE 500 MG/1
500 TABLET, EXTENDED RELEASE ORAL
Qty: 30 TABLET | Refills: 3 | Status: SHIPPED | OUTPATIENT
Start: 2023-02-06

## 2023-02-10 ENCOUNTER — LAB (OUTPATIENT)
Dept: LAB | Facility: HOSPITAL | Age: 53
End: 2023-02-10
Payer: COMMERCIAL

## 2023-02-10 DIAGNOSIS — E11.8 CONTROLLED TYPE 2 DIABETES MELLITUS WITH COMPLICATION, WITHOUT LONG-TERM CURRENT USE OF INSULIN: ICD-10-CM

## 2023-02-10 DIAGNOSIS — K76.0 FATTY LIVER: ICD-10-CM

## 2023-02-10 DIAGNOSIS — E66.01 CLASS 2 SEVERE OBESITY WITH SERIOUS COMORBIDITY AND BODY MASS INDEX (BMI) OF 37.0 TO 37.9 IN ADULT, UNSPECIFIED OBESITY TYPE: ICD-10-CM

## 2023-02-10 DIAGNOSIS — F90.0 ATTENTION DEFICIT HYPERACTIVITY DISORDER (ADHD), PREDOMINANTLY INATTENTIVE TYPE: ICD-10-CM

## 2023-02-10 DIAGNOSIS — Z02.83 ENCOUNTER FOR DRUG SCREENING: ICD-10-CM

## 2023-02-10 DIAGNOSIS — Z99.89 OSA ON CPAP: ICD-10-CM

## 2023-02-10 DIAGNOSIS — R79.89 LOW TESTOSTERONE: ICD-10-CM

## 2023-02-10 DIAGNOSIS — E78.2 MIXED HYPERLIPIDEMIA: ICD-10-CM

## 2023-02-10 DIAGNOSIS — E55.9 VITAMIN D DEFICIENCY: ICD-10-CM

## 2023-02-10 DIAGNOSIS — G47.33 OSA ON CPAP: ICD-10-CM

## 2023-02-10 PROCEDURE — 82306 VITAMIN D 25 HYDROXY: CPT

## 2023-02-10 PROCEDURE — 83036 HEMOGLOBIN GLYCOSYLATED A1C: CPT

## 2023-02-10 PROCEDURE — 82607 VITAMIN B-12: CPT

## 2023-02-10 PROCEDURE — 80053 COMPREHEN METABOLIC PANEL: CPT

## 2023-02-10 PROCEDURE — 80306 DRUG TEST PRSMV INSTRMNT: CPT

## 2023-02-10 PROCEDURE — 85025 COMPLETE CBC W/AUTO DIFF WBC: CPT

## 2023-02-10 PROCEDURE — 80061 LIPID PANEL: CPT

## 2023-02-11 LAB
25(OH)D3 SERPL-MCNC: 35.1 NG/ML (ref 30–100)
ALBUMIN SERPL-MCNC: 4.3 G/DL (ref 3.5–5.2)
ALBUMIN/GLOB SERPL: 1.5 G/DL
ALP SERPL-CCNC: 90 U/L (ref 39–117)
ALT SERPL W P-5'-P-CCNC: 26 U/L (ref 1–41)
ANION GAP SERPL CALCULATED.3IONS-SCNC: 12 MMOL/L (ref 5–15)
AST SERPL-CCNC: 30 U/L (ref 1–40)
BASOPHILS # BLD AUTO: 0.06 10*3/MM3 (ref 0–0.2)
BASOPHILS NFR BLD AUTO: 0.9 % (ref 0–1.5)
BILIRUB SERPL-MCNC: 0.7 MG/DL (ref 0–1.2)
BUN SERPL-MCNC: 13 MG/DL (ref 6–20)
BUN/CREAT SERPL: 10.7 (ref 7–25)
CALCIUM SPEC-SCNC: 9.3 MG/DL (ref 8.6–10.5)
CHLORIDE SERPL-SCNC: 101 MMOL/L (ref 98–107)
CHOLEST SERPL-MCNC: 130 MG/DL (ref 0–200)
CO2 SERPL-SCNC: 24 MMOL/L (ref 22–29)
CREAT SERPL-MCNC: 1.21 MG/DL (ref 0.76–1.27)
DEPRECATED RDW RBC AUTO: 41.6 FL (ref 37–54)
EGFRCR SERPLBLD CKD-EPI 2021: 71.2 ML/MIN/1.73
EOSINOPHIL # BLD AUTO: 0.22 10*3/MM3 (ref 0–0.4)
EOSINOPHIL NFR BLD AUTO: 3.5 % (ref 0.3–6.2)
ERYTHROCYTE [DISTWIDTH] IN BLOOD BY AUTOMATED COUNT: 12.9 % (ref 12.3–15.4)
GLOBULIN UR ELPH-MCNC: 2.8 GM/DL
GLUCOSE SERPL-MCNC: 111 MG/DL (ref 65–99)
HBA1C MFR BLD: 5.6 % (ref 4.8–5.6)
HCT VFR BLD AUTO: 51.5 % (ref 37.5–51)
HDLC SERPL-MCNC: 37 MG/DL (ref 40–60)
HGB BLD-MCNC: 18 G/DL (ref 13–17.7)
IMM GRANULOCYTES # BLD AUTO: 0.02 10*3/MM3 (ref 0–0.05)
IMM GRANULOCYTES NFR BLD AUTO: 0.3 % (ref 0–0.5)
LDLC SERPL CALC-MCNC: 71 MG/DL (ref 0–100)
LDLC/HDLC SERPL: 1.84 {RATIO}
LYMPHOCYTES # BLD AUTO: 2.28 10*3/MM3 (ref 0.7–3.1)
LYMPHOCYTES NFR BLD AUTO: 36 % (ref 19.6–45.3)
MCH RBC QN AUTO: 30.6 PG (ref 26.6–33)
MCHC RBC AUTO-ENTMCNC: 35 G/DL (ref 31.5–35.7)
MCV RBC AUTO: 87.4 FL (ref 79–97)
MONOCYTES # BLD AUTO: 0.56 10*3/MM3 (ref 0.1–0.9)
MONOCYTES NFR BLD AUTO: 8.8 % (ref 5–12)
NEUTROPHILS NFR BLD AUTO: 3.2 10*3/MM3 (ref 1.7–7)
NEUTROPHILS NFR BLD AUTO: 50.5 % (ref 42.7–76)
NRBC BLD AUTO-RTO: 0 /100 WBC (ref 0–0.2)
PLATELET # BLD AUTO: 266 10*3/MM3 (ref 140–450)
PMV BLD AUTO: 9.9 FL (ref 6–12)
POTASSIUM SERPL-SCNC: 4.8 MMOL/L (ref 3.5–5.2)
PROT SERPL-MCNC: 7.1 G/DL (ref 6–8.5)
RBC # BLD AUTO: 5.89 10*6/MM3 (ref 4.14–5.8)
SODIUM SERPL-SCNC: 137 MMOL/L (ref 136–145)
TRIGL SERPL-MCNC: 125 MG/DL (ref 0–150)
VIT B12 BLD-MCNC: 466 PG/ML (ref 211–946)
VLDLC SERPL-MCNC: 22 MG/DL (ref 5–40)
WBC NRBC COR # BLD: 6.34 10*3/MM3 (ref 3.4–10.8)

## 2023-02-13 RX ORDER — ROSUVASTATIN CALCIUM 40 MG/1
40 TABLET, COATED ORAL NIGHTLY
Qty: 30 TABLET | Refills: 0 | Status: SHIPPED | OUTPATIENT
Start: 2023-02-13 | End: 2023-03-17

## 2023-02-13 RX ORDER — METOPROLOL SUCCINATE 50 MG/1
50 TABLET, EXTENDED RELEASE ORAL DAILY
Qty: 30 TABLET | Refills: 0 | Status: SHIPPED | OUTPATIENT
Start: 2023-02-13 | End: 2023-03-27

## 2023-02-19 PROBLEM — D75.1 POLYCYTHEMIA: Status: ACTIVE | Noted: 2023-02-19

## 2023-02-22 ENCOUNTER — OFFICE VISIT (OUTPATIENT)
Dept: FAMILY MEDICINE CLINIC | Facility: CLINIC | Age: 53
End: 2023-02-22
Payer: COMMERCIAL

## 2023-02-22 VITALS
WEIGHT: 285.2 LBS | HEART RATE: 88 BPM | BODY MASS INDEX: 39.93 KG/M2 | DIASTOLIC BLOOD PRESSURE: 80 MMHG | SYSTOLIC BLOOD PRESSURE: 128 MMHG | HEIGHT: 71 IN | OXYGEN SATURATION: 95 % | TEMPERATURE: 97.9 F

## 2023-02-22 DIAGNOSIS — E55.9 VITAMIN D DEFICIENCY: ICD-10-CM

## 2023-02-22 DIAGNOSIS — E66.01 CLASS 2 SEVERE OBESITY WITH SERIOUS COMORBIDITY AND BODY MASS INDEX (BMI) OF 39.0 TO 39.9 IN ADULT, UNSPECIFIED OBESITY TYPE: ICD-10-CM

## 2023-02-22 DIAGNOSIS — N18.2 CONTROLLED TYPE 2 DIABETES MELLITUS WITH STAGE 2 CHRONIC KIDNEY DISEASE, WITHOUT LONG-TERM CURRENT USE OF INSULIN: ICD-10-CM

## 2023-02-22 DIAGNOSIS — N18.2 STAGE 2 CHRONIC KIDNEY DISEASE: ICD-10-CM

## 2023-02-22 DIAGNOSIS — I10 ESSENTIAL HYPERTENSION: ICD-10-CM

## 2023-02-22 DIAGNOSIS — E11.22 CONTROLLED TYPE 2 DIABETES MELLITUS WITH STAGE 2 CHRONIC KIDNEY DISEASE, WITHOUT LONG-TERM CURRENT USE OF INSULIN: ICD-10-CM

## 2023-02-22 DIAGNOSIS — E78.2 MIXED HYPERLIPIDEMIA: ICD-10-CM

## 2023-02-22 DIAGNOSIS — D75.1 POLYCYTHEMIA: ICD-10-CM

## 2023-02-22 DIAGNOSIS — R79.89 LOW TESTOSTERONE: ICD-10-CM

## 2023-02-22 DIAGNOSIS — F90.0 ATTENTION DEFICIT HYPERACTIVITY DISORDER (ADHD), PREDOMINANTLY INATTENTIVE TYPE: Primary | ICD-10-CM

## 2023-02-22 DIAGNOSIS — K76.0 FATTY LIVER: ICD-10-CM

## 2023-02-22 PROCEDURE — 3044F HG A1C LEVEL LT 7.0%: CPT | Performed by: FAMILY MEDICINE

## 2023-02-22 PROCEDURE — 99214 OFFICE O/P EST MOD 30 MIN: CPT | Performed by: FAMILY MEDICINE

## 2023-02-22 RX ORDER — DEXTROAMPHETAMINE SACCHARATE, AMPHETAMINE ASPARTATE MONOHYDRATE, DEXTROAMPHETAMINE SULFATE AND AMPHETAMINE SULFATE 6.25; 6.25; 6.25; 6.25 MG/1; MG/1; MG/1; MG/1
25 CAPSULE, EXTENDED RELEASE ORAL EVERY MORNING
Qty: 30 CAPSULE | Refills: 0 | Status: SHIPPED | OUTPATIENT
Start: 2023-02-22 | End: 2023-03-22 | Stop reason: SDUPTHER

## 2023-02-22 RX ORDER — DEXTROAMPHETAMINE SACCHARATE, AMPHETAMINE ASPARTATE MONOHYDRATE, DEXTROAMPHETAMINE SULFATE AND AMPHETAMINE SULFATE 6.25; 6.25; 6.25; 6.25 MG/1; MG/1; MG/1; MG/1
25 CAPSULE, EXTENDED RELEASE ORAL EVERY MORNING
COMMUNITY
Start: 2023-01-23 | End: 2023-02-22 | Stop reason: SDUPTHER

## 2023-02-22 RX ORDER — CELECOXIB 50 MG/1
CAPSULE ORAL
Qty: 60 CAPSULE | Refills: 3 | Status: SHIPPED | OUTPATIENT
Start: 2023-02-22 | End: 2023-03-22

## 2023-02-28 ENCOUNTER — TRANSCRIBE ORDERS (OUTPATIENT)
Dept: LAB | Facility: HOSPITAL | Age: 53
End: 2023-02-28
Payer: COMMERCIAL

## 2023-02-28 ENCOUNTER — LAB (OUTPATIENT)
Dept: LAB | Facility: HOSPITAL | Age: 53
End: 2023-02-28
Payer: COMMERCIAL

## 2023-02-28 DIAGNOSIS — R53.82 CHRONIC FATIGUE: ICD-10-CM

## 2023-02-28 DIAGNOSIS — Z79.899 ENCOUNTER FOR LONG-TERM (CURRENT) USE OF OTHER MEDICATIONS: ICD-10-CM

## 2023-02-28 DIAGNOSIS — E29.1 HYPOGONADISM MALE: ICD-10-CM

## 2023-02-28 DIAGNOSIS — E29.1 HYPOGONADISM MALE: Primary | ICD-10-CM

## 2023-02-28 LAB
BASOPHILS # BLD AUTO: 0.08 10*3/MM3 (ref 0–0.2)
BASOPHILS NFR BLD AUTO: 1.3 % (ref 0–1.5)
DEPRECATED RDW RBC AUTO: 43.5 FL (ref 37–54)
EOSINOPHIL # BLD AUTO: 0.29 10*3/MM3 (ref 0–0.4)
EOSINOPHIL NFR BLD AUTO: 4.6 % (ref 0.3–6.2)
ERYTHROCYTE [DISTWIDTH] IN BLOOD BY AUTOMATED COUNT: 13.4 % (ref 12.3–15.4)
HCT VFR BLD AUTO: 50.1 % (ref 37.5–51)
HGB BLD-MCNC: 16.7 G/DL (ref 13–17.7)
LYMPHOCYTES # BLD AUTO: 1.99 10*3/MM3 (ref 0.7–3.1)
LYMPHOCYTES NFR BLD AUTO: 31.6 % (ref 19.6–45.3)
MCH RBC QN AUTO: 29.9 PG (ref 26.6–33)
MCHC RBC AUTO-ENTMCNC: 33.3 G/DL (ref 31.5–35.7)
MCV RBC AUTO: 89.6 FL (ref 79–97)
MONOCYTES # BLD AUTO: 0.62 10*3/MM3 (ref 0.1–0.9)
MONOCYTES NFR BLD AUTO: 9.8 % (ref 5–12)
NEUTROPHILS NFR BLD AUTO: 3.28 10*3/MM3 (ref 1.7–7)
NEUTROPHILS NFR BLD AUTO: 52.1 % (ref 42.7–76)
PLATELET # BLD AUTO: 256 10*3/MM3 (ref 140–450)
PMV BLD AUTO: 9.6 FL (ref 6–12)
RBC # BLD AUTO: 5.59 10*6/MM3 (ref 4.14–5.8)
TESTOST SERPL-MCNC: 353 NG/DL (ref 193–740)
WBC NRBC COR # BLD: 6.3 10*3/MM3 (ref 3.4–10.8)

## 2023-02-28 PROCEDURE — 84403 ASSAY OF TOTAL TESTOSTERONE: CPT

## 2023-02-28 PROCEDURE — 85027 COMPLETE CBC AUTOMATED: CPT

## 2023-03-10 RX ORDER — FEXOFENADINE HYDROCHLORIDE 60 MG/1
TABLET, FILM COATED ORAL
Qty: 60 TABLET | Refills: 0 | Status: SHIPPED | OUTPATIENT
Start: 2023-03-10

## 2023-03-15 PROBLEM — E11.22 CONTROLLED TYPE 2 DIABETES MELLITUS WITH STAGE 2 CHRONIC KIDNEY DISEASE, WITHOUT LONG-TERM CURRENT USE OF INSULIN: Status: ACTIVE | Noted: 2023-03-15

## 2023-03-15 PROBLEM — N18.2 CONTROLLED TYPE 2 DIABETES MELLITUS WITH STAGE 2 CHRONIC KIDNEY DISEASE, WITHOUT LONG-TERM CURRENT USE OF INSULIN: Status: ACTIVE | Noted: 2023-03-15

## 2023-03-17 RX ORDER — ROSUVASTATIN CALCIUM 40 MG/1
40 TABLET, COATED ORAL NIGHTLY
Qty: 30 TABLET | Refills: 0 | Status: SHIPPED | OUTPATIENT
Start: 2023-03-17

## 2023-03-22 ENCOUNTER — OFFICE VISIT (OUTPATIENT)
Dept: FAMILY MEDICINE CLINIC | Facility: CLINIC | Age: 53
End: 2023-03-22
Payer: COMMERCIAL

## 2023-03-22 VITALS
HEIGHT: 71 IN | HEART RATE: 68 BPM | DIASTOLIC BLOOD PRESSURE: 72 MMHG | BODY MASS INDEX: 40.74 KG/M2 | WEIGHT: 291 LBS | TEMPERATURE: 97.6 F | OXYGEN SATURATION: 96 % | SYSTOLIC BLOOD PRESSURE: 116 MMHG

## 2023-03-22 DIAGNOSIS — K21.9 GASTROESOPHAGEAL REFLUX DISEASE, UNSPECIFIED WHETHER ESOPHAGITIS PRESENT: ICD-10-CM

## 2023-03-22 DIAGNOSIS — N18.2 STAGE 2 CHRONIC KIDNEY DISEASE: ICD-10-CM

## 2023-03-22 DIAGNOSIS — I10 ESSENTIAL HYPERTENSION: ICD-10-CM

## 2023-03-22 DIAGNOSIS — F90.0 ATTENTION DEFICIT HYPERACTIVITY DISORDER (ADHD), PREDOMINANTLY INATTENTIVE TYPE: ICD-10-CM

## 2023-03-22 DIAGNOSIS — Z99.89 OSA ON CPAP: ICD-10-CM

## 2023-03-22 DIAGNOSIS — Z98.890 S/P CARPAL TUNNEL RELEASE: ICD-10-CM

## 2023-03-22 DIAGNOSIS — E11.22 CONTROLLED TYPE 2 DIABETES MELLITUS WITH STAGE 2 CHRONIC KIDNEY DISEASE, WITHOUT LONG-TERM CURRENT USE OF INSULIN: ICD-10-CM

## 2023-03-22 DIAGNOSIS — N18.2 CONTROLLED TYPE 2 DIABETES MELLITUS WITH STAGE 2 CHRONIC KIDNEY DISEASE, WITHOUT LONG-TERM CURRENT USE OF INSULIN: ICD-10-CM

## 2023-03-22 DIAGNOSIS — E66.01 MORBID (SEVERE) OBESITY DUE TO EXCESS CALORIES: ICD-10-CM

## 2023-03-22 DIAGNOSIS — E55.9 VITAMIN D DEFICIENCY: ICD-10-CM

## 2023-03-22 DIAGNOSIS — G47.33 OSA ON CPAP: ICD-10-CM

## 2023-03-22 DIAGNOSIS — R79.89 LOW TESTOSTERONE: ICD-10-CM

## 2023-03-22 DIAGNOSIS — M25.531 WRIST PAIN, RIGHT: Primary | ICD-10-CM

## 2023-03-22 DIAGNOSIS — E78.2 MIXED HYPERLIPIDEMIA: ICD-10-CM

## 2023-03-22 RX ORDER — DEXTROAMPHETAMINE SACCHARATE, AMPHETAMINE ASPARTATE MONOHYDRATE, DEXTROAMPHETAMINE SULFATE AND AMPHETAMINE SULFATE 6.25; 6.25; 6.25; 6.25 MG/1; MG/1; MG/1; MG/1
25 CAPSULE, EXTENDED RELEASE ORAL EVERY MORNING
Qty: 30 CAPSULE | Refills: 0 | Status: SHIPPED | OUTPATIENT
Start: 2023-03-22

## 2023-03-22 NOTE — PATIENT INSTRUCTIONS
Labwork before next visit along with UDS  Recheck in 2 months     Start on diclofenac topical gel up to 4 times a day for right wrist.  Stop celebrex and ibuprofen    Refer to ROLAN Simon. With Orthopedic for right wrist pain/history of carpal tunnel    Call us if still having issues getting Adderall may have to do short acting medication     Get x-ray of wrist today

## 2023-03-27 RX ORDER — METOPROLOL SUCCINATE 50 MG/1
50 TABLET, EXTENDED RELEASE ORAL DAILY
Qty: 30 TABLET | Refills: 3 | Status: SHIPPED | OUTPATIENT
Start: 2023-03-27

## 2023-04-04 ENCOUNTER — OFFICE VISIT (OUTPATIENT)
Dept: ORTHOPEDIC SURGERY | Facility: CLINIC | Age: 53
End: 2023-04-04
Payer: COMMERCIAL

## 2023-04-04 VITALS — BODY MASS INDEX: 39.63 KG/M2 | HEIGHT: 71 IN | WEIGHT: 283.1 LBS

## 2023-04-04 DIAGNOSIS — R20.0 NUMBNESS AND TINGLING IN RIGHT HAND: ICD-10-CM

## 2023-04-04 DIAGNOSIS — M25.531 RIGHT WRIST PAIN: Primary | ICD-10-CM

## 2023-04-04 DIAGNOSIS — R20.2 NUMBNESS AND TINGLING IN RIGHT HAND: ICD-10-CM

## 2023-04-04 PROCEDURE — 99214 OFFICE O/P EST MOD 30 MIN: CPT | Performed by: NURSE PRACTITIONER

## 2023-04-04 NOTE — PROGRESS NOTES
Randall Hernandez is a 52 y.o. male   Primary provider:  Scottie Borges MD       Chief Complaint   Patient presents with   • Right Wrist - Pain       HISTORY OF PRESENT ILLNESS:      Mr. Hernandez is a 52-year-old male who presents today with complaints of right wrist and hand pain.  He reports numbness, tingling in right hand.  He reports aching pain that starts in his wrist and shoots into his forearm.  Pain is mild to severe.  Sometimes pain is intermittent other times it is constant.  Symptoms are worse at night and sometimes wake him from sleep.  Symptoms are worse when holding phone or riding his motorcycle.  He had bilateral CTR in 2018 by Dr. Mathias.  He reports some mild chronic neck pain.  No injuries or traumas.  No fever, nausea, vomiting.  He has tried ibuprofen without relief of symptoms.  Cannot be reproduced reliably with movement of wrist.    Pain  This is a new problem. The current episode started more than 1 month ago. The problem occurs constantly. Associated symptoms include arthralgias and numbness. Nothing (night pain ) aggravates the symptoms. Treatments tried: topical rub  The treatment provided mild relief.        CONCURRENT MEDICAL HISTORY:    Past Medical History:   Diagnosis Date   • Allergic rhinitis    • Arthritis    • Backache    • Cellulitis of leg, except foot    • Dyslipidemia    • GERD (gastroesophageal reflux disease)    • Heartburn    • History of Holter monitoring 02/26/2016    Sinus mechanism with a normal average heart rate.No evidence of chronotropic incompetence.Asymptomatic Holter   • Hyperlipidemia    • Hypertension    • Low back pain    • Numbness of lower limb     left leg numbness and tingling      • Obesity, unspecified    • Obstructive sleep apnea of adult     USING C-PAP   • Pain in left hip    • Pain in right hip    • Palpitations    • Skin lesion     insect bite.      • Supraventricular tachycardia    • Weight loss     advised       Allergies   Allergen  "Reactions   • Cats Claw (Uncaria Tomentosa) Itching         Current Outpatient Medications:   •  amphetamine-dextroamphetamine XR (ADDERALL XR) 25 MG 24 hr capsule, Take 1 capsule by mouth Every Morning, Disp: 30 capsule, Rfl: 0  •  B-D 3CC LUER-STEFFEN SYR 04NR0-0/2 22G X 1-1/2\" 3 ML misc, , Disp: , Rfl:   •  Diclofenac Sodium (VOLTAREN) 1 % gel gel, Apply 4 g topically to the appropriate area as directed 4 (Four) Times a Day., Disp: 350 g, Rfl: 3  •  fexofenadine (ALLEGRA) 60 MG tablet, TAKE 1 TABLET BY MOUTH TWICE DAILY, Disp: 60 tablet, Rfl: 0  •  fluticasone (FLONASE) 50 MCG/ACT nasal spray, SHAKE LIQUID AND USE 2 SPRAYS IN EACH NOSTRIL DAILY, Disp: 16 g, Rfl: 3  •  lisinopril (PRINIVIL,ZESTRIL) 20 MG tablet, TAKE 1 TABLET BY MOUTH DAILY, Disp: 30 tablet, Rfl: 0  •  metFORMIN ER (GLUCOPHAGE-XR) 500 MG 24 hr tablet, TAKE 1 TABLET BY MOUTH DAILY WITH BREAKFAST, Disp: 30 tablet, Rfl: 3  •  metoprolol succinate XL (TOPROL-XL) 50 MG 24 hr tablet, TAKE 1 TABLET BY MOUTH DAILY, Disp: 30 tablet, Rfl: 3  •  montelukast (SINGULAIR) 10 MG tablet, TAKE 1 TABLET BY MOUTH EVERY NIGHT, Disp: 30 tablet, Rfl: 3  •  pantoprazole (PROTONIX) 40 MG EC tablet, TAKE 1 TABLET BY MOUTH DAILY, Disp: 30 tablet, Rfl: 3  •  rosuvastatin (CRESTOR) 40 MG tablet, TAKE 1 TABLET BY MOUTH EVERY NIGHT, Disp: 30 tablet, Rfl: 0  •  tadalafil (Cialis) 10 MG tablet, Take 1 tablet by mouth Daily As Needed for Erectile Dysfunction., Disp: 30 tablet, Rfl: 3  •  Testosterone Cypionate (DEPOTESTOTERONE CYPIONATE) 200 MG/ML injection, , Disp: , Rfl:     Past Surgical History:   Procedure Laterality Date   • CARPAL TUNNEL RELEASE Right 11/2/2018    Procedure: CARPAL TUNNEL RELEASE;  Surgeon: Judd Mathias MD;  Location: [facility];  Service: Orthopedics   • CARPAL TUNNEL RELEASE  12/07/2018    Left   • CARPAL TUNNEL RELEASE Left 12/7/2018    Procedure: CARPAL TUNNEL RELEASE;  Surgeon: Judd Mathias MD;  Location: [facility];  Service: Orthopedics " "  • COLONOSCOPY N/A 2019    Procedure: COLONOSCOPY a friday please ;  Surgeon: Ruslan Bowers MD;  Location: [facility] ENDOSCOPY;  Service: Gastroenterology   • ENDOSCOPY     • ENDOSCOPY N/A 2020    Procedure: ESOPHAGOGASTRODUODENOSCOPY possible dilation ;  Surgeon: Ruslan Bowers MD;  Location: [facility] ENDOSCOPY;  Service: Gastroenterology;  Laterality: N/A;   • PYLOROMYOTOMY         Family History   Problem Relation Age of Onset   • Colon cancer Mother         onset age greater than 75y   • Diabetes Mother    • Breast cancer Sister    • Cancer Brother         bladder   • Diabetes Brother    • Hyperlipidemia Brother         Social History     Socioeconomic History   • Marital status: Significant Other   Tobacco Use   • Smoking status: Former     Packs/day: 1.00     Years: 6.00     Pack years: 6.00     Types: Cigarettes     Quit date:      Years since quittin.2   • Smokeless tobacco: Never   Vaping Use   • Vaping Use: Never used   Substance and Sexual Activity   • Alcohol use: Yes     Comment: occassional   • Drug use: No   • Sexual activity: Defer        Review of Systems   Constitutional: Negative.    HENT: Negative.    Eyes: Negative.    Respiratory: Negative.    Cardiovascular: Negative.    Gastrointestinal: Negative.    Endocrine: Negative.    Genitourinary: Negative.    Musculoskeletal: Positive for arthralgias.   Skin: Negative.    Allergic/Immunologic: Negative.    Neurological: Positive for numbness.        Tingling   Hematological: Negative.    Psychiatric/Behavioral: Negative.        PHYSICAL EXAMINATION:       Ht 180.3 cm (71\")   Wt 128 kg (283 lb 1.6 oz)   BMI 39.48 kg/m²     Physical Exam  Vitals and nursing note reviewed.   Constitutional:       General: He is not in acute distress.     Appearance: He is well-developed. He is not toxic-appearing.   HENT:      Head: Normocephalic.   Pulmonary:      Effort: Pulmonary effort is normal. No respiratory distress.   Skin:     " General: Skin is warm and dry.   Neurological:      Mental Status: He is alert and oriented to person, place, and time.   Psychiatric:         Behavior: Behavior normal.         Thought Content: Thought content normal.         Judgment: Judgment normal.         GAIT:     []  Normal  []  Antalgic    Assistive device: []  None  []  Walker     []  Crutches  []  Cane     []  Wheelchair  []  Stretcher    Right Hand Exam     Tests   Phalen’s sign: positive  Tinel's sign (median nerve): negative  Finkelstein's test: negative    Comments:  Normal range of motion.  No pain with range of motion.  No evidence of infection.  Fingers are warm, pink, with good capillary refill              XR Wrist 3+ View Right    Result Date: 3/24/2023  Narrative: EXAMINATION:  XR WRIST 3+ VW RIGHT CLINICAL HISTORY:  52 years Male,wrist pain, M25.531 Pain in right wrist Z98.890 Other specified postprocedural states COMPARISON:  5/16/2018 plain films FINDINGS:  The right wrist is negative for acute fracture. No dislocation. No appreciable degenerative change. No radiopaque foreign body.     Impression: Negative exam of the right wrist. Electronically signed by:  Kamron Vasquez MD  3/24/2023 8:07 PM CDT Workstation: [id number]          ASSESSMENT:    Diagnoses and all orders for this visit:    Right wrist pain    Numbness and tingling in right hand  -     EMG & Nerve Conduction Test; Future          PLAN    Most recent x-rays reviewed.  Op note from 2018 reviewed.  Clinical history sound suspicious for recurrence of carpal tunnel.  Patient has positive Phalen's but negative Tinel's today.  Recommend proceeding with EMG.  Patient agreeable.  EMG ordered.  Patient to continue occasional OTC NSAID use.  Patient also given brace to wear at night.  Patient to return for EMG results.    Return for EMG results .    EMR Dragon/Transciption Disclaimer: Some of this note may be an electronic transcription/translation of spoken language to printed text.  The  electronic translation of spoken language may permit erroneous, or at times, nonsensical words or phrases to be inadvertently transcribed. Although I have reviewed the note for such errors, some may still exist.     This document has been electronically signed by Susana GONGORA on April 4, 2023 13:16 CDT

## 2023-04-17 RX ORDER — ROSUVASTATIN CALCIUM 40 MG/1
40 TABLET, COATED ORAL NIGHTLY
Qty: 30 TABLET | Refills: 0 | Status: SHIPPED | OUTPATIENT
Start: 2023-04-17

## 2023-04-18 NOTE — PROGRESS NOTES
Subjective:  Randall Hernandez is a 52 y.o. male who presents for       Patient Active Problem List   Diagnosis   • Hyperlipidemia   • Inattention   • Non morbid obesity   • Prediabetes   • Attention deficit hyperactivity disorder (ADHD), combined type   • Need for vaccination   • Gastroesophageal reflux disease   • Encounter for drug screening   • Tingling in extremities   • Elevated BP without diagnosis of hypertension   • Allergic rhinitis   • Elevated blood pressure reading   • Pruritic rash   • Numbness in both hands   • Bilateral elbow joint pain   • Pain in both wrists   • Ulnar neuropathy of left upper extremity   • General medical examination   • Bilateral carpal tunnel syndrome   • History of prediabetes   • Numbness and tingling in both hands   • Essential hypertension   • Cerumen debris on tympanic membrane of both ears   • Carpal tunnel syndrome   • S/P carpal tunnel release   • Encounter for screening for malignant neoplasm of colon   • FH: colon cancer   • Acute recurrent sinusitis   • Attention deficit hyperactivity disorder (ADHD), predominantly inattentive type   • Class 2 obesity with body mass index (BMI) of 35.0 to 35.9 in adult   • Annual physical exam   • Arthritis of back   • Scoliosis   • Hyponatremia   • Hypochloremia   • Stage 2 chronic kidney disease   • Epigastric pain   • Bilious vomiting with nausea   • Fatty liver   • Dysphagia   • Nausea and vomiting   • Gastritis without bleeding   • Class 2 obesity with body mass index (BMI) of 38.0 to 38.9 in adult   • Mixed hyperlipidemia   • Dizziness   • Hypotension   • Class 2 obesity with body mass index (BMI) of 39.0 to 39.9 in adult   • Normocytic anemia   • Vitamin D deficiency   • Anxiety   • Class 2 severe obesity with serious comorbidity and body mass index (BMI) of 39.0 to 39.9 in adult   • Palpitations   • Tachycardia   • Erectile dysfunction   • Morbid (severe) obesity due to excess calories   • Right shoulder pain   • Bilateral  "hip pain   • PAULINA on CPAP   • Low testosterone   • Controlled type 2 diabetes mellitus with complication, without long-term current use of insulin   • Class 2 severe obesity with serious comorbidity and body mass index (BMI) of 37.0 to 37.9 in adult   • Polycythemia   • Controlled type 2 diabetes mellitus with stage 2 chronic kidney disease, without long-term current use of insulin   • Right wrist pain           Current Outpatient Medications:   •  amphetamine-dextroamphetamine XR (ADDERALL XR) 25 MG 24 hr capsule, Take 1 capsule by mouth Every Morning, Disp: 30 capsule, Rfl: 0  •  B-D 3CC LUER-STEFFEN SYR 60VB6-1/2 22G X 1-1/2\" 3 ML misc, , Disp: , Rfl:   •  fexofenadine (ALLEGRA) 60 MG tablet, TAKE 1 TABLET BY MOUTH TWICE DAILY, Disp: 60 tablet, Rfl: 0  •  fluticasone (FLONASE) 50 MCG/ACT nasal spray, SHAKE LIQUID AND USE 2 SPRAYS IN EACH NOSTRIL DAILY, Disp: 16 g, Rfl: 3  •  lisinopril (PRINIVIL,ZESTRIL) 20 MG tablet, TAKE 1 TABLET BY MOUTH DAILY, Disp: 30 tablet, Rfl: 0  •  metFORMIN ER (GLUCOPHAGE-XR) 500 MG 24 hr tablet, TAKE 1 TABLET BY MOUTH DAILY WITH BREAKFAST, Disp: 30 tablet, Rfl: 3  •  metoprolol succinate XL (TOPROL-XL) 50 MG 24 hr tablet, TAKE 1 TABLET BY MOUTH DAILY, Disp: 30 tablet, Rfl: 3  •  montelukast (SINGULAIR) 10 MG tablet, TAKE 1 TABLET BY MOUTH EVERY NIGHT, Disp: 30 tablet, Rfl: 3  •  pantoprazole (PROTONIX) 40 MG EC tablet, TAKE 1 TABLET BY MOUTH DAILY, Disp: 30 tablet, Rfl: 3  •  rosuvastatin (CRESTOR) 40 MG tablet, TAKE 1 TABLET BY MOUTH EVERY NIGHT, Disp: 30 tablet, Rfl: 0  •  tadalafil (Cialis) 10 MG tablet, Take 1 tablet by mouth Daily As Needed for Erectile Dysfunction., Disp: 30 tablet, Rfl: 3  •  Testosterone Cypionate (DEPOTESTOTERONE CYPIONATE) 200 MG/ML injection, , Disp: , Rfl:        Pt is 53 yo male with management  of morbid obesity, history of prediabetes, HTN,  GERD, vitamin D deficiency, alelrgic rhinitis, ADHD predominantly inattentive type ,sp bilateral carpal tunnel " release surgery,  Chronic back pain (arthritis of lumbar spine, short pedicles, mild scoliosis, mild arthritis thoracic spine).  CKD stage 2 , fatty liver, gastirits, esophagitis, PAULINA, echocardiogram on 6/15/21 (grade 1 diastolic dysfunction, right ventricular dilation    2/22/23 in office visit for recheck. Pt had labwork on 2/10/22 that showed UDS consistent with use of ADD medication. On lipid panel HDL low a 37. Vitamin b12 and vitamin D normal hga1c normal CMP showed GFR at 71.2 from 84.4 normal liver enzymes. CBC showed hemoglobin at 18.0 HCT at 51.5 and RBC at 5.89. pt doing well overall no chest pain no dizziness. Breathing stable. He continues to take his testosterone injections. He is using his CPAP machine     3/22/23 in office visit for recheck. And refill on ADD medication His testosterone levels on 3/28/23 was normal. Pt continues to take his medications for ADD, HTN, HLP, DM type 2. Denies any chest pain no dizziness. BP stable today. He gained 6 lbs since his last visit.  His pharmacy currently does not have Adderall due to shortage. His main issue is right wrist pain that radiates to his forearm. He did have carpal tunnel release surgery in 2018 with Dr. Mathias. No trauma no recent injury. Ice make it worse. Ibuprofen does help     5/23/23 in office visit for recheck. Pt saw Orthopedic on 4/4/23 for his right wrist pain and was ordered EMG for possible carpal tunnel. Pt had labwork done on 4/3/23 that showed UDS consistent with taking ADD medication vitamin D and b12 stable lipid panel  Showed LDL at 113 hga1c at 5.80. CMP showed GFR at 78.9. and normal liver enzymes. CBC showed hemoglobin at 17.9. pt doing well overall no chset pain no dizziness. Breathing stable. He is doing well on ADHD medication. He is back on Adderall XL mg daily.      HPIDiabetes  He presents for his follow-up diabetic visit. He has type 2 diabetes mellitus. His disease course has been stable. Pertinent negatives for  hypoglycemia include no headaches. Associated symptoms include fatigue and weakness. Pertinent negatives for diabetes include no chest pain. Risk factors for coronary artery disease include diabetes mellitus, male sex, obesity, hypertension, sedentary lifestyle and stress. When asked about meal planning, he reported none. He has had a previous visit with a dietitian. He participates in exercise daily. An ACE inhibitor/angiotensin II receptor blocker is being taken. He does not see a podiatrist.Eye exam is not current.   Male  Problem  The patient's pertinent negatives include no genital injury, genital itching, genital lesions, pelvic pain, penile discharge, penile pain, priapism, scrotal swelling or testicular pain. Primary symptoms comment: low libido . This is a new problem. The current episode started today. The problem has been unchanged. Pertinent negatives include no anorexia, chest pain, chills, constipation, coughing, diarrhea, discolored urine, dysuria, fever, flank pain, frequency, headaches, hematuria, hesitancy, joint pain, joint swelling, nausea, painful intercourse, rash, shortness of breath, sore throat, urgency, urinary retention or vomiting. Nothing aggravates the symptoms. He has tried nothing for the symptoms. The treatment provided no relief. He is sexually active. He consistently uses condoms. There is no history of BPH, chlamydia, cryptorchidism, erectile aid use, erectile dysfunction, a femoral hernia, gonorrhea, herpes simplex, HIV, an inguinal hernia, kidney stones, prostatitis, sickle cell disease, syphilis or varicocele.   Chronic Kidney Disease  This is a chronic problem. The current episode started more than 1 year ago. The problem occurs constantly. The problem has been unchanged. Associated symptoms include arthralgias and fatigue. Pertinent negatives include no abdominal pain, anorexia, change in bowel habit, chest  pain, chills, congestion, coughing, diaphoresis, fever, headaches, joint swelling, myalgias, nausea, neck pain, numbness, rash, sore throat, swollen glands, urinary symptoms, vertigo, visual change, vomiting or weakness. Nothing aggravates the symptoms. He has tried nothing for the symptoms. The treatment provided no relief.   Hyperlipidemia  This is a chronic problem. The current episode started more than 1 year ago. The problem is uncontrolled. Exacerbating diseases include liver disease and obesity. He has no history of chronic renal disease, diabetes, hypothyroidism or nephrotic syndrome. Current antihyperlipidemic treatment includes statins. The current treatment provides moderate improvement of lipids. Risk factors for coronary artery disease include male sex, obesity, hypertension, dyslipidemia and a sedentary lifestyle.   Obesity   This is a chronic problem. The current episode started more than 1 year ago. The problem occurs constantly. The problem has been worsening  Associated symptoms include arthralgias and numbness. Pertinent negatives include no abdominal pain, anorexia, change in bowel habit, chest pain, chills, congestion, coughing, diaphoresis, fatigue, fever, headaches, joint swelling, myalgias, nausea, rash, sore throat, swollen glands, urinary symptoms, vertigo, visual change, vomiting or weakness. Nothing aggravates the symptoms. He has tried nothing for the symptoms. The treatment provided no relief.   Hypertension   This is a chronic problem. The current episode started more than 1year ago. The problem has been gradually improving tThe problem is controlled. Pertinent negatives include no anxiety, blurred vision, chest pain, headaches, malaise/fatigue, neck pain, orthopnea, palpitations, peripheral edema, PND, shortness of breath or sweats. Risk factors for coronary artery disease include male gender, obesity and sedentary lifestyle. Past treatments include diuretics and ace-inhibitor There  are no compliance problems. Treatment has provided significant relief     Review of Systems  Review of Systems   Constitutional: Positive for activity change and fatigue. Negative for appetite change, chills and diaphoresis.   HENT: Negative for congestion, postnasal drip, rhinorrhea, sinus pressure, sinus pain, sneezing, sore throat, trouble swallowing and voice change.    Respiratory: Negative for cough, choking, chest tightness, shortness of breath, wheezing and stridor.    Cardiovascular: Negative for chest pain.   Gastrointestinal: Negative for diarrhea, nausea and vomiting.   Musculoskeletal: Positive for arthralgias.   Neurological: Positive for weakness. Negative for headaches.   Psychiatric/Behavioral: Positive for decreased concentration.       Patient Active Problem List   Diagnosis   • Hyperlipidemia   • Inattention   • Non morbid obesity   • Prediabetes   • Attention deficit hyperactivity disorder (ADHD), combined type   • Need for vaccination   • Gastroesophageal reflux disease   • Encounter for drug screening   • Tingling in extremities   • Elevated BP without diagnosis of hypertension   • Allergic rhinitis   • Elevated blood pressure reading   • Pruritic rash   • Numbness in both hands   • Bilateral elbow joint pain   • Pain in both wrists   • Ulnar neuropathy of left upper extremity   • General medical examination   • Bilateral carpal tunnel syndrome   • History of prediabetes   • Numbness and tingling in both hands   • Essential hypertension   • Cerumen debris on tympanic membrane of both ears   • Carpal tunnel syndrome   • S/P carpal tunnel release   • Encounter for screening for malignant neoplasm of colon   • FH: colon cancer   • Acute recurrent sinusitis   • Attention deficit hyperactivity disorder (ADHD), predominantly inattentive type   • Class 2 obesity with body mass index (BMI) of 35.0 to 35.9 in adult   • Annual physical exam   • Arthritis of back   • Scoliosis   • Hyponatremia   •  Hypochloremia   • Stage 2 chronic kidney disease   • Epigastric pain   • Bilious vomiting with nausea   • Fatty liver   • Dysphagia   • Nausea and vomiting   • Gastritis without bleeding   • Class 2 obesity with body mass index (BMI) of 38.0 to 38.9 in adult   • Mixed hyperlipidemia   • Dizziness   • Hypotension   • Class 2 obesity with body mass index (BMI) of 39.0 to 39.9 in adult   • Normocytic anemia   • Vitamin D deficiency   • Anxiety   • Class 2 severe obesity with serious comorbidity and body mass index (BMI) of 39.0 to 39.9 in adult   • Palpitations   • Tachycardia   • Erectile dysfunction   • Morbid (severe) obesity due to excess calories   • Right shoulder pain   • Bilateral hip pain   • PAULINA on CPAP   • Low testosterone   • Controlled type 2 diabetes mellitus with complication, without long-term current use of insulin   • Class 2 severe obesity with serious comorbidity and body mass index (BMI) of 37.0 to 37.9 in adult   • Polycythemia   • Controlled type 2 diabetes mellitus with stage 2 chronic kidney disease, without long-term current use of insulin   • Right wrist pain     Past Surgical History:   Procedure Laterality Date   • CARPAL TUNNEL RELEASE Right 11/2/2018    Procedure: CARPAL TUNNEL RELEASE;  Surgeon: Judd Mathias MD;  Location: [facility] OR;  Service: Orthopedics   • CARPAL TUNNEL RELEASE  12/07/2018    Left   • CARPAL TUNNEL RELEASE Left 12/7/2018    Procedure: CARPAL TUNNEL RELEASE;  Surgeon: Judd Mathias MD;  Location: [facility] OR;  Service: Orthopedics   • COLONOSCOPY N/A 1/18/2019    Procedure: COLONOSCOPY a friday please ;  Surgeon: Ruslan Bowers MD;  Location: [facility] ENDOSCOPY;  Service: Gastroenterology   • ENDOSCOPY     • ENDOSCOPY N/A 2/21/2020    Procedure: ESOPHAGOGASTRODUODENOSCOPY possible dilation Fridays;  Surgeon: Ruslan Bowers MD;  Location: [facility] ENDOSCOPY;  Service: Gastroenterology;  Laterality: N/A;   • PYLOROMYOTOMY       Social History      Socioeconomic History   • Marital status: Significant Other   Tobacco Use   • Smoking status: Former     Packs/day: 1.00     Years: 6.00     Pack years: 6.00     Types: Cigarettes     Quit date:      Years since quittin.4   • Smokeless tobacco: Never   Vaping Use   • Vaping Use: Never used   Substance and Sexual Activity   • Alcohol use: Yes     Comment: occassional   • Drug use: No   • Sexual activity: Defer     Family History   Problem Relation Age of Onset   • Colon cancer Mother         onset age greater than 75y   • Diabetes Mother    • Breast cancer Sister    • Cancer Brother         bladder   • Diabetes Brother    • Hyperlipidemia Brother      Lab on 2023   Component Date Value Ref Range Status   • WBC 2023 5.70  3.40 - 10.80 10*3/mm3 Final   • RBC 2023 5.72  4.14 - 5.80 10*6/mm3 Final   • Hemoglobin 2023 17.9 (H)  13.0 - 17.7 g/dL Final   • Hematocrit 2023 51.0  37.5 - 51.0 % Final   • MCV 2023 89.2  79.0 - 97.0 fL Final   • MCH 2023 31.3  26.6 - 33.0 pg Final   • MCHC 2023 35.1  31.5 - 35.7 g/dL Final   • RDW 2023 12.8  12.3 - 15.4 % Final   • RDW-SD 2023 40.8  37.0 - 54.0 fl Final   • MPV 2023 9.4  6.0 - 12.0 fL Final   • Platelets 2023 232  140 - 450 10*3/mm3 Final   • Neutrophil % 2023 50.1  42.7 - 76.0 % Final   • Lymphocyte % 2023 37.2  19.6 - 45.3 % Final   • Monocyte % 2023 8.2  5.0 - 12.0 % Final   • Eosinophil % 2023 2.8  0.3 - 6.2 % Final   • Basophil % 2023 1.2  0.0 - 1.5 % Final   • Immature Grans % 2023 0.5  0.0 - 0.5 % Final   • Neutrophils, Absolute 2023 2.85  1.70 - 7.00 10*3/mm3 Final   • Lymphocytes, Absolute 2023 2.12  0.70 - 3.10 10*3/mm3 Final   • Monocytes, Absolute 2023 0.47  0.10 - 0.90 10*3/mm3 Final   • Eosinophils, Absolute 2023 0.16  0.00 - 0.40 10*3/mm3 Final   • Basophils, Absolute 2023 0.07  0.00 - 0.20 10*3/mm3 Final   •  Immature Grans, Absolute 05/03/2023 0.03  0.00 - 0.05 10*3/mm3 Final   • nRBC 05/03/2023 0.0  0.0 - 0.2 /100 WBC Final   • Glucose 05/03/2023 98  65 - 99 mg/dL Final   • BUN 05/03/2023 12  6 - 20 mg/dL Final   • Creatinine 05/03/2023 1.11  0.76 - 1.27 mg/dL Final   • Sodium 05/03/2023 136  136 - 145 mmol/L Final   • Potassium 05/03/2023 4.7  3.5 - 5.2 mmol/L Final   • Chloride 05/03/2023 99  98 - 107 mmol/L Final   • CO2 05/03/2023 25.9  22.0 - 29.0 mmol/L Final   • Calcium 05/03/2023 9.8  8.6 - 10.5 mg/dL Final   • Total Protein 05/03/2023 7.5  6.0 - 8.5 g/dL Final   • Albumin 05/03/2023 4.1  3.5 - 5.2 g/dL Final   • ALT (SGPT) 05/03/2023 35  1 - 41 U/L Final   • AST (SGOT) 05/03/2023 30  1 - 40 U/L Final   • Alkaline Phosphatase 05/03/2023 78  39 - 117 U/L Final   • Total Bilirubin 05/03/2023 1.1  0.0 - 1.2 mg/dL Final   • Globulin 05/03/2023 3.4  gm/dL Final   • A/G Ratio 05/03/2023 1.2  g/dL Final   • BUN/Creatinine Ratio 05/03/2023 10.8  7.0 - 25.0 Final   • Anion Gap 05/03/2023 11.1  5.0 - 15.0 mmol/L Final   • eGFR 05/03/2023 78.9  >60.0 mL/min/1.73 Final   • Hemoglobin A1C 05/03/2023 5.80 (H)  4.80 - 5.60 % Final   • Total Cholesterol 05/03/2023 174  0 - 200 mg/dL Final   • Triglycerides 05/03/2023 106  0 - 150 mg/dL Final   • HDL Cholesterol 05/03/2023 42  40 - 60 mg/dL Final   • LDL Cholesterol  05/03/2023 113 (H)  0 - 100 mg/dL Final   • VLDL Cholesterol 05/03/2023 19  5 - 40 mg/dL Final   • LDL/HDL Ratio 05/03/2023 2.64   Final   • 25 Hydroxy, Vitamin D 05/03/2023 35.2  30.0 - 100.0 ng/ml Final   • Vitamin B-12 05/03/2023 503  211 - 946 pg/mL Final   • Microalbumin/Creatinine Ratio 05/03/2023    Final    Unable to calculate   • Creatinine, Urine 05/03/2023 26.1  mg/dL Final   • Microalbumin, Urine 05/03/2023 <1.2  mg/dL Final   • THC, Screen, Urine 05/03/2023 Negative  Negative Final   • Phencyclidine (PCP), Urine 05/03/2023 Negative  Negative Final   • Cocaine Screen, Urine 05/03/2023 Negative  Negative  Final   • Methamphetamine, Ur 05/03/2023 Negative  Negative Final   • Opiate Screen 05/03/2023 Negative  Negative Final   • Amphetamine Screen, Urine 05/03/2023 Positive (A)  Negative Final   • Benzodiazepine Screen, Urine 05/03/2023 Negative  Negative Final   • Tricyclic Antidepressants Screen 05/03/2023 Negative  Negative Final   • Methadone Screen, Urine 05/03/2023 Negative  Negative Final   • Barbiturates Screen, Urine 05/03/2023 Negative  Negative Final   • Oxycodone Screen, Urine 05/03/2023 Negative  Negative Final   • Propoxyphene Screen 05/03/2023 Negative  Negative Final   • Buprenorphine, Screen, Urine 05/03/2023 Negative  Negative Final   • Testosterone, Total 05/03/2023 218.00  193.00 - 740.00 ng/dL Final   • Fentanyl, Urine 05/03/2023 Negative  Negative Final   Lab on 02/28/2023   Component Date Value Ref Range Status   • Testosterone, Total 02/28/2023 353.00  193.00 - 740.00 ng/dL Final   • WBC 02/28/2023 6.30  3.40 - 10.80 10*3/mm3 Final   • RBC 02/28/2023 5.59  4.14 - 5.80 10*6/mm3 Final   • Hemoglobin 02/28/2023 16.7  13.0 - 17.7 g/dL Final   • Hematocrit 02/28/2023 50.1  37.5 - 51.0 % Final   • MCV 02/28/2023 89.6  79.0 - 97.0 fL Final   • MCH 02/28/2023 29.9  26.6 - 33.0 pg Final   • MCHC 02/28/2023 33.3  31.5 - 35.7 g/dL Final   • RDW 02/28/2023 13.4  12.3 - 15.4 % Final   • RDW-SD 02/28/2023 43.5  37.0 - 54.0 fl Final   • MPV 02/28/2023 9.6  6.0 - 12.0 fL Final   • Platelets 02/28/2023 256  140 - 450 10*3/mm3 Final   • Neutrophil % 02/28/2023 52.1  42.7 - 76.0 % Final   • Lymphocyte % 02/28/2023 31.6  19.6 - 45.3 % Final   • Monocyte % 02/28/2023 9.8  5.0 - 12.0 % Final   • Eosinophil % 02/28/2023 4.6  0.3 - 6.2 % Final   • Basophil % 02/28/2023 1.3  0.0 - 1.5 % Final   • Neutrophils, Absolute 02/28/2023 3.28  1.70 - 7.00 10*3/mm3 Final   • Lymphocytes, Absolute 02/28/2023 1.99  0.70 - 3.10 10*3/mm3 Final   • Monocytes, Absolute 02/28/2023 0.62  0.10 - 0.90 10*3/mm3 Final   • Eosinophils,  Absolute 02/28/2023 0.29  0.00 - 0.40 10*3/mm3 Final   • Basophils, Absolute 02/28/2023 0.08  0.00 - 0.20 10*3/mm3 Final   Lab on 02/10/2023   Component Date Value Ref Range Status   • WBC 02/10/2023 6.34  3.40 - 10.80 10*3/mm3 Final   • RBC 02/10/2023 5.89 (H)  4.14 - 5.80 10*6/mm3 Final   • Hemoglobin 02/10/2023 18.0 (H)  13.0 - 17.7 g/dL Final   • Hematocrit 02/10/2023 51.5 (H)  37.5 - 51.0 % Final   • MCV 02/10/2023 87.4  79.0 - 97.0 fL Final   • MCH 02/10/2023 30.6  26.6 - 33.0 pg Final   • MCHC 02/10/2023 35.0  31.5 - 35.7 g/dL Final   • RDW 02/10/2023 12.9  12.3 - 15.4 % Final   • RDW-SD 02/10/2023 41.6  37.0 - 54.0 fl Final   • MPV 02/10/2023 9.9  6.0 - 12.0 fL Final   • Platelets 02/10/2023 266  140 - 450 10*3/mm3 Final   • Neutrophil % 02/10/2023 50.5  42.7 - 76.0 % Final   • Lymphocyte % 02/10/2023 36.0  19.6 - 45.3 % Final   • Monocyte % 02/10/2023 8.8  5.0 - 12.0 % Final   • Eosinophil % 02/10/2023 3.5  0.3 - 6.2 % Final   • Basophil % 02/10/2023 0.9  0.0 - 1.5 % Final   • Immature Grans % 02/10/2023 0.3  0.0 - 0.5 % Final   • Neutrophils, Absolute 02/10/2023 3.20  1.70 - 7.00 10*3/mm3 Final   • Lymphocytes, Absolute 02/10/2023 2.28  0.70 - 3.10 10*3/mm3 Final   • Monocytes, Absolute 02/10/2023 0.56  0.10 - 0.90 10*3/mm3 Final   • Eosinophils, Absolute 02/10/2023 0.22  0.00 - 0.40 10*3/mm3 Final   • Basophils, Absolute 02/10/2023 0.06  0.00 - 0.20 10*3/mm3 Final   • Immature Grans, Absolute 02/10/2023 0.02  0.00 - 0.05 10*3/mm3 Final   • nRBC 02/10/2023 0.0  0.0 - 0.2 /100 WBC Final   • Glucose 02/10/2023 111 (H)  65 - 99 mg/dL Final   • BUN 02/10/2023 13  6 - 20 mg/dL Final   • Creatinine 02/10/2023 1.21  0.76 - 1.27 mg/dL Final   • Sodium 02/10/2023 137  136 - 145 mmol/L Final   • Potassium 02/10/2023 4.8  3.5 - 5.2 mmol/L Final   • Chloride 02/10/2023 101  98 - 107 mmol/L Final   • CO2 02/10/2023 24.0  22.0 - 29.0 mmol/L Final   • Calcium 02/10/2023 9.3  8.6 - 10.5 mg/dL Final   • Total Protein  02/10/2023 7.1  6.0 - 8.5 g/dL Final   • Albumin 02/10/2023 4.3  3.5 - 5.2 g/dL Final   • ALT (SGPT) 02/10/2023 26  1 - 41 U/L Final   • AST (SGOT) 02/10/2023 30  1 - 40 U/L Final   • Alkaline Phosphatase 02/10/2023 90  39 - 117 U/L Final   • Total Bilirubin 02/10/2023 0.7  0.0 - 1.2 mg/dL Final   • Globulin 02/10/2023 2.8  gm/dL Final   • A/G Ratio 02/10/2023 1.5  g/dL Final   • BUN/Creatinine Ratio 02/10/2023 10.7  7.0 - 25.0 Final   • Anion Gap 02/10/2023 12.0  5.0 - 15.0 mmol/L Final   • eGFR 02/10/2023 71.2  >60.0 mL/min/1.73 Final   • Hemoglobin A1C 02/10/2023 5.60  4.80 - 5.60 % Final   • 25 Hydroxy, Vitamin D 02/10/2023 35.1  30.0 - 100.0 ng/ml Final   • Vitamin B-12 02/10/2023 466  211 - 946 pg/mL Final   • Total Cholesterol 02/10/2023 130  0 - 200 mg/dL Final   • Triglycerides 02/10/2023 125  0 - 150 mg/dL Final   • HDL Cholesterol 02/10/2023 37 (L)  40 - 60 mg/dL Final   • LDL Cholesterol  02/10/2023 71  0 - 100 mg/dL Final   • VLDL Cholesterol 02/10/2023 22  5 - 40 mg/dL Final   • LDL/HDL Ratio 02/10/2023 1.84   Final   • THC, Screen, Urine 02/10/2023 Negative  Negative Final   • Phencyclidine (PCP), Urine 02/10/2023 Negative  Negative Final   • Cocaine Screen, Urine 02/10/2023 Negative  Negative Final   • Methamphetamine, Ur 02/10/2023 Negative  Negative Final   • Opiate Screen 02/10/2023 Negative  Negative Final   • Amphetamine Screen, Urine 02/10/2023 Positive (A)  Negative Final   • Benzodiazepine Screen, Urine 02/10/2023 Negative  Negative Final   • Tricyclic Antidepressants Screen 02/10/2023 Negative  Negative Final   • Methadone Screen, Urine 02/10/2023 Negative  Negative Final   • Barbiturates Screen, Urine 02/10/2023 Negative  Negative Final   • Oxycodone Screen, Urine 02/10/2023 Negative  Negative Final   • Propoxyphene Screen 02/10/2023 Negative  Negative Final   • Buprenorphine, Screen, Urine 02/10/2023 Negative  Negative Final   Lab on 12/16/2022   Component Date Value Ref Range Status   •  "PSA 12/16/2022 0.648  0.000 - 4.000 ng/mL Final   • Testosterone, Total 12/16/2022 672.00  193.00 - 740.00 ng/dL Final      XR Wrist 3+ View Right  Narrative: EXAMINATION:  XR WRIST 3+ VW RIGHT    CLINICAL HISTORY:  52 years Male,wrist pain, M25.531 Pain in  right wrist Z98.890 Other specified postprocedural states    COMPARISON:  5/16/2018 plain films    FINDINGS:  The right wrist is negative for acute fracture. No  dislocation. No appreciable degenerative change. No radiopaque  foreign body.  Impression: Negative exam of the right wrist.    Electronically signed by:  Kamron Vasquez MD  3/24/2023 8:07 PM CDT  Workstation: [id number]    [email or web address]  Immunization History   Administered Date(s) Administered   • Influenza TIV (IM) 03/28/2017, 03/28/2017   • Tdap 05/23/2016, 02/17/2017, 09/20/2019       The following portions of the patient's history were reviewed and updated as appropriate: allergies, current medications, past family history, past medical history, past social history, past surgical history and problem list.        Physical Exam  /82 (BP Location: Right arm, Patient Position: Sitting, Cuff Size: Large Adult)   Pulse 74   Temp 98.2 °F (36.8 °C)   Ht 180.3 cm (71\")   Wt 131 kg (288 lb 9.6 oz)   SpO2 95%   BMI 40.25 kg/m²     /82 (BP Location: Right arm, Patient Position: Sitting, Cuff Size: Large Adult)   Pulse 74   Temp 98.2 °F (36.8 °C)   Ht 180.3 cm (71\")   Wt 131 kg (288 lb 9.6 oz)   SpO2 95%   BMI 40.25 kg/m²       Physical Exam  Vitals and nursing note reviewed.   Constitutional:       Appearance: He is well-developed. He is not diaphoretic.   HENT:      Head: Normocephalic and atraumatic.      Right Ear: External ear normal.   Eyes:      Conjunctiva/sclera: Conjunctivae normal.      Pupils: Pupils are equal, round, and reactive to light.   Cardiovascular:      Rate and Rhythm: Normal rate and regular rhythm.      Heart sounds: Normal heart sounds. No murmur " heard.  Pulmonary:      Effort: Pulmonary effort is normal. No respiratory distress.      Breath sounds: Normal breath sounds.   Abdominal:      General: Bowel sounds are normal. There is no distension.      Palpations: Abdomen is soft.      Tenderness: There is no abdominal tenderness.      Comments: Obese abdomen    Musculoskeletal:         General: Tenderness present. No deformity.      Right wrist: Tenderness and bony tenderness present. Decreased range of motion.      Cervical back: Normal range of motion and neck supple.   Skin:     General: Skin is warm.      Coloration: Skin is not pale.      Findings: No erythema or rash.   Neurological:      Mental Status: He is alert and oriented to person, place, and time.      Cranial Nerves: No cranial nerve deficit.   Psychiatric:         Behavior: Behavior normal.         [unfilled]   Diagnosis Plan   1. Stage 2 chronic kidney disease        2. Vitamin D deficiency        3. Mixed hyperlipidemia        4. Low testosterone        5. Controlled type 2 diabetes mellitus with stage 2 chronic kidney disease, without long-term current use of insulin        6. Attention deficit hyperactivity disorder (ADHD), predominantly inattentive type  amphetamine-dextroamphetamine XR (ADDERALL XR) 25 MG 24 hr capsule      7. Polycythemia                   -went over labwork  -recommend diabetic eye exam   -right wrist pain/history of carpal tunnel - will get x-ray. Refer to Orthopedics. Start on diclofenac gel topical QID. Stop iburpofen and celebrex  -polycythemia - continue to monitor recheck CBC on next labdraw. Consider Hematology referral. Recommend more water intake    -right hip and  Right shoulder pain - Orthopedic following on celebrex  -DM type 2-  Recommend ADA diet. On metformin  mg PO q daily. consider ozempic injections gave drug inofrmation  -tachycardia/palpitations - Cardiology following. Had recent echocardiogrram and TST.  on toprol XL 50 mg  daily.   -abdominal pain/fatty liver/nausea vomiting/Gastritis/GERD with esophagitis - on protonix 40 mg daily. .  GI following had recent EGD   -low testosterone/low libido/fatigue - referred to Urology now on testosterone injections   -ckd stage 2 - continue to monitor.  -Vitamin D deficiency vitamin D pill once a week   -HLP - on crestor 20 mg PO qhs. Recommend heart healthy diet recommend repatha injection gave drug information.   -HTN - on lisionpril 20 mg daily on Toprol XL  50 mg daily   -ADD- stable  on Adderrall XR 25 mg caban.reviewed last UDS and ERWIN   -GERD - on protonix 40 mg PO q daily.    -allergic rhinitis - continue singulair and flonase on allegra 60 mg PO BID   -PAULINA on CPAP - recommend pt start using machine. Sleep medicine following    hyperlipidemia - on crestor 40 mg PO qhs hold on  repatha injjections 140 mg subq every 2 weeks   - obesity - recommend ketogenic diet along with intermittent fasting. Counseled weight loss >5 minutes  BMI at 40.59   -advised pt to be safe and call with questions and concerns  -advised to go to ER or call 911 if symptoms get severe  -advised to followup with specialist and referrals   -advised pt to be safe during COVID-19 pandemic  I spent 34  minutes caring for Randall on this date of service. This time includes time spent by me in the following activities: preparing for the visit, reviewing tests, obtaining and/or reviewing a separately obtained history, performing a medically appropriate examination and/or evaluation, counseling and educating the patient/family/caregiver, ordering medications, tests, or procedures, referring and communicating with other health care professionals, documenting information in the medical record, independently interpreting results and communicating that information with the patient/family/caregiver and care coordination.   -recheck in 1 month         This document has been electronically signed by Scottie Borges MD on May 23, 2023  10:56 CDT

## 2023-05-03 ENCOUNTER — TRANSCRIBE ORDERS (OUTPATIENT)
Dept: LAB | Facility: HOSPITAL | Age: 53
End: 2023-05-03
Payer: COMMERCIAL

## 2023-05-03 ENCOUNTER — LAB (OUTPATIENT)
Dept: LAB | Facility: HOSPITAL | Age: 53
End: 2023-05-03
Payer: COMMERCIAL

## 2023-05-03 DIAGNOSIS — R79.89 LOW TESTOSTERONE: ICD-10-CM

## 2023-05-03 DIAGNOSIS — K76.0 FATTY LIVER: ICD-10-CM

## 2023-05-03 DIAGNOSIS — E55.9 VITAMIN D DEFICIENCY: ICD-10-CM

## 2023-05-03 DIAGNOSIS — E78.2 MIXED HYPERLIPIDEMIA: ICD-10-CM

## 2023-05-03 DIAGNOSIS — I10 ESSENTIAL HYPERTENSION: ICD-10-CM

## 2023-05-03 DIAGNOSIS — F90.0 ATTENTION DEFICIT HYPERACTIVITY DISORDER (ADHD), PREDOMINANTLY INATTENTIVE TYPE: ICD-10-CM

## 2023-05-03 DIAGNOSIS — D75.1 POLYCYTHEMIA: ICD-10-CM

## 2023-05-03 DIAGNOSIS — N18.2 CONTROLLED TYPE 2 DIABETES MELLITUS WITH STAGE 2 CHRONIC KIDNEY DISEASE, WITHOUT LONG-TERM CURRENT USE OF INSULIN: ICD-10-CM

## 2023-05-03 DIAGNOSIS — E11.22 CONTROLLED TYPE 2 DIABETES MELLITUS WITH STAGE 2 CHRONIC KIDNEY DISEASE, WITHOUT LONG-TERM CURRENT USE OF INSULIN: ICD-10-CM

## 2023-05-03 DIAGNOSIS — E29.1 MALE HYPOGONADISM: Primary | ICD-10-CM

## 2023-05-03 DIAGNOSIS — E29.1 MALE HYPOGONADISM: ICD-10-CM

## 2023-05-03 DIAGNOSIS — N18.2 STAGE 2 CHRONIC KIDNEY DISEASE: ICD-10-CM

## 2023-05-03 LAB
ALBUMIN UR-MCNC: <1.2 MG/DL
AMPHET+METHAMPHET UR QL: POSITIVE
AMPHETAMINES UR QL: NEGATIVE
BARBITURATES UR QL SCN: NEGATIVE
BASOPHILS # BLD AUTO: 0.07 10*3/MM3 (ref 0–0.2)
BASOPHILS NFR BLD AUTO: 1.2 % (ref 0–1.5)
BENZODIAZ UR QL SCN: NEGATIVE
BUPRENORPHINE SERPL-MCNC: NEGATIVE NG/ML
CANNABINOIDS SERPL QL: NEGATIVE
COCAINE UR QL: NEGATIVE
CREAT UR-MCNC: 26.1 MG/DL
DEPRECATED RDW RBC AUTO: 40.8 FL (ref 37–54)
EOSINOPHIL # BLD AUTO: 0.16 10*3/MM3 (ref 0–0.4)
EOSINOPHIL NFR BLD AUTO: 2.8 % (ref 0.3–6.2)
ERYTHROCYTE [DISTWIDTH] IN BLOOD BY AUTOMATED COUNT: 12.8 % (ref 12.3–15.4)
FENTANYL UR-MCNC: NEGATIVE NG/ML
HBA1C MFR BLD: 5.8 % (ref 4.8–5.6)
HCT VFR BLD AUTO: 51 % (ref 37.5–51)
HGB BLD-MCNC: 17.9 G/DL (ref 13–17.7)
IMM GRANULOCYTES # BLD AUTO: 0.03 10*3/MM3 (ref 0–0.05)
IMM GRANULOCYTES NFR BLD AUTO: 0.5 % (ref 0–0.5)
LYMPHOCYTES # BLD AUTO: 2.12 10*3/MM3 (ref 0.7–3.1)
LYMPHOCYTES NFR BLD AUTO: 37.2 % (ref 19.6–45.3)
MCH RBC QN AUTO: 31.3 PG (ref 26.6–33)
MCHC RBC AUTO-ENTMCNC: 35.1 G/DL (ref 31.5–35.7)
MCV RBC AUTO: 89.2 FL (ref 79–97)
METHADONE UR QL SCN: NEGATIVE
MICROALBUMIN/CREAT UR: NORMAL MG/G{CREAT}
MONOCYTES # BLD AUTO: 0.47 10*3/MM3 (ref 0.1–0.9)
MONOCYTES NFR BLD AUTO: 8.2 % (ref 5–12)
NEUTROPHILS NFR BLD AUTO: 2.85 10*3/MM3 (ref 1.7–7)
NEUTROPHILS NFR BLD AUTO: 50.1 % (ref 42.7–76)
NRBC BLD AUTO-RTO: 0 /100 WBC (ref 0–0.2)
OPIATES UR QL: NEGATIVE
OXYCODONE UR QL SCN: NEGATIVE
PCP UR QL SCN: NEGATIVE
PLATELET # BLD AUTO: 232 10*3/MM3 (ref 140–450)
PMV BLD AUTO: 9.4 FL (ref 6–12)
PROPOXYPH UR QL: NEGATIVE
RBC # BLD AUTO: 5.72 10*6/MM3 (ref 4.14–5.8)
TRICYCLICS UR QL SCN: NEGATIVE
WBC NRBC COR # BLD: 5.7 10*3/MM3 (ref 3.4–10.8)

## 2023-05-03 PROCEDURE — 80307 DRUG TEST PRSMV CHEM ANLYZR: CPT

## 2023-05-03 PROCEDURE — 80053 COMPREHEN METABOLIC PANEL: CPT

## 2023-05-03 PROCEDURE — 84403 ASSAY OF TOTAL TESTOSTERONE: CPT

## 2023-05-03 PROCEDURE — 82570 ASSAY OF URINE CREATININE: CPT

## 2023-05-03 PROCEDURE — 82043 UR ALBUMIN QUANTITATIVE: CPT

## 2023-05-03 PROCEDURE — 80061 LIPID PANEL: CPT

## 2023-05-03 PROCEDURE — 82607 VITAMIN B-12: CPT

## 2023-05-03 PROCEDURE — 82306 VITAMIN D 25 HYDROXY: CPT

## 2023-05-03 PROCEDURE — 85025 COMPLETE CBC W/AUTO DIFF WBC: CPT

## 2023-05-03 PROCEDURE — 83036 HEMOGLOBIN GLYCOSYLATED A1C: CPT

## 2023-05-04 LAB
25(OH)D3 SERPL-MCNC: 35.2 NG/ML (ref 30–100)
ALBUMIN SERPL-MCNC: 4.1 G/DL (ref 3.5–5.2)
ALBUMIN/GLOB SERPL: 1.2 G/DL
ALP SERPL-CCNC: 78 U/L (ref 39–117)
ALT SERPL W P-5'-P-CCNC: 35 U/L (ref 1–41)
ANION GAP SERPL CALCULATED.3IONS-SCNC: 11.1 MMOL/L (ref 5–15)
AST SERPL-CCNC: 30 U/L (ref 1–40)
BILIRUB SERPL-MCNC: 1.1 MG/DL (ref 0–1.2)
BUN SERPL-MCNC: 12 MG/DL (ref 6–20)
BUN/CREAT SERPL: 10.8 (ref 7–25)
CALCIUM SPEC-SCNC: 9.8 MG/DL (ref 8.6–10.5)
CHLORIDE SERPL-SCNC: 99 MMOL/L (ref 98–107)
CHOLEST SERPL-MCNC: 174 MG/DL (ref 0–200)
CO2 SERPL-SCNC: 25.9 MMOL/L (ref 22–29)
CREAT SERPL-MCNC: 1.11 MG/DL (ref 0.76–1.27)
EGFRCR SERPLBLD CKD-EPI 2021: 78.9 ML/MIN/1.73
GLOBULIN UR ELPH-MCNC: 3.4 GM/DL
GLUCOSE SERPL-MCNC: 98 MG/DL (ref 65–99)
HDLC SERPL-MCNC: 42 MG/DL (ref 40–60)
LDLC SERPL CALC-MCNC: 113 MG/DL (ref 0–100)
LDLC/HDLC SERPL: 2.64 {RATIO}
POTASSIUM SERPL-SCNC: 4.7 MMOL/L (ref 3.5–5.2)
PROT SERPL-MCNC: 7.5 G/DL (ref 6–8.5)
SODIUM SERPL-SCNC: 136 MMOL/L (ref 136–145)
TESTOST SERPL-MCNC: 218 NG/DL (ref 193–740)
TRIGL SERPL-MCNC: 106 MG/DL (ref 0–150)
VIT B12 BLD-MCNC: 503 PG/ML (ref 211–946)
VLDLC SERPL-MCNC: 19 MG/DL (ref 5–40)

## 2023-05-23 ENCOUNTER — OFFICE VISIT (OUTPATIENT)
Dept: FAMILY MEDICINE CLINIC | Facility: CLINIC | Age: 53
End: 2023-05-23
Payer: COMMERCIAL

## 2023-05-23 VITALS
HEART RATE: 74 BPM | BODY MASS INDEX: 40.4 KG/M2 | HEIGHT: 71 IN | SYSTOLIC BLOOD PRESSURE: 128 MMHG | TEMPERATURE: 98.2 F | WEIGHT: 288.6 LBS | DIASTOLIC BLOOD PRESSURE: 82 MMHG | OXYGEN SATURATION: 95 %

## 2023-05-23 DIAGNOSIS — E55.9 VITAMIN D DEFICIENCY: ICD-10-CM

## 2023-05-23 DIAGNOSIS — E11.22 CONTROLLED TYPE 2 DIABETES MELLITUS WITH STAGE 2 CHRONIC KIDNEY DISEASE, WITHOUT LONG-TERM CURRENT USE OF INSULIN: ICD-10-CM

## 2023-05-23 DIAGNOSIS — D75.1 POLYCYTHEMIA: ICD-10-CM

## 2023-05-23 DIAGNOSIS — N18.2 STAGE 2 CHRONIC KIDNEY DISEASE: Primary | ICD-10-CM

## 2023-05-23 DIAGNOSIS — E78.2 MIXED HYPERLIPIDEMIA: ICD-10-CM

## 2023-05-23 DIAGNOSIS — R79.89 LOW TESTOSTERONE: ICD-10-CM

## 2023-05-23 DIAGNOSIS — N18.2 CONTROLLED TYPE 2 DIABETES MELLITUS WITH STAGE 2 CHRONIC KIDNEY DISEASE, WITHOUT LONG-TERM CURRENT USE OF INSULIN: ICD-10-CM

## 2023-05-23 DIAGNOSIS — F90.0 ATTENTION DEFICIT HYPERACTIVITY DISORDER (ADHD), PREDOMINANTLY INATTENTIVE TYPE: ICD-10-CM

## 2023-05-23 PROCEDURE — 3044F HG A1C LEVEL LT 7.0%: CPT | Performed by: FAMILY MEDICINE

## 2023-05-23 PROCEDURE — 99214 OFFICE O/P EST MOD 30 MIN: CPT | Performed by: FAMILY MEDICINE

## 2023-05-23 PROCEDURE — 3079F DIAST BP 80-89 MM HG: CPT | Performed by: FAMILY MEDICINE

## 2023-05-23 PROCEDURE — 3074F SYST BP LT 130 MM HG: CPT | Performed by: FAMILY MEDICINE

## 2023-05-23 RX ORDER — DEXTROAMPHETAMINE SACCHARATE, AMPHETAMINE ASPARTATE MONOHYDRATE, DEXTROAMPHETAMINE SULFATE AND AMPHETAMINE SULFATE 6.25; 6.25; 6.25; 6.25 MG/1; MG/1; MG/1; MG/1
25 CAPSULE, EXTENDED RELEASE ORAL EVERY MORNING
Qty: 30 CAPSULE | Refills: 0 | Status: SHIPPED | OUTPATIENT
Start: 2023-05-23

## 2023-05-24 RX ORDER — FEXOFENADINE HYDROCHLORIDE 60 MG/1
TABLET, FILM COATED ORAL
Qty: 60 TABLET | Refills: 0 | Status: SHIPPED | OUTPATIENT
Start: 2023-05-24

## 2023-05-24 RX ORDER — ROSUVASTATIN CALCIUM 40 MG/1
40 TABLET, COATED ORAL NIGHTLY
Qty: 30 TABLET | Refills: 3 | Status: SHIPPED | OUTPATIENT
Start: 2023-05-24

## 2023-05-24 RX ORDER — PANTOPRAZOLE SODIUM 40 MG/1
40 TABLET, DELAYED RELEASE ORAL DAILY
Qty: 30 TABLET | Refills: 3 | Status: SHIPPED | OUTPATIENT
Start: 2023-05-24

## 2023-07-28 DIAGNOSIS — F90.0 ATTENTION DEFICIT HYPERACTIVITY DISORDER (ADHD), PREDOMINANTLY INATTENTIVE TYPE: ICD-10-CM

## 2023-07-28 RX ORDER — DEXTROAMPHETAMINE SACCHARATE, AMPHETAMINE ASPARTATE MONOHYDRATE, DEXTROAMPHETAMINE SULFATE AND AMPHETAMINE SULFATE 6.25; 6.25; 6.25; 6.25 MG/1; MG/1; MG/1; MG/1
25 CAPSULE, EXTENDED RELEASE ORAL EVERY MORNING
Qty: 30 CAPSULE | Refills: 0 | Status: SHIPPED | OUTPATIENT
Start: 2023-07-28

## 2023-07-28 RX ORDER — DEXTROAMPHETAMINE SACCHARATE, AMPHETAMINE ASPARTATE MONOHYDRATE, DEXTROAMPHETAMINE SULFATE AND AMPHETAMINE SULFATE 6.25; 6.25; 6.25; 6.25 MG/1; MG/1; MG/1; MG/1
25 CAPSULE, EXTENDED RELEASE ORAL EVERY MORNING
Qty: 30 CAPSULE | Refills: 0 | OUTPATIENT
Start: 2023-07-28

## 2023-07-28 RX ORDER — TESTOSTERONE CYPIONATE 200 MG/ML
INJECTION, SOLUTION INTRAMUSCULAR
OUTPATIENT
Start: 2023-07-28

## 2023-08-02 RX ORDER — FEXOFENADINE HCL 60 MG/1
TABLET, FILM COATED ORAL
Qty: 60 TABLET | Refills: 0 | Status: SHIPPED | OUTPATIENT
Start: 2023-08-02

## 2023-08-08 NOTE — PROGRESS NOTES
Subjective:  Randall Hernandez is a 52 y.o. male who presents for       Patient Active Problem List   Diagnosis    Hyperlipidemia    Inattention    Non morbid obesity    Prediabetes    Attention deficit hyperactivity disorder (ADHD), combined type    Need for vaccination    Gastroesophageal reflux disease    Encounter for drug screening    Tingling in extremities    Elevated BP without diagnosis of hypertension    Allergic rhinitis    Elevated blood pressure reading    Pruritic rash    Numbness in both hands    Bilateral elbow joint pain    Pain in both wrists    Ulnar neuropathy of left upper extremity    General medical examination    Bilateral carpal tunnel syndrome    History of prediabetes    Numbness and tingling in both hands    Essential hypertension    Cerumen debris on tympanic membrane of both ears    Carpal tunnel syndrome    S/P carpal tunnel release    Encounter for screening for malignant neoplasm of colon    FH: colon cancer    Acute recurrent sinusitis    Attention deficit hyperactivity disorder (ADHD), predominantly inattentive type    Class 2 obesity with body mass index (BMI) of 35.0 to 35.9 in adult    Annual physical exam    Arthritis of back    Scoliosis    Hyponatremia    Hypochloremia    Stage 2 chronic kidney disease    Epigastric pain    Bilious vomiting with nausea    Fatty liver    Dysphagia    Nausea and vomiting    Gastritis without bleeding    Class 2 obesity with body mass index (BMI) of 38.0 to 38.9 in adult    Mixed hyperlipidemia    Dizziness    Hypotension    Class 2 obesity with body mass index (BMI) of 39.0 to 39.9 in adult    Normocytic anemia    Vitamin D deficiency    Anxiety    Class 2 severe obesity with serious comorbidity and body mass index (BMI) of 39.0 to 39.9 in adult    Palpitations    Tachycardia    Erectile dysfunction    Morbid (severe) obesity due to excess calories    Right shoulder pain    Bilateral hip pain    PAULINA on CPAP    Low testosterone    Controlled  "type 2 diabetes mellitus with complication, without long-term current use of insulin    Class 2 severe obesity with serious comorbidity and body mass index (BMI) of 37.0 to 37.9 in adult    Polycythemia    Controlled type 2 diabetes mellitus with stage 2 chronic kidney disease, without long-term current use of insulin    Right wrist pain           Current Outpatient Medications:     amphetamine-dextroamphetamine XR (ADDERALL XR) 25 MG 24 hr capsule, Take 1 capsule by mouth Every Morning, Disp: 30 capsule, Rfl: 0    B-D 3CC LUER-STEFFEN SYR [id number]/2 22G X 1-1/2\" 3 ML misc, , Disp: , Rfl:     fexofenadine (ALLEGRA) 60 MG tablet, TAKE 1 TABLET BY MOUTH TWICE DAILY, Disp: 60 tablet, Rfl: 0    fluticasone (FLONASE) 50 MCG/ACT nasal spray, SHAKE LIQUID AND USE 2 SPRAYS IN EACH NOSTRIL DAILY, Disp: 16 g, Rfl: 3    lisinopril (PRINIVIL,ZESTRIL) 20 MG tablet, TAKE 1 TABLET BY MOUTH DAILY, Disp: 30 tablet, Rfl: 0    metFORMIN ER (GLUCOPHAGE-XR) 500 MG 24 hr tablet, TAKE 1 TABLET BY MOUTH DAILY WITH BREAKFAST, Disp: 30 tablet, Rfl: 3    metoprolol succinate XL (TOPROL-XL) 50 MG 24 hr tablet, TAKE 1 TABLET BY MOUTH DAILY, Disp: 30 tablet, Rfl: 3    montelukast (SINGULAIR) 10 MG tablet, TAKE 1 TABLET BY MOUTH EVERY NIGHT, Disp: 30 tablet, Rfl: 3    pantoprazole (PROTONIX) 40 MG EC tablet, TAKE 1 TABLET BY MOUTH DAILY, Disp: 30 tablet, Rfl: 3    rosuvastatin (CRESTOR) 40 MG tablet, TAKE 1 TABLET BY MOUTH EVERY NIGHT, Disp: 30 tablet, Rfl: 3    Testosterone Cypionate (DEPOTESTOTERONE CYPIONATE) 200 MG/ML injection, , Disp: , Rfl:        Pt is 53 yo male with management  of morbid obesity, history of prediabetes, HTN,  GERD, vitamin D deficiency, alelrgic rhinitis, ADHD predominantly inattentive type ,sp bilateral carpal tunnel release surgery,  Chronic back pain (arthritis of lumbar spine, short pedicles, mild scoliosis, mild arthritis thoracic spine).  CKD stage 2 , fatty liver, gastirits, esophagitis, PAULINA, echocardiogram on " 6/15/21 (grade 1 diastolic dysfunction, right ventricular dilation, erythrocytosis     5/23/23 in office visit for recheck. Pt saw Orthopedic on 4/4/23 for his right wrist pain and was ordered EMG for possible carpal tunnel. Pt had labwork done on 5/3/23 that showed UDS consistent with taking ADD medication vitamin D and b12 stable lipid panel  Showed LDL at 113 hga1c at 5.80. CMP showed GFR at 78.9. and normal liver enzymes. CBC showed hemoglobin at 17.9. pt doing well overall no chset pain no dizziness. Breathing stable. He is doing well on ADHD medication. He is back on Adderall XL mg daily.      6/23/23 in office visit for recheck. Pt is doing well overall. No chest pain no dizziness. Breathing stable. No chest pain no dizziness.      8/29/23 in office visit for recheck. Pt had labwork on 7/7/23 that showed On CMP fasting sugars at 107. Liver enzymes stable On kidney function GFR in 70s pt has CKD stage 2. On lipid panel triglycerides high HDL low. Recommend heart healthy diet and pt take fish oil supplement On CBC hemoglobin high.  Likely due to testosterone therapy. Recommend pt see Hematology for monitoring and surveillance.  Pt is doing well . No chest pain no dizziness. Doing well on ADD medications       HPIDiabetes  He presents for his follow-up diabetic visit. He has type 2 diabetes mellitus. His disease course has been stable. Pertinent negatives for hypoglycemia include no headaches. Associated symptoms include fatigue and weakness. Pertinent negatives for diabetes include no chest pain. Risk factors for coronary artery disease include diabetes mellitus, male sex, obesity, hypertension, sedentary lifestyle and stress. When asked about meal planning, he reported none. He has had a previous visit with a dietitian. He participates in exercise daily. An ACE inhibitor/angiotensin II receptor blocker is being taken. He does not see a podiatrist.Eye exam is not current.   Male  Problem  The patient's  pertinent negatives include no genital injury, genital itching, genital lesions, pelvic pain, penile discharge, penile pain, priapism, scrotal swelling or testicular pain. Primary symptoms comment: low libido . This is a new problem. The current episode started today. The problem has been unchanged. Pertinent negatives include no anorexia, chest pain, chills, constipation, coughing, diarrhea, discolored urine, dysuria, fever, flank pain, frequency, headaches, hematuria, hesitancy, joint pain, joint swelling, nausea, painful intercourse, rash, shortness of breath, sore throat, urgency, urinary retention or vomiting. Nothing aggravates the symptoms. He has tried nothing for the symptoms. The treatment provided no relief. He is sexually active. He consistently uses condoms. There is no history of BPH, chlamydia, cryptorchidism, erectile aid use, erectile dysfunction, a femoral hernia, gonorrhea, herpes simplex, HIV, an inguinal hernia, kidney stones, prostatitis, sickle cell disease, syphilis or varicocele.   Chronic Kidney Disease  This is a chronic problem. The current episode started more than 1 year ago. The problem occurs constantly. The problem has been unchanged. Associated symptoms include arthralgias and fatigue. Pertinent negatives include no abdominal pain, anorexia, change in bowel habit, chest pain, chills, congestion, coughing, diaphoresis, fever, headaches, joint swelling, myalgias, nausea, neck pain, numbness, rash, sore throat, swollen glands, urinary symptoms, vertigo, visual change, vomiting or weakness. Nothing aggravates the symptoms. He has tried nothing for the symptoms. The treatment provided no relief.   Hyperlipidemia  This is a chronic problem. The current episode started more than 1 year ago. The problem is uncontrolled. Exacerbating diseases include liver disease and obesity. He has no history of chronic renal disease, diabetes, hypothyroidism or nephrotic syndrome. Current  antihyperlipidemic treatment includes statins. The current treatment provides moderate improvement of lipids. Risk factors for coronary artery disease include male sex, obesity, hypertension, dyslipidemia and a sedentary lifestyle.   Obesity   This is a chronic problem. The current episode started more than 1 year ago. The problem occurs constantly. The problem has been worsening  Associated symptoms include arthralgias and numbness. Pertinent negatives include no abdominal pain, anorexia, change in bowel habit, chest pain, chills, congestion, coughing, diaphoresis, fatigue, fever, headaches, joint swelling, myalgias, nausea, rash, sore throat, swollen glands, urinary symptoms, vertigo, visual change, vomiting or weakness. Nothing aggravates the symptoms. He has tried nothing for the symptoms. The treatment provided no relief.   Hypertension   This is a chronic problem. The current episode started more than 1year ago. The problem has been gradually improving tThe problem is controlled. Pertinent negatives include no anxiety, blurred vision, chest pain, headaches, malaise/fatigue, neck pain, orthopnea, palpitations, peripheral edema, PND, shortness of breath or sweats. Risk factors for coronary artery disease include male gender, obesity and sedentary lifestyle. Past treatments include diuretics and ace-inhibitor There are no compliance problems. Treatment has provided significant relief     Review of Systems  Review of Systems   Constitutional:  Positive for activity change and fatigue. Negative for appetite change, chills and diaphoresis.   HENT:  Negative for congestion, postnasal drip, rhinorrhea, sinus pressure, sinus pain, sneezing, sore throat, trouble swallowing and voice change.    Respiratory:  Negative for cough, choking, chest tightness, shortness of breath, wheezing and stridor.    Cardiovascular:  Negative for chest pain.   Gastrointestinal:  Negative for diarrhea, nausea and vomiting.    Musculoskeletal:  Positive for arthralgias.   Neurological:  Positive for weakness. Negative for headaches.   Psychiatric/Behavioral:  Positive for decreased concentration.      Patient Active Problem List   Diagnosis    Hyperlipidemia    Inattention    Non morbid obesity    Prediabetes    Attention deficit hyperactivity disorder (ADHD), combined type    Need for vaccination    Gastroesophageal reflux disease    Encounter for drug screening    Tingling in extremities    Elevated BP without diagnosis of hypertension    Allergic rhinitis    Elevated blood pressure reading    Pruritic rash    Numbness in both hands    Bilateral elbow joint pain    Pain in both wrists    Ulnar neuropathy of left upper extremity    General medical examination    Bilateral carpal tunnel syndrome    History of prediabetes    Numbness and tingling in both hands    Essential hypertension    Cerumen debris on tympanic membrane of both ears    Carpal tunnel syndrome    S/P carpal tunnel release    Encounter for screening for malignant neoplasm of colon    FH: colon cancer    Acute recurrent sinusitis    Attention deficit hyperactivity disorder (ADHD), predominantly inattentive type    Class 2 obesity with body mass index (BMI) of 35.0 to 35.9 in adult    Annual physical exam    Arthritis of back    Scoliosis    Hyponatremia    Hypochloremia    Stage 2 chronic kidney disease    Epigastric pain    Bilious vomiting with nausea    Fatty liver    Dysphagia    Nausea and vomiting    Gastritis without bleeding    Class 2 obesity with body mass index (BMI) of 38.0 to 38.9 in adult    Mixed hyperlipidemia    Dizziness    Hypotension    Class 2 obesity with body mass index (BMI) of 39.0 to 39.9 in adult    Normocytic anemia    Vitamin D deficiency    Anxiety    Class 2 severe obesity with serious comorbidity and body mass index (BMI) of 39.0 to 39.9 in adult    Palpitations    Tachycardia    Erectile dysfunction    Morbid (severe) obesity due to  excess calories    Right shoulder pain    Bilateral hip pain    PAULINA on CPAP    Low testosterone    Controlled type 2 diabetes mellitus with complication, without long-term current use of insulin    Class 2 severe obesity with serious comorbidity and body mass index (BMI) of 37.0 to 37.9 in adult    Polycythemia    Controlled type 2 diabetes mellitus with stage 2 chronic kidney disease, without long-term current use of insulin    Right wrist pain     Past Surgical History:   Procedure Laterality Date    CARPAL TUNNEL RELEASE Right 2018    Procedure: CARPAL TUNNEL RELEASE;  Surgeon: Judd Mathias MD;  Location: [facility] OR;  Service: Orthopedics    CARPAL TUNNEL RELEASE  2018    Left    CARPAL TUNNEL RELEASE Left 2018    Procedure: CARPAL TUNNEL RELEASE;  Surgeon: Judd Mathias MD;  Location: [facility] OR;  Service: Orthopedics    COLONOSCOPY N/A 2019    Procedure: COLONOSCOPY a friday please ;  Surgeon: Ruslan Bowers MD;  Location: [facility] ENDOSCOPY;  Service: Gastroenterology    ENDOSCOPY      ENDOSCOPY N/A 2020    Procedure: ESOPHAGOGASTRODUODENOSCOPY possible dilation ;  Surgeon: Ruslan Bowers MD;  Location: [facility] ENDOSCOPY;  Service: Gastroenterology;  Laterality: N/A;    PYLOROMYOTOMY       Social History     Socioeconomic History    Marital status: Significant Other   Tobacco Use    Smoking status: Former     Packs/day: 1.00     Years: 6.00     Pack years: 6.00     Types: Cigarettes     Quit date:      Years since quittin.6    Smokeless tobacco: Never   Vaping Use    Vaping Use: Never used   Substance and Sexual Activity    Alcohol use: Yes     Comment: occassional    Drug use: No    Sexual activity: Defer     Family History   Problem Relation Age of Onset    Colon cancer Mother         onset age greater than 75y    Diabetes Mother     Breast cancer Sister     Cancer Brother         bladder    Diabetes Brother     Hyperlipidemia Brother      Lab on  07/07/2023   Component Date Value Ref Range Status    WBC 07/07/2023 6.23  3.40 - 10.80 10*3/mm3 Final    RBC 07/07/2023 5.75  4.14 - 5.80 10*6/mm3 Final    Hemoglobin 07/07/2023 18.0 (H)  13.0 - 17.7 g/dL Final    Hematocrit 07/07/2023 53.0 (H)  37.5 - 51.0 % Final    MCV 07/07/2023 92.2  79.0 - 97.0 fL Final    MCH 07/07/2023 31.3  26.6 - 33.0 pg Final    MCHC 07/07/2023 34.0  31.5 - 35.7 g/dL Final    RDW 07/07/2023 13.0  12.3 - 15.4 % Final    RDW-SD 07/07/2023 44.4  37.0 - 54.0 fl Final    MPV 07/07/2023 9.7  6.0 - 12.0 fL Final    Platelets 07/07/2023 220  140 - 450 10*3/mm3 Final    Neutrophil % 07/07/2023 49.3  42.7 - 76.0 % Final    Lymphocyte % 07/07/2023 35.8  19.6 - 45.3 % Final    Monocyte % 07/07/2023 8.8  5.0 - 12.0 % Final    Eosinophil % 07/07/2023 4.2  0.3 - 6.2 % Final    Basophil % 07/07/2023 1.3  0.0 - 1.5 % Final    Immature Grans % 07/07/2023 0.6 (H)  0.0 - 0.5 % Final    Neutrophils, Absolute 07/07/2023 3.07  1.70 - 7.00 10*3/mm3 Final    Lymphocytes, Absolute 07/07/2023 2.23  0.70 - 3.10 10*3/mm3 Final    Monocytes, Absolute 07/07/2023 0.55  0.10 - 0.90 10*3/mm3 Final    Eosinophils, Absolute 07/07/2023 0.26  0.00 - 0.40 10*3/mm3 Final    Basophils, Absolute 07/07/2023 0.08  0.00 - 0.20 10*3/mm3 Final    Immature Grans, Absolute 07/07/2023 0.04  0.00 - 0.05 10*3/mm3 Final    nRBC 07/07/2023 0.0  0.0 - 0.2 /100 WBC Final    Glucose 07/07/2023 107 (H)  65 - 99 mg/dL Final    BUN 07/07/2023 10  6 - 20 mg/dL Final    Creatinine 07/07/2023 1.20  0.76 - 1.27 mg/dL Final    Sodium 07/07/2023 143  136 - 145 mmol/L Final    Potassium 07/07/2023 4.7  3.5 - 5.2 mmol/L Final    Slight hemolysis detected by analyzer. Results may be affected.    Chloride 07/07/2023 105  98 - 107 mmol/L Final    CO2 07/07/2023 26.0  22.0 - 29.0 mmol/L Final    Calcium 07/07/2023 10.2  8.6 - 10.5 mg/dL Final    Total Protein 07/07/2023 7.1  6.0 - 8.5 g/dL Final    Albumin 07/07/2023 4.5  3.5 - 5.2 g/dL Final    ALT (SGPT)  07/07/2023 35  1 - 41 U/L Final    AST (SGOT) 07/07/2023 32  1 - 40 U/L Final    Slight hemolysis detected by analyzer. Results may be affected.    Alkaline Phosphatase 07/07/2023 78  39 - 117 U/L Final    Total Bilirubin 07/07/2023 0.5  0.0 - 1.2 mg/dL Final    Globulin 07/07/2023 2.6  gm/dL Final    A/G Ratio 07/07/2023 1.7  g/dL Final    BUN/Creatinine Ratio 07/07/2023 8.3  7.0 - 25.0 Final    Anion Gap 07/07/2023 12.0  5.0 - 15.0 mmol/L Final    eGFR 07/07/2023 71.9  >60.0 mL/min/1.73 Final    Hemoglobin A1C 07/07/2023 5.60  4.80 - 5.60 % Final    Total Cholesterol 07/07/2023 152  0 - 200 mg/dL Final    Triglycerides 07/07/2023 173 (H)  0 - 150 mg/dL Final    HDL Cholesterol 07/07/2023 35 (L)  40 - 60 mg/dL Final    LDL Cholesterol  07/07/2023 87  0 - 100 mg/dL Final    VLDL Cholesterol 07/07/2023 30  5 - 40 mg/dL Final    LDL/HDL Ratio 07/07/2023 2.35   Final    THC, Screen, Urine 07/07/2023 Negative  Negative Final    Phencyclidine (PCP), Urine 07/07/2023 Negative  Negative Final    Cocaine Screen, Urine 07/07/2023 Negative  Negative Final    Methamphetamine, Ur 07/07/2023 Negative  Negative Final    Opiate Screen 07/07/2023 Negative  Negative Final    Amphetamine Screen, Urine 07/07/2023 Positive (A)  Negative Final    Benzodiazepine Screen, Urine 07/07/2023 Negative  Negative Final    Tricyclic Antidepressants Screen 07/07/2023 Negative  Negative Final    Methadone Screen, Urine 07/07/2023 Negative  Negative Final    Barbiturates Screen, Urine 07/07/2023 Negative  Negative Final    Oxycodone Screen, Urine 07/07/2023 Negative  Negative Final    Propoxyphene Screen 07/07/2023 Negative  Negative Final    Buprenorphine, Screen, Urine 07/07/2023 Negative  Negative Final    Testosterone, Total 07/07/2023 585.00  193.00 - 740.00 ng/dL Final    PSA 07/07/2023 0.602  0.000 - 4.000 ng/mL Final    Fentanyl, Urine 07/07/2023 Negative  Negative Final   Lab on 05/03/2023   Component Date Value Ref Range Status    WBC  05/03/2023 5.70  3.40 - 10.80 10*3/mm3 Final    RBC 05/03/2023 5.72  4.14 - 5.80 10*6/mm3 Final    Hemoglobin 05/03/2023 17.9 (H)  13.0 - 17.7 g/dL Final    Hematocrit 05/03/2023 51.0  37.5 - 51.0 % Final    MCV 05/03/2023 89.2  79.0 - 97.0 fL Final    MCH 05/03/2023 31.3  26.6 - 33.0 pg Final    MCHC 05/03/2023 35.1  31.5 - 35.7 g/dL Final    RDW 05/03/2023 12.8  12.3 - 15.4 % Final    RDW-SD 05/03/2023 40.8  37.0 - 54.0 fl Final    MPV 05/03/2023 9.4  6.0 - 12.0 fL Final    Platelets 05/03/2023 232  140 - 450 10*3/mm3 Final    Neutrophil % 05/03/2023 50.1  42.7 - 76.0 % Final    Lymphocyte % 05/03/2023 37.2  19.6 - 45.3 % Final    Monocyte % 05/03/2023 8.2  5.0 - 12.0 % Final    Eosinophil % 05/03/2023 2.8  0.3 - 6.2 % Final    Basophil % 05/03/2023 1.2  0.0 - 1.5 % Final    Immature Grans % 05/03/2023 0.5  0.0 - 0.5 % Final    Neutrophils, Absolute 05/03/2023 2.85  1.70 - 7.00 10*3/mm3 Final    Lymphocytes, Absolute 05/03/2023 2.12  0.70 - 3.10 10*3/mm3 Final    Monocytes, Absolute 05/03/2023 0.47  0.10 - 0.90 10*3/mm3 Final    Eosinophils, Absolute 05/03/2023 0.16  0.00 - 0.40 10*3/mm3 Final    Basophils, Absolute 05/03/2023 0.07  0.00 - 0.20 10*3/mm3 Final    Immature Grans, Absolute 05/03/2023 0.03  0.00 - 0.05 10*3/mm3 Final    nRBC 05/03/2023 0.0  0.0 - 0.2 /100 WBC Final    Glucose 05/03/2023 98  65 - 99 mg/dL Final    BUN 05/03/2023 12  6 - 20 mg/dL Final    Creatinine 05/03/2023 1.11  0.76 - 1.27 mg/dL Final    Sodium 05/03/2023 136  136 - 145 mmol/L Final    Potassium 05/03/2023 4.7  3.5 - 5.2 mmol/L Final    Chloride 05/03/2023 99  98 - 107 mmol/L Final    CO2 05/03/2023 25.9  22.0 - 29.0 mmol/L Final    Calcium 05/03/2023 9.8  8.6 - 10.5 mg/dL Final    Total Protein 05/03/2023 7.5  6.0 - 8.5 g/dL Final    Albumin 05/03/2023 4.1  3.5 - 5.2 g/dL Final    ALT (SGPT) 05/03/2023 35  1 - 41 U/L Final    AST (SGOT) 05/03/2023 30  1 - 40 U/L Final    Alkaline Phosphatase 05/03/2023 78  39 - 117 U/L Final     Total Bilirubin 05/03/2023 1.1  0.0 - 1.2 mg/dL Final    Globulin 05/03/2023 3.4  gm/dL Final    A/G Ratio 05/03/2023 1.2  g/dL Final    BUN/Creatinine Ratio 05/03/2023 10.8  7.0 - 25.0 Final    Anion Gap 05/03/2023 11.1  5.0 - 15.0 mmol/L Final    eGFR 05/03/2023 78.9  >60.0 mL/min/1.73 Final    Hemoglobin A1C 05/03/2023 5.80 (H)  4.80 - 5.60 % Final    Total Cholesterol 05/03/2023 174  0 - 200 mg/dL Final    Triglycerides 05/03/2023 106  0 - 150 mg/dL Final    HDL Cholesterol 05/03/2023 42  40 - 60 mg/dL Final    LDL Cholesterol  05/03/2023 113 (H)  0 - 100 mg/dL Final    VLDL Cholesterol 05/03/2023 19  5 - 40 mg/dL Final    LDL/HDL Ratio 05/03/2023 2.64   Final    25 Hydroxy, Vitamin D 05/03/2023 35.2  30.0 - 100.0 ng/ml Final    Vitamin B-12 05/03/2023 503  211 - 946 pg/mL Final    Microalbumin/Creatinine Ratio 05/03/2023    Final    Unable to calculate    Creatinine, Urine 05/03/2023 26.1  mg/dL Final    Microalbumin, Urine 05/03/2023 <1.2  mg/dL Final    THC, Screen, Urine 05/03/2023 Negative  Negative Final    Phencyclidine (PCP), Urine 05/03/2023 Negative  Negative Final    Cocaine Screen, Urine 05/03/2023 Negative  Negative Final    Methamphetamine, Ur 05/03/2023 Negative  Negative Final    Opiate Screen 05/03/2023 Negative  Negative Final    Amphetamine Screen, Urine 05/03/2023 Positive (A)  Negative Final    Benzodiazepine Screen, Urine 05/03/2023 Negative  Negative Final    Tricyclic Antidepressants Screen 05/03/2023 Negative  Negative Final    Methadone Screen, Urine 05/03/2023 Negative  Negative Final    Barbiturates Screen, Urine 05/03/2023 Negative  Negative Final    Oxycodone Screen, Urine 05/03/2023 Negative  Negative Final    Propoxyphene Screen 05/03/2023 Negative  Negative Final    Buprenorphine, Screen, Urine 05/03/2023 Negative  Negative Final    Testosterone, Total 05/03/2023 218.00  193.00 - 740.00 ng/dL Final    Fentanyl, Urine 05/03/2023 Negative  Negative Final      XR Wrist 3+ View  "Right  Narrative: EXAMINATION:  XR WRIST 3+ VW RIGHT    CLINICAL HISTORY:  52 years Male,wrist pain, M25.531 Pain in  right wrist Z98.890 Other specified postprocedural states    COMPARISON:  5/16/2018 plain films    FINDINGS:  The right wrist is negative for acute fracture. No  dislocation. No appreciable degenerative change. No radiopaque  foreign body.  Impression: Negative exam of the right wrist.    Electronically signed by:  Kamron Vasquez MD  3/24/2023 8:07 PM CDT  Workstation: [id number]    @[email or web address]@  Immunization History   Administered Date(s) Administered    Influenza TIV (IM) 03/28/2017, 03/28/2017    Tdap 05/23/2016, 02/17/2017, 09/20/2019       The following portions of the patient's history were reviewed and updated as appropriate: allergies, current medications, past family history, past medical history, past social history, past surgical history and problem list.        Physical Exam  /84 (BP Location: Left arm, Patient Position: Sitting, Cuff Size: Adult)   Pulse 68   Temp 98 øF (36.7 øC)   Ht 180.3 cm (71\")   Wt 135 kg (296 lb 12.8 oz)   SpO2 96%   BMI 41.40 kg/mý     /84 (BP Location: Left arm, Patient Position: Sitting, Cuff Size: Adult)   Pulse 68   Temp 98 øF (36.7 øC)   Ht 180.3 cm (71\")   Wt 135 kg (296 lb 12.8 oz)   SpO2 96%   BMI 41.40 kg/mý       Physical Exam  Vitals and nursing note reviewed.   Constitutional:       Appearance: He is well-developed. He is not diaphoretic.   HENT:      Head: Normocephalic and atraumatic.      Right Ear: External ear normal.   Eyes:      Conjunctiva/sclera: Conjunctivae normal.      Pupils: Pupils are equal, round, and reactive to light.   Cardiovascular:      Rate and Rhythm: Normal rate and regular rhythm.      Heart sounds: Normal heart sounds. No murmur heard.  Pulmonary:      Effort: Pulmonary effort is normal. No respiratory distress.      Breath sounds: Normal breath sounds.   Abdominal:      General: Bowel sounds are " normal. There is no distension.      Palpations: Abdomen is soft.      Tenderness: There is no abdominal tenderness.      Comments: Obese abdomen    Musculoskeletal:         General: Tenderness present. No deformity.      Right wrist: Tenderness and bony tenderness present. Decreased range of motion.      Cervical back: Normal range of motion and neck supple.   Skin:     General: Skin is warm.      Coloration: Skin is not pale.      Findings: No erythema or rash.   Neurological:      Mental Status: He is alert and oriented to person, place, and time.      Cranial Nerves: No cranial nerve deficit.   Psychiatric:         Behavior: Behavior normal.       [unfilled]   Diagnosis Plan   1. Erythrocytosis  Ambulatory Referral to Hematology    CBC Auto Differential    Comprehensive Metabolic Panel    Hemoglobin A1c    Lipid Panel    Urine Drug Screen - Urine, Clean Catch      2. Mixed hyperlipidemia  CBC Auto Differential    Comprehensive Metabolic Panel    Hemoglobin A1c    Lipid Panel    Urine Drug Screen - Urine, Clean Catch      3. Essential hypertension  CBC Auto Differential    Comprehensive Metabolic Panel    Hemoglobin A1c    Lipid Panel    Urine Drug Screen - Urine, Clean Catch      4. Controlled type 2 diabetes mellitus with stage 2 chronic kidney disease, without long-term current use of insulin  CBC Auto Differential    Comprehensive Metabolic Panel    Hemoglobin A1c    Lipid Panel    Urine Drug Screen - Urine, Clean Catch      5. Low testosterone  CBC Auto Differential    Comprehensive Metabolic Panel    Hemoglobin A1c    Lipid Panel    Urine Drug Screen - Urine, Clean Catch      6. Vitamin D deficiency  CBC Auto Differential    Comprehensive Metabolic Panel    Hemoglobin A1c    Lipid Panel    Urine Drug Screen - Urine, Clean Catch      7. Gastroesophageal reflux disease, unspecified whether esophagitis present  CBC Auto Differential    Comprehensive Metabolic Panel    Hemoglobin A1c    Lipid Panel     Urine Drug Screen - Urine, Clean Catch      8. Gastritis without bleeding, unspecified chronicity, unspecified gastritis type  CBC Auto Differential    Comprehensive Metabolic Panel    Hemoglobin A1c    Lipid Panel    Urine Drug Screen - Urine, Clean Catch      9. Fatty liver  CBC Auto Differential    Comprehensive Metabolic Panel    Hemoglobin A1c    Lipid Panel    Urine Drug Screen - Urine, Clean Catch      10. Stage 2 chronic kidney disease  CBC Auto Differential    Comprehensive Metabolic Panel    Hemoglobin A1c    Lipid Panel    Urine Drug Screen - Urine, Clean Catch      11. Attention deficit hyperactivity disorder (ADHD), predominantly inattentive type  amphetamine-dextroamphetamine XR (ADDERALL XR) 25 MG 24 hr capsule    CBC Auto Differential    Comprehensive Metabolic Panel    Hemoglobin A1c    Lipid Panel    Urine Drug Screen - Urine, Clean Catch      12. Encounter for monitoring testosterone replacement therapy  Ambulatory Referral to Hematology    CBC Auto Differential    Comprehensive Metabolic Panel    Hemoglobin A1c    Lipid Panel    Urine Drug Screen - Urine, Clean Catch      13. Morbid (severe) obesity due to excess calories                   -went over labwork  -recommend diabetic eye exam   -right wrist pain/history of carpal tunnel - will get x-ray. Refer to Orthopedics. Start on diclofenac gel topical QID. Stop iburpofen and celebrex  -polycythemia/erythrocytosis  - continue to monitor recheck CBC on next labdraw.recommend Hematology referral   -right hip and  Right shoulder pain - Orthopedic following on celebrex  -DM type 2-  Recommend ADA diet. On metformin  mg PO q daily. consider ozempic injections gave drug inofrmation  -tachycardia/palpitations - Cardiology following. Had recent echocardiogrram and TST.  on toprol XL 50 mg daily.   -abdominal pain/fatty liver/nausea vomiting/Gastritis/GERD with esophagitis - on protonix 40 mg daily. .  GI following had recent EGD   -low  testosterone/low libido/fatigue - referred to Urology now on testosterone injections   -ckd stage 2 - continue to monitor.  -Vitamin D deficiency vitamin D pill once a week   -HLP - on crestor 20 mg PO qhs. Recommend heart healthy diet recommend repatha injection gave drug information.   -HTN - on lisionpril 20 mg daily on Toprol XL  50 mg daily   -ADD- stable  on Adderrall XR 25 mg caban.reviewed last UDS and ERWIN   -GERD - on protonix 40 mg PO q daily.    -allergic rhinitis - continue singulair and flonase on allegra 60 mg PO BID   -PAULINA on CPAP - recommend pt start using machine. Sleep medicine following    hyperlipidemia - on crestor 40 mg PO qhs hold on  repatha injjections 140 mg subq every 2 weeks   -morbid  obesity - recommend ketogenic diet along with intermittent fasting. Counseled weight loss >5 minutes  BMI at 41.40   -advised pt to be safe and call with questions and concerns  -advised to go to ER or call 911 if symptoms get severe  -advised to followup with specialist and referrals   -advised pt to be safe during COVID-19 pandemic  I spent  36 minutes caring for Randall on this date of service. This time includes time spent by me in the following activities: preparing for the visit, reviewing tests, obtaining and/or reviewing a separately obtained history, performing a medically appropriate examination and/or evaluation, counseling and educating the patient/family/caregiver, ordering medications, tests, or procedures, referring and communicating with other health care professionals, documenting information in the medical record, independently interpreting results and communicating that information with the patient/family/caregiver, and care coordination   -recheck in 2 months         This document has been electronically signed by Scottie Borges MD on August 29, 2023 09:35 CDT

## 2023-08-21 RX ORDER — METOPROLOL SUCCINATE 50 MG/1
50 TABLET, EXTENDED RELEASE ORAL DAILY
Qty: 30 TABLET | Refills: 3 | Status: SHIPPED | OUTPATIENT
Start: 2023-08-21

## 2023-08-29 ENCOUNTER — OFFICE VISIT (OUTPATIENT)
Dept: FAMILY MEDICINE CLINIC | Facility: CLINIC | Age: 53
End: 2023-08-29
Payer: COMMERCIAL

## 2023-08-29 VITALS
BODY MASS INDEX: 41.55 KG/M2 | HEART RATE: 68 BPM | OXYGEN SATURATION: 96 % | WEIGHT: 296.8 LBS | DIASTOLIC BLOOD PRESSURE: 84 MMHG | TEMPERATURE: 98 F | SYSTOLIC BLOOD PRESSURE: 126 MMHG | HEIGHT: 71 IN

## 2023-08-29 DIAGNOSIS — K29.70 GASTRITIS WITHOUT BLEEDING, UNSPECIFIED CHRONICITY, UNSPECIFIED GASTRITIS TYPE: ICD-10-CM

## 2023-08-29 DIAGNOSIS — I10 ESSENTIAL HYPERTENSION: ICD-10-CM

## 2023-08-29 DIAGNOSIS — Z79.890 ENCOUNTER FOR MONITORING TESTOSTERONE REPLACEMENT THERAPY: ICD-10-CM

## 2023-08-29 DIAGNOSIS — N18.2 CONTROLLED TYPE 2 DIABETES MELLITUS WITH STAGE 2 CHRONIC KIDNEY DISEASE, WITHOUT LONG-TERM CURRENT USE OF INSULIN: ICD-10-CM

## 2023-08-29 DIAGNOSIS — K76.0 FATTY LIVER: ICD-10-CM

## 2023-08-29 DIAGNOSIS — E55.9 VITAMIN D DEFICIENCY: ICD-10-CM

## 2023-08-29 DIAGNOSIS — E78.2 MIXED HYPERLIPIDEMIA: ICD-10-CM

## 2023-08-29 DIAGNOSIS — E11.22 CONTROLLED TYPE 2 DIABETES MELLITUS WITH STAGE 2 CHRONIC KIDNEY DISEASE, WITHOUT LONG-TERM CURRENT USE OF INSULIN: ICD-10-CM

## 2023-08-29 DIAGNOSIS — F90.0 ATTENTION DEFICIT HYPERACTIVITY DISORDER (ADHD), PREDOMINANTLY INATTENTIVE TYPE: ICD-10-CM

## 2023-08-29 DIAGNOSIS — Z51.81 ENCOUNTER FOR MONITORING TESTOSTERONE REPLACEMENT THERAPY: ICD-10-CM

## 2023-08-29 DIAGNOSIS — N18.2 STAGE 2 CHRONIC KIDNEY DISEASE: ICD-10-CM

## 2023-08-29 DIAGNOSIS — K21.9 GASTROESOPHAGEAL REFLUX DISEASE, UNSPECIFIED WHETHER ESOPHAGITIS PRESENT: ICD-10-CM

## 2023-08-29 DIAGNOSIS — D75.1 ERYTHROCYTOSIS: Primary | ICD-10-CM

## 2023-08-29 DIAGNOSIS — E66.01 MORBID (SEVERE) OBESITY DUE TO EXCESS CALORIES: ICD-10-CM

## 2023-08-29 DIAGNOSIS — R79.89 LOW TESTOSTERONE: ICD-10-CM

## 2023-08-29 RX ORDER — DEXTROAMPHETAMINE SACCHARATE, AMPHETAMINE ASPARTATE MONOHYDRATE, DEXTROAMPHETAMINE SULFATE AND AMPHETAMINE SULFATE 6.25; 6.25; 6.25; 6.25 MG/1; MG/1; MG/1; MG/1
25 CAPSULE, EXTENDED RELEASE ORAL EVERY MORNING
Qty: 30 CAPSULE | Refills: 0 | Status: SHIPPED | OUTPATIENT
Start: 2023-08-29

## 2023-08-29 NOTE — PATIENT INSTRUCTIONS
Recommend Hematology referral for high hemoglobin     Fish oil supplement omega 3 fatty acid  with DHA and EPA

## 2023-09-08 ENCOUNTER — TRANSCRIBE ORDERS (OUTPATIENT)
Dept: LAB | Facility: HOSPITAL | Age: 53
End: 2023-09-08
Payer: COMMERCIAL

## 2023-09-08 ENCOUNTER — LAB (OUTPATIENT)
Dept: LAB | Facility: HOSPITAL | Age: 53
End: 2023-09-08
Payer: COMMERCIAL

## 2023-09-08 DIAGNOSIS — E29.1 HYPOGONADISM MALE: ICD-10-CM

## 2023-09-08 DIAGNOSIS — E29.1 HYPOGONADISM MALE: Primary | ICD-10-CM

## 2023-09-08 PROCEDURE — 84403 ASSAY OF TOTAL TESTOSTERONE: CPT

## 2023-09-08 PROCEDURE — 85025 COMPLETE CBC W/AUTO DIFF WBC: CPT

## 2023-09-09 LAB
BASOPHILS # BLD AUTO: 0.08 10*3/MM3 (ref 0–0.2)
BASOPHILS NFR BLD AUTO: 1.1 % (ref 0–1.5)
DEPRECATED RDW RBC AUTO: 41.4 FL (ref 37–54)
EOSINOPHIL # BLD AUTO: 0.22 10*3/MM3 (ref 0–0.4)
EOSINOPHIL NFR BLD AUTO: 2.9 % (ref 0.3–6.2)
ERYTHROCYTE [DISTWIDTH] IN BLOOD BY AUTOMATED COUNT: 12.4 % (ref 12.3–15.4)
HCT VFR BLD AUTO: 53.4 % (ref 37.5–51)
HGB BLD-MCNC: 18.4 G/DL (ref 13–17.7)
IMM GRANULOCYTES # BLD AUTO: 0.04 10*3/MM3 (ref 0–0.05)
IMM GRANULOCYTES NFR BLD AUTO: 0.5 % (ref 0–0.5)
LYMPHOCYTES # BLD AUTO: 2.26 10*3/MM3 (ref 0.7–3.1)
LYMPHOCYTES NFR BLD AUTO: 30.3 % (ref 19.6–45.3)
MCH RBC QN AUTO: 31.2 PG (ref 26.6–33)
MCHC RBC AUTO-ENTMCNC: 34.5 G/DL (ref 31.5–35.7)
MCV RBC AUTO: 90.5 FL (ref 79–97)
MONOCYTES # BLD AUTO: 0.61 10*3/MM3 (ref 0.1–0.9)
MONOCYTES NFR BLD AUTO: 8.2 % (ref 5–12)
NEUTROPHILS NFR BLD AUTO: 4.26 10*3/MM3 (ref 1.7–7)
NEUTROPHILS NFR BLD AUTO: 57 % (ref 42.7–76)
NRBC BLD AUTO-RTO: 0 /100 WBC (ref 0–0.2)
PLATELET # BLD AUTO: 236 10*3/MM3 (ref 140–450)
PMV BLD AUTO: 9.4 FL (ref 6–12)
RBC # BLD AUTO: 5.9 10*6/MM3 (ref 4.14–5.8)
TESTOST SERPL-MCNC: 661 NG/DL (ref 193–740)
WBC NRBC COR # BLD: 7.47 10*3/MM3 (ref 3.4–10.8)

## 2023-09-18 ENCOUNTER — TRANSCRIBE ORDERS (OUTPATIENT)
Dept: LAB | Facility: HOSPITAL | Age: 53
End: 2023-09-18
Payer: COMMERCIAL

## 2023-09-18 DIAGNOSIS — R53.83 OTHER FATIGUE: Primary | ICD-10-CM

## 2023-09-29 DIAGNOSIS — F90.0 ATTENTION DEFICIT HYPERACTIVITY DISORDER (ADHD), PREDOMINANTLY INATTENTIVE TYPE: ICD-10-CM

## 2023-10-03 RX ORDER — DEXTROAMPHETAMINE SACCHARATE, AMPHETAMINE ASPARTATE MONOHYDRATE, DEXTROAMPHETAMINE SULFATE AND AMPHETAMINE SULFATE 6.25; 6.25; 6.25; 6.25 MG/1; MG/1; MG/1; MG/1
25 CAPSULE, EXTENDED RELEASE ORAL EVERY MORNING
Qty: 30 CAPSULE | Refills: 0 | Status: SHIPPED | OUTPATIENT
Start: 2023-10-03

## 2023-10-13 NOTE — PATIENT INSTRUCTIONS
2nd call - tried to call patient again but phone is just ringing no answer or voice mail, please send letter to patient. Biotene mouth wash for dry mouth.      Monkfruit sugar?    Will refer to urology for testosterone.

## (undated) DEVICE — NDL HYPO SFTY GLD 22G 1 1/2IN

## (undated) DEVICE — GAUZE,SPONGE,FLUFF,6"X6.75",STRL,5/TRAY: Brand: MEDLINE

## (undated) DEVICE — GLV SURG SENSICARE POLYISPRN W/ALOE PF LF 6.5 GRN STRL

## (undated) DEVICE — GLV SURG NEOLON 2G PF LF 6.5 STRL

## (undated) DEVICE — UNDRPD BREATH 23X36 BG/10

## (undated) DEVICE — SPLNT PLSTR CAST FAST 3IN

## (undated) DEVICE — NDL HYPO ECLPS SFTY 25G 1 1/2IN

## (undated) DEVICE — SUT ETHLN 4/0 FS2 18IN 662H

## (undated) DEVICE — GLV SURG SENSICARE PI PF LF 7 GRN STRL

## (undated) DEVICE — SOL IRR NACL 0.9PCT BT 1000ML

## (undated) DEVICE — SYR LL TP 10ML STRL

## (undated) DEVICE — GLV SURG TRIUMPH LT PF LTX 6 STRL

## (undated) DEVICE — SINGLE-USE BIOPSY FORCEPS: Brand: RADIAL JAW 4

## (undated) DEVICE — BLD SCLPL BEAVR MINI STR 2BVL 180D LF

## (undated) DEVICE — GLV SURG ORTHO BIOGEL OPTIFIT PF SZ9

## (undated) DEVICE — PK EXTRM LF 60

## (undated) DEVICE — BNDG ELAS CO-FLEX SLF ADHR 4IN5YD LF STRL

## (undated) DEVICE — BITEBLOCK ENDO W/STRAP 60F A/ LF DISP

## (undated) DEVICE — STERILE POLYISOPRENE POWDER-FREE SURGICAL GLOVES WITH EMOLLIENT COATING: Brand: PROTEXIS

## (undated) DEVICE — DRSNG GZ CURAD XEROFORM NONADHS 5X9IN STRL

## (undated) DEVICE — PAD UNDERCAST WYTEX 4IN 4YD LF STRL

## (undated) DEVICE — DISPOSABLE TOURNIQUET CUFF SINGLE BLADDER, DUAL PORT AND QUICK CONNECT CONNECTOR: Brand: COLOR CUFF

## (undated) DEVICE — SPNG GZ WOVN 4X4IN 12PLY 10/BX STRL

## (undated) DEVICE — NDL HYPO PRECISIONGLIDE/REG 18G 1IN PNK

## (undated) DEVICE — GOWN,AURORA,NOREINF,RAGLAN,XL,STERILE: Brand: MEDLINE

## (undated) DEVICE — GLV SURG SENSICARE PI LF PF 7.5 GRN STRL

## (undated) DEVICE — GLV SURG TRIUMPH LT PF LTX 7.5 STRL